# Patient Record
Sex: MALE | Race: WHITE | NOT HISPANIC OR LATINO
[De-identification: names, ages, dates, MRNs, and addresses within clinical notes are randomized per-mention and may not be internally consistent; named-entity substitution may affect disease eponyms.]

---

## 2017-03-09 PROBLEM — Z00.00 ENCOUNTER FOR PREVENTIVE HEALTH EXAMINATION: Status: ACTIVE | Noted: 2017-03-09

## 2017-03-15 ENCOUNTER — APPOINTMENT (OUTPATIENT)
Dept: UROLOGY | Facility: CLINIC | Age: 62
End: 2017-03-15

## 2017-04-03 ENCOUNTER — RECORD ABSTRACTING (OUTPATIENT)
Age: 62
End: 2017-04-03

## 2017-04-03 DIAGNOSIS — Z87.828 PERSONAL HISTORY OF OTHER (HEALED) PHYSICAL INJURY AND TRAUMA: ICD-10-CM

## 2017-04-03 DIAGNOSIS — Z78.9 OTHER SPECIFIED HEALTH STATUS: ICD-10-CM

## 2017-04-03 DIAGNOSIS — Z86.79 PERSONAL HISTORY OF OTHER DISEASES OF THE CIRCULATORY SYSTEM: ICD-10-CM

## 2017-04-03 DIAGNOSIS — Z21 ASYMPTOMATIC HUMAN IMMUNODEFICIENCY VIRUS [HIV] INFECTION STATUS: ICD-10-CM

## 2017-04-03 DIAGNOSIS — J86.9 PYOTHORAX W/OUT FISTULA: ICD-10-CM

## 2017-04-03 DIAGNOSIS — Z86.39 PERSONAL HISTORY OF OTHER ENDOCRINE, NUTRITIONAL AND METABOLIC DISEASE: ICD-10-CM

## 2017-04-26 ENCOUNTER — APPOINTMENT (OUTPATIENT)
Dept: UROLOGY | Facility: CLINIC | Age: 62
End: 2017-04-26

## 2017-04-26 VITALS — SYSTOLIC BLOOD PRESSURE: 194 MMHG | HEART RATE: 89 BPM | DIASTOLIC BLOOD PRESSURE: 110 MMHG

## 2017-04-26 DIAGNOSIS — Z87.438 PERSONAL HISTORY OF OTHER DISEASES OF MALE GENITAL ORGANS: ICD-10-CM

## 2017-04-26 RX ORDER — ASPIRIN 325 MG/1
325 TABLET, FILM COATED ORAL
Refills: 0 | Status: ACTIVE | COMMUNITY

## 2017-04-26 RX ORDER — METOPROLOL TARTRATE 100 MG/1
100 TABLET, FILM COATED ORAL
Refills: 0 | Status: ACTIVE | COMMUNITY

## 2017-04-26 RX ORDER — AMLODIPINE BESYLATE AND BENAZEPRIL HYDROCHLORIDE 10; 20 MG/1; MG/1
10-20 CAPSULE ORAL
Refills: 0 | Status: ACTIVE | COMMUNITY

## 2017-04-26 RX ORDER — BLOOD SUGAR DIAGNOSTIC
STRIP MISCELLANEOUS
Qty: 100 | Refills: 0 | Status: ACTIVE | COMMUNITY
Start: 2016-12-05

## 2017-05-31 ENCOUNTER — APPOINTMENT (OUTPATIENT)
Dept: UROLOGY | Facility: CLINIC | Age: 62
End: 2017-05-31

## 2017-06-29 ENCOUNTER — APPOINTMENT (OUTPATIENT)
Dept: UROLOGY | Facility: CLINIC | Age: 62
End: 2017-06-29

## 2017-06-29 RX ORDER — ISOPROPYL ALCOHOL 0.75 G/1
SWAB TOPICAL
Qty: 100 | Refills: 0 | Status: ACTIVE | COMMUNITY
Start: 2017-06-22

## 2017-08-08 ENCOUNTER — APPOINTMENT (OUTPATIENT)
Dept: UROLOGY | Facility: CLINIC | Age: 62
End: 2017-08-08
Payer: MEDICARE

## 2017-08-08 PROCEDURE — 51705 CHANGE OF BLADDER TUBE: CPT

## 2017-08-08 RX ORDER — NYSTATIN 100000 [USP'U]/G
100000 CREAM TOPICAL
Qty: 30 | Refills: 0 | Status: ACTIVE | COMMUNITY
Start: 2017-06-30

## 2017-08-08 RX ORDER — TRIAMCINOLONE ACETONIDE 0.25 MG/G
0.03 OINTMENT TOPICAL
Qty: 30 | Refills: 0 | Status: ACTIVE | COMMUNITY
Start: 2017-06-30

## 2017-08-17 ENCOUNTER — APPOINTMENT (OUTPATIENT)
Dept: UROLOGY | Facility: CLINIC | Age: 62
End: 2017-08-17

## 2017-09-07 ENCOUNTER — APPOINTMENT (OUTPATIENT)
Dept: UROLOGY | Facility: CLINIC | Age: 62
End: 2017-09-07
Payer: MEDICARE

## 2017-09-07 PROCEDURE — 51710 CHANGE OF BLADDER TUBE: CPT

## 2017-10-05 ENCOUNTER — APPOINTMENT (OUTPATIENT)
Dept: UROLOGY | Facility: CLINIC | Age: 62
End: 2017-10-05
Payer: MEDICARE

## 2017-10-05 VITALS — DIASTOLIC BLOOD PRESSURE: 89 MMHG | SYSTOLIC BLOOD PRESSURE: 154 MMHG | HEART RATE: 88 BPM

## 2017-10-05 PROCEDURE — 51705 CHANGE OF BLADDER TUBE: CPT

## 2017-10-10 ENCOUNTER — RX RENEWAL (OUTPATIENT)
Age: 62
End: 2017-10-10

## 2017-11-02 ENCOUNTER — APPOINTMENT (OUTPATIENT)
Dept: UROLOGY | Facility: CLINIC | Age: 62
End: 2017-11-02
Payer: MEDICARE

## 2017-11-02 VITALS
HEIGHT: 74 IN | WEIGHT: 180 LBS | SYSTOLIC BLOOD PRESSURE: 129 MMHG | HEART RATE: 81 BPM | BODY MASS INDEX: 23.1 KG/M2 | DIASTOLIC BLOOD PRESSURE: 79 MMHG

## 2017-11-02 PROCEDURE — 51705 CHANGE OF BLADDER TUBE: CPT

## 2017-11-28 ENCOUNTER — APPOINTMENT (OUTPATIENT)
Dept: UROLOGY | Facility: CLINIC | Age: 62
End: 2017-11-28
Payer: MEDICARE

## 2017-11-28 PROCEDURE — 51710 CHANGE OF BLADDER TUBE: CPT

## 2017-12-28 ENCOUNTER — APPOINTMENT (OUTPATIENT)
Dept: UROLOGY | Facility: CLINIC | Age: 62
End: 2017-12-28
Payer: MEDICARE

## 2017-12-28 ENCOUNTER — MESSAGE (OUTPATIENT)
Age: 62
End: 2017-12-28

## 2017-12-28 ENCOUNTER — OUTPATIENT (OUTPATIENT)
Dept: OUTPATIENT SERVICES | Facility: HOSPITAL | Age: 62
LOS: 1 days | Discharge: HOME | End: 2017-12-28

## 2017-12-28 DIAGNOSIS — N31.9 NEUROMUSCULAR DYSFUNCTION OF BLADDER, UNSPECIFIED: ICD-10-CM

## 2017-12-28 PROCEDURE — 51710 CHANGE OF BLADDER TUBE: CPT

## 2017-12-29 ENCOUNTER — RESULT REVIEW (OUTPATIENT)
Age: 62
End: 2017-12-29

## 2018-01-23 ENCOUNTER — OUTPATIENT (OUTPATIENT)
Dept: OUTPATIENT SERVICES | Facility: HOSPITAL | Age: 63
LOS: 1 days | Discharge: HOME | End: 2018-01-23

## 2018-01-23 ENCOUNTER — APPOINTMENT (OUTPATIENT)
Dept: UROLOGY | Facility: CLINIC | Age: 63
End: 2018-01-23
Payer: MEDICARE

## 2018-01-23 DIAGNOSIS — N20.0 CALCULUS OF KIDNEY: ICD-10-CM

## 2018-01-23 PROCEDURE — 51710 CHANGE OF BLADDER TUBE: CPT

## 2018-01-25 ENCOUNTER — APPOINTMENT (OUTPATIENT)
Dept: UROLOGY | Facility: CLINIC | Age: 63
End: 2018-01-25

## 2018-01-29 LAB
COMPN STONE: NORMAL
COMPN STONE: NORMAL
NIDUS STONE QL: NORMAL
SPECIMEN SOURCE: NORMAL
WT STONE: 0.03 G

## 2018-02-22 ENCOUNTER — APPOINTMENT (OUTPATIENT)
Dept: UROLOGY | Facility: CLINIC | Age: 63
End: 2018-02-22
Payer: MEDICARE

## 2018-02-22 VITALS
HEIGHT: 74 IN | DIASTOLIC BLOOD PRESSURE: 75 MMHG | WEIGHT: 180 LBS | SYSTOLIC BLOOD PRESSURE: 131 MMHG | HEART RATE: 77 BPM | BODY MASS INDEX: 23.1 KG/M2

## 2018-02-22 PROCEDURE — 51710 CHANGE OF BLADDER TUBE: CPT

## 2018-02-22 RX ORDER — CEFUROXIME AXETIL 250 MG/1
250 TABLET ORAL
Qty: 30 | Refills: 5 | Status: ACTIVE | COMMUNITY
Start: 2018-02-22 | End: 1900-01-01

## 2018-02-28 LAB — COMPN STONE: NORMAL

## 2018-03-22 ENCOUNTER — APPOINTMENT (OUTPATIENT)
Dept: UROLOGY | Facility: CLINIC | Age: 63
End: 2018-03-22

## 2018-03-29 ENCOUNTER — APPOINTMENT (OUTPATIENT)
Dept: UROLOGY | Facility: CLINIC | Age: 63
End: 2018-03-29
Payer: MEDICARE

## 2018-03-29 PROCEDURE — 51705 CHANGE OF BLADDER TUBE: CPT

## 2018-04-11 ENCOUNTER — RX RENEWAL (OUTPATIENT)
Age: 63
End: 2018-04-11

## 2018-04-11 RX ORDER — SOLIFENACIN SUCCINATE 10 MG/1
10 TABLET, FILM COATED ORAL
Qty: 90 | Refills: 3 | Status: ACTIVE | COMMUNITY
Start: 2018-04-11 | End: 1900-01-01

## 2018-04-26 ENCOUNTER — APPOINTMENT (OUTPATIENT)
Dept: UROLOGY | Facility: CLINIC | Age: 63
End: 2018-04-26

## 2018-05-03 ENCOUNTER — APPOINTMENT (OUTPATIENT)
Dept: UROLOGY | Facility: CLINIC | Age: 63
End: 2018-05-03
Payer: MEDICARE

## 2018-05-03 PROCEDURE — 51710 CHANGE OF BLADDER TUBE: CPT

## 2018-05-03 RX ORDER — PEN NEEDLE, DIABETIC 31 GX3/16"
31G X 8 MM NEEDLE, DISPOSABLE MISCELLANEOUS
Qty: 100 | Refills: 0 | Status: ACTIVE | COMMUNITY
Start: 2017-10-10

## 2018-05-03 RX ORDER — CLOBETASOL PROPIONATE 0.5 MG/G
0.05 OINTMENT TOPICAL
Qty: 60 | Refills: 0 | Status: ACTIVE | COMMUNITY
Start: 2018-02-05

## 2018-05-03 RX ORDER — INSULIN DETEMIR 100 [IU]/ML
100 INJECTION, SOLUTION SUBCUTANEOUS
Qty: 12 | Refills: 0 | Status: ACTIVE | COMMUNITY
Start: 2017-06-19

## 2018-05-29 ENCOUNTER — APPOINTMENT (OUTPATIENT)
Dept: UROLOGY | Facility: CLINIC | Age: 63
End: 2018-05-29
Payer: MEDICARE

## 2018-05-29 PROCEDURE — 51710 CHANGE OF BLADDER TUBE: CPT

## 2018-06-26 ENCOUNTER — APPOINTMENT (OUTPATIENT)
Dept: UROLOGY | Facility: CLINIC | Age: 63
End: 2018-06-26
Payer: MEDICARE

## 2018-06-26 VITALS
BODY MASS INDEX: 23.1 KG/M2 | HEART RATE: 85 BPM | DIASTOLIC BLOOD PRESSURE: 86 MMHG | HEIGHT: 74 IN | WEIGHT: 180 LBS | SYSTOLIC BLOOD PRESSURE: 130 MMHG

## 2018-06-26 PROCEDURE — 51705 CHANGE OF BLADDER TUBE: CPT

## 2018-07-17 ENCOUNTER — APPOINTMENT (OUTPATIENT)
Dept: UROLOGY | Facility: CLINIC | Age: 63
End: 2018-07-17
Payer: MEDICARE

## 2018-07-17 PROCEDURE — 51710 CHANGE OF BLADDER TUBE: CPT

## 2018-08-14 ENCOUNTER — APPOINTMENT (OUTPATIENT)
Dept: UROLOGY | Facility: CLINIC | Age: 63
End: 2018-08-14
Payer: MEDICARE

## 2018-08-14 PROCEDURE — 51710 CHANGE OF BLADDER TUBE: CPT

## 2018-08-14 RX ORDER — TRIAMCINOLONE ACETONIDE 1 MG/G
0.1 OINTMENT TOPICAL
Qty: 454 | Refills: 0 | Status: ACTIVE | COMMUNITY
Start: 2018-08-03

## 2018-08-14 RX ORDER — PETROLATUM,WHITE 41 %
OINTMENT (GRAM) TOPICAL
Qty: 396 | Refills: 0 | Status: ACTIVE | COMMUNITY
Start: 2018-08-03

## 2018-08-14 RX ORDER — LANCETS 28 GAUGE
EACH MISCELLANEOUS
Qty: 100 | Refills: 0 | Status: ACTIVE | COMMUNITY
Start: 2017-11-09

## 2018-09-11 ENCOUNTER — APPOINTMENT (OUTPATIENT)
Dept: UROLOGY | Facility: CLINIC | Age: 63
End: 2018-09-11
Payer: MEDICARE

## 2018-09-11 DIAGNOSIS — Z09 ENCOUNTER FOR FOLLOW-UP EXAMINATION AFTER COMPLETED TREATMENT FOR CONDITIONS OTHER THAN MALIGNANT NEOPLASM: ICD-10-CM

## 2018-09-11 PROCEDURE — 51710 CHANGE OF BLADDER TUBE: CPT

## 2018-10-11 ENCOUNTER — APPOINTMENT (OUTPATIENT)
Dept: UROLOGY | Facility: CLINIC | Age: 63
End: 2018-10-11

## 2018-10-19 ENCOUNTER — APPOINTMENT (OUTPATIENT)
Dept: UROLOGY | Facility: CLINIC | Age: 63
End: 2018-10-19
Payer: MEDICARE

## 2018-10-19 PROCEDURE — 51710 CHANGE OF BLADDER TUBE: CPT

## 2018-11-20 ENCOUNTER — APPOINTMENT (OUTPATIENT)
Dept: UROLOGY | Facility: CLINIC | Age: 63
End: 2018-11-20
Payer: MEDICARE

## 2018-11-20 PROCEDURE — 51710 CHANGE OF BLADDER TUBE: CPT

## 2018-11-21 ENCOUNTER — RX RENEWAL (OUTPATIENT)
Age: 63
End: 2018-11-21

## 2018-11-21 RX ORDER — CEFUROXIME AXETIL 250 MG/1
250 TABLET ORAL
Qty: 30 | Refills: 5 | Status: ACTIVE | COMMUNITY
Start: 2018-11-21 | End: 1900-01-01

## 2018-12-18 ENCOUNTER — APPOINTMENT (OUTPATIENT)
Dept: UROLOGY | Facility: CLINIC | Age: 63
End: 2018-12-18
Payer: MEDICARE

## 2018-12-18 PROCEDURE — 51710 CHANGE OF BLADDER TUBE: CPT

## 2019-01-03 ENCOUNTER — APPOINTMENT (OUTPATIENT)
Dept: UROLOGY | Facility: CLINIC | Age: 64
End: 2019-01-03
Payer: MEDICARE

## 2019-01-03 PROCEDURE — 51710 CHANGE OF BLADDER TUBE: CPT

## 2019-01-18 ENCOUNTER — APPOINTMENT (OUTPATIENT)
Dept: UROLOGY | Facility: CLINIC | Age: 64
End: 2019-01-18

## 2019-01-31 ENCOUNTER — APPOINTMENT (OUTPATIENT)
Dept: UROLOGY | Facility: CLINIC | Age: 64
End: 2019-01-31

## 2019-02-07 ENCOUNTER — APPOINTMENT (OUTPATIENT)
Dept: UROLOGY | Facility: CLINIC | Age: 64
End: 2019-02-07
Payer: MEDICARE

## 2019-02-07 VITALS — HEIGHT: 74 IN | BODY MASS INDEX: 23.1 KG/M2 | WEIGHT: 180 LBS

## 2019-02-07 PROCEDURE — 51710 CHANGE OF BLADDER TUBE: CPT

## 2019-03-07 ENCOUNTER — APPOINTMENT (OUTPATIENT)
Dept: UROLOGY | Facility: CLINIC | Age: 64
End: 2019-03-07
Payer: MEDICARE

## 2019-03-07 PROCEDURE — 51710 CHANGE OF BLADDER TUBE: CPT

## 2019-03-07 RX ORDER — SOLIFENACIN SUCCINATE 10 MG/1
10 TABLET, FILM COATED ORAL
Qty: 90 | Refills: 3 | Status: ACTIVE | COMMUNITY
Start: 2019-03-07 | End: 1900-01-01

## 2019-04-04 ENCOUNTER — APPOINTMENT (OUTPATIENT)
Dept: UROLOGY | Facility: CLINIC | Age: 64
End: 2019-04-04
Payer: MEDICARE

## 2019-04-04 PROCEDURE — 51710 CHANGE OF BLADDER TUBE: CPT

## 2019-04-12 ENCOUNTER — RX RENEWAL (OUTPATIENT)
Age: 64
End: 2019-04-12

## 2019-04-12 RX ORDER — METHENAMINE HIPPURATE 1 G/1
1 TABLET ORAL
Qty: 90 | Refills: 3 | Status: ACTIVE | COMMUNITY
Start: 2019-04-12 | End: 1900-01-01

## 2019-05-07 ENCOUNTER — APPOINTMENT (OUTPATIENT)
Dept: UROLOGY | Facility: CLINIC | Age: 64
End: 2019-05-07
Payer: MEDICARE

## 2019-05-07 PROCEDURE — 51710 CHANGE OF BLADDER TUBE: CPT

## 2019-06-06 ENCOUNTER — APPOINTMENT (OUTPATIENT)
Dept: UROLOGY | Facility: CLINIC | Age: 64
End: 2019-06-06
Payer: MEDICARE

## 2019-06-06 PROCEDURE — 51710 CHANGE OF BLADDER TUBE: CPT

## 2019-07-09 ENCOUNTER — APPOINTMENT (OUTPATIENT)
Dept: UROLOGY | Facility: CLINIC | Age: 64
End: 2019-07-09
Payer: MEDICARE

## 2019-07-09 PROCEDURE — 51702 INSERT TEMP BLADDER CATH: CPT

## 2019-07-17 ENCOUNTER — RX RENEWAL (OUTPATIENT)
Age: 64
End: 2019-07-17

## 2019-08-06 ENCOUNTER — APPOINTMENT (OUTPATIENT)
Dept: UROLOGY | Facility: CLINIC | Age: 64
End: 2019-08-06
Payer: MEDICARE

## 2019-08-06 PROCEDURE — 51703 INSERT BLADDER CATH COMPLEX: CPT

## 2019-09-02 PROBLEM — Z09 FOLLOW UP: Status: ACTIVE | Noted: 2018-07-17

## 2019-09-03 ENCOUNTER — APPOINTMENT (OUTPATIENT)
Dept: UROLOGY | Facility: CLINIC | Age: 64
End: 2019-09-03
Payer: MEDICARE

## 2019-09-03 PROCEDURE — 51710 CHANGE OF BLADDER TUBE: CPT

## 2019-10-02 ENCOUNTER — APPOINTMENT (OUTPATIENT)
Dept: UROLOGY | Facility: CLINIC | Age: 64
End: 2019-10-02

## 2019-10-08 ENCOUNTER — APPOINTMENT (OUTPATIENT)
Dept: UROLOGY | Facility: CLINIC | Age: 64
End: 2019-10-08
Payer: MEDICARE

## 2019-10-08 PROCEDURE — 51710 CHANGE OF BLADDER TUBE: CPT

## 2019-11-05 ENCOUNTER — APPOINTMENT (OUTPATIENT)
Dept: UROLOGY | Facility: CLINIC | Age: 64
End: 2019-11-05
Payer: MEDICARE

## 2019-11-05 PROCEDURE — 51705 CHANGE OF BLADDER TUBE: CPT

## 2019-12-05 ENCOUNTER — APPOINTMENT (OUTPATIENT)
Dept: UROLOGY | Facility: CLINIC | Age: 64
End: 2019-12-05
Payer: MEDICARE

## 2019-12-05 PROCEDURE — 51710 CHANGE OF BLADDER TUBE: CPT

## 2020-01-06 ENCOUNTER — APPOINTMENT (OUTPATIENT)
Dept: UROLOGY | Facility: CLINIC | Age: 65
End: 2020-01-06
Payer: MEDICARE

## 2020-01-06 PROCEDURE — 51710 CHANGE OF BLADDER TUBE: CPT

## 2020-01-06 RX ORDER — ATORVASTATIN CALCIUM 20 MG/1
20 TABLET, FILM COATED ORAL
Qty: 90 | Refills: 0 | Status: ACTIVE | COMMUNITY
Start: 2019-12-19

## 2020-01-06 RX ORDER — METOPROLOL SUCCINATE 100 MG/1
100 TABLET, EXTENDED RELEASE ORAL
Qty: 90 | Refills: 0 | Status: ACTIVE | COMMUNITY
Start: 2019-12-11

## 2020-02-04 ENCOUNTER — APPOINTMENT (OUTPATIENT)
Dept: UROLOGY | Facility: CLINIC | Age: 65
End: 2020-02-04
Payer: MEDICARE

## 2020-02-04 PROCEDURE — 51710 CHANGE OF BLADDER TUBE: CPT

## 2020-03-03 ENCOUNTER — APPOINTMENT (OUTPATIENT)
Dept: UROLOGY | Facility: CLINIC | Age: 65
End: 2020-03-03
Payer: MEDICARE

## 2020-03-03 PROCEDURE — 51705 CHANGE OF BLADDER TUBE: CPT

## 2020-03-31 ENCOUNTER — APPOINTMENT (OUTPATIENT)
Dept: UROLOGY | Facility: CLINIC | Age: 65
End: 2020-03-31
Payer: MEDICARE

## 2020-03-31 VITALS
SYSTOLIC BLOOD PRESSURE: 126 MMHG | WEIGHT: 180 LBS | HEART RATE: 80 BPM | HEIGHT: 74 IN | TEMPERATURE: 97.2 F | BODY MASS INDEX: 23.1 KG/M2 | DIASTOLIC BLOOD PRESSURE: 80 MMHG

## 2020-03-31 PROCEDURE — 51705 CHANGE OF BLADDER TUBE: CPT

## 2020-05-07 ENCOUNTER — APPOINTMENT (OUTPATIENT)
Dept: UROLOGY | Facility: CLINIC | Age: 65
End: 2020-05-07
Payer: MEDICARE

## 2020-05-07 PROCEDURE — 51710 CHANGE OF BLADDER TUBE: CPT

## 2020-06-04 ENCOUNTER — APPOINTMENT (OUTPATIENT)
Dept: UROLOGY | Facility: CLINIC | Age: 65
End: 2020-06-04
Payer: MEDICARE

## 2020-06-04 PROCEDURE — 51710 CHANGE OF BLADDER TUBE: CPT

## 2020-07-02 ENCOUNTER — APPOINTMENT (OUTPATIENT)
Dept: UROLOGY | Facility: CLINIC | Age: 65
End: 2020-07-02
Payer: MEDICARE

## 2020-07-02 PROCEDURE — 51710 CHANGE OF BLADDER TUBE: CPT

## 2020-07-08 ENCOUNTER — RX RENEWAL (OUTPATIENT)
Age: 65
End: 2020-07-08

## 2020-07-08 RX ORDER — OXYBUTYNIN CHLORIDE 10 MG/1
10 TABLET, EXTENDED RELEASE ORAL
Qty: 90 | Refills: 3 | Status: ACTIVE | COMMUNITY
Start: 2019-07-17 | End: 1900-01-01

## 2020-08-06 ENCOUNTER — APPOINTMENT (OUTPATIENT)
Dept: UROLOGY | Facility: CLINIC | Age: 65
End: 2020-08-06
Payer: MEDICARE

## 2020-08-06 PROCEDURE — 51710 CHANGE OF BLADDER TUBE: CPT

## 2020-09-08 ENCOUNTER — APPOINTMENT (OUTPATIENT)
Dept: UROLOGY | Facility: CLINIC | Age: 65
End: 2020-09-08
Payer: MEDICARE

## 2020-09-08 VITALS — TEMPERATURE: 98.1 F | WEIGHT: 180 LBS | HEIGHT: 74 IN | BODY MASS INDEX: 23.1 KG/M2

## 2020-09-08 PROCEDURE — 51710 CHANGE OF BLADDER TUBE: CPT

## 2020-09-18 ENCOUNTER — APPOINTMENT (OUTPATIENT)
Dept: NEUROLOGY | Facility: CLINIC | Age: 65
End: 2020-09-18

## 2020-09-22 ENCOUNTER — APPOINTMENT (OUTPATIENT)
Dept: UROLOGY | Facility: CLINIC | Age: 65
End: 2020-09-22

## 2020-10-06 ENCOUNTER — APPOINTMENT (OUTPATIENT)
Dept: UROLOGY | Facility: CLINIC | Age: 65
End: 2020-10-06
Payer: MEDICARE

## 2020-10-06 VITALS — TEMPERATURE: 97.3 F

## 2020-10-06 PROCEDURE — 51710 CHANGE OF BLADDER TUBE: CPT

## 2020-10-06 RX ORDER — METHENAMINE HIPPURATE 1 G/1
1 TABLET ORAL
Qty: 90 | Refills: 3 | Status: ACTIVE | COMMUNITY
Start: 2019-03-07 | End: 1900-01-01

## 2020-11-10 ENCOUNTER — APPOINTMENT (OUTPATIENT)
Dept: UROLOGY | Facility: CLINIC | Age: 65
End: 2020-11-10
Payer: MEDICARE

## 2020-11-10 VITALS — WEIGHT: 180 LBS | HEIGHT: 74 IN | BODY MASS INDEX: 23.1 KG/M2 | TEMPERATURE: 98.6 F

## 2020-11-10 PROCEDURE — 51710 CHANGE OF BLADDER TUBE: CPT

## 2020-11-10 PROCEDURE — 99072 ADDL SUPL MATRL&STAF TM PHE: CPT

## 2020-12-08 ENCOUNTER — APPOINTMENT (OUTPATIENT)
Dept: UROLOGY | Facility: CLINIC | Age: 65
End: 2020-12-08
Payer: MEDICARE

## 2020-12-08 PROCEDURE — 99072 ADDL SUPL MATRL&STAF TM PHE: CPT

## 2020-12-08 PROCEDURE — 51710 CHANGE OF BLADDER TUBE: CPT

## 2021-01-05 ENCOUNTER — APPOINTMENT (OUTPATIENT)
Dept: UROLOGY | Facility: CLINIC | Age: 66
End: 2021-01-05
Payer: MEDICARE

## 2021-01-05 VITALS — HEIGHT: 74 IN | BODY MASS INDEX: 23.1 KG/M2 | WEIGHT: 180 LBS | TEMPERATURE: 98 F

## 2021-01-05 PROCEDURE — 51710 CHANGE OF BLADDER TUBE: CPT

## 2021-01-05 PROCEDURE — 99072 ADDL SUPL MATRL&STAF TM PHE: CPT

## 2021-01-05 RX ORDER — FLASH GLUCOSE SENSOR
KIT MISCELLANEOUS
Qty: 2 | Refills: 0 | Status: ACTIVE | COMMUNITY
Start: 2020-12-08

## 2021-02-09 ENCOUNTER — APPOINTMENT (OUTPATIENT)
Dept: UROLOGY | Facility: CLINIC | Age: 66
End: 2021-02-09
Payer: MEDICARE

## 2021-02-09 PROCEDURE — 99072 ADDL SUPL MATRL&STAF TM PHE: CPT

## 2021-02-09 PROCEDURE — 51710 CHANGE OF BLADDER TUBE: CPT

## 2021-03-10 ENCOUNTER — APPOINTMENT (OUTPATIENT)
Dept: UROLOGY | Facility: CLINIC | Age: 66
End: 2021-03-10
Payer: MEDICARE

## 2021-03-10 PROCEDURE — 51705 CHANGE OF BLADDER TUBE: CPT

## 2021-03-10 PROCEDURE — 99072 ADDL SUPL MATRL&STAF TM PHE: CPT

## 2021-03-24 ENCOUNTER — INPATIENT (INPATIENT)
Facility: HOSPITAL | Age: 66
LOS: 6 days | Discharge: HOME HEALTH | End: 2021-03-31
Attending: INTERNAL MEDICINE | Admitting: INTERNAL MEDICINE
Payer: MEDICARE

## 2021-03-24 VITALS
DIASTOLIC BLOOD PRESSURE: 72 MMHG | RESPIRATION RATE: 18 BRPM | WEIGHT: 149.91 LBS | HEART RATE: 80 BPM | OXYGEN SATURATION: 92 % | SYSTOLIC BLOOD PRESSURE: 117 MMHG | TEMPERATURE: 99 F

## 2021-03-24 DIAGNOSIS — M46.20 OSTEOMYELITIS OF VERTEBRA, SITE UNSPECIFIED: Chronic | ICD-10-CM

## 2021-03-24 LAB
ALBUMIN SERPL ELPH-MCNC: 3.2 G/DL — LOW (ref 3.5–5.2)
ALP SERPL-CCNC: 127 U/L — HIGH (ref 30–115)
ALT FLD-CCNC: 28 U/L — SIGNIFICANT CHANGE UP (ref 0–41)
ANION GAP SERPL CALC-SCNC: 15 MMOL/L — HIGH (ref 7–14)
APTT BLD: 36 SEC — SIGNIFICANT CHANGE UP (ref 27–39.2)
AST SERPL-CCNC: 69 U/L — HIGH (ref 0–41)
BASE EXCESS BLDV CALC-SCNC: -7.9 MMOL/L — LOW (ref -2–2)
BASOPHILS # BLD AUTO: 0.03 K/UL — SIGNIFICANT CHANGE UP (ref 0–0.2)
BASOPHILS NFR BLD AUTO: 0.6 % — SIGNIFICANT CHANGE UP (ref 0–1)
BILIRUB SERPL-MCNC: 0.3 MG/DL — SIGNIFICANT CHANGE UP (ref 0.2–1.2)
BUN SERPL-MCNC: 87 MG/DL — CRITICAL HIGH (ref 10–20)
CA-I SERPL-SCNC: 1.19 MMOL/L — SIGNIFICANT CHANGE UP (ref 1.12–1.3)
CALCIUM SERPL-MCNC: 8.9 MG/DL — SIGNIFICANT CHANGE UP (ref 8.5–10.1)
CHLORIDE SERPL-SCNC: 102 MMOL/L — SIGNIFICANT CHANGE UP (ref 98–110)
CO2 SERPL-SCNC: 16 MMOL/L — LOW (ref 17–32)
CREAT SERPL-MCNC: 3.1 MG/DL — HIGH (ref 0.7–1.5)
D DIMER BLD IA.RAPID-MCNC: 570 NG/ML DDU — HIGH (ref 0–230)
EOSINOPHIL # BLD AUTO: 0.02 K/UL — SIGNIFICANT CHANGE UP (ref 0–0.7)
EOSINOPHIL NFR BLD AUTO: 0.4 % — SIGNIFICANT CHANGE UP (ref 0–8)
GAS PNL BLDV: 140 MMOL/L — SIGNIFICANT CHANGE UP (ref 136–145)
GAS PNL BLDV: SIGNIFICANT CHANGE UP
GLUCOSE BLDC GLUCOMTR-MCNC: 111 MG/DL — HIGH (ref 70–99)
GLUCOSE SERPL-MCNC: 75 MG/DL — SIGNIFICANT CHANGE UP (ref 70–99)
HCO3 BLDV-SCNC: 18 MMOL/L — LOW (ref 22–29)
HCT VFR BLD CALC: 38.9 % — LOW (ref 42–52)
HCT VFR BLDA CALC: 35.1 % — SIGNIFICANT CHANGE UP (ref 34–44)
HGB BLD CALC-MCNC: 11.5 G/DL — LOW (ref 14–18)
HGB BLD-MCNC: 12.6 G/DL — LOW (ref 14–18)
IMM GRANULOCYTES NFR BLD AUTO: 1.1 % — HIGH (ref 0.1–0.3)
INR BLD: 1.1 RATIO — SIGNIFICANT CHANGE UP (ref 0.65–1.3)
LACTATE BLDV-MCNC: 1.5 MMOL/L — SIGNIFICANT CHANGE UP (ref 0.5–1.6)
LACTATE SERPL-SCNC: 1.6 MMOL/L — SIGNIFICANT CHANGE UP (ref 0.7–2)
LYMPHOCYTES # BLD AUTO: 1.43 K/UL — SIGNIFICANT CHANGE UP (ref 1.2–3.4)
LYMPHOCYTES # BLD AUTO: 27.4 % — SIGNIFICANT CHANGE UP (ref 20.5–51.1)
MCHC RBC-ENTMCNC: 28.1 PG — SIGNIFICANT CHANGE UP (ref 27–31)
MCHC RBC-ENTMCNC: 32.4 G/DL — SIGNIFICANT CHANGE UP (ref 32–37)
MCV RBC AUTO: 86.6 FL — SIGNIFICANT CHANGE UP (ref 80–94)
MONOCYTES # BLD AUTO: 0.14 K/UL — SIGNIFICANT CHANGE UP (ref 0.1–0.6)
MONOCYTES NFR BLD AUTO: 2.7 % — SIGNIFICANT CHANGE UP (ref 1.7–9.3)
NEUTROPHILS # BLD AUTO: 3.54 K/UL — SIGNIFICANT CHANGE UP (ref 1.4–6.5)
NEUTROPHILS NFR BLD AUTO: 67.8 % — SIGNIFICANT CHANGE UP (ref 42.2–75.2)
NRBC # BLD: 0 /100 WBCS — SIGNIFICANT CHANGE UP (ref 0–0)
PCO2 BLDV: 39 MMHG — LOW (ref 42–55)
PH BLDV: 7.28 — SIGNIFICANT CHANGE UP (ref 7.26–7.43)
PLATELET # BLD AUTO: 249 K/UL — SIGNIFICANT CHANGE UP (ref 130–400)
PO2 BLDV: 19 MMHG — LOW (ref 20–40)
POTASSIUM BLDV-SCNC: 4.5 MMOL/L — SIGNIFICANT CHANGE UP (ref 3.3–5.6)
POTASSIUM SERPL-MCNC: 4.6 MMOL/L — SIGNIFICANT CHANGE UP (ref 3.5–5)
POTASSIUM SERPL-SCNC: 4.6 MMOL/L — SIGNIFICANT CHANGE UP (ref 3.5–5)
PROT SERPL-MCNC: 8.8 G/DL — HIGH (ref 6–8)
PROTHROM AB SERPL-ACNC: 12.6 SEC — SIGNIFICANT CHANGE UP (ref 9.95–12.87)
RBC # BLD: 4.49 M/UL — LOW (ref 4.7–6.1)
RBC # FLD: 14.4 % — SIGNIFICANT CHANGE UP (ref 11.5–14.5)
SAO2 % BLDV: 28 % — SIGNIFICANT CHANGE UP
SARS-COV-2 RNA SPEC QL NAA+PROBE: DETECTED
SODIUM SERPL-SCNC: 133 MMOL/L — LOW (ref 135–146)
TROPONIN T SERPL-MCNC: 0.05 NG/ML — CRITICAL HIGH
WBC # BLD: 5.22 K/UL — SIGNIFICANT CHANGE UP (ref 4.8–10.8)
WBC # FLD AUTO: 5.22 K/UL — SIGNIFICANT CHANGE UP (ref 4.8–10.8)

## 2021-03-24 PROCEDURE — 99285 EMERGENCY DEPT VISIT HI MDM: CPT

## 2021-03-24 PROCEDURE — 99223 1ST HOSP IP/OBS HIGH 75: CPT

## 2021-03-24 PROCEDURE — 93010 ELECTROCARDIOGRAM REPORT: CPT

## 2021-03-24 PROCEDURE — 71045 X-RAY EXAM CHEST 1 VIEW: CPT | Mod: 26

## 2021-03-24 RX ORDER — SODIUM CHLORIDE 9 MG/ML
1000 INJECTION, SOLUTION INTRAVENOUS
Refills: 0 | Status: DISCONTINUED | OUTPATIENT
Start: 2021-03-24 | End: 2021-03-29

## 2021-03-24 RX ORDER — DEXAMETHASONE 0.5 MG/5ML
6 ELIXIR ORAL DAILY
Refills: 0 | Status: DISCONTINUED | OUTPATIENT
Start: 2021-03-24 | End: 2021-03-30

## 2021-03-24 RX ORDER — OXYBUTYNIN CHLORIDE 5 MG
10 TABLET ORAL DAILY
Refills: 0 | Status: ACTIVE | OUTPATIENT
Start: 2021-03-24 | End: 2022-02-20

## 2021-03-24 RX ORDER — METOPROLOL TARTRATE 50 MG
100 TABLET ORAL DAILY
Refills: 0 | Status: DISCONTINUED | OUTPATIENT
Start: 2021-03-24 | End: 2021-03-27

## 2021-03-24 RX ORDER — HEPARIN SODIUM 5000 [USP'U]/ML
5000 INJECTION INTRAVENOUS; SUBCUTANEOUS EVERY 8 HOURS
Refills: 0 | Status: ACTIVE | OUTPATIENT
Start: 2021-03-24 | End: 2022-02-20

## 2021-03-24 RX ORDER — AZITHROMYCIN 500 MG/1
500 TABLET, FILM COATED ORAL ONCE
Refills: 0 | Status: COMPLETED | OUTPATIENT
Start: 2021-03-24 | End: 2021-03-24

## 2021-03-24 RX ORDER — SODIUM CHLORIDE 9 MG/ML
1000 INJECTION, SOLUTION INTRAVENOUS ONCE
Refills: 0 | Status: COMPLETED | OUTPATIENT
Start: 2021-03-24 | End: 2021-03-24

## 2021-03-24 RX ORDER — CHLORHEXIDINE GLUCONATE 213 G/1000ML
1 SOLUTION TOPICAL
Refills: 0 | Status: ACTIVE | OUTPATIENT
Start: 2021-03-24 | End: 2022-02-20

## 2021-03-24 RX ORDER — DEXAMETHASONE 0.5 MG/5ML
6 ELIXIR ORAL ONCE
Refills: 0 | Status: COMPLETED | OUTPATIENT
Start: 2021-03-24 | End: 2021-03-24

## 2021-03-24 RX ORDER — CEFTRIAXONE 500 MG/1
1000 INJECTION, POWDER, FOR SOLUTION INTRAMUSCULAR; INTRAVENOUS ONCE
Refills: 0 | Status: COMPLETED | OUTPATIENT
Start: 2021-03-24 | End: 2021-03-24

## 2021-03-24 RX ORDER — ATORVASTATIN CALCIUM 80 MG/1
40 TABLET, FILM COATED ORAL DAILY
Refills: 0 | Status: ACTIVE | OUTPATIENT
Start: 2021-03-24 | End: 2022-02-20

## 2021-03-24 RX ORDER — AMLODIPINE BESYLATE 2.5 MG/1
10 TABLET ORAL DAILY
Refills: 0 | Status: ACTIVE | OUTPATIENT
Start: 2021-03-24 | End: 2022-02-20

## 2021-03-24 RX ADMIN — Medication 6 MILLIGRAM(S): at 16:18

## 2021-03-24 RX ADMIN — AZITHROMYCIN 255 MILLIGRAM(S): 500 TABLET, FILM COATED ORAL at 16:18

## 2021-03-24 RX ADMIN — HEPARIN SODIUM 5000 UNIT(S): 5000 INJECTION INTRAVENOUS; SUBCUTANEOUS at 21:50

## 2021-03-24 RX ADMIN — CEFTRIAXONE 100 MILLIGRAM(S): 500 INJECTION, POWDER, FOR SOLUTION INTRAMUSCULAR; INTRAVENOUS at 16:18

## 2021-03-24 RX ADMIN — SODIUM CHLORIDE 1000 MILLILITER(S): 9 INJECTION, SOLUTION INTRAVENOUS at 13:43

## 2021-03-24 RX ADMIN — SODIUM CHLORIDE 75 MILLILITER(S): 9 INJECTION, SOLUTION INTRAVENOUS at 21:57

## 2021-03-24 NOTE — ED PROVIDER NOTE - NS ED ROS FT
Review of Systems:  	•	CONSTITUTIONAL - no fever, no diaphoresis, no chills  	•	SKIN - no rash  	•	HEMATOLOGIC - no bleeding, no bruising  	•	EYES - no eye pain, no blurry vision  	•	ENT - no congestion  	•	RESPIRATORY - no shortness of breath, + cough  	•	CARDIAC - no chest pain, no palpitations  	•	GI - no abd pain, no nausea, no vomiting, no diarrhea, no constipation  	•	GENITO-URINARY - no dysuria; no hematuria, no increased urinary frequency  	•	MUSCULOSKELETAL - no joint paint, no swelling, no redness  	•	NEUROLOGIC - + weakness, no headache, no paresthesias, no LOC  	•	PSYCH - no anxiety, no depression  	All other ROS are negative except as documented in HPI.
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
## 2021-03-24 NOTE — ED PROVIDER NOTE - PHYSICAL EXAMINATION
VITAL SIGNS: I have reviewed nursing notes and confirm.  CONSTITUTIONAL: Ill-appearing male; in no acute distress.  SKIN: Skin exam is warm and dry, no acute rash.  HEAD: Normocephalic; atraumatic.  EYES: PERRL, EOM intact; conjunctiva and sclera clear.  ENT: No nasal discharge; airway clear.   NECK: Supple; non tender.  CARD: S1, S2 normal; no murmurs, gallops, or rubs. Regular rate and rhythm.  RESP: Clear to auscultation bilaterally. No wheezes, rales or rhonchi.  ABD: Normal bowel sounds; soft; non-distended; non-tender.   EXT: Contracted extremities. Moving extremities. No edema.  LYMPH: No acute cervical adenopathy.  NEURO: Alert, oriented. Grossly unremarkable. No focal deficits.  PSYCH: Cooperative, appropriate.

## 2021-03-24 NOTE — H&P ADULT - NSICDXPASTMEDICALHX_GEN_ALL_CORE_FT
PAST MEDICAL HISTORY:  DM (diabetes mellitus)     H/O paraplegia     HTN (hypertension)     Spinal abscess

## 2021-03-24 NOTE — H&P ADULT - ASSESSMENT
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 COVID-19 Positive    #) Acute hypoxic respiratory failure secondary to COVID-19  - Symptoms began 3/15  - Requiring 3L NC to saturate 95%   - Wean as tolerated  - Will begin IV dexamethasone 6mg QD  - Outside of window for Remdesivir  - Follow-up inflammatory markers   - Procal sent - ED gave antibiotics to treat bacterial pneumonia, no real evidence on CXR  - Follow-up COVID antibodies   - Active type and screen in anticipation of possible convalescent plasma   - Heparin 5,000 Q8 for DVT prophylaxis     #) JOÃO   - Likely due to dehydration     #) HTN   - BP acceptable; continue home amlodipine + metoprolol     #) DM   - Not on home insulin   - Follow-up Finger-stick glucose + hemoglobin A1C    #) Diet - DASH/consistent carbohydrate  #) DVT prophylaxis - heparin 5,000 Q8   #) Disposition- TBD  #) Activity- increase as tolerated  #) Code status - Full    64 y/o male with PMH, DM, HTN, 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 COVID-19 Positive    #) Acute hypoxic respiratory failure secondary to COVID-19  - Symptoms began 3/15  - Requiring 3L NC to saturate 95%   - Wean as tolerated  - Will begin IV dexamethasone 6mg QD  - Outside of window for Remdesivir  - Follow-up inflammatory markers   - Procal sent - ED gave antibiotics to treat bacterial pneumonia, no real evidence on CXR  - Follow-up COVID antibodies   - Active type and screen in anticipation of possible convalescent plasma   - Heparin 5,000 Q8 for DVT prophylaxis     #) JOÃO   - Likely due to dehydration   - LR 75 cc/hr  - Monitor daily CMP    #) HTN   - BP acceptable; continue home amlodipine + metoprolol     #) DM   - Not on home insulin   - Follow-up Finger-stick glucose + hemoglobin A1C    #) Diet - DASH/consistent carbohydrate  #) DVT prophylaxis - heparin 5,000 Q8   #) Disposition- TBD  #) Activity- increase as tolerated  #) Code status - Full    66 y/o male with PMH, DM, HTN, 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 COVID-19 Positive    #) Acute hypoxic respiratory failure secondary to COVID-19  - Symptoms began 3/15  - Requiring 3L NC to saturate 95%   - Wean as tolerated  - Will begin IV dexamethasone 6mg QD  - Outside of window for Remdesivir  - Follow-up inflammatory markers   - Procal sent - ED gave antibiotics to treat bacterial pneumonia, no real evidence on CXR  - Follow-up COVID antibodies   - Active type and screen in anticipation of possible convalescent plasma   - Heparin 5,000 Q8 for DVT prophylaxis     #) Elevated troponin   - No complaints of chest pain   - Follow-up EKG  - Likely due to JOÃO   - Trend with am labs     #) JOÃO   - Likely due to dehydration   - LR 75 cc/hr  - Monitor daily CMP    #) Transaminitis   - Likely due to covid  - Monitor with daily labs     #) HTN   - BP acceptable; continue home amlodipine + metoprolol     #) Hyponatremia   - Mild   - Trend with daily labs  - On IVF  - If persistently low send urine studies     #) DM   - Not on home insulin   - Follow-up Finger-stick glucose + hemoglobin A1C    #) Diet - DASH/consistent carbohydrate  #) DVT prophylaxis - heparin 5,000 Q8   #) Disposition- TBD  #) Activity- increase as tolerated  #) Code status - Full    66 y/o male with PMH, DM, HTN, 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 COVID-19 Positive    #) Acute hypoxic respiratory failure secondary to COVID-19  - Symptoms began 3/15  - Requiring 3L NC to saturate 95%   - Wean as tolerated  - Will begin IV dexamethasone 6mg QD  - Outside of window for Remdesivir  - Follow-up inflammatory markers   - Procal sent - ED gave antibiotics to treat bacterial pneumonia, no real evidence on CXR  - Follow-up COVID antibodies   - Active type and screen in anticipation of possible convalescent plasma   - Heparin 5,000 Q8 for DVT prophylaxis     #) Elevated troponin   - No complaints of chest pain   - Follow-up EKG  - Likely due to JOÃO   - Trend with am labs     #) JOÃO   - Likely due to dehydration   - LR 75 cc/hr  - Monitor daily CMP    #) Transaminitis   - Likely due to covid  - Monitor with daily labs     #) HTN   - BP acceptable; continue home amlodipine + metoprolol     #) Hyponatremia   - Mild   - Trend with daily labs  - On IVF  - If persistently low send urine studies     #) DM   - Not on home insulin   - Follow-up Finger-stick glucose + hemoglobin A1C    #) Normocytic anemia   - Follow-up iron studies     #) Diet - DASH/consistent carbohydrate  #) DVT prophylaxis - heparin 5,000 Q8   #) Disposition- TBD  #) Activity- increase as tolerated  #) Code status - Full

## 2021-03-24 NOTE — ED PROVIDER NOTE - ATTENDING CONTRIBUTION TO CARE
65 yr old m w/ a pmh significant for dm, paraplegic, htn, chronic Dye who presents with weakness. Pt states that for the past couple of days he has not been feeling well. Pt states that he has also been having a cough for the past four days. Of note, pt has been recently exposed to covid by a family member (brother and nephew are covid positive). Pt denies any chest pain, sob, nausea, vomiting, diarrhea, or any other complaints.     VITAL SIGNS: I have reviewed nursing notes and confirm.  CONSTITUTIONAL: cachectic  SKIN: no rash, no petechiae.  EYES: PERRL, EOMI, pink conjunctiva, anicteric  ENT: tongue midline, no exudates, MMM  NECK: Supple; no meningismus, no JVD  CARD: RRR, no murmurs, equal radial pulses bilaterally 2+  RESP: tacypneic, pt noted to be 90% on room air   ABD: Soft, non-tender, non-distended, no peritoneal signs, no HSM, no CVA tenderness  EXT: Normal ROM x4. No edema. No calves tenderness  NEURO: Alert, oriented. CN2-12 intact, equal strength bilaterally, nl gait.  PSYCH: Cooperative, appropriate.    a/p  65 yr old m that presents with sob, concern for covid  -labs  -ekg  -cxr  -consider admission

## 2021-03-24 NOTE — H&P ADULT - NSHPPHYSICALEXAM_GEN_ALL_CORE
VITALS:   T(F): 98.2  HR: 74  BP: 136/71  RR: 18  SpO2: 95%    PHYSICAL EXAM:  GENERAL: NAD, speaks in full sentences, no signs of respiratory distress  HEAD: Atraumatic  NECK: Supple  CHEST/LUNG: Clear to auscultation bilaterally; No wheeze or crackles  HEART: S1, S2; RRR; No murmurs, rubs, or gallops  ABDOMEN: BS+; Soft, Non-tender, Non-distended  EXTREMITIES:  2+ Peripheral Pulses, No clubbing, cyanosis, or edema  PSYCH: AAOx3  NEUROLOGY: non-focal  SKIN: No rashes or lesions VITALS:   T(F): 98.2  HR: 74  BP: 136/71  RR: 18  SpO2: 95%    PHYSICAL EXAM:  GENERAL: NAD, appears confused  HEAD: Atraumatic; mucous membranes appear dry   NECK: Supple  CHEST/LUNG: Poor inspiratory effort  HEART: S1, S2; RRR; No murmurs, rubs, or gallops  ABDOMEN: BS+; Soft, Non-tender, Non-distended  EXTREMITIES:  2+ Peripheral Pulses, No clubbing, cyanosis, or edema  PSYCH: AAOx1  NEUROLOGY: non-focal  SKIN: No rashes or lesions

## 2021-03-24 NOTE — ED PROVIDER NOTE - OBJECTIVE STATEMENT
66 yo M with pmhx of HTN, DM, paraplegic, neurogenic bladder with angelo catheter presenting for generalized weakness x 4 days. Symptoms are moderate. Pt caretaker, brother is COVID +. Symptoms are moderate. Patient also has been having cough. No cp, sob, fever, chills, abdominal pain, nausea, vomiting, diarrhea, back pain, urinary symptoms, headache, dizziness, or paresthesias.

## 2021-03-24 NOTE — H&P ADULT - NSHPLABSRESULTS_GEN_ALL_CORE
LABS:                        12.6   5.22  )-----------( 249      ( 24 Mar 2021 13:45 )             38.9     03-24    133<L>  |  102  |  87<HH>  ----------------------------<  75  4.6   |  16<L>  |  3.1<H>    Ca    8.9      24 Mar 2021 13:45    TPro  8.8<H>  /  Alb  3.2<L>  /  TBili  0.3  /  DBili  x   /  AST  69<H>  /  ALT  28  /  AlkPhos  127<H>  03-24    PT/INR - ( 24 Mar 2021 13:45 )   PT: 12.60 sec;   INR: 1.10 ratio         PTT - ( 24 Mar 2021 13:45 )  PTT:36.0 sec      Troponin T, Serum: 0.05 ng/mL <HH> (03-24-21 @ 13:45)  Lactate, Blood: 1.6 mmol/L (03-24-21 @ 13:45)      CARDIAC MARKERS ( 24 Mar 2021 13:45 )  x     / 0.05 ng/mL / x     / x     / x          RADIOLOGY: LABS:                        12.6   5.22  )-----------( 249      ( 24 Mar 2021 13:45 )             38.9     03-24    133<L>  |  102  |  87<HH>  ----------------------------<  75  4.6   |  16<L>  |  3.1<H>    Ca    8.9      24 Mar 2021 13:45    TPro  8.8<H>  /  Alb  3.2<L>  /  TBili  0.3  /  DBili  x   /  AST  69<H>  /  ALT  28  /  AlkPhos  127<H>  03-24    PT/INR - ( 24 Mar 2021 13:45 )   PT: 12.60 sec;   INR: 1.10 ratio         PTT - ( 24 Mar 2021 13:45 )  PTT:36.0 sec      Troponin T, Serum: 0.05 ng/mL <HH> (03-24-21 @ 13:45)  Lactate, Blood: 1.6 mmol/L (03-24-21 @ 13:45)      CARDIAC MARKERS ( 24 Mar 2021 13:45 )  x     / 0.05 ng/mL / x     / x     / x          RADIOLOGY:  3/24 CXR -   Impression:    Bilateral mid to lower lung predominant hazy opacities may reflect pneumonia in the appropriate clinical setting. LABS:                        12.6   5.22  )-----------( 249      ( 24 Mar 2021 13:45 )             38.9     03-24    133<L>  |  102  |  87<HH>  ----------------------------<  75  4.6   |  16<L>  |  3.1<H>    Ca    8.9      24 Mar 2021 13:45    TPro  8.8<H>  /  Alb  3.2<L>  /  TBili  0.3  /  DBili  x   /  AST  69<H>  /  ALT  28  /  AlkPhos  127<H>  03-24    PT/INR - ( 24 Mar 2021 13:45 )   PT: 12.60 sec;   INR: 1.10 ratio         PTT - ( 24 Mar 2021 13:45 )  PTT:36.0 sec      Troponin T, Serum: 0.05 ng/mL <HH> (03-24-21 @ 13:45)  Lactate, Blood: 1.6 mmol/L (03-24-21 @ 13:45)      CARDIAC MARKERS ( 24 Mar 2021 13:45 )  x     / 0.05 ng/mL / x     / x     / x          RADIOLOGY:  3/24 CXR -   Impression:    Bilateral mid to lower lung predominant hazy opacities may reflect pneumonia in the appropriate clinical setting.    EKG: NSR

## 2021-03-25 LAB
A1C WITH ESTIMATED AVERAGE GLUCOSE RESULT: 6.7 % — HIGH (ref 4–5.6)
ALBUMIN SERPL ELPH-MCNC: 3 G/DL — LOW (ref 3.5–5.2)
ALP SERPL-CCNC: 106 U/L — SIGNIFICANT CHANGE UP (ref 30–115)
ALT FLD-CCNC: 22 U/L — SIGNIFICANT CHANGE UP (ref 0–41)
ANION GAP SERPL CALC-SCNC: 12 MMOL/L — SIGNIFICANT CHANGE UP (ref 7–14)
ANION GAP SERPL CALC-SCNC: 16 MMOL/L — HIGH (ref 7–14)
APPEARANCE UR: ABNORMAL
AST SERPL-CCNC: 44 U/L — HIGH (ref 0–41)
BACTERIA # UR AUTO: ABNORMAL
BASOPHILS # BLD AUTO: 0 K/UL — SIGNIFICANT CHANGE UP (ref 0–0.2)
BASOPHILS NFR BLD AUTO: 0 % — SIGNIFICANT CHANGE UP (ref 0–1)
BILIRUB SERPL-MCNC: 0.2 MG/DL — SIGNIFICANT CHANGE UP (ref 0.2–1.2)
BILIRUB UR-MCNC: NEGATIVE — SIGNIFICANT CHANGE UP
BLD GP AB SCN SERPL QL: SIGNIFICANT CHANGE UP
BUN SERPL-MCNC: 73 MG/DL — CRITICAL HIGH (ref 10–20)
BUN SERPL-MCNC: 84 MG/DL — CRITICAL HIGH (ref 10–20)
BURR CELLS BLD QL SMEAR: PRESENT — SIGNIFICANT CHANGE UP
CALCIUM SERPL-MCNC: 9.1 MG/DL — SIGNIFICANT CHANGE UP (ref 8.5–10.1)
CALCIUM SERPL-MCNC: 9.5 MG/DL — SIGNIFICANT CHANGE UP (ref 8.5–10.1)
CHLORIDE SERPL-SCNC: 109 MMOL/L — SIGNIFICANT CHANGE UP (ref 98–110)
CHLORIDE SERPL-SCNC: 110 MMOL/L — SIGNIFICANT CHANGE UP (ref 98–110)
CO2 SERPL-SCNC: 16 MMOL/L — LOW (ref 17–32)
CO2 SERPL-SCNC: 19 MMOL/L — SIGNIFICANT CHANGE UP (ref 17–32)
COLOR SPEC: YELLOW — SIGNIFICANT CHANGE UP
CREAT SERPL-MCNC: 2.1 MG/DL — HIGH (ref 0.7–1.5)
CREAT SERPL-MCNC: 2.5 MG/DL — HIGH (ref 0.7–1.5)
CRP SERPL-MCNC: 107 MG/L — HIGH
D DIMER BLD IA.RAPID-MCNC: 490 NG/ML DDU — HIGH (ref 0–230)
DIFF PNL FLD: ABNORMAL
EOSINOPHIL # BLD AUTO: 0 K/UL — SIGNIFICANT CHANGE UP (ref 0–0.7)
EOSINOPHIL NFR BLD AUTO: 0 % — SIGNIFICANT CHANGE UP (ref 0–8)
EPI CELLS # UR: 7 /HPF — HIGH (ref 0–5)
ERYTHROCYTE [SEDIMENTATION RATE] IN BLOOD: 63 MM/HR — HIGH (ref 0–10)
ESTIMATED AVERAGE GLUCOSE: 146 MG/DL — HIGH (ref 68–114)
FERRITIN SERPL-MCNC: 1903 NG/ML — HIGH (ref 30–400)
GIANT PLATELETS BLD QL SMEAR: PRESENT — SIGNIFICANT CHANGE UP
GLUCOSE BLDC GLUCOMTR-MCNC: 134 MG/DL — HIGH (ref 70–99)
GLUCOSE BLDC GLUCOMTR-MCNC: 169 MG/DL — HIGH (ref 70–99)
GLUCOSE BLDC GLUCOMTR-MCNC: 193 MG/DL — HIGH (ref 70–99)
GLUCOSE BLDC GLUCOMTR-MCNC: 221 MG/DL — HIGH (ref 70–99)
GLUCOSE BLDC GLUCOMTR-MCNC: 415 MG/DL — HIGH (ref 70–99)
GLUCOSE SERPL-MCNC: 144 MG/DL — HIGH (ref 70–99)
GLUCOSE SERPL-MCNC: 238 MG/DL — HIGH (ref 70–99)
GLUCOSE UR QL: NEGATIVE — SIGNIFICANT CHANGE UP
HCT VFR BLD CALC: 38.5 % — LOW (ref 42–52)
HCV AB S/CO SERPL IA: 0.04 COI — SIGNIFICANT CHANGE UP
HCV AB SERPL-IMP: SIGNIFICANT CHANGE UP
HGB BLD-MCNC: 12.2 G/DL — LOW (ref 14–18)
HYALINE CASTS # UR AUTO: 12 /LPF — HIGH (ref 0–7)
KETONES UR-MCNC: SIGNIFICANT CHANGE UP
LEUKOCYTE ESTERASE UR-ACNC: ABNORMAL
LYMPHOCYTES # BLD AUTO: 0.35 K/UL — LOW (ref 1.2–3.4)
LYMPHOCYTES # BLD AUTO: 16.5 % — LOW (ref 20.5–51.1)
MAGNESIUM SERPL-MCNC: 2.2 MG/DL — SIGNIFICANT CHANGE UP (ref 1.8–2.4)
MANUAL SMEAR VERIFICATION: SIGNIFICANT CHANGE UP
MCHC RBC-ENTMCNC: 27.6 PG — SIGNIFICANT CHANGE UP (ref 27–31)
MCHC RBC-ENTMCNC: 31.7 G/DL — LOW (ref 32–37)
MCV RBC AUTO: 87.1 FL — SIGNIFICANT CHANGE UP (ref 80–94)
METAMYELOCYTES # FLD: 1.7 % — HIGH (ref 0–0)
MONOCYTES # BLD AUTO: 0 K/UL — LOW (ref 0.1–0.6)
MONOCYTES NFR BLD AUTO: 0 % — LOW (ref 1.7–9.3)
NEUTROPHILS # BLD AUTO: 1.72 K/UL — SIGNIFICANT CHANGE UP (ref 1.4–6.5)
NEUTROPHILS NFR BLD AUTO: 80 % — HIGH (ref 42.2–75.2)
NITRITE UR-MCNC: POSITIVE
PH UR: 8.5 — HIGH (ref 5–8)
PLAT MORPH BLD: NORMAL — SIGNIFICANT CHANGE UP
PLATELET # BLD AUTO: 228 K/UL — SIGNIFICANT CHANGE UP (ref 130–400)
POIKILOCYTOSIS BLD QL AUTO: SLIGHT — SIGNIFICANT CHANGE UP
POTASSIUM SERPL-MCNC: 4.2 MMOL/L — SIGNIFICANT CHANGE UP (ref 3.5–5)
POTASSIUM SERPL-MCNC: 6.8 MMOL/L — CRITICAL HIGH (ref 3.5–5)
POTASSIUM SERPL-SCNC: 4.2 MMOL/L — SIGNIFICANT CHANGE UP (ref 3.5–5)
POTASSIUM SERPL-SCNC: 6.8 MMOL/L — CRITICAL HIGH (ref 3.5–5)
PROCALCITONIN SERPL-MCNC: 0.46 NG/ML — HIGH (ref 0.02–0.1)
PROT SERPL-MCNC: 8.1 G/DL — HIGH (ref 6–8)
PROT UR-MCNC: ABNORMAL
RBC # BLD: 4.42 M/UL — LOW (ref 4.7–6.1)
RBC # FLD: 14.5 % — SIGNIFICANT CHANGE UP (ref 11.5–14.5)
RBC BLD AUTO: ABNORMAL
RBC CASTS # UR COMP ASSIST: 71 /HPF — HIGH (ref 0–4)
SMUDGE CELLS # BLD: PRESENT — SIGNIFICANT CHANGE UP
SODIUM SERPL-SCNC: 141 MMOL/L — SIGNIFICANT CHANGE UP (ref 135–146)
SODIUM SERPL-SCNC: 141 MMOL/L — SIGNIFICANT CHANGE UP (ref 135–146)
SP GR SPEC: 1.01 — SIGNIFICANT CHANGE UP (ref 1.01–1.03)
TRI-PHOS CRY UR QL COMP ASSIST: ABNORMAL
TROPONIN T SERPL-MCNC: 0.01 NG/ML — SIGNIFICANT CHANGE UP
TROPONIN T SERPL-MCNC: 0.03 NG/ML — CRITICAL HIGH
UROBILINOGEN FLD QL: SIGNIFICANT CHANGE UP
VARIANT LYMPHS # BLD: 1.8 % — SIGNIFICANT CHANGE UP (ref 0–5)
WBC # BLD: 2.15 K/UL — LOW (ref 4.8–10.8)
WBC # FLD AUTO: 2.15 K/UL — LOW (ref 4.8–10.8)
WBC UR QL: 121 /HPF — HIGH (ref 0–5)

## 2021-03-25 PROCEDURE — 93970 EXTREMITY STUDY: CPT | Mod: 26

## 2021-03-25 PROCEDURE — 93010 ELECTROCARDIOGRAM REPORT: CPT

## 2021-03-25 PROCEDURE — 99222 1ST HOSP IP/OBS MODERATE 55: CPT

## 2021-03-25 PROCEDURE — 99233 SBSQ HOSP IP/OBS HIGH 50: CPT

## 2021-03-25 RX ORDER — INSULIN HUMAN 100 [IU]/ML
10 INJECTION, SOLUTION SUBCUTANEOUS ONCE
Refills: 0 | Status: COMPLETED | OUTPATIENT
Start: 2021-03-25 | End: 2021-03-25

## 2021-03-25 RX ORDER — DEXTROSE 50 % IN WATER 50 %
50 SYRINGE (ML) INTRAVENOUS ONCE
Refills: 0 | Status: COMPLETED | OUTPATIENT
Start: 2021-03-25 | End: 2021-03-25

## 2021-03-25 RX ORDER — CALCIUM GLUCONATE 100 MG/ML
2 VIAL (ML) INTRAVENOUS ONCE
Refills: 0 | Status: COMPLETED | OUTPATIENT
Start: 2021-03-25 | End: 2021-03-25

## 2021-03-25 RX ORDER — SODIUM ZIRCONIUM CYCLOSILICATE 10 G/10G
10 POWDER, FOR SUSPENSION ORAL EVERY 8 HOURS
Refills: 0 | Status: DISCONTINUED | OUTPATIENT
Start: 2021-03-25 | End: 2021-03-26

## 2021-03-25 RX ORDER — SODIUM BICARBONATE 1 MEQ/ML
50 SYRINGE (ML) INTRAVENOUS ONCE
Refills: 0 | Status: COMPLETED | OUTPATIENT
Start: 2021-03-25 | End: 2021-03-25

## 2021-03-25 RX ADMIN — Medication 50 MILLILITER(S): at 17:09

## 2021-03-25 RX ADMIN — Medication 200 GRAM(S): at 17:02

## 2021-03-25 RX ADMIN — HEPARIN SODIUM 5000 UNIT(S): 5000 INJECTION INTRAVENOUS; SUBCUTANEOUS at 14:55

## 2021-03-25 RX ADMIN — HEPARIN SODIUM 5000 UNIT(S): 5000 INJECTION INTRAVENOUS; SUBCUTANEOUS at 21:56

## 2021-03-25 RX ADMIN — INSULIN HUMAN 10 UNIT(S): 100 INJECTION, SOLUTION SUBCUTANEOUS at 09:57

## 2021-03-25 RX ADMIN — Medication 10 MILLIGRAM(S): at 11:42

## 2021-03-25 RX ADMIN — SODIUM ZIRCONIUM CYCLOSILICATE 10 GRAM(S): 10 POWDER, FOR SUSPENSION ORAL at 21:56

## 2021-03-25 RX ADMIN — ATORVASTATIN CALCIUM 40 MILLIGRAM(S): 80 TABLET, FILM COATED ORAL at 11:42

## 2021-03-25 RX ADMIN — HEPARIN SODIUM 5000 UNIT(S): 5000 INJECTION INTRAVENOUS; SUBCUTANEOUS at 06:44

## 2021-03-25 RX ADMIN — Medication 200 GRAM(S): at 09:57

## 2021-03-25 RX ADMIN — AMLODIPINE BESYLATE 10 MILLIGRAM(S): 2.5 TABLET ORAL at 06:43

## 2021-03-25 RX ADMIN — Medication 6 MILLIGRAM(S): at 06:44

## 2021-03-25 RX ADMIN — Medication 100 MILLIGRAM(S): at 06:52

## 2021-03-25 RX ADMIN — Medication 50 MILLIEQUIVALENT(S): at 17:34

## 2021-03-25 RX ADMIN — SODIUM ZIRCONIUM CYCLOSILICATE 10 GRAM(S): 10 POWDER, FOR SUSPENSION ORAL at 14:55

## 2021-03-25 RX ADMIN — INSULIN HUMAN 10 UNIT(S): 100 INJECTION, SOLUTION SUBCUTANEOUS at 17:08

## 2021-03-25 RX ADMIN — Medication 50 MILLILITER(S): at 09:56

## 2021-03-25 NOTE — PROGRESS NOTE ADULT - ASSESSMENT
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 with 10 day history of weakness, lethargy, and diarrhea. COVID-19 Positive    #) COVID PNA  - Symptoms began 3/15  - on 3L NC  - IV dexamethasone 6mg QD  - Outside of window for Remdesivir  - procal 0.46, D-Dimer 490, ferritin 1903   - will give him one dose of toci today  - No need for abx  - Follow-up COVID antibodies   - Heparin 5,000 Q8 for DVT prophylaxis   - US duplex venous LE    #) Elevated troponin   - No complaints of chest pain   - Follow-up EKG  - Likely due to JOÃO   - troponin stable    #) JOÃO   - Likely due to dehydration   - creatinine downtrending with hydration  - LR 75 cc/hr  - Monitor daily CMP    # Pyuria  - no urinary symptoms  - patient has chronic angelo secondary to urinary incontinence  - folley changed 2 weeks prior to presentation, as per his brother it gets changed every 4 weeks  - no need for abx    #) Transaminitis   - Likely due to covid  - Monitor with daily labs     #) HTN   - BP acceptable; continue home amlodipine + metoprolol     #) DM   - Not on home insulin   - Follow-up Finger-stick glucose + hemoglobin A1C    #) Normocytic anemia   - Follow-up iron studies     #) Diet - DASH/consistent carbohydrate  #) DVT prophylaxis - heparin 5,000 Q8   #) Activity- bedbound  #) Code status - Full  66 y/o male with PMH, DM, HTN, 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 COVID-19 Positive    #) COVID PNA  - Symptoms began 3/15  - on 3L NC  - IV dexamethasone 6mg QD  - Outside of window for Remdesivir  - procal 0.46, D-Dimer 490, ferritin 1903   - No need for abx  - Follow-up COVID antibodies   - Heparin 5,000 Q8 for DVT prophylaxis   - US duplex venous LE    #) Elevated troponin   - No complaints of chest pain   - Follow-up EKG  - Likely due to JOÃO   - troponin stable    #) JOÃO   - Likely due to dehydration   - creatinine downtrending with hydration  - LR 75 cc/hr  - Monitor daily CMP    # Pyuria  - no urinary symptoms  - patient has chronic angelo secondary to urinary incontinence  - folley changed 2 weeks prior to presentation, as per his brother it gets changed every 4 weeks  - no need for abx    #) Transaminitis   - Likely due to covid  - Monitor with daily labs     #) HTN   - BP acceptable; continue home amlodipine + metoprolol     #) DM   - Not on home insulin   - Follow-up Finger-stick glucose + hemoglobin A1C    #) Normocytic anemia   - Follow-up iron studies     #) Diet - DASH/consistent carbohydrate  #) DVT prophylaxis - heparin 5,000 Q8   #) Activity- bedbound  #) Code status - Full

## 2021-03-25 NOTE — PROGRESS NOTE ADULT - ASSESSMENT
a/p:  #Acute Hypoxic respiratory failure 2/2 COVID-19 pneumonia  -continue isolation precautions  -cont iv dexamethasone 6 mg daily x 10 days or until dc  -out of time frame window for RDV  -monitor o2 sat---remains on 3L and ow sat 98%    #Spinal abscess-paraplegia  -freuquent position change  -podiatry eval of Right foot (wound care)    #chronic angelo 2/2 urinary retention  -ua +--->will f/u urine cx   -no need for abx as likely colonized    #JOÃO with hyperkalemia- suspect prerenal 2/2 dehydration (x 1 week decreased po intkae with covid)  -s/p insulin, d50, calcium gluconate  -EKG if K remains >6  -cont ivf  -repeat bmp at 4 pm  -monitor bun/cr--check ulytes     DVT/GI ppx  FULL CODE     Statement Selected

## 2021-03-25 NOTE — PROGRESS NOTE ADULT - SUBJECTIVE AND OBJECTIVE BOX
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 Patient is A/O x1 at bedside; history obtained from brother (whom he lives with) via telephone. Brother tested positive for COVID on 3/11. He tried to keep his distance from patient as soon as he found out he had it, but regardless patient started to develop symptoms on 3/15. He never had fevers or shortness of breath, but he did develop extreme lethargy, a cough, and diarrhea (watery). For the last three days prior to admission, he became significantly more lethargic and less responsive prompting the brother to take him to the ED today. (24 Mar 2021 18:27)    PAST MEDICAL & SURGICAL HISTORY:  Spinal abscess  H/O paraplegia  HTN (hypertension)  DM (diabetes mellitus)  Spinal abscess  S/P Surgery    patient seen and examined independently on morning rounds for the first time today in F9 SDU covid unit, chart reviewed and discussed with the medicine resident and on interdisciplinary rounds    no overnight events---remains on IVF and oxygen- on iv dexamethasone     Vital Signs Last 24 Hrs  T(C): 36.3 (25 Mar 2021 12:58), Max: 36.8 (24 Mar 2021 16:47)  T(F): 97.3 (25 Mar 2021 12:58), Max: 98.2 (24 Mar 2021 16:47)  HR: 59 (25 Mar 2021 12:58) (59 - 79)  BP: 139/61 (25 Mar 2021 12:58) (108/56 - 139/61)  BP(mean): --  RR: 18 (25 Mar 2021 12:58) (18 - 18)  SpO2: 95% (24 Mar 2021 20:22) (95% - 95%)             PE:  GEN-NAD, AAOx2  PULM- fair air entry, bilateral basilar crackles   CVS- +s1/s2 RRR no murmurs  GI- soft NT ND +bs, no rebound, no guarding  EXT-trace edema paraplegia             12.2   2.15  )-----------( 228      ( 25 Mar 2021 06:21 )             38.5         141  |  109  |  84<HH>  ----------------------------<  144<H>  6.8<HH>   |  16<L>  |  2.5<H>    Ca    9.1      25 Mar 2021 06:21  Mg     2.2         TPro  8.1<H>  /  Alb  3.0<L>  /  TBili  0.2  /  DBili  x   /  AST  44<H>  /  ALT  22  /  AlkPhos  106      CARDIAC MARKERS ( 25 Mar 2021 06:21 )  x     / 0.03 ng/mL / x     / x     / x      CARDIAC MARKERS ( 24 Mar 2021 13:45 )  x     / 0.05 ng/mL / x     / x     / x          Urinalysis Basic - ( 25 Mar 2021 00:30 )    Color: Yellow / Appearance: Turbid / S.012 / pH: x  Gluc: x / Ketone: Trace  / Bili: Negative / Urobili: <2 mg/dL   Blood: x / Protein: 100 mg/dL / Nitrite: Positive   Leuk Esterase: Large / RBC: 71 /HPF /  /HPF   Sq Epi: x / Non Sq Epi: 7 /HPF / Bacteria: Moderate          MEDICATIONS  (STANDING):  amLODIPine   Tablet 10 milliGRAM(s) Oral daily  atorvastatin Oral Tab/Cap - Peds 40 milliGRAM(s) Oral daily  chlorhexidine 4% Liquid 1 Application(s) Topical <User Schedule>  dexAMETHasone  Injectable 6 milliGRAM(s) IV Push daily  heparin   Injectable 5000 Unit(s) SubCutaneous every 8 hours  lactated ringers. 1000 milliLiter(s) (75 mL/Hr) IV Continuous <Continuous>  metoprolol succinate  milliGRAM(s) Oral daily  oxybutynin 10 milliGRAM(s) Oral daily  sodium zirconium cyclosilicate 10 Gram(s) Oral every 8 hours

## 2021-03-25 NOTE — PROGRESS NOTE ADULT - SUBJECTIVE AND OBJECTIVE BOX
SUBJECTIVE:    Patient is a 65y old Male who presents with a chief complaint of lethargy, weakness (24 Mar 2021 18:27)    Overnight Events: Patient is stable, no acute events overnight.  VS are stable, saturating well on 3 L NC.  More awake and alert today.    PAST MEDICAL & SURGICAL HISTORY  Spinal abscess    H/O paraplegia    HTN (hypertension)    DM (diabetes mellitus)    Spinal abscess  S/P Surgery      SOCIAL HISTORY:  Negative for smoking/alcohol/drug use.     ALLERGIES:  No Known Allergies    MEDICATIONS:  STANDING MEDICATIONS  amLODIPine   Tablet 10 milliGRAM(s) Oral daily  atorvastatin Oral Tab/Cap - Peds 40 milliGRAM(s) Oral daily  chlorhexidine 4% Liquid 1 Application(s) Topical <User Schedule>  dexAMETHasone  Injectable 6 milliGRAM(s) IV Push daily  heparin   Injectable 5000 Unit(s) SubCutaneous every 8 hours  lactated ringers. 1000 milliLiter(s) IV Continuous <Continuous>  metoprolol succinate  milliGRAM(s) Oral daily  oxybutynin 10 milliGRAM(s) Oral daily  sodium zirconium cyclosilicate 10 Gram(s) Oral every 8 hours    PRN MEDICATIONS    VITALS:   T(F): 97.1, Max: 98.2 (21 @ 16:47)  HR: 60 (60 - 79)  BP: 108/56 (108/56 - 136/71)  RR: 18 (18 - 18)  SpO2: 95% (95% - 95%)    LABS:                        12.2   2.15  )-----------( 228      ( 25 Mar 2021 06:21 )             38.5         141  |  109  |  84<HH>  ----------------------------<  144<H>  6.8<HH>   |  16<L>  |  2.5<H>    Ca    9.1      25 Mar 2021 06:21  Mg     2.2         TPro  8.1<H>  /  Alb  3.0<L>  /  TBili  0.2  /  DBili  x   /  AST  44<H>  /  ALT  22  /  AlkPhos  106      PT/INR - ( 24 Mar 2021 13:45 )   PT: 12.60 sec;   INR: 1.10 ratio         PTT - ( 24 Mar 2021 13:45 )  PTT:36.0 sec  Urinalysis Basic - ( 25 Mar 2021 00:30 )    Color: Yellow / Appearance: Turbid / S.012 / pH: x  Gluc: x / Ketone: Trace  / Bili: Negative / Urobili: <2 mg/dL   Blood: x / Protein: 100 mg/dL / Nitrite: Positive   Leuk Esterase: Large / RBC: 71 /HPF /  /HPF   Sq Epi: x / Non Sq Epi: 7 /HPF / Bacteria: Moderate        Sedimentation Rate, Erythrocyte: 63 mm/Hr <H> (21 @ 06:21)  Troponin T, Serum: 0.03 ng/mL <HH> (21 @ 06:21)  Troponin T, Serum: 0.05 ng/mL <HH> (21 @ 13:45)  Lactate, Blood: 1.6 mmol/L (21 @ 13:45)      CARDIAC MARKERS ( 25 Mar 2021 06:21 )  x     / 0.03 ng/mL / x     / x     / x      CARDIAC MARKERS ( 24 Mar 2021 13:45 )  x     / 0.05 ng/mL / x     / x     / x              21 @ 07:01  -  21 @ 07:00  --------------------------------------------------------  IN: 0 mL / OUT: 1060 mL / NET: -1060 mL        PHYSICAL EXAM:  GEN: NAD, comfortable  LUNGS: CTAB, b/l crackles   HEART: RRR, s1 and s2 appreciated, no m/r/g  ABD: soft, NT/ND, +BS  EXT: no edema, PP b/l  NEURO: AAOX3, paraplegia
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
## 2021-03-26 LAB
ALBUMIN SERPL ELPH-MCNC: 2.8 G/DL — LOW (ref 3.5–5.2)
ALP SERPL-CCNC: 89 U/L — SIGNIFICANT CHANGE UP (ref 30–115)
ALT FLD-CCNC: 16 U/L — SIGNIFICANT CHANGE UP (ref 0–41)
ANION GAP SERPL CALC-SCNC: 10 MMOL/L — SIGNIFICANT CHANGE UP (ref 7–14)
ANION GAP SERPL CALC-SCNC: 12 MMOL/L — SIGNIFICANT CHANGE UP (ref 7–14)
AST SERPL-CCNC: 29 U/L — SIGNIFICANT CHANGE UP (ref 0–41)
BASOPHILS # BLD AUTO: 0.01 K/UL — SIGNIFICANT CHANGE UP (ref 0–0.2)
BASOPHILS NFR BLD AUTO: 0.1 % — SIGNIFICANT CHANGE UP (ref 0–1)
BILIRUB SERPL-MCNC: <0.2 MG/DL — SIGNIFICANT CHANGE UP (ref 0.2–1.2)
BUN SERPL-MCNC: 66 MG/DL — CRITICAL HIGH (ref 10–20)
BUN SERPL-MCNC: 70 MG/DL — CRITICAL HIGH (ref 10–20)
CALCIUM SERPL-MCNC: 8.9 MG/DL — SIGNIFICANT CHANGE UP (ref 8.5–10.1)
CALCIUM SERPL-MCNC: 9 MG/DL — SIGNIFICANT CHANGE UP (ref 8.5–10.1)
CHLORIDE SERPL-SCNC: 108 MMOL/L — SIGNIFICANT CHANGE UP (ref 98–110)
CHLORIDE SERPL-SCNC: 110 MMOL/L — SIGNIFICANT CHANGE UP (ref 98–110)
CO2 SERPL-SCNC: 19 MMOL/L — SIGNIFICANT CHANGE UP (ref 17–32)
CO2 SERPL-SCNC: 20 MMOL/L — SIGNIFICANT CHANGE UP (ref 17–32)
COVID-19 NUCLEOCAPSID GAM AB INTERP: POSITIVE
COVID-19 NUCLEOCAPSID TOTAL GAM ANTIBODY RESULT: 10.5 INDEX — HIGH
COVID-19 SPIKE DOMAIN AB INTERP: POSITIVE
COVID-19 SPIKE DOMAIN ANTIBODY RESULT: 5.41 U/ML — HIGH
CREAT SERPL-MCNC: 1.8 MG/DL — HIGH (ref 0.7–1.5)
CREAT SERPL-MCNC: 1.8 MG/DL — HIGH (ref 0.7–1.5)
CRP SERPL-MCNC: 108 MG/L — HIGH
CULTURE RESULTS: SIGNIFICANT CHANGE UP
EOSINOPHIL # BLD AUTO: 0 K/UL — SIGNIFICANT CHANGE UP (ref 0–0.7)
EOSINOPHIL NFR BLD AUTO: 0 % — SIGNIFICANT CHANGE UP (ref 0–8)
GLUCOSE BLDC GLUCOMTR-MCNC: 211 MG/DL — HIGH (ref 70–99)
GLUCOSE BLDC GLUCOMTR-MCNC: 215 MG/DL — HIGH (ref 70–99)
GLUCOSE BLDC GLUCOMTR-MCNC: 220 MG/DL — HIGH (ref 70–99)
GLUCOSE BLDC GLUCOMTR-MCNC: 252 MG/DL — HIGH (ref 70–99)
GLUCOSE SERPL-MCNC: 215 MG/DL — HIGH (ref 70–99)
GLUCOSE SERPL-MCNC: 242 MG/DL — HIGH (ref 70–99)
HCT VFR BLD CALC: 32.5 % — LOW (ref 42–52)
HGB BLD-MCNC: 10.6 G/DL — LOW (ref 14–18)
IMM GRANULOCYTES NFR BLD AUTO: 1.6 % — HIGH (ref 0.1–0.3)
LYMPHOCYTES # BLD AUTO: 0.9 K/UL — LOW (ref 1.2–3.4)
LYMPHOCYTES # BLD AUTO: 13.1 % — LOW (ref 20.5–51.1)
MCHC RBC-ENTMCNC: 28 PG — SIGNIFICANT CHANGE UP (ref 27–31)
MCHC RBC-ENTMCNC: 32.6 G/DL — SIGNIFICANT CHANGE UP (ref 32–37)
MCV RBC AUTO: 85.8 FL — SIGNIFICANT CHANGE UP (ref 80–94)
MONOCYTES # BLD AUTO: 0.37 K/UL — SIGNIFICANT CHANGE UP (ref 0.1–0.6)
MONOCYTES NFR BLD AUTO: 5.4 % — SIGNIFICANT CHANGE UP (ref 1.7–9.3)
NEUTROPHILS # BLD AUTO: 5.47 K/UL — SIGNIFICANT CHANGE UP (ref 1.4–6.5)
NEUTROPHILS NFR BLD AUTO: 79.8 % — HIGH (ref 42.2–75.2)
NRBC # BLD: 0 /100 WBCS — SIGNIFICANT CHANGE UP (ref 0–0)
PLATELET # BLD AUTO: 211 K/UL — SIGNIFICANT CHANGE UP (ref 130–400)
POTASSIUM SERPL-MCNC: 4.3 MMOL/L — SIGNIFICANT CHANGE UP (ref 3.5–5)
POTASSIUM SERPL-MCNC: 4.5 MMOL/L — SIGNIFICANT CHANGE UP (ref 3.5–5)
POTASSIUM SERPL-SCNC: 4.3 MMOL/L — SIGNIFICANT CHANGE UP (ref 3.5–5)
POTASSIUM SERPL-SCNC: 4.5 MMOL/L — SIGNIFICANT CHANGE UP (ref 3.5–5)
PROCALCITONIN SERPL-MCNC: 0.33 NG/ML — HIGH (ref 0.02–0.1)
PROT SERPL-MCNC: 7 G/DL — SIGNIFICANT CHANGE UP (ref 6–8)
RBC # BLD: 3.79 M/UL — LOW (ref 4.7–6.1)
RBC # FLD: 14.4 % — SIGNIFICANT CHANGE UP (ref 11.5–14.5)
SARS-COV-2 IGG+IGM SERPL QL IA: 10.5 INDEX — HIGH
SARS-COV-2 IGG+IGM SERPL QL IA: 5.41 U/ML — HIGH
SARS-COV-2 IGG+IGM SERPL QL IA: POSITIVE
SARS-COV-2 IGG+IGM SERPL QL IA: POSITIVE
SODIUM SERPL-SCNC: 139 MMOL/L — SIGNIFICANT CHANGE UP (ref 135–146)
SODIUM SERPL-SCNC: 140 MMOL/L — SIGNIFICANT CHANGE UP (ref 135–146)
SPECIMEN SOURCE: SIGNIFICANT CHANGE UP
TROPONIN T SERPL-MCNC: 0.01 NG/ML — SIGNIFICANT CHANGE UP
WBC # BLD: 6.86 K/UL — SIGNIFICANT CHANGE UP (ref 4.8–10.8)
WBC # FLD AUTO: 6.86 K/UL — SIGNIFICANT CHANGE UP (ref 4.8–10.8)

## 2021-03-26 PROCEDURE — 99233 SBSQ HOSP IP/OBS HIGH 50: CPT

## 2021-03-26 PROCEDURE — 99221 1ST HOSP IP/OBS SF/LOW 40: CPT

## 2021-03-26 PROCEDURE — 93010 ELECTROCARDIOGRAM REPORT: CPT

## 2021-03-26 RX ORDER — REMDESIVIR 5 MG/ML
INJECTION INTRAVENOUS
Refills: 0 | Status: COMPLETED | OUTPATIENT
Start: 2021-03-26 | End: 2021-03-30

## 2021-03-26 RX ORDER — CEFTRIAXONE 500 MG/1
1000 INJECTION, POWDER, FOR SOLUTION INTRAMUSCULAR; INTRAVENOUS EVERY 24 HOURS
Refills: 0 | Status: ACTIVE | OUTPATIENT
Start: 2021-03-26 | End: 2021-04-02

## 2021-03-26 RX ORDER — REMDESIVIR 5 MG/ML
200 INJECTION INTRAVENOUS EVERY 24 HOURS
Refills: 0 | Status: COMPLETED | OUTPATIENT
Start: 2021-03-26 | End: 2021-03-26

## 2021-03-26 RX ORDER — REMDESIVIR 5 MG/ML
100 INJECTION INTRAVENOUS EVERY 24 HOURS
Refills: 0 | Status: COMPLETED | OUTPATIENT
Start: 2021-03-27 | End: 2021-03-30

## 2021-03-26 RX ADMIN — HEPARIN SODIUM 5000 UNIT(S): 5000 INJECTION INTRAVENOUS; SUBCUTANEOUS at 05:23

## 2021-03-26 RX ADMIN — Medication 6 MILLIGRAM(S): at 05:23

## 2021-03-26 RX ADMIN — HEPARIN SODIUM 5000 UNIT(S): 5000 INJECTION INTRAVENOUS; SUBCUTANEOUS at 23:23

## 2021-03-26 RX ADMIN — HEPARIN SODIUM 5000 UNIT(S): 5000 INJECTION INTRAVENOUS; SUBCUTANEOUS at 13:12

## 2021-03-26 RX ADMIN — SODIUM ZIRCONIUM CYCLOSILICATE 10 GRAM(S): 10 POWDER, FOR SUSPENSION ORAL at 05:23

## 2021-03-26 RX ADMIN — ATORVASTATIN CALCIUM 40 MILLIGRAM(S): 80 TABLET, FILM COATED ORAL at 12:03

## 2021-03-26 RX ADMIN — CEFTRIAXONE 100 MILLIGRAM(S): 500 INJECTION, POWDER, FOR SOLUTION INTRAMUSCULAR; INTRAVENOUS at 13:12

## 2021-03-26 RX ADMIN — REMDESIVIR 500 MILLIGRAM(S): 5 INJECTION INTRAVENOUS at 12:03

## 2021-03-26 RX ADMIN — Medication 10 MILLIGRAM(S): at 12:03

## 2021-03-26 RX ADMIN — CHLORHEXIDINE GLUCONATE 1 APPLICATION(S): 213 SOLUTION TOPICAL at 05:22

## 2021-03-26 NOTE — CONSULT NOTE ADULT - SUBJECTIVE AND OBJECTIVE BOX
Patient is a 65y old  Male who presents with a chief complaint of lethargy, weakness (26 Mar 2021 15:32)      HPI:  66 y/o male with PMH, DM, HTN, multiple spinal abscesses leading to paraplegia with neurogenic bladder and chronic SPT admitted to medicine with COVID 19 PNA, Urology called to evaluate Pt. for gross hematuria. Pt. appears A&O x 3 but confused  denies medical and  surgical history all medical information obtained from medical records. SP catheter drains bloody tinged urine.   Pt. was seen in 2017 with gross hematuria by Dr. Merchant. Last Urine (cytology 2018) negative for malignant cells .   Pt. reports SPT change 3 weeks ago, refusing SPT change at this time, prefers his RN to do that.    Pt. denies fever, chills, N/V.           PAST MEDICAL & SURGICAL HISTORY:  Spinal abscess    H/O paraplegia    HTN (hypertension)    DM (diabetes mellitus)    Spinal abscess  S/P Surgery        REVIEW OF SYSTEMS:    CONSTITUTIONAL:  no fevers or chills  HEENT: No visual changes  ENDO: No sweating  NECK: No pain or stiffness  MUSCULOSKELETAL: No back pain, no joint pain  RESPIRATORY: No shortness of breath  CARDIOVASCULAR: No chest pain  GASTROINTESTINAL: No abdominal or epigastric pain. No nausea, vomiting,  No diarrhea or constipation.   NEUROLOGICAL: No mental status changes  PSYCH: No depression, no mood changes  SKIN: No itching   as per HPI    MEDICATIONS  (STANDING):  amLODIPine   Tablet 10 milliGRAM(s) Oral daily  atorvastatin Oral Tab/Cap - Peds 40 milliGRAM(s) Oral daily  cefTRIAXone   IVPB 1000 milliGRAM(s) IV Intermittent every 24 hours  chlorhexidine 4% Liquid 1 Application(s) Topical <User Schedule>  dexAMETHasone  Injectable 6 milliGRAM(s) IV Push daily  heparin   Injectable 5000 Unit(s) SubCutaneous every 8 hours  lactated ringers. 1000 milliLiter(s) (75 mL/Hr) IV Continuous <Continuous>  metoprolol succinate  milliGRAM(s) Oral daily  oxybutynin 10 milliGRAM(s) Oral daily  remdesivir  IVPB   IV Intermittent       Allergies:  No Known Allergies      SOCIAL HISTORY: No illicit drug use, smoking, ETOH     FAMILY HISTORY:  FH: heart disease    FH: melanoma    FH: breast cancer    FH: HTN (hypertension)        Vital Signs Last 24 Hrs  T(C): 36.3 (26 Mar 2021 13:47), Max: 36.3 (26 Mar 2021 13:47)  T(F): 97.4 (26 Mar 2021 13:47), Max: 97.4 (26 Mar 2021 13:47)  HR: 43 (26 Mar 2021 13:47) (43 - 55)  BP: 139/75 (26 Mar 2021 13:47) (121/65 - 139/75)  RR: 18 (26 Mar 2021 13:47) (18 - 18)  SpO2: 95% (26 Mar 2021 09:00) (95% - 97%)         PHYSICAL EXAM:  Constitutional: NAD, appears older than his age, following verbal commands answering questions,   HEENT: NC/AT, EOMI  Back: No CVA tenderness B/L   Respiratory: No accessory respiratory muscle use  Abd: Soft, NT/ND  SPT tube 26 Fr in place drains bloody tinged urine   : circumcised male genitalia, meatus with catheter associated hypospadias to the base of glans, testis descended B/L. no scrotal edema  Extremities: no edema, very contracted   Neurological: A/O x 3 , but confused   Psychiatric: Normal mood, normal affect  Skin: No apparent skin rashes    I&O's Summary    25 Mar 2021 07:01  -  26 Mar 2021 07:00  --------------------------------------------------------  IN: 1780 mL / OUT: 1550 mL / NET: 230 mL    26 Mar 2021 07:01  -  26 Mar 2021 18:58  --------------------------------------------------------  IN: 535 mL / OUT: 800 mL / NET: -265 mL        LABS:                        10.6   6.86  )-----------( 211      ( 26 Mar 2021 04:30 )             32.5     03-26    140  |  110  |  66<HH>  ----------------------------<  215<H>  4.3   |  20  |  1.8<H>    Ca    8.9      26 Mar 2021 04:30  Mg     2.2     03-25    TPro  7.0  /  Alb  2.8<L>  /  TBili  <0.2  /  DBili  x   /  AST  29  /  ALT  16  /  AlkPhos  89  03-26       Urinalysis (03.25.21 @ 00:30)    Glucose Qualitative, Urine: Negative    Blood, Urine: Moderate    pH Urine: 8.5    Color: Yellow    Urine Appearance: Turbid    Bilirubin: Negative    Ketone - Urine: Trace    Specific Gravity: 1.012    Protein, Urine: 100 mg/dL    Urobilinogen: <2 mg/dL    Nitrite: Positive    Leukocyte Esterase Concentration: Large        RADIOLOGY & ADDITIONAL STUDIES:     < from: Xray Chest 1 View-PORTABLE IMMEDIATE (Xray Chest 1 View-PORTABLE IMMEDIATE .) (03.24.21 @ 13:52) >    EXAM:  XR CHEST PORTABLE IMMED 1V            PROCEDURE DATE:  03/24/2021            INTERPRETATION:  STUDY INDICATION: weakness    Comparison:   4/23/2012.    Technique/Positioning: Frontal view of the chest were obtained.    Findings:    Support devices: None.    Cardiac/mediastinum/hilum: Cardiac silhouette size is within normal limits. Tortuous aorta.    Lung parenchyma/Pleura: Bilateral mid to lower lung predominant hazy opacities. No pneumothorax or significant effusion on frontal view.    Skeleton/soft tissues:  No radiographically evident acute displaced fracture within the limitations of this exam.    Impression:    Bilateral mid to lower lung predominant hazy opacities may reflect pneumonia in the appropriate clinical setting.        KEN JETT MD; Attending Radiologist  This document has been electronically signed. Mar 24 2021  2:45PM    < end of copied text >

## 2021-03-26 NOTE — PROGRESS NOTE ADULT - SUBJECTIVE AND OBJECTIVE BOX
SUBJECTIVE:    Patient is a 65y old Male who presents with a chief complaint of lethargy, weakness (26 Mar 2021 06:14)    Overnight Events: Patient is stable, no acute events overnight.  He is saturating well on 2 L NC.  he started having hematuria today, no other complaints.  Hemodynamically stable.    PAST MEDICAL & SURGICAL HISTORY  Spinal abscess    H/O paraplegia    HTN (hypertension)    DM (diabetes mellitus)    Spinal abscess  S/P Surgery      SOCIAL HISTORY:  Negative for smoking/alcohol/drug use.     ALLERGIES:  No Known Allergies    MEDICATIONS:  STANDING MEDICATIONS  amLODIPine   Tablet 10 milliGRAM(s) Oral daily  atorvastatin Oral Tab/Cap - Peds 40 milliGRAM(s) Oral daily  cefTRIAXone   IVPB 1000 milliGRAM(s) IV Intermittent every 24 hours  chlorhexidine 4% Liquid 1 Application(s) Topical <User Schedule>  dexAMETHasone  Injectable 6 milliGRAM(s) IV Push daily  heparin   Injectable 5000 Unit(s) SubCutaneous every 8 hours  lactated ringers. 1000 milliLiter(s) IV Continuous <Continuous>  metoprolol succinate  milliGRAM(s) Oral daily  oxybutynin 10 milliGRAM(s) Oral daily  remdesivir  IVPB   IV Intermittent   remdesivir  IVPB 200 milliGRAM(s) IV Intermittent every 24 hours    PRN MEDICATIONS    VITALS:   T(F): 97, Max: 97.3 (-21 @ 12:58)  HR: 45 (45 - 59)  BP: 123/76 (121/65 - 139/61)  RR: 18 (18 - 18)  SpO2: 95% (95% - 97%)    LABS:                        10.6   6.86  )-----------( 211      ( 26 Mar 2021 04:30 )             32.5         140  |  110  |  66<HH>  ----------------------------<  215<H>  4.3   |  20  |  1.8<H>    Ca    8.9      26 Mar 2021 04:30  Mg     2.2         TPro  7.0  /  Alb  2.8<L>  /  TBili  <0.2  /  DBili  x   /  AST  29  /  ALT  16  /  AlkPhos  89      PT/INR - ( 24 Mar 2021 13:45 )   PT: 12.60 sec;   INR: 1.10 ratio         PTT - ( 24 Mar 2021 13:45 )  PTT:36.0 sec  Urinalysis Basic - ( 25 Mar 2021 00:30 )    Color: Yellow / Appearance: Turbid / S.012 / pH: x  Gluc: x / Ketone: Trace  / Bili: Negative / Urobili: <2 mg/dL   Blood: x / Protein: 100 mg/dL / Nitrite: Positive   Leuk Esterase: Large / RBC: 71 /HPF /  /HPF   Sq Epi: x / Non Sq Epi: 7 /HPF / Bacteria: Moderate        Troponin T, Serum: 0.01 ng/mL (21 @ 16:00)      CARDIAC MARKERS ( 25 Mar 2021 16:00 )  x     / 0.01 ng/mL / x     / x     / x      CARDIAC MARKERS ( 25 Mar 2021 06:21 )  x     / 0.03 ng/mL / x     / x     / x      CARDIAC MARKERS ( 24 Mar 2021 13:45 )  x     / 0.05 ng/mL / x     / x     / x              21 @ 07:01  -  21 @ 07:00  --------------------------------------------------------  IN: 1780 mL / OUT: 1550 mL / NET: 230 mL    21 @ 07:01  -  21 @ 11:37  --------------------------------------------------------  IN: 275 mL / OUT: 0 mL / NET: 275 mL      PHYSICAL EXAM:  GEN: NAD, comfortable  LUNGS: CTAB, no w/r/r  HEART: RRR, s1 and s2 appreciated, no m/r/g  ABD: soft, NT/ND, +BS  EXT: no IZABELLA  NEURO: AAOX2,. paraplegic
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
## 2021-03-26 NOTE — PROGRESS NOTE ADULT - SUBJECTIVE AND OBJECTIVE BOX
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 Patient is A/O x1 at bedside; history obtained from brother (whom he lives with) via telephone. Brother tested positive for COVID on 3/11. He tried to keep his distance from patient as soon as he found out he had it, but regardless patient started to develop symptoms on 3/15. He never had fevers or shortness of breath, but he did develop extreme lethargy, a cough, and diarrhea (watery). For the last three days prior to admission, he became significantly more lethargic and less responsive prompting the brother to take him to the ED today. (24 Mar 2021 18:27)    PAST MEDICAL & SURGICAL HISTORY:  Spinal abscess  H/O paraplegia  HTN (hypertension)  DM (diabetes mellitus)  Spinal abscess  S/P Surgery    patient seen and examined independently on morning rounds in F9 SDU covid unit, chart reviewed and discussed with the medicine resident and on interdisciplinary rounds    no overnight events---remains on IVF and oxygen- on iv dexamethasone     Vital Signs Last 24 Hrs  T(C): 36.3 (25 Mar 2021 12:58), Max: 36.8 (24 Mar 2021 16:47)  T(F): 97.3 (25 Mar 2021 12:58), Max: 98.2 (24 Mar 2021 16:47)  HR: 59 (25 Mar 2021 12:58) (59 - 79)  BP: 139/61 (25 Mar 2021 12:58) (108/56 - 139/61)  BP(mean): --  RR: 18 (25 Mar 2021 12:58) (18 - 18)  SpO2: 95% (24 Mar 2021 20:22) (95% - 95%)             PE:  GEN-NAD, AAOx2  PULM- fair air entry, bilateral basilar crackles   CVS- +s1/s2 RRR no murmurs  GI- soft NT ND +bs, no rebound, no guarding  EXT-trace edema paraplegia             12.2   2.15  )-----------( 228      ( 25 Mar 2021 06:21 )             38.5     03-    141  |  109  |  84<HH>  ----------------------------<  144<H>  6.8<HH>   |  16<L>  |  2.5<H>    Ca    9.1      25 Mar 2021 06:21  Mg     2.2         TPro  8.1<H>  /  Alb  3.0<L>  /  TBili  0.2  /  DBili  x   /  AST  44<H>  /  ALT  22  /  AlkPhos  106  03-25    CARDIAC MARKERS ( 25 Mar 2021 06:21 )  x     / 0.03 ng/mL / x     / x     / x      CARDIAC MARKERS ( 24 Mar 2021 13:45 )  x     / 0.05 ng/mL / x     / x     / x          Urinalysis Basic - ( 25 Mar 2021 00:30 )    Color: Yellow / Appearance: Turbid / S.012 / pH: x  Gluc: x / Ketone: Trace  / Bili: Negative / Urobili: <2 mg/dL   Blood: x / Protein: 100 mg/dL / Nitrite: Positive   Leuk Esterase: Large / RBC: 71 /HPF /  /HPF   Sq Epi: x / Non Sq Epi: 7 /HPF / Bacteria: Moderate          MEDICATIONS  (STANDING):  amLODIPine   Tablet 10 milliGRAM(s) Oral daily  atorvastatin Oral Tab/Cap - Peds 40 milliGRAM(s) Oral daily  chlorhexidine 4% Liquid 1 Application(s) Topical <User Schedule>  dexAMETHasone  Injectable 6 milliGRAM(s) IV Push daily  heparin   Injectable 5000 Unit(s) SubCutaneous every 8 hours  lactated ringers. 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 Patient is A/O x1 at bedside; history obtained from brother (whom he lives with) via telephone. Brother tested positive for COVID on 3/11. He tried to keep his distance from patient as soon as he found out he had it, but regardless patient started to develop symptoms on 3/15. He never had fevers or shortness of breath, but he did develop extreme lethargy, a cough, and diarrhea (watery). For the last three days prior to admission, he became significantly more lethargic and less responsive prompting the brother to take him to the ED today. (24 Mar 2021 18:27)    PAST MEDICAL & SURGICAL HISTORY:  Spinal abscess  H/O paraplegia  HTN (hypertension)  DM (diabetes mellitus)  Spinal abscess  S/P Surgery    patient seen and examined independently on morning rounds in F9 SDU covid unit, chart reviewed and discussed with the medicine resident and on interdisciplinary rounds    new onset gross hematuria (red tinged urine in cath bag)---Suprapubic cath site c/d/i (patient says was last changed 2 weeks ago and gets changed every 4 weeks)             PE:  GEN-NAD, AAOx3  PULM- fair air entry, bilateral basilar crackles   CVS- +s1/s2 RRR no murmurs  GI- soft NT ND +bs, no rebound, no guarding, +SPC (site c/d/i_)  EXT-trace edema paraplegia        Labs:                        10.6   6.86  )-----------( 211      ( 26 Mar 2021 04:30 )             32.5     CBC Full  -  ( 26 Mar 2021 04:30 )  WBC Count : 6.86 K/uL  RBC Count : 3.79 M/uL  Hemoglobin : 10.6 g/dL  Hematocrit : 32.5 %  Platelet Count - Automated : 211 K/uL  Mean Cell Volume : 85.8 fL  Mean Cell Hemoglobin : 28.0 pg  Mean Cell Hemoglobin Concentration : 32.6 g/dL  Auto Neutrophil # : 5.47 K/uL  Auto Lymphocyte # : 0.90 K/uL  Auto Monocyte # : 0.37 K/uL  Auto Eosinophil # : 0.00 K/uL  Auto Basophil # : 0.01 K/uL  Auto Neutrophil % : 79.8 %  Auto Lymphocyte % : 13.1 %  Auto Monocyte % : 5.4 %  Auto Eosinophil % : 0.0 %  Auto Basophil % : 0.1 %      03-26    140  |  110  |  66<HH>  ----------------------------<  215<H>  4.3   |  20  |  1.8<H>    Ca    8.9      26 Mar 2021 04:30  Mg     2.2     03-25    TPro  7.0  /  Alb  2.8<L>  /  TBili  <0.2  /  DBili  x   /  AST  29  /  ALT  16  /  AlkPhos  89  03-26      Microbiology:    Culture - Urine (collected 25 Mar 2021 00:30)  Source: .Urine Clean Catch (Midstream)  Final Report (26 Mar 2021 11:38):    >=3 organisms. Probable collection contamination.        Vital Signs Last 24 Hrs  T(C): 36.3 (26 Mar 2021 13:47), Max: 36.3 (26 Mar 2021 13:47)  T(F): 97.4 (26 Mar 2021 13:47), Max: 97.4 (26 Mar 2021 13:47)  HR: 43 (26 Mar 2021 13:47) (43 - 55)  BP: 139/75 (26 Mar 2021 13:47) (121/65 - 139/75)  BP(mean): --  RR: 18 (26 Mar 2021 13:47) (18 - 18)  SpO2: 95% (26 Mar 2021 09:00) (95% - 97%)    I&O's Summary    25 Mar 2021 07:01  -  26 Mar 2021 07:00  --------------------------------------------------------  IN: 1780 mL / OUT: 1550 mL / NET: 230 mL    26 Mar 2021 07:01  -  26 Mar 2021 16:15  --------------------------------------------------------  IN: 535 mL / OUT: 800 mL / NET: -265 mL

## 2021-03-26 NOTE — PROGRESS NOTE ADULT - ASSESSMENT
a/p:  #Acute Hypoxic respiratory failure 2/2 COVID-19 pneumonia  -continue isolation precautions  -cont iv dexamethasone 6 mg daily x 10 days or until dc  -out of time frame window for RDV  -monitor o2 sat---remains on 3L and ow sat 98%    #Spinal abscess-paraplegia  -freuquent position change  -podiatry eval of Right foot (wound care)    #chronic angelo 2/2 urinary retention  -ua +--->will f/u urine cx   -no need for abx as likely colonized    #JOÃO with hyperkalemia- suspect prerenal 2/2 dehydration (x 1 week decreased po intkae with covid)  -s/p insulin, d50, calcium gluconate  -EKG if K remains >6  -cont ivf  -repeat bmp at 4 pm  -monitor bun/cr--check ulytes     DVT/GI ppx  FULL CODE     a/p:  #Acute Hypoxic respiratory failure 2/2 COVID-19 pneumonia  -continue isolation precautions  -cont iv dexamethasone 6 mg daily x 10 days or until dc and started on RDV (today day #1)  -monitor o2 sat---remains on 3L and o2 sat 98%    #Spinal abscess-paraplegia  -freuquent position change  -podiatry eval of Right foot (wound care)    #chronic SPC 2/2 urinary retention  -ua + with new onset hematuria---flush catheter and monitor for clearing of hematuria  -cbc q12 hr---transfuse if hb <8  -f/u repeat UA and Ucx (intial ucx >3 organisms---likely contaminate or colonization) however with new onset hematuria will start iv ceftriaxone and reepat ua/ucx  -monitor wbc and fever curve  - consult (may need suprapubic cath changed)    #JOÃO with hyperkalemia- improving (bun/cr 66/1.8 today)  - prerenal 2/2 dehydration (x 1 week decreased po intkae with covid)  -hyperkalemia resolved (k 4.3 today)  -repeat ekg if recurrent hyperkalemia  -cont ivf  -monitor bmp     DVT/GI ppx  FULL CODE    #Progress Note Handoff  Pending (specify):  clinical improvement in symptoms   Family discussion: we d/w patient himself bedside and med team spoke with brother as well  Disposition: Home_x when medically stable- he has hha and case managment following for dispo planning

## 2021-03-26 NOTE — PROGRESS NOTE ADULT - ASSESSMENT
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 COVID-19 Positive.    #) COVID PNA  - Symptoms began 3/15  - on 2L NC  - IV dexamethasone 6mg QD  - started on RDV on 3/26  - procal 0.46, D-Dimer 490, ferritin 1903   - No need for abx  - Follow-up COVID antibodies   - Heparin 5,000 Q8 for DVT prophylaxis   - US duplex venous LE    #) Elevated troponin   - No complaints of chest pain   - ECG no ischemic changed  - Likely due to JOÃO   - troponin stable    #) JOÃO   - Likely due to dehydration   - creatinine downtrending with hydration  - LR 75 cc/hr  - Monitor daily CMP    # Pyuria with hematuria  - patient has chronic angelo secondary to urinary incontinence  - folley changed 2 weeks prior to presentation, as per his brother it gets changed every 4 weeks  - start rocephin  - c/s uro  - f/u urine culture    #) Transaminitis   - Likely due to covid  - Monitor with daily labs     #) HTN   - BP acceptable; continue home amlodipine + metoprolol     #) DM   - Not on home insulin   - Follow-up Finger-stick glucose + hemoglobin A1C    #) Normocytic anemia   - Follow-up iron studies     #) Diet - DASH/consistent carbohydrate  #) DVT prophylaxis - heparin 5,000 Q8   #) Activity- bedbound  #) Code status - Full

## 2021-03-27 LAB
ALBUMIN SERPL ELPH-MCNC: 2.6 G/DL — LOW (ref 3.5–5.2)
ALP SERPL-CCNC: 81 U/L — SIGNIFICANT CHANGE UP (ref 30–115)
ALT FLD-CCNC: 16 U/L — SIGNIFICANT CHANGE UP (ref 0–41)
ANION GAP SERPL CALC-SCNC: 11 MMOL/L — SIGNIFICANT CHANGE UP (ref 7–14)
AST SERPL-CCNC: 24 U/L — SIGNIFICANT CHANGE UP (ref 0–41)
BASOPHILS # BLD AUTO: 0.02 K/UL — SIGNIFICANT CHANGE UP (ref 0–0.2)
BASOPHILS NFR BLD AUTO: 0.2 % — SIGNIFICANT CHANGE UP (ref 0–1)
BILIRUB SERPL-MCNC: <0.2 MG/DL — SIGNIFICANT CHANGE UP (ref 0.2–1.2)
BUN SERPL-MCNC: 48 MG/DL — HIGH (ref 10–20)
CALCIUM SERPL-MCNC: 8.1 MG/DL — LOW (ref 8.5–10.1)
CHLORIDE SERPL-SCNC: 112 MMOL/L — HIGH (ref 98–110)
CO2 SERPL-SCNC: 21 MMOL/L — SIGNIFICANT CHANGE UP (ref 17–32)
CREAT SERPL-MCNC: 1.3 MG/DL — SIGNIFICANT CHANGE UP (ref 0.7–1.5)
EOSINOPHIL # BLD AUTO: 0 K/UL — SIGNIFICANT CHANGE UP (ref 0–0.7)
EOSINOPHIL NFR BLD AUTO: 0 % — SIGNIFICANT CHANGE UP (ref 0–8)
GLUCOSE BLDC GLUCOMTR-MCNC: 204 MG/DL — HIGH (ref 70–99)
GLUCOSE BLDC GLUCOMTR-MCNC: 208 MG/DL — HIGH (ref 70–99)
GLUCOSE BLDC GLUCOMTR-MCNC: 220 MG/DL — HIGH (ref 70–99)
GLUCOSE SERPL-MCNC: 233 MG/DL — HIGH (ref 70–99)
HCT VFR BLD CALC: 32.8 % — LOW (ref 42–52)
HGB BLD-MCNC: 10.5 G/DL — LOW (ref 14–18)
IMM GRANULOCYTES NFR BLD AUTO: 1.6 % — HIGH (ref 0.1–0.3)
LYMPHOCYTES # BLD AUTO: 0.85 K/UL — LOW (ref 1.2–3.4)
LYMPHOCYTES # BLD AUTO: 10.5 % — LOW (ref 20.5–51.1)
MCHC RBC-ENTMCNC: 27.6 PG — SIGNIFICANT CHANGE UP (ref 27–31)
MCHC RBC-ENTMCNC: 32 G/DL — SIGNIFICANT CHANGE UP (ref 32–37)
MCV RBC AUTO: 86.3 FL — SIGNIFICANT CHANGE UP (ref 80–94)
MONOCYTES # BLD AUTO: 0.53 K/UL — SIGNIFICANT CHANGE UP (ref 0.1–0.6)
MONOCYTES NFR BLD AUTO: 6.6 % — SIGNIFICANT CHANGE UP (ref 1.7–9.3)
NEUTROPHILS # BLD AUTO: 6.53 K/UL — HIGH (ref 1.4–6.5)
NEUTROPHILS NFR BLD AUTO: 81.1 % — HIGH (ref 42.2–75.2)
NRBC # BLD: 0 /100 WBCS — SIGNIFICANT CHANGE UP (ref 0–0)
PLATELET # BLD AUTO: 211 K/UL — SIGNIFICANT CHANGE UP (ref 130–400)
POTASSIUM SERPL-MCNC: 3.7 MMOL/L — SIGNIFICANT CHANGE UP (ref 3.5–5)
POTASSIUM SERPL-SCNC: 3.7 MMOL/L — SIGNIFICANT CHANGE UP (ref 3.5–5)
PROT SERPL-MCNC: 6.7 G/DL — SIGNIFICANT CHANGE UP (ref 6–8)
RBC # BLD: 3.8 M/UL — LOW (ref 4.7–6.1)
RBC # FLD: 14.5 % — SIGNIFICANT CHANGE UP (ref 11.5–14.5)
SODIUM SERPL-SCNC: 144 MMOL/L — SIGNIFICANT CHANGE UP (ref 135–146)
TSH SERPL-MCNC: 0.36 UIU/ML — SIGNIFICANT CHANGE UP (ref 0.27–4.2)
WBC # BLD: 8.06 K/UL — SIGNIFICANT CHANGE UP (ref 4.8–10.8)
WBC # FLD AUTO: 8.06 K/UL — SIGNIFICANT CHANGE UP (ref 4.8–10.8)

## 2021-03-27 PROCEDURE — 99222 1ST HOSP IP/OBS MODERATE 55: CPT

## 2021-03-27 PROCEDURE — 99233 SBSQ HOSP IP/OBS HIGH 50: CPT

## 2021-03-27 RX ADMIN — HEPARIN SODIUM 5000 UNIT(S): 5000 INJECTION INTRAVENOUS; SUBCUTANEOUS at 21:28

## 2021-03-27 RX ADMIN — SODIUM CHLORIDE 75 MILLILITER(S): 9 INJECTION, SOLUTION INTRAVENOUS at 00:22

## 2021-03-27 RX ADMIN — HEPARIN SODIUM 5000 UNIT(S): 5000 INJECTION INTRAVENOUS; SUBCUTANEOUS at 13:39

## 2021-03-27 RX ADMIN — ATORVASTATIN CALCIUM 40 MILLIGRAM(S): 80 TABLET, FILM COATED ORAL at 12:37

## 2021-03-27 RX ADMIN — CHLORHEXIDINE GLUCONATE 1 APPLICATION(S): 213 SOLUTION TOPICAL at 06:30

## 2021-03-27 RX ADMIN — HEPARIN SODIUM 5000 UNIT(S): 5000 INJECTION INTRAVENOUS; SUBCUTANEOUS at 06:35

## 2021-03-27 RX ADMIN — CEFTRIAXONE 100 MILLIGRAM(S): 500 INJECTION, POWDER, FOR SOLUTION INTRAMUSCULAR; INTRAVENOUS at 12:37

## 2021-03-27 RX ADMIN — Medication 6 MILLIGRAM(S): at 06:32

## 2021-03-27 RX ADMIN — AMLODIPINE BESYLATE 10 MILLIGRAM(S): 2.5 TABLET ORAL at 06:30

## 2021-03-27 RX ADMIN — REMDESIVIR 500 MILLIGRAM(S): 5 INJECTION INTRAVENOUS at 13:40

## 2021-03-27 RX ADMIN — Medication 10 MILLIGRAM(S): at 12:37

## 2021-03-27 NOTE — PROGRESS NOTE ADULT - ASSESSMENT
a/p:  #Acute Hypoxic respiratory failure 2/2 COVID-19 pneumonia  -continue isolation precautions  -cont iv dexamethasone 6 mg daily x 10 days or until dc and RDV (today day #2)  -monitor o2 sat---remains on 3L and o2 sat 98%  -antibody +--->not candidate for conv plasma  -ID following    #Spinal abscess-paraplegia  -freuquent position change  -podiatry eval of Right foot (wound care)    #chronic SPC 2/2 urinary retention and hematuria  - consult appreciated  -needs to have suprapubic cath changed in 2 weeks--will f/u as outpaitent  -cont iv ceftriaxone---f/u ucx (initial ucx >3 organisms--c/w contaminate)  -monitor cbc and transfuse if hb <8    #JOÃO with hyperkalemia- improving (bun/cr 66/1.8)  - prerenal 2/2 dehydration (x 1 week decreased po intkae with covid)  -hyperkalemia resolved  -repeat ekg if recurrent hyperkalemia  -cont ivf  -monitor bmp     DVT/GI ppx  FULL CODE    #Progress Note Handoff  Pending (specify):  clinical improvement in symptoms   Disposition: Home_x when medically stable- he has hha and case managment following for dispo planning)--may need STR as he has HHA at home (and patient is covid +)

## 2021-03-27 NOTE — CONSULT NOTE ADULT - CONSULT REASON
COVID
Right Plantar Medial Midfoot Scab;
Sinus bradycardia
Patient admitted for covid. has hx of urinary incontinence on SPC chronic. started having hematuria while in house

## 2021-03-27 NOTE — PROGRESS NOTE ADULT - ASSESSMENT
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 COVID-19 Positive.    #) COVID PNA  - Symptoms began 3/15  - on 2L NC  - IV dexamethasone 6mg QD  - started on RDV on 3/26  - procal 0.46, D-Dimer 490, ferritin 1903   - No need for abx  - COVID antibodies positive, no indications for plasma   - Heparin 5,000 Q8 for DVT prophylaxis   - US duplex venous LE negative for dvt     #) Elevated troponin   - No complaints of chest pain   - ECG no ischemic changed  - Likely due to JOÃO   - troponin stable    #) JOÃO   - Likely due to dehydration   - creatinine downtrending with hydration  - LR 75 cc/hr  - Monitor daily CMP    # Pyuria with hematuria  - patient has chronic angelo secondary to urinary incontinence  - folley changed 2 weeks prior to presentation, as per his brother it gets changed every 4 weeks  - start rocephin, course for 7 days  - c/s uro, consider CT a/p  - hematuria resolving  - urine cx possible contamination, blood cx negative    #) Transaminitis   - Likely due to covid  - Monitor with daily labs     #) HTN   - BP acceptable; continue home amlodipine + metoprolol     #) DM   - Not on home insulin   - Follow-up Finger-stick glucose + hemoglobin A1C    #) Normocytic anemia   - Follow-up iron studies     #) Diet - DASH/consistent carbohydrate  #) DVT prophylaxis - heparin 5,000 Q8   #) Activity- bedbound  #) Code status - Full  66 y/o male with PMH, DM, HTN, 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 COVID-19 Positive.    #) COVID PNA  - Symptoms began 3/15  - on 2L NC  - IV dexamethasone 6mg QD  - started on RDV on 3/26  - procal 0.46, D-Dimer 490, ferritin 1903   - No need for abx  - COVID antibodies positive, no indications for plasma   - Heparin 5,000 Q8 for DVT prophylaxis   - US duplex venous LE negative for dvt     #) Elevated troponin   # SInus bradycardia  - No complaints of chest pain , asymptomatic  - ECG no ischemic changed  - Likely due to JOÃO   - troponin stable    #) JOÃO   - Likely due to dehydration   - creatinine downtrending with hydration  - LR 75 cc/hr  - Monitor daily CMP    # Pyuria with hematuria  - patient has chronic angelo secondary to urinary incontinence  - folley changed 2 weeks prior to presentation, as per his brother it gets changed every 4 weeks  - start rocephin, course for 7 days  - c/s uro, consider CT a/p  - hematuria resolving  - urine cx possible contamination, blood cx negative    #) Transaminitis   - Likely due to covid  - Monitor with daily labs     #) HTN   - BP acceptable; continue home amlodipine + metoprolol     #) DM   - Not on home insulin   - Follow-up Finger-stick glucose + hemoglobin A1C    #) Normocytic anemia   - Follow-up iron studies     #) Diet - DASH/consistent carbohydrate  #) DVT prophylaxis - heparin 5,000 Q8   #) Activity- bedbound  #) Code status - Full  64 y/o male with PMH, DM, HTN, 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 COVID-19 Positive.    #) COVID PNA  - Symptoms began 3/15  - on 2L NC  - IV dexamethasone 6mg QD  - started on RDV on 3/26  - procal 0.46, D-Dimer 490, ferritin 1903   - No need for abx  - COVID antibodies positive, no indications for plasma   - Heparin 5,000 Q8 for DVT prophylaxis   - US duplex venous LE negative for dvt     #) Elevated troponin   # SInus bradycardia  - No complaints of chest pain , asymptomatic  - ECG no ischemic changed  - Likely due to JOÃO   - troponin stable    #) JOÃO   - Likely due to dehydration   - creatinine downtrending with hydration  - LR 75 cc/hr  - Monitor daily CMP    # Pyuria with hematuria  - patient has chronic angelo secondary to urinary incontinence  - folley changed 2 weeks prior to presentation, as per his brother it gets changed every 4 weeks  - start rocephin, course for 7 days  - as per the brother he has recurrent episodes of hematuria at home which his urologist is aware of  - c/s uro, consider CT a/p if worsening or hemodynamic instability  - hematuria resolving  - urine cx possible contamination, blood cx negative    #) Transaminitis   - Likely due to covid  - Monitor with daily labs     #) HTN   - BP acceptable; continue home amlodipine + metoprolol     #) DM   - Not on home insulin   - Follow-up Finger-stick glucose + hemoglobin A1C    #) Normocytic anemia   - Follow-up iron studies     #) Diet - DASH/consistent carbohydrate  #) DVT prophylaxis - heparin 5,000 Q8   #) Activity- bedbound  #) Code status - Full  64 y/o male with PMH, DM, HTN, 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 COVID-19 Positive.    #) COVID PNA  - Symptoms began 3/15  - on 2L NC  - IV dexamethasone 6mg QD  - started on RDV on 3/26  - procal 0.46, D-Dimer 490, ferritin 1903   - No need for abx  - COVID antibodies positive, no indications for plasma   - Heparin 5,000 Q8 for DVT prophylaxis   - US duplex venous LE negative for dvt     #) Elevated troponin   # SInus bradycardia  - No complaints of chest pain , asymptomatic  - ECG no ischemic changed  - Likely due to JOÃO   - troponin stable    #) JOÃO   - Likely due to dehydration   - creatinine downtrending with hydration  - LR 75 cc/hr  - Monitor daily CMP    # Pyuria with hematuria  - patient has chronic angelo secondary to urinary incontinence  - folley changed on 3/10 as per his brother, next change on 4/14  - start rocephin, course for 7 days  - as per the brother he has recurrent episodes of hematuria at home which his urologist is aware of  - c/s uro, consider CT a/p if worsening or hemodynamic instability  - hematuria resolving  - urine cx possible contamination, blood cx negative    #) Transaminitis   - Likely due to covid  - Monitor with daily labs     #) HTN   - BP acceptable; continue home amlodipine + metoprolol     #) DM   - Not on home insulin   - Follow-up Finger-stick glucose + hemoglobin A1C    #) Normocytic anemia   - Follow-up iron studies     #) Diet - DASH/consistent carbohydrate  #) DVT prophylaxis - heparin 5,000 Q8   #) Activity- bedbound  #) Code status - Full

## 2021-03-27 NOTE — PROGRESS NOTE ADULT - SUBJECTIVE AND OBJECTIVE BOX
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 Patient is A/O x1 at bedside; history obtained from brother (whom he lives with) via telephone. Brother tested positive for COVID on 3/11. He tried to keep his distance from patient as soon as he found out he had it, but regardless patient started to develop symptoms on 3/15. He never had fevers or shortness of breath, but he did develop extreme lethargy, a cough, and diarrhea (watery). For the last three days prior to admission, he became significantly more lethargic and less responsive prompting the brother to take him to the ED today. (24 Mar 2021 18:27)    PAST MEDICAL & SURGICAL HISTORY:  Spinal abscess  H/O paraplegia  HTN (hypertension)  DM (diabetes mellitus)  Spinal abscess  S/P Surgery    patient seen and examined independently on morning rounds in F9 SDU covid unit, chart reviewed and discussed with the medicine resident and on interdisciplinary rounds    no overnight events---hematuria clearing (urine in catheter bag pink tinged)             PE:  GEN-NAD, AAOx3  PULM- fair air entry, bilateral basilar crackles   CVS- +s1/s2 RRR no murmurs  GI- soft NT ND +bs, no rebound, no guarding, +SPC (site c/d/i_)  EXT-trace edema paraplegia          Labs:                        10.6   6.86  )-----------( 211      ( 26 Mar 2021 04:30 )             32.5     CBC Full  -  ( 26 Mar 2021 04:30 )  WBC Count : 6.86 K/uL  RBC Count : 3.79 M/uL  Hemoglobin : 10.6 g/dL  Hematocrit : 32.5 %  Platelet Count - Automated : 211 K/uL  Mean Cell Volume : 85.8 fL  Mean Cell Hemoglobin : 28.0 pg  Mean Cell Hemoglobin Concentration : 32.6 g/dL  Auto Neutrophil # : 5.47 K/uL  Auto Lymphocyte # : 0.90 K/uL  Auto Monocyte # : 0.37 K/uL  Auto Eosinophil # : 0.00 K/uL  Auto Basophil # : 0.01 K/uL  Auto Neutrophil % : 79.8 %  Auto Lymphocyte % : 13.1 %  Auto Monocyte % : 5.4 %  Auto Eosinophil % : 0.0 %  Auto Basophil % : 0.1 %      03-26    140  |  110  |  66<HH>  ----------------------------<  215<H>  4.3   |  20  |  1.8<H>    Ca    8.9      26 Mar 2021 04:30    TPro  7.0  /  Alb  2.8<L>  /  TBili  <0.2  /  DBili  x   /  AST  29  /  ALT  16  /  AlkPhos  89  03-26      Microbiology:    Culture - Blood (collected 25 Mar 2021 16:00)  Source: .Blood Blood-Peripheral  Preliminary Report (27 Mar 2021 01:03):    No growth to date.    Culture - Blood (collected 25 Mar 2021 16:00)  Source: .Blood Blood-Peripheral  Preliminary Report (27 Mar 2021 01:03):    No growth to date.    Culture - Urine (collected 25 Mar 2021 00:30)  Source: .Urine Clean Catch (Midstream)  Final Report (26 Mar 2021 11:38):    >=3 organisms. Probable collection contamination.        Vital Signs Last 24 Hrs  T(C): 35.7 (27 Mar 2021 15:01), Max: 35.7 (27 Mar 2021 15:01)  T(F): 96.2 (27 Mar 2021 15:01), Max: 96.2 (27 Mar 2021 15:01)  HR: 50 (27 Mar 2021 15:01) (43 - 50)  BP: 129/78 (27 Mar 2021 15:01) (129/78 - 143/80)  BP(mean): --  RR: 18 (27 Mar 2021 15:01) (18 - 18)  SpO2: 96% (27 Mar 2021 08:15) (95% - 96%)    I&O's Summary    26 Mar 2021 07:01  -  27 Mar 2021 07:00  --------------------------------------------------------  IN: 610 mL / OUT: 1350 mL / NET: -740 mL    27 Mar 2021 07:01  -  27 Mar 2021 15:16  --------------------------------------------------------  IN: 300 mL / OUT: 1325 mL / NET: -1025 mL

## 2021-03-27 NOTE — CONSULT NOTE ADULT - ASSESSMENT
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 Found to have sinus bradycardia to 40s, down to 30s while asleep, asymptomatic.     Impression:  COVID PNA  Sinus bradycardia 30-40s, asymptomatic  Hx HTN    Plan:  - Agree with holding metoprolol, last dose 3/25 AM  - Cont tele monitoring  - No indication for PPM at present time  - Check TSH  - Consider ACE/ARB for HTN if needed, no indication for BB at current  - Please re-call if needed    EPS [phone]  
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 Patient is A/O x1 at bedside; history obtained from brother (whom he lives with) via telephone. Brother tested positive for COVID on 3/11. He tried to keep his distance from patient as soon as he found out he had it, but regardless patient started to develop symptoms on 3/15. He never had fevers or shortness of breath, but he did develop extreme lethargy, a cough, and diarrhea (watery). For the last three days prior to admission, he became significantly more lethargic and less responsive prompting the brother to take him to the ED today.     IMPRESSION  #Severe Sepsis on admission T<96.8F, wbc<4, Bands>10%, metabolic encephalopathy, JOÃO due to COVID19 PNA, Severe (O2 < or = 94% on RA and requiring supplemental O2)  Cxray with bilateral infiltrates    D-Dimer Assay, Quantitative: 490 ng/mL DDU (03-25-21 @ 06:21)  C-Reactive Protein, Serum: 107 mg/L (03-24-21 @ 16:20)  D-Dimer Assay, Quantitative: 570 ng/mL DDU (03-24-21 @ 16:20)  Ferritin, Serum: 1903 ng/mL (03-24-21 @ 16:20)  Procalcitonin, Serum: 0.46 ng/mL (03-24-21 @ 16:20)    #Resolved Hyponatremia   #Metabolic encephalopathy  #Suprapubic catheter associated bactiuria  #DM   #JOÃO  Creatinine, Serum: 1.8 (03-26-21 @ 00:45)      RECOMMENDATIONS  - Start Remdesivir D1: 200mg x1, Day 2 - Day 5 100mg IV daily. Monitor Cr/LFTs  - Await COVID Ab and if NEGATIVE, can offer and consent for convalescent Plasma: 1 unit over 2h   - Continue Dexamethasone 6mg x 10 days or until Discharge (whichever is shorter), any taper per Pulm  - Await Blood Cultures & baseline Urine C&S  - A/C per primary team  - Prone positioning if possible  - Monitor off Abx   - Repeat procalcitonin  - Repeat Cr    
65 y.o M with neurogenic bladder 2/2 spinal abscess, chronic SPT admitted to medicine with COVID 19 PNA Urology called to evaluate PT with gross hematuria.   Pt. refused inpatient SPT change     Plan:  Cont SPT drain to gravity, avoid pulling on the tube   Urine cx   Adjust Abx according C&S   Might repeat urine cytology   Consider CT A/P w/o IVC    Cont Oxybutynin   SPT change when Pt. is agreeable.  Will d/w  attending

## 2021-03-27 NOTE — CONSULT NOTE ADULT - TIME BILLING
I agree with the assessment and plan  no clear indication for intervention   the patient should have his UTI managed  SPT management
COVID pneumonia
bradycardia
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
## 2021-03-27 NOTE — PROGRESS NOTE ADULT - SUBJECTIVE AND OBJECTIVE BOX
SUBJECTIVE:    Patient is a 65y old Male who presents with a chief complaint of lethargy, weakness (26 Mar 2021 18:55)    Overnight Events: Patient is stable, he had sinus bradycardia with no sx overnight.  Hemodynamically stable, hematuria resolving.  No major complaints.    PAST MEDICAL & SURGICAL HISTORY  Spinal abscess    H/O paraplegia    HTN (hypertension)    DM (diabetes mellitus)    Spinal abscess  S/P Surgery      SOCIAL HISTORY:  Negative for smoking/alcohol/drug use.     ALLERGIES:  No Known Allergies    MEDICATIONS:  STANDING MEDICATIONS  amLODIPine   Tablet 10 milliGRAM(s) Oral daily  atorvastatin Oral Tab/Cap - Peds 40 milliGRAM(s) Oral daily  cefTRIAXone   IVPB 1000 milliGRAM(s) IV Intermittent every 24 hours  chlorhexidine 4% Liquid 1 Application(s) Topical <User Schedule>  dexAMETHasone  Injectable 6 milliGRAM(s) IV Push daily  heparin   Injectable 5000 Unit(s) SubCutaneous every 8 hours  lactated ringers. 1000 milliLiter(s) IV Continuous <Continuous>  oxybutynin 10 milliGRAM(s) Oral daily  remdesivir  IVPB   IV Intermittent   remdesivir  IVPB 100 milliGRAM(s) IV Intermittent every 24 hours    PRN MEDICATIONS    VITALS:   T(F): 95.1, Max: 97.4 (03-26-21 @ 13:47)  HR: 43 (43 - 45)  BP: 143/80 (139/75 - 143/80)  RR: 18 (18 - 18)  SpO2: 95% (95% - 95%)    LABS:                        10.6   6.86  )-----------( 211      ( 26 Mar 2021 04:30 )             32.5     03-26    140  |  110  |  66<HH>  ----------------------------<  215<H>  4.3   |  20  |  1.8<H>    Ca    8.9      26 Mar 2021 04:30    TPro  7.0  /  Alb  2.8<L>  /  TBili  <0.2  /  DBili  x   /  AST  29  /  ALT  16  /  AlkPhos  89  03-26          Troponin T, Serum: 0.01 ng/mL (03-26-21 @ 16:00)      Culture - Blood (collected 25 Mar 2021 16:00)  Source: .Blood Blood-Peripheral  Preliminary Report (27 Mar 2021 01:03):    No growth to date.    Culture - Blood (collected 25 Mar 2021 16:00)  Source: .Blood Blood-Peripheral  Preliminary Report (27 Mar 2021 01:03):    No growth to date.    Culture - Urine (collected 25 Mar 2021 00:30)  Source: .Urine Clean Catch (Midstream)  Final Report (26 Mar 2021 11:38):    >=3 organisms. Probable collection contamination.      CARDIAC MARKERS ( 26 Mar 2021 16:00 )  x     / 0.01 ng/mL / x     / x     / x      CARDIAC MARKERS ( 25 Mar 2021 16:00 )  x     / 0.01 ng/mL / x     / x     / x              03-26-21 @ 07:01  -  03-27-21 @ 07:00  --------------------------------------------------------  IN: 535 mL / OUT: 1350 mL / NET: -815 mL    03-27-21 @ 07:01  -  03-27-21 @ 11:43  --------------------------------------------------------  IN: 0 mL / OUT: 1325 mL / NET: -1325 mL          PHYSICAL EXAM:  GEN: NAD, comfortable  LUNGS: CTAB, no w/r/r  HEART: RRR, s1 and s2 appreciated, no m/r/g  ABD: soft, NT/ND, +BS  EXT: no edema, PP b/l  NEURO: AAOX3, paraplegic

## 2021-03-27 NOTE — CHART NOTE - NSCHARTNOTEFT_GEN_A_CORE
Patient has HR in 40s during day and drops to high 30s while asleep. Patient has running dru for past 2 days.  Patient is asymptomatic. Metoprolol and amlodipine was held yesterday.  EKG performed today noted for sinus bradycardia. last trops were negative.  will hold metoprolol for now.    EP consult placed.

## 2021-03-27 NOTE — CONSULT NOTE ADULT - ATTENDING COMMENTS
Agree with above note
asymptomatic bradycardia  - cont to monitor off of BB  - Check TSH and free T4  - Check 2D echo

## 2021-03-28 LAB
ALBUMIN SERPL ELPH-MCNC: 2.7 G/DL — LOW (ref 3.5–5.2)
ALP SERPL-CCNC: 79 U/L — SIGNIFICANT CHANGE UP (ref 30–115)
ALT FLD-CCNC: 17 U/L — SIGNIFICANT CHANGE UP (ref 0–41)
ANION GAP SERPL CALC-SCNC: 12 MMOL/L — SIGNIFICANT CHANGE UP (ref 7–14)
AST SERPL-CCNC: 28 U/L — SIGNIFICANT CHANGE UP (ref 0–41)
BASOPHILS # BLD AUTO: 0.03 K/UL — SIGNIFICANT CHANGE UP (ref 0–0.2)
BASOPHILS NFR BLD AUTO: 0.4 % — SIGNIFICANT CHANGE UP (ref 0–1)
BILIRUB SERPL-MCNC: 0.2 MG/DL — SIGNIFICANT CHANGE UP (ref 0.2–1.2)
BUN SERPL-MCNC: 42 MG/DL — HIGH (ref 10–20)
CALCIUM SERPL-MCNC: 8.2 MG/DL — LOW (ref 8.5–10.1)
CHLORIDE SERPL-SCNC: 109 MMOL/L — SIGNIFICANT CHANGE UP (ref 98–110)
CO2 SERPL-SCNC: 21 MMOL/L — SIGNIFICANT CHANGE UP (ref 17–32)
CREAT SERPL-MCNC: 1.2 MG/DL — SIGNIFICANT CHANGE UP (ref 0.7–1.5)
EOSINOPHIL # BLD AUTO: 0.03 K/UL — SIGNIFICANT CHANGE UP (ref 0–0.7)
EOSINOPHIL NFR BLD AUTO: 0.4 % — SIGNIFICANT CHANGE UP (ref 0–8)
GLUCOSE BLDC GLUCOMTR-MCNC: 128 MG/DL — HIGH (ref 70–99)
GLUCOSE BLDC GLUCOMTR-MCNC: 201 MG/DL — HIGH (ref 70–99)
GLUCOSE BLDC GLUCOMTR-MCNC: 249 MG/DL — HIGH (ref 70–99)
GLUCOSE BLDC GLUCOMTR-MCNC: 273 MG/DL — HIGH (ref 70–99)
GLUCOSE SERPL-MCNC: 131 MG/DL — HIGH (ref 70–99)
HCT VFR BLD CALC: 34.9 % — LOW (ref 42–52)
HGB BLD-MCNC: 11.1 G/DL — LOW (ref 14–18)
IMM GRANULOCYTES NFR BLD AUTO: 4.7 % — HIGH (ref 0.1–0.3)
LYMPHOCYTES # BLD AUTO: 0.86 K/UL — LOW (ref 1.2–3.4)
LYMPHOCYTES # BLD AUTO: 10.9 % — LOW (ref 20.5–51.1)
MCHC RBC-ENTMCNC: 27.4 PG — SIGNIFICANT CHANGE UP (ref 27–31)
MCHC RBC-ENTMCNC: 31.8 G/DL — LOW (ref 32–37)
MCV RBC AUTO: 86.2 FL — SIGNIFICANT CHANGE UP (ref 80–94)
MONOCYTES # BLD AUTO: 0.6 K/UL — SIGNIFICANT CHANGE UP (ref 0.1–0.6)
MONOCYTES NFR BLD AUTO: 7.6 % — SIGNIFICANT CHANGE UP (ref 1.7–9.3)
NEUTROPHILS # BLD AUTO: 6.01 K/UL — SIGNIFICANT CHANGE UP (ref 1.4–6.5)
NEUTROPHILS NFR BLD AUTO: 76 % — HIGH (ref 42.2–75.2)
NRBC # BLD: 0 /100 WBCS — SIGNIFICANT CHANGE UP (ref 0–0)
PLATELET # BLD AUTO: 192 K/UL — SIGNIFICANT CHANGE UP (ref 130–400)
POTASSIUM SERPL-MCNC: 4.4 MMOL/L — SIGNIFICANT CHANGE UP (ref 3.5–5)
POTASSIUM SERPL-SCNC: 4.4 MMOL/L — SIGNIFICANT CHANGE UP (ref 3.5–5)
PROT SERPL-MCNC: 6.9 G/DL — SIGNIFICANT CHANGE UP (ref 6–8)
RBC # BLD: 4.05 M/UL — LOW (ref 4.7–6.1)
RBC # FLD: 14.4 % — SIGNIFICANT CHANGE UP (ref 11.5–14.5)
SODIUM SERPL-SCNC: 142 MMOL/L — SIGNIFICANT CHANGE UP (ref 135–146)
WBC # BLD: 7.9 K/UL — SIGNIFICANT CHANGE UP (ref 4.8–10.8)
WBC # FLD AUTO: 7.9 K/UL — SIGNIFICANT CHANGE UP (ref 4.8–10.8)

## 2021-03-28 PROCEDURE — 99233 SBSQ HOSP IP/OBS HIGH 50: CPT

## 2021-03-28 RX ADMIN — ATORVASTATIN CALCIUM 40 MILLIGRAM(S): 80 TABLET, FILM COATED ORAL at 11:28

## 2021-03-28 RX ADMIN — CHLORHEXIDINE GLUCONATE 1 APPLICATION(S): 213 SOLUTION TOPICAL at 05:17

## 2021-03-28 RX ADMIN — AMLODIPINE BESYLATE 10 MILLIGRAM(S): 2.5 TABLET ORAL at 05:16

## 2021-03-28 RX ADMIN — Medication 10 MILLIGRAM(S): at 11:28

## 2021-03-28 RX ADMIN — HEPARIN SODIUM 5000 UNIT(S): 5000 INJECTION INTRAVENOUS; SUBCUTANEOUS at 13:28

## 2021-03-28 RX ADMIN — HEPARIN SODIUM 5000 UNIT(S): 5000 INJECTION INTRAVENOUS; SUBCUTANEOUS at 05:17

## 2021-03-28 RX ADMIN — REMDESIVIR 500 MILLIGRAM(S): 5 INJECTION INTRAVENOUS at 11:52

## 2021-03-28 RX ADMIN — HEPARIN SODIUM 5000 UNIT(S): 5000 INJECTION INTRAVENOUS; SUBCUTANEOUS at 21:17

## 2021-03-28 RX ADMIN — Medication 6 MILLIGRAM(S): at 05:15

## 2021-03-28 RX ADMIN — SODIUM CHLORIDE 75 MILLILITER(S): 9 INJECTION, SOLUTION INTRAVENOUS at 13:32

## 2021-03-28 RX ADMIN — CEFTRIAXONE 100 MILLIGRAM(S): 500 INJECTION, POWDER, FOR SOLUTION INTRAMUSCULAR; INTRAVENOUS at 12:28

## 2021-03-28 NOTE — PROGRESS NOTE ADULT - ASSESSMENT
a/p:  #Acute Hypoxic respiratory failure 2/2 COVID-19 pneumonia  -continue isolation precautions  -cont iv dexamethasone 6 mg daily x 10 days or until dc and RDV (today day #3)  -monitor o2 sat---remains on 3L and o2 sat 98%  -antibody +--->not candidate for conv plasma  -ID following    #asymptomatic bradycardia- at rest  -cont to monitor on tele  -hold bblocker  -EPS following  -check echo and tsh/t4    #Spinal abscess-paraplegia  -freuquent position change  -podiatry eval of Right foot (wound care)    #chronic SPC 2/2 urinary retention and hematuria (resolved)  - following  -needs to have suprapubic cath changed in 2 weeks--will f/u as outpaitent  -cont iv ceftriaxone---f/u ucx (initial ucx >3 organisms--c/w contaminate)  -monitor cbc and transfuse if hb <8    #JOÃO with hyperkalemia- improving (bun/cr 42/1.2)  - prerenal 2/2 dehydration (x 1 week decreased po intkae with covid)  -hyperkalemia resolved  -repeat ekg if recurrent hyperkalemia  -cont ivf  -monitor bmp     DVT/GI ppx  FULL CODE    #Progress Note Handoff  Pending (specify):  clinical improvement in symptoms   Disposition: Home_x when medically stable- he has hha and case managment following for dispo planning)--may need STR as he has HHA at home (and patient is covid +) vs need for STR (lives with sibling)

## 2021-03-28 NOTE — PROGRESS NOTE ADULT - SUBJECTIVE AND OBJECTIVE BOX
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 Patient is A/O x1 at bedside; history obtained from brother (whom he lives with) via telephone. Brother tested positive for COVID on 3/11. He tried to keep his distance from patient as soon as he found out he had it, but regardless patient started to develop symptoms on 3/15. He never had fevers or shortness of breath, but he did develop extreme lethargy, a cough, and diarrhea (watery). For the last three days prior to admission, he became significantly more lethargic and less responsive prompting the brother to take him to the ED today. (24 Mar 2021 18:27)    PAST MEDICAL & SURGICAL HISTORY:  Spinal abscess  H/O paraplegia  HTN (hypertension)  DM (diabetes mellitus)  Spinal abscess  S/P Surgery    patient seen and examined independently on morning rounds in F9 SDU covid unit, chart reviewed and discussed with the medicine resident and on interdisciplinary rounds    no overnight events---urine cleared- no further hematiura- intermittent asymptomatic bradycardia (especially at rest- hr in the 40's)             PE:  GEN-NAD, AAOx3  PULM- fair air entry, bilateral basilar crackles   CVS- +s1/s2 RRR no murmurs  GI- soft NT ND +bs, no rebound, no guarding, +SPC (site c/d/i_)  EXT-trace edema paraplegia    >=3 organisms. Probable collection contamination.        Labs:                        11.1   7.90  )-----------( 192      ( 28 Mar 2021 07:28 )             34.9     CBC Full  -  ( 28 Mar 2021 07:28 )  WBC Count : 7.90 K/uL  RBC Count : 4.05 M/uL  Hemoglobin : 11.1 g/dL  Hematocrit : 34.9 %  Platelet Count - Automated : 192 K/uL  Mean Cell Volume : 86.2 fL  Mean Cell Hemoglobin : 27.4 pg  Mean Cell Hemoglobin Concentration : 31.8 g/dL  Auto Neutrophil # : 6.01 K/uL  Auto Lymphocyte # : 0.86 K/uL  Auto Monocyte # : 0.60 K/uL  Auto Eosinophil # : 0.03 K/uL  Auto Basophil # : 0.03 K/uL  Auto Neutrophil % : 76.0 %  Auto Lymphocyte % : 10.9 %  Auto Monocyte % : 7.6 %  Auto Eosinophil % : 0.4 %  Auto Basophil % : 0.4 %      03-28    142  |  109  |  42<H>  ----------------------------<  131<H>  4.4   |  21  |  1.2    Ca    8.2<L>      28 Mar 2021 07:28    TPro  6.9  /  Alb  2.7<L>  /  TBili  0.2  /  DBili  x   /  AST  28  /  ALT  17  /  AlkPhos  79  03-28      Microbiology:    Culture - Blood (collected 25 Mar 2021 16:00)  Source: .Blood Blood-Peripheral  Preliminary Report (27 Mar 2021 01:03):    No growth to date.    Culture - Blood (collected 25 Mar 2021 16:00)  Source: .Blood Blood-Peripheral  Preliminary Report (27 Mar 2021 01:03):    No growth to date.        Vital Signs Last 24 Hrs  T(C): 35.7 (28 Mar 2021 12:56), Max: 36.3 (27 Mar 2021 20:47)  T(F): 96.2 (28 Mar 2021 12:56), Max: 97.4 (27 Mar 2021 20:47)  HR: 58 (28 Mar 2021 12:56) (52 - 58)  BP: 144/74 (28 Mar 2021 12:56) (137/74 - 144/74)  BP(mean): --  RR: 18 (28 Mar 2021 12:56) (16 - 18)  SpO2: 96% (28 Mar 2021 07:58) (96% - 97%)    I&O's Summary    27 Mar 2021 07:01  -  28 Mar 2021 07:00  --------------------------------------------------------  IN: 1320 mL / OUT: 3975 mL / NET: -2655 mL    28 Mar 2021 07:01  -  28 Mar 2021 15:06  --------------------------------------------------------  IN: 1275 mL / OUT: 900 mL / NET: 375 mL     Statement Selected

## 2021-03-28 NOTE — PROGRESS NOTE ADULT - ASSESSMENT
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 with 10 day history of weakness, lethargy, and diarrhea. COVID-19 Positive.    #) COVID PNA  - Symptoms began 3/15  - on 2L NC  - IV dexamethasone 6mg QD  - started on RDV on 3/26  - procal 0.46, D-Dimer 490, ferritin 1903   - No need for abx  - COVID antibodies positive, no indications for plasma   - Heparin 5,000 Q8 for DVT prophylaxis   - US duplex venous LE negative for dvt     #) Elevated troponin   # SInus bradycardia  - No complaints of chest pain , asymptomatic  - ECG no ischemic changed  - Likely due to JOÃO   - troponin stable    #) JOÃO   - Likely due to dehydration   - creatinine downtrending with hydration  - LR 75 cc/hr  - Monitor daily CMP    # Pyuria with hematuria  - patient has chronic angelo secondary to urinary incontinence  - folley changed on 3/10 as per his brother, next change on 4/14  - start rocephin, course for 7 days  - as per the brother he has recurrent episodes of hematuria at home which his urologist is aware of  - c/s uro, consider CT a/p if worsening or hemodynamic instability  - hematuria resolving  - urine cx possible contamination, blood cx negative    #) Transaminitis   - Likely due to covid  - Resolved    #) HTN   - BP acceptable, Continue to hold Metoprolol. Continue with amlodipine     #) DM   - Not on home insulin   - Follow-up Finger-stick glucose + hemoglobin A1C    #) Normocytic anemia   - Follow-up iron studies     #) Diet - DASH/consistent carbohydrate  #) DVT prophylaxis - heparin 5,000 Q8   #) Activity- bedbound  #) Code status - Full

## 2021-03-28 NOTE — PROGRESS NOTE ADULT - SUBJECTIVE AND OBJECTIVE BOX
24H events:    Patient is a 65y old Male who presents with a chief complaint of lethargy, weakness (27 Mar 2021 15:10)    Primary diagnosis of COVID-19       Today is hospital day 4d.     PAST MEDICAL & SURGICAL HISTORY  Spinal abscess    H/O paraplegia    HTN (hypertension)    DM (diabetes mellitus)    Spinal abscess  S/P Surgery      SOCIAL HISTORY:  Negative for smoking/alcohol/drug use.     ALLERGIES:  No Known Allergies    MEDICATIONS:  STANDING MEDICATIONS  amLODIPine   Tablet 10 milliGRAM(s) Oral daily  atorvastatin Oral Tab/Cap - Peds 40 milliGRAM(s) Oral daily  cefTRIAXone   IVPB 1000 milliGRAM(s) IV Intermittent every 24 hours  chlorhexidine 4% Liquid 1 Application(s) Topical <User Schedule>  dexAMETHasone  Injectable 6 milliGRAM(s) IV Push daily  heparin   Injectable 5000 Unit(s) SubCutaneous every 8 hours  lactated ringers. 1000 milliLiter(s) IV Continuous <Continuous>  oxybutynin 10 milliGRAM(s) Oral daily  remdesivir  IVPB   IV Intermittent   remdesivir  IVPB 100 milliGRAM(s) IV Intermittent every 24 hours    PRN MEDICATIONS    VITALS:   T(F): 96.2  HR: 58  BP: 144/74  RR: 18  SpO2: 96%    LABS:                        11.1   7.90  )-----------( 192      ( 28 Mar 2021 07:28 )             34.9     03-28    142  |  109  |  42<H>  ----------------------------<  131<H>  4.4   |  21  |  1.2    Ca    8.2<L>      28 Mar 2021 07:28    TPro  6.9  /  Alb  2.7<L>  /  TBili  0.2  /  DBili  x   /  AST  28  /  ALT  17  /  AlkPhos  79  03-28              Culture - Blood (collected 25 Mar 2021 16:00)  Source: .Blood Blood-Peripheral  Preliminary Report (27 Mar 2021 01:03):    No growth to date.    Culture - Blood (collected 25 Mar 2021 16:00)  Source: .Blood Blood-Peripheral  Preliminary Report (27 Mar 2021 01:03):    No growth to date.      CARDIAC MARKERS ( 26 Mar 2021 16:00 )  x     / 0.01 ng/mL / x     / x     / x          RADIOLOGY:    PHYSICAL EXAM:  GEN: NAD, comfortable  LUNGS: CTAB, no w/r/r  HEART: RRR, s1 and s2 appreciated, no m/r/g  ABD: soft, NT/ND, +BS  EXT: no edema, PP b/l  NEURO: AAOX3, paraplegic

## 2021-03-29 LAB
ALBUMIN SERPL ELPH-MCNC: 2.6 G/DL — LOW (ref 3.5–5.2)
ALP SERPL-CCNC: 82 U/L — SIGNIFICANT CHANGE UP (ref 30–115)
ALT FLD-CCNC: 24 U/L — SIGNIFICANT CHANGE UP (ref 0–41)
ANION GAP SERPL CALC-SCNC: 11 MMOL/L — SIGNIFICANT CHANGE UP (ref 7–14)
AST SERPL-CCNC: 37 U/L — SIGNIFICANT CHANGE UP (ref 0–41)
BASOPHILS # BLD AUTO: 0.06 K/UL — SIGNIFICANT CHANGE UP (ref 0–0.2)
BASOPHILS NFR BLD AUTO: 0.6 % — SIGNIFICANT CHANGE UP (ref 0–1)
BILIRUB SERPL-MCNC: 0.3 MG/DL — SIGNIFICANT CHANGE UP (ref 0.2–1.2)
BUN SERPL-MCNC: 35 MG/DL — HIGH (ref 10–20)
CALCIUM SERPL-MCNC: 7.9 MG/DL — LOW (ref 8.5–10.1)
CHLORIDE SERPL-SCNC: 104 MMOL/L — SIGNIFICANT CHANGE UP (ref 98–110)
CO2 SERPL-SCNC: 25 MMOL/L — SIGNIFICANT CHANGE UP (ref 17–32)
CREAT SERPL-MCNC: 1.1 MG/DL — SIGNIFICANT CHANGE UP (ref 0.7–1.5)
D DIMER BLD IA.RAPID-MCNC: 577 NG/ML DDU — HIGH (ref 0–230)
EOSINOPHIL # BLD AUTO: 0.03 K/UL — SIGNIFICANT CHANGE UP (ref 0–0.7)
EOSINOPHIL NFR BLD AUTO: 0.3 % — SIGNIFICANT CHANGE UP (ref 0–8)
GLUCOSE BLDC GLUCOMTR-MCNC: 115 MG/DL — HIGH (ref 70–99)
GLUCOSE BLDC GLUCOMTR-MCNC: 134 MG/DL — HIGH (ref 70–99)
GLUCOSE BLDC GLUCOMTR-MCNC: 197 MG/DL — HIGH (ref 70–99)
GLUCOSE BLDC GLUCOMTR-MCNC: 245 MG/DL — HIGH (ref 70–99)
GLUCOSE BLDC GLUCOMTR-MCNC: 268 MG/DL — HIGH (ref 70–99)
GLUCOSE SERPL-MCNC: 219 MG/DL — HIGH (ref 70–99)
HCT VFR BLD CALC: 36.3 % — LOW (ref 42–52)
HGB BLD-MCNC: 11.5 G/DL — LOW (ref 14–18)
IMM GRANULOCYTES NFR BLD AUTO: 6.3 % — HIGH (ref 0.1–0.3)
LYMPHOCYTES # BLD AUTO: 0.97 K/UL — LOW (ref 1.2–3.4)
LYMPHOCYTES # BLD AUTO: 9.3 % — LOW (ref 20.5–51.1)
MCHC RBC-ENTMCNC: 27.4 PG — SIGNIFICANT CHANGE UP (ref 27–31)
MCHC RBC-ENTMCNC: 31.7 G/DL — LOW (ref 32–37)
MCV RBC AUTO: 86.6 FL — SIGNIFICANT CHANGE UP (ref 80–94)
MONOCYTES # BLD AUTO: 0.44 K/UL — SIGNIFICANT CHANGE UP (ref 0.1–0.6)
MONOCYTES NFR BLD AUTO: 4.2 % — SIGNIFICANT CHANGE UP (ref 1.7–9.3)
NEUTROPHILS # BLD AUTO: 8.29 K/UL — HIGH (ref 1.4–6.5)
NEUTROPHILS NFR BLD AUTO: 79.3 % — HIGH (ref 42.2–75.2)
NRBC # BLD: 0 /100 WBCS — SIGNIFICANT CHANGE UP (ref 0–0)
PLATELET # BLD AUTO: 212 K/UL — SIGNIFICANT CHANGE UP (ref 130–400)
POTASSIUM SERPL-MCNC: 4 MMOL/L — SIGNIFICANT CHANGE UP (ref 3.5–5)
POTASSIUM SERPL-SCNC: 4 MMOL/L — SIGNIFICANT CHANGE UP (ref 3.5–5)
PROT SERPL-MCNC: 6.8 G/DL — SIGNIFICANT CHANGE UP (ref 6–8)
RBC # BLD: 4.19 M/UL — LOW (ref 4.7–6.1)
RBC # FLD: 14.5 % — SIGNIFICANT CHANGE UP (ref 11.5–14.5)
SODIUM SERPL-SCNC: 140 MMOL/L — SIGNIFICANT CHANGE UP (ref 135–146)
WBC # BLD: 10.45 K/UL — SIGNIFICANT CHANGE UP (ref 4.8–10.8)
WBC # FLD AUTO: 10.45 K/UL — SIGNIFICANT CHANGE UP (ref 4.8–10.8)

## 2021-03-29 PROCEDURE — 99233 SBSQ HOSP IP/OBS HIGH 50: CPT

## 2021-03-29 RX ORDER — SODIUM CHLORIDE 9 MG/ML
1000 INJECTION, SOLUTION INTRAVENOUS
Refills: 0 | Status: ACTIVE | OUTPATIENT
Start: 2021-03-29 | End: 2022-02-25

## 2021-03-29 RX ORDER — INSULIN LISPRO 100/ML
VIAL (ML) SUBCUTANEOUS
Refills: 0 | Status: ACTIVE | OUTPATIENT
Start: 2021-03-29 | End: 2022-02-25

## 2021-03-29 RX ORDER — PANTOPRAZOLE SODIUM 20 MG/1
40 TABLET, DELAYED RELEASE ORAL
Refills: 0 | Status: ACTIVE | OUTPATIENT
Start: 2021-03-29 | End: 2022-02-25

## 2021-03-29 RX ORDER — GLUCAGON INJECTION, SOLUTION 0.5 MG/.1ML
1 INJECTION, SOLUTION SUBCUTANEOUS ONCE
Refills: 0 | Status: ACTIVE | OUTPATIENT
Start: 2021-03-29 | End: 2022-02-25

## 2021-03-29 RX ADMIN — AMLODIPINE BESYLATE 10 MILLIGRAM(S): 2.5 TABLET ORAL at 05:39

## 2021-03-29 RX ADMIN — Medication 2: at 17:00

## 2021-03-29 RX ADMIN — Medication 10 MILLIGRAM(S): at 11:11

## 2021-03-29 RX ADMIN — HEPARIN SODIUM 5000 UNIT(S): 5000 INJECTION INTRAVENOUS; SUBCUTANEOUS at 13:01

## 2021-03-29 RX ADMIN — REMDESIVIR 500 MILLIGRAM(S): 5 INJECTION INTRAVENOUS at 12:37

## 2021-03-29 RX ADMIN — PANTOPRAZOLE SODIUM 40 MILLIGRAM(S): 20 TABLET, DELAYED RELEASE ORAL at 21:56

## 2021-03-29 RX ADMIN — CEFTRIAXONE 100 MILLIGRAM(S): 500 INJECTION, POWDER, FOR SOLUTION INTRAMUSCULAR; INTRAVENOUS at 11:10

## 2021-03-29 RX ADMIN — CHLORHEXIDINE GLUCONATE 1 APPLICATION(S): 213 SOLUTION TOPICAL at 05:38

## 2021-03-29 RX ADMIN — HEPARIN SODIUM 5000 UNIT(S): 5000 INJECTION INTRAVENOUS; SUBCUTANEOUS at 05:38

## 2021-03-29 RX ADMIN — SODIUM CHLORIDE 75 MILLILITER(S): 9 INJECTION, SOLUTION INTRAVENOUS at 07:47

## 2021-03-29 RX ADMIN — Medication 6 MILLIGRAM(S): at 05:39

## 2021-03-29 RX ADMIN — HEPARIN SODIUM 5000 UNIT(S): 5000 INJECTION INTRAVENOUS; SUBCUTANEOUS at 21:56

## 2021-03-29 RX ADMIN — ATORVASTATIN CALCIUM 40 MILLIGRAM(S): 80 TABLET, FILM COATED ORAL at 21:56

## 2021-03-29 NOTE — PROGRESS NOTE ADULT - ASSESSMENT
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 COVID-19 Positive.    # Acute hypoxemic Respiratory Failure 2/2 COVID PNA  - was on supplemental O2, now on RA  - COVID antibodies positive, no indications for plasma   - cw RDV and dexamethasone, incentive spirometry  - US duplex venous LE negative for dvt   - repeat inflammatory markers    # Sinus bradycardia  - No complaints of chest pain, asymptomatic  - ECG no ischemic changed   - EP: Hold BB, no PPM    # JOÃO  - resolved  - Likely pre-renal due to dehydration     # Pyuria with hematuria  - Patient has chronic angelo secondary to urinary incontinence  - Angelo changed on 3/10 as per his brother, next change on 4/14  - c/w rocephin, course for 7 days  - as per the brother he has recurrent episodes of hematuria at home which his urologist is aware of  - c/s uro, consider CT a/p if worsening or hemodynamic instability  - urine cx possible contamination, blood cx negative    # HTN - BP acceptable, Continue to hold Metoprolol. Continue with amlodipine   # DM - A1C 6.7, start Sliding scale for now, if persistently >180 then start basal - bolus coverage     # DVT ppx - Heparin SQ  # GI ppx - PPI   # Diet - DASH / CC  # COVID test - Positive   Full code.  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 COVID-19 Positive.    # Acute hypoxemic Respiratory Failure 2/2 COVID PNA  - was on supplemental O2, now on RA  - COVID antibodies positive, no indications for plasma   - c/w RDV and dexamethasone, incentive spirometry  - US duplex venous LE negative for dvt   - repeat inflammatory markers    # Sinus bradycardia  - No complaints of chest pain, asymptomatic  - ECG no ischemic changed   - EP: Hold BB, no PPM  - TSH - WNL  - check ECHO - pending  - continue tele today    # JOÃO  - resolving  - Likely pre-renal due to dehydration     # Pyuria with hematuria - now resolved  - Patient has chronic angelo secondary to urinary incontinence  - Angelo changed on 3/10 as per his brother, next change on 4/14  - c/w rocephin, course for 7 days - can change to PO on discharge  - as per the brother he has recurrent episodes of hematuria at home which his urologist is aware of  - seen by urology - pt refused SPC change this admission - can be done as outpt  - urine cx possible contamination, blood cx negative    # HTN - BP acceptable, Continue to hold Metoprolol. Continue with amlodipine   # DM - A1C 6.7, start Sliding scale for now, if persistently >180 then start basal - bolus coverage     # DVT ppx - Heparin SQ  # GI ppx - PPI   # Diet - DASH / CC  # COVID test - Positive   Full code.

## 2021-03-29 NOTE — PROGRESS NOTE ADULT - SUBJECTIVE AND OBJECTIVE BOX
Hospital Day:  5d    Subjective: Patient is a 65y old  Male who presents with a chief complaint of lethargy, weakness (28 Mar 2021 15:05)    Pt seen and evaluated at bedside.   Complaints: None  Over the night Events: None    Past Medical Hx:   Spinal abscess    H/O paraplegia    HTN (hypertension)    HTN, age 0-18    DM (diabetes mellitus)      Past Sx:  Spinal abscess      Allergies:  No Known Allergies    Current Meds:   Standng Meds:  amLODIPine   Tablet 10 milliGRAM(s) Oral daily  atorvastatin Oral Tab/Cap - Peds 40 milliGRAM(s) Oral daily  cefTRIAXone   IVPB 1000 milliGRAM(s) IV Intermittent every 24 hours  chlorhexidine 4% Liquid 1 Application(s) Topical <User Schedule>  dexAMETHasone  Injectable 6 milliGRAM(s) IV Push daily  heparin   Injectable 5000 Unit(s) SubCutaneous every 8 hours  lactated ringers. 1000 milliLiter(s) (75 mL/Hr) IV Continuous <Continuous>  oxybutynin 10 milliGRAM(s) Oral daily  remdesivir  IVPB   IV Intermittent   remdesivir  IVPB 100 milliGRAM(s) IV Intermittent every 24 hours    PRN Meds:      Vital Signs:   T(F): 96.3 (03-29-21 @ 05:30), Max: 96.3 (03-28-21 @ 20:28)  HR: 49 (03-29-21 @ 05:30) (49 - 58)  BP: 154/79 (03-29-21 @ 05:30) (143/75 - 154/79)  RR: 18 (03-29-21 @ 05:30) (18 - 18)  SpO2: 95% (03-29-21 @ 07:47) (95% - 98%)    Physical Exam:   GENERAL: NAD, Resting in bed  HEENT: NCAT  CHEST/LUNG: Clear to auscultation bilaterally; No wheezing or rubs.   HEART: Regular rate and rhythm; No murmurs, rubs, or gallops  ABDOMEN: Bowel sounds present; Soft, Nontender, Nondistended.   EXTREMITIES:  Contracted  NERVOUS SYSTEM:  Alert & Oriented X3    FLUID BALANCE    03-27-21 @ 07:01  -  03-28-21 @ 07:00  --------------------------------------------------------  IN: 1320 mL / OUT: 3975 mL / NET: -2655 mL    03-28-21 @ 07:01  -  03-29-21 @ 07:00  --------------------------------------------------------  IN: 1810 mL / OUT: 2400 mL / NET: -590 mL    03-29-21 @ 07:01  -  03-29-21 @ 12:22  --------------------------------------------------------  IN: 680 mL / OUT: 0 mL / NET: 680 mL        Labs:                         11.5   10.45 )-----------( 212      ( 29 Mar 2021 09:21 )             36.3     Neutophil% 79.3, Lymphocyte% 9.3, Monocyte% 4.2, Bands% 6.3 03-29-21 @ 09:21    29 Mar 2021 09:21    140    |  104    |  35     ----------------------------<  219    4.0     |  25     |  1.1      Ca    7.9        29 Mar 2021 09:21    TPro  6.8    /  Alb  2.6    /  TBili  0.3    /  DBili  x      /  AST  37     /  ALT  24     /  AlkPhos  82     29 Mar 2021 09:21    Troponin 0.01, CKMB --, CK -- 03-26-21 @ 16:00  Troponin 0.01, CKMB --, CK -- 03-25-21 @ 16:00    Culture - Blood (collected 03-25-21 @ 16:00)  Source: .Blood Blood-Peripheral  Preliminary Report (03-27-21 @ 01:03):    No growth to date.    Culture - Blood (collected 03-25-21 @ 16:00)  Source: .Blood Blood-Peripheral  Preliminary Report (03-27-21 @ 01:03):    No growth to date.    D-Dimer Assay, Quantitative: 570 ng/mL DDU (03-24-21 @ 16:20)  D-Dimer Assay, Quantitative: 490 ng/mL DDU (03-25-21 @ 06:21)    Procalcitonin, Serum: 0.46 ng/mL (03-24-21 @ 16:20)  Procalcitonin, Serum: 0.33 ng/mL (03-25-21 @ 06:21)    Ferritin, Serum: 1903 ng/mL (03-24-21 @ 16:20)    C-Reactive Protein, Serum: 107 mg/L (03-24-21 @ 16:20)  C-Reactive Protein, Serum: 108 mg/L (03-25-21 @ 06:21)    Radiology:   < from: [facility] Duplex Lower Ext Vein Scan, Carlo (03.25.21 @ 21:51) >  Impression: No evidenceof deep venous thrombosis or superficial thrombophlebitis in the bilateral lower extremities.  < end of copied text >   Hospital Day:  5d    Subjective: Patient is a 65y old  Male who presents with a chief complaint of lethargy, weakness (28 Mar 2021 15:05)    Pt seen and evaluated at bedside.   Complaints: None  Over the night Events: None    Past Medical Hx:   Spinal abscess    H/O paraplegia    HTN (hypertension)    HTN, age 0-18    DM (diabetes mellitus)      Past Sx:  Spinal abscess      Allergies:  No Known Allergies    Current Meds:   Standng Meds:  amLODIPine   Tablet 10 milliGRAM(s) Oral daily  atorvastatin Oral Tab/Cap - Peds 40 milliGRAM(s) Oral daily  cefTRIAXone   IVPB 1000 milliGRAM(s) IV Intermittent every 24 hours  chlorhexidine 4% Liquid 1 Application(s) Topical <User Schedule>  dexAMETHasone  Injectable 6 milliGRAM(s) IV Push daily  heparin   Injectable 5000 Unit(s) SubCutaneous every 8 hours  lactated ringers. 1000 milliLiter(s) (75 mL/Hr) IV Continuous <Continuous>  oxybutynin 10 milliGRAM(s) Oral daily  remdesivir  IVPB   IV Intermittent   remdesivir  IVPB 100 milliGRAM(s) IV Intermittent every 24 hours    PRN Meds:      Vital Signs:   T(F): 96.3 (03-29-21 @ 05:30), Max: 96.3 (03-28-21 @ 20:28)  HR: 49 (03-29-21 @ 05:30) (49 - 58)  BP: 154/79 (03-29-21 @ 05:30) (143/75 - 154/79)  RR: 18 (03-29-21 @ 05:30) (18 - 18)  SpO2: 95% (03-29-21 @ 07:47) (95% - 98%) on RA    Physical Exam:   GENERAL: NAD, Resting in bed  HEENT: NCAT  CHEST/LUNG: Clear to auscultation bilaterally; No wheezing or rubs.   HEART: Regular rate and rhythm; No murmurs, rubs, or gallops  ABDOMEN: Bowel sounds present; Soft, Nontender, Nondistended.   EXTREMITIES:  Contracted  NERVOUS SYSTEM:  Alert & Oriented X3    FLUID BALANCE    03-27-21 @ 07:01  -  03-28-21 @ 07:00  --------------------------------------------------------  IN: 1320 mL / OUT: 3975 mL / NET: -2655 mL    03-28-21 @ 07:01  -  03-29-21 @ 07:00  --------------------------------------------------------  IN: 1810 mL / OUT: 2400 mL / NET: -590 mL    03-29-21 @ 07:01  -  03-29-21 @ 12:22  --------------------------------------------------------  IN: 680 mL / OUT: 0 mL / NET: 680 mL        Labs:                         11.5   10.45 )-----------( 212      ( 29 Mar 2021 09:21 )             36.3     Neutophil% 79.3, Lymphocyte% 9.3, Monocyte% 4.2, Bands% 6.3 03-29-21 @ 09:21    29 Mar 2021 09:21    140    |  104    |  35     ----------------------------<  219    4.0     |  25     |  1.1      Ca    7.9        29 Mar 2021 09:21    TPro  6.8    /  Alb  2.6    /  TBili  0.3    /  DBili  x      /  AST  37     /  ALT  24     /  AlkPhos  82     29 Mar 2021 09:21    Troponin 0.01, CKMB --, CK -- 03-26-21 @ 16:00  Troponin 0.01, CKMB --, CK -- 03-25-21 @ 16:00    Culture - Blood (collected 03-25-21 @ 16:00)  Source: .Blood Blood-Peripheral  Preliminary Report (03-27-21 @ 01:03):    No growth to date.    Culture - Blood (collected 03-25-21 @ 16:00)  Source: .Blood Blood-Peripheral  Preliminary Report (03-27-21 @ 01:03):    No growth to date.    D-Dimer Assay, Quantitative: 570 ng/mL DDU (03-24-21 @ 16:20)  D-Dimer Assay, Quantitative: 490 ng/mL DDU (03-25-21 @ 06:21)    Procalcitonin, Serum: 0.46 ng/mL (03-24-21 @ 16:20)  Procalcitonin, Serum: 0.33 ng/mL (03-25-21 @ 06:21)    Ferritin, Serum: 1903 ng/mL (03-24-21 @ 16:20)    C-Reactive Protein, Serum: 107 mg/L (03-24-21 @ 16:20)  C-Reactive Protein, Serum: 108 mg/L (03-25-21 @ 06:21)    Radiology:   < from: [facility] Duplex Lower Ext Vein Scan, Carlo (03.25.21 @ 21:51) >  Impression: No evidence of deep venous thrombosis or superficial thrombophlebitis in the bilateral lower extremities.  < end of copied text >

## 2021-03-29 NOTE — PROGRESS NOTE ADULT - ATTENDING COMMENTS
Pt seen this morning and case discussed with staff on rounds today.   He is in bed and denies any pain  on room air now and stable  NO fever  No further hematuria  SPC site clean    Bradycardia  still with bradycardia on telemetry - 40 to 50's range  off metoprolol since 3/27 - continue telemetry and monitor  ECHO per EP consult    Hematuria/UTI  was placed on IV abx for cystitis/hematuria  can complete course with PO abx on discharge    COVID 19 pneumonia  will complete remdesivir on 3/30  on decadron  now tolerating room air    JOÃO - likely prerenal - improving  pt had multiple episodes of diarrhea prior to admission    pt lives with brother who needs 24 hr notice prior to discharge  discussed with case management - anticipate discharge on 3/31    case discussed with his brother Erich today who arranged for [employer]  on 3/31 at 2PM      PROGRESS NOTE HANDOFF    Pending: ECHO, labs in AM    Family discussion: with Erich today    Disposition: home with services

## 2021-03-30 ENCOUNTER — TRANSCRIPTION ENCOUNTER (OUTPATIENT)
Age: 66
End: 2021-03-30

## 2021-03-30 LAB
ALBUMIN SERPL ELPH-MCNC: 2.7 G/DL — LOW (ref 3.5–5.2)
ALP SERPL-CCNC: 76 U/L — SIGNIFICANT CHANGE UP (ref 30–115)
ALT FLD-CCNC: 25 U/L — SIGNIFICANT CHANGE UP (ref 0–41)
ANION GAP SERPL CALC-SCNC: 8 MMOL/L — SIGNIFICANT CHANGE UP (ref 7–14)
AST SERPL-CCNC: 32 U/L — SIGNIFICANT CHANGE UP (ref 0–41)
BASOPHILS # BLD AUTO: 0.09 K/UL — SIGNIFICANT CHANGE UP (ref 0–0.2)
BASOPHILS NFR BLD AUTO: 0.7 % — SIGNIFICANT CHANGE UP (ref 0–1)
BILIRUB SERPL-MCNC: 0.3 MG/DL — SIGNIFICANT CHANGE UP (ref 0.2–1.2)
BUN SERPL-MCNC: 38 MG/DL — HIGH (ref 10–20)
CALCIUM SERPL-MCNC: 7.9 MG/DL — LOW (ref 8.5–10.1)
CHLORIDE SERPL-SCNC: 103 MMOL/L — SIGNIFICANT CHANGE UP (ref 98–110)
CO2 SERPL-SCNC: 27 MMOL/L — SIGNIFICANT CHANGE UP (ref 17–32)
CREAT SERPL-MCNC: 1.2 MG/DL — SIGNIFICANT CHANGE UP (ref 0.7–1.5)
CRP SERPL-MCNC: 11 MG/L — HIGH
EOSINOPHIL # BLD AUTO: 0.05 K/UL — SIGNIFICANT CHANGE UP (ref 0–0.7)
EOSINOPHIL NFR BLD AUTO: 0.4 % — SIGNIFICANT CHANGE UP (ref 0–8)
FERRITIN SERPL-MCNC: 921 NG/ML — HIGH (ref 30–400)
GLUCOSE BLDC GLUCOMTR-MCNC: 135 MG/DL — HIGH (ref 70–99)
GLUCOSE BLDC GLUCOMTR-MCNC: 247 MG/DL — HIGH (ref 70–99)
GLUCOSE BLDC GLUCOMTR-MCNC: 279 MG/DL — HIGH (ref 70–99)
GLUCOSE BLDC GLUCOMTR-MCNC: 307 MG/DL — HIGH (ref 70–99)
GLUCOSE SERPL-MCNC: 146 MG/DL — HIGH (ref 70–99)
HCT VFR BLD CALC: 35 % — LOW (ref 42–52)
HGB BLD-MCNC: 11.1 G/DL — LOW (ref 14–18)
IMM GRANULOCYTES NFR BLD AUTO: 8 % — HIGH (ref 0.1–0.3)
LYMPHOCYTES # BLD AUTO: 1.41 K/UL — SIGNIFICANT CHANGE UP (ref 1.2–3.4)
LYMPHOCYTES # BLD AUTO: 11.5 % — LOW (ref 20.5–51.1)
MAGNESIUM SERPL-MCNC: 1.2 MG/DL — LOW (ref 1.8–2.4)
MCHC RBC-ENTMCNC: 27.6 PG — SIGNIFICANT CHANGE UP (ref 27–31)
MCHC RBC-ENTMCNC: 31.7 G/DL — LOW (ref 32–37)
MCV RBC AUTO: 87.1 FL — SIGNIFICANT CHANGE UP (ref 80–94)
MONOCYTES # BLD AUTO: 0.51 K/UL — SIGNIFICANT CHANGE UP (ref 0.1–0.6)
MONOCYTES NFR BLD AUTO: 4.2 % — SIGNIFICANT CHANGE UP (ref 1.7–9.3)
NEUTROPHILS # BLD AUTO: 9.23 K/UL — HIGH (ref 1.4–6.5)
NEUTROPHILS NFR BLD AUTO: 75.2 % — SIGNIFICANT CHANGE UP (ref 42.2–75.2)
NRBC # BLD: 0 /100 WBCS — SIGNIFICANT CHANGE UP (ref 0–0)
PLATELET # BLD AUTO: 213 K/UL — SIGNIFICANT CHANGE UP (ref 130–400)
POTASSIUM SERPL-MCNC: 3.8 MMOL/L — SIGNIFICANT CHANGE UP (ref 3.5–5)
POTASSIUM SERPL-SCNC: 3.8 MMOL/L — SIGNIFICANT CHANGE UP (ref 3.5–5)
PROCALCITONIN SERPL-MCNC: 0.09 NG/ML — SIGNIFICANT CHANGE UP (ref 0.02–0.1)
PROT SERPL-MCNC: 6.5 G/DL — SIGNIFICANT CHANGE UP (ref 6–8)
RBC # BLD: 4.02 M/UL — LOW (ref 4.7–6.1)
RBC # FLD: 14.3 % — SIGNIFICANT CHANGE UP (ref 11.5–14.5)
SODIUM SERPL-SCNC: 138 MMOL/L — SIGNIFICANT CHANGE UP (ref 135–146)
WBC # BLD: 12.27 K/UL — HIGH (ref 4.8–10.8)
WBC # FLD AUTO: 12.27 K/UL — HIGH (ref 4.8–10.8)

## 2021-03-30 PROCEDURE — 99232 SBSQ HOSP IP/OBS MODERATE 35: CPT

## 2021-03-30 PROCEDURE — 93306 TTE W/DOPPLER COMPLETE: CPT | Mod: 26

## 2021-03-30 RX ORDER — MAGNESIUM SULFATE 500 MG/ML
2 VIAL (ML) INJECTION EVERY 4 HOURS
Refills: 0 | Status: COMPLETED | OUTPATIENT
Start: 2021-03-30 | End: 2021-03-30

## 2021-03-30 RX ADMIN — REMDESIVIR 500 MILLIGRAM(S): 5 INJECTION INTRAVENOUS at 14:15

## 2021-03-30 RX ADMIN — Medication 6 MILLIGRAM(S): at 05:46

## 2021-03-30 RX ADMIN — Medication 3: at 16:58

## 2021-03-30 RX ADMIN — Medication 4: at 11:31

## 2021-03-30 RX ADMIN — AMLODIPINE BESYLATE 10 MILLIGRAM(S): 2.5 TABLET ORAL at 05:45

## 2021-03-30 RX ADMIN — Medication 10 MILLIGRAM(S): at 11:27

## 2021-03-30 RX ADMIN — ATORVASTATIN CALCIUM 40 MILLIGRAM(S): 80 TABLET, FILM COATED ORAL at 21:25

## 2021-03-30 RX ADMIN — HEPARIN SODIUM 5000 UNIT(S): 5000 INJECTION INTRAVENOUS; SUBCUTANEOUS at 14:15

## 2021-03-30 RX ADMIN — Medication 50 GRAM(S): at 09:49

## 2021-03-30 RX ADMIN — HEPARIN SODIUM 5000 UNIT(S): 5000 INJECTION INTRAVENOUS; SUBCUTANEOUS at 21:25

## 2021-03-30 RX ADMIN — CHLORHEXIDINE GLUCONATE 1 APPLICATION(S): 213 SOLUTION TOPICAL at 05:45

## 2021-03-30 RX ADMIN — CEFTRIAXONE 100 MILLIGRAM(S): 500 INJECTION, POWDER, FOR SOLUTION INTRAMUSCULAR; INTRAVENOUS at 11:23

## 2021-03-30 RX ADMIN — Medication 50 GRAM(S): at 14:58

## 2021-03-30 RX ADMIN — HEPARIN SODIUM 5000 UNIT(S): 5000 INJECTION INTRAVENOUS; SUBCUTANEOUS at 05:45

## 2021-03-30 RX ADMIN — PANTOPRAZOLE SODIUM 40 MILLIGRAM(S): 20 TABLET, DELAYED RELEASE ORAL at 06:16

## 2021-03-30 NOTE — DISCHARGE NOTE PROVIDER - NSDCMRMEDTOKEN_GEN_ALL_CORE_FT
amLODIPine 10 mg oral tablet: 1 tab(s) orally once a day  atorvastatin 40 mg oral tablet: 1 tab(s) orally once a day  metoprolol succinate 100 mg oral tablet, extended release: 1 tab(s) orally once a day  oxybutynin 10 mg/24 hr oral tablet, extended release: 1 tab(s) orally once a day   amLODIPine 10 mg oral tablet: 1 tab(s) orally once a day  atorvastatin 40 mg oral tablet: 1 tab(s) orally once a day  oxybutynin 10 mg/24 hr oral tablet, extended release: 1 tab(s) orally once a day

## 2021-03-30 NOTE — PROGRESS NOTE ADULT - SUBJECTIVE AND OBJECTIVE BOX
JOSEKRISTY  65y Male    INTERVAL HPI/OVERNIGHT EVENTS:    Pt feels well and has no complaints.   Looking forward to going home tomorrow.    T(F): 97.2 (03-30-21 @ 05:04), Max: 97.5 (03-29-21 @ 20:37)  HR: 52 (03-30-21 @ 05:04) (52 - 57)  BP: 148/65 (03-30-21 @ 05:04) (125/71 - 148/65)  RR: 18 (03-30-21 @ 05:04) (18 - 18)  SpO2: 95% (03-30-21 @ 05:04) (95% - 95%) on RA    I&O's Summary    29 Mar 2021 07:01  -  30 Mar 2021 07:00  --------------------------------------------------------  IN: 1650 mL / OUT: 2525 mL / NET: -875 mL      CAPILLARY BLOOD GLUCOSE      POCT Blood Glucose.: 307 mg/dL (30 Mar 2021 11:27)  POCT Blood Glucose.: 135 mg/dL (30 Mar 2021 07:56)  POCT Blood Glucose.: 197 mg/dL (29 Mar 2021 21:15)  POCT Blood Glucose.: 245 mg/dL (29 Mar 2021 16:36)        PHYSICAL EXAM:  GENERAL: NAD  HEAD:  Normocephalic  EYES:  conjunctiva and sclera clear  ENMT: Moist mucous membranes  NECK: Supple  NERVOUS SYSTEM:  Alert, awake, Good concentration  CHEST/LUNG: Clear to percussion bilaterally  HEART: Regular rate and rhythm  ABDOMEN: Soft, Nontender, Nondistended  suprapubic catheter  EXTREMITIES:   No edema  : clear urine    Consultant(s) Notes Reviewed:  [x ] YES  [ ] NO  Care Discussed with Consultants/Other Providers [ x] YES  [ ] NO    MEDICATIONS  (STANDING):  amLODIPine   Tablet 10 milliGRAM(s) Oral daily  atorvastatin Oral Tab/Cap - Peds 40 milliGRAM(s) Oral daily  cefTRIAXone   IVPB 1000 milliGRAM(s) IV Intermittent every 24 hours  chlorhexidine 4% Liquid 1 Application(s) Topical <User Schedule>  dextrose 5%. 1000 milliLiter(s) (100 mL/Hr) IV Continuous <Continuous>  glucagon  Injectable 1 milliGRAM(s) IntraMuscular once  heparin   Injectable 5000 Unit(s) SubCutaneous every 8 hours  insulin lispro (ADMELOG) corrective regimen sliding scale   SubCutaneous three times a day before meals  magnesium sulfate  IVPB 2 Gram(s) IV Intermittent every 4 hours  oxybutynin 10 milliGRAM(s) Oral daily  pantoprazole    Tablet 40 milliGRAM(s) Oral before breakfast  remdesivir  IVPB 100 milliGRAM(s) IV Intermittent every 24 hours  remdesivir  IVPB   IV Intermittent     MEDICATIONS  (PRN):    Telemetry reviewed    LABS:                        11.1   12.27 )-----------( 213      ( 30 Mar 2021 07:26 )             35.0     03-30    138  |  103  |  38<H>  ----------------------------<  146<H>  3.8   |  27  |  1.2    Ca    7.9<L>      30 Mar 2021 07:26  Mg     1.2     03-30    TPro  6.5  /  Alb  2.7<L>  /  TBili  0.3  /  DBili  x   /  AST  32  /  ALT  25  /  AlkPhos  76  03-30              RADIOLOGY & ADDITIONAL TESTS:    Imaging or report Personally Reviewed:  [ ] YES  [ ] NO    < from: TTE Echo Complete w/o Contrast w/ Doppler (03.30.21 @ 09:24) >  Summary:   1. Technically limited study.   2. Normal global left ventricular systolic function.   3. LV Ejection Fraction by Todd's Method with a biplane EF of 61 %.   4. Spectral Doppler shows impaired relaxation pattern of left ventricular myocardial filling (Grade I diastolic dysfunction).   5. Mildly enlarged right ventricle.   6. Normal right atrial size.   7. There is no evidence of pericardial effusion.   8. Mild thickening of the anterior and posterior mitral valve leaflets.   9. No evidence of mitral valve regurgitation.  10. Mild tricuspid regurgitation.  11. Sclerotic aortic valve with normal opening.    < end of copied text >      Case discussed with resident and RN on rounds today    Care discussed with pt

## 2021-03-30 NOTE — PROGRESS NOTE ADULT - ASSESSMENT
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    1. Acute hypoxemic Respiratory Failure due to COVID 19  - was on supplemental O2, now on RA and stable  - COVID antibodies positive, no indication for plasma   - c/w Remdesivir (last dose today) and dexamethasone, incentive spirometry  - venous duplex negative for DVT   - repeat inflammatory markers reviewed    2. Asymptomatic Sinus bradycardia  - metoprolol stopped on 3/27  - seen by EP: Hold BB, no PPM at this time  - TSH - WNL  - ECHO report reviewed  - d/c telemetry    3. JOÃO  - resolving  - Likely pre-renal due to dehydration from diarrhea  - encourage PO intake    4. Hypomagnesemia - repleted IV    5. ?UTI - pt had hematuria and abx were started for possible cystitis  he will receive 7 days of rocephin on 3/31 and then stop  pt wants SPC changed as outpt per  note  urine cx possible contamination, blood cx negative    6. Leukocytosis - possibly due to decadron - monitor temps and WBC    7. HTN on amlodipine    8. DM type 2 - on diet control - hyperglycemia likely due to decadron    9. DVT ppx - Heparin SQ      full code status      PROGRESS NOTE HANDOFF    Pending: labs in AM    Family discussion: left message for brother Erich today    Disposition: home with services

## 2021-03-30 NOTE — DISCHARGE NOTE NURSING/CASE MANAGEMENT/SOCIAL WORK - PATIENT PORTAL LINK FT
You can access the FollowMyHealth Patient Portal offered by [facility] by registering at the following website: [email or web address]. By joining [employer]’s FollowMyHealth portal, you will also be able to view your health information using other applications (apps) compatible with our system.

## 2021-03-30 NOTE — DISCHARGE NOTE PROVIDER - CARE PROVIDER_API CALL
[facility]  Internal Medicine  [address] [place] [address]  Phone: [phone]  Fax: [phone]  Follow Up Time: 1-3 days

## 2021-03-30 NOTE — PROGRESS NOTE ADULT - SUBJECTIVE AND OBJECTIVE BOX
SUBJECTIVE:    Patient is a 65y old  Male who presents with a chief complaint of lethargy, weakness (30 Mar 2021 13:13)    Currently admitted to medicine with the primary diagnosis of COVID-19    Today is hospital day 6d. Patient was seen and examined at bedside. This morning he is resting comfortably in bed and reports no new issues or overnight events.     PAST MEDICAL & SURGICAL HISTORY  PAST MEDICAL & SURGICAL HISTORY:  Spinal abscess    H/O paraplegia    HTN (hypertension)    DM (diabetes mellitus)    Spinal abscess  S/P Surgery    ALLERGIES:  No Known Allergies    MEDICATIONS:  STANDING MEDICATIONS  amLODIPine   Tablet 10 milliGRAM(s) Oral daily  atorvastatin Oral Tab/Cap - Peds 40 milliGRAM(s) Oral daily  cefTRIAXone   IVPB 1000 milliGRAM(s) IV Intermittent every 24 hours  chlorhexidine 4% Liquid 1 Application(s) Topical <User Schedule>  dextrose 5%. 1000 milliLiter(s) IV Continuous <Continuous>  glucagon  Injectable 1 milliGRAM(s) IntraMuscular once  heparin   Injectable 5000 Unit(s) SubCutaneous every 8 hours  insulin lispro (ADMELOG) corrective regimen sliding scale   SubCutaneous three times a day before meals  magnesium sulfate  IVPB 2 Gram(s) IV Intermittent every 4 hours  oxybutynin 10 milliGRAM(s) Oral daily  pantoprazole    Tablet 40 milliGRAM(s) Oral before breakfast  remdesivir  IVPB 100 milliGRAM(s) IV Intermittent every 24 hours  remdesivir  IVPB   IV Intermittent     PRN MEDICATIONS    VITALS:   T(F): 97.2  HR: 52  BP: 148/65  RR: 18  SpO2: 95%    LABS:                        11.1   12.27 )-----------( 213      ( 30 Mar 2021 07:26 )             35.0     03-30    138  |  103  |  38<H>  ----------------------------<  146<H>  3.8   |  27  |  1.2    Ca    7.9<L>      30 Mar 2021 07:26  Mg     1.2     03-30    TPro  6.5  /  Alb  2.7<L>  /  TBili  0.3  /  DBili  x   /  AST  32  /  ALT  25  /  AlkPhos  76  03-30    PHYSICAL EXAM:  GENERAL: NAD, pleasant and conversational   HEAD: Atraumatic;  NECK: Supple  CHEST/LUNG: Poor inspiratory effort  HEART: S1, S2; RRR; No murmurs, rubs, or gallops  ABDOMEN: BS+; Soft, Non-tender, Non-distended  EXTREMITIES:  2+ Peripheral Pulses, No clubbing, cyanosis, or edema  PSYCH: AAOx3  NEUROLOGY: non-focal  SKIN: No rashes or lesions

## 2021-03-30 NOTE — PROGRESS NOTE ADULT - ASSESSMENT
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 COVID-19 Positive.    # Acute hypoxemic Respiratory Failure 2/2 COVID PNA  - was on supplemental O2, now on RA  - COVID antibodies positive, no indications for plasma   - c/w RDV and dexamethasone, incentive spirometry  - US duplex venous LE negative for DVT   - repeat inflammatory markers noted  - Preparing for D/C tomorrow     # Sinus bradycardia  - No complaints of chest pain, asymptomatic  - ECG no ischemic changed   - EP: Hold BB, no PPM  - TSH - WNL  - check ECHO - pending  - continue tele today    # JOÃO  - resolving  - Likely pre-renal due to dehydration     # Pyuria with hematuria - now resolved  - Patient has chronic angelo secondary to urinary incontinence  - Angelo changed on 3/10 as per his brother, next change on 4/14  - c/w rocephin, course for 7 days - can change to PO on discharge  - as per the brother he has recurrent episodes of hematuria at home which his urologist is aware of  - seen by urology - pt refused SPC change this admission - can be done as outpt  - urine cx possible contamination, blood cx negative    # HTN - BP acceptable, Continue to hold Metoprolol. Continue with amlodipine   # DM - A1C 6.7, start Sliding scale for now, if persistently >180 then start basal - bolus coverage     # DVT ppx - Heparin SQ  # GI ppx - PPI   # Diet - DASH / CC  # COVID test - Positive   Full code.
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
## 2021-03-31 VITALS — OXYGEN SATURATION: 95 %

## 2021-03-31 LAB
CULTURE RESULTS: SIGNIFICANT CHANGE UP
CULTURE RESULTS: SIGNIFICANT CHANGE UP
GLUCOSE BLDC GLUCOMTR-MCNC: 156 MG/DL — HIGH (ref 70–99)
GLUCOSE BLDC GLUCOMTR-MCNC: 196 MG/DL — HIGH (ref 70–99)
GLUCOSE BLDC GLUCOMTR-MCNC: 238 MG/DL — HIGH (ref 70–99)
SPECIMEN SOURCE: SIGNIFICANT CHANGE UP
SPECIMEN SOURCE: SIGNIFICANT CHANGE UP

## 2021-03-31 PROCEDURE — 99238 HOSP IP/OBS DSCHRG MGMT 30/<: CPT

## 2021-03-31 RX ORDER — METOPROLOL TARTRATE 50 MG
1 TABLET ORAL
Qty: 0 | Refills: 0 | DISCHARGE

## 2021-03-31 RX ADMIN — PANTOPRAZOLE SODIUM 40 MILLIGRAM(S): 20 TABLET, DELAYED RELEASE ORAL at 05:52

## 2021-03-31 RX ADMIN — Medication 2: at 12:02

## 2021-03-31 RX ADMIN — HEPARIN SODIUM 5000 UNIT(S): 5000 INJECTION INTRAVENOUS; SUBCUTANEOUS at 05:52

## 2021-03-31 RX ADMIN — Medication 10 MILLIGRAM(S): at 12:02

## 2021-03-31 RX ADMIN — AMLODIPINE BESYLATE 10 MILLIGRAM(S): 2.5 TABLET ORAL at 05:51

## 2021-03-31 RX ADMIN — CEFTRIAXONE 100 MILLIGRAM(S): 500 INJECTION, POWDER, FOR SOLUTION INTRAMUSCULAR; INTRAVENOUS at 13:52

## 2021-03-31 RX ADMIN — HEPARIN SODIUM 5000 UNIT(S): 5000 INJECTION INTRAVENOUS; SUBCUTANEOUS at 13:52

## 2021-03-31 RX ADMIN — CHLORHEXIDINE GLUCONATE 1 APPLICATION(S): 213 SOLUTION TOPICAL at 05:52
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
## 2021-03-31 NOTE — PROGRESS NOTE ADULT - ASSESSMENT
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    1. Acute hypoxemic Respiratory Failure due to COVID 19  - was on supplemental O2, now on RA and stable  - COVID antibodies positive, no indication for plasma   - s/p Remdesivir and dexamethasone, incentive spirometry  - venous duplex negative for DVT   - repeat inflammatory markers reviewed    2. Asymptomatic Sinus bradycardia  - metoprolol stopped on 3/27  - seen by EP: Hold BB, no PPM at this time  - TSH - WNL  - ECHO report reviewed    3. JOÃO  - resolving  - Likely pre-renal due to dehydration from diarrhea  - encourage PO intake    4. Hypomagnesemia - repleted IV on 3/30    5. ?UTI - pt had hematuria and abx were started for possible cystitis  he will receive 7 days of rocephin on 3/31 and then stop  pt wants SPC changed as outpt per  note  urine cx possible contamination, blood cx negative    6. Leukocytosis - possibly due to decadron - pt is afebrile    7. HTN on amlodipine    8. DM type 2 - on diet control - hyperglycemia likely due to decadron    9. DVT ppx - Heparin SQ        Home with services today    Medication reconciliation and discharge papers reviewed

## 2021-03-31 NOTE — PROGRESS NOTE ADULT - SUBJECTIVE AND OBJECTIVE BOX
Discharge note    KRISTY GARCIA  65y Male    INTERVAL HPI/OVERNIGHT EVENTS:    No complaints. Looking forward to going home today    T(F): 97.5 (03-31-21 @ 05:00), Max: 97.8 (03-30-21 @ 13:22)  HR: 46 (03-31-21 @ 05:00) (46 - 67)  BP: 130/83 (03-31-21 @ 05:00) (106/69 - 138/75)  RR: 18 (03-31-21 @ 05:00) (16 - 18)  SpO2: 95% (03-31-21 @ 11:00) (95% - 96%) on RA    I&O's Summary    CAPILLARY BLOOD GLUCOSE      POCT Blood Glucose.: 238 mg/dL (31 Mar 2021 11:44)  POCT Blood Glucose.: 156 mg/dL (31 Mar 2021 08:14)  POCT Blood Glucose.: 247 mg/dL (30 Mar 2021 20:50)  POCT Blood Glucose.: 279 mg/dL (30 Mar 2021 16:39)        PHYSICAL EXAM:  GENERAL: NAD  HEAD:  Normocephalic  EYES:  conjunctiva and sclera clear  ENMT: Moist mucous membranes  NECK: Supple  NERVOUS SYSTEM:  Alert, awake, Good concentration, paraplegia  CHEST/LUNG: Clear to percussion bilaterally  HEART: Regular rate and rhythm  ABDOMEN: Soft, Nontender, Nondistended; + SPC  EXTREMITIES:   No edema    Consultant(s) Notes Reviewed:  [x ] YES  [ ] NO  Care Discussed with Consultants/Other Providers [ x] YES  [ ] NO    MEDICATIONS  (STANDING):  amLODIPine   Tablet 10 milliGRAM(s) Oral daily  atorvastatin Oral Tab/Cap - Peds 40 milliGRAM(s) Oral daily  cefTRIAXone   IVPB 1000 milliGRAM(s) IV Intermittent every 24 hours  chlorhexidine 4% Liquid 1 Application(s) Topical <User Schedule>  dextrose 5%. 1000 milliLiter(s) (100 mL/Hr) IV Continuous <Continuous>  glucagon  Injectable 1 milliGRAM(s) IntraMuscular once  heparin   Injectable 5000 Unit(s) SubCutaneous every 8 hours  insulin lispro (ADMELOG) corrective regimen sliding scale   SubCutaneous three times a day before meals  oxybutynin 10 milliGRAM(s) Oral daily  pantoprazole    Tablet 40 milliGRAM(s) Oral before breakfast    MEDICATIONS  (PRN):      LABS:                        11.1   12.27 )-----------( 213      ( 30 Mar 2021 07:26 )             35.0     03-30    138  |  103  |  38<H>  ----------------------------<  146<H>  3.8   |  27  |  1.2    Ca    7.9<L>      30 Mar 2021 07:26  Mg     1.2     03-30    TPro  6.5  /  Alb  2.7<L>  /  TBili  0.3  /  DBili  x   /  AST  32  /  ALT  25  /  AlkPhos  76  03-30            Case discussed with resident and RN on rounds toda    Care discussed with pt

## 2021-03-31 NOTE — PROGRESS NOTE ADULT - PROVIDER SPECIALTY LIST ADULT
Hospitalist
Internal Medicine
Hospitalist
Internal Medicine
Internal Medicine
Hospitalist
Internal Medicine

## 2021-03-31 NOTE — PROGRESS NOTE ADULT - REASON FOR ADMISSION
lethargy, weakness

## 2021-04-08 DIAGNOSIS — U07.1 COVID-19: ICD-10-CM

## 2021-04-08 DIAGNOSIS — N17.9 ACUTE KIDNEY FAILURE, UNSPECIFIED: ICD-10-CM

## 2021-04-08 DIAGNOSIS — E87.2 ACIDOSIS: ICD-10-CM

## 2021-04-08 DIAGNOSIS — E87.1 HYPO-OSMOLALITY AND HYPONATREMIA: ICD-10-CM

## 2021-04-08 DIAGNOSIS — R00.1 BRADYCARDIA, UNSPECIFIED: ICD-10-CM

## 2021-04-08 DIAGNOSIS — E09.65 DRUG OR CHEMICAL INDUCED DIABETES MELLITUS WITH HYPERGLYCEMIA: ICD-10-CM

## 2021-04-08 DIAGNOSIS — G93.41 METABOLIC ENCEPHALOPATHY: ICD-10-CM

## 2021-04-08 DIAGNOSIS — N31.9 NEUROMUSCULAR DYSFUNCTION OF BLADDER, UNSPECIFIED: ICD-10-CM

## 2021-04-08 DIAGNOSIS — J12.82 PNEUMONIA DUE TO CORONAVIRUS DISEASE 2019: ICD-10-CM

## 2021-04-08 DIAGNOSIS — R65.20 SEVERE SEPSIS WITHOUT SEPTIC SHOCK: ICD-10-CM

## 2021-04-08 DIAGNOSIS — G82.20 PARAPLEGIA, UNSPECIFIED: ICD-10-CM

## 2021-04-08 DIAGNOSIS — A41.89 OTHER SPECIFIED SEPSIS: ICD-10-CM
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
## 2021-04-08 DIAGNOSIS — I10 ESSENTIAL (PRIMARY) HYPERTENSION: ICD-10-CM

## 2021-04-08 DIAGNOSIS — R32 UNSPECIFIED URINARY INCONTINENCE: ICD-10-CM

## 2021-04-08 DIAGNOSIS — D64.9 ANEMIA, UNSPECIFIED: ICD-10-CM

## 2021-04-08 DIAGNOSIS — E86.0 DEHYDRATION: ICD-10-CM

## 2021-04-08 DIAGNOSIS — N30.91 CYSTITIS, UNSPECIFIED WITH HEMATURIA: ICD-10-CM

## 2021-04-08 DIAGNOSIS — R82.71 BACTERIURIA: ICD-10-CM

## 2021-04-08 DIAGNOSIS — T83.89XA OTHER SPECIFIED COMPLICATION OF GENITOURINARY PROSTHETIC DEVICES, IMPLANTS AND GRAFTS, INITIAL ENCOUNTER: ICD-10-CM

## 2021-04-08 DIAGNOSIS — Z74.01 BED CONFINEMENT STATUS: ICD-10-CM

## 2021-04-08 DIAGNOSIS — T38.0X5A ADVERSE EFFECT OF GLUCOCORTICOIDS AND SYNTHETIC ANALOGUES, INITIAL ENCOUNTER: ICD-10-CM

## 2021-04-08 DIAGNOSIS — E87.5 HYPERKALEMIA: ICD-10-CM

## 2021-04-08 DIAGNOSIS — E83.42 HYPOMAGNESEMIA: ICD-10-CM

## 2021-04-08 DIAGNOSIS — J96.01 ACUTE RESPIRATORY FAILURE WITH HYPOXIA: ICD-10-CM

## 2021-04-14 ENCOUNTER — APPOINTMENT (OUTPATIENT)
Dept: UROLOGY | Facility: CLINIC | Age: 66
End: 2021-04-14
Payer: MEDICARE

## 2021-04-14 PROBLEM — M46.20 OSTEOMYELITIS OF VERTEBRA, SITE UNSPECIFIED: Chronic | Status: ACTIVE | Noted: 2021-03-24

## 2021-04-14 PROBLEM — I10 ESSENTIAL (PRIMARY) HYPERTENSION: Chronic | Status: ACTIVE | Noted: 2021-03-24

## 2021-04-14 PROBLEM — E11.9 TYPE 2 DIABETES MELLITUS WITHOUT COMPLICATIONS: Chronic | Status: ACTIVE | Noted: 2021-03-24

## 2021-04-14 PROBLEM — Z86.69 PERSONAL HISTORY OF OTHER DISEASES OF THE NERVOUS SYSTEM AND SENSE ORGANS: Chronic | Status: ACTIVE | Noted: 2021-03-24

## 2021-04-14 PROCEDURE — 51710 CHANGE OF BLADDER TUBE: CPT

## 2021-04-14 PROCEDURE — 99072 ADDL SUPL MATRL&STAF TM PHE: CPT

## 2021-05-19 ENCOUNTER — APPOINTMENT (OUTPATIENT)
Dept: UROLOGY | Facility: CLINIC | Age: 66
End: 2021-05-19
Payer: MEDICARE

## 2021-05-19 VITALS
SYSTOLIC BLOOD PRESSURE: 165 MMHG | TEMPERATURE: 98 F | BODY MASS INDEX: 20.02 KG/M2 | HEIGHT: 74 IN | HEART RATE: 85 BPM | WEIGHT: 156 LBS | DIASTOLIC BLOOD PRESSURE: 93 MMHG

## 2021-05-19 PROCEDURE — 51710 CHANGE OF BLADDER TUBE: CPT

## 2021-06-23 ENCOUNTER — APPOINTMENT (OUTPATIENT)
Dept: UROLOGY | Facility: CLINIC | Age: 66
End: 2021-06-23
Payer: MEDICARE

## 2021-06-23 PROCEDURE — 51710 CHANGE OF BLADDER TUBE: CPT

## 2021-07-21 ENCOUNTER — APPOINTMENT (OUTPATIENT)
Dept: UROLOGY | Facility: CLINIC | Age: 66
End: 2021-07-21
Payer: MEDICARE

## 2021-07-21 PROCEDURE — 51710 CHANGE OF BLADDER TUBE: CPT

## 2021-08-18 ENCOUNTER — APPOINTMENT (OUTPATIENT)
Dept: UROLOGY | Facility: CLINIC | Age: 66
End: 2021-08-18
Payer: MEDICARE

## 2021-08-18 PROCEDURE — 51710 CHANGE OF BLADDER TUBE: CPT

## 2021-08-25 ENCOUNTER — APPOINTMENT (OUTPATIENT)
Dept: UROLOGY | Facility: CLINIC | Age: 66
End: 2021-08-25

## 2021-09-08 ENCOUNTER — APPOINTMENT (OUTPATIENT)
Dept: UROLOGY | Facility: CLINIC | Age: 66
End: 2021-09-08
Payer: MEDICARE

## 2021-09-08 PROCEDURE — 51705 CHANGE OF BLADDER TUBE: CPT

## 2021-09-15 ENCOUNTER — NON-APPOINTMENT (OUTPATIENT)
Age: 66
End: 2021-09-15

## 2021-09-16 ENCOUNTER — OUTPATIENT (OUTPATIENT)
Dept: OUTPATIENT SERVICES | Facility: HOSPITAL | Age: 66
LOS: 1 days | Discharge: HOME | End: 2021-09-16
Payer: MEDICARE

## 2021-09-16 VITALS
OXYGEN SATURATION: 100 % | HEIGHT: 73 IN | RESPIRATION RATE: 18 BRPM | HEART RATE: 68 BPM | WEIGHT: 190.04 LBS | DIASTOLIC BLOOD PRESSURE: 86 MMHG | TEMPERATURE: 97 F | SYSTOLIC BLOOD PRESSURE: 142 MMHG

## 2021-09-16 DIAGNOSIS — Z01.818 ENCOUNTER FOR OTHER PREPROCEDURAL EXAMINATION: ICD-10-CM

## 2021-09-16 DIAGNOSIS — R33.9 RETENTION OF URINE, UNSPECIFIED: ICD-10-CM

## 2021-09-16 DIAGNOSIS — M46.20 OSTEOMYELITIS OF VERTEBRA, SITE UNSPECIFIED: Chronic | ICD-10-CM

## 2021-09-16 LAB
ALBUMIN SERPL ELPH-MCNC: 4 G/DL — SIGNIFICANT CHANGE UP (ref 3.5–5.2)
ALP SERPL-CCNC: 142 U/L — HIGH (ref 30–115)
ALT FLD-CCNC: 22 U/L — SIGNIFICANT CHANGE UP (ref 0–41)
ANION GAP SERPL CALC-SCNC: 12 MMOL/L — SIGNIFICANT CHANGE UP (ref 7–14)
APTT BLD: 39.7 SEC — HIGH (ref 27–39.2)
AST SERPL-CCNC: 27 U/L — SIGNIFICANT CHANGE UP (ref 0–41)
BASOPHILS # BLD AUTO: 0.1 K/UL — SIGNIFICANT CHANGE UP (ref 0–0.2)
BASOPHILS NFR BLD AUTO: 1.2 % — HIGH (ref 0–1)
BILIRUB SERPL-MCNC: 0.3 MG/DL — SIGNIFICANT CHANGE UP (ref 0.2–1.2)
BUN SERPL-MCNC: 34 MG/DL — HIGH (ref 10–20)
CALCIUM SERPL-MCNC: 9.3 MG/DL — SIGNIFICANT CHANGE UP (ref 8.5–10.1)
CHLORIDE SERPL-SCNC: 106 MMOL/L — SIGNIFICANT CHANGE UP (ref 98–110)
CO2 SERPL-SCNC: 24 MMOL/L — SIGNIFICANT CHANGE UP (ref 17–32)
CREAT SERPL-MCNC: 1.5 MG/DL — SIGNIFICANT CHANGE UP (ref 0.7–1.5)
EOSINOPHIL # BLD AUTO: 0.27 K/UL — SIGNIFICANT CHANGE UP (ref 0–0.7)
EOSINOPHIL NFR BLD AUTO: 3.3 % — SIGNIFICANT CHANGE UP (ref 0–8)
GLUCOSE SERPL-MCNC: 144 MG/DL — HIGH (ref 70–99)
HCT VFR BLD CALC: 40.1 % — LOW (ref 42–52)
HGB BLD-MCNC: 12.7 G/DL — LOW (ref 14–18)
IMM GRANULOCYTES NFR BLD AUTO: 0.4 % — HIGH (ref 0.1–0.3)
INR BLD: 0.95 RATIO — SIGNIFICANT CHANGE UP (ref 0.65–1.3)
LYMPHOCYTES # BLD AUTO: 2.65 K/UL — SIGNIFICANT CHANGE UP (ref 1.2–3.4)
LYMPHOCYTES # BLD AUTO: 32.5 % — SIGNIFICANT CHANGE UP (ref 20.5–51.1)
MCHC RBC-ENTMCNC: 28.9 PG — SIGNIFICANT CHANGE UP (ref 27–31)
MCHC RBC-ENTMCNC: 31.7 G/DL — LOW (ref 32–37)
MCV RBC AUTO: 91.1 FL — SIGNIFICANT CHANGE UP (ref 80–94)
MONOCYTES # BLD AUTO: 0.56 K/UL — SIGNIFICANT CHANGE UP (ref 0.1–0.6)
MONOCYTES NFR BLD AUTO: 6.9 % — SIGNIFICANT CHANGE UP (ref 1.7–9.3)
NEUTROPHILS # BLD AUTO: 4.55 K/UL — SIGNIFICANT CHANGE UP (ref 1.4–6.5)
NEUTROPHILS NFR BLD AUTO: 55.7 % — SIGNIFICANT CHANGE UP (ref 42.2–75.2)
NRBC # BLD: 0 /100 WBCS — SIGNIFICANT CHANGE UP (ref 0–0)
PLATELET # BLD AUTO: 246 K/UL — SIGNIFICANT CHANGE UP (ref 130–400)
POTASSIUM SERPL-MCNC: 4.7 MMOL/L — SIGNIFICANT CHANGE UP (ref 3.5–5)
POTASSIUM SERPL-SCNC: 4.7 MMOL/L — SIGNIFICANT CHANGE UP (ref 3.5–5)
PROT SERPL-MCNC: 8.7 G/DL — HIGH (ref 6–8)
PROTHROM AB SERPL-ACNC: 10.9 SEC — SIGNIFICANT CHANGE UP (ref 9.95–12.87)
RBC # BLD: 4.4 M/UL — LOW (ref 4.7–6.1)
RBC # FLD: 15.3 % — HIGH (ref 11.5–14.5)
SODIUM SERPL-SCNC: 142 MMOL/L — SIGNIFICANT CHANGE UP (ref 135–146)
WBC # BLD: 8.16 K/UL — SIGNIFICANT CHANGE UP (ref 4.8–10.8)
WBC # FLD AUTO: 8.16 K/UL — SIGNIFICANT CHANGE UP (ref 4.8–10.8)

## 2021-09-16 PROCEDURE — 93010 ELECTROCARDIOGRAM REPORT: CPT

## 2021-09-16 RX ORDER — OXYBUTYNIN CHLORIDE 5 MG
1 TABLET ORAL
Qty: 0 | Refills: 0 | DISCHARGE

## 2021-09-16 NOTE — H&P PST ADULT - REASON FOR ADMISSION
Patient is a  year old  66 male presenting to [facility] in preparation for Suprapubic Catheter Placement   on  9/22/2021  under sedation anesthesia by Dr. zepeda

## 2021-09-16 NOTE — H&P PST ADULT - NSICDXPASTMEDICALHX_GEN_ALL_CORE_FT
PAST MEDICAL HISTORY:  DM (diabetes mellitus)     H/O paraplegia     HTN (hypertension)     Renal stones     Spinal abscess

## 2021-09-16 NOTE — H&P PST ADULT - HISTORY OF PRESENT ILLNESS
pt hx quadriplegia secondary to spinal abscesses , has had suprapubic catheters, fell out last week   now for planned procedure        PATIENT CURRENTLY DENIES CHEST PAIN  SHORTNESS OF BREATH  PALPITATIONS,  DYSURIA, OR UPPER RESPIRATORY INFECTION IN PAST 2 WEEKS  EXERCISE  TOLERANCE pt quadriplegic   denies travel outside the [place] in the past 30 days  2 doses moderna  Patient denies any signs or symptoms of COVID 19 and denies contact with known positive individuals.  They have an appointment for repeat COVID testing pre-procedure and acknowledge its time and place.  They were instructed to quarantine pre-procedure, practice exposure control measures, continue to self-monitor and report any concerns to their proceduralist.  pt advised self quarantine till day of procedure  Anesthesia Alert  NO--Difficult Airway  NO--History of neck surgery or radiation  NO--Limited ROM of neck  NO--History of Malignant hyperthermia  NO--No personal or family history of Pseudocholinesterase deficiency.  NO--Prior Anesthesia Complication  NO--Latex Allergy  NO--Loose teeth  NO--History of Rheumatoid Arthritis  NO--Bleeding risk  NO--MARGE  NO--Other_____    PT with abrasions to lower extremities     AS PER THE PT, THIS IS HIS/HER COMPLETE MEDICAL AND SURGICAL HX, INCLUDING MEDICATIONS PRESCRIBED AND OVER THE COUNTER    Patient verbalized understanding of instructions and was given the opportunity to ask questions and have them answered.    pt denies any suicidal ideation or thoughts, pt states not a threat to self or others       R33.9 / 71051    Retention of urine    Encounter for other preprocedural examination    ^R33.9 / 37501    FH: HTN (hypertension)    FH: breast cancer    FH: melanoma    FH: heart disease    Retention of urine    Encounter for other preprocedural examination    DM (diabetes mellitus)    HTN, age 0-18    HTN (hypertension)    H/O paraplegia    Spinal abscess    Spinal abscess

## 2021-09-19 ENCOUNTER — OUTPATIENT (OUTPATIENT)
Dept: OUTPATIENT SERVICES | Facility: HOSPITAL | Age: 66
LOS: 1 days | Discharge: HOME | End: 2021-09-19

## 2021-09-19 ENCOUNTER — LABORATORY RESULT (OUTPATIENT)
Age: 66
End: 2021-09-19

## 2021-09-19 DIAGNOSIS — Z11.59 ENCOUNTER FOR SCREENING FOR OTHER VIRAL DISEASES: ICD-10-CM

## 2021-09-19 DIAGNOSIS — M46.20 OSTEOMYELITIS OF VERTEBRA, SITE UNSPECIFIED: Chronic | ICD-10-CM

## 2021-09-19 PROBLEM — N20.0 CALCULUS OF KIDNEY: Chronic | Status: ACTIVE | Noted: 2021-09-16

## 2021-09-21 ENCOUNTER — APPOINTMENT (OUTPATIENT)
Dept: UROLOGY | Facility: CLINIC | Age: 66
End: 2021-09-21
Payer: MEDICARE

## 2021-09-21 PROCEDURE — 51703 INSERT BLADDER CATH COMPLEX: CPT

## 2021-09-22 ENCOUNTER — RESULT REVIEW (OUTPATIENT)
Age: 66
End: 2021-09-22

## 2021-09-22 ENCOUNTER — OUTPATIENT (OUTPATIENT)
Dept: OUTPATIENT SERVICES | Facility: HOSPITAL | Age: 66
LOS: 1 days | Discharge: HOME | End: 2021-09-22
Payer: MEDICARE

## 2021-09-22 DIAGNOSIS — M46.20 OSTEOMYELITIS OF VERTEBRA, SITE UNSPECIFIED: Chronic | ICD-10-CM

## 2021-09-22 DIAGNOSIS — N31.9 NEUROMUSCULAR DYSFUNCTION OF BLADDER, UNSPECIFIED: ICD-10-CM

## 2021-09-22 DIAGNOSIS — R33.9 RETENTION OF URINE, UNSPECIFIED: ICD-10-CM

## 2021-09-22 DIAGNOSIS — T83.020A DISPLACEMENT OF CYSTOSTOMY CATHETER, INITIAL ENCOUNTER: ICD-10-CM

## 2021-09-22 PROCEDURE — 75984 XRAY CONTROL CATHETER CHANGE: CPT | Mod: 26

## 2021-09-22 PROCEDURE — 99152 MOD SED SAME PHYS/QHP 5/>YRS: CPT

## 2021-09-22 PROCEDURE — 51705 CHANGE OF BLADDER TUBE: CPT

## 2021-09-22 NOTE — PROGRESS NOTE ADULT - SUBJECTIVE AND OBJECTIVE BOX
Interventional Radiology Outpatient Documentation    PRE-OPERATIVE DAY OF PROCEDURE EVALUATION:     I have personally seen and examined this patient.  I agree with the history and physical which I have reviewed and noted any changes below:     Plan is for image-directed placement of suprapubic catheter  (Signed electronically Jamarcus Arcos MD) 09-22-21 @ 08:13     Procedure/ risks/ benefits/ goals/ alternatives were explained.  All questions answered. Informed content obtained from patient.  Consent placed in chart.     POST-OPERATIVE PROCEDURAL EVALUATION:     Procedure:  Image-guided replacement of suprapubic cystostomy catheter    Pre-Op Diagnosis:  Neurogenic bladder; urinary incontinence; paraplegia; H/O UTI and dislodged SPT 2 weeks ago    Post-Op Diagnosis:  Same    Attending:  Isrrael  Resident:  None    Antibiotic Prophylaxis:  Ancef, 2 g ivpb on-call    Anesthesia (type):  [ ] General Anesthesia  [x] Sedation-- Versed, 2 mg and Fentanyl, 50 mcg, iv (in divided doses)  [ ] Spinal Anesthesia  [x] Local/Regional-- 1% Lidocaine, SQ, 18 cc    Total Face-to-Face Sedation Time:  38 minutes    Contrast:  Visipaque, 35 cc to bladder, drained    Blood Loss:  5 cc    Condition:   [ ] Critical  [ ] Serious  [ ] Fair   [X] Good    Findings/Follow up Plan of Care:  Attempted but failed retrograde gravity filling of bladder to place new catheter.  Although the original dermatotomy had closed, a preserved subcutaneous tract going to the urinary bladder was identified with ultrasound, which was accessed.  An 18-French Councill catheter was placed uneventfully.  Patient tolerated fairly well, without incident.     Specimens Removed:  None    Implants:  None    Complications:  None immediate    Disposition:  Recovery, then discharge home and f/u Dr. Merchant.

## 2021-11-03 ENCOUNTER — APPOINTMENT (OUTPATIENT)
Dept: UROLOGY | Facility: CLINIC | Age: 66
End: 2021-11-03
Payer: MEDICARE

## 2021-11-03 PROCEDURE — 51710 CHANGE OF BLADDER TUBE: CPT

## 2021-12-15 ENCOUNTER — APPOINTMENT (OUTPATIENT)
Dept: UROLOGY | Facility: CLINIC | Age: 66
End: 2021-12-15
Payer: MEDICARE

## 2021-12-15 PROCEDURE — 51710 CHANGE OF BLADDER TUBE: CPT

## 2022-01-26 ENCOUNTER — APPOINTMENT (OUTPATIENT)
Dept: UROLOGY | Facility: CLINIC | Age: 67
End: 2022-01-26
Payer: MEDICARE

## 2022-01-26 PROCEDURE — 51710 CHANGE OF BLADDER TUBE: CPT

## 2022-03-01 ENCOUNTER — APPOINTMENT (OUTPATIENT)
Dept: UROLOGY | Facility: CLINIC | Age: 67
End: 2022-03-01
Payer: MEDICARE

## 2022-03-01 VITALS — HEIGHT: 74 IN | WEIGHT: 156 LBS | BODY MASS INDEX: 20.02 KG/M2

## 2022-03-01 PROCEDURE — 51710 CHANGE OF BLADDER TUBE: CPT

## 2022-04-05 ENCOUNTER — APPOINTMENT (OUTPATIENT)
Dept: UROLOGY | Facility: CLINIC | Age: 67
End: 2022-04-05
Payer: MEDICARE

## 2022-04-05 PROCEDURE — 51710 CHANGE OF BLADDER TUBE: CPT

## 2022-05-11 ENCOUNTER — APPOINTMENT (OUTPATIENT)
Dept: UROLOGY | Facility: CLINIC | Age: 67
End: 2022-05-11
Payer: MEDICARE

## 2022-05-11 PROCEDURE — 51710 CHANGE OF BLADDER TUBE: CPT

## 2022-06-08 ENCOUNTER — APPOINTMENT (OUTPATIENT)
Dept: UROLOGY | Facility: CLINIC | Age: 67
End: 2022-06-08
Payer: MEDICARE

## 2022-06-08 PROCEDURE — 51710 CHANGE OF BLADDER TUBE: CPT

## 2022-07-13 ENCOUNTER — APPOINTMENT (OUTPATIENT)
Dept: UROLOGY | Facility: CLINIC | Age: 67
End: 2022-07-13

## 2022-07-13 VITALS — BODY MASS INDEX: 20.02 KG/M2 | WEIGHT: 156 LBS | HEIGHT: 74 IN

## 2022-07-13 PROCEDURE — 51710 CHANGE OF BLADDER TUBE: CPT

## 2022-08-10 ENCOUNTER — APPOINTMENT (OUTPATIENT)
Dept: UROLOGY | Facility: CLINIC | Age: 67
End: 2022-08-10

## 2022-08-10 PROCEDURE — 51710 CHANGE OF BLADDER TUBE: CPT

## 2022-09-13 ENCOUNTER — APPOINTMENT (OUTPATIENT)
Dept: UROLOGY | Facility: CLINIC | Age: 67
End: 2022-09-13

## 2022-09-13 VITALS — HEIGHT: 74 IN | BODY MASS INDEX: 20.02 KG/M2 | WEIGHT: 156 LBS

## 2022-09-13 PROCEDURE — 51710 CHANGE OF BLADDER TUBE: CPT

## 2022-10-11 ENCOUNTER — APPOINTMENT (OUTPATIENT)
Dept: UROLOGY | Facility: CLINIC | Age: 67
End: 2022-10-11

## 2022-10-11 PROCEDURE — 51710 CHANGE OF BLADDER TUBE: CPT

## 2022-11-09 ENCOUNTER — APPOINTMENT (OUTPATIENT)
Dept: UROLOGY | Facility: CLINIC | Age: 67
End: 2022-11-09

## 2022-11-09 PROCEDURE — 51710 CHANGE OF BLADDER TUBE: CPT

## 2022-12-13 ENCOUNTER — APPOINTMENT (OUTPATIENT)
Dept: UROLOGY | Facility: CLINIC | Age: 67
End: 2022-12-13

## 2022-12-13 PROCEDURE — 51710 CHANGE OF BLADDER TUBE: CPT

## 2023-01-11 ENCOUNTER — APPOINTMENT (OUTPATIENT)
Dept: UROLOGY | Facility: CLINIC | Age: 68
End: 2023-01-11

## 2023-02-07 ENCOUNTER — APPOINTMENT (OUTPATIENT)
Dept: UROLOGY | Facility: CLINIC | Age: 68
End: 2023-02-07
Payer: MEDICARE

## 2023-02-07 PROCEDURE — 51705 CHANGE OF BLADDER TUBE: CPT

## 2023-03-07 ENCOUNTER — APPOINTMENT (OUTPATIENT)
Dept: UROLOGY | Facility: CLINIC | Age: 68
End: 2023-03-07
Payer: MEDICARE

## 2023-03-07 VITALS
BODY MASS INDEX: 20.02 KG/M2 | HEART RATE: 82 BPM | RESPIRATION RATE: 16 BRPM | SYSTOLIC BLOOD PRESSURE: 161 MMHG | WEIGHT: 156 LBS | OXYGEN SATURATION: 98 % | TEMPERATURE: 97.3 F | HEIGHT: 74 IN | DIASTOLIC BLOOD PRESSURE: 88 MMHG

## 2023-03-07 PROCEDURE — 51705 CHANGE OF BLADDER TUBE: CPT

## 2023-04-06 ENCOUNTER — APPOINTMENT (OUTPATIENT)
Dept: UROLOGY | Facility: CLINIC | Age: 68
End: 2023-04-06
Payer: MEDICARE

## 2023-04-06 PROCEDURE — 51705 CHANGE OF BLADDER TUBE: CPT

## 2023-05-09 ENCOUNTER — APPOINTMENT (OUTPATIENT)
Dept: UROLOGY | Facility: CLINIC | Age: 68
End: 2023-05-09
Payer: MEDICARE

## 2023-05-09 PROCEDURE — 51705 CHANGE OF BLADDER TUBE: CPT

## 2023-06-06 ENCOUNTER — APPOINTMENT (OUTPATIENT)
Dept: UROLOGY | Facility: CLINIC | Age: 68
End: 2023-06-06
Payer: MEDICARE

## 2023-06-06 VITALS
DIASTOLIC BLOOD PRESSURE: 96 MMHG | SYSTOLIC BLOOD PRESSURE: 193 MMHG | HEIGHT: 74 IN | HEART RATE: 72 BPM | TEMPERATURE: 98 F

## 2023-06-06 PROCEDURE — 51705 CHANGE OF BLADDER TUBE: CPT

## 2023-07-03 NOTE — DISCHARGE NOTE NURSING/CASE MANAGEMENT/SOCIAL WORK - MODE OF TRANSPORTATION
From: Randa Sifuentes  To: Carla Dickson  Sent: 6/30/2023 8:42 AM CDT  Subject: Blood Pressure    As of today 6/30/23 it is down to 127/72. No salt added to anything and diet also adjusted accordingly!!  
Message sent back to patient with information below.  
Patient was seen in the office on 6/22/23 for MWV.  At visit, her blood pressure was elevated 190/100.  She had mentioned that she had taken her Metoprolol that day, but feels like she has been overdoing it with the salt.    Per notes: \" Metoprolol increased to 50 mg daily, consider add amlodipine if needed, she will monitor BP at home and report in a 3-4 weeks her readings.\"    She writes on 6/30/23, that she has not added salt to anything and also adjusted her diet accordingly.  She is taking Metoprolol 50 mg.  Blood pressure down to 127/72. (In Epic)    Please advise.     
Sounds good, continue metoprolol and minimal salt!  
Ambulette

## 2023-07-05 ENCOUNTER — APPOINTMENT (OUTPATIENT)
Dept: UROLOGY | Facility: CLINIC | Age: 68
End: 2023-07-05
Payer: MEDICARE

## 2023-07-05 PROCEDURE — 51705 CHANGE OF BLADDER TUBE: CPT

## 2023-08-02 ENCOUNTER — APPOINTMENT (OUTPATIENT)
Dept: UROLOGY | Facility: CLINIC | Age: 68
End: 2023-08-02
Payer: MEDICARE

## 2023-08-02 DIAGNOSIS — R97.20 ELEVATED PROSTATE, SPECIFIC ANTIGEN [PSA]: ICD-10-CM

## 2023-08-02 DIAGNOSIS — N20.0 CALCULUS OF KIDNEY: ICD-10-CM

## 2023-08-02 DIAGNOSIS — N31.9 NEUROMUSCULAR DYSFUNCTION OF BLADDER, UNSPECIFIED: ICD-10-CM

## 2023-08-02 PROCEDURE — 51705 CHANGE OF BLADDER TUBE: CPT

## 2023-08-30 ENCOUNTER — APPOINTMENT (OUTPATIENT)
Dept: UROLOGY | Facility: CLINIC | Age: 68
End: 2023-08-30
Payer: MEDICARE

## 2023-08-30 PROCEDURE — 51705 CHANGE OF BLADDER TUBE: CPT

## 2023-09-06 ENCOUNTER — INPATIENT (INPATIENT)
Facility: HOSPITAL | Age: 68
LOS: 11 days | Discharge: HOME HEALTH | DRG: 683 | End: 2023-09-18
Attending: INTERNAL MEDICINE | Admitting: INTERNAL MEDICINE
Payer: MEDICARE

## 2023-09-06 VITALS
OXYGEN SATURATION: 100 % | TEMPERATURE: 98 F | DIASTOLIC BLOOD PRESSURE: 97 MMHG | RESPIRATION RATE: 16 BRPM | HEART RATE: 97 BPM | SYSTOLIC BLOOD PRESSURE: 174 MMHG | HEIGHT: 73 IN | WEIGHT: 175.05 LBS

## 2023-09-06 DIAGNOSIS — M46.20 OSTEOMYELITIS OF VERTEBRA, SITE UNSPECIFIED: Chronic | ICD-10-CM

## 2023-09-06 LAB
ALBUMIN SERPL ELPH-MCNC: 2.9 G/DL — LOW (ref 3.5–5.2)
ALP SERPL-CCNC: 108 U/L — SIGNIFICANT CHANGE UP (ref 30–115)
ALT FLD-CCNC: 11 U/L — SIGNIFICANT CHANGE UP (ref 0–41)
ANION GAP SERPL CALC-SCNC: 13 MMOL/L — SIGNIFICANT CHANGE UP (ref 7–14)
AST SERPL-CCNC: 14 U/L — SIGNIFICANT CHANGE UP (ref 0–41)
BASE EXCESS BLDV CALC-SCNC: -5.3 MMOL/L — LOW (ref -2–3)
BASOPHILS # BLD AUTO: 0.08 K/UL — SIGNIFICANT CHANGE UP (ref 0–0.2)
BASOPHILS NFR BLD AUTO: 0.9 % — SIGNIFICANT CHANGE UP (ref 0–1)
BILIRUB DIRECT SERPL-MCNC: <0.2 MG/DL — SIGNIFICANT CHANGE UP (ref 0–0.3)
BILIRUB INDIRECT FLD-MCNC: >0.3 MG/DL — SIGNIFICANT CHANGE UP (ref 0.2–1.2)
BILIRUB SERPL-MCNC: 0.5 MG/DL — SIGNIFICANT CHANGE UP (ref 0.2–1.2)
BUN SERPL-MCNC: 80 MG/DL — CRITICAL HIGH (ref 10–20)
CA-I SERPL-SCNC: 1.15 MMOL/L — SIGNIFICANT CHANGE UP (ref 1.15–1.33)
CALCIUM SERPL-MCNC: 8.4 MG/DL — SIGNIFICANT CHANGE UP (ref 8.4–10.5)
CHLORIDE SERPL-SCNC: 102 MMOL/L — SIGNIFICANT CHANGE UP (ref 98–110)
CO2 SERPL-SCNC: 19 MMOL/L — SIGNIFICANT CHANGE UP (ref 17–32)
CREAT SERPL-MCNC: 5.5 MG/DL — CRITICAL HIGH (ref 0.7–1.5)
EGFR: 11 ML/MIN/1.73M2 — LOW
EOSINOPHIL # BLD AUTO: 0.18 K/UL — SIGNIFICANT CHANGE UP (ref 0–0.7)
EOSINOPHIL NFR BLD AUTO: 2.1 % — SIGNIFICANT CHANGE UP (ref 0–8)
GAS PNL BLDV: 131 MMOL/L — LOW (ref 136–145)
GAS PNL BLDV: SIGNIFICANT CHANGE UP
GLUCOSE SERPL-MCNC: 191 MG/DL — HIGH (ref 70–99)
HCO3 BLDV-SCNC: 22 MMOL/L — SIGNIFICANT CHANGE UP (ref 22–29)
HCT VFR BLD CALC: 32.9 % — LOW (ref 42–52)
HCT VFR BLDA CALC: 33 % — LOW (ref 39–51)
HGB BLD CALC-MCNC: 10.9 G/DL — LOW (ref 12.6–17.4)
HGB BLD-MCNC: 10.6 G/DL — LOW (ref 14–18)
IMM GRANULOCYTES NFR BLD AUTO: 0.2 % — SIGNIFICANT CHANGE UP (ref 0.1–0.3)
LACTATE BLDV-MCNC: 1.3 MMOL/L — SIGNIFICANT CHANGE UP (ref 0.5–2)
LIDOCAIN IGE QN: 57 U/L — SIGNIFICANT CHANGE UP (ref 7–60)
LYMPHOCYTES # BLD AUTO: 1.76 K/UL — SIGNIFICANT CHANGE UP (ref 1.2–3.4)
LYMPHOCYTES # BLD AUTO: 20.5 % — SIGNIFICANT CHANGE UP (ref 20.5–51.1)
MAGNESIUM SERPL-MCNC: 2.2 MG/DL — SIGNIFICANT CHANGE UP (ref 1.8–2.4)
MCHC RBC-ENTMCNC: 29.4 PG — SIGNIFICANT CHANGE UP (ref 27–31)
MCHC RBC-ENTMCNC: 32.2 G/DL — SIGNIFICANT CHANGE UP (ref 32–37)
MCV RBC AUTO: 91.1 FL — SIGNIFICANT CHANGE UP (ref 80–94)
MONOCYTES # BLD AUTO: 0.57 K/UL — SIGNIFICANT CHANGE UP (ref 0.1–0.6)
MONOCYTES NFR BLD AUTO: 6.6 % — SIGNIFICANT CHANGE UP (ref 1.7–9.3)
NEUTROPHILS # BLD AUTO: 5.97 K/UL — SIGNIFICANT CHANGE UP (ref 1.4–6.5)
NEUTROPHILS NFR BLD AUTO: 69.7 % — SIGNIFICANT CHANGE UP (ref 42.2–75.2)
NRBC # BLD: 0 /100 WBCS — SIGNIFICANT CHANGE UP (ref 0–0)
PCO2 BLDV: 47 MMHG — SIGNIFICANT CHANGE UP (ref 42–55)
PH BLDV: 7.27 — LOW (ref 7.32–7.43)
PHOSPHATE SERPL-MCNC: 4.7 MG/DL — SIGNIFICANT CHANGE UP (ref 2.1–4.9)
PLATELET # BLD AUTO: 250 K/UL — SIGNIFICANT CHANGE UP (ref 130–400)
PMV BLD: 10.8 FL — HIGH (ref 7.4–10.4)
PO2 BLDV: 29 MMHG — SIGNIFICANT CHANGE UP
POTASSIUM BLDV-SCNC: 5.5 MMOL/L — HIGH (ref 3.5–5.1)
POTASSIUM SERPL-MCNC: 4.8 MMOL/L — SIGNIFICANT CHANGE UP (ref 3.5–5)
POTASSIUM SERPL-SCNC: 4.8 MMOL/L — SIGNIFICANT CHANGE UP (ref 3.5–5)
PROT SERPL-MCNC: 8.4 G/DL — HIGH (ref 6–8)
RBC # BLD: 3.61 M/UL — LOW (ref 4.7–6.1)
RBC # FLD: 13.9 % — SIGNIFICANT CHANGE UP (ref 11.5–14.5)
SAO2 % BLDV: 48.9 % — SIGNIFICANT CHANGE UP
SODIUM SERPL-SCNC: 134 MMOL/L — LOW (ref 135–146)
WBC # BLD: 8.58 K/UL — SIGNIFICANT CHANGE UP (ref 4.8–10.8)
WBC # FLD AUTO: 8.58 K/UL — SIGNIFICANT CHANGE UP (ref 4.8–10.8)

## 2023-09-06 PROCEDURE — 83550 IRON BINDING TEST: CPT

## 2023-09-06 PROCEDURE — 99222 1ST HOSP IP/OBS MODERATE 55: CPT

## 2023-09-06 PROCEDURE — 84156 ASSAY OF PROTEIN URINE: CPT

## 2023-09-06 PROCEDURE — 71045 X-RAY EXAM CHEST 1 VIEW: CPT

## 2023-09-06 PROCEDURE — 86901 BLOOD TYPING SEROLOGIC RH(D): CPT

## 2023-09-06 PROCEDURE — 82668 ASSAY OF ERYTHROPOIETIN: CPT

## 2023-09-06 PROCEDURE — 82570 ASSAY OF URINE CREATININE: CPT

## 2023-09-06 PROCEDURE — 80048 BASIC METABOLIC PNL TOTAL CA: CPT

## 2023-09-06 PROCEDURE — 84155 ASSAY OF PROTEIN SERUM: CPT

## 2023-09-06 PROCEDURE — 84300 ASSAY OF URINE SODIUM: CPT

## 2023-09-06 PROCEDURE — 86255 FLUORESCENT ANTIBODY SCREEN: CPT

## 2023-09-06 PROCEDURE — 85027 COMPLETE CBC AUTOMATED: CPT

## 2023-09-06 PROCEDURE — 76775 US EXAM ABDO BACK WALL LIM: CPT

## 2023-09-06 PROCEDURE — 86140 C-REACTIVE PROTEIN: CPT

## 2023-09-06 PROCEDURE — 84133 ASSAY OF URINE POTASSIUM: CPT

## 2023-09-06 PROCEDURE — 93010 ELECTROCARDIOGRAM REPORT: CPT

## 2023-09-06 PROCEDURE — 83516 IMMUNOASSAY NONANTIBODY: CPT

## 2023-09-06 PROCEDURE — 86850 RBC ANTIBODY SCREEN: CPT

## 2023-09-06 PROCEDURE — 84165 PROTEIN E-PHORESIS SERUM: CPT

## 2023-09-06 PROCEDURE — 83735 ASSAY OF MAGNESIUM: CPT

## 2023-09-06 PROCEDURE — 83036 HEMOGLOBIN GLYCOSYLATED A1C: CPT

## 2023-09-06 PROCEDURE — 83521 IG LIGHT CHAINS FREE EACH: CPT

## 2023-09-06 PROCEDURE — 80074 ACUTE HEPATITIS PANEL: CPT

## 2023-09-06 PROCEDURE — 86225 DNA ANTIBODY NATIVE: CPT

## 2023-09-06 PROCEDURE — 86334 IMMUNOFIX E-PHORESIS SERUM: CPT

## 2023-09-06 PROCEDURE — 99285 EMERGENCY DEPT VISIT HI MDM: CPT

## 2023-09-06 PROCEDURE — 82962 GLUCOSE BLOOD TEST: CPT

## 2023-09-06 PROCEDURE — 80053 COMPREHEN METABOLIC PANEL: CPT

## 2023-09-06 PROCEDURE — 82728 ASSAY OF FERRITIN: CPT

## 2023-09-06 PROCEDURE — 82784 ASSAY IGA/IGD/IGG/IGM EACH: CPT

## 2023-09-06 PROCEDURE — 36415 COLL VENOUS BLD VENIPUNCTURE: CPT

## 2023-09-06 PROCEDURE — 84100 ASSAY OF PHOSPHORUS: CPT

## 2023-09-06 PROCEDURE — 86038 ANTINUCLEAR ANTIBODIES: CPT

## 2023-09-06 PROCEDURE — 71045 X-RAY EXAM CHEST 1 VIEW: CPT | Mod: 26

## 2023-09-06 PROCEDURE — 86160 COMPLEMENT ANTIGEN: CPT

## 2023-09-06 PROCEDURE — 82746 ASSAY OF FOLIC ACID SERUM: CPT

## 2023-09-06 PROCEDURE — 84443 ASSAY THYROID STIM HORMONE: CPT

## 2023-09-06 PROCEDURE — 82306 VITAMIN D 25 HYDROXY: CPT

## 2023-09-06 PROCEDURE — 85025 COMPLETE CBC W/AUTO DIFF WBC: CPT

## 2023-09-06 PROCEDURE — 83540 ASSAY OF IRON: CPT

## 2023-09-06 PROCEDURE — 81001 URINALYSIS AUTO W/SCOPE: CPT

## 2023-09-06 PROCEDURE — 84540 ASSAY OF URINE/UREA-N: CPT

## 2023-09-06 PROCEDURE — 86900 BLOOD TYPING SEROLOGIC ABO: CPT

## 2023-09-06 PROCEDURE — 80061 LIPID PANEL: CPT

## 2023-09-06 PROCEDURE — 83935 ASSAY OF URINE OSMOLALITY: CPT

## 2023-09-06 PROCEDURE — 82607 VITAMIN B-12: CPT

## 2023-09-06 RX ORDER — INSULIN LISPRO 100/ML
VIAL (ML) SUBCUTANEOUS
Refills: 0 | Status: DISCONTINUED | OUTPATIENT
Start: 2023-09-06 | End: 2023-09-18

## 2023-09-06 RX ORDER — DEXTROSE 50 % IN WATER 50 %
12.5 SYRINGE (ML) INTRAVENOUS ONCE
Refills: 0 | Status: DISCONTINUED | OUTPATIENT
Start: 2023-09-06 | End: 2023-09-18

## 2023-09-06 RX ORDER — ATORVASTATIN CALCIUM 80 MG/1
1 TABLET, FILM COATED ORAL
Qty: 0 | Refills: 0 | DISCHARGE

## 2023-09-06 RX ORDER — AMLODIPINE BESYLATE 2.5 MG/1
1 TABLET ORAL
Qty: 0 | Refills: 0 | DISCHARGE

## 2023-09-06 RX ORDER — SODIUM CHLORIDE 9 MG/ML
1000 INJECTION, SOLUTION INTRAVENOUS
Refills: 0 | Status: DISCONTINUED | OUTPATIENT
Start: 2023-09-06 | End: 2023-09-18

## 2023-09-06 RX ORDER — DEXTROSE 50 % IN WATER 50 %
25 SYRINGE (ML) INTRAVENOUS ONCE
Refills: 0 | Status: DISCONTINUED | OUTPATIENT
Start: 2023-09-06 | End: 2023-09-18

## 2023-09-06 RX ORDER — NIFEDIPINE 30 MG
30 TABLET, EXTENDED RELEASE 24 HR ORAL ONCE
Refills: 0 | Status: DISCONTINUED | OUTPATIENT
Start: 2023-09-06 | End: 2023-09-06

## 2023-09-06 RX ORDER — HEPARIN SODIUM 5000 [USP'U]/ML
5000 INJECTION INTRAVENOUS; SUBCUTANEOUS EVERY 8 HOURS
Refills: 0 | Status: DISCONTINUED | OUTPATIENT
Start: 2023-09-06 | End: 2023-09-18

## 2023-09-06 RX ORDER — ATORVASTATIN CALCIUM 80 MG/1
20 TABLET, FILM COATED ORAL AT BEDTIME
Refills: 0 | Status: DISCONTINUED | OUTPATIENT
Start: 2023-09-06 | End: 2023-09-18

## 2023-09-06 RX ORDER — GLUCAGON INJECTION, SOLUTION 0.5 MG/.1ML
1 INJECTION, SOLUTION SUBCUTANEOUS ONCE
Refills: 0 | Status: DISCONTINUED | OUTPATIENT
Start: 2023-09-06 | End: 2023-09-18

## 2023-09-06 RX ORDER — DEXTROSE 50 % IN WATER 50 %
15 SYRINGE (ML) INTRAVENOUS ONCE
Refills: 0 | Status: DISCONTINUED | OUTPATIENT
Start: 2023-09-06 | End: 2023-09-18

## 2023-09-06 RX ORDER — SODIUM CHLORIDE 9 MG/ML
1000 INJECTION, SOLUTION INTRAVENOUS
Refills: 0 | Status: DISCONTINUED | OUTPATIENT
Start: 2023-09-06 | End: 2023-09-08

## 2023-09-06 RX ORDER — SODIUM CHLORIDE 9 MG/ML
1000 INJECTION INTRAMUSCULAR; INTRAVENOUS; SUBCUTANEOUS ONCE
Refills: 0 | Status: COMPLETED | OUTPATIENT
Start: 2023-09-06 | End: 2023-09-06

## 2023-09-06 RX ADMIN — HEPARIN SODIUM 5000 UNIT(S): 5000 INJECTION INTRAVENOUS; SUBCUTANEOUS at 21:47

## 2023-09-06 RX ADMIN — SODIUM CHLORIDE 60 MILLILITER(S): 9 INJECTION, SOLUTION INTRAVENOUS at 21:47

## 2023-09-06 RX ADMIN — ATORVASTATIN CALCIUM 20 MILLIGRAM(S): 80 TABLET, FILM COATED ORAL at 21:48

## 2023-09-06 RX ADMIN — SODIUM CHLORIDE 1000 MILLILITER(S): 9 INJECTION INTRAMUSCULAR; INTRAVENOUS; SUBCUTANEOUS at 13:16

## 2023-09-06 NOTE — ED PROVIDER NOTE - EKG #1 DATE/TIME
06-Sep-2023 15:20 Graft Cartilage Fenestration Text: The cartilage was fenestrated with a 2mm punch biopsy to help facilitate graft survival and healing.

## 2023-09-06 NOTE — H&P ADULT - HISTORY OF PRESENT ILLNESS
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  Patient had blood work done Friday, September 1 which demonstrated a creatinine of 5.7.  Patient and his brother received a phone call from the PCP today and told him to come to the emergency department.  Of note patient states he has had some loose stool about once a day for the past month and according to brother has been eating and drinking less.  Brother also notices for the past 2 weeks has been emptying his Dye less often.  Patient denies any fever, chills, abdominal pain, chest pain, nausea and vomiting   In the ED, HD stable, afebrile, Labs significant for Hgb 10.6 , Cr 5.5  Patient will be admitted for further workup of renal failure

## 2023-09-06 NOTE — ED PROVIDER NOTE - OBJECTIVE STATEMENT
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  Patient had blood work done Friday, September 1 which demonstrated a creatinine of 5.73.  Patient and his brother received a phone call from the PCP today and told him to come to the emergency department.  Of note patient states he has had some loose stool about once a day for the past month and according to brother has been eating and drinking less.  Brother also notices for the past 2 weeks has been emptying his Dye less often.  Patient denies any symptoms no fever chills abdominal pain.

## 2023-09-06 NOTE — ED ADULT NURSE NOTE - OBJECTIVE STATEMENT
Pt. presents to the ED c/o abnormal lab results. As per pt. brother, pt. had blood work on Friday and creatine and GFR were both abnormal. Pt. states PCP told them to come to the ED. Pt. has a suprapubic angelo catheter placed prior to arrival; changed 1 week ago.

## 2023-09-06 NOTE — H&P ADULT - CLICK TO LAUNCH ORM
.
pt comes to ED with abdominal pain and no bm x2 days. hx of constipation   smiling and interacting. states she want to throw up eating snacks in [place]   up to date on vaccinations, auscultated hr consistent with v/s machine

## 2023-09-06 NOTE — H&P ADULT - NSHPLABSRESULTS_GEN_ALL_CORE
.  LABS:                         10.6   8.58  )-----------( 250      ( 06 Sep 2023 13:56 )             32.9     09-06    134<L>  |  102  |  80<HH>  ----------------------------<  191<H>  4.8   |  19  |  5.5<HH>    Ca    8.4      06 Sep 2023 13:56  Phos  4.7     09-06  Mg     2.2     09-06    TPro  8.4<H>  /  Alb  2.9<L>  /  TBili  0.5  /  DBili  <0.2  /  AST  14  /  ALT  11  /  AlkPhos  108  09-06      Urinalysis Basic - ( 06 Sep 2023 13:56 )    Color: x / Appearance: x / SG: x / pH: x  Gluc: 191 mg/dL / Ketone: x  / Bili: x / Urobili: x   Blood: x / Protein: x / Nitrite: x   Leuk Esterase: x / RBC: x / WBC x   Sq Epi: x / Non Sq Epi: x / Bacteria: x      RADIOLOGY, EKG & ADDITIONAL TESTS: Reviewed.

## 2023-09-06 NOTE — H&P ADULT - NSHPPHYSICALEXAM_GEN_ALL_CORE
Constitutional: NAD  Eyes: PERRL, no conjunctival injection  HENT:  Neck supple without meningismus, poor dentition    CV: RRR, Warm, well-perfused extremities  RESP: CTA B/L, no tachypnea   GI: soft, non-tender, non-distended, angelo in place with yellow urine   MSK/skin: contracted LE with chronic wounds   Neuro: Alert,   Psych: Appropriate mood and affect.

## 2023-09-06 NOTE — ED PROVIDER NOTE - PHYSICAL EXAMINATION
Const: NAD  Eyes: PERRL, no conjunctival injection  HENT:  Neck supple without meningismus, poor dentition    CV: RRR, Warm, well-perfused extremities  RESP: CTA B/L, no tachypnea   GI: soft, non-tender, non-distended, angelo in place with yellow urine   MSK/skin: contracted LE with chronic wounds   Neuro: Alert,   Psych: Appropriate mood and affect.

## 2023-09-06 NOTE — H&P ADULT - ASSESSMENT
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 2021 1.5  - Admits to reduced PO/fluid intake and recent loose stools   - No recent nephrotoxic drugs  - s/p 1L fluids in ED, c/w IV fluid hydration   - phosphorous, bicarb wnl   - follow up UA, urine lytes  - Nephrology consult    #Normocytic anemia  - Possibly secondary to CKD  - F/u iron studies, B12, B9, EPO    #Hx of multiple chronic lower extremity wounds   - Healing well, has visiting nurse for dressing 3x/week  - f/u wound care recs    #Hx of DM  - On metformin at home  - Monitor finger sticks, + ISS   - F/u a1c    #Hx of HTN   #Hx of HLD  - Was on amlodipine 10 2 years ago but not currently   - c/w statin   - Monitor BP     #Diet: Renal  #DVT ppx: Heparin  #Code: DNR/DNI,  68-year-old male past medical history of DM, HTN, 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 2021 1.5  - Admits to reduced PO/fluid intake and recent loose stools   - No recent nephrotoxic drugs  - s/p 1L fluids in ED, c/w IV fluid hydration   - phosphorous, bicarb wnl   - follow up UA, urine lytes  - Strict in/out, renal diet   - Nephrology consult    #Normocytic anemia  - Possibly secondary to CKD  - F/u iron studies, B12, B9, EPO    #Hx of multiple chronic lower extremity wounds   - Healing well, has visiting nurse for dressing 3x/week  - f/u wound care recs    #Hx of DM  - On metformin at home  - Monitor finger sticks, + ISS   - F/u a1c    #Hx of HTN   #Hx of HLD  - Was on amlodipine 10 2 years ago but not currently   - c/w statin   - Monitor BP     #Diet: Renal  #DVT ppx: Heparin  #Code: DNR/DNI,

## 2023-09-06 NOTE — ED PROVIDER NOTE - CLINICAL SUMMARY MEDICAL DECISION MAKING FREE TEXT BOX
69 yo M presented to the ED for concern for elevated creatinine. Pt was found to have creatinine of 5.5 without any electrolyte abnormalities. urine in angeol bag. pt admitted for further evaluation and management.

## 2023-09-06 NOTE — ED ADULT NURSE NOTE - NSFALLRISKINTERV_ED_ALL_ED

## 2023-09-06 NOTE — H&P ADULT - ATTENDING COMMENTS
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  Patient had blood work done Friday, September 1 which demonstrated a creatinine of 5.7.  Patient and his brother received a phone call from the PCP today and told him to come to the emergency department.  Of note patient states he has had some loose stool about once a day for the past month and according to brother has been eating and drinking less.  Brother also notices for the past 2 weeks has been emptying his Dye less often.  Patient denies any fever, chills, abdominal pain, chest pain, nausea and vomiting   In the ED, HD stable, afebrile, Labs significant for Hgb 10.6 , Cr 5.5  Patient will be admitted for further workup of renal failure  (06 Sep 2023 16:18)    REVIEW OF SYSTEMS: see cc/HPI   CONSTITUTIONAL: No weakness, fevers or chills  EYES/ENT: No visual changes;  No vertigo or throat pain   NECK: No pain or stiffness  RESPIRATORY: No cough, wheezing, hemoptysis; No shortness of breath  CARDIOVASCULAR: No chest pain or palpitations  GASTROINTESTINAL: No abdominal or epigastric pain. No nausea, vomiting, or hematemesis; No diarrhea or constipation. No melena or hematochezia.  GENITOURINARY: No dysuria, frequency or hematuria  NEUROLOGICAL: No numbness or weakness  SKIN: No itching, rashes  ENDO: ( ) hyperglycemia ( ) thyroid disorder ( ) dyslipidemia   HEM: ( ) bleeding ( ) bruising ( ) anemia   PSYCHE: ( ) psychosis  ( ) mood disorder ( ) hallucinations ( ) delusion   MSK: ( ) deformity ( ) fracture    Physical Exam:   General: WN/WD NAD  Neurology: A&Ox3, nonfocal, follows commands  Eyes: PERRLA/ EOMI  ENT/Neck: Neck supple, trachea midline, No JVD  Respiratory: CTA B/L, No wheezing, rales, rhonchi  CV: Normal rate regular rhythm, S1S2, no murmurs, rubs or gallops  Abdominal: Soft, NT, ND +BS,   Extremities: No edema, + peripheral pulses  Skin: No Rashes, Hematoma, Ecchymosis  Incisions:   Tubes: HPI:  68-year-old male past medical history of DM, HTN, 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  Patient had blood work done Friday, September 1 which demonstrated a creatinine of 5.7.  Patient and his brother received a phone call from the PCP today and told him to come to the emergency department.  Of note patient states he has had some loose stool about once a day for the past month and according to brother has been eating and drinking less.  Brother also notices for the past 2 weeks has been emptying his Dye less often.  Patient denies any fever, chills, abdominal pain, chest pain, nausea and vomiting   In the ED, HD stable, afebrile, Labs significant for Hgb 10.6 , Cr 5.5  Patient will be admitted for further workup of renal failure  (06 Sep 2023 16:18)    REVIEW OF SYSTEMS: see cc/HPI   CONSTITUTIONAL: No weakness, fevers or chills  EYES/ENT: No visual changes;  No vertigo or throat pain   NECK: No pain or stiffness  RESPIRATORY: No cough, wheezing, hemoptysis; No shortness of breath  CARDIOVASCULAR: No chest pain or palpitations  GASTROINTESTINAL: No abdominal or epigastric pain. No nausea, vomiting, or hematemesis; No diarrhea or constipation. No melena or hematochezia.  GENITOURINARY: No dysuria, frequency or hematuria  NEUROLOGICAL: No numbness (+) weakness-LE weakness / bedbound   SKIN: No itching, rashes, see cc/HPI   ENDO: ( ) hyperglycemia ( ) thyroid disorder ( ) dyslipidemia   HEM: ( ) bleeding ( ) bruising ( ) anemia   PSYCHE: ( ) psychosis  ( ) mood disorder ( ) hallucinations ( ) delusion   MSK: ( ) deformity ( ) fracture    T(C): 36.5 (09-07-23 @ 01:33), Max: 36.5 (09-07-23 @ 01:33)  HR: 74 (09-07-23 @ 01:33) (73 - 97)  BP: 144/67 (09-07-23 @ 01:33) (144/67 - 174/97)  RR: 18 (09-07-23 @ 01:33) (16 - 18)  SpO2: 97% (09-07-23 @ 01:33) (97% - 100%)    Physical Exam:   General: WN/WD NAD  Neurology: A&Ox3, nonfocal, follows commands  Eyes: PERRLA/ EOMI  ENT/Neck: Neck supple, trachea midline, No JVD  Respiratory: CTA B/L, No wheezing, rales, rhonchi  CV: Normal rate regular rhythm, S1S2, no murmurs, rubs or gallops  Abdominal: Soft, NT, ND +BS,   Extremities: No edema, + peripheral pulses  Skin: No Rashes, Hematoma, Ecchymosis  Incisions:   Tubes:    A/p  Acute on Chronic kidney disease possible 2/2 dehydration ( decreased intake and loose stools )  H/o suprapubic cath   -IV fluids and serial CMP   -Is and OS   -Nephrology eval     Functional quadriplegia   Pressure ulcers   -frequent rotation   -c/w Dye     DM type II - elevated blood glucose   -Hold oral agents  -Lantus / Lispo AC and F/s monitoring w/ ISS    HTN  -elevated BP   -restart CCB    DVT prophylaxis

## 2023-09-07 LAB
24R-OH-CALCIDIOL SERPL-MCNC: 13 NG/ML — LOW (ref 30–80)
A1C WITH ESTIMATED AVERAGE GLUCOSE RESULT: 11.6 % — HIGH (ref 4–5.6)
ALBUMIN SERPL ELPH-MCNC: 2.7 G/DL — LOW (ref 3.5–5.2)
ALP SERPL-CCNC: 102 U/L — SIGNIFICANT CHANGE UP (ref 30–115)
ALT FLD-CCNC: 10 U/L — SIGNIFICANT CHANGE UP (ref 0–41)
ANION GAP SERPL CALC-SCNC: 13 MMOL/L — SIGNIFICANT CHANGE UP (ref 7–14)
APPEARANCE UR: ABNORMAL
AST SERPL-CCNC: 14 U/L — SIGNIFICANT CHANGE UP (ref 0–41)
BACTERIA # UR AUTO: ABNORMAL /HPF
BASOPHILS # BLD AUTO: 0.06 K/UL — SIGNIFICANT CHANGE UP (ref 0–0.2)
BASOPHILS NFR BLD AUTO: 1 % — SIGNIFICANT CHANGE UP (ref 0–1)
BILIRUB SERPL-MCNC: 0.6 MG/DL — SIGNIFICANT CHANGE UP (ref 0.2–1.2)
BILIRUB UR-MCNC: NEGATIVE — SIGNIFICANT CHANGE UP
BUN SERPL-MCNC: 71 MG/DL — CRITICAL HIGH (ref 10–20)
CALCIUM SERPL-MCNC: 8.3 MG/DL — LOW (ref 8.4–10.5)
CAST: 2 /LPF — SIGNIFICANT CHANGE UP (ref 0–4)
CHLORIDE SERPL-SCNC: 104 MMOL/L — SIGNIFICANT CHANGE UP (ref 98–110)
CHOLEST SERPL-MCNC: 134 MG/DL — SIGNIFICANT CHANGE UP
CO2 SERPL-SCNC: 16 MMOL/L — LOW (ref 17–32)
COLOR SPEC: YELLOW — SIGNIFICANT CHANGE UP
CREAT SERPL-MCNC: 5 MG/DL — CRITICAL HIGH (ref 0.7–1.5)
DIFF PNL FLD: ABNORMAL
EGFR: 12 ML/MIN/1.73M2 — LOW
EOSINOPHIL # BLD AUTO: 0.17 K/UL — SIGNIFICANT CHANGE UP (ref 0–0.7)
EOSINOPHIL NFR BLD AUTO: 2.8 % — SIGNIFICANT CHANGE UP (ref 0–8)
ESTIMATED AVERAGE GLUCOSE: 286 MG/DL — HIGH (ref 68–114)
GLUCOSE BLDC GLUCOMTR-MCNC: 116 MG/DL — HIGH (ref 70–99)
GLUCOSE BLDC GLUCOMTR-MCNC: 116 MG/DL — HIGH (ref 70–99)
GLUCOSE BLDC GLUCOMTR-MCNC: 121 MG/DL — HIGH (ref 70–99)
GLUCOSE BLDC GLUCOMTR-MCNC: 217 MG/DL — HIGH (ref 70–99)
GLUCOSE SERPL-MCNC: 133 MG/DL — HIGH (ref 70–99)
GLUCOSE UR QL: NEGATIVE MG/DL — SIGNIFICANT CHANGE UP
HCT VFR BLD CALC: 31.3 % — LOW (ref 42–52)
HDLC SERPL-MCNC: 30 MG/DL — LOW
HGB BLD-MCNC: 9.9 G/DL — LOW (ref 14–18)
IMM GRANULOCYTES NFR BLD AUTO: 0.3 % — SIGNIFICANT CHANGE UP (ref 0.1–0.3)
IRON SATN MFR SERPL: 21 % — SIGNIFICANT CHANGE UP (ref 15–50)
IRON SATN MFR SERPL: 30 UG/DL — LOW (ref 35–150)
KETONES UR-MCNC: NEGATIVE MG/DL — SIGNIFICANT CHANGE UP
LEUKOCYTE ESTERASE UR-ACNC: ABNORMAL
LIPID PNL WITH DIRECT LDL SERPL: 78 MG/DL — SIGNIFICANT CHANGE UP
LYMPHOCYTES # BLD AUTO: 1.2 K/UL — SIGNIFICANT CHANGE UP (ref 1.2–3.4)
LYMPHOCYTES # BLD AUTO: 19.4 % — LOW (ref 20.5–51.1)
MAGNESIUM SERPL-MCNC: 2 MG/DL — SIGNIFICANT CHANGE UP (ref 1.8–2.4)
MCHC RBC-ENTMCNC: 28.6 PG — SIGNIFICANT CHANGE UP (ref 27–31)
MCHC RBC-ENTMCNC: 31.6 G/DL — LOW (ref 32–37)
MCV RBC AUTO: 90.5 FL — SIGNIFICANT CHANGE UP (ref 80–94)
MONOCYTES # BLD AUTO: 0.43 K/UL — SIGNIFICANT CHANGE UP (ref 0.1–0.6)
MONOCYTES NFR BLD AUTO: 7 % — SIGNIFICANT CHANGE UP (ref 1.7–9.3)
NEUTROPHILS # BLD AUTO: 4.3 K/UL — SIGNIFICANT CHANGE UP (ref 1.4–6.5)
NEUTROPHILS NFR BLD AUTO: 69.5 % — SIGNIFICANT CHANGE UP (ref 42.2–75.2)
NITRITE UR-MCNC: POSITIVE
NON HDL CHOLESTEROL: 104 MG/DL — SIGNIFICANT CHANGE UP
NRBC # BLD: 0 /100 WBCS — SIGNIFICANT CHANGE UP (ref 0–0)
OSMOLALITY UR: 361 MOS/KG — SIGNIFICANT CHANGE UP (ref 50–1200)
PH UR: 8 — SIGNIFICANT CHANGE UP (ref 5–8)
PHOSPHATE SERPL-MCNC: 4.7 MG/DL — SIGNIFICANT CHANGE UP (ref 2.1–4.9)
PLATELET # BLD AUTO: 251 K/UL — SIGNIFICANT CHANGE UP (ref 130–400)
PMV BLD: 10.3 FL — SIGNIFICANT CHANGE UP (ref 7.4–10.4)
POTASSIUM SERPL-MCNC: 4.5 MMOL/L — SIGNIFICANT CHANGE UP (ref 3.5–5)
POTASSIUM SERPL-SCNC: 4.5 MMOL/L — SIGNIFICANT CHANGE UP (ref 3.5–5)
POTASSIUM UR-SCNC: 14 MMOL/L — SIGNIFICANT CHANGE UP
PROT SERPL-MCNC: 8.4 G/DL — HIGH (ref 6–8)
PROT UR-MCNC: 100 MG/DL
RBC # BLD: 3.46 M/UL — LOW (ref 4.7–6.1)
RBC # FLD: 13.8 % — SIGNIFICANT CHANGE UP (ref 11.5–14.5)
RBC CASTS # UR COMP ASSIST: 31 /HPF — HIGH (ref 0–4)
SODIUM SERPL-SCNC: 133 MMOL/L — LOW (ref 135–146)
SODIUM UR-SCNC: 45 MMOL/L — SIGNIFICANT CHANGE UP
SP GR SPEC: 1.01 — SIGNIFICANT CHANGE UP (ref 1–1.03)
SQUAMOUS # UR AUTO: 0 /HPF — SIGNIFICANT CHANGE UP (ref 0–5)
TIBC SERPL-MCNC: 143 UG/DL — LOW (ref 220–430)
TRIGL SERPL-MCNC: 132 MG/DL — SIGNIFICANT CHANGE UP
UIBC SERPL-MCNC: 113 UG/DL — SIGNIFICANT CHANGE UP (ref 110–370)
UROBILINOGEN FLD QL: 0.2 MG/DL — SIGNIFICANT CHANGE UP (ref 0.2–1)
WBC # BLD: 6.18 K/UL — SIGNIFICANT CHANGE UP (ref 4.8–10.8)
WBC # FLD AUTO: 6.18 K/UL — SIGNIFICANT CHANGE UP (ref 4.8–10.8)
WBC UR QL: 469 /HPF — HIGH (ref 0–5)

## 2023-09-07 PROCEDURE — 99232 SBSQ HOSP IP/OBS MODERATE 35: CPT

## 2023-09-07 RX ORDER — SODIUM BICARBONATE 1 MEQ/ML
650 SYRINGE (ML) INTRAVENOUS THREE TIMES A DAY
Refills: 0 | Status: DISCONTINUED | OUTPATIENT
Start: 2023-09-07 | End: 2023-09-18

## 2023-09-07 RX ORDER — NIFEDIPINE 30 MG
30 TABLET, EXTENDED RELEASE 24 HR ORAL DAILY
Refills: 0 | Status: DISCONTINUED | OUTPATIENT
Start: 2023-09-07 | End: 2023-09-18

## 2023-09-07 RX ADMIN — Medication 650 MILLIGRAM(S): at 21:14

## 2023-09-07 RX ADMIN — Medication 4: at 17:49

## 2023-09-07 RX ADMIN — Medication 30 MILLIGRAM(S): at 21:14

## 2023-09-07 RX ADMIN — ATORVASTATIN CALCIUM 20 MILLIGRAM(S): 80 TABLET, FILM COATED ORAL at 21:14

## 2023-09-07 RX ADMIN — HEPARIN SODIUM 5000 UNIT(S): 5000 INJECTION INTRAVENOUS; SUBCUTANEOUS at 21:14

## 2023-09-07 RX ADMIN — HEPARIN SODIUM 5000 UNIT(S): 5000 INJECTION INTRAVENOUS; SUBCUTANEOUS at 05:28

## 2023-09-07 RX ADMIN — HEPARIN SODIUM 5000 UNIT(S): 5000 INJECTION INTRAVENOUS; SUBCUTANEOUS at 14:20

## 2023-09-07 NOTE — ADVANCED PRACTICE NURSE CONSULT - ASSESSMENT
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  Patient had blood work done Friday, September 1 which demonstrated a creatinine of 5.7.  Patient and his brother received a phone call from the PCP today and told him to come to the emergency department.  Of note patient states he has had some loose stool about once a day for the past month and according to brother has been eating and drinking less.  Brother also notices for the past 2 weeks has been emptying his Dye less often.  Patient denies any fever, chills, abdominal pain, chest pain, nausea and vomiting. Patient will be admitted for further workup of renal failure     Allergies and Intolerances:        Allergies:  	No Known Allergies:     Home Medications:   * Patient Currently Takes Medications as of 06-Sep-2023 16:25 documented in Structured Notes  · 	metFORMIN 500 mg oral tablet: Last Dose Taken:  , 1 orally 2 times a day  · 	atorvastatin 20 mg oral tablet: Last Dose Taken:  , 1 orally once a day    Patient History:    Past Medical, Past Surgical, and Family History:  PAST MEDICAL HISTORY:  DM (diabetes mellitus)   H/O paraplegia   HTN (hypertension)   Renal stones   Spinal abscess.   PAST SURGICAL HISTORY:  Spinal abscess S/P Surgery.   FAMILY HISTORY:  FH: breast cancer  FH: heart disease  FH: HTN (hypertension)  FH: melanoma.    Patient received lying in bed. Alert and oriented. Limited mobility. Incontinent of stool. Suprapubic catheter in place. High risk for pressure injury. Patient states has visiting nurse treat wounds at home.     Type of Wound: Vascular ulcers  Location: Bilateral lower extremities  Tunneling /Undermining: No  Wound bed: Multiple areas of pink partial thickness skin loss  Wound edges: Irregular  Periwound: Dry, flaky, hyperpigmented  Wound exudate: None  Wound odor: No  Induration, erythema, warmth: No  Wound pain: No    No pressure injuries at time of assessment.

## 2023-09-07 NOTE — ADVANCED PRACTICE NURSE CONSULT - REASON FOR CONSULT
Pressure Injury Assessment and Treatment Recommendation
- IV tylenol 1 g  - 1 L NS
Medications for intubation per anesthesia  Given 70mcg propanol on way to ICU for tachycardia

## 2023-09-07 NOTE — CONSULT NOTE ADULT - ASSESSMENT
68 year old male with PMH HTN, DM, spinal abscesses leading to quadriplegia, incontinence w suprapubic catheter, presented to ED after lab call back for creatinine of 5.5 in setting of diarrhea x7days, poor PO intake. Admitted for management of prerenal JOÃO.     #JOÃO, prerenal   #eGFR 11 - CKD?  -pt reporting diarrhea x7 days, poor PO intake  -cr 5.5 > 5.0 (baseline 1.5)  -cw IV fluids - LR  -encourage PO intake   -bicarb 19 - give PO bicarb 650mg TID  -hold metformin (eGFR <30)  -f/u US to rule out obstructive process     #HTN  -hx of htn, pt self-discontinued about a year ago   -elevated pressures inpt >180 systolic  -start procardia 30mg q daily   -avoid ACEi/ARB  -BP goal <140/90     #bilateral LE lesions, chronic  -pt w bilat LE lesions w erythema, scaling, areas of ulceration for years  -daily wound care

## 2023-09-07 NOTE — CONSULT NOTE ADULT - SUBJECTIVE AND OBJECTIVE BOX
NEPHROLOGY CONSULTATION NOTE    Patient is a 68y Male whom presented to the hospital with JOÃO.     PAST MEDICAL & SURGICAL HISTORY:  DM (diabetes mellitus)  HTN (hypertension)  H/O paraplegia  Spinal abscess  Renal stones  Spinal abscess  S/P Surgery      Allergies:  No Known Allergies    Home Medications Reviewed  Hospital Medications:   MEDICATIONS  (STANDING):  atorvastatin 20 milliGRAM(s) Oral at bedtime  dextrose 5%. 1000 milliLiter(s) (50 mL/Hr) IV Continuous <Continuous>  dextrose 5%. 1000 milliLiter(s) (100 mL/Hr) IV Continuous <Continuous>  dextrose 50% Injectable 25 Gram(s) IV Push once  dextrose 50% Injectable 12.5 Gram(s) IV Push once  dextrose 50% Injectable 25 Gram(s) IV Push once  glucagon  Injectable 1 milliGRAM(s) IntraMuscular once  heparin   Injectable 5000 Unit(s) SubCutaneous every 8 hours  insulin lispro (ADMELOG) corrective regimen sliding scale   SubCutaneous three times a day before meals  lactated ringers. 1000 milliLiter(s) (60 mL/Hr) IV Continuous <Continuous>    SOCIAL HISTORY:  Denies ETOH,Smoking,   FAMILY HISTORY:  FH: HTN (hypertension)    FH: breast cancer    FH: melanoma    FH: heart disease        REVIEW OF SYSTEMS:  CONSTITUTIONAL: No weakness, fevers or chills  RESPIRATORY: No cough, wheezing, hemoptysis; No shortness of breath  CARDIOVASCULAR: No chest pain or palpitations.  GASTROINTESTINAL: No abdominal or epigastric pain. + diarrhea x7 days, resolving   GENITOURINARY: No dysuria, frequency, foamy urine, urinary urgency, incontinence or hematuria  NEUROLOGICAL: paraplegia, chronic  SKIN: bilateral chronic LE rash up to knees with erythema and scaling  VASCULAR: No bilateral lower extremity edema.   All other review of systems is negative unless indicated above.    VITALS:  Vital Signs Last 24 Hrs  T(C): 36.4 (07 Sep 2023 08:29), Max: 36.5 (07 Sep 2023 01:33)  T(F): 97.5 (07 Sep 2023 08:29), Max: 97.7 (07 Sep 2023 01:33)  HR: 73 (07 Sep 2023 08:29) (69 - 74)  BP: 171/87 (07 Sep 2023 10:45) (117/78 - 191/82)  BP(mean): --  RR: 18 (07 Sep 2023 08:29) (18 - 18)  SpO2: 99% (07 Sep 2023 07:23) (97% - 99%)    Parameters below as of 07 Sep 2023 07:23  Patient On (Oxygen Delivery Method): room air      PHYSICAL EXAM:  Constitutional: NAD  HEENT: anicteric sclera, oropharynx clear, MMM  Neck: No JVD  Respiratory: CTAB, no wheezes, rales or rhonchi  Cardiovascular: S1, S2, RRR  Gastrointestinal: BS+, soft, NT/ND  Extremities: No cyanosis or clubbing. No peripheral edema  Neurological: A/O x 3, no focal deficits  Psychiatric: Normal mood, normal affect  : No CVA tenderness. No angelo.   Skin: bilat LE rash with erythema, scaling, chronic  Vascular Access:    LABS:  09-07    133<L>  |  104  |  71<HH>  ----------------------------<  133<H>  4.5   |  16<L>  |  5.0<HH>    Ca    8.3<L>      07 Sep 2023 09:17  Phos  4.7     09-07  Mg     2.0     09-07    TPro  8.4<H>  /  Alb  2.7<L>  /  TBili  0.6  /  DBili      /  AST  14  /  ALT  10  /  AlkPhos  102  09-07    Creatinine Trend: 5.0 <--, 5.5 <--                        9.9    6.18  )-----------( 251      ( 07 Sep 2023 09:17 )             31.3     Urine Studies:  Urinalysis Basic - ( 07 Sep 2023 09:17 )    Color:  / Appearance:  / SG:  / pH:   Gluc: 133 mg/dL / Ketone:   / Bili:  / Urobili:    Blood:  / Protein:  / Nitrite:    Leuk Esterase:  / RBC:  / WBC    Sq Epi:  / Non Sq Epi:  / Bacteria:       RADIOLOGY & ADDITIONAL STUDIES:    US Kid/Blad pending

## 2023-09-07 NOTE — PATIENT PROFILE ADULT - FALL HARM RISK - HARM RISK INTERVENTIONS
Assistance with ambulation/Assistance OOB with selected safe patient handling equipment/Communicate Risk of Fall with Harm to all staff/Discuss with provider need for PT consult/Monitor gait and stability/Reinforce activity limits and safety measures with patient and family/Tailored Fall Risk Interventions/Visual Cue: Yellow wristband and red socks/Bed in lowest position, wheels locked, appropriate side rails in place/Call bell, personal items and telephone in reach/Instruct patient to call for assistance before getting out of bed or chair/Non-slip footwear when patient is out of bed/[place] to call system/Physically safe environment - no spills, clutter or unnecessary equipment/Purposeful Proactive Rounding/Room/bathroom lighting operational, light cord in reach

## 2023-09-07 NOTE — CONSULT NOTE ADULT - ATTENDING COMMENTS
#JOÃO/ acidosis/ SPC / anemia chronic   most likely prerenal  suggest IVF bicarb drip at 100 cc per hour   follow UOP   follow BMP tonight and in AM   target bicarb 21-27  follow Hb no need for transfusion    will follow

## 2023-09-07 NOTE — ADVANCED PRACTICE NURSE CONSULT - RECOMMEDATIONS
Cleanse wounds with soap and water.   Pat dry apply Xeroform, wrap with Kerlix, twice a day and prn for soiling.   Apply moisturizer to periwound    Maintain pressure injury prevention.   Keep skin clean.   Maintain incontinence care.   Monitor wound for changes and notify provider  Cleanse wounds with soap and water.   Pat dry apply Xeroform, wrap with Kerlix, twice a day and prn for soiling.   Apply moisturizer to periwound  Recommend follow up with Vascular    Maintain pressure injury prevention.   Keep skin clean.   Maintain incontinence care.   Monitor wound for changes and notify provider

## 2023-09-08 LAB
ANION GAP SERPL CALC-SCNC: 11 MMOL/L — SIGNIFICANT CHANGE UP (ref 7–14)
ANION GAP SERPL CALC-SCNC: 9 MMOL/L — SIGNIFICANT CHANGE UP (ref 7–14)
BUN SERPL-MCNC: 67 MG/DL — CRITICAL HIGH (ref 10–20)
BUN SERPL-MCNC: 70 MG/DL — CRITICAL HIGH (ref 10–20)
CALCIUM SERPL-MCNC: 8.2 MG/DL — LOW (ref 8.4–10.4)
CALCIUM SERPL-MCNC: 8.2 MG/DL — LOW (ref 8.4–10.4)
CHLORIDE SERPL-SCNC: 106 MMOL/L — SIGNIFICANT CHANGE UP (ref 98–110)
CHLORIDE SERPL-SCNC: 109 MMOL/L — SIGNIFICANT CHANGE UP (ref 98–110)
CO2 SERPL-SCNC: 17 MMOL/L — SIGNIFICANT CHANGE UP (ref 17–32)
CO2 SERPL-SCNC: 22 MMOL/L — SIGNIFICANT CHANGE UP (ref 17–32)
CREAT ?TM UR-MCNC: 98 MG/DL — SIGNIFICANT CHANGE UP
CREAT SERPL-MCNC: 5.2 MG/DL — CRITICAL HIGH (ref 0.7–1.5)
CREAT SERPL-MCNC: 5.3 MG/DL — CRITICAL HIGH (ref 0.7–1.5)
CRP SERPL-MCNC: 62.3 MG/L — HIGH
EGFR: 11 ML/MIN/1.73M2 — LOW
EGFR: 11 ML/MIN/1.73M2 — LOW
FERRITIN SERPL-MCNC: 533 NG/ML — HIGH (ref 30–400)
FOLATE SERPL-MCNC: 8.4 NG/ML — SIGNIFICANT CHANGE UP
GLUCOSE BLDC GLUCOMTR-MCNC: 140 MG/DL — HIGH (ref 70–99)
GLUCOSE BLDC GLUCOMTR-MCNC: 167 MG/DL — HIGH (ref 70–99)
GLUCOSE BLDC GLUCOMTR-MCNC: 196 MG/DL — HIGH (ref 70–99)
GLUCOSE BLDC GLUCOMTR-MCNC: 246 MG/DL — HIGH (ref 70–99)
GLUCOSE SERPL-MCNC: 144 MG/DL — HIGH (ref 70–99)
GLUCOSE SERPL-MCNC: 163 MG/DL — HIGH (ref 70–99)
HCT VFR BLD CALC: 31.3 % — LOW (ref 42–52)
HGB BLD-MCNC: 9.9 G/DL — LOW (ref 14–18)
MAGNESIUM SERPL-MCNC: 1.9 MG/DL — SIGNIFICANT CHANGE UP (ref 1.8–2.4)
MCHC RBC-ENTMCNC: 28.6 PG — SIGNIFICANT CHANGE UP (ref 27–31)
MCHC RBC-ENTMCNC: 31.6 G/DL — LOW (ref 32–37)
MCV RBC AUTO: 90.5 FL — SIGNIFICANT CHANGE UP (ref 80–94)
NRBC # BLD: 0 /100 WBCS — SIGNIFICANT CHANGE UP (ref 0–0)
PLATELET # BLD AUTO: 243 K/UL — SIGNIFICANT CHANGE UP (ref 130–400)
PMV BLD: 11 FL — HIGH (ref 7.4–10.4)
POTASSIUM SERPL-MCNC: 4.4 MMOL/L — SIGNIFICANT CHANGE UP (ref 3.5–5)
POTASSIUM SERPL-MCNC: 4.9 MMOL/L — SIGNIFICANT CHANGE UP (ref 3.5–5)
POTASSIUM SERPL-SCNC: 4.4 MMOL/L — SIGNIFICANT CHANGE UP (ref 3.5–5)
POTASSIUM SERPL-SCNC: 4.9 MMOL/L — SIGNIFICANT CHANGE UP (ref 3.5–5)
PROT ?TM UR-MCNC: 91 MG/DLG/24H — SIGNIFICANT CHANGE UP
PROT/CREAT UR-RTO: 0.9 RATIO — HIGH (ref 0–0.2)
RBC # BLD: 3.46 M/UL — LOW (ref 4.7–6.1)
RBC # FLD: 13.9 % — SIGNIFICANT CHANGE UP (ref 11.5–14.5)
SODIUM SERPL-SCNC: 137 MMOL/L — SIGNIFICANT CHANGE UP (ref 135–146)
SODIUM SERPL-SCNC: 137 MMOL/L — SIGNIFICANT CHANGE UP (ref 135–146)
UUN UR-MCNC: 514 MG/DL — SIGNIFICANT CHANGE UP
VIT B12 SERPL-MCNC: 1267 PG/ML — HIGH (ref 232–1245)
WBC # BLD: 8.05 K/UL — SIGNIFICANT CHANGE UP (ref 4.8–10.8)
WBC # FLD AUTO: 8.05 K/UL — SIGNIFICANT CHANGE UP (ref 4.8–10.8)

## 2023-09-08 PROCEDURE — 99233 SBSQ HOSP IP/OBS HIGH 50: CPT

## 2023-09-08 PROCEDURE — 76775 US EXAM ABDO BACK WALL LIM: CPT | Mod: 26

## 2023-09-08 RX ORDER — ERGOCALCIFEROL 1.25 MG/1
50000 CAPSULE ORAL
Refills: 0 | Status: DISCONTINUED | OUTPATIENT
Start: 2023-09-08 | End: 2023-09-18

## 2023-09-08 RX ORDER — SODIUM CHLORIDE 9 MG/ML
1000 INJECTION, SOLUTION INTRAVENOUS
Refills: 0 | Status: DISCONTINUED | OUTPATIENT
Start: 2023-09-08 | End: 2023-09-08

## 2023-09-08 RX ORDER — SODIUM BICARBONATE 1 MEQ/ML
0.09 SYRINGE (ML) INTRAVENOUS
Qty: 75 | Refills: 0 | Status: DISCONTINUED | OUTPATIENT
Start: 2023-09-08 | End: 2023-09-09

## 2023-09-08 RX ADMIN — ATORVASTATIN CALCIUM 20 MILLIGRAM(S): 80 TABLET, FILM COATED ORAL at 21:21

## 2023-09-08 RX ADMIN — Medication 650 MILLIGRAM(S): at 21:20

## 2023-09-08 RX ADMIN — Medication 30 MILLIGRAM(S): at 05:12

## 2023-09-08 RX ADMIN — Medication 4: at 12:02

## 2023-09-08 RX ADMIN — Medication 650 MILLIGRAM(S): at 13:42

## 2023-09-08 RX ADMIN — SODIUM CHLORIDE 60 MILLILITER(S): 9 INJECTION, SOLUTION INTRAVENOUS at 12:01

## 2023-09-08 RX ADMIN — Medication 100 MEQ/KG/HR: at 17:33

## 2023-09-08 RX ADMIN — HEPARIN SODIUM 5000 UNIT(S): 5000 INJECTION INTRAVENOUS; SUBCUTANEOUS at 05:12

## 2023-09-08 RX ADMIN — Medication 2: at 17:26

## 2023-09-08 RX ADMIN — HEPARIN SODIUM 5000 UNIT(S): 5000 INJECTION INTRAVENOUS; SUBCUTANEOUS at 21:21

## 2023-09-08 RX ADMIN — Medication 650 MILLIGRAM(S): at 05:12

## 2023-09-08 RX ADMIN — HEPARIN SODIUM 5000 UNIT(S): 5000 INJECTION INTRAVENOUS; SUBCUTANEOUS at 13:42

## 2023-09-08 NOTE — PROGRESS NOTE ADULT - ASSESSMENT
68 year old male with PMH HTN, DM, spinal abscesses leading to quadriplegia, incontinence w suprapubic catheter, presented to ED after lab call back for creatinine of 5.5 in setting of diarrhea x7days, poor PO intake. Admitted for management of prerenal JOÃO.     #JOÃO, ?prerenal     -pt reporting diarrhea x7 days, poor PO intake  -cr initially improved , now up   -cw IV fluids - LR  -encourage PO intake   -bicarb ok  -hold metformin (eGFR <30)  -f/u US   no indication for HD    #HTN  -hx of htn, pt self-discontinued about a year ago   -elevated pressures inpt >180 systolic now dropped  significantly hold nifedipine   -avoid ACEi/ARB for now     #bilateral LE lesions, chronic  -pt w bilat LE lesions w erythema, scaling, areas of ulceration for years  -daily wound care

## 2023-09-08 NOTE — PROGRESS NOTE ADULT - ASSESSMENT
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 Acute kidney injury on CKD associated with decrease po intake  . Hx of chronic suprapubic catheter . Hx of CKD stage 5  *  HD stable, Afebrile, SPC changed 2 days ago  * Cr 5.5, BUN 80, Patient and family unsure of baseline, last Cr in Sept. 2021 1.5   Urine analysis:positive           - CXR:WNL   - No recent nephrotoxic drugs  - s/p 1L fluids in ED, c/w IV fluid hydration   - IVFs w/ HCO3 as per renal  - FENA 1.7 suggestive of ATN, AIN, etc   - US renal:pending   - Nephrology on the case  -daily BMP  -strict I/O's    2. Normocytic anemia  - Possibly secondary to CKD    3. Hx of multiple chronic lower extremity wounds   - Healing well, has visiting nurse for dressing 3x/week  - f/u wound care recs    4. Uncontrolled DM w/ hyperglycemia  - On metformin at home?   - Monitor finger sticks, + ISS   - a1c 11.6    5. Hx of HTN  Hx of HLD  - Was on amlodipine 10 2 years ago but not currently   - c/w statin   - Monitor BP     6. Hx of paraplegia due to multiple spinal abscesses in the past  - Supportive care     7. A-Sx Pyuria   - ua:positive  - Observe     8. Vit D defic  supplement    #Diet: Renal  #DVT ppx: Heparin  #Code: DNR/DNI,     #Progress Note Handoff  Pending: Clinical improvement and stability__x__Improved Cr_  Pt/Family discussion: Pt informed and agrees with the current plan  Disposition: Home____    My note supersedes the residents note should a discrepancy arise.    Chart and notes personally reviewed.  Care Discussed with Consultants/Other Providers/ Housestaff [ x] YES [ ] NO   Radiology, labs, old records personally reviewed.    discussed w/ housestaff, nursing, case management, neuro team    Attestation Statements:    Attestation Statements:  Risk Statement (NON-critical care).     On this date of service, level of risk to patient is considered: High.     Due to: severe JOÃO, uncontrolled DM    Time-based billing (NON-critical care).     50 minutes spent on total encounter. The necessity of the time spent during the encounter on this date of service was due to:     time spent on review of labs, imaging studies, old records, obtaining history, personally examining patient, counselling and communicating with patient/ family, entering orders for medications/tests/etc, discussions with other health care providers, documentation in electronic health records, independent interpretation of labs, imaging/procedure results and care coordination.     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 Acute kidney injury on CKD associated with decrease po intake  . Hx of chronic suprapubic catheter . Hx of CKD stage 5  *  HD stable, Afebrile, SPC changed 2 days ago  * Cr 5.5, BUN 80, Patient and family unsure of baseline, last Cr in Sept. 2021 1.5   Urine analysis:positive           - CXR:WNL   - No recent nephrotoxic drugs  - s/p 1L fluids in ED, c/w IV fluid hydration   - IVFs w/ HCO3 as per renal  - FENA 1.7 suggestive of ATN, AIN, etc   - US renal:pending   - Nephrology on the case  -daily BMP  -strict I/O's    2. Normocytic anemia  - Possibly secondary to CKD    3. Hx of multiple chronic lower extremity wounds   - Healing well, has visiting nurse for dressing 3x/week  - f/u wound care recs  Cleanse wounds with soap and water.   Pat dry apply Xeroform, wrap with Kerlix, twice a day and prn for soiling.     4. Uncontrolled DM w/ hyperglycemia  - On metformin at home?   - Monitor finger sticks, + ISS   - a1c 11.6    5. Hx of HTN  Hx of HLD  - Was on amlodipine 10 2 years ago but not currently   - c/w statin   - Monitor BP     6. Hx of paraplegia due to multiple spinal abscesses in the past  - Supportive care     7. A-Sx Pyuria   - ua:positive  - Observe     8. Vit D defic  supplement    #Diet: Renal  #DVT ppx: Heparin  #Code: DNR/DNI,     #Progress Note Handoff  Pending: Clinical improvement and stability__x__Improved Cr_  Pt/Family discussion: Pt informed and agrees with the current plan  Disposition: Home____    My note supersedes the residents note should a discrepancy arise.    Chart and notes personally reviewed.  Care Discussed with Consultants/Other Providers/ Housestaff [ x] YES [ ] NO   Radiology, labs, old records personally reviewed.    discussed w/ housestaff, nursing, case management, neuro team    Attestation Statements:    Attestation Statements:  Risk Statement (NON-critical care).     On this date of service, level of risk to patient is considered: High.     Due to: severe JOÃO, uncontrolled DM    Time-based billing (NON-critical care).     50 minutes spent on total encounter. The necessity of the time spent during the encounter on this date of service was due to:     time spent on review of labs, imaging studies, old records, obtaining history, personally examining patient, counselling and communicating with patient/ family, entering orders for medications/tests/etc, discussions with other health care providers, documentation in electronic health records, independent interpretation of labs, imaging/procedure results and care coordination.

## 2023-09-08 NOTE — PROGRESS NOTE ADULT - SUBJECTIVE AND OBJECTIVE BOX
Patient is a 68y old  Male who presents with a chief complaint of Renal failure (07 Sep 2023 11:41)    INTERVAL HPI/OVERNIGHT EVENTS: Patient was examined and seen at bedside. This morning pt is resting comfortably in bed and reports no new issues or overnight events. No complaints, feels well. Was refusing IVFs earlier but agreed to after explanation. Reports that bowel movements are more formed now. Appetite is good.  ROS: Denies CP, SOB, AP, new weakness  All other systems reviewed and are within normal limits.  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  Patient had blood work done Friday, September 1 which demonstrated a creatinine of 5.7.  Patient and his brother received a phone call from the PCP today and told him to come to the emergency department.  Of note patient states he has had some loose stool about once a day for the past month and according to brother has been eating and drinking less.  Brother also notices for the past 2 weeks has been emptying his Dye less often.  Patient denies any fever, chills, abdominal pain, chest pain, nausea and vomiting   In the ED, HD stable, afebrile, Labs significant for Hgb 10.6 , Cr 5.5  Patient will be admitted for further workup of renal failure  (06 Sep 2023 16:18)    PAST MEDICAL & SURGICAL HISTORY:  DM (diabetes mellitus)      HTN (hypertension)      H/O paraplegia      Spinal abscess      Renal stones      Spinal abscess  S/P Surgery          General: NAD, AAO3  HEENT:  EOMI, no LAD  CV: S1 S2  Resp: decreased breath sounds at bases  GI: NT/ND/S +BS. +SPC  MS: chronic venous stasis w/ b/l skin ulcerations  Neuro: paraplegia b/l LE    MEDICATIONS  (STANDING):  atorvastatin 20 milliGRAM(s) Oral at bedtime  dextrose 5%. 1000 milliLiter(s) (50 mL/Hr) IV Continuous <Continuous>  dextrose 5%. 1000 milliLiter(s) (100 mL/Hr) IV Continuous <Continuous>  dextrose 50% Injectable 25 Gram(s) IV Push once  dextrose 50% Injectable 25 Gram(s) IV Push once  dextrose 50% Injectable 12.5 Gram(s) IV Push once  glucagon  Injectable 1 milliGRAM(s) IntraMuscular once  heparin   Injectable 5000 Unit(s) SubCutaneous every 8 hours  insulin lispro (ADMELOG) corrective regimen sliding scale   SubCutaneous three times a day before meals  lactated ringers. 1000 milliLiter(s) (60 mL/Hr) IV Continuous <Continuous>  NIFEdipine XL 30 milliGRAM(s) Oral daily  sodium bicarbonate 650 milliGRAM(s) Oral three times a day    MEDICATIONS  (PRN):  dextrose Oral Gel 15 Gram(s) Oral once PRN Blood Glucose LESS THAN 70 milliGRAM(s)/deciliter    Vital Signs Last 24 Hrs  T(C): 35.7 (08 Sep 2023 05:06), Max: 37.2 (07 Sep 2023 20:43)  T(F): 96.2 (08 Sep 2023 05:06), Max: 98.9 (07 Sep 2023 20:43)  HR: 82 (08 Sep 2023 05:06) (82 - 91)  BP: 108/57 (08 Sep 2023 05:06) (108/57 - 182/94)  BP(mean): 131 (07 Sep 2023 20:43) (131 - 131)  RR: 19 (08 Sep 2023 05:06) (19 - 20)  SpO2: --      CAPILLARY BLOOD GLUCOSE      POCT Blood Glucose.: 246 mg/dL (08 Sep 2023 11:58)  POCT Blood Glucose.: 140 mg/dL (08 Sep 2023 07:41)  POCT Blood Glucose.: 116 mg/dL (07 Sep 2023 21:04)  POCT Blood Glucose.: 217 mg/dL (07 Sep 2023 17:02)                          9.9    8.05  )-----------( 243      ( 08 Sep 2023 05:41 )             31.3     09-08    137  |  109  |  70<HH>  ----------------------------<  144<H>  4.9   |  17  |  5.3<HH>    Ca    8.2<L>      08 Sep 2023 05:41  Phos  4.7     09-07  Mg     1.9     09-08    TPro  8.4<H>  /  Alb  2.7<L>  /  TBili  0.6  /  DBili  x   /  AST  14  /  ALT  10  /  AlkPhos  102  09-07    LIVER FUNCTIONS - ( 07 Sep 2023 09:17 )  Alb: 2.7 g/dL / Pro: 8.4 g/dL / ALK PHOS: 102 U/L / ALT: 10 U/L / AST: 14 U/L / GGT: x                 Urinalysis Basic - ( 08 Sep 2023 05:41 )    Color: x / Appearance: x / SG: x / pH: x  Gluc: 144 mg/dL / Ketone: x  / Bili: x / Urobili: x   Blood: x / Protein: x / Nitrite: x   Leuk Esterase: x / RBC: x / WBC x   Sq Epi: x / Non Sq Epi: x / Bacteria: x              Chart, Consultant(s) Notes Reviewed:  [x ] YES  [ ] NO  Care Discussed with Consultants/Other Providers/ Housestaff [ x] YES  [ ] NO  Radiology, labs, old available records personally reviewed.

## 2023-09-09 LAB
ALBUMIN SERPL ELPH-MCNC: 2.5 G/DL — LOW (ref 3.5–5.2)
ALP SERPL-CCNC: 91 U/L — SIGNIFICANT CHANGE UP (ref 30–115)
ALT FLD-CCNC: 9 U/L — SIGNIFICANT CHANGE UP (ref 0–41)
ANION GAP SERPL CALC-SCNC: 11 MMOL/L — SIGNIFICANT CHANGE UP (ref 7–14)
AST SERPL-CCNC: 13 U/L — SIGNIFICANT CHANGE UP (ref 0–41)
BILIRUB SERPL-MCNC: 0.3 MG/DL — SIGNIFICANT CHANGE UP (ref 0.2–1.2)
BUN SERPL-MCNC: 59 MG/DL — HIGH (ref 10–20)
CALCIUM SERPL-MCNC: 8.1 MG/DL — LOW (ref 8.4–10.5)
CHLORIDE SERPL-SCNC: 104 MMOL/L — SIGNIFICANT CHANGE UP (ref 98–110)
CO2 SERPL-SCNC: 22 MMOL/L — SIGNIFICANT CHANGE UP (ref 17–32)
CREAT SERPL-MCNC: 4.7 MG/DL — CRITICAL HIGH (ref 0.7–1.5)
EGFR: 13 ML/MIN/1.73M2 — LOW
EPO SERPL-MCNC: 9.7 MIU/ML — SIGNIFICANT CHANGE UP (ref 2.6–18.5)
GLUCOSE BLDC GLUCOMTR-MCNC: 133 MG/DL — HIGH (ref 70–99)
GLUCOSE BLDC GLUCOMTR-MCNC: 152 MG/DL — HIGH (ref 70–99)
GLUCOSE BLDC GLUCOMTR-MCNC: 162 MG/DL — HIGH (ref 70–99)
GLUCOSE BLDC GLUCOMTR-MCNC: 164 MG/DL — HIGH (ref 70–99)
GLUCOSE BLDC GLUCOMTR-MCNC: 200 MG/DL — HIGH (ref 70–99)
GLUCOSE SERPL-MCNC: 129 MG/DL — HIGH (ref 70–99)
HCT VFR BLD CALC: 31.2 % — LOW (ref 42–52)
HGB BLD-MCNC: 9.9 G/DL — LOW (ref 14–18)
MAGNESIUM SERPL-MCNC: 1.7 MG/DL — LOW (ref 1.8–2.4)
MCHC RBC-ENTMCNC: 28.9 PG — SIGNIFICANT CHANGE UP (ref 27–31)
MCHC RBC-ENTMCNC: 31.7 G/DL — LOW (ref 32–37)
MCV RBC AUTO: 91.2 FL — SIGNIFICANT CHANGE UP (ref 80–94)
NRBC # BLD: 0 /100 WBCS — SIGNIFICANT CHANGE UP (ref 0–0)
PLATELET # BLD AUTO: 206 K/UL — SIGNIFICANT CHANGE UP (ref 130–400)
PMV BLD: 10.7 FL — HIGH (ref 7.4–10.4)
POTASSIUM SERPL-MCNC: 4.6 MMOL/L — SIGNIFICANT CHANGE UP (ref 3.5–5)
POTASSIUM SERPL-SCNC: 4.6 MMOL/L — SIGNIFICANT CHANGE UP (ref 3.5–5)
PROT SERPL-MCNC: 7.7 G/DL — SIGNIFICANT CHANGE UP (ref 6–8)
RBC # BLD: 3.42 M/UL — LOW (ref 4.7–6.1)
RBC # FLD: 13.8 % — SIGNIFICANT CHANGE UP (ref 11.5–14.5)
SODIUM SERPL-SCNC: 137 MMOL/L — SIGNIFICANT CHANGE UP (ref 135–146)
WBC # BLD: 7.68 K/UL — SIGNIFICANT CHANGE UP (ref 4.8–10.8)
WBC # FLD AUTO: 7.68 K/UL — SIGNIFICANT CHANGE UP (ref 4.8–10.8)

## 2023-09-09 PROCEDURE — 99232 SBSQ HOSP IP/OBS MODERATE 35: CPT

## 2023-09-09 RX ORDER — GUAIFENESIN/DEXTROMETHORPHAN 600MG-30MG
10 TABLET, EXTENDED RELEASE 12 HR ORAL EVERY 4 HOURS
Refills: 0 | Status: DISCONTINUED | OUTPATIENT
Start: 2023-09-09 | End: 2023-09-18

## 2023-09-09 RX ADMIN — Medication 30 MILLIGRAM(S): at 05:48

## 2023-09-09 RX ADMIN — Medication 2: at 15:42

## 2023-09-09 RX ADMIN — Medication 2: at 12:29

## 2023-09-09 RX ADMIN — ATORVASTATIN CALCIUM 20 MILLIGRAM(S): 80 TABLET, FILM COATED ORAL at 21:23

## 2023-09-09 RX ADMIN — HEPARIN SODIUM 5000 UNIT(S): 5000 INJECTION INTRAVENOUS; SUBCUTANEOUS at 05:48

## 2023-09-09 RX ADMIN — ERGOCALCIFEROL 50000 UNIT(S): 1.25 CAPSULE ORAL at 12:25

## 2023-09-09 RX ADMIN — HEPARIN SODIUM 5000 UNIT(S): 5000 INJECTION INTRAVENOUS; SUBCUTANEOUS at 13:08

## 2023-09-09 RX ADMIN — Medication 650 MILLIGRAM(S): at 05:48

## 2023-09-09 RX ADMIN — Medication 650 MILLIGRAM(S): at 21:23

## 2023-09-09 RX ADMIN — HEPARIN SODIUM 5000 UNIT(S): 5000 INJECTION INTRAVENOUS; SUBCUTANEOUS at 21:23

## 2023-09-09 RX ADMIN — Medication 10 MILLILITER(S): at 22:15

## 2023-09-09 RX ADMIN — Medication 650 MILLIGRAM(S): at 13:11

## 2023-09-09 NOTE — PROGRESS NOTE ADULT - ASSESSMENT
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 Acute kidney injury on CKD associated with decrease po intake  . Hx of chronic suprapubic catheter . Hx of CKD stage 5  - HD stable, Afebrile, SPC changed 2 days ago  - Cr 5.5, BUN 80, Patient and family unsure of baseline, last Cr in Sept. 2021 1.5  - Urine noted   - No recent nephrotoxic drugs  - FENA 1.7 suggestive of ATN, AIN, etc   - US renal: no hydronephrosis, left renal stone  - Nephrology on the case, recs today noted: d/c bicarb drip , c/w sodium bicarb po  - daily BMP  - strict I/O's    2. Normocytic anemia  - Possibly secondary to CKD    3. Hx of multiple chronic lower extremity wounds   - Healing well, has visiting nurse for dressing 3x/week  - f/u wound care recs  Cleanse wounds with soap and water.   Pat dry apply Xeroform, wrap with Kerlix, twice a day and prn for soiling.     4. Uncontrolled DM w/ hyperglycemia  - On metformin at home?   - d/c metformin with advanced CKD  - Monitor finger sticks, + ISS   - a1c 11.6    5. Hx of HTN   - Was on amlodipine 10 2 years ago but not currently   - Monitor BP   - now on nifedipine 30 mg XL     6. Hx of HLD  - c/w statin     7.  Hx of paraplegia due to multiple spinal abscesses in the past  - Supportive care     8. Vit D defic  supplement    #Diet: Renal  #DVT ppx: Heparin  #Code: DNR/DNI,   #Progress Note Handoff  Pending: Clinical improvement and stability__x__Improved Cr_  Pt/Family discussion: Pt informed and agrees with the current plan  Disposition: Home

## 2023-09-09 NOTE — PROGRESS NOTE ADULT - SUBJECTIVE AND OBJECTIVE BOX
SUBJECTIVE:    Patient is a 68y old Male who presents with a chief complaint of Renal failure (09 Sep 2023 09:16)    Currently admitted to medicine with the primary diagnosis of Acute renal failure    Today is hospital day 3d. This morning he is resting comfortably in bed and reports no new issues or overnight events.     no complains this morning.     PAST MEDICAL & SURGICAL HISTORY  DM (diabetes mellitus)    HTN (hypertension)    H/O paraplegia    Spinal abscess    Renal stones    Spinal abscess  S/P Surgery      SOCIAL HISTORY:  Negative for smoking/alcohol/drug use.     ALLERGIES:  No Known Allergies    MEDICATIONS:  STANDING MEDICATIONS  atorvastatin 20 milliGRAM(s) Oral at bedtime  dextrose 5%. 1000 milliLiter(s) IV Continuous <Continuous>  dextrose 5%. 1000 milliLiter(s) IV Continuous <Continuous>  dextrose 50% Injectable 25 Gram(s) IV Push once  dextrose 50% Injectable 25 Gram(s) IV Push once  dextrose 50% Injectable 12.5 Gram(s) IV Push once  ergocalciferol 19375 Unit(s) Oral every week  glucagon  Injectable 1 milliGRAM(s) IntraMuscular once  heparin   Injectable 5000 Unit(s) SubCutaneous every 8 hours  insulin lispro (ADMELOG) corrective regimen sliding scale   SubCutaneous three times a day before meals  NIFEdipine XL 30 milliGRAM(s) Oral daily  sodium bicarbonate 650 milliGRAM(s) Oral three times a day  sodium bicarbonate  Infusion 0.094 mEq/kG/Hr IV Continuous <Continuous>    PRN MEDICATIONS  dextrose Oral Gel 15 Gram(s) Oral once PRN    VITALS:   T(F): 97  HR: 71  BP: 134/63  RR: 18  SpO2: 98%    LABS:                        9.9    7.68  )-----------( 206      ( 09 Sep 2023 08:32 )             31.2     09-09    137  |  104  |  59<H>  ----------------------------<  129<H>  4.6   |  22  |  4.7<HH>    Ca    8.1<L>      09 Sep 2023 08:32  Mg     1.7     09-09    TPro  7.7  /  Alb  2.5<L>  /  TBili  0.3  /  DBili  x   /  AST  13  /  ALT  9   /  AlkPhos  91  09-09      Urinalysis Basic - ( 09 Sep 2023 08:32 )    Color: x / Appearance: x / SG: x / pH: x  Gluc: 129 mg/dL / Ketone: x  / Bili: x / Urobili: x   Blood: x / Protein: x / Nitrite: x   Leuk Esterase: x / RBC: x / WBC x   Sq Epi: x / Non Sq Epi: x / Bacteria: x                RADIOLOGY:    < from: US Renal (09.08.23 @ 21:25) >    IMPRESSION:  1.  Left kidney is slightly decreased in size compared with examination   performed March, 2008.  2.  New 1.9 x 1.6 cm lower pole, left renal calculus without   hydronephrosis.  3.Unremarkable examination of the right kidney.  4.  Urinary bladder not evaluated due to the presence of a Dye catheter.      < end of copied text >      PHYSICAL EXAM:  GEN: No acute distress  LUNGS: Clear to auscultation bilaterally   HEART: S1/S2 present. RRR.   ABD: Soft, non-tender, non-distended.    EXT: bilateral lower ext rash and erythema, RLE wrapped   NEURO: AAOX3     Germaine Gongora  (RN)  2018 09:36:40

## 2023-09-09 NOTE — PROGRESS NOTE ADULT - SUBJECTIVE AND OBJECTIVE BOX
seen and examined   24 h events noted   no distress        PAST HISTORY  --------------------------------------------------------------------------------  No significant changes to PMH, PSH, FHx, SHx, unless otherwise noted    ALLERGIES & MEDICATIONS  --------------------------------------------------------------------------------  Allergies    No Known Allergies    Intolerances      Standing Inpatient Medications  atorvastatin 20 milliGRAM(s) Oral at bedtime  dextrose 5%. 1000 milliLiter(s) IV Continuous <Continuous>  dextrose 5%. 1000 milliLiter(s) IV Continuous <Continuous>  dextrose 50% Injectable 25 Gram(s) IV Push once  dextrose 50% Injectable 25 Gram(s) IV Push once  dextrose 50% Injectable 12.5 Gram(s) IV Push once  ergocalciferol 22940 Unit(s) Oral every week  glucagon  Injectable 1 milliGRAM(s) IntraMuscular once  heparin   Injectable 5000 Unit(s) SubCutaneous every 8 hours  insulin lispro (ADMELOG) corrective regimen sliding scale   SubCutaneous three times a day before meals  NIFEdipine XL 30 milliGRAM(s) Oral daily  sodium bicarbonate 650 milliGRAM(s) Oral three times a day  sodium bicarbonate  Infusion 0.094 mEq/kG/Hr IV Continuous <Continuous>    PRN Inpatient Medications  dextrose Oral Gel 15 Gram(s) Oral once PRN            VITALS/PHYSICAL EXAM  --------------------------------------------------------------------------------  T(C): 36.1 (09-09-23 @ 04:25), Max: 36.6 (09-08-23 @ 20:33)  HR: 71 (09-09-23 @ 04:25) (71 - 92)  BP: 134/63 (09-09-23 @ 04:25) (118/64 - 139/73)  RR: 18 (09-09-23 @ 04:25) (18 - 18)  SpO2: 98% (09-09-23 @ 04:25) (98% - 98%)  Wt(kg): --  Height (cm): 185.4 (09-08-23 @ 13:18)  Weight (kg): 79.4 (09-08-23 @ 13:18)  BMI (kg/m2): 23.1 (09-08-23 @ 13:18)  BSA (m2): 2.03 (09-08-23 @ 13:18)      09-08-23 @ 07:01  -  09-09-23 @ 07:00  --------------------------------------------------------  IN: 2160 mL / OUT: 2305 mL / NET: -145 mL      Physical Exam:  	Gen: NAD,  	Pulm: decrease BS  B/L  	CV:  S1S2; no rub  	Abd: +distended  	LE:  dressing    LABS/STUDIES  --------------------------------------------------------------------------------              9.9    7.68  >-----------<  206      [09-09-23 @ 08:32]              31.2     137  |  106  |  67  ----------------------------<  163      [09-08-23 @ 19:45]  4.4   |  22  |  5.2        Ca     8.2     [09-08-23 @ 19:45]      Mg     1.9     [09-08-23 @ 05:41]      Phos  4.7     [09-07-23 @ 09:17]    TPro  8.4  /  Alb  2.7  /  TBili  0.6  /  DBili  x   /  AST  14  /  ALT  10  /  AlkPhos  102  [09-07-23 @ 09:17]      Creatinine Trend:  SCr 5.2 [09-08 @ 19:45]  SCr 5.3 [09-08 @ 05:41]  SCr 5.0 [09-07 @ 09:17]  SCr 5.5 [09-06 @ 13:56]    Urinalysis - [09-08-23 @ 19:45]      Color  / Appearance  / SG  / pH       Gluc 163 / Ketone   / Bili  / Urobili        Blood  / Protein  / Leuk Est  / Nitrite       RBC  / WBC  / Hyaline  / Gran  / Sq Epi  / Non Sq Epi  / Bacteria     Urine Creatinine 98      [09-07-23 @ 12:20]  Urine Protein 91      [09-07-23 @ 12:20]  Urine Sodium 45.0      [09-07-23 @ 12:20]  Urine Urea Nitrogen 514      [09-07-23 @ 12:20]  Urine Potassium 14      [09-07-23 @ 12:20]  Urine Osmolality 361      [09-07-23 @ 12:20]    Iron 30, TIBC 143, %sat 21      [09-07-23 @ 09:17]  Ferritin 533      [09-07-23 @ 09:17]  Vitamin D (25OH) 13      [09-07-23 @ 09:17]  Lipid: chol 134, , HDL 30, LDL --      [09-07-23 @ 09:17]

## 2023-09-09 NOTE — PROGRESS NOTE ADULT - ASSESSMENT
68 year old male with PMH HTN, DM, spinal abscesses leading to quadriplegia, incontinence w suprapubic catheter, presented to ED after lab call back for creatinine of 5.5 in setting of diarrhea x7days, poor PO intake. Admitted for management of prerenal JOÃO.   #JOÃO, ?prerenal / most probably ATN since cr not improving with IV fluids  check repeat cr today  non oliguric   continue sodium bicarbonate po   d/c bicarbonate drip   ph at goal   LR at 75 cc/h   sono : no hydro/ stone  BP controlled  no acute indication for RRT   will follow

## 2023-09-10 LAB
ALBUMIN SERPL ELPH-MCNC: 2.4 G/DL — LOW (ref 3.5–5.2)
ALP SERPL-CCNC: 95 U/L — SIGNIFICANT CHANGE UP (ref 30–115)
ALT FLD-CCNC: 10 U/L — SIGNIFICANT CHANGE UP (ref 0–41)
ANION GAP SERPL CALC-SCNC: 9 MMOL/L — SIGNIFICANT CHANGE UP (ref 7–14)
AST SERPL-CCNC: 17 U/L — SIGNIFICANT CHANGE UP (ref 0–41)
BILIRUB SERPL-MCNC: 0.3 MG/DL — SIGNIFICANT CHANGE UP (ref 0.2–1.2)
BUN SERPL-MCNC: 58 MG/DL — HIGH (ref 10–20)
CALCIUM SERPL-MCNC: 7.9 MG/DL — LOW (ref 8.4–10.4)
CHLORIDE SERPL-SCNC: 105 MMOL/L — SIGNIFICANT CHANGE UP (ref 98–110)
CO2 SERPL-SCNC: 22 MMOL/L — SIGNIFICANT CHANGE UP (ref 17–32)
CREAT SERPL-MCNC: 4.4 MG/DL — CRITICAL HIGH (ref 0.7–1.5)
EGFR: 14 ML/MIN/1.73M2 — LOW
GLUCOSE BLDC GLUCOMTR-MCNC: 111 MG/DL — HIGH (ref 70–99)
GLUCOSE SERPL-MCNC: 134 MG/DL — HIGH (ref 70–99)
HCT VFR BLD CALC: 30.3 % — LOW (ref 42–52)
HGB BLD-MCNC: 9.5 G/DL — LOW (ref 14–18)
MAGNESIUM SERPL-MCNC: 1.6 MG/DL — LOW (ref 1.8–2.4)
MCHC RBC-ENTMCNC: 28.5 PG — SIGNIFICANT CHANGE UP (ref 27–31)
MCHC RBC-ENTMCNC: 31.4 G/DL — LOW (ref 32–37)
MCV RBC AUTO: 91 FL — SIGNIFICANT CHANGE UP (ref 80–94)
NRBC # BLD: 0 /100 WBCS — SIGNIFICANT CHANGE UP (ref 0–0)
PLATELET # BLD AUTO: 216 K/UL — SIGNIFICANT CHANGE UP (ref 130–400)
PMV BLD: 10.7 FL — HIGH (ref 7.4–10.4)
POTASSIUM SERPL-MCNC: 4.2 MMOL/L — SIGNIFICANT CHANGE UP (ref 3.5–5)
POTASSIUM SERPL-SCNC: 4.2 MMOL/L — SIGNIFICANT CHANGE UP (ref 3.5–5)
PROT SERPL-MCNC: 7.5 G/DL — SIGNIFICANT CHANGE UP (ref 6–8)
RBC # BLD: 3.33 M/UL — LOW (ref 4.7–6.1)
RBC # FLD: 13.8 % — SIGNIFICANT CHANGE UP (ref 11.5–14.5)
SODIUM SERPL-SCNC: 136 MMOL/L — SIGNIFICANT CHANGE UP (ref 135–146)
WBC # BLD: 7.81 K/UL — SIGNIFICANT CHANGE UP (ref 4.8–10.8)
WBC # FLD AUTO: 7.81 K/UL — SIGNIFICANT CHANGE UP (ref 4.8–10.8)

## 2023-09-10 PROCEDURE — 71045 X-RAY EXAM CHEST 1 VIEW: CPT | Mod: 26

## 2023-09-10 PROCEDURE — 99232 SBSQ HOSP IP/OBS MODERATE 35: CPT

## 2023-09-10 RX ADMIN — HEPARIN SODIUM 5000 UNIT(S): 5000 INJECTION INTRAVENOUS; SUBCUTANEOUS at 13:34

## 2023-09-10 RX ADMIN — HEPARIN SODIUM 5000 UNIT(S): 5000 INJECTION INTRAVENOUS; SUBCUTANEOUS at 05:10

## 2023-09-10 RX ADMIN — HEPARIN SODIUM 5000 UNIT(S): 5000 INJECTION INTRAVENOUS; SUBCUTANEOUS at 21:42

## 2023-09-10 RX ADMIN — Medication 650 MILLIGRAM(S): at 21:41

## 2023-09-10 RX ADMIN — Medication 6: at 11:36

## 2023-09-10 RX ADMIN — Medication 650 MILLIGRAM(S): at 05:11

## 2023-09-10 RX ADMIN — ATORVASTATIN CALCIUM 20 MILLIGRAM(S): 80 TABLET, FILM COATED ORAL at 21:42

## 2023-09-10 RX ADMIN — Medication 30 MILLIGRAM(S): at 05:10

## 2023-09-10 RX ADMIN — Medication 650 MILLIGRAM(S): at 13:34

## 2023-09-10 NOTE — PROGRESS NOTE ADULT - ASSESSMENT
68 year old male with PMH HTN, DM, spinal abscesses leading to quadriplegia, incontinence w suprapubic catheter, presented to ED after lab call back for creatinine of 5.5 in setting of diarrhea x7days, poor PO intake. Admitted for management of prerenal JOÃO.   #JOÃO, ?prerenal vs. ATN  non oliguric  creat level down-trending  continue sodium bicarbonate po   ph at goal   sono : no hydro/ stone  increase nifedipine to 60 if BP remains elevated  no acute indication for RRT   replete Mg to 2  will follow

## 2023-09-10 NOTE — PROGRESS NOTE ADULT - SUBJECTIVE AND OBJECTIVE BOX
KRISTY GARCIA 68y Male  MRN#: [id number]     SUBJECTIVE  Patient is a 68y old Male who presents with a chief complaint of Renal failure (09 Sep 2023 11:29)  Today is hospital day 4d, and this morning he is lying in bed without distress.   No acute overnight events.     OBJECTIVE  PAST MEDICAL & SURGICAL HISTORY  DM (diabetes mellitus)  HTN (hypertension)  H/O paraplegia  Spinal abscess  Renal stones  Spinal abscess  S/P Surgery    ALLERGIES:  No Known Allergies    MEDICATIONS:  STANDING MEDICATIONS  atorvastatin 20 milliGRAM(s) Oral at bedtime  dextrose 5%. 1000 milliLiter(s) IV Continuous <Continuous>  dextrose 5%. 1000 milliLiter(s) IV Continuous <Continuous>  dextrose 50% Injectable 25 Gram(s) IV Push once  dextrose 50% Injectable 12.5 Gram(s) IV Push once  dextrose 50% Injectable 25 Gram(s) IV Push once  ergocalciferol 01049 Unit(s) Oral every week  glucagon  Injectable 1 milliGRAM(s) IntraMuscular once  heparin   Injectable 5000 Unit(s) SubCutaneous every 8 hours  insulin lispro (ADMELOG) corrective regimen sliding scale   SubCutaneous three times a day before meals  NIFEdipine XL 30 milliGRAM(s) Oral daily  sodium bicarbonate 650 milliGRAM(s) Oral three times a day    PRN MEDICATIONS  dextrose Oral Gel 15 Gram(s) Oral once PRN  guaifenesin/dextromethorphan Oral Liquid 10 milliLiter(s) Oral every 4 hours PRN    HOME MEDICATIONS  Home Medications:  atorvastatin 20 mg oral tablet: 1 orally once a day (06 Sep 2023 16:25)  metFORMIN 500 mg oral tablet: 1 orally 2 times a day (06 Sep 2023 16:25)      VITAL SIGNS: Last 24 Hours  T(C): 36 (10 Sep 2023 04:30), Max: 36.2 (09 Sep 2023 12:31)  T(F): 96.8 (10 Sep 2023 04:30), Max: 97.1 (09 Sep 2023 12:31)  HR: 77 (10 Sep 2023 04:30) (66 - 80)  BP: 152/78 (10 Sep 2023 04:30) (138/67 - 152/78)  BP(mean): 106 (10 Sep 2023 04:30) (106 - 106)  RR: 18 (10 Sep 2023 04:30) (18 - 19)  SpO2: --    09-09-23 @ 07:01  -  09-10-23 @ 07:00  --------------------------------------------------------  IN: 400 mL / OUT: 1870 mL / NET: -1470 mL        LABS:                        9.5    7.81  )-----------( 216      ( 10 Sep 2023 07:20 )             30.3     09-10    136  |  105  |  58<H>  ----------------------------<  134<H>  4.2   |  22  |  4.4<HH>    Ca    7.9<L>      10 Sep 2023 07:20  Mg     1.6     09-10    TPro  7.5  /  Alb  2.4<L>  /  TBili  0.3  /  DBili  x   /  AST  17  /  ALT  10  /  AlkPhos  95  09-10    LIVER FUNCTIONS - ( 10 Sep 2023 07:20 )  Alb: 2.4 g/dL / Pro: 7.5 g/dL / ALK PHOS: 95 U/L / ALT: 10 U/L / AST: 17 U/L / GGT: x             Urinalysis Basic - ( 10 Sep 2023 07:20 )    Color: x / Appearance: x / SG: x / pH: x  Gluc: 134 mg/dL / Ketone: x  / Bili: x / Urobili: x   Blood: x / Protein: x / Nitrite: x   Leuk Esterase: x / RBC: x / WBC x   Sq Epi: x / Non Sq Epi: x / Bacteria: x      CAPILLARY BLOOD GLUCOSE  POCT Blood Glucose.: 111 mg/dL (10 Sep 2023 07:50)      RADIOLOGY:    PHYSICAL EXAM:  GENERAL: NAD, 68y M  HEAD:  Atraumatic, Normocephalic  EYES: EOMI, conjunctivae clear and sclerae white  NECK: Supple, No JVD  CHEST/LUNG: Clear to auscultation bilaterally; No wheeze; No crackles; No accessory muscles used  HEART: Regular rate and rhythm; No murmurs;   ABDOMEN: Soft, Nontender, Nondistended; Bowel sounds present; No guarding  EXTREMITIES: bilateral LE rash and erythema, ACE wrapped  NEUROLOGY: non-focal    ADMISSION SUMMARY  Patient is a 68y old Male who presents with a chief complaint of Renal failure (09 Sep 2023 11:29)

## 2023-09-10 NOTE — PROGRESS NOTE ADULT - SUBJECTIVE AND OBJECTIVE BOX
Nephrology Progress Note    KRISTY GARCIA  MRN-[id number]  68y  Male    S:  Patient is seen and examined, events over the last 24h noted.    O:  Allergies:  No Known Allergies    Hospital Medications:   MEDICATIONS  (STANDING):  atorvastatin 20 milliGRAM(s) Oral at bedtime  dextrose 5%. 1000 milliLiter(s) (50 mL/Hr) IV Continuous <Continuous>  dextrose 5%. 1000 milliLiter(s) (100 mL/Hr) IV Continuous <Continuous>  dextrose 50% Injectable 25 Gram(s) IV Push once  dextrose 50% Injectable 25 Gram(s) IV Push once  dextrose 50% Injectable 12.5 Gram(s) IV Push once  ergocalciferol 01400 Unit(s) Oral every week  glucagon  Injectable 1 milliGRAM(s) IntraMuscular once  heparin   Injectable 5000 Unit(s) SubCutaneous every 8 hours  insulin lispro (ADMELOG) corrective regimen sliding scale   SubCutaneous three times a day before meals  NIFEdipine XL 30 milliGRAM(s) Oral daily  sodium bicarbonate 650 milliGRAM(s) Oral three times a day    MEDICATIONS  (PRN):  dextrose Oral Gel 15 Gram(s) Oral once PRN Blood Glucose LESS THAN 70 milliGRAM(s)/deciliter  guaifenesin/dextromethorphan Oral Liquid 10 milliLiter(s) Oral every 4 hours PRN Cough    Home Medications:  atorvastatin 20 mg oral tablet: 1 orally once a day (06 Sep 2023 16:25)  metFORMIN 500 mg oral tablet: 1 orally 2 times a day (06 Sep 2023 16:25)      VITALS:  Daily     Daily   T(F): 96.8 (09-10-23 @ 04:30), Max: 97.1 (09-09-23 @ 12:31)  HR: 77 (09-10-23 @ 04:30)  BP: 152/78 (09-10-23 @ 04:30)  RR: 18 (09-10-23 @ 04:30)  SpO2: --  Wt(kg): --  I&O's Detail    09 Sep 2023 07:01  -  10 Sep 2023 07:00  --------------------------------------------------------  IN:    Sodium Bicarbonate: 400 mL  Total IN: 400 mL    OUT:    Indwelling Catheter - Suprapubic (mL): 1870 mL  Total OUT: 1870 mL    Total NET: -1470 mL        I&O's Summary    09 Sep 2023 07:01  -  10 Sep 2023 07:00  --------------------------------------------------------  IN: 400 mL / OUT: 1870 mL / NET: -1470 mL          PHYSICAL EXAM:  Gen: NAD  Chest: b/l breath sounds  Abd: soft      LABS:    09-10    136  |  105  |  58<H>  ----------------------------<  134<H>  4.2   |  22  |  4.4<HH>    Ca    7.9<L>      10 Sep 2023 07:20  Mg     1.6     09-10    TPro  7.5  /  Alb  2.4<L>  /  TBili  0.3  /  DBili      /  AST  17  /  ALT  10  /  AlkPhos  95  09-10    eGFR: 14 mL/min/1.73m2 (09-10-23 @ 07:20)  eGFR: 13 mL/min/1.73m2 (09-09-23 @ 08:32)    Phosphorus: 4.7 mg/dL (09-07-23 @ 09:17)  Phosphorus: 4.7 mg/dL (09-06-23 @ 13:56)    Vitamin D, 25-Hydroxy: 13 ng/mL (09-07-23 @ 09:17)                          9.5    7.81  )-----------( 216      ( 10 Sep 2023 07:20 )             30.3     Mean Cell Volume: 91.0 fL (09-10-23 @ 07:20)    Iron Total: 30 ug/dL (09-07-23 @ 09:17)  % Saturation, Iron: 21 % (09-07-23 @ 09:17)      % Saturation, Iron: 21 % (09-07-23 @ 09:17)    Urine Studies:  Protein, Urine: 100 mg/dL (09-07-23 @ 12:20)  White Blood Cell - Urine: 469 /HPF (09-07-23 @ 12:20)  Red Blood Cell - Urine: 31 /HPF (09-07-23 @ 12:20)  Urea Nitrogen,  Random Urine: 514 mg/dL (09-07-23 @ 12:20)  Sodium, Random Urine: 45.0 mmoL/L (09-07 @ 12:20)  Creatinine, Random Urine: 98 mg/dL (09-07 @ 12:20)    Creatinine trend:  Creatinine: 4.4 mg/dL (09-10-23 @ 07:20)  Creatinine: 4.7 mg/dL (09-09-23 @ 08:32)  Creatinine: 5.2 mg/dL (09-08-23 @ 19:45)  Creatinine: 5.3 mg/dL (09-08-23 @ 05:41)  Creatinine: 5.0 mg/dL (09-07-23 @ 09:17)  Creatinine: 5.5 mg/dL (09-06-23 @ 13:56)  Creatinine, Serum: 1.5 mg/dL (09-16-21 @ 09:44)      US Renal:   ACC: [id number] EXAM:  US KIDNEY(S)   ORDERED BY: YOSEF MEYERS     PROCEDURE DATE:  09/08/2023          INTERPRETATION:  CLINICAL INFORMATION: 66-year-old male with kidney   failure.    COMPARISON: Renal ultrasound examination performed 3/27/2008    TECHNIQUE: Sonography of the kidneys and bladder    FINDINGS:    Right kidney: 12.2 cm in length, previously 11.9 cm.  Normal in   echogenicity.  No hydronephrosis or calculi.  Vascular flow is   demonstrated within the right kidney.    Left kidney:9.9 cm in length, previously 13.8 cm.  Normal in   echogenicity.  There is a new 1.9 x 1.6 cm shadowing, echogenic focus in   the lower pole of the left kidney.  Vascular flow is demonstrated within   the left kidney.    Bladder: The bladder is not evaluated due to the presence of a Dye   catheter.      IMPRESSION:  1.  Left kidney is slightly decreased in size compared with examination   performed March, 2008.  2.  New 1.9 x 1.6 cm lower pole, left renal calculus without   hydronephrosis.  3.Unremarkable examination of the right kidney.  4.  Urinary bladder not evaluated due to the presence of a Dye catheter.        --- End of Report ---            HARIKA GARCIA MD; Attending Radiologist  This document has been electronically signed. Sep  9 2023  8:37AM (09-08-23 @ 21:25)       Nephrology Progress Note    KRISTY GARCIA  MRN-[id number]  68y  Male    S:  Patient is seen and examined, events over the last 24h noted.    O:  Allergies:  No Known Allergies    Hospital Medications:   MEDICATIONS  (STANDING):  atorvastatin 20 milliGRAM(s) Oral at bedtime  ergocalciferol 31841 Unit(s) Oral every week  heparin   Injectable 5000 Unit(s) SubCutaneous every 8 hours  insulin lispro (ADMELOG) corrective regimen sliding scale   SubCutaneous three times a day before meals  NIFEdipine XL 30 milliGRAM(s) Oral daily  sodium bicarbonate 650 milliGRAM(s) Oral three times a day    MEDICATIONS  (PRN):  dextrose Oral Gel 15 Gram(s) Oral once PRN Blood Glucose LESS THAN 70 milliGRAM(s)/deciliter  guaifenesin/dextromethorphan Oral Liquid 10 milliLiter(s) Oral every 4 hours PRN Cough    Home Medications:  atorvastatin 20 mg oral tablet: 1 orally once a day (06 Sep 2023 16:25)  metFORMIN 500 mg oral tablet: 1 orally 2 times a day (06 Sep 2023 16:25)      VITALS:  T(F): 96.8 (09-10-23 @ 04:30), Max: 97.1 (09-09-23 @ 12:31)  HR: 77 (09-10-23 @ 04:30)  BP: 152/78 (09-10-23 @ 04:30)  RR: 18 (09-10-23 @ 04:30)  SpO2: --  Wt(kg): --  I&O's Detail    09 Sep 2023 07:01  -  10 Sep 2023 07:00  --------------------------------------------------------  IN:    Sodium Bicarbonate: 400 mL  Total IN: 400 mL    OUT:    Indwelling Catheter - Suprapubic (mL): 1870 mL  Total OUT: 1870 mL    Total NET: -1470 mL        I&O's Summary    09 Sep 2023 07:01  -  10 Sep 2023 07:00  --------------------------------------------------------  IN: 400 mL / OUT: 1870 mL / NET: -1470 mL          PHYSICAL EXAM:  Gen: NAD  Chest: b/l breath sounds  Abd: soft      LABS:    09-10    136  |  105  |  58<H>  ----------------------------<  134<H>  4.2   |  22  |  4.4<HH>    Ca    7.9<L>      10 Sep 2023 07:20  Mg     1.6     09-10    TPro  7.5  /  Alb  2.4<L>  /  TBili  0.3  /  DBili      /  AST  17  /  ALT  10  /  AlkPhos  95  09-10    Phosphorus: 4.7 mg/dL (09-07-23 @ 09:17)  Phosphorus: 4.7 mg/dL (09-06-23 @ 13:56)    Vitamin D, 25-Hydroxy: 13 ng/mL (09-07-23 @ 09:17)                          9.5    7.81  )-----------( 216      ( 10 Sep 2023 07:20 )             30.3     Mean Cell Volume: 91.0 fL (09-10-23 @ 07:20)    Iron Total: 30 ug/dL (09-07-23 @ 09:17)  % Saturation, Iron: 21 % (09-07-23 @ 09:17)    Urine Studies:  Protein, Urine: 100 mg/dL (09-07-23 @ 12:20)  White Blood Cell - Urine: 469 /HPF (09-07-23 @ 12:20)  Red Blood Cell - Urine: 31 /HPF (09-07-23 @ 12:20)  Urea Nitrogen,  Random Urine: 514 mg/dL (09-07-23 @ 12:20)  Sodium, Random Urine: 45.0 mmoL/L (09-07 @ 12:20)  Creatinine, Random Urine: 98 mg/dL (09-07 @ 12:20)    Creatinine trend:  Creatinine: 4.4 mg/dL (09-10-23 @ 07:20)  Creatinine: 4.7 mg/dL (09-09-23 @ 08:32)  Creatinine: 5.2 mg/dL (09-08-23 @ 19:45)  Creatinine: 5.3 mg/dL (09-08-23 @ 05:41)  Creatinine: 5.0 mg/dL (09-07-23 @ 09:17)  Creatinine: 5.5 mg/dL (09-06-23 @ 13:56)  Creatinine, Serum: 1.5 mg/dL (09-16-21 @ 09:44)      US Renal:   ACC: [id number] EXAM:  US KIDNEY(S)   ORDERED BY: YOSEF MEYERS     PROCEDURE DATE:  09/08/2023          INTERPRETATION:  CLINICAL INFORMATION: 66-year-old male with kidney   failure.    COMPARISON: Renal ultrasound examination performed 3/27/2008    TECHNIQUE: Sonography of the kidneys and bladder    FINDINGS:    Right kidney: 12.2 cm in length, previously 11.9 cm.  Normal in   echogenicity.  No hydronephrosis or calculi.  Vascular flow is   demonstrated within the right kidney.    Left kidney:9.9 cm in length, previously 13.8 cm.  Normal in   echogenicity.  There is a new 1.9 x 1.6 cm shadowing, echogenic focus in   the lower pole of the left kidney.  Vascular flow is demonstrated within   the left kidney.    Bladder: The bladder is not evaluated due to the presence of a Dye   catheter.      IMPRESSION:  1.  Left kidney is slightly decreased in size compared with examination   performed March, 2008.  2.  New 1.9 x 1.6 cm lower pole, left renal calculus without   hydronephrosis.  3.Unremarkable examination of the right kidney.  4.  Urinary bladder not evaluated due to the presence of a Dye catheter.        --- End of Report ---            HARIKA GARCIA MD; Attending Radiologist  This document has been electronically signed. Sep  9 2023  8:37AM (09-08-23 @ 21:25)

## 2023-09-10 NOTE — PROGRESS NOTE ADULT - ASSESSMENT
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    1. Acute kidney injury on CKD associated with decrease po intake  . Hx of chronic suprapubic catheter . Hx of CKD stage 5  - HD stable, Afebrile, SPC changed 2 days ago  - Cr 5.5, BUN 80, Patient and family unsure of baseline, last Cr in Sept. 2021 1.5  - Urine noted   - No recent nephrotoxic drugs  - FENA 1.7 suggestive of ATN, AIN, etc   - US renal: no hydronephrosis, left renal stone  - Nephrology on the case, d/c bicarb drip , c/w sodium bicarb po  - daily BMP  - strict I/O's    2. Normocytic anemia  - Possibly secondary to CKD    3. Hx of multiple chronic lower extremity wounds   - Healing well, has visiting nurse for dressing 3x/week  - f/u wound care recs  Cleanse wounds with soap and water.   Pat dry apply Xeroform, wrap with Kerlix, twice a day and prn for soiling.     4. Uncontrolled DM w/ hyperglycemia  - On metformin at home?   - d/c metformin with advanced CKD  - Monitor finger sticks, + ISS   - a1c 11.6    5. Hx of HTN   - Was on amlodipine 10 2 years ago but not currently   - Monitor BP   - now on nifedipine 30 mg XL     6. Hx of HLD  - c/w statin     7.  Hx of paraplegia due to multiple spinal abscesses in the past  - Supportive care     8. Vit D defic  supplement    #Diet: Renal  #DVT ppx: Heparin  #Code: DNR/DNI,   #Progress Note Handoff  Pending: Clinical improvement and stability__x__Improved Cr_  Pt/Family discussion: Pt informed and agrees with the current plan  Disposition: Home

## 2023-09-11 LAB
ALBUMIN SERPL ELPH-MCNC: 2.7 G/DL — LOW (ref 3.5–5.2)
ALP SERPL-CCNC: 101 U/L — SIGNIFICANT CHANGE UP (ref 30–115)
ALT FLD-CCNC: 12 U/L — SIGNIFICANT CHANGE UP (ref 0–41)
ANION GAP SERPL CALC-SCNC: 13 MMOL/L — SIGNIFICANT CHANGE UP (ref 7–14)
AST SERPL-CCNC: 18 U/L — SIGNIFICANT CHANGE UP (ref 0–41)
BILIRUB SERPL-MCNC: 0.3 MG/DL — SIGNIFICANT CHANGE UP (ref 0.2–1.2)
BUN SERPL-MCNC: 53 MG/DL — HIGH (ref 10–20)
CALCIUM SERPL-MCNC: 8.4 MG/DL — SIGNIFICANT CHANGE UP (ref 8.4–10.5)
CHLORIDE SERPL-SCNC: 106 MMOL/L — SIGNIFICANT CHANGE UP (ref 98–110)
CO2 SERPL-SCNC: 21 MMOL/L — SIGNIFICANT CHANGE UP (ref 17–32)
CREAT SERPL-MCNC: 4.2 MG/DL — CRITICAL HIGH (ref 0.7–1.5)
EGFR: 15 ML/MIN/1.73M2 — LOW
GLUCOSE BLDC GLUCOMTR-MCNC: 145 MG/DL — HIGH (ref 70–99)
GLUCOSE BLDC GLUCOMTR-MCNC: 218 MG/DL — HIGH (ref 70–99)
GLUCOSE BLDC GLUCOMTR-MCNC: 244 MG/DL — HIGH (ref 70–99)
GLUCOSE SERPL-MCNC: 105 MG/DL — HIGH (ref 70–99)
HCT VFR BLD CALC: 32.9 % — LOW (ref 42–52)
HGB BLD-MCNC: 10.2 G/DL — LOW (ref 14–18)
MAGNESIUM SERPL-MCNC: 1.6 MG/DL — LOW (ref 1.8–2.4)
MCHC RBC-ENTMCNC: 28.9 PG — SIGNIFICANT CHANGE UP (ref 27–31)
MCHC RBC-ENTMCNC: 31 G/DL — LOW (ref 32–37)
MCV RBC AUTO: 93.2 FL — SIGNIFICANT CHANGE UP (ref 80–94)
NRBC # BLD: 0 /100 WBCS — SIGNIFICANT CHANGE UP (ref 0–0)
PLATELET # BLD AUTO: 208 K/UL — SIGNIFICANT CHANGE UP (ref 130–400)
PMV BLD: 11 FL — HIGH (ref 7.4–10.4)
POTASSIUM SERPL-MCNC: 4.7 MMOL/L — SIGNIFICANT CHANGE UP (ref 3.5–5)
POTASSIUM SERPL-SCNC: 4.7 MMOL/L — SIGNIFICANT CHANGE UP (ref 3.5–5)
PROT SERPL-MCNC: 7.9 G/DL — SIGNIFICANT CHANGE UP (ref 6–8)
RBC # BLD: 3.53 M/UL — LOW (ref 4.7–6.1)
RBC # FLD: 13.9 % — SIGNIFICANT CHANGE UP (ref 11.5–14.5)
SODIUM SERPL-SCNC: 140 MMOL/L — SIGNIFICANT CHANGE UP (ref 135–146)
WBC # BLD: 8.13 K/UL — SIGNIFICANT CHANGE UP (ref 4.8–10.8)
WBC # FLD AUTO: 8.13 K/UL — SIGNIFICANT CHANGE UP (ref 4.8–10.8)

## 2023-09-11 PROCEDURE — 99233 SBSQ HOSP IP/OBS HIGH 50: CPT

## 2023-09-11 RX ORDER — CHLORHEXIDINE GLUCONATE 213 G/1000ML
1 SOLUTION TOPICAL
Refills: 0 | Status: DISCONTINUED | OUTPATIENT
Start: 2023-09-11 | End: 2023-09-18

## 2023-09-11 RX ORDER — BACITRACIN ZINC 500 UNIT/G
1 OINTMENT IN PACKET (EA) TOPICAL
Refills: 0 | Status: DISCONTINUED | OUTPATIENT
Start: 2023-09-11 | End: 2023-09-18

## 2023-09-11 RX ORDER — MAGNESIUM SULFATE 500 MG/ML
2 VIAL (ML) INJECTION ONCE
Refills: 0 | Status: COMPLETED | OUTPATIENT
Start: 2023-09-11 | End: 2023-09-11

## 2023-09-11 RX ORDER — BACITRACIN ZINC NEOMYCIN SULFATE POLYMYXIN B SULFATE 400; 3.5; 5 [IU]/G; MG/G; [IU]/G
1 OINTMENT TOPICAL
Refills: 0 | Status: DISCONTINUED | OUTPATIENT
Start: 2023-09-11 | End: 2023-09-11

## 2023-09-11 RX ADMIN — ATORVASTATIN CALCIUM 20 MILLIGRAM(S): 80 TABLET, FILM COATED ORAL at 21:27

## 2023-09-11 RX ADMIN — Medication 4: at 12:08

## 2023-09-11 RX ADMIN — HEPARIN SODIUM 5000 UNIT(S): 5000 INJECTION INTRAVENOUS; SUBCUTANEOUS at 21:27

## 2023-09-11 RX ADMIN — HEPARIN SODIUM 5000 UNIT(S): 5000 INJECTION INTRAVENOUS; SUBCUTANEOUS at 13:26

## 2023-09-11 RX ADMIN — Medication 4: at 17:45

## 2023-09-11 RX ADMIN — Medication 650 MILLIGRAM(S): at 21:26

## 2023-09-11 RX ADMIN — Medication 30 MILLIGRAM(S): at 05:21

## 2023-09-11 RX ADMIN — Medication 650 MILLIGRAM(S): at 13:26

## 2023-09-11 RX ADMIN — HEPARIN SODIUM 5000 UNIT(S): 5000 INJECTION INTRAVENOUS; SUBCUTANEOUS at 05:21

## 2023-09-11 RX ADMIN — Medication 650 MILLIGRAM(S): at 05:21

## 2023-09-11 RX ADMIN — Medication 25 GRAM(S): at 11:03

## 2023-09-11 NOTE — PROGRESS NOTE ADULT - ASSESSMENT
68 year old male with PMH HTN, DM, spinal abscesses leading to quadriplegia, incontinence w suprapubic catheter, presented to ED after lab call back for creatinine of 5.5 in setting of diarrhea x7days, poor PO intake. Admitted for management of prerenal JOÃO.   #JOÃO, ?prerenal vs. ATN  non oliguric  creat level trending down / check repeat   continue sodium bicarbonate po   ph at goal   sono : no hydro/ stone  bp controlled   no acute indication for RRT   will follow

## 2023-09-11 NOTE — PROGRESS NOTE ADULT - SUBJECTIVE AND OBJECTIVE BOX
Patient is a 68y old  Male who presents with a chief complaint of Renal failure (07 Sep 2023 11:41)    INTERVAL HPI/OVERNIGHT EVENTS: Patient was examined and seen at bedside. This morning pt is resting comfortably in bed and reports no new issues or overnight events. No complaints, feels well. Cr trending down. Making good urine.   ROS: Denies CP, SOB, AP, new weakness  All other systems reviewed and are within normal limits.  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  Patient had blood work done Friday, September 1 which demonstrated a creatinine of 5.7.  Patient and his brother received a phone call from the PCP today and told him to come to the emergency department.  Of note patient states he has had some loose stool about once a day for the past month and according to brother has been eating and drinking less.  Brother also notices for the past 2 weeks has been emptying his Dye less often.  Patient denies any fever, chills, abdominal pain, chest pain, nausea and vomiting   In the ED, HD stable, afebrile, Labs significant for Hgb 10.6 , Cr 5.5  Patient will be admitted for further workup of renal failure  (06 Sep 2023 16:18)    PAST MEDICAL & SURGICAL HISTORY:  DM (diabetes mellitus)      HTN (hypertension)      H/O paraplegia      Spinal abscess      Renal stones      Spinal abscess  S/P Surgery          General: NAD, AAO3  HEENT:  EOMI, no LAD  CV: S1 S2  Resp: decreased breath sounds at bases  GI: NT/ND/S +BS. +SPC  MS: chronic venous stasis w/ b/l skin ulcerations  Neuro: paraplegia b/l LE          Home Medications:  atorvastatin 20 mg oral tablet: 1 orally once a day (06 Sep 2023 16:25)  metFORMIN 500 mg oral tablet: 1 orally 2 times a day (06 Sep 2023 16:25)    MEDICATIONS  (STANDING):  atorvastatin 20 milliGRAM(s) Oral at bedtime  chlorhexidine 2% Cloths 1 Application(s) Topical <User Schedule>  dextrose 5%. 1000 milliLiter(s) (50 mL/Hr) IV Continuous <Continuous>  dextrose 5%. 1000 milliLiter(s) (100 mL/Hr) IV Continuous <Continuous>  dextrose 50% Injectable 25 Gram(s) IV Push once  dextrose 50% Injectable 12.5 Gram(s) IV Push once  dextrose 50% Injectable 25 Gram(s) IV Push once  ergocalciferol 60185 Unit(s) Oral every week  glucagon  Injectable 1 milliGRAM(s) IntraMuscular once  heparin   Injectable 5000 Unit(s) SubCutaneous every 8 hours  insulin lispro (ADMELOG) corrective regimen sliding scale   SubCutaneous three times a day before meals  NIFEdipine XL 30 milliGRAM(s) Oral daily  sodium bicarbonate 650 milliGRAM(s) Oral three times a day    MEDICATIONS  (PRN):  bacitracin   Ointment 1 Application(s) Topical two times a day PRN erythema surrounding open wound  dextrose Oral Gel 15 Gram(s) Oral once PRN Blood Glucose LESS THAN 70 milliGRAM(s)/deciliter  guaifenesin/dextromethorphan Oral Liquid 10 milliLiter(s) Oral every 4 hours PRN Cough    Vital Signs Last 24 Hrs  T(C): 36.2 (11 Sep 2023 12:41), Max: 36.2 (11 Sep 2023 04:40)  T(F): 97.1 (11 Sep 2023 12:41), Max: 97.1 (11 Sep 2023 04:40)  HR: 75 (11 Sep 2023 12:41) (75 - 84)  BP: 158/82 (11 Sep 2023 12:41) (124/61 - 158/82)  BP(mean): 85 (11 Sep 2023 04:40) (85 - 85)  RR: 18 (11 Sep 2023 12:41) (18 - 18)  SpO2: 97% (11 Sep 2023 04:40) (97% - 97%)    Parameters below as of 11 Sep 2023 04:40  Patient On (Oxygen Delivery Method): room air      CAPILLARY BLOOD GLUCOSE      POCT Blood Glucose.: 244 mg/dL (11 Sep 2023 11:36)  POCT Blood Glucose.: 127 mg/dL (11 Sep 2023 07:25)  POCT Blood Glucose.: 240 mg/dL (10 Sep 2023 21:47)  POCT Blood Glucose.: 116 mg/dL (10 Sep 2023 16:26)    LABS:                        10.2   8.13  )-----------( 208      ( 11 Sep 2023 06:23 )             32.9     09-11    140  |  106  |  53<H>  ----------------------------<  105<H>  4.7   |  21  |  4.2<HH>    Ca    8.4      11 Sep 2023 06:23  Mg     1.6     09-11    TPro  7.9  /  Alb  2.7<L>  /  TBili  0.3  /  DBili  x   /  AST  18  /  ALT  12  /  AlkPhos  101  09-11    LIVER FUNCTIONS - ( 11 Sep 2023 06:23 )  Alb: 2.7 g/dL / Pro: 7.9 g/dL / ALK PHOS: 101 U/L / ALT: 12 U/L / AST: 18 U/L / GGT: x                 Urinalysis Basic - ( 11 Sep 2023 06:23 )    Color: x / Appearance: x / SG: x / pH: x  Gluc: 105 mg/dL / Ketone: x  / Bili: x / Urobili: x   Blood: x / Protein: x / Nitrite: x   Leuk Esterase: x / RBC: x / WBC x   Sq Epi: x / Non Sq Epi: x / Bacteria: x              Consultant Notes Reviewed:  [x ] YES  [ ] NO  Care Discussed with Consultants/Other Providers/ Housestaff [ x] YES  [ ] NO  Radiology, labs, new studies personally reviewed.

## 2023-09-11 NOTE — PROGRESS NOTE ADULT - ASSESSMENT
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 Acute kidney injury on CKD associated with decrease po intake  . Hx of chronic suprapubic catheter . Hx of CKD stage 5  *  HD stable, Afebrile, SPC changed 2 days ago  * Cr 5.5, BUN 80, Patient and family unsure of baseline, last Cr in Sept. 2021 1.5   Urine analysis:positive           - CXR:WNL   - No recent nephrotoxic drugs  - s/p 1L fluids in ED  - FENA 1.7 suggestive of ATN, AIN, etc   - US renal w/out hydro/obstruction  - Nephrology on the case  -daily BMP  -strict I/O's    2. Normocytic anemia  - Possibly secondary to CKD    3. Hx of multiple chronic lower extremity wounds   - Healing well, has visiting nurse for dressing 3x/week  - f/u wound care recs  Cleanse wounds with soap and water.   Pat dry apply Xeroform, wrap with Kerlix, twice a day and prn for soiling.     4. Uncontrolled DM w/ hyperglycemia  - On metformin at home?   - Monitor finger sticks, + ISS   - a1c 11.6    5. Hx of HTN  Hx of HLD  - Was on amlodipine 10 2 years ago but not currently at home   - c/w statin   - Monitor BP     6. Hx of paraplegia due to multiple spinal abscesses in the past  - Supportive care     7. A-Sx Pyuria   - ua:positive  - Observe     8. Vit D defic  supplement    #Diet: Renal  #DVT ppx: Heparin  #Code: DNR/DNI,     #Progress Note Handoff  Pending: Clinical improvement and stability__x__Improved Cr_  Pt/Family discussion: Pt informed and agrees with the current plan  Disposition: Home____    My note supersedes the residents note should a discrepancy arise.    Chart and notes personally reviewed.  Care Discussed with Consultants/Other Providers/ Housestaff [ x] YES [ ] NO   Radiology, labs, old records personally reviewed.    discussed w/ housestaff, nursing, case management, neuro team    Attestation Statements:    Attestation Statements:  Risk Statement (NON-critical care).     On this date of service, level of risk to patient is considered: High.     Due to: severe JOÃO, uncontrolled DM    Time-based billing (NON-critical care).     50 minutes spent on total encounter. The necessity of the time spent during the encounter on this date of service was due to:     time spent on review of labs, imaging studies, old records, obtaining history, personally examining patient, counselling and communicating with patient/ family, entering orders for medications/tests/etc, discussions with other health care providers, documentation in electronic health records, independent interpretation of labs, imaging/procedure results and care coordination.

## 2023-09-11 NOTE — PROGRESS NOTE ADULT - SUBJECTIVE AND OBJECTIVE BOX
seen and examined   24 h events noted   no distress         PAST HISTORY  --------------------------------------------------------------------------------  No significant changes to PMH, PSH, FHx, SHx, unless otherwise noted    ALLERGIES & MEDICATIONS  --------------------------------------------------------------------------------  Allergies    No Known Allergies    Intolerances      Standing Inpatient Medications  atorvastatin 20 milliGRAM(s) Oral at bedtime  dextrose 5%. 1000 milliLiter(s) IV Continuous <Continuous>  dextrose 5%. 1000 milliLiter(s) IV Continuous <Continuous>  dextrose 50% Injectable 25 Gram(s) IV Push once  dextrose 50% Injectable 25 Gram(s) IV Push once  dextrose 50% Injectable 12.5 Gram(s) IV Push once  ergocalciferol 88391 Unit(s) Oral every week  glucagon  Injectable 1 milliGRAM(s) IntraMuscular once  heparin   Injectable 5000 Unit(s) SubCutaneous every 8 hours  insulin lispro (ADMELOG) corrective regimen sliding scale   SubCutaneous three times a day before meals  NIFEdipine XL 30 milliGRAM(s) Oral daily  sodium bicarbonate 650 milliGRAM(s) Oral three times a day    PRN Inpatient Medications  dextrose Oral Gel 15 Gram(s) Oral once PRN  guaifenesin/dextromethorphan Oral Liquid 10 milliLiter(s) Oral every 4 hours PRN          VITALS/PHYSICAL EXAM  --------------------------------------------------------------------------------  T(C): 36.2 (09-11-23 @ 04:40), Max: 36.4 (09-10-23 @ 13:48)  HR: 76 (09-11-23 @ 04:40) (76 - 84)  BP: 124/61 (09-11-23 @ 04:40) (124/61 - 155/85)  RR: 18 (09-11-23 @ 04:40) (18 - 18)  SpO2: 97% (09-11-23 @ 04:40) (97% - 97%)  Wt(kg): --        09-10-23 @ 07:01  -  09-11-23 @ 07:00  --------------------------------------------------------  IN: 0 mL / OUT: 1630 mL / NET: -1630 mL      Physical Exam:  	Gen: NAD,  	Pulm: CTA B/L  	CV:  S1S2; no rub  	Abd: soft, nontender/nondistended  	LE:dressings       LABS/STUDIES  --------------------------------------------------------------------------------              9.5    7.81  >-----------<  216      [09-10-23 @ 07:20]              30.3     136  |  105  |  58  ----------------------------<  134      [09-10-23 @ 07:20]  4.2   |  22  |  4.4        Ca     7.9     [09-10-23 @ 07:20]      Mg     1.6     [09-10-23 @ 07:20]    TPro  7.5  /  Alb  2.4  /  TBili  0.3  /  DBili  x   /  AST  17  /  ALT  10  /  AlkPhos  95  [09-10-23 @ 07:20]        Creatinine Trend:  SCr 4.4 [09-10 @ 07:20]  SCr 4.7 [09-09 @ 08:32]  SCr 5.2 [09-08 @ 19:45]  SCr 5.3 [09-08 @ 05:41]  SCr 5.0 [09-07 @ 09:17]    Urinalysis - [09-10-23 @ 07:20]      Color  / Appearance  / SG  / pH       Gluc 134 / Ketone   / Bili  / Urobili        Blood  / Protein  / Leuk Est  / Nitrite       RBC  / WBC  / Hyaline  / Gran  / Sq Epi  / Non Sq Epi  / Bacteria     Urine Creatinine 98      [09-07-23 @ 12:20]  Urine Protein 91      [09-07-23 @ 12:20]  Urine Sodium 45.0      [09-07-23 @ 12:20]  Urine Urea Nitrogen 514      [09-07-23 @ 12:20]  Urine Potassium 14      [09-07-23 @ 12:20]  Urine Osmolality 361      [09-07-23 @ 12:20]    Iron 30, TIBC 143, %sat 21      [09-07-23 @ 09:17]  Ferritin 533      [09-07-23 @ 09:17]  Vitamin D (25OH) 13      [09-07-23 @ 09:17]  Lipid: chol 134, , HDL 30, LDL --      [09-07-23 @ 09:17]

## 2023-09-12 LAB
ANION GAP SERPL CALC-SCNC: 11 MMOL/L — SIGNIFICANT CHANGE UP (ref 7–14)
BUN SERPL-MCNC: 49 MG/DL — HIGH (ref 10–20)
CALCIUM SERPL-MCNC: 8.4 MG/DL — SIGNIFICANT CHANGE UP (ref 8.4–10.5)
CHLORIDE SERPL-SCNC: 104 MMOL/L — SIGNIFICANT CHANGE UP (ref 98–110)
CO2 SERPL-SCNC: 22 MMOL/L — SIGNIFICANT CHANGE UP (ref 17–32)
CREAT SERPL-MCNC: 4.2 MG/DL — CRITICAL HIGH (ref 0.7–1.5)
EGFR: 15 ML/MIN/1.73M2 — LOW
GLUCOSE BLDC GLUCOMTR-MCNC: 132 MG/DL — HIGH (ref 70–99)
GLUCOSE BLDC GLUCOMTR-MCNC: 187 MG/DL — HIGH (ref 70–99)
GLUCOSE BLDC GLUCOMTR-MCNC: 192 MG/DL — HIGH (ref 70–99)
GLUCOSE BLDC GLUCOMTR-MCNC: 229 MG/DL — HIGH (ref 70–99)
GLUCOSE SERPL-MCNC: 126 MG/DL — HIGH (ref 70–99)
MAGNESIUM SERPL-MCNC: 2 MG/DL — SIGNIFICANT CHANGE UP (ref 1.8–2.4)
PHOSPHATE SERPL-MCNC: 3.8 MG/DL — SIGNIFICANT CHANGE UP (ref 2.1–4.9)
POTASSIUM SERPL-MCNC: 4.8 MMOL/L — SIGNIFICANT CHANGE UP (ref 3.5–5)
POTASSIUM SERPL-SCNC: 4.8 MMOL/L — SIGNIFICANT CHANGE UP (ref 3.5–5)
SODIUM SERPL-SCNC: 137 MMOL/L — SIGNIFICANT CHANGE UP (ref 135–146)

## 2023-09-12 PROCEDURE — 99233 SBSQ HOSP IP/OBS HIGH 50: CPT

## 2023-09-12 RX ORDER — ONDANSETRON 8 MG/1
4 TABLET, FILM COATED ORAL EVERY 6 HOURS
Refills: 0 | Status: DISCONTINUED | OUTPATIENT
Start: 2023-09-12 | End: 2023-09-18

## 2023-09-12 RX ORDER — CHOLECALCIFEROL (VITAMIN D3) 125 MCG
1000 CAPSULE ORAL DAILY
Refills: 0 | Status: DISCONTINUED | OUTPATIENT
Start: 2023-09-12 | End: 2023-09-12

## 2023-09-12 RX ORDER — ACETAMINOPHEN 500 MG
650 TABLET ORAL EVERY 6 HOURS
Refills: 0 | Status: DISCONTINUED | OUTPATIENT
Start: 2023-09-12 | End: 2023-09-18

## 2023-09-12 RX ORDER — PETROLATUM,WHITE
1 JELLY (GRAM) TOPICAL
Refills: 0 | Status: DISCONTINUED | OUTPATIENT
Start: 2023-09-12 | End: 2023-09-18

## 2023-09-12 RX ADMIN — ATORVASTATIN CALCIUM 20 MILLIGRAM(S): 80 TABLET, FILM COATED ORAL at 21:57

## 2023-09-12 RX ADMIN — CHLORHEXIDINE GLUCONATE 1 APPLICATION(S): 213 SOLUTION TOPICAL at 05:36

## 2023-09-12 RX ADMIN — HEPARIN SODIUM 5000 UNIT(S): 5000 INJECTION INTRAVENOUS; SUBCUTANEOUS at 13:05

## 2023-09-12 RX ADMIN — HEPARIN SODIUM 5000 UNIT(S): 5000 INJECTION INTRAVENOUS; SUBCUTANEOUS at 21:57

## 2023-09-12 RX ADMIN — HEPARIN SODIUM 5000 UNIT(S): 5000 INJECTION INTRAVENOUS; SUBCUTANEOUS at 05:36

## 2023-09-12 RX ADMIN — ONDANSETRON 4 MILLIGRAM(S): 8 TABLET, FILM COATED ORAL at 20:55

## 2023-09-12 RX ADMIN — ONDANSETRON 4 MILLIGRAM(S): 8 TABLET, FILM COATED ORAL at 13:34

## 2023-09-12 RX ADMIN — Medication 2: at 17:00

## 2023-09-12 RX ADMIN — Medication 1000 UNIT(S): at 11:18

## 2023-09-12 RX ADMIN — Medication 4: at 11:52

## 2023-09-12 RX ADMIN — Medication 650 MILLIGRAM(S): at 13:04

## 2023-09-12 RX ADMIN — Medication 650 MILLIGRAM(S): at 21:57

## 2023-09-12 RX ADMIN — Medication 1 TABLET(S): at 11:17

## 2023-09-12 RX ADMIN — Medication 650 MILLIGRAM(S): at 05:36

## 2023-09-12 RX ADMIN — Medication 1 APPLICATION(S): at 16:45

## 2023-09-12 RX ADMIN — Medication 30 MILLIGRAM(S): at 05:36

## 2023-09-12 NOTE — PROGRESS NOTE ADULT - ASSESSMENT
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 Acute kidney injury on CKD associated with decrease po intake  . Hx of chronic suprapubic catheter . Hx of CKD stage 5  *  HD stable, Afebrile, SPC changed 2 days ago  * Cr 5.5, BUN 80, Patient and family unsure of baseline, last Cr in Sept. 2021 1.5   Urine analysis:positive           - CXR:WNL   - No recent nephrotoxic drugs  - s/p 1L fluids in ED  - FENA 1.7 suggestive of ATN, AIN, etc   - US renal w/out hydro/obstruction  - Nephrology on the case  -daily BMP  -strict I/O's  -was on IVFs but held over the weekend ?resume as Cr plateaued  -awaiting renal f/u    2. Normocytic anemia  - Possibly secondary to CKD    3. Hx of multiple chronic lower extremity wounds   - Healing well, has visiting nurse for dressing 3x/week  - f/u wound care recs  Cleanse wounds with soap and water.   Pat dry apply Xeroform, wrap with Kerlix, twice a day and prn for soiling.   Cont aggressive decub prevention and treatment protocols.    4. Uncontrolled DM w/ hyperglycemia  - On metformin at home?   - Monitor finger sticks, + ISS   - a1c 11.6  -will benefit from more aggressive regimen on d/c    5. Hx of HTN  Hx of HLD  - Was on amlodipine 10 2 years ago but not currently at home   - c/w statin   - Monitor BP     6. Hx of paraplegia due to multiple spinal abscesses in the past  - Supportive care     7. A-Sx Pyuria   - ua:positive  - Observe     8. Vit D defic  supplement    #Diet: Renal  #DVT ppx: Heparin  #Code: DNR/DNI,     #Progress Note Handoff  Pending: Clinical improvement and stability__x__Improved Cr_  Pt/Family discussion: Pt informed and agrees with the current plan  Disposition: Home____    My note supersedes the residents note should a discrepancy arise.    Chart and notes personally reviewed.  Care Discussed with Consultants/Other Providers/ Housestaff [ x] YES [ ] NO   Radiology, labs, old records personally reviewed.    discussed w/ housestaff, nursing, case management, neuro team    Attestation Statements:    Attestation Statements:  Risk Statement (NON-critical care).     On this date of service, level of risk to patient is considered: High.     Due to: severe JOÃO, uncontrolled DM    Time-based billing (NON-critical care).     50 minutes spent on total encounter. The necessity of the time spent during the encounter on this date of service was due to:     time spent on review of labs, imaging studies, old records, obtaining history, personally examining patient, counselling and communicating with patient/ family, entering orders for medications/tests/etc, discussions with other health care providers, documentation in electronic health records, independent interpretation of labs, imaging/procedure results and care coordination.
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
## 2023-09-12 NOTE — PROGRESS NOTE ADULT - SUBJECTIVE AND OBJECTIVE BOX
Patient is a 68y old  Male who presents with a chief complaint of Renal failure (07 Sep 2023 11:41)    INTERVAL HPI/OVERNIGHT EVENTS: Patient was examined and seen at bedside. This morning pt is resting comfortably in bed and reports no new issues or overnight events. No complaints, feels well. Cr was trending down but now plateued.  ROS: Denies CP, SOB, AP, new weakness  All other systems reviewed and are within normal limits.  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  Patient had blood work done Friday, September 1 which demonstrated a creatinine of 5.7.  Patient and his brother received a phone call from the PCP today and told him to come to the emergency department.  Of note patient states he has had some loose stool about once a day for the past month and according to brother has been eating and drinking less.  Brother also notices for the past 2 weeks has been emptying his Dye less often.  Patient denies any fever, chills, abdominal pain, chest pain, nausea and vomiting   In the ED, HD stable, afebrile, Labs significant for Hgb 10.6 , Cr 5.5  Patient will be admitted for further workup of renal failure  (06 Sep 2023 16:18)    PAST MEDICAL & SURGICAL HISTORY:  DM (diabetes mellitus)      HTN (hypertension)      H/O paraplegia      Spinal abscess      Renal stones      Spinal abscess  S/P Surgery          General: NAD, AAO3  HEENT:  EOMI, no LAD  CV: S1 S2  Resp: decreased breath sounds at bases  GI: NT/ND/S +BS. +SPC  MS: chronic venous stasis w/ b/l skin ulcerations  Neuro: paraplegia b/l LE            Home Medications:  atorvastatin 20 mg oral tablet: 1 orally once a day (06 Sep 2023 16:25)  metFORMIN 500 mg oral tablet: 1 orally 2 times a day (06 Sep 2023 16:25)    MEDICATIONS  (STANDING):  AQUAPHOR (petrolatum Ointment) 1 Application(s) Topical two times a day  atorvastatin 20 milliGRAM(s) Oral at bedtime  chlorhexidine 2% Cloths 1 Application(s) Topical <User Schedule>  cholecalciferol 1000 Unit(s) Oral daily  dextrose 5%. 1000 milliLiter(s) (50 mL/Hr) IV Continuous <Continuous>  dextrose 5%. 1000 milliLiter(s) (100 mL/Hr) IV Continuous <Continuous>  dextrose 50% Injectable 25 Gram(s) IV Push once  dextrose 50% Injectable 12.5 Gram(s) IV Push once  dextrose 50% Injectable 25 Gram(s) IV Push once  ergocalciferol 20974 Unit(s) Oral every week  glucagon  Injectable 1 milliGRAM(s) IntraMuscular once  heparin   Injectable 5000 Unit(s) SubCutaneous every 8 hours  insulin lispro (ADMELOG) corrective regimen sliding scale   SubCutaneous three times a day before meals  multivitamin  Chewable 1 Tablet(s) Oral daily  NIFEdipine XL 30 milliGRAM(s) Oral daily  sodium bicarbonate 650 milliGRAM(s) Oral three times a day    MEDICATIONS  (PRN):  bacitracin   Ointment 1 Application(s) Topical two times a day PRN erythema surrounding open wound  dextrose Oral Gel 15 Gram(s) Oral once PRN Blood Glucose LESS THAN 70 milliGRAM(s)/deciliter  guaifenesin/dextromethorphan Oral Liquid 10 milliLiter(s) Oral every 4 hours PRN Cough    Vital Signs Last 24 Hrs  T(C): 36 (12 Sep 2023 05:31), Max: 36.3 (11 Sep 2023 19:50)  T(F): 96.8 (12 Sep 2023 05:31), Max: 97.3 (11 Sep 2023 19:50)  HR: 74 (12 Sep 2023 05:31) (74 - 84)  BP: 126/67 (12 Sep 2023 05:31) (126/67 - 158/82)  BP(mean): --  RR: 18 (12 Sep 2023 05:31) (18 - 18)  SpO2: 97% (12 Sep 2023 05:31) (97% - 97%)    Parameters below as of 12 Sep 2023 05:31  Patient On (Oxygen Delivery Method): room air      CAPILLARY BLOOD GLUCOSE      POCT Blood Glucose.: 229 mg/dL (12 Sep 2023 11:23)  POCT Blood Glucose.: 132 mg/dL (12 Sep 2023 07:33)  POCT Blood Glucose.: 145 mg/dL (11 Sep 2023 21:07)  POCT Blood Glucose.: 218 mg/dL (11 Sep 2023 17:41)    LABS:                        10.2   8.13  )-----------( 208      ( 11 Sep 2023 06:23 )             32.9     09-12    137  |  104  |  49<H>  ----------------------------<  126<H>  4.8   |  22  |  4.2<HH>    Ca    8.4      12 Sep 2023 06:23  Phos  3.8     09-12  Mg     2.0     09-12    TPro  7.9  /  Alb  2.7<L>  /  TBili  0.3  /  DBili  x   /  AST  18  /  ALT  12  /  AlkPhos  101  09-11    LIVER FUNCTIONS - ( 11 Sep 2023 06:23 )  Alb: 2.7 g/dL / Pro: 7.9 g/dL / ALK PHOS: 101 U/L / ALT: 12 U/L / AST: 18 U/L / GGT: x                 Urinalysis Basic - ( 12 Sep 2023 06:23 )    Color: x / Appearance: x / SG: x / pH: x  Gluc: 126 mg/dL / Ketone: x  / Bili: x / Urobili: x   Blood: x / Protein: x / Nitrite: x   Leuk Esterase: x / RBC: x / WBC x   Sq Epi: x / Non Sq Epi: x / Bacteria: x              Consultant Notes Reviewed:  [x ] YES  [ ] NO  Care Discussed with Consultants/Other Providers/ Housestaff [ x] YES  [ ] NO  Radiology, labs, new studies personally reviewed.

## 2023-09-12 NOTE — PROGRESS NOTE ADULT - ASSESSMENT
68 year old male with PMH HTN, DM, spinal abscesses leading to quadriplegia, incontinence w suprapubic catheter, presented to ED after lab call back for creatinine of 5.5 in setting of diarrhea x7days, poor PO intake. Admitted for management of prerenal JOÃO.   #JOÃO, ?prerenal vs. ATN  non oliguric  creat stable today, improved from peak  continue sodium bicarbonate po   ph 3.8  sono : no hydro/ stone  bp controlled   no acute indication for RRT   will follow

## 2023-09-12 NOTE — PROGRESS NOTE ADULT - SUBJECTIVE AND OBJECTIVE BOX
Nephrology progress note    Patient was seen and examined, events over the last 24 h noted .  Cr stable    Allergies:  No Known Allergies    Hospital Medications:   MEDICATIONS  (STANDING):  AQUAPHOR (petrolatum Ointment) 1 Application(s) Topical two times a day  atorvastatin 20 milliGRAM(s) Oral at bedtime  chlorhexidine 2% Cloths 1 Application(s) Topical <User Schedule>  cholecalciferol 1000 Unit(s) Oral daily  dextrose 5%. 1000 milliLiter(s) (50 mL/Hr) IV Continuous <Continuous>  dextrose 5%. 1000 milliLiter(s) (100 mL/Hr) IV Continuous <Continuous>  dextrose 50% Injectable 25 Gram(s) IV Push once  dextrose 50% Injectable 25 Gram(s) IV Push once  dextrose 50% Injectable 12.5 Gram(s) IV Push once  ergocalciferol 70725 Unit(s) Oral every week  glucagon  Injectable 1 milliGRAM(s) IntraMuscular once  heparin   Injectable 5000 Unit(s) SubCutaneous every 8 hours  insulin lispro (ADMELOG) corrective regimen sliding scale   SubCutaneous three times a day before meals  multivitamin  Chewable 1 Tablet(s) Oral daily  NIFEdipine XL 30 milliGRAM(s) Oral daily  sodium bicarbonate 650 milliGRAM(s) Oral three times a day        VITALS:  T(F): 96.8 (09-12-23 @ 05:31), Max: 97.3 (09-11-23 @ 19:50)  HR: 74 (09-12-23 @ 05:31)  BP: 126/67 (09-12-23 @ 05:31)  RR: 18 (09-12-23 @ 05:31)  SpO2: 97% (09-12-23 @ 05:31)  Wt(kg): --    09-10 @ 07:01  -  09-11 @ 07:00  --------------------------------------------------------  IN: 0 mL / OUT: 1630 mL / NET: -1630 mL    09-11 @ 07:01  -  09-12 @ 07:00  --------------------------------------------------------  IN: 0 mL / OUT: 1500 mL / NET: -1500 mL      Height (cm): 185.4 (09-12 @ 12:04)  Weight (kg): 79.4 (09-12 @ 12:04)  BMI (kg/m2): 23.1 (09-12 @ 12:04)  BSA (m2): 2.03 (09-12 @ 12:04)    PHYSICAL EXAM:    	Gen: NAD,  	Pulm: CTA B/L  	CV:  S1S2; no rub  	Abd: soft, nontender/nondistended  	LE:dressings   LABS:  09-12    137  |  104  |  49<H>  ----------------------------<  126<H>  4.8   |  22  |  4.2<HH>    Ca    8.4      12 Sep 2023 06:23  Phos  3.8     09-12  Mg     2.0     09-12    TPro  7.9  /  Alb  2.7<L>  /  TBili  0.3  /  DBili      /  AST  18  /  ALT  12  /  AlkPhos  101  09-11                          10.2   8.13  )-----------( 208      ( 11 Sep 2023 06:23 )             32.9       Urine Studies:  Urinalysis Basic - ( 12 Sep 2023 06:23 )    Color:  / Appearance:  / SG:  / pH:   Gluc: 126 mg/dL / Ketone:   / Bili:  / Urobili:    Blood:  / Protein:  / Nitrite:    Leuk Esterase:  / RBC:  / WBC    Sq Epi:  / Non Sq Epi:  / Bacteria:       Sodium, Random Urine: 45.0 mmoL/L (09-07 @ 12:20)  Creatinine, Random Urine: 98 mg/dL (09-07 @ 12:20)  Protein/Creatinine Ratio Calculation: 0.9 Ratio (09-07 @ 12:20)  Osmolality, Random Urine: 361 mos/kg (09-07 @ 12:20)  Potassium, Random Urine: 14 mmol/L (09-07 @ 12:20)    RADIOLOGY & ADDITIONAL STUDIES:

## 2023-09-13 LAB
ANION GAP SERPL CALC-SCNC: 10 MMOL/L — SIGNIFICANT CHANGE UP (ref 7–14)
BUN SERPL-MCNC: 48 MG/DL — HIGH (ref 10–20)
CALCIUM SERPL-MCNC: 7.9 MG/DL — LOW (ref 8.4–10.5)
CHLORIDE SERPL-SCNC: 104 MMOL/L — SIGNIFICANT CHANGE UP (ref 98–110)
CO2 SERPL-SCNC: 23 MMOL/L — SIGNIFICANT CHANGE UP (ref 17–32)
CREAT SERPL-MCNC: 4.4 MG/DL — CRITICAL HIGH (ref 0.7–1.5)
EGFR: 14 ML/MIN/1.73M2 — LOW
GLUCOSE BLDC GLUCOMTR-MCNC: 111 MG/DL — HIGH (ref 70–99)
GLUCOSE BLDC GLUCOMTR-MCNC: 145 MG/DL — HIGH (ref 70–99)
GLUCOSE BLDC GLUCOMTR-MCNC: 153 MG/DL — HIGH (ref 70–99)
GLUCOSE BLDC GLUCOMTR-MCNC: 225 MG/DL — HIGH (ref 70–99)
GLUCOSE SERPL-MCNC: 112 MG/DL — HIGH (ref 70–99)
HCT VFR BLD CALC: 28.8 % — LOW (ref 42–52)
HGB BLD-MCNC: 8.9 G/DL — LOW (ref 14–18)
MAGNESIUM SERPL-MCNC: 1.8 MG/DL — SIGNIFICANT CHANGE UP (ref 1.8–2.4)
MCHC RBC-ENTMCNC: 28.2 PG — SIGNIFICANT CHANGE UP (ref 27–31)
MCHC RBC-ENTMCNC: 30.9 G/DL — LOW (ref 32–37)
MCV RBC AUTO: 91.1 FL — SIGNIFICANT CHANGE UP (ref 80–94)
NRBC # BLD: 0 /100 WBCS — SIGNIFICANT CHANGE UP (ref 0–0)
PLATELET # BLD AUTO: 173 K/UL — SIGNIFICANT CHANGE UP (ref 130–400)
PMV BLD: 10.7 FL — HIGH (ref 7.4–10.4)
POTASSIUM SERPL-MCNC: 4.6 MMOL/L — SIGNIFICANT CHANGE UP (ref 3.5–5)
POTASSIUM SERPL-SCNC: 4.6 MMOL/L — SIGNIFICANT CHANGE UP (ref 3.5–5)
RBC # BLD: 3.16 M/UL — LOW (ref 4.7–6.1)
RBC # FLD: 13.5 % — SIGNIFICANT CHANGE UP (ref 11.5–14.5)
SODIUM SERPL-SCNC: 137 MMOL/L — SIGNIFICANT CHANGE UP (ref 135–146)
WBC # BLD: 7.13 K/UL — SIGNIFICANT CHANGE UP (ref 4.8–10.8)
WBC # FLD AUTO: 7.13 K/UL — SIGNIFICANT CHANGE UP (ref 4.8–10.8)

## 2023-09-13 PROCEDURE — 99233 SBSQ HOSP IP/OBS HIGH 50: CPT

## 2023-09-13 RX ADMIN — HEPARIN SODIUM 5000 UNIT(S): 5000 INJECTION INTRAVENOUS; SUBCUTANEOUS at 17:10

## 2023-09-13 RX ADMIN — Medication 4: at 12:09

## 2023-09-13 RX ADMIN — CHLORHEXIDINE GLUCONATE 1 APPLICATION(S): 213 SOLUTION TOPICAL at 05:57

## 2023-09-13 RX ADMIN — Medication 1 APPLICATION(S): at 17:16

## 2023-09-13 RX ADMIN — Medication 1 TABLET(S): at 12:09

## 2023-09-13 RX ADMIN — Medication 1 APPLICATION(S): at 17:10

## 2023-09-13 RX ADMIN — ATORVASTATIN CALCIUM 20 MILLIGRAM(S): 80 TABLET, FILM COATED ORAL at 21:09

## 2023-09-13 RX ADMIN — Medication 650 MILLIGRAM(S): at 17:12

## 2023-09-13 RX ADMIN — Medication 650 MILLIGRAM(S): at 05:56

## 2023-09-13 RX ADMIN — HEPARIN SODIUM 5000 UNIT(S): 5000 INJECTION INTRAVENOUS; SUBCUTANEOUS at 21:09

## 2023-09-13 RX ADMIN — HEPARIN SODIUM 5000 UNIT(S): 5000 INJECTION INTRAVENOUS; SUBCUTANEOUS at 05:56

## 2023-09-13 RX ADMIN — Medication 650 MILLIGRAM(S): at 21:10

## 2023-09-13 RX ADMIN — Medication 1 APPLICATION(S): at 05:56

## 2023-09-13 RX ADMIN — Medication 30 MILLIGRAM(S): at 05:56

## 2023-09-13 NOTE — PROGRESS NOTE ADULT - SUBJECTIVE AND OBJECTIVE BOX
seen and examined  24 h events noted   no new complaints         PAST HISTORY  --------------------------------------------------------------------------------  No significant changes to PMH, PSH, FHx, SHx, unless otherwise noted    ALLERGIES & MEDICATIONS  --------------------------------------------------------------------------------  Allergies    No Known Allergies    Intolerances      Standing Inpatient Medications  AQUAPHOR (petrolatum Ointment) 1 Application(s) Topical two times a day  atorvastatin 20 milliGRAM(s) Oral at bedtime  chlorhexidine 2% Cloths 1 Application(s) Topical <User Schedule>  dextrose 5%. 1000 milliLiter(s) IV Continuous <Continuous>  dextrose 5%. 1000 milliLiter(s) IV Continuous <Continuous>  dextrose 50% Injectable 25 Gram(s) IV Push once  dextrose 50% Injectable 25 Gram(s) IV Push once  dextrose 50% Injectable 12.5 Gram(s) IV Push once  ergocalciferol 18518 Unit(s) Oral every week  glucagon  Injectable 1 milliGRAM(s) IntraMuscular once  heparin   Injectable 5000 Unit(s) SubCutaneous every 8 hours  insulin lispro (ADMELOG) corrective regimen sliding scale   SubCutaneous three times a day before meals  multivitamin  Chewable 1 Tablet(s) Oral daily  NIFEdipine XL 30 milliGRAM(s) Oral daily  sodium bicarbonate 650 milliGRAM(s) Oral three times a day    PRN Inpatient Medications  acetaminophen     Tablet .. 650 milliGRAM(s) Oral every 6 hours PRN  bacitracin   Ointment 1 Application(s) Topical two times a day PRN  dextrose Oral Gel 15 Gram(s) Oral once PRN  guaifenesin/dextromethorphan Oral Liquid 10 milliLiter(s) Oral every 4 hours PRN  ondansetron Injectable 4 milliGRAM(s) IV Push every 6 hours PRN            VITALS/PHYSICAL EXAM  --------------------------------------------------------------------------------  T(C): 36.5 (09-13-23 @ 04:30), Max: 37 (09-12-23 @ 20:14)  HR: 64 (09-13-23 @ 04:30) (64 - 103)  BP: 131/72 (09-13-23 @ 04:30) (115/68 - 170/97)  RR: 18 (09-13-23 @ 04:30) (18 - 18)  SpO2: --  Wt(kg): --  Height (cm): 185.4 (09-12-23 @ 12:04)  Weight (kg): 79.4 (09-12-23 @ 12:04)  BMI (kg/m2): 23.1 (09-12-23 @ 12:04)  BSA (m2): 2.03 (09-12-23 @ 12:04)      09-12-23 @ 07:01  -  09-13-23 @ 07:00  --------------------------------------------------------  IN: 0 mL / OUT: 820 mL / NET: -820 mL      Physical Exam:  	Gen: NAD,  	Pulm: CTA B/L  	CV:  S1S2; no rub  	Abd:  soft, nontender/nondistended  	LE: dressing       LABS/STUDIES  --------------------------------------------------------------------------------              8.9    7.13  >-----------<  173      [09-13-23 @ 06:52]              28.8     137  |  104  |  49  ----------------------------<  126      [09-12-23 @ 06:23]  4.8   |  22  |  4.2        Ca     8.4     [09-12-23 @ 06:23]      Mg     2.0     [09-12-23 @ 06:23]      Phos  3.8     [09-12-23 @ 06:23]        Creatinine Trend:  SCr 4.2 [09-12 @ 06:23]  SCr 4.2 [09-11 @ 06:23]  SCr 4.4 [09-10 @ 07:20]  SCr 4.7 [09-09 @ 08:32]  SCr 5.2 [09-08 @ 19:45]    Urinalysis - [09-12-23 @ 06:23]      Color  / Appearance  / SG  / pH       Gluc 126 / Ketone   / Bili  / Urobili        Blood  / Protein  / Leuk Est  / Nitrite       RBC  / WBC  / Hyaline  / Gran  / Sq Epi  / Non Sq Epi  / Bacteria     Urine Creatinine 98      [09-07-23 @ 12:20]  Urine Protein 91      [09-07-23 @ 12:20]  Urine Sodium 45.0      [09-07-23 @ 12:20]  Urine Urea Nitrogen 514      [09-07-23 @ 12:20]  Urine Potassium 14      [09-07-23 @ 12:20]  Urine Osmolality 361      [09-07-23 @ 12:20]    Iron 30, TIBC 143, %sat 21      [09-07-23 @ 09:17]  Ferritin 533      [09-07-23 @ 09:17]  Vitamin D (25OH) 13      [09-07-23 @ 09:17]  Lipid: chol 134, , HDL 30, LDL --      [09-07-23 @ 09:17]

## 2023-09-13 NOTE — PROGRESS NOTE ADULT - ASSESSMENT
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 2021 1.5   Urine analysis:positive           - CXR:WNL   - No recent nephrotoxic drugs  - s/p 1L fluids in ED  - FENA 1.7 suggestive of ATN, AIN, etc   - US renal w/out hydro/obstruction  - Nephrology on the case  -daily BMP  -strict I/O's  -was on IVFs but held over the weekend ?resume as Cr plateaued  -awaiting renal f/u    2. Normocytic anemia  - Possibly secondary to CKD    3. Hx of multiple chronic lower extremity wounds   - Healing well, has visiting nurse for dressing 3x/week  - f/u wound care recs  Cleanse wounds with soap and water.   Pat dry apply Xeroform, wrap with Kerlix, twice a day and prn for soiling.   Cont aggressive decub prevention and treatment protocols.    4. Uncontrolled DM w/ hyperglycemia  - On metformin at home?   - Monitor finger sticks, + ISS   - a1c 11.6  -will benefit from more aggressive regimen on d/c    5. Hx of HTN  Hx of HLD  - Was on amlodipine 10 2 years ago but not currently at home   - c/w statin   - Monitor BP     6. Hx of paraplegia due to multiple spinal abscesses in the past  - Supportive care     7. A-Sx Pyuria   - ua:positive  - Observe     8. Vit D defic  supplement    #Diet: Renal  #DVT ppx: Heparin  #Code: DNR/DNI,     #Progress Note Handoff  Pending: Clinical improvement and stability__x__Improved Cr_  Pt/Family discussion: Pt informed and agrees with the current plan  Disposition: Home____    My note supersedes the residents note should a discrepancy arise.    Chart and notes personally reviewed.  Care Discussed with Consultants/Other Providers/ Housestaff [ x] YES [ ] NO   Radiology, labs, old records personally reviewed.    discussed w/ housestaff, nursing, case management, renal    Attestation Statements:    Attestation Statements:  Risk Statement (NON-critical care).     On this date of service, level of risk to patient is considered: High.     Due to: severe JOÃO, uncontrolled DM    Time-based billing (NON-critical care).     50 minutes spent on total encounter. The necessity of the time spent during the encounter on this date of service was due to:     time spent on review of labs, imaging studies, old records, obtaining history, personally examining patient, counselling and communicating with patient/ family, entering orders for medications/tests/etc, discussions with other health care providers, documentation in electronic health records, independent interpretation of labs, imaging/procedure results and care coordination.

## 2023-09-13 NOTE — PROGRESS NOTE ADULT - ASSESSMENT
68 year old male with PMH HTN, DM, spinal abscesses leading to quadriplegia, incontinence w suprapubic catheter, presented to ED after lab call back for creatinine of 5.5 in setting of diarrhea x7days, poor PO intake. Admitted for management of prerenal JOÃO.   #JOÃO, ?prerenal vs. ATN  non oliguric  creat stable   continue sodium bicarbonate po   ph 3.8/ no binders   sono : no hydro/ stone  bp controlled   no acute indication for RRT   will follow

## 2023-09-13 NOTE — PROGRESS NOTE ADULT - SUBJECTIVE AND OBJECTIVE BOX
Patient is a 68y old  Male who presents with a chief complaint of Renal failure (07 Sep 2023 11:41)    INTERVAL HPI/OVERNIGHT EVENTS: Patient was examined and seen at bedside. This morning pt is resting comfortably in bed and reports no new issues or overnight events. No complaints, feels well. Cr was trending down but now plateued. Had 1 episode of vomiting yesterday that pt attributes to overeating.  ROS: Denies CP, SOB, AP, new weakness  All other systems reviewed and are within normal limits.  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  Patient had blood work done Friday, September 1 which demonstrated a creatinine of 5.7.  Patient and his brother received a phone call from the PCP today and told him to come to the emergency department.  Of note patient states he has had some loose stool about once a day for the past month and according to brother has been eating and drinking less.  Brother also notices for the past 2 weeks has been emptying his Dye less often.  Patient denies any fever, chills, abdominal pain, chest pain, nausea and vomiting   In the ED, HD stable, afebrile, Labs significant for Hgb 10.6 , Cr 5.5  Patient will be admitted for further workup of renal failure  (06 Sep 2023 16:18)    PAST MEDICAL & SURGICAL HISTORY:  DM (diabetes mellitus)      HTN (hypertension)      H/O paraplegia      Spinal abscess      Renal stones      Spinal abscess  S/P Surgery          General: NAD, AAO3  HEENT:  EOMI, no LAD  CV: S1 S2  Resp: decreased breath sounds at bases  GI: NT/ND/S +BS. +SPC  MS: chronic venous stasis w/ b/l skin ulcerations  Neuro: paraplegia b/l LE          Home Medications:  atorvastatin 20 mg oral tablet: 1 orally once a day (06 Sep 2023 16:25)  metFORMIN 500 mg oral tablet: 1 orally 2 times a day (06 Sep 2023 16:25)    MEDICATIONS  (STANDING):  AQUAPHOR (petrolatum Ointment) 1 Application(s) Topical two times a day  atorvastatin 20 milliGRAM(s) Oral at bedtime  chlorhexidine 2% Cloths 1 Application(s) Topical <User Schedule>  dextrose 5%. 1000 milliLiter(s) (50 mL/Hr) IV Continuous <Continuous>  dextrose 5%. 1000 milliLiter(s) (100 mL/Hr) IV Continuous <Continuous>  dextrose 50% Injectable 25 Gram(s) IV Push once  dextrose 50% Injectable 12.5 Gram(s) IV Push once  dextrose 50% Injectable 25 Gram(s) IV Push once  ergocalciferol 58517 Unit(s) Oral every week  glucagon  Injectable 1 milliGRAM(s) IntraMuscular once  heparin   Injectable 5000 Unit(s) SubCutaneous every 8 hours  insulin lispro (ADMELOG) corrective regimen sliding scale   SubCutaneous three times a day before meals  multivitamin  Chewable 1 Tablet(s) Oral daily  NIFEdipine XL 30 milliGRAM(s) Oral daily  sodium bicarbonate 650 milliGRAM(s) Oral three times a day    MEDICATIONS  (PRN):  acetaminophen     Tablet .. 650 milliGRAM(s) Oral every 6 hours PRN Mild Pain (1 - 3), Moderate Pain (4 - 6)  bacitracin   Ointment 1 Application(s) Topical two times a day PRN erythema surrounding open wound  dextrose Oral Gel 15 Gram(s) Oral once PRN Blood Glucose LESS THAN 70 milliGRAM(s)/deciliter  guaifenesin/dextromethorphan Oral Liquid 10 milliLiter(s) Oral every 4 hours PRN Cough  ondansetron Injectable 4 milliGRAM(s) IV Push every 6 hours PRN Nausea and/or Vomiting    Vital Signs Last 24 Hrs  T(C): 36.4 (13 Sep 2023 13:42), Max: 37 (12 Sep 2023 20:14)  T(F): 97.5 (13 Sep 2023 13:42), Max: 98.6 (12 Sep 2023 20:14)  HR: 71 (13 Sep 2023 13:42) (64 - 103)  BP: 131/62 (13 Sep 2023 13:42) (115/68 - 131/72)  BP(mean): 107 (13 Sep 2023 13:42) (97 - 107)  RR: 18 (13 Sep 2023 13:42) (18 - 18)  SpO2: --      CAPILLARY BLOOD GLUCOSE      POCT Blood Glucose.: 225 mg/dL (13 Sep 2023 11:21)  POCT Blood Glucose.: 111 mg/dL (13 Sep 2023 07:29)  POCT Blood Glucose.: 187 mg/dL (12 Sep 2023 21:32)  POCT Blood Glucose.: 192 mg/dL (12 Sep 2023 16:27)    LABS:                        8.9    7.13  )-----------( 173      ( 13 Sep 2023 06:52 )             28.8     09-13    137  |  104  |  48<H>  ----------------------------<  112<H>  4.6   |  23  |  4.4<HH>    Ca    7.9<L>      13 Sep 2023 06:52  Phos  3.8     09-12  Mg     1.8     09-13              Urinalysis Basic - ( 13 Sep 2023 06:52 )    Color: x / Appearance: x / SG: x / pH: x  Gluc: 112 mg/dL / Ketone: x  / Bili: x / Urobili: x   Blood: x / Protein: x / Nitrite: x   Leuk Esterase: x / RBC: x / WBC x   Sq Epi: x / Non Sq Epi: x / Bacteria: x              Consultant Notes Reviewed:  [x ] YES  [ ] NO  Care Discussed with Consultants/Other Providers/ Housestaff [ x] YES  [ ] NO  Radiology, labs, new studies personally reviewed.

## 2023-09-14 LAB
ANION GAP SERPL CALC-SCNC: 8 MMOL/L — SIGNIFICANT CHANGE UP (ref 7–14)
BLD GP AB SCN SERPL QL: SIGNIFICANT CHANGE UP
BUN SERPL-MCNC: 50 MG/DL — HIGH (ref 10–20)
CALCIUM SERPL-MCNC: 8.2 MG/DL — LOW (ref 8.4–10.5)
CHLORIDE SERPL-SCNC: 102 MMOL/L — SIGNIFICANT CHANGE UP (ref 98–110)
CO2 SERPL-SCNC: 24 MMOL/L — SIGNIFICANT CHANGE UP (ref 17–32)
CREAT SERPL-MCNC: 4.9 MG/DL — CRITICAL HIGH (ref 0.7–1.5)
EGFR: 12 ML/MIN/1.73M2 — LOW
GLUCOSE BLDC GLUCOMTR-MCNC: 126 MG/DL — HIGH (ref 70–99)
GLUCOSE BLDC GLUCOMTR-MCNC: 167 MG/DL — HIGH (ref 70–99)
GLUCOSE BLDC GLUCOMTR-MCNC: 263 MG/DL — HIGH (ref 70–99)
GLUCOSE SERPL-MCNC: 125 MG/DL — HIGH (ref 70–99)
POTASSIUM SERPL-MCNC: 4.7 MMOL/L — SIGNIFICANT CHANGE UP (ref 3.5–5)
POTASSIUM SERPL-SCNC: 4.7 MMOL/L — SIGNIFICANT CHANGE UP (ref 3.5–5)
SODIUM SERPL-SCNC: 134 MMOL/L — LOW (ref 135–146)
T4 FREE+ TSH PNL SERPL: 2.8 UIU/ML — SIGNIFICANT CHANGE UP (ref 0.27–4.2)

## 2023-09-14 PROCEDURE — 99232 SBSQ HOSP IP/OBS MODERATE 35: CPT

## 2023-09-14 RX ORDER — SODIUM CHLORIDE 9 MG/ML
1000 INJECTION INTRAMUSCULAR; INTRAVENOUS; SUBCUTANEOUS
Refills: 0 | Status: DISCONTINUED | OUTPATIENT
Start: 2023-09-14 | End: 2023-09-15

## 2023-09-14 RX ADMIN — Medication 1 APPLICATION(S): at 17:18

## 2023-09-14 RX ADMIN — Medication 1 APPLICATION(S): at 06:13

## 2023-09-14 RX ADMIN — Medication 650 MILLIGRAM(S): at 23:14

## 2023-09-14 RX ADMIN — Medication 650 MILLIGRAM(S): at 06:07

## 2023-09-14 RX ADMIN — Medication 30 MILLIGRAM(S): at 06:07

## 2023-09-14 RX ADMIN — Medication 1 TABLET(S): at 15:03

## 2023-09-14 RX ADMIN — ATORVASTATIN CALCIUM 20 MILLIGRAM(S): 80 TABLET, FILM COATED ORAL at 23:14

## 2023-09-14 RX ADMIN — CHLORHEXIDINE GLUCONATE 1 APPLICATION(S): 213 SOLUTION TOPICAL at 06:08

## 2023-09-14 RX ADMIN — Medication 2: at 17:17

## 2023-09-14 RX ADMIN — HEPARIN SODIUM 5000 UNIT(S): 5000 INJECTION INTRAVENOUS; SUBCUTANEOUS at 06:07

## 2023-09-14 RX ADMIN — Medication 10 MILLILITER(S): at 23:23

## 2023-09-14 RX ADMIN — Medication 6: at 11:33

## 2023-09-14 RX ADMIN — Medication 650 MILLIGRAM(S): at 15:06

## 2023-09-14 RX ADMIN — HEPARIN SODIUM 5000 UNIT(S): 5000 INJECTION INTRAVENOUS; SUBCUTANEOUS at 23:14

## 2023-09-14 RX ADMIN — SODIUM CHLORIDE 100 MILLILITER(S): 9 INJECTION INTRAMUSCULAR; INTRAVENOUS; SUBCUTANEOUS at 17:17

## 2023-09-14 RX ADMIN — HEPARIN SODIUM 5000 UNIT(S): 5000 INJECTION INTRAVENOUS; SUBCUTANEOUS at 15:07

## 2023-09-14 NOTE — PROGRESS NOTE ADULT - ASSESSMENT
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 2021 1.5   Urine analysis:positive           - CXR:WNL   - No recent nephrotoxic drugs  - s/p 1L fluids in ED  - FENA 1.7 suggestive of ATN, AIN, etc   - US renal w/out hydro/obstruction  - Nephrology on the case  -daily BMP  -strict I/O's  -was on IVFs but held over the weekend ?resume as Cr plateaued  -awaiting renal recs/plan    2. Normocytic anemia  - Possibly secondary to CKD    3. Hx of multiple chronic lower extremity wounds   - Healing well, has visiting nurse for dressing 3x/week  - f/u wound care recs  Cleanse wounds with soap and water.   Pat dry apply Xeroform, wrap with Kerlix, twice a day and prn for soiling.   Cont aggressive decub prevention and treatment protocols.    4. Uncontrolled DM w/ hyperglycemia  - On metformin at home?   - Monitor finger sticks, + ISS   - a1c 11.6  -will benefit from more aggressive regimen on d/c    5. Hx of HTN  Hx of HLD  - Was on amlodipine 10 2 years ago but not currently at home   - c/w statin   - Monitor BP     6. Hx of paraplegia due to multiple spinal abscesses in the past  - Supportive care     7. A-Sx Pyuria   - ua:positive  - Observe     8. Vit D defic  supplement    #Diet: Renal  #DVT ppx: Heparin  #Code: DNR/DNI,     #Progress Note Handoff  Pending: Clinical improvement and stability__x__Improved Cr_  Pt/Family discussion: Pt informed and agrees with the current plan  Disposition: Home____    My note supersedes the residents note should a discrepancy arise.    Chart and notes personally reviewed.  Care Discussed with Consultants/Other Providers/ Housestaff [ x] YES [ ] NO   Radiology, labs, old records personally reviewed.    discussed w/ housestaff, nursing, case management    Time-based billing (NON-critical care).     35 minutes spent on total encounter. The necessity of the time spent during the encounter on this date of service was due to:     time spent on review of labs, imaging studies, old records, obtaining history, personally examining patient, counselling and communicating with patient/ family, entering orders for medications/tests/etc, discussions with other health care providers, documentation in electronic health records, independent interpretation of labs, imaging/procedure results and care coordination.

## 2023-09-14 NOTE — PROGRESS NOTE ADULT - ASSESSMENT
68 year old male with PMH HTN, DM, spinal abscesses leading to quadriplegia, incontinence w suprapubic catheter, presented to ED after lab call back for creatinine of 5.5 in setting of diarrhea x7days, poor PO intake. Admitted for management of prerenal JOÃO.   #JOÃO, ?prerenal vs. ATN  non oliguric  creat up today to 4.9   if poor po intak consider LR at 100 cc per hour   continue sodium bicarbonate po   proteinuria could be related to DM   ph 3.8/ no binders   sono : no hydro/ stone  bp controlled   no acute indication for RRT yet  will follow

## 2023-09-14 NOTE — PROGRESS NOTE ADULT - SUBJECTIVE AND OBJECTIVE BOX
Nephrology progress note    Patient is seen and examined, events over the last 24 h noted .  Lying in bed comfortable   non oliguric     Allergies:  No Known Allergies    Hospital Medications:   MEDICATIONS  (STANDING):  AQUAPHOR (petrolatum Ointment) 1 Application(s) Topical two times a day  atorvastatin 20 milliGRAM(s) Oral at bedtime  ergocalciferol 90064 Unit(s) Oral every week  glucagon  Injectable 1 milliGRAM(s) IntraMuscular once  heparin   Injectable 5000 Unit(s) SubCutaneous every 8 hours  insulin lispro (ADMELOG) corrective regimen sliding scale   SubCutaneous three times a day before meals  multivitamin  Chewable 1 Tablet(s) Oral daily  NIFEdipine XL 30 milliGRAM(s) Oral daily  sodium bicarbonate 650 milliGRAM(s) Oral three times a day        VITALS:  T(F): 97.2 (09-14-23 @ 04:56), Max: 98.7 (09-13-23 @ 21:12)  HR: 68 (09-14-23 @ 04:56)  BP: 136/85 (09-14-23 @ 04:56)  RR: 19 (09-14-23 @ 04:56)      09-12 @ 07:01  -  09-13 @ 07:00  --------------------------------------------------------  IN: 0 mL / OUT: 820 mL / NET: -820 mL    09-13 @ 07:01  -  09-14 @ 07:00  --------------------------------------------------------  IN: 0 mL / OUT: 1100 mL / NET: -1100 mL      Height (cm): 185.4 (09-13 @ 13:49)  Weight (kg): 79.4 (09-13 @ 13:49)  BMI (kg/m2): 23.1 (09-13 @ 13:49)  BSA (m2): 2.03 (09-13 @ 13:49)    PHYSICAL EXAM:    Constitutional: NAD  Respiratory: CTAB,  Cardiovascular: S1, S2, RRR  Gastrointestinal: BS+, soft, NT/ND  Extremities: No cyanosis or clubbing. No peripheral edema  :  No angelo.   Skin: No rashes    LABS:  09-14    134<L>  |  102  |  50<H>  ----------------------------<  125<H>  4.7   |  24  |  4.9<HH>  Creatinine Trend: 4.9<--, 4.4<--, 4.2<--, 4.2<--, 4.4<--, 4.7<--  Ca    8.2<L>      14 Sep 2023 07:01  Mg     1.8     09-13                            8.9    7.13  )-----------( 173      ( 13 Sep 2023 06:52 )             28.8       Urine Studies:  Urinalysis Basic - ( 14 Sep 2023 07:01 )    Color:  / Appearance:  / SG:  / pH:   Gluc: 125 mg/dL / Ketone:   / Bili:  / Urobili:    Blood:  / Protein:  / Nitrite:    Leuk Esterase:  / RBC:  / WBC    Sq Epi:  / Non Sq Epi:  / Bacteria:       Sodium, Random Urine: 45.0 mmoL/L (09-07 @ 12:20)  Creatinine, Random Urine: 98 mg/dL (09-07 @ 12:20)  Protein/Creatinine Ratio Calculation: 0.9 Ratio (09-07 @ 12:20)  Osmolality, Random Urine: 361 mos/kg (09-07 @ 12:20)  Potassium, Random Urine: 14 mmol/L (09-07 @ 12:20)      Iron 30, TIBC 143, %sat 21      [09-07-23 @ 09:17]  Ferritin 533      [09-07-23 @ 09:17]  Vitamin D (25OH) 13      [09-07-23 @ 09:17]  Lipid: chol 134, , HDL 30, LDL --      [09-07-23 @ 09:17]          RADIOLOGY & ADDITIONAL STUDIES:

## 2023-09-14 NOTE — PROGRESS NOTE ADULT - SUBJECTIVE AND OBJECTIVE BOX
Patient is a 68y old  Male who presents with a chief complaint of Renal failure (07 Sep 2023 11:41)    INTERVAL HPI/OVERNIGHT EVENTS: Patient was examined and seen at bedside. This morning pt is resting comfortably in bed and reports no new issues or overnight events. No complaints, feels well. Cr up today and decreased urine outpt.  ROS: Denies CP, SOB, AP, new weakness  All other systems reviewed and are within normal limits.  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  Patient had blood work done Friday, September 1 which demonstrated a creatinine of 5.7.  Patient and his brother received a phone call from the PCP today and told him to come to the emergency department.  Of note patient states he has had some loose stool about once a day for the past month and according to brother has been eating and drinking less.  Brother also notices for the past 2 weeks has been emptying his Dye less often.  Patient denies any fever, chills, abdominal pain, chest pain, nausea and vomiting   In the ED, HD stable, afebrile, Labs significant for Hgb 10.6 , Cr 5.5  Patient will be admitted for further workup of renal failure  (06 Sep 2023 16:18)    PAST MEDICAL & SURGICAL HISTORY:  DM (diabetes mellitus)      HTN (hypertension)      H/O paraplegia      Spinal abscess      Renal stones      Spinal abscess  S/P Surgery          General: NAD, AAO3  HEENT:  EOMI, no LAD  CV: S1 S2  Resp: decreased breath sounds at bases  GI: NT/ND/S +BS. +SPC  MS: chronic venous stasis w/ b/l skin ulcerations  Neuro: paraplegia b/l LE            Home Medications:  atorvastatin 20 mg oral tablet: 1 orally once a day (06 Sep 2023 16:25)  metFORMIN 500 mg oral tablet: 1 orally 2 times a day (06 Sep 2023 16:25)    MEDICATIONS  (STANDING):  AQUAPHOR (petrolatum Ointment) 1 Application(s) Topical two times a day  atorvastatin 20 milliGRAM(s) Oral at bedtime  chlorhexidine 2% Cloths 1 Application(s) Topical <User Schedule>  dextrose 5%. 1000 milliLiter(s) (50 mL/Hr) IV Continuous <Continuous>  dextrose 5%. 1000 milliLiter(s) (100 mL/Hr) IV Continuous <Continuous>  dextrose 50% Injectable 25 Gram(s) IV Push once  dextrose 50% Injectable 12.5 Gram(s) IV Push once  dextrose 50% Injectable 25 Gram(s) IV Push once  ergocalciferol 40993 Unit(s) Oral every week  glucagon  Injectable 1 milliGRAM(s) IntraMuscular once  heparin   Injectable 5000 Unit(s) SubCutaneous every 8 hours  insulin lispro (ADMELOG) corrective regimen sliding scale   SubCutaneous three times a day before meals  multivitamin  Chewable 1 Tablet(s) Oral daily  NIFEdipine XL 30 milliGRAM(s) Oral daily  sodium bicarbonate 650 milliGRAM(s) Oral three times a day  sodium chloride 0.9%. 1000 milliLiter(s) (100 mL/Hr) IV Continuous <Continuous>    MEDICATIONS  (PRN):  acetaminophen     Tablet .. 650 milliGRAM(s) Oral every 6 hours PRN Mild Pain (1 - 3), Moderate Pain (4 - 6)  bacitracin   Ointment 1 Application(s) Topical two times a day PRN erythema surrounding open wound  dextrose Oral Gel 15 Gram(s) Oral once PRN Blood Glucose LESS THAN 70 milliGRAM(s)/deciliter  guaifenesin/dextromethorphan Oral Liquid 10 milliLiter(s) Oral every 4 hours PRN Cough  ondansetron Injectable 4 milliGRAM(s) IV Push every 6 hours PRN Nausea and/or Vomiting    Vital Signs Last 24 Hrs  T(C): 35.7 (14 Sep 2023 13:00), Max: 37.1 (13 Sep 2023 21:12)  T(F): 96.2 (14 Sep 2023 13:00), Max: 98.7 (13 Sep 2023 21:12)  HR: 73 (14 Sep 2023 13:00) (68 - 79)  BP: 133/73 (14 Sep 2023 13:00) (133/73 - 136/85)  BP(mean): 85 (13 Sep 2023 21:12) (85 - 85)  RR: 18 (14 Sep 2023 13:00) (18 - 19)  SpO2: --      CAPILLARY BLOOD GLUCOSE      POCT Blood Glucose.: 167 mg/dL (14 Sep 2023 16:32)  POCT Blood Glucose.: 263 mg/dL (14 Sep 2023 11:06)  POCT Blood Glucose.: 126 mg/dL (14 Sep 2023 07:27)  POCT Blood Glucose.: 153 mg/dL (13 Sep 2023 21:36)    LABS:                        8.9    7.13  )-----------( 173      ( 13 Sep 2023 06:52 )             28.8     09-14    134<L>  |  102  |  50<H>  ----------------------------<  125<H>  4.7   |  24  |  4.9<HH>    Ca    8.2<L>      14 Sep 2023 07:01  Mg     1.8     09-13              Urinalysis Basic - ( 14 Sep 2023 07:01 )    Color: x / Appearance: x / SG: x / pH: x  Gluc: 125 mg/dL / Ketone: x  / Bili: x / Urobili: x   Blood: x / Protein: x / Nitrite: x   Leuk Esterase: x / RBC: x / WBC x   Sq Epi: x / Non Sq Epi: x / Bacteria: x              Consultant Notes Reviewed:  [x ] YES  [ ] NO  Care Discussed with Consultants/Other Providers/ Housestaff [ x] YES  [ ] NO  Radiology, labs, new studies personally reviewed.

## 2023-09-15 LAB
ANION GAP SERPL CALC-SCNC: 11 MMOL/L — SIGNIFICANT CHANGE UP (ref 7–14)
BUN SERPL-MCNC: 43 MG/DL — HIGH (ref 10–20)
CALCIUM SERPL-MCNC: 7.6 MG/DL — LOW (ref 8.4–10.4)
CHLORIDE SERPL-SCNC: 108 MMOL/L — SIGNIFICANT CHANGE UP (ref 98–110)
CO2 SERPL-SCNC: 19 MMOL/L — SIGNIFICANT CHANGE UP (ref 17–32)
CREAT SERPL-MCNC: 3.9 MG/DL — HIGH (ref 0.7–1.5)
EGFR: 16 ML/MIN/1.73M2 — LOW
GLUCOSE BLDC GLUCOMTR-MCNC: 117 MG/DL — HIGH (ref 70–99)
GLUCOSE BLDC GLUCOMTR-MCNC: 148 MG/DL — HIGH (ref 70–99)
GLUCOSE BLDC GLUCOMTR-MCNC: 207 MG/DL — HIGH (ref 70–99)
GLUCOSE SERPL-MCNC: 117 MG/DL — HIGH (ref 70–99)
HCT VFR BLD CALC: 27.2 % — LOW (ref 42–52)
HGB BLD-MCNC: 8.3 G/DL — LOW (ref 14–18)
MAGNESIUM SERPL-MCNC: 1.5 MG/DL — LOW (ref 1.8–2.4)
MCHC RBC-ENTMCNC: 28.7 PG — SIGNIFICANT CHANGE UP (ref 27–31)
MCHC RBC-ENTMCNC: 30.5 G/DL — LOW (ref 32–37)
MCV RBC AUTO: 94.1 FL — HIGH (ref 80–94)
NRBC # BLD: 0 /100 WBCS — SIGNIFICANT CHANGE UP (ref 0–0)
PLATELET # BLD AUTO: 159 K/UL — SIGNIFICANT CHANGE UP (ref 130–400)
PMV BLD: 12 FL — HIGH (ref 7.4–10.4)
POTASSIUM SERPL-MCNC: 4.8 MMOL/L — SIGNIFICANT CHANGE UP (ref 3.5–5)
POTASSIUM SERPL-SCNC: 4.8 MMOL/L — SIGNIFICANT CHANGE UP (ref 3.5–5)
RBC # BLD: 2.89 M/UL — LOW (ref 4.7–6.1)
RBC # FLD: 13.6 % — SIGNIFICANT CHANGE UP (ref 11.5–14.5)
SODIUM SERPL-SCNC: 138 MMOL/L — SIGNIFICANT CHANGE UP (ref 135–146)
WBC # BLD: 6.52 K/UL — SIGNIFICANT CHANGE UP (ref 4.8–10.8)
WBC # FLD AUTO: 6.52 K/UL — SIGNIFICANT CHANGE UP (ref 4.8–10.8)

## 2023-09-15 PROCEDURE — 99232 SBSQ HOSP IP/OBS MODERATE 35: CPT

## 2023-09-15 RX ORDER — MAGNESIUM SULFATE 500 MG/ML
2 VIAL (ML) INJECTION
Refills: 0 | Status: COMPLETED | OUTPATIENT
Start: 2023-09-15 | End: 2023-09-15

## 2023-09-15 RX ORDER — SODIUM CHLORIDE 9 MG/ML
1000 INJECTION INTRAMUSCULAR; INTRAVENOUS; SUBCUTANEOUS
Refills: 0 | Status: DISCONTINUED | OUTPATIENT
Start: 2023-09-15 | End: 2023-09-18

## 2023-09-15 RX ADMIN — CHLORHEXIDINE GLUCONATE 1 APPLICATION(S): 213 SOLUTION TOPICAL at 06:42

## 2023-09-15 RX ADMIN — Medication 650 MILLIGRAM(S): at 21:04

## 2023-09-15 RX ADMIN — Medication 1 APPLICATION(S): at 17:49

## 2023-09-15 RX ADMIN — SODIUM CHLORIDE 100 MILLILITER(S): 9 INJECTION INTRAMUSCULAR; INTRAVENOUS; SUBCUTANEOUS at 06:43

## 2023-09-15 RX ADMIN — Medication 650 MILLIGRAM(S): at 14:18

## 2023-09-15 RX ADMIN — Medication 1 APPLICATION(S): at 06:40

## 2023-09-15 RX ADMIN — Medication 650 MILLIGRAM(S): at 06:41

## 2023-09-15 RX ADMIN — HEPARIN SODIUM 5000 UNIT(S): 5000 INJECTION INTRAVENOUS; SUBCUTANEOUS at 06:41

## 2023-09-15 RX ADMIN — Medication 25 GRAM(S): at 11:13

## 2023-09-15 RX ADMIN — ATORVASTATIN CALCIUM 20 MILLIGRAM(S): 80 TABLET, FILM COATED ORAL at 21:04

## 2023-09-15 RX ADMIN — HEPARIN SODIUM 5000 UNIT(S): 5000 INJECTION INTRAVENOUS; SUBCUTANEOUS at 14:18

## 2023-09-15 RX ADMIN — Medication 30 MILLIGRAM(S): at 06:41

## 2023-09-15 RX ADMIN — Medication 1 TABLET(S): at 11:01

## 2023-09-15 RX ADMIN — Medication 25 GRAM(S): at 14:17

## 2023-09-15 RX ADMIN — Medication 4: at 11:13

## 2023-09-15 RX ADMIN — HEPARIN SODIUM 5000 UNIT(S): 5000 INJECTION INTRAVENOUS; SUBCUTANEOUS at 21:04

## 2023-09-15 NOTE — PROGRESS NOTE ADULT - ASSESSMENT
68 year old male with PMH HTN, DM, spinal abscesses leading to quadriplegia, incontinence w suprapubic catheter, presented to ED after lab call back for creatinine of 5.5 in setting of diarrhea x7days, poor PO intake. Admitted for management of prerenal JOÃO.   #JOÃO, ?prerenal vs. ATN  non oliguric  creat improved  today to 3.9   if poor po intak consider LR at 100 cc per hour   continue sodium bicarbonate po   proteinuria could be related to DM   ph 3.8/ no binders   sono : no hydro/ stone  bp controlled   no acute indication for RRT   will follow

## 2023-09-15 NOTE — PROGRESS NOTE ADULT - ASSESSMENT
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 2021 1.5   Urine analysis:positive           - CXR:WNL   - No recent nephrotoxic drugs  - s/p 1L fluids in ED  - FENA 1.7 suggestive of ATN, AIN, etc   - US renal w/out hydro/obstruction  - Nephrology on the case  -daily BMP  -strict I/O's  -was on IVFs but held over the weekend   -9/14 started on IVFs  -awaiting renal recs/plan    2. Normocytic anemia  - Possibly secondary to CKD  -monitor    3. Hx of multiple chronic lower extremity wounds   - Healing well, has visiting nurse for dressing 3x/week  - f/u wound care recs  Cleanse wounds with soap and water.   Pat dry apply Xeroform, wrap with Kerlix, twice a day and prn for soiling.   Cont aggressive decub prevention and treatment protocols.    4. Uncontrolled DM w/ hyperglycemia  - On metformin at home?   - Monitor finger sticks, + ISS   - a1c 11.6  -will benefit from more aggressive regimen on d/c    5. Hx of HTN  Hx of HLD  - Was on amlodipine 10 2 years ago but not currently at home   - c/w statin   - Monitor BP     6. Hx of paraplegia due to multiple spinal abscesses in the past  - Supportive care     7. A-Sx Pyuria   - ua:positive  - Observe     8. Vit D defic  supplement    #Diet: Renal  #DVT ppx: Heparin  #Code: DNR/DNI,     #Progress Note Handoff  Pending: Clinical improvement and stability__x__Improved Cr_  Pt/Family discussion: Pt informed and agrees with the current plan  Disposition: Home____    My note supersedes the residents note should a discrepancy arise.    Chart and notes personally reviewed.  Care Discussed with Consultants/Other Providers/ Housestaff [ x] YES [ ] NO   Radiology, labs, old records personally reviewed.    discussed w/ housestaff, nursing, case management    Time-based billing (NON-critical care).     35 minutes spent on total encounter. The necessity of the time spent during the encounter on this date of service was due to:     time spent on review of labs, imaging studies, old records, obtaining history, personally examining patient, counselling and communicating with patient/ family, entering orders for medications/tests/etc, discussions with other health care providers, documentation in electronic health records, independent interpretation of labs, imaging/procedure results and care coordination.

## 2023-09-15 NOTE — PROGRESS NOTE ADULT - SUBJECTIVE AND OBJECTIVE BOX
Patient is a 68y old  Male who presents with a chief complaint of Renal failure (07 Sep 2023 11:41)    INTERVAL HPI/OVERNIGHT EVENTS: Patient was examined and seen at bedside. This morning pt is resting comfortably in bed and reports no new issues or overnight events. No complaints, feels well. Cr improved to 3.9 from 4.9 on IVFs.  ROS: Denies CP, SOB, AP, new weakness  All other systems reviewed and are within normal limits.  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  Patient had blood work done Friday, September 1 which demonstrated a creatinine of 5.7.  Patient and his brother received a phone call from the PCP today and told him to come to the emergency department.  Of note patient states he has had some loose stool about once a day for the past month and according to brother has been eating and drinking less.  Brother also notices for the past 2 weeks has been emptying his Dye less often.  Patient denies any fever, chills, abdominal pain, chest pain, nausea and vomiting   In the ED, HD stable, afebrile, Labs significant for Hgb 10.6 , Cr 5.5  Patient will be admitted for further workup of renal failure  (06 Sep 2023 16:18)    PAST MEDICAL & SURGICAL HISTORY:  DM (diabetes mellitus)      HTN (hypertension)      H/O paraplegia      Spinal abscess      Renal stones      Spinal abscess  S/P Surgery          General: NAD, AAO3  HEENT:  EOMI, no LAD  CV: S1 S2  Resp: decreased breath sounds at bases  GI: NT/ND/S +BS. +SPC  MS: chronic venous stasis w/ b/l skin ulcerations  Neuro: paraplegia b/l LE          Home Medications:  atorvastatin 20 mg oral tablet: 1 orally once a day (06 Sep 2023 16:25)  metFORMIN 500 mg oral tablet: 1 orally 2 times a day (06 Sep 2023 16:25)    MEDICATIONS  (STANDING):  AQUAPHOR (petrolatum Ointment) 1 Application(s) Topical two times a day  atorvastatin 20 milliGRAM(s) Oral at bedtime  chlorhexidine 2% Cloths 1 Application(s) Topical <User Schedule>  dextrose 5%. 1000 milliLiter(s) (50 mL/Hr) IV Continuous <Continuous>  dextrose 5%. 1000 milliLiter(s) (100 mL/Hr) IV Continuous <Continuous>  dextrose 50% Injectable 25 Gram(s) IV Push once  dextrose 50% Injectable 25 Gram(s) IV Push once  dextrose 50% Injectable 12.5 Gram(s) IV Push once  ergocalciferol 44389 Unit(s) Oral every week  glucagon  Injectable 1 milliGRAM(s) IntraMuscular once  heparin   Injectable 5000 Unit(s) SubCutaneous every 8 hours  insulin lispro (ADMELOG) corrective regimen sliding scale   SubCutaneous three times a day before meals  multivitamin  Chewable 1 Tablet(s) Oral daily  NIFEdipine XL 30 milliGRAM(s) Oral daily  sodium bicarbonate 650 milliGRAM(s) Oral three times a day  sodium chloride 0.9%. 1000 milliLiter(s) (100 mL/Hr) IV Continuous <Continuous>    MEDICATIONS  (PRN):  acetaminophen     Tablet .. 650 milliGRAM(s) Oral every 6 hours PRN Mild Pain (1 - 3), Moderate Pain (4 - 6)  bacitracin   Ointment 1 Application(s) Topical two times a day PRN erythema surrounding open wound  dextrose Oral Gel 15 Gram(s) Oral once PRN Blood Glucose LESS THAN 70 milliGRAM(s)/deciliter  guaifenesin/dextromethorphan Oral Liquid 10 milliLiter(s) Oral every 4 hours PRN Cough  ondansetron Injectable 4 milliGRAM(s) IV Push every 6 hours PRN Nausea and/or Vomiting    Vital Signs Last 24 Hrs  T(C): 36.3 (15 Sep 2023 12:30), Max: 37.2 (14 Sep 2023 19:50)  T(F): 97.3 (15 Sep 2023 12:30), Max: 99 (14 Sep 2023 19:50)  HR: 79 (15 Sep 2023 12:30) (70 - 81)  BP: 135/82 (15 Sep 2023 12:30) (135/82 - 185/92)  BP(mean): --  RR: 18 (15 Sep 2023 12:30) (18 - 18)  SpO2: --      CAPILLARY BLOOD GLUCOSE      POCT Blood Glucose.: 148 mg/dL (15 Sep 2023 17:07)  POCT Blood Glucose.: 207 mg/dL (15 Sep 2023 11:07)  POCT Blood Glucose.: 117 mg/dL (15 Sep 2023 07:50)    LABS:                        8.3    6.52  )-----------( 159      ( 15 Sep 2023 08:37 )             27.2     09-15    138  |  108  |  43<H>  ----------------------------<  117<H>  4.8   |  19  |  3.9<H>    Ca    7.6<L>      15 Sep 2023 08:37  Mg     1.5     09-15              Urinalysis Basic - ( 15 Sep 2023 08:37 )    Color: x / Appearance: x / SG: x / pH: x  Gluc: 117 mg/dL / Ketone: x  / Bili: x / Urobili: x   Blood: x / Protein: x / Nitrite: x   Leuk Esterase: x / RBC: x / WBC x   Sq Epi: x / Non Sq Epi: x / Bacteria: x              Consultant Notes Reviewed:  [x ] YES  [ ] NO  Care Discussed with Consultants/Other Providers/ Housestaff [ x] YES  [ ] NO  Radiology, labs, new studies personally reviewed.

## 2023-09-15 NOTE — PROGRESS NOTE ADULT - SUBJECTIVE AND OBJECTIVE BOX
Nephrology progress note    Patient was seen and examined, events over the last 24 h noted .    Allergies:  No Known Allergies    Hospital Medications:   MEDICATIONS  (STANDING):  AQUAPHOR (petrolatum Ointment) 1 Application(s) Topical two times a day  atorvastatin 20 milliGRAM(s) Oral at bedtime  chlorhexidine 2% Cloths 1 Application(s) Topical <User Schedule>  dextrose 5%. 1000 milliLiter(s) (50 mL/Hr) IV Continuous <Continuous>  dextrose 5%. 1000 milliLiter(s) (100 mL/Hr) IV Continuous <Continuous>  dextrose 50% Injectable 25 Gram(s) IV Push once  dextrose 50% Injectable 25 Gram(s) IV Push once  dextrose 50% Injectable 12.5 Gram(s) IV Push once  ergocalciferol 31950 Unit(s) Oral every week  glucagon  Injectable 1 milliGRAM(s) IntraMuscular once  heparin   Injectable 5000 Unit(s) SubCutaneous every 8 hours  insulin lispro (ADMELOG) corrective regimen sliding scale   SubCutaneous three times a day before meals  multivitamin  Chewable 1 Tablet(s) Oral daily  NIFEdipine XL 30 milliGRAM(s) Oral daily  sodium bicarbonate 650 milliGRAM(s) Oral three times a day  sodium chloride 0.9%. 1000 milliLiter(s) (100 mL/Hr) IV Continuous <Continuous>        VITALS:  T(F): 97.4 (09-15-23 @ 05:19), Max: 99 (09-14-23 @ 19:50)  HR: 70 (09-15-23 @ 05:19)  BP: 138/88 (09-15-23 @ 05:19)  RR: 18 (09-15-23 @ 05:19)  SpO2: --  Wt(kg): --    09-13 @ 07:01  -  09-14 @ 07:00  --------------------------------------------------------  IN: 0 mL / OUT: 1100 mL / NET: -1100 mL    09-14 @ 07:01  -  09-15 @ 07:00  --------------------------------------------------------  IN: 1320 mL / OUT: 500 mL / NET: 820 mL      Height (cm): 185.4 (09-14 @ 18:28)  Weight (kg): 79.4 (09-14 @ 18:28)  BMI (kg/m2): 23.1 (09-14 @ 18:28)  BSA (m2): 2.03 (09-14 @ 18:28)    PHYSICAL EXAM:  Constitutional: NAD  HEENT: anicteric sclera, oropharynx clear, MMM  Neck: No JVD  Respiratory: CTAB, no wheezes, rales or rhonchi  Cardiovascular: S1, S2, RRR  Gastrointestinal: BS+, soft, NT/ND  Extremities: No cyanosis or clubbing. No peripheral edema  :  No angelo.   Skin: No rashes    LABS:  09-15    138  |  108  |  43<H>  ----------------------------<  117<H>  4.8   |  19  |  3.9<H>    Ca    7.6<L>      15 Sep 2023 08:37  Mg     1.5     09-15          Urine Studies:  Urinalysis Basic - ( 15 Sep 2023 08:37 )    Color:  / Appearance:  / SG:  / pH:   Gluc: 117 mg/dL / Ketone:   / Bili:  / Urobili:    Blood:  / Protein:  / Nitrite:    Leuk Esterase:  / RBC:  / WBC    Sq Epi:  / Non Sq Epi:  / Bacteria:         RADIOLOGY & ADDITIONAL STUDIES:

## 2023-09-16 LAB
ANION GAP SERPL CALC-SCNC: 9 MMOL/L — SIGNIFICANT CHANGE UP (ref 7–14)
BUN SERPL-MCNC: 44 MG/DL — HIGH (ref 10–20)
CALCIUM SERPL-MCNC: 7.8 MG/DL — LOW (ref 8.4–10.5)
CHLORIDE SERPL-SCNC: 103 MMOL/L — SIGNIFICANT CHANGE UP (ref 98–110)
CO2 SERPL-SCNC: 21 MMOL/L — SIGNIFICANT CHANGE UP (ref 17–32)
CREAT SERPL-MCNC: 4.6 MG/DL — CRITICAL HIGH (ref 0.7–1.5)
EGFR: 13 ML/MIN/1.73M2 — LOW
GLUCOSE BLDC GLUCOMTR-MCNC: 129 MG/DL — HIGH (ref 70–99)
GLUCOSE BLDC GLUCOMTR-MCNC: 161 MG/DL — HIGH (ref 70–99)
GLUCOSE BLDC GLUCOMTR-MCNC: 213 MG/DL — HIGH (ref 70–99)
GLUCOSE BLDC GLUCOMTR-MCNC: 244 MG/DL — HIGH (ref 70–99)
GLUCOSE SERPL-MCNC: 156 MG/DL — HIGH (ref 70–99)
POTASSIUM SERPL-MCNC: 4.8 MMOL/L — SIGNIFICANT CHANGE UP (ref 3.5–5)
POTASSIUM SERPL-SCNC: 4.8 MMOL/L — SIGNIFICANT CHANGE UP (ref 3.5–5)
SODIUM SERPL-SCNC: 133 MMOL/L — LOW (ref 135–146)

## 2023-09-16 PROCEDURE — 99233 SBSQ HOSP IP/OBS HIGH 50: CPT

## 2023-09-16 RX ORDER — MAGNESIUM OXIDE 400 MG ORAL TABLET 241.3 MG
400 TABLET ORAL
Refills: 0 | Status: DISCONTINUED | OUTPATIENT
Start: 2023-09-16 | End: 2023-09-17

## 2023-09-16 RX ADMIN — HEPARIN SODIUM 5000 UNIT(S): 5000 INJECTION INTRAVENOUS; SUBCUTANEOUS at 05:03

## 2023-09-16 RX ADMIN — Medication 1 TABLET(S): at 13:09

## 2023-09-16 RX ADMIN — Medication 650 MILLIGRAM(S): at 13:09

## 2023-09-16 RX ADMIN — CHLORHEXIDINE GLUCONATE 1 APPLICATION(S): 213 SOLUTION TOPICAL at 05:03

## 2023-09-16 RX ADMIN — Medication 650 MILLIGRAM(S): at 05:03

## 2023-09-16 RX ADMIN — Medication 4: at 12:15

## 2023-09-16 RX ADMIN — MAGNESIUM OXIDE 400 MG ORAL TABLET 400 MILLIGRAM(S): 241.3 TABLET ORAL at 13:08

## 2023-09-16 RX ADMIN — ATORVASTATIN CALCIUM 20 MILLIGRAM(S): 80 TABLET, FILM COATED ORAL at 21:38

## 2023-09-16 RX ADMIN — Medication 650 MILLIGRAM(S): at 21:38

## 2023-09-16 RX ADMIN — Medication 2: at 09:24

## 2023-09-16 RX ADMIN — HEPARIN SODIUM 5000 UNIT(S): 5000 INJECTION INTRAVENOUS; SUBCUTANEOUS at 13:09

## 2023-09-16 RX ADMIN — ERGOCALCIFEROL 50000 UNIT(S): 1.25 CAPSULE ORAL at 12:16

## 2023-09-16 RX ADMIN — HEPARIN SODIUM 5000 UNIT(S): 5000 INJECTION INTRAVENOUS; SUBCUTANEOUS at 21:37

## 2023-09-16 RX ADMIN — MAGNESIUM OXIDE 400 MG ORAL TABLET 400 MILLIGRAM(S): 241.3 TABLET ORAL at 16:50

## 2023-09-16 RX ADMIN — Medication 30 MILLIGRAM(S): at 05:03

## 2023-09-16 NOTE — PROGRESS NOTE ADULT - ASSESSMENT
68 year old male with PMH HTN, DM, spinal abscesses leading to quadriplegia, incontinence w suprapubic catheter, presented to ED after lab call back for creatinine of 5.5 in setting of diarrhea x7days, poor PO intake. Admitted for management of prerenal JOÃO.   #JOÃO, ?prerenal vs. ATN  non oliguric  crea noted / the 3.9 ? error  continue sodium bicarbonate po   proteinuria could be related to DM / will send GN w/up: check c3/ c4/ TAY/ dna/ IF, k/l, pla2r, hepatitis profile, ANCA   ph 3.8/ no binders   sono : no hydro/ stone  bp controlled   will start TACHO after venofer course  no acute indication for RRT   will follow

## 2023-09-16 NOTE — PROGRESS NOTE ADULT - ASSESSMENT
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 2021 1.5   Urine analysis:positive           - CXR:WNL   - No recent nephrotoxic drugs  - s/p 1L fluids in ED  - FENA 1.7 suggestive of ATN, AIN, etc   - US renal w/out hydro/obstruction  - Nephrology on the case  -daily BMP  -strict I/O's  -was on IVFs but held over the weekend   -9/14 started on IVFs  -9/16 d/w renal: will order GN panel. Orders placed    2. Normocytic anemia  - Possibly secondary to CKD  -monitor    3. Hx of multiple chronic lower extremity wounds   - Healing well, has visiting nurse for dressing 3x/week  - f/u wound care recs  Cleanse wounds with soap and water.   Pat dry apply Xeroform, wrap with Kerlix, twice a day and prn for soiling.   Cont aggressive decub prevention and treatment protocols.    4. Uncontrolled DM w/ hyperglycemia  - On metformin at home?   - Monitor finger sticks, + ISS   - a1c 11.6  -will benefit from more aggressive regimen on d/c    5. Hx of HTN  Hx of HLD  - Was on amlodipine 10 2 years ago but not currently at home   - c/w statin   - Monitor BP     6. Hx of paraplegia due to multiple spinal abscesses in the past  - Supportive care     7. A-Sx Pyuria   - ua:positive  - Observe     8. Vit D defic  supplement    #Diet: Renal  #DVT ppx: Heparin  #Code: DNR/DNI,     #Progress Note Handoff  Pending: Clinical improvement and stability__x__Improved Cr_  Pt/Family discussion: Pt informed and agrees with the current plan  Disposition: Home____    My note supersedes the residents note should a discrepancy arise.    Chart and notes personally reviewed.  Care Discussed with Consultants/Other Providers/ Housestaff [ x] YES [ ] NO   Radiology, labs, old records personally reviewed.    discussed w/ housestaff, nursing, case management    Time-based billing (NON-critical care).     35 minutes spent on total encounter. The necessity of the time spent during the encounter on this date of service was due to:     time spent on review of labs, imaging studies, old records, obtaining history, personally examining patient, counselling and communicating with patient/ family, entering orders for medications/tests/etc, discussions with other health care providers, documentation in electronic health records, independent interpretation of labs, imaging/procedure results and care coordination.

## 2023-09-16 NOTE — PROGRESS NOTE ADULT - SUBJECTIVE AND OBJECTIVE BOX
seen and examined  24 h events noted  no distress        PAST HISTORY  --------------------------------------------------------------------------------  No significant changes to PMH, PSH, FHx, SHx, unless otherwise noted    ALLERGIES & MEDICATIONS  --------------------------------------------------------------------------------  Allergies    No Known Allergies    Intolerances      Standing Inpatient Medications  AQUAPHOR (petrolatum Ointment) 1 Application(s) Topical two times a day  atorvastatin 20 milliGRAM(s) Oral at bedtime  chlorhexidine 2% Cloths 1 Application(s) Topical <User Schedule>  dextrose 5%. 1000 milliLiter(s) IV Continuous <Continuous>  dextrose 5%. 1000 milliLiter(s) IV Continuous <Continuous>  dextrose 50% Injectable 25 Gram(s) IV Push once  dextrose 50% Injectable 12.5 Gram(s) IV Push once  dextrose 50% Injectable 25 Gram(s) IV Push once  ergocalciferol 70379 Unit(s) Oral every week  glucagon  Injectable 1 milliGRAM(s) IntraMuscular once  heparin   Injectable 5000 Unit(s) SubCutaneous every 8 hours  insulin lispro (ADMELOG) corrective regimen sliding scale   SubCutaneous three times a day before meals  multivitamin  Chewable 1 Tablet(s) Oral daily  NIFEdipine XL 30 milliGRAM(s) Oral daily  sodium bicarbonate 650 milliGRAM(s) Oral three times a day  sodium chloride 0.9%. 1000 milliLiter(s) IV Continuous <Continuous>    PRN Inpatient Medications  acetaminophen     Tablet .. 650 milliGRAM(s) Oral every 6 hours PRN  bacitracin   Ointment 1 Application(s) Topical two times a day PRN  dextrose Oral Gel 15 Gram(s) Oral once PRN  guaifenesin/dextromethorphan Oral Liquid 10 milliLiter(s) Oral every 4 hours PRN  ondansetron Injectable 4 milliGRAM(s) IV Push every 6 hours PRN          VITALS/PHYSICAL EXAM  --------------------------------------------------------------------------------  T(C): 36.2 (09-16-23 @ 05:21), Max: 36.3 (09-15-23 @ 12:30)  HR: 104 (09-16-23 @ 05:21) (79 - 104)  BP: 158/77 (09-16-23 @ 05:21) (135/82 - 158/77)  RR: 18 (09-16-23 @ 05:21) (18 - 18)  SpO2: --  Wt(kg): --  Height (cm): 185.4 (09-15-23 @ 17:31)  Weight (kg): 79.4 (09-15-23 @ 17:31)  BMI (kg/m2): 23.1 (09-15-23 @ 17:31)  BSA (m2): 2.03 (09-15-23 @ 17:31)      09-15-23 @ 07:01  -  09-16-23 @ 07:00  --------------------------------------------------------  IN: 0 mL / OUT: 2300 mL / NET: -2300 mL      Physical Exam:  	Gen: NAD  	Pulm: decrease BS  B/L  	CV:  S1S2; no rub  	Abd:  soft, nontender/nondistended  	LE: dressing    LABS/STUDIES  --------------------------------------------------------------------------------              8.3    6.52  >-----------<  159      [09-15-23 @ 08:37]              27.2     133  |  103  |  44  ----------------------------<  156      [09-16-23 @ 06:45]  4.8   |  21  |  4.6        Ca     7.8     [09-16-23 @ 06:45]      Mg     1.5     [09-15-23 @ 08:37]            Creatinine Trend:  SCr 4.6 [09-16 @ 06:45]  SCr 3.9 [09-15 @ 08:37]  SCr 4.9 [09-14 @ 07:01]  SCr 4.4 [09-13 @ 06:52]  SCr 4.2 [09-12 @ 06:23]    Urinalysis - [09-16-23 @ 06:45]      Color  / Appearance  / SG  / pH       Gluc 156 / Ketone   / Bili  / Urobili        Blood  / Protein  / Leuk Est  / Nitrite       RBC  / WBC  / Hyaline  / Gran  / Sq Epi  / Non Sq Epi  / Bacteria       Iron 30, TIBC 143, %sat 21      [09-07-23 @ 09:17]  Ferritin 533      [09-07-23 @ 09:17]  Vitamin D (25OH) 13      [09-07-23 @ 09:17]  Lipid: chol 134, , HDL 30, LDL --      [09-07-23 @ 09:17]

## 2023-09-16 NOTE — PROGRESS NOTE ADULT - SUBJECTIVE AND OBJECTIVE BOX
Patient is a 68y old  Male who presents with a chief complaint of Renal failure (07 Sep 2023 11:41)    INTERVAL HPI/OVERNIGHT EVENTS: Patient was examined and seen at bedside. This morning pt is resting comfortably in bed and reports no new issues or overnight events. No complaints, feels well. Refused IVFs since yesterday. Cr improved to 3.9(???lab error) from 4.9 on IVFs but now back to 4.6.  ROS: Denies CP, SOB, AP, new weakness  All other systems reviewed and are within normal limits.  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  Patient had blood work done Friday, September 1 which demonstrated a creatinine of 5.7.  Patient and his brother received a phone call from the PCP today and told him to come to the emergency department.  Of note patient states he has had some loose stool about once a day for the past month and according to brother has been eating and drinking less.  Brother also notices for the past 2 weeks has been emptying his Dye less often.  Patient denies any fever, chills, abdominal pain, chest pain, nausea and vomiting   In the ED, HD stable, afebrile, Labs significant for Hgb 10.6 , Cr 5.5  Patient will be admitted for further workup of renal failure  (06 Sep 2023 16:18)    PAST MEDICAL & SURGICAL HISTORY:  DM (diabetes mellitus)      HTN (hypertension)      H/O paraplegia      Spinal abscess      Renal stones      Spinal abscess  S/P Surgery          General: NAD, AAO3  HEENT:  EOMI, no LAD  CV: S1 S2  Resp: decreased breath sounds at bases  GI: NT/ND/S +BS. +SPC  MS: chronic venous stasis w/ b/l skin ulcerations  Neuro: paraplegia b/l LE          Home Medications:  atorvastatin 20 mg oral tablet: 1 orally once a day (06 Sep 2023 16:25)  metFORMIN 500 mg oral tablet: 1 orally 2 times a day (06 Sep 2023 16:25)    MEDICATIONS  (STANDING):  AQUAPHOR (petrolatum Ointment) 1 Application(s) Topical two times a day  atorvastatin 20 milliGRAM(s) Oral at bedtime  chlorhexidine 2% Cloths 1 Application(s) Topical <User Schedule>  dextrose 5%. 1000 milliLiter(s) (50 mL/Hr) IV Continuous <Continuous>  dextrose 5%. 1000 milliLiter(s) (100 mL/Hr) IV Continuous <Continuous>  dextrose 50% Injectable 25 Gram(s) IV Push once  dextrose 50% Injectable 25 Gram(s) IV Push once  dextrose 50% Injectable 12.5 Gram(s) IV Push once  ergocalciferol 60580 Unit(s) Oral every week  glucagon  Injectable 1 milliGRAM(s) IntraMuscular once  heparin   Injectable 5000 Unit(s) SubCutaneous every 8 hours  insulin lispro (ADMELOG) corrective regimen sliding scale   SubCutaneous three times a day before meals  magnesium oxide 400 milliGRAM(s) Oral three times a day with meals  multivitamin  Chewable 1 Tablet(s) Oral daily  NIFEdipine XL 30 milliGRAM(s) Oral daily  sodium bicarbonate 650 milliGRAM(s) Oral three times a day  sodium chloride 0.9%. 1000 milliLiter(s) (100 mL/Hr) IV Continuous <Continuous>    MEDICATIONS  (PRN):  acetaminophen     Tablet .. 650 milliGRAM(s) Oral every 6 hours PRN Mild Pain (1 - 3), Moderate Pain (4 - 6)  bacitracin   Ointment 1 Application(s) Topical two times a day PRN erythema surrounding open wound  dextrose Oral Gel 15 Gram(s) Oral once PRN Blood Glucose LESS THAN 70 milliGRAM(s)/deciliter  guaifenesin/dextromethorphan Oral Liquid 10 milliLiter(s) Oral every 4 hours PRN Cough  ondansetron Injectable 4 milliGRAM(s) IV Push every 6 hours PRN Nausea and/or Vomiting    Vital Signs Last 24 Hrs  T(C): 36.2 (16 Sep 2023 05:21), Max: 36.3 (15 Sep 2023 12:30)  T(F): 97.1 (16 Sep 2023 05:21), Max: 97.3 (15 Sep 2023 12:30)  HR: 104 (16 Sep 2023 05:21) (79 - 104)  BP: 158/77 (16 Sep 2023 05:21) (135/82 - 158/77)  BP(mean): --  RR: 18 (16 Sep 2023 05:21) (18 - 18)  SpO2: --      CAPILLARY BLOOD GLUCOSE      POCT Blood Glucose.: 244 mg/dL (16 Sep 2023 11:51)  POCT Blood Glucose.: 161 mg/dL (16 Sep 2023 07:52)  POCT Blood Glucose.: 148 mg/dL (15 Sep 2023 17:07)    LABS:                        8.3    6.52  )-----------( 159      ( 15 Sep 2023 08:37 )             27.2     09-16    133<L>  |  103  |  44<H>  ----------------------------<  156<H>  4.8   |  21  |  4.6<HH>    Ca    7.8<L>      16 Sep 2023 06:45  Mg     1.5     09-15              Urinalysis Basic - ( 16 Sep 2023 06:45 )    Color: x / Appearance: x / SG: x / pH: x  Gluc: 156 mg/dL / Ketone: x  / Bili: x / Urobili: x   Blood: x / Protein: x / Nitrite: x   Leuk Esterase: x / RBC: x / WBC x   Sq Epi: x / Non Sq Epi: x / Bacteria: x              Consultant Notes Reviewed:  [x ] YES  [ ] NO  Care Discussed with Consultants/Other Providers/ Housestaff [ x] YES  [ ] NO  Radiology, labs, new studies personally reviewed.

## 2023-09-17 LAB
ANION GAP SERPL CALC-SCNC: 12 MMOL/L — SIGNIFICANT CHANGE UP (ref 7–14)
BUN SERPL-MCNC: 48 MG/DL — HIGH (ref 10–20)
CALCIUM SERPL-MCNC: 8.1 MG/DL — LOW (ref 8.4–10.5)
CHLORIDE SERPL-SCNC: 102 MMOL/L — SIGNIFICANT CHANGE UP (ref 98–110)
CO2 SERPL-SCNC: 22 MMOL/L — SIGNIFICANT CHANGE UP (ref 17–32)
CREAT SERPL-MCNC: 4.3 MG/DL — CRITICAL HIGH (ref 0.7–1.5)
EGFR: 14 ML/MIN/1.73M2 — LOW
GLUCOSE BLDC GLUCOMTR-MCNC: 110 MG/DL — HIGH (ref 70–99)
GLUCOSE BLDC GLUCOMTR-MCNC: 114 MG/DL — HIGH (ref 70–99)
GLUCOSE BLDC GLUCOMTR-MCNC: 143 MG/DL — HIGH (ref 70–99)
GLUCOSE BLDC GLUCOMTR-MCNC: 220 MG/DL — HIGH (ref 70–99)
GLUCOSE SERPL-MCNC: 103 MG/DL — HIGH (ref 70–99)
HCT VFR BLD CALC: 29.9 % — LOW (ref 42–52)
HGB BLD-MCNC: 9.3 G/DL — LOW (ref 14–18)
MAGNESIUM SERPL-MCNC: 2.3 MG/DL — SIGNIFICANT CHANGE UP (ref 1.8–2.4)
MCHC RBC-ENTMCNC: 28.7 PG — SIGNIFICANT CHANGE UP (ref 27–31)
MCHC RBC-ENTMCNC: 31.1 G/DL — LOW (ref 32–37)
MCV RBC AUTO: 92.3 FL — SIGNIFICANT CHANGE UP (ref 80–94)
NRBC # BLD: 0 /100 WBCS — SIGNIFICANT CHANGE UP (ref 0–0)
PLATELET # BLD AUTO: 222 K/UL — SIGNIFICANT CHANGE UP (ref 130–400)
PMV BLD: 10.7 FL — HIGH (ref 7.4–10.4)
POTASSIUM SERPL-MCNC: 4.8 MMOL/L — SIGNIFICANT CHANGE UP (ref 3.5–5)
POTASSIUM SERPL-SCNC: 4.8 MMOL/L — SIGNIFICANT CHANGE UP (ref 3.5–5)
RBC # BLD: 3.24 M/UL — LOW (ref 4.7–6.1)
RBC # FLD: 13.7 % — SIGNIFICANT CHANGE UP (ref 11.5–14.5)
SODIUM SERPL-SCNC: 136 MMOL/L — SIGNIFICANT CHANGE UP (ref 135–146)
WBC # BLD: 8.18 K/UL — SIGNIFICANT CHANGE UP (ref 4.8–10.8)
WBC # FLD AUTO: 8.18 K/UL — SIGNIFICANT CHANGE UP (ref 4.8–10.8)

## 2023-09-17 PROCEDURE — 99232 SBSQ HOSP IP/OBS MODERATE 35: CPT

## 2023-09-17 RX ADMIN — ATORVASTATIN CALCIUM 20 MILLIGRAM(S): 80 TABLET, FILM COATED ORAL at 21:03

## 2023-09-17 RX ADMIN — Medication 1 APPLICATION(S): at 17:28

## 2023-09-17 RX ADMIN — MAGNESIUM OXIDE 400 MG ORAL TABLET 400 MILLIGRAM(S): 241.3 TABLET ORAL at 08:06

## 2023-09-17 RX ADMIN — CHLORHEXIDINE GLUCONATE 1 APPLICATION(S): 213 SOLUTION TOPICAL at 06:26

## 2023-09-17 RX ADMIN — MAGNESIUM OXIDE 400 MG ORAL TABLET 400 MILLIGRAM(S): 241.3 TABLET ORAL at 12:08

## 2023-09-17 RX ADMIN — Medication 1 APPLICATION(S): at 06:25

## 2023-09-17 RX ADMIN — Medication 650 MILLIGRAM(S): at 06:26

## 2023-09-17 RX ADMIN — Medication 4: at 17:27

## 2023-09-17 RX ADMIN — Medication 1 TABLET(S): at 12:09

## 2023-09-17 RX ADMIN — Medication 650 MILLIGRAM(S): at 13:30

## 2023-09-17 RX ADMIN — HEPARIN SODIUM 5000 UNIT(S): 5000 INJECTION INTRAVENOUS; SUBCUTANEOUS at 21:03

## 2023-09-17 RX ADMIN — Medication 30 MILLIGRAM(S): at 06:25

## 2023-09-17 RX ADMIN — Medication 650 MILLIGRAM(S): at 21:03

## 2023-09-17 RX ADMIN — HEPARIN SODIUM 5000 UNIT(S): 5000 INJECTION INTRAVENOUS; SUBCUTANEOUS at 13:30

## 2023-09-17 RX ADMIN — SODIUM CHLORIDE 100 MILLILITER(S): 9 INJECTION INTRAMUSCULAR; INTRAVENOUS; SUBCUTANEOUS at 22:00

## 2023-09-17 RX ADMIN — HEPARIN SODIUM 5000 UNIT(S): 5000 INJECTION INTRAVENOUS; SUBCUTANEOUS at 06:26

## 2023-09-17 NOTE — PROGRESS NOTE ADULT - ASSESSMENT
68 year old male with PMH HTN, DM, spinal abscesses leading to quadriplegia, incontinence w suprapubic catheter, presented to ED after lab call back for creatinine of 5.5 in setting of diarrhea x7days, poor PO intake. Admitted for management of prerenal JOÃO.   #JOÃO, ?prerenal vs. ATN  non oliguric  crea noted trend around mid 4   continue sodium bicarbonate po   proteinuria could be related to DM / will send GN w/up: check c3/ c4/ TAY/ dna/ IF, k/l, pla2r, hepatitis profile, ANCA   ph 3.8/ no binders   sono : no hydro/ stone  bp controlled   will start TACHO after venofer course  no acute indication for RRT   will follow

## 2023-09-17 NOTE — PROGRESS NOTE ADULT - SUBJECTIVE AND OBJECTIVE BOX
Nephrology progress note    THIS IS AN INCOMPLETE NOTE . FULL NOTE TO FOLLOW SHORTLY    Patient is seen and examined, events over the last 24 h noted .    Allergies:  No Known Allergies    Hospital Medications:   MEDICATIONS  (STANDING):  AQUAPHOR (petrolatum Ointment) 1 Application(s) Topical two times a day  atorvastatin 20 milliGRAM(s) Oral at bedtime  chlorhexidine 2% Cloths 1 Application(s) Topical <User Schedule>  dextrose 5%. 1000 milliLiter(s) (50 mL/Hr) IV Continuous <Continuous>  dextrose 5%. 1000 milliLiter(s) (100 mL/Hr) IV Continuous <Continuous>  dextrose 50% Injectable 25 Gram(s) IV Push once  dextrose 50% Injectable 12.5 Gram(s) IV Push once  dextrose 50% Injectable 25 Gram(s) IV Push once  ergocalciferol 60165 Unit(s) Oral every week  glucagon  Injectable 1 milliGRAM(s) IntraMuscular once  heparin   Injectable 5000 Unit(s) SubCutaneous every 8 hours  insulin lispro (ADMELOG) corrective regimen sliding scale   SubCutaneous three times a day before meals  magnesium oxide 400 milliGRAM(s) Oral three times a day with meals  multivitamin  Chewable 1 Tablet(s) Oral daily  NIFEdipine XL 30 milliGRAM(s) Oral daily  sodium bicarbonate 650 milliGRAM(s) Oral three times a day  sodium chloride 0.9%. 1000 milliLiter(s) (100 mL/Hr) IV Continuous <Continuous>        VITALS:  T(F): 97.1 (09-17-23 @ 05:26), Max: 97.1 (09-17-23 @ 05:26)  HR: 71 (09-17-23 @ 05:26)  BP: 116/59 (09-17-23 @ 05:26)  RR: 18 (09-17-23 @ 05:26)  SpO2: --  Wt(kg): --    09-15 @ 07:01  -  09-16 @ 07:00  --------------------------------------------------------  IN: 0 mL / OUT: 2300 mL / NET: -2300 mL    09-16 @ 07:01  -  09-17 @ 07:00  --------------------------------------------------------  IN: 0 mL / OUT: 325 mL / NET: -325 mL      Height (cm): 185.4 (09-16 @ 12:17)  Weight (kg): 79.4 (09-16 @ 12:17)  BMI (kg/m2): 23.1 (09-16 @ 12:17)  BSA (m2): 2.03 (09-16 @ 12:17)    PHYSICAL EXAM:  Constitutional: NAD  HEENT: anicteric sclera, oropharynx clear, MMM  Neck: No JVD  Respiratory: CTAB, no wheezes, rales or rhonchi  Cardiovascular: S1, S2, RRR  Gastrointestinal: BS+, soft, NT/ND  Extremities: No cyanosis or clubbing. No peripheral edema  :  No angelo.   Skin: No rashes    LABS:  09-17    136  |  102  |  48<H>  ----------------------------<  103<H>  4.8   |  22  |  4.3<HH>    Ca    8.1<L>      17 Sep 2023 07:23  Mg     2.3     09-17                            9.3    8.18  )-----------( 222      ( 17 Sep 2023 07:23 )             29.9       Urine Studies:  Urinalysis Basic - ( 17 Sep 2023 07:23 )    Color:  / Appearance:  / SG:  / pH:   Gluc: 103 mg/dL / Ketone:   / Bili:  / Urobili:    Blood:  / Protein:  / Nitrite:    Leuk Esterase:  / RBC:  / WBC    Sq Epi:  / Non Sq Epi:  / Bacteria:           Iron 30, TIBC 143, %sat 21      [09-07-23 @ 09:17]  Ferritin 533      [09-07-23 @ 09:17]  Vitamin D (25OH) 13      [09-07-23 @ 09:17]  Lipid: chol 134, , HDL 30, LDL --      [09-07-23 @ 09:17]          RADIOLOGY & ADDITIONAL STUDIES:   Nephrology progress note    Patient is seen and examined, events over the last 24 h noted .  Lying in bed comfortable     Allergies:  No Known Allergies    Hospital Medications:   MEDICATIONS  (STANDING):  AQUAPHOR (petrolatum Ointment) 1 Application(s) Topical two times a day  atorvastatin 20 milliGRAM(s) Oral at bedtime  ergocalciferol 83249 Unit(s) Oral every week  glucagon  Injectable 1 milliGRAM(s) IntraMuscular once  heparin   Injectable 5000 Unit(s) SubCutaneous every 8 hours  insulin lispro (ADMELOG) corrective regimen sliding scale   SubCutaneous three times a day before meals  magnesium oxide 400 milliGRAM(s) Oral three times a day with meals  multivitamin  Chewable 1 Tablet(s) Oral daily  NIFEdipine XL 30 milliGRAM(s) Oral daily  sodium bicarbonate 650 milliGRAM(s) Oral three times a day  sodium chloride 0.9%. 1000 milliLiter(s) (100 mL/Hr) IV Continuous <Continuous>        VITALS:  T(F): 97.1 (09-17-23 @ 05:26), Max: 97.1 (09-17-23 @ 05:26)  HR: 71 (09-17-23 @ 05:26)  BP: 116/59 (09-17-23 @ 05:26)  RR: 18 (09-17-23 @ 05:26)      09-15 @ 07:01  -  09-16 @ 07:00  --------------------------------------------------------  IN: 0 mL / OUT: 2300 mL / NET: -2300 mL    09-16 @ 07:01  -  09-17 @ 07:00  --------------------------------------------------------  IN: 0 mL / OUT: 325 mL / NET: -325 mL      Height (cm): 185.4 (09-16 @ 12:17)  Weight (kg): 79.4 (09-16 @ 12:17)  BMI (kg/m2): 23.1 (09-16 @ 12:17)  BSA (m2): 2.03 (09-16 @ 12:17)    PHYSICAL EXAM:  Constitutional: NAD  Respiratory: CTAB,  Cardiovascular: S1, S2, RRR  Gastrointestinal: BS+, soft, NT/ND  Extremities: No cyanosis or clubbing. No peripheral edema  :  No angelo.   Skin: No rashes    LABS:  09-17    136  |  102  |  48<H>  ----------------------------<  103<H>  4.8   |  22  |  4.3<HH>  Creatinine Trend: 4.3<--, 4.6<--, 3.9<--, 4.9<--, 4.4<--, 4.2<--  Ca    8.1<L>      17 Sep 2023 07:23  Mg     2.3     09-17                            9.3    8.18  )-----------( 222      ( 17 Sep 2023 07:23 )             29.9       Urine Studies:  Urinalysis Basic - ( 17 Sep 2023 07:23 )    Color:  / Appearance:  / SG:  / pH:   Gluc: 103 mg/dL / Ketone:   / Bili:  / Urobili:    Blood:  / Protein:  / Nitrite:    Leuk Esterase:  / RBC:  / WBC    Sq Epi:  / Non Sq Epi:  / Bacteria:           Iron 30, TIBC 143, %sat 21      [09-07-23 @ 09:17]  Ferritin 533      [09-07-23 @ 09:17]  Vitamin D (25OH) 13      [09-07-23 @ 09:17]  Lipid: chol 134, , HDL 30, LDL --      [09-07-23 @ 09:17]          RADIOLOGY & ADDITIONAL STUDIES:

## 2023-09-17 NOTE — PROGRESS NOTE ADULT - SUBJECTIVE AND OBJECTIVE BOX
Patient is a 68y old  Male who presents with a chief complaint of Renal failure (07 Sep 2023 11:41)    INTERVAL HPI/OVERNIGHT EVENTS: Patient was examined and seen at bedside. This morning pt is resting comfortably in bed and reports no new issues or overnight events. No complaints, feels well. Occasionally refuses IVFs.  ROS: Denies CP, SOB, AP, new weakness  All other systems reviewed and are within normal limits.  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  Patient had blood work done Friday, September 1 which demonstrated a creatinine of 5.7.  Patient and his brother received a phone call from the PCP today and told him to come to the emergency department.  Of note patient states he has had some loose stool about once a day for the past month and according to brother has been eating and drinking less.  Brother also notices for the past 2 weeks has been emptying his Dye less often.  Patient denies any fever, chills, abdominal pain, chest pain, nausea and vomiting   In the ED, HD stable, afebrile, Labs significant for Hgb 10.6 , Cr 5.5  Patient will be admitted for further workup of renal failure  (06 Sep 2023 16:18)    PAST MEDICAL & SURGICAL HISTORY:  DM (diabetes mellitus)      HTN (hypertension)      H/O paraplegia      Spinal abscess      Renal stones      Spinal abscess  S/P Surgery          General: NAD, AAO3  HEENT:  EOMI, no LAD  CV: S1 S2  Resp: decreased breath sounds at bases  GI: NT/ND/S +BS. +SPC  MS: chronic venous stasis w/ b/l skin ulcerations  Neuro: paraplegia b/l LE          Home Medications:  atorvastatin 20 mg oral tablet: 1 orally once a day (06 Sep 2023 16:25)  metFORMIN 500 mg oral tablet: 1 orally 2 times a day (06 Sep 2023 16:25)    MEDICATIONS  (STANDING):  AQUAPHOR (petrolatum Ointment) 1 Application(s) Topical two times a day  atorvastatin 20 milliGRAM(s) Oral at bedtime  chlorhexidine 2% Cloths 1 Application(s) Topical <User Schedule>  dextrose 5%. 1000 milliLiter(s) (50 mL/Hr) IV Continuous <Continuous>  dextrose 5%. 1000 milliLiter(s) (100 mL/Hr) IV Continuous <Continuous>  dextrose 50% Injectable 25 Gram(s) IV Push once  dextrose 50% Injectable 25 Gram(s) IV Push once  dextrose 50% Injectable 12.5 Gram(s) IV Push once  ergocalciferol 39425 Unit(s) Oral every week  glucagon  Injectable 1 milliGRAM(s) IntraMuscular once  heparin   Injectable 5000 Unit(s) SubCutaneous every 8 hours  insulin lispro (ADMELOG) corrective regimen sliding scale   SubCutaneous three times a day before meals  multivitamin  Chewable 1 Tablet(s) Oral daily  NIFEdipine XL 30 milliGRAM(s) Oral daily  sodium bicarbonate 650 milliGRAM(s) Oral three times a day  sodium chloride 0.9%. 1000 milliLiter(s) (100 mL/Hr) IV Continuous <Continuous>    MEDICATIONS  (PRN):  acetaminophen     Tablet .. 650 milliGRAM(s) Oral every 6 hours PRN Mild Pain (1 - 3), Moderate Pain (4 - 6)  bacitracin   Ointment 1 Application(s) Topical two times a day PRN erythema surrounding open wound  dextrose Oral Gel 15 Gram(s) Oral once PRN Blood Glucose LESS THAN 70 milliGRAM(s)/deciliter  guaifenesin/dextromethorphan Oral Liquid 10 milliLiter(s) Oral every 4 hours PRN Cough  ondansetron Injectable 4 milliGRAM(s) IV Push every 6 hours PRN Nausea and/or Vomiting    Vital Signs Last 24 Hrs  T(C): 36.4 (17 Sep 2023 12:51), Max: 36.4 (17 Sep 2023 12:51)  T(F): 97.5 (17 Sep 2023 12:51), Max: 97.5 (17 Sep 2023 12:51)  HR: 77 (17 Sep 2023 12:51) (71 - 77)  BP: 108/62 (17 Sep 2023 12:51) (108/62 - 116/66)  BP(mean): --  RR: 18 (17 Sep 2023 12:51) (18 - 18)  SpO2: --      CAPILLARY BLOOD GLUCOSE      POCT Blood Glucose.: 143 mg/dL (17 Sep 2023 11:22)  POCT Blood Glucose.: 110 mg/dL (17 Sep 2023 07:38)  POCT Blood Glucose.: 213 mg/dL (16 Sep 2023 21:27)  POCT Blood Glucose.: 129 mg/dL (16 Sep 2023 16:19)    LABS:                        9.3    8.18  )-----------( 222      ( 17 Sep 2023 07:23 )             29.9     09-17    136  |  102  |  48<H>  ----------------------------<  103<H>  4.8   |  22  |  4.3<HH>    Ca    8.1<L>      17 Sep 2023 07:23  Mg     2.3     09-17              Urinalysis Basic - ( 17 Sep 2023 07:23 )    Color: x / Appearance: x / SG: x / pH: x  Gluc: 103 mg/dL / Ketone: x  / Bili: x / Urobili: x   Blood: x / Protein: x / Nitrite: x   Leuk Esterase: x / RBC: x / WBC x   Sq Epi: x / Non Sq Epi: x / Bacteria: x              Consultant Notes Reviewed:  [x ] YES  [ ] NO  Care Discussed with Consultants/Other Providers/ Housestaff [ x] YES  [ ] NO  Radiology, labs, new studies personally reviewed.

## 2023-09-17 NOTE — PROGRESS NOTE ADULT - ASSESSMENT
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 2021 1.5   Urine analysis:positive           - CXR:WNL   - No recent nephrotoxic drugs  - s/p 1L fluids in ED  - FENA 1.7 suggestive of ATN, AIN, etc   - US renal w/out hydro/obstruction  - Nephrology on the case  -daily BMP  -strict I/O's  -was on IVFs but held over the weekend   -9/14 started on IVFs  -9/16 d/w renal: ordered GN panel. Awaiting results    2. Normocytic anemia  - Possibly secondary to CKD  -monitor  -no evidence of iron defic    3. Hx of multiple chronic lower extremity wounds   - Healing well, has visiting nurse for dressing 3x/week  - f/u wound care recs  Cleanse wounds with soap and water.   Pat dry apply Xeroform, wrap with Kerlix, twice a day and prn for soiling.   Cont aggressive decub prevention and treatment protocols.    4. Uncontrolled DM w/ hyperglycemia  - On metformin at home?   - Monitor finger sticks, + ISS   - a1c 11.6  -will benefit from more aggressive regimen on d/c    5. Hx of HTN  Hx of HLD  - Was on amlodipine 10 2 years ago but not currently at home   - c/w statin   - Monitor BP     6. Hx of paraplegia due to multiple spinal abscesses in the past  - Supportive care     7. A-Sx Pyuria   - ua:positive  - Observe     8. Vit D defic  supplement    #Diet: Renal  #DVT ppx: Heparin  #Code: DNR/DNI,     #Progress Note Handoff  Pending: Clinical improvement and stability__x__Improved Cr, GN workup, Renal clearance_  Pt/Family discussion: Pt informed and agrees with the current plan  Disposition: Home____    My note supersedes the residents note should a discrepancy arise.    Chart and notes personally reviewed.  Care Discussed with Consultants/Other Providers/ Housestaff [ x] YES [ ] NO   Radiology, labs, old records personally reviewed.    discussed w/ housestaff, nursing, case management    Time-based billing (NON-critical care).     35 minutes spent on total encounter. The necessity of the time spent during the encounter on this date of service was due to:     time spent on review of labs, imaging studies, old records, obtaining history, personally examining patient, counselling and communicating with patient/ family, entering orders for medications/tests/etc, discussions with other health care providers, documentation in electronic health records, independent interpretation of labs, imaging/procedure results and care coordination.

## 2023-09-18 ENCOUNTER — TRANSCRIPTION ENCOUNTER (OUTPATIENT)
Age: 68
End: 2023-09-18

## 2023-09-18 LAB
ANION GAP SERPL CALC-SCNC: 12 MMOL/L — SIGNIFICANT CHANGE UP (ref 7–14)
ANION GAP SERPL CALC-SCNC: 8 MMOL/L — SIGNIFICANT CHANGE UP (ref 7–14)
BUN SERPL-MCNC: 47 MG/DL — HIGH (ref 10–20)
BUN SERPL-MCNC: 50 MG/DL — HIGH (ref 10–20)
C3 SERPL-MCNC: 132 MG/DL — SIGNIFICANT CHANGE UP (ref 81–157)
C4 SERPL-MCNC: 35 MG/DL — SIGNIFICANT CHANGE UP (ref 13–39)
CALCIUM SERPL-MCNC: 7.7 MG/DL — LOW (ref 8.4–10.4)
CALCIUM SERPL-MCNC: 7.7 MG/DL — LOW (ref 8.4–10.5)
CHLORIDE SERPL-SCNC: 104 MMOL/L — SIGNIFICANT CHANGE UP (ref 98–110)
CHLORIDE SERPL-SCNC: 104 MMOL/L — SIGNIFICANT CHANGE UP (ref 98–110)
CO2 SERPL-SCNC: 18 MMOL/L — SIGNIFICANT CHANGE UP (ref 17–32)
CO2 SERPL-SCNC: 23 MMOL/L — SIGNIFICANT CHANGE UP (ref 17–32)
CREAT SERPL-MCNC: 4 MG/DL — HIGH (ref 0.7–1.5)
CREAT SERPL-MCNC: 4.4 MG/DL — CRITICAL HIGH (ref 0.7–1.5)
EGFR: 14 ML/MIN/1.73M2 — LOW
EGFR: 16 ML/MIN/1.73M2 — LOW
GLUCOSE BLDC GLUCOMTR-MCNC: 105 MG/DL — HIGH (ref 70–99)
GLUCOSE BLDC GLUCOMTR-MCNC: 155 MG/DL — HIGH (ref 70–99)
GLUCOSE BLDC GLUCOMTR-MCNC: 246 MG/DL — HIGH (ref 70–99)
GLUCOSE SERPL-MCNC: 111 MG/DL — HIGH (ref 70–99)
GLUCOSE SERPL-MCNC: 148 MG/DL — HIGH (ref 70–99)
HAV IGM SER-ACNC: SIGNIFICANT CHANGE UP
HBV CORE IGM SER-ACNC: SIGNIFICANT CHANGE UP
HBV SURFACE AG SER-ACNC: SIGNIFICANT CHANGE UP
HCV AB S/CO SERPL IA: 0.16 S/CO — SIGNIFICANT CHANGE UP (ref 0–0.99)
HCV AB SERPL-IMP: SIGNIFICANT CHANGE UP
IGA FLD-MCNC: 311 MG/DL — SIGNIFICANT CHANGE UP (ref 84–499)
IGG FLD-MCNC: 2932 MG/DL — HIGH (ref 610–1660)
IGM SERPL-MCNC: 194 MG/DL — SIGNIFICANT CHANGE UP (ref 35–242)
KAPPA LC SER QL IFE: 35.4 MG/DL — HIGH (ref 0.33–1.94)
KAPPA LC SER QL IFE: 35.4 MG/DL — HIGH (ref 0.33–1.94)
KAPPA/LAMBDA FREE LIGHT CHAIN RATIO, SERUM: 0.98 RATIO — SIGNIFICANT CHANGE UP (ref 0.26–1.65)
KAPPA/LAMBDA FREE LIGHT CHAIN RATIO, SERUM: 0.98 RATIO — SIGNIFICANT CHANGE UP (ref 0.26–1.65)
LAMBDA LC SER QL IFE: 35.96 MG/DL — HIGH (ref 0.57–2.63)
LAMBDA LC SER QL IFE: 35.96 MG/DL — HIGH (ref 0.57–2.63)
POTASSIUM SERPL-MCNC: 4.4 MMOL/L — SIGNIFICANT CHANGE UP (ref 3.5–5)
POTASSIUM SERPL-MCNC: 5.4 MMOL/L — HIGH (ref 3.5–5)
POTASSIUM SERPL-SCNC: 4.4 MMOL/L — SIGNIFICANT CHANGE UP (ref 3.5–5)
POTASSIUM SERPL-SCNC: 5.4 MMOL/L — HIGH (ref 3.5–5)
SODIUM SERPL-SCNC: 134 MMOL/L — LOW (ref 135–146)
SODIUM SERPL-SCNC: 135 MMOL/L — SIGNIFICANT CHANGE UP (ref 135–146)

## 2023-09-18 PROCEDURE — 99239 HOSP IP/OBS DSCHRG MGMT >30: CPT

## 2023-09-18 RX ORDER — ERGOCALCIFEROL 1.25 MG/1
1 CAPSULE ORAL
Qty: 13 | Refills: 0
Start: 2023-09-18 | End: 2023-12-16

## 2023-09-18 RX ORDER — METFORMIN HYDROCHLORIDE 850 MG/1
1 TABLET ORAL
Refills: 0 | DISCHARGE

## 2023-09-18 RX ORDER — PETROLATUM,WHITE
1 JELLY (GRAM) TOPICAL
Qty: 0 | Refills: 0 | DISCHARGE
Start: 2023-09-18

## 2023-09-18 RX ORDER — ATORVASTATIN CALCIUM 80 MG/1
1 TABLET, FILM COATED ORAL
Qty: 0 | Refills: 0 | DISCHARGE
Start: 2023-09-18

## 2023-09-18 RX ORDER — BACITRACIN ZINC 500 UNIT/G
1 OINTMENT IN PACKET (EA) TOPICAL
Qty: 0 | Refills: 0 | DISCHARGE
Start: 2023-09-18

## 2023-09-18 RX ORDER — SODIUM BICARBONATE 1 MEQ/ML
1 SYRINGE (ML) INTRAVENOUS
Qty: 270 | Refills: 3
Start: 2023-09-18 | End: 2024-09-11

## 2023-09-18 RX ORDER — ATORVASTATIN CALCIUM 80 MG/1
1 TABLET, FILM COATED ORAL
Refills: 0 | DISCHARGE

## 2023-09-18 RX ORDER — NIFEDIPINE 30 MG
1 TABLET, EXTENDED RELEASE 24 HR ORAL
Qty: 90 | Refills: 3
Start: 2023-09-18 | End: 2024-09-11

## 2023-09-18 RX ADMIN — HEPARIN SODIUM 5000 UNIT(S): 5000 INJECTION INTRAVENOUS; SUBCUTANEOUS at 05:04

## 2023-09-18 RX ADMIN — Medication 1 APPLICATION(S): at 05:08

## 2023-09-18 RX ADMIN — Medication 2: at 17:00

## 2023-09-18 RX ADMIN — Medication 650 MILLIGRAM(S): at 14:05

## 2023-09-18 RX ADMIN — Medication 30 MILLIGRAM(S): at 05:04

## 2023-09-18 RX ADMIN — Medication 1 APPLICATION(S): at 17:13

## 2023-09-18 RX ADMIN — Medication 4: at 11:39

## 2023-09-18 RX ADMIN — CHLORHEXIDINE GLUCONATE 1 APPLICATION(S): 213 SOLUTION TOPICAL at 05:07

## 2023-09-18 RX ADMIN — Medication 1 TABLET(S): at 11:40

## 2023-09-18 RX ADMIN — Medication 650 MILLIGRAM(S): at 05:04

## 2023-09-18 RX ADMIN — HEPARIN SODIUM 5000 UNIT(S): 5000 INJECTION INTRAVENOUS; SUBCUTANEOUS at 14:05

## 2023-09-18 NOTE — DISCHARGE NOTE PROVIDER - NSDCMRMEDTOKEN_GEN_ALL_CORE_FT
atorvastatin 20 mg oral tablet: 1 orally once a day  metFORMIN 500 mg oral tablet: 1 orally 2 times a day   atorvastatin 20 mg oral tablet: 1 tab(s) orally once a day (at bedtime)  bacitracin 500 units/g topical ointment: 1 Apply topically to affected area 2 times a day As needed erythema surrounding open wound  NIFEdipine 30 mg oral tablet, extended release: 1 tab(s) orally once a day  petrolatum topical ointment: 1 Apply topically to affected area 2 times a day  sodium bicarbonate 650 mg oral tablet: 1 tab(s) orally 3 times a day  Vitamin D2 1.25 mg (50,000 intl units) oral capsule: 1 cap(s) orally once a week

## 2023-09-18 NOTE — PROGRESS NOTE ADULT - REASON FOR ADMISSION
Renal failure

## 2023-09-18 NOTE — PROGRESS NOTE ADULT - ASSESSMENT
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 2021 1.5   Urine analysis:positive           - CXR:WNL   - No recent nephrotoxic drugs  - s/p 1L fluids in ED  - FENA 1.7 suggestive of ATN, AIN, etc   - US renal w/out hydro/obstruction  - Nephrology on the case  -daily BMP  -strict I/O's  -was on IVFs but held over the weekend   -9/14 started on IVFs  -9/16 d/w renal: ordered GN panel. Awaiting results  -9/18 d/w renal. Cr plateaued. Nephrology cleared for outpt f/u    2. Normocytic anemia  - Possibly secondary to CKD  -monitor  -no evidence of iron defic    3. Hx of multiple chronic lower extremity wounds   - Healing well, has visiting nurse for dressing 3x/week  - f/u wound care recs  Cleanse wounds with soap and water.   Pat dry apply Xeroform, wrap with Kerlix, twice a day and prn for soiling.   Cont aggressive decub prevention and treatment protocols.    4. Uncontrolled DM w/ hyperglycemia  - On metformin at home?   - Monitor finger sticks, + ISS   - a1c 11.6    5. Hx of HTN  Hx of HLD  - Was on amlodipine 10 2 years ago but not currently at home   - c/w statin   - Monitor BP     6. Hx of paraplegia due to multiple spinal abscesses in the past  - Supportive care     7. A-Sx Pyuria   - ua:positive  - Observe     8. Vit D defic  supplement    #Diet: Renal  #DVT ppx: Heparin  #Code: DNR/DNI,     Discharge instructions discussed and patient knows when to seek immediate medical attention.  Patient has proper follow up.  All results discussed and patient aware they require further follow up/work up.  Stressed importance of proper follow up.  Medications prescribed and changes discussed.  All questions and concerns from patient addressed. Understanding of instructions verbalized. PGY2 gave the same detailed instructions to the brother    My note supersedes the residents note should a discrepancy arise.    Chart and notes personally reviewed.  Care Discussed with Consultants/Other Providers/ Housestaff [ x] YES [ ] NO   Radiology, labs, old records personally reviewed.    discussed w/ housestaff, nursing, case management, nephrology    Time-based billing (NON-critical care).     35 minutes spent on total encounter. The necessity of the time spent during the encounter on this date of service was due to:     time spent on review of labs, imaging studies, old records, obtaining history, personally examining patient, counselling and communicating with patient/ family, entering orders for medications/tests/etc, discussions with other health care providers, documentation in electronic health records, independent interpretation of labs, imaging/procedure results and care coordination.

## 2023-09-18 NOTE — PROGRESS NOTE ADULT - SUBJECTIVE AND OBJECTIVE BOX
Nephrology progress note    THIS IS AN INCOMPLETE NOTE . FULL NOTE TO FOLLOW SHORTLY    Patient is seen and examined, events over the last 24 h noted .    Allergies:  No Known Allergies    Hospital Medications:   MEDICATIONS  (STANDING):  AQUAPHOR (petrolatum Ointment) 1 Application(s) Topical two times a day  atorvastatin 20 milliGRAM(s) Oral at bedtime  chlorhexidine 2% Cloths 1 Application(s) Topical <User Schedule>  dextrose 5%. 1000 milliLiter(s) (50 mL/Hr) IV Continuous <Continuous>  dextrose 5%. 1000 milliLiter(s) (100 mL/Hr) IV Continuous <Continuous>  dextrose 50% Injectable 25 Gram(s) IV Push once  dextrose 50% Injectable 12.5 Gram(s) IV Push once  dextrose 50% Injectable 25 Gram(s) IV Push once  ergocalciferol 80208 Unit(s) Oral every week  glucagon  Injectable 1 milliGRAM(s) IntraMuscular once  heparin   Injectable 5000 Unit(s) SubCutaneous every 8 hours  insulin lispro (ADMELOG) corrective regimen sliding scale   SubCutaneous three times a day before meals  multivitamin  Chewable 1 Tablet(s) Oral daily  NIFEdipine XL 30 milliGRAM(s) Oral daily  sodium bicarbonate 650 milliGRAM(s) Oral three times a day  sodium chloride 0.9%. 1000 milliLiter(s) (100 mL/Hr) IV Continuous <Continuous>        VITALS:  T(F): 97.3 (09-18-23 @ 05:14), Max: 100.3 (09-17-23 @ 20:37)  HR: 73 (09-18-23 @ 05:14)  BP: 132/63 (09-18-23 @ 05:14)  RR: 18 (09-18-23 @ 05:14)  SpO2: --  Wt(kg): --    09-16 @ 07:01  -  09-17 @ 07:00  --------------------------------------------------------  IN: 0 mL / OUT: 325 mL / NET: -325 mL    09-17 @ 07:01  -  09-18 @ 07:00  --------------------------------------------------------  IN: 0 mL / OUT: 1150 mL / NET: -1150 mL      Height (cm): 185.4 (09-17 @ 13:46)  Weight (kg): 79.4 (09-17 @ 13:46)  BMI (kg/m2): 23.1 (09-17 @ 13:46)  BSA (m2): 2.03 (09-17 @ 13:46)    PHYSICAL EXAM:  Constitutional: NAD  HEENT: anicteric sclera, oropharynx clear, MMM  Neck: No JVD  Respiratory: CTAB, no wheezes, rales or rhonchi  Cardiovascular: S1, S2, RRR  Gastrointestinal: BS+, soft, NT/ND  Extremities: No cyanosis or clubbing. No peripheral edema  :  No angelo.   Skin: No rashes    LABS:  09-18    134<L>  |  104  |  50<H>  ----------------------------<  111<H>  5.4<H>   |  18  |  4.4<HH>    Ca    7.7<L>      18 Sep 2023 06:47  Mg     2.3     09-17                            9.3    8.18  )-----------( 222      ( 17 Sep 2023 07:23 )             29.9       Urine Studies:  Urinalysis Basic - ( 18 Sep 2023 06:47 )    Color:  / Appearance:  / SG:  / pH:   Gluc: 111 mg/dL / Ketone:   / Bili:  / Urobili:    Blood:  / Protein:  / Nitrite:    Leuk Esterase:  / RBC:  / WBC    Sq Epi:  / Non Sq Epi:  / Bacteria:           Iron 30, TIBC 143, %sat 21      [09-07-23 @ 09:17]  Ferritin 533      [09-07-23 @ 09:17]  Vitamin D (25OH) 13      [09-07-23 @ 09:17]  Lipid: chol 134, , HDL 30, LDL --      [09-07-23 @ 09:17]          RADIOLOGY & ADDITIONAL STUDIES:   Nephrology progress note    Patient is seen and examined, events over the last 24 h noted .  Lying in bed comfortable     Allergies:  No Known Allergies    Hospital Medications:   MEDICATIONS  (STANDING):  AQUAPHOR (petrolatum Ointment) 1 Application(s) Topical two times a day  atorvastatin 20 milliGRAM(s) Oral at bedtime  ergocalciferol 97361 Unit(s) Oral every week  glucagon  Injectable 1 milliGRAM(s) IntraMuscular once  heparin   Injectable 5000 Unit(s) SubCutaneous every 8 hours  insulin lispro (ADMELOG) corrective regimen sliding scale   SubCutaneous three times a day before meals  multivitamin  Chewable 1 Tablet(s) Oral daily  NIFEdipine XL 30 milliGRAM(s) Oral daily  sodium bicarbonate 650 milliGRAM(s) Oral three times a day  sodium chloride 0.9%. 1000 milliLiter(s) (100 mL/Hr) IV Continuous <Continuous>        VITALS:  T(F): 97.3 (09-18-23 @ 05:14), Max: 100.3 (09-17-23 @ 20:37)  HR: 73 (09-18-23 @ 05:14)  BP: 132/63 (09-18-23 @ 05:14)  RR: 18 (09-18-23 @ 05:14)      09-16 @ 07:01  -  09-17 @ 07:00  --------------------------------------------------------  IN: 0 mL / OUT: 325 mL / NET: -325 mL    09-17 @ 07:01  -  09-18 @ 07:00  --------------------------------------------------------  IN: 0 mL / OUT: 1150 mL / NET: -1150 mL      Height (cm): 185.4 (09-17 @ 13:46)  Weight (kg): 79.4 (09-17 @ 13:46)  BMI (kg/m2): 23.1 (09-17 @ 13:46)  BSA (m2): 2.03 (09-17 @ 13:46)    PHYSICAL EXAM:  Constitutional: NAD  Respiratory: CTAB,  Cardiovascular: S1, S2, RRR  Gastrointestinal: BS+, soft, NT/ND  Extremities: No cyanosis or clubbing. No peripheral edema  :  No angelo.   Skin: No rashes    LABS:  09-18    134<L>  |  104  |  50<H>  ----------------------------<  111<H>  5.4<H>   |  18  |  4.4<HH>    Ca    7.7<L>      18 Sep 2023 06:47  Mg     2.3     09-17                            9.3    8.18  )-----------( 222      ( 17 Sep 2023 07:23 )             29.9       Urine Studies:  Urinalysis Basic - ( 18 Sep 2023 06:47 )    Color:  / Appearance:  / SG:  / pH:   Gluc: 111 mg/dL / Ketone:   / Bili:  / Urobili:    Blood:  / Protein:  / Nitrite:    Leuk Esterase:  / RBC:  / WBC    Sq Epi:  / Non Sq Epi:  / Bacteria:           Iron 30, TIBC 143, %sat 21      [09-07-23 @ 09:17]  Ferritin 533      [09-07-23 @ 09:17]  Vitamin D (25OH) 13      [09-07-23 @ 09:17]  Lipid: chol 134, , HDL 30, LDL --      [09-07-23 @ 09:17]          RADIOLOGY & ADDITIONAL STUDIES:

## 2023-09-18 NOTE — PROGRESS NOTE ADULT - PROVIDER SPECIALTY LIST ADULT
Hospitalist
Nephrology
Nephrology
Hospitalist
Hospitalist
Internal Medicine
Nephrology
Hospitalist
Nephrology
Nephrology
Hospitalist
Nephrology
Hospitalist

## 2023-09-18 NOTE — DISCHARGE NOTE PROVIDER - CARE PROVIDER_API CALL
David Thakur  Nephrology  [address] [place] [id number]  Phone: [phone]  Fax: [phone]  Follow Up Time:    David Thakur  Nephrology  [address] [place] [id number]  Phone: [phone]  Fax: [phone]  Follow Up Time: 1 week    Kwaku Hernandez  Phone: (   )    -  Fax: (   )    -  Follow Up Time: 1 week

## 2023-09-18 NOTE — DISCHARGE NOTE NURSING/CASE MANAGEMENT/SOCIAL WORK - PATIENT PORTAL LINK FT
You can access the FollowMyHealth Patient Portal offered by [facility] by registering at the following website: [email or web address]. By joining [employer]’s FollowMyHealth portal, you will also be able to view your health information using other applications (apps) compatible with our system.

## 2023-09-18 NOTE — PROGRESS NOTE ADULT - SUBJECTIVE AND OBJECTIVE BOX
Patient is a 68y old  Male who presents with a chief complaint of Renal failure (07 Sep 2023 11:41)    INTERVAL HPI/OVERNIGHT EVENTS: Patient was examined and seen at bedside. This morning pt is resting comfortably in bed and reports no new issues or overnight events. No complaints, feels well.   ROS: Denies CP, SOB, AP, new weakness  All other systems reviewed and are within normal limits.  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  Patient had blood work done Friday, September 1 which demonstrated a creatinine of 5.7.  Patient and his brother received a phone call from the PCP today and told him to come to the emergency department.  Of note patient states he has had some loose stool about once a day for the past month and according to brother has been eating and drinking less.  Brother also notices for the past 2 weeks has been emptying his Yde less often.  Patient denies any fever, chills, abdominal pain, chest pain, nausea and vomiting   In the ED, HD stable, afebrile, Labs significant for Hgb 10.6 , Cr 5.5  Patient will be admitted for further workup of renal failure  (06 Sep 2023 16:18)    PAST MEDICAL & SURGICAL HISTORY:  DM (diabetes mellitus)      HTN (hypertension)      H/O paraplegia      Spinal abscess      Renal stones      Spinal abscess  S/P Surgery          General: NAD, AAO3  HEENT:  EOMI, no LAD  CV: S1 S2  Resp: decreased breath sounds at bases  GI: NT/ND/S +BS. +SPC  MS: chronic venous stasis w/ b/l skin ulcerations  Neuro: paraplegia b/l LE          Home Medications:  atorvastatin 20 mg oral tablet: 1 tab(s) orally once a day (at bedtime) (18 Sep 2023 15:24)  bacitracin 500 units/g topical ointment: 1 Apply topically to affected area 2 times a day As needed erythema surrounding open wound (18 Sep 2023 16:15)  petrolatum topical ointment: 1 Apply topically to affected area 2 times a day (18 Sep 2023 16:15)    MEDICATIONS  (STANDING):  AQUAPHOR (petrolatum Ointment) 1 Application(s) Topical two times a day  atorvastatin 20 milliGRAM(s) Oral at bedtime  chlorhexidine 2% Cloths 1 Application(s) Topical <User Schedule>  dextrose 5%. 1000 milliLiter(s) (50 mL/Hr) IV Continuous <Continuous>  dextrose 5%. 1000 milliLiter(s) (100 mL/Hr) IV Continuous <Continuous>  dextrose 50% Injectable 25 Gram(s) IV Push once  dextrose 50% Injectable 12.5 Gram(s) IV Push once  dextrose 50% Injectable 25 Gram(s) IV Push once  ergocalciferol 56068 Unit(s) Oral every week  glucagon  Injectable 1 milliGRAM(s) IntraMuscular once  heparin   Injectable 5000 Unit(s) SubCutaneous every 8 hours  insulin lispro (ADMELOG) corrective regimen sliding scale   SubCutaneous three times a day before meals  multivitamin  Chewable 1 Tablet(s) Oral daily  NIFEdipine XL 30 milliGRAM(s) Oral daily  sodium bicarbonate 650 milliGRAM(s) Oral three times a day  sodium chloride 0.9%. 1000 milliLiter(s) (100 mL/Hr) IV Continuous <Continuous>    MEDICATIONS  (PRN):  acetaminophen     Tablet .. 650 milliGRAM(s) Oral every 6 hours PRN Mild Pain (1 - 3), Moderate Pain (4 - 6)  bacitracin   Ointment 1 Application(s) Topical two times a day PRN erythema surrounding open wound  dextrose Oral Gel 15 Gram(s) Oral once PRN Blood Glucose LESS THAN 70 milliGRAM(s)/deciliter  guaifenesin/dextromethorphan Oral Liquid 10 milliLiter(s) Oral every 4 hours PRN Cough  ondansetron Injectable 4 milliGRAM(s) IV Push every 6 hours PRN Nausea and/or Vomiting    Vital Signs Last 24 Hrs  T(C): 36.6 (18 Sep 2023 13:18), Max: 36.6 (18 Sep 2023 13:18)  T(F): 97.9 (18 Sep 2023 13:18), Max: 97.9 (18 Sep 2023 13:18)  HR: 77 (18 Sep 2023 13:18) (73 - 77)  BP: 148/72 (18 Sep 2023 13:18) (132/63 - 148/72)  BP(mean): 103 (18 Sep 2023 13:18) (103 - 103)  RR: 18 (18 Sep 2023 13:18) (18 - 18)  SpO2: --      CAPILLARY BLOOD GLUCOSE      POCT Blood Glucose.: 155 mg/dL (18 Sep 2023 16:52)  POCT Blood Glucose.: 246 mg/dL (18 Sep 2023 11:05)  POCT Blood Glucose.: 105 mg/dL (18 Sep 2023 07:32)  POCT Blood Glucose.: 114 mg/dL (17 Sep 2023 21:46)    LABS:                        9.3    8.18  )-----------( 222      ( 17 Sep 2023 07:23 )             29.9     09-18    135  |  104  |  47<H>  ----------------------------<  148<H>  4.4   |  23  |  4.0<H>    Ca    7.7<L>      18 Sep 2023 16:22  Mg     2.3     09-17              Urinalysis Basic - ( 18 Sep 2023 16:22 )    Color: x / Appearance: x / SG: x / pH: x  Gluc: 148 mg/dL / Ketone: x  / Bili: x / Urobili: x   Blood: x / Protein: x / Nitrite: x   Leuk Esterase: x / RBC: x / WBC x   Sq Epi: x / Non Sq Epi: x / Bacteria: x              Consultant Notes Reviewed:  [x ] YES  [ ] NO  Care Discussed with Consultants/Other Providers/ Housestaff [ x] YES  [ ] NO  Radiology, labs, new studies personally reviewed.

## 2023-09-18 NOTE — DISCHARGE NOTE PROVIDER - NSDCCPCAREPLAN_GEN_ALL_CORE_FT
PRINCIPAL DISCHARGE DIAGNOSIS  Diagnosis: Acute renal failure  Assessment and Plan of Treatment: You were evaluated in the hospital for your high creatinine which indicates poor kidney function. It was initially suspected that the kidney damage was because of dehydration over the past month, but your kidney function did not improve to your previous baseline. Your creatinine stayed between 4-5, as compared to your previous 1.5 in 1/2023 and compared to 5.5 on admission.   A panel of blood work was sent and will need to be followed up as an outpatient with Nephrology.   After discharge, you will need to:   - Follow up with your primary care doctor within 1-2 weeks and have blood work within 1 week to check your kidney function   - Follow up with Nephrology within 2 weeks  - Take all medications as prescribed unless otherwise instructed   ** Please follow up with your providers by calling them to make an appointment so that you can see them in 1-2weeks; bring your paperwork from this hospital stay to that visit. You can access your visit information by signing up for an account for the patient portal at [email or web address]   ** Seek immediate medical attention if you develop fevers, chills, chest pain, shortness of breath, nausea and vomiting, abdominal pain, passing out, weakness or numbness or tingling on one side of your body, or any other concerning signs or symptoms.     PRINCIPAL DISCHARGE DIAGNOSIS  Diagnosis: Acute renal failure  Assessment and Plan of Treatment: You were evaluated in the hospital for your high creatinine which indicates poor kidney function. It was initially suspected that the kidney damage was because of dehydration over the past month, but your kidney function did not improve to your previous baseline. Your creatinine stayed between 4-5, as compared to your previous 1.5 in 1/2023 and compared to 5.5 on admission.   A panel of blood work was sent and will need to be followed up as an outpatient with Nephrology.   After discharge, you will need to:   - Follow up with your primary care doctor within 1-2 weeks and have blood work within 1 week to check your kidney function   - Follow up with Nephrology within 1-2 weeks  - Take all medications as prescribed unless otherwise instructed  - avoid eating bananas, tomatoes, potatoes as they have high amount of potassium.   ** Please follow up with your providers by calling them to make an appointment so that you can see them in 1-2weeks; bring your paperwork from this hospital stay to that visit. You can access your visit information by signing up for an account for the patient portal at [email or web address]   ** Seek immediate medical attention if you develop fevers, chills, chest pain, shortness of breath, nausea and vomiting, abdominal pain, passing out, weakness or numbness or tingling on one side of your body, or any other concerning signs or symptoms.      SECONDARY DISCHARGE DIAGNOSES  Diagnosis: Diabetes  Assessment and Plan of Treatment: please stop taking Metformin as it is contraindicated in advanced kidney dz. Please monitor your sugars at home and bring results to PMD.    Diagnosis: Hypertension  Assessment and Plan of Treatment: we started you on Procardia. Please monitor BP at home    Diagnosis: Multiple open wounds of lower leg  Assessment and Plan of Treatment: Cleanse wounds with soap and water.   Pat dry apply Xeroform, wrap with Kerlix, twice a day and as needed for soiling.   Apply moisturizer to periwound  Recommend follow up with Vascular  Maintain pressure injury prevention.   Keep skin clean.   Maintain incontinence care.   Monitor wounds for changes and notify your doctors
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
## 2023-09-18 NOTE — DISCHARGE NOTE NURSING/CASE MANAGEMENT/SOCIAL WORK - NSDCPEFALRISK_GEN_ALL_CORE
For information on Fall & Injury Prevention, visit: [email or web address] OR  [email or web address] OR  https://www.cdc.gov/steadi/patient.html

## 2023-09-18 NOTE — DISCHARGE NOTE PROVIDER - PROVIDER TOKENS
PROVIDER:[TOKEN:[[id number]]] PROVIDER:[TOKEN:[[id number]],FOLLOWUP:[1 week]],FREE:[LAST:[[place]],FIRST:[[place]],PHONE:[(   )    -],FAX:[(   )    -],FOLLOWUP:[1 week]]

## 2023-09-18 NOTE — DISCHARGE NOTE PROVIDER - CARE PROVIDERS DIRECT ADDRESSES
[email or web address]

## 2023-09-18 NOTE — PROGRESS NOTE ADULT - ASSESSMENT
68 year old male with PMH HTN, DM, spinal abscesses leading to quadriplegia, incontinence w suprapubic catheter, presented to ED after lab call back for creatinine of 5.5 in setting of diarrhea x7days, poor PO intake. Admitted for management of prerenal JOÃO.   #JOÃO, ?prerenal vs. ATN  non oliguric  crea noted trend around mid 4   continue sodium bicarbonate po   proteinuria could be related to DM / send GN w/up: check c3/ c4/ TAY/ dna/ IF, k/l, pla2r, hepatitis profile, ANCA   ph 3.8/ no binders   sono : no hydro/ stone  bp controlled   start TACHO after venofer course  no acute indication for RRT   if discharged work up above needs to be done as OP   will follow

## 2023-09-18 NOTE — DISCHARGE NOTE PROVIDER - NSDCFUSCHEDAPPT_GEN_ALL_CORE_FT
[facility]ician [facility]  UROLOGY [address] [place] [address]  Scheduled Appointment: 09/26/2023

## 2023-09-19 VITALS
HEART RATE: 83 BPM | TEMPERATURE: 96 F | DIASTOLIC BLOOD PRESSURE: 95 MMHG | SYSTOLIC BLOOD PRESSURE: 167 MMHG | RESPIRATION RATE: 18 BRPM

## 2023-09-19 LAB
ANA TITR SER: NEGATIVE — SIGNIFICANT CHANGE UP
DSDNA AB SER-ACNC: <12 IU/ML — SIGNIFICANT CHANGE UP
MPO AB + PR3 PNL SER: SIGNIFICANT CHANGE UP
PROT SERPL-MCNC: 7.4 G/DL — SIGNIFICANT CHANGE UP (ref 6–8.3)
PROT SERPL-MCNC: 7.4 G/DL — SIGNIFICANT CHANGE UP (ref 6–8.3)

## 2023-09-20 LAB
AUTO DIFF PNL BLD: NEGATIVE — SIGNIFICANT CHANGE UP
C-ANCA SER-ACNC: NEGATIVE — SIGNIFICANT CHANGE UP
P-ANCA SER-ACNC: NEGATIVE — SIGNIFICANT CHANGE UP
PHOSPHOLIPASE A2 RECEPTOR ELISA: <1.8 RU/ML — SIGNIFICANT CHANGE UP (ref 0–19.9)

## 2023-09-26 ENCOUNTER — APPOINTMENT (OUTPATIENT)
Dept: UROLOGY | Facility: CLINIC | Age: 68
End: 2023-09-26
Payer: MEDICARE

## 2023-09-26 LAB
% ALBUMIN: 30.2 % — SIGNIFICANT CHANGE UP
% ALPHA 1: 6.7 % — SIGNIFICANT CHANGE UP
% ALPHA 2: 12.1 % — SIGNIFICANT CHANGE UP
% BETA: 12.6 % — SIGNIFICANT CHANGE UP
% GAMMA: 38.4 % — SIGNIFICANT CHANGE UP
ALBUMIN SERPL ELPH-MCNC: 2.2 G/DL — LOW (ref 3.6–5.5)
ALBUMIN/GLOB SERPL ELPH: 0.4 RATIO — SIGNIFICANT CHANGE UP
ALPHA1 GLOB SERPL ELPH-MCNC: 0.5 G/DL — HIGH (ref 0.1–0.4)
ALPHA2 GLOB SERPL ELPH-MCNC: 0.9 G/DL — SIGNIFICANT CHANGE UP (ref 0.5–1)
B-GLOBULIN SERPL ELPH-MCNC: 0.9 G/DL — SIGNIFICANT CHANGE UP (ref 0.5–1)
GAMMA GLOBULIN: 2.8 G/DL — HIGH (ref 0.6–1.6)
INTERPRETATION SERPL IFE-IMP: SIGNIFICANT CHANGE UP
PROT PATTERN SERPL ELPH-IMP: SIGNIFICANT CHANGE UP

## 2023-09-26 PROCEDURE — 99214 OFFICE O/P EST MOD 30 MIN: CPT

## 2023-09-27 NOTE — CDI QUERY NOTE - NSCDIOTHERTXTBX_GEN_ALL_CORE_HH
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 Hx of paraplegia due to multiple spinal abscesses in the past  - Supportive care      9/7/2023:  Adult Assessment and Intervention:   Registered Nurse:  Putting on and taking off regular lower body clothing?: 2 = A lot of assistance  Bathing (including washing, rinsing, drying)?: 2 = A lot of assistance  Toileting, which includes using toilet, bedpan or urinal?: 2 = A lot of assistance  Putting on and taking off regular upper body clothing?: 2 = A lot of assistance  Take care of personal grooming such as brushing teeth?: 4 = No assist / stand by assistance  Eating meals?: 4 = No assist / stand by assistance      Score: 16  Diet/Tolerance  Feeding Assistance: tray set-up      Thank you.

## 2023-10-03 DIAGNOSIS — Z79.84 LONG TERM (CURRENT) USE OF ORAL HYPOGLYCEMIC DRUGS: ICD-10-CM

## 2023-10-03 DIAGNOSIS — D63.1 ANEMIA IN CHRONIC KIDNEY DISEASE: ICD-10-CM

## 2023-10-03 DIAGNOSIS — I87.8 OTHER SPECIFIED DISORDERS OF VEINS: ICD-10-CM

## 2023-10-03 DIAGNOSIS — E11.65 TYPE 2 DIABETES MELLITUS WITH HYPERGLYCEMIA: ICD-10-CM

## 2023-10-03 DIAGNOSIS — G82.20 PARAPLEGIA, UNSPECIFIED: ICD-10-CM

## 2023-10-03 DIAGNOSIS — N17.0 ACUTE KIDNEY FAILURE WITH TUBULAR NECROSIS: ICD-10-CM

## 2023-10-03 DIAGNOSIS — E55.9 VITAMIN D DEFICIENCY, UNSPECIFIED: ICD-10-CM

## 2023-10-03 DIAGNOSIS — E11.22 TYPE 2 DIABETES MELLITUS WITH DIABETIC CHRONIC KIDNEY DISEASE: ICD-10-CM

## 2023-10-03 DIAGNOSIS — E86.0 DEHYDRATION: ICD-10-CM

## 2023-10-03 DIAGNOSIS — N18.31 CHRONIC KIDNEY DISEASE, STAGE 3A: ICD-10-CM

## 2023-10-03 DIAGNOSIS — I12.9 HYPERTENSIVE CHRONIC KIDNEY DISEASE WITH STAGE 1 THROUGH STAGE 4 CHRONIC KIDNEY DISEASE, OR UNSPECIFIED CHRONIC KIDNEY DISEASE: ICD-10-CM

## 2023-10-03 DIAGNOSIS — E78.5 HYPERLIPIDEMIA, UNSPECIFIED: ICD-10-CM

## 2023-10-03 DIAGNOSIS — E87.20 ACIDOSIS, UNSPECIFIED: ICD-10-CM

## 2023-10-25 ENCOUNTER — APPOINTMENT (OUTPATIENT)
Dept: UROLOGY | Facility: CLINIC | Age: 68
End: 2023-10-25
Payer: MEDICARE

## 2023-10-25 PROCEDURE — 51705 CHANGE OF BLADDER TUBE: CPT

## 2023-11-07 ENCOUNTER — APPOINTMENT (OUTPATIENT)
Dept: UROLOGY | Facility: CLINIC | Age: 68
End: 2023-11-07

## 2023-11-14 ENCOUNTER — APPOINTMENT (OUTPATIENT)
Dept: UROLOGY | Facility: CLINIC | Age: 68
End: 2023-11-14
Payer: MEDICARE

## 2023-11-14 PROCEDURE — 51705 CHANGE OF BLADDER TUBE: CPT

## 2023-12-14 ENCOUNTER — APPOINTMENT (OUTPATIENT)
Dept: UROLOGY | Facility: CLINIC | Age: 68
End: 2023-12-14
Payer: MEDICARE

## 2023-12-14 PROCEDURE — 51705 CHANGE OF BLADDER TUBE: CPT

## 2024-01-17 ENCOUNTER — APPOINTMENT (OUTPATIENT)
Dept: UROLOGY | Facility: CLINIC | Age: 69
End: 2024-01-17
Payer: MEDICARE

## 2024-01-17 PROCEDURE — 51705 CHANGE OF BLADDER TUBE: CPT

## 2024-02-15 ENCOUNTER — APPOINTMENT (OUTPATIENT)
Dept: UROLOGY | Facility: CLINIC | Age: 69
End: 2024-02-15
Payer: MEDICARE

## 2024-02-15 DIAGNOSIS — R33.9 RETENTION OF URINE, UNSPECIFIED: ICD-10-CM

## 2024-02-15 PROCEDURE — 51705 CHANGE OF BLADDER TUBE: CPT

## 2024-03-14 ENCOUNTER — APPOINTMENT (OUTPATIENT)
Dept: UROLOGY | Facility: CLINIC | Age: 69
End: 2024-03-14
Payer: MEDICARE

## 2024-03-14 PROCEDURE — 51705 CHANGE OF BLADDER TUBE: CPT

## 2024-03-15 LAB
PSA FREE FLD-MCNC: 16 %
PSA FREE SERPL-MCNC: 0.81 NG/ML
PSA SERPL-MCNC: 4.99 NG/ML

## 2024-04-11 ENCOUNTER — APPOINTMENT (OUTPATIENT)
Dept: UROLOGY | Facility: CLINIC | Age: 69
End: 2024-04-11
Payer: MEDICARE

## 2024-04-11 PROCEDURE — 99213 OFFICE O/P EST LOW 20 MIN: CPT | Mod: 24

## 2024-04-11 PROCEDURE — G2211 COMPLEX E/M VISIT ADD ON: CPT

## 2024-04-11 PROCEDURE — 51705 CHANGE OF BLADDER TUBE: CPT

## 2024-05-15 ENCOUNTER — APPOINTMENT (OUTPATIENT)
Dept: UROLOGY | Facility: CLINIC | Age: 69
End: 2024-05-15
Payer: MEDICARE

## 2024-05-15 PROCEDURE — 51705 CHANGE OF BLADDER TUBE: CPT

## 2024-06-08 NOTE — H&P ADULT - NSHPLANGLIMITEDENGLISH_GEN_A_CORE
"Goal Outcome Evaluation:      Plan of Care Reviewed With: patient    Overall Patient Progress: no change Overall Patient Progress: no change    Outcome Evaluation: Pt confused but calm and cooperative. PSC at bedside.    Drowsy, confused. Requires reorientation. Slept well most of night. Voiding spontaneously. Discharge TBD.    Problem: Adult Inpatient Plan of Care  Goal: Plan of Care Review  Description: The Plan of Care Review/Shift note should be completed every shift.  The Outcome Evaluation is a brief statement about your assessment that the patient is improving, declining, or no change.  This information will be displayed automatically on your shift  note.  Outcome: Progressing  Flowsheets (Taken 6/8/2024 0348)  Outcome Evaluation: Pt confused but calm and cooperative. PSC at bedside.  Plan of Care Reviewed With: patient  Overall Patient Progress: no change  Goal: Patient-Specific Goal (Individualized)  Description: You can add care plan individualizations to a care plan. Examples of Individualization might be:  \"Parent requests to be called daily at 9am for status\", \"I have a hard time hearing out of my right ear\", or \"Do not touch me to wake me up as it startles  me\".  Outcome: Progressing  Flowsheets (Taken 6/8/2024 0348)  Individualized Care Needs: Reorient as needed.  Goal: Absence of Hospital-Acquired Illness or Injury  Outcome: Progressing  Intervention: Identify and Manage Fall Risk  Recent Flowsheet Documentation  Taken 6/8/2024 0147 by Paolo Baldwin RN  Safety Promotion/Fall Prevention:   activity supervised   assistive device/personal items within reach   bedside attendant   increase visualization of patient   nonskid shoes/slippers when out of bed   patient and family education   safety round/check completed  Intervention: Prevent Skin Injury  Recent Flowsheet Documentation  Taken 6/8/2024 0147 by Paolo Baldwin RN  Body Position: weight shifting  Goal: Optimal Comfort and " Wellbeing  Outcome: Progressing  Goal: Readiness for Transition of Care  Outcome: Progressing  Flowsheets (Taken 6/8/2024 0348)  Anticipated Changes Related to Illness: inability to care for self  Transportation Anticipated: (unknown at this time) other (see comments)  Concerns to be Addressed:   basic needs   care coordination/care conferences   decision making   discharge planning   financial/insurance  Barriers to Discharge: Family unable to care for patient at home. Placement pending MA application.  Intervention: Mutually Develop Transition Plan  Recent Flowsheet Documentation  Taken 6/8/2024 0348 by Paolo Baldwin RN  Anticipated Changes Related to Illness: inability to care for self  Transportation Anticipated: (unknown at this time) other (see comments)  Concerns to be Addressed:   basic needs   care coordination/care conferences   decision making   discharge planning   financial/insurance     Problem: Delirium  Goal: Optimal Coping  Outcome: Progressing  Goal: Improved Behavioral Control  Outcome: Progressing  Intervention: Minimize Safety Risk  Recent Flowsheet Documentation  Taken 6/8/2024 0147 by Paolo Baldwin RN  Enhanced Safety Measures:  at bedside  Goal: Improved Attention and Thought Clarity  Outcome: Progressing  Goal: Improved Sleep  Outcome: Progressing     Problem: Dementia Signs/Symptoms  Goal: Improved Behavioral Control (Dementia Signs/Symptoms)  Outcome: Progressing  Flowsheets (Taken 6/8/2024 0348)  Mutually Determined Action Steps (Improved Behavioral Control):   eats at meal time   maintains baseline weight   maintains regular elimination   sleeps 4-6 hours at night  Goal: Improved Mood Symptoms (Dementia Signs/Symptoms)  Outcome: Progressing  Flowsheets (Taken 6/8/2024 0348)  Mutually Determined Action Steps (Improved Mood Symptoms): engages in recreational/leisure activities  Goal: Optimized Cognitive Function (Dementia Signs/Symptoms)  Outcome:  Progressing  Flowsheets (Taken 6/8/2024 0348)  Mutually Determined Action Steps (Optimized Cognitive Function):   engages in cognitive-oriented therapies   uses memory and communication aides  Goal: Optimized Oral Intake (Dementia Signs/Symptoms)  Outcome: Progressing  Flowsheets (Taken 6/8/2024 0348)  Mutually Determined Action Steps (Optimized Nutrition Intake):   maintains baseline weight   maintains regular elimination   eats at meal time  Goal: Improved Sleep (Dementia Signs/Symptoms)  Outcome: Progressing  Flowsheets (Taken 6/8/2024 0348)  Mutually Determined Action Steps (Improved Sleep):   sleeps 4-6 hours at night   uses relaxation techniques  Goal: Enhanced Social or Functional Skills and Ability (Dementia Signs/Symptoms)  Outcome: Progressing  Flowsheets (Taken 6/8/2024 0348)  Mutually Determined Action Steps (Enhanced Social or Functional Skill and Ability):   develop quality of life goals   identifies quality of life goals      No

## 2024-06-12 ENCOUNTER — APPOINTMENT (OUTPATIENT)
Dept: UROLOGY | Facility: CLINIC | Age: 69
End: 2024-06-12
Payer: MEDICARE

## 2024-06-12 PROCEDURE — 51705 CHANGE OF BLADDER TUBE: CPT

## 2024-07-09 ENCOUNTER — INPATIENT (INPATIENT)
Facility: HOSPITAL | Age: 69
LOS: 18 days | Discharge: HOME | DRG: 694 | End: 2024-07-28
Attending: INTERNAL MEDICINE | Admitting: STUDENT IN AN ORGANIZED HEALTH CARE EDUCATION/TRAINING PROGRAM
Payer: MEDICARE

## 2024-07-09 VITALS
RESPIRATION RATE: 18 BRPM | TEMPERATURE: 98 F | SYSTOLIC BLOOD PRESSURE: 136 MMHG | HEART RATE: 80 BPM | DIASTOLIC BLOOD PRESSURE: 80 MMHG | OXYGEN SATURATION: 98 %

## 2024-07-09 DIAGNOSIS — M46.20 OSTEOMYELITIS OF VERTEBRA, SITE UNSPECIFIED: Chronic | ICD-10-CM

## 2024-07-09 DIAGNOSIS — Z43.5 ENCOUNTER FOR ATTENTION TO CYSTOSTOMY: Chronic | ICD-10-CM

## 2024-07-09 DIAGNOSIS — N13.2 HYDRONEPHROSIS WITH RENAL AND URETERAL CALCULOUS OBSTRUCTION: ICD-10-CM

## 2024-07-09 LAB
ALBUMIN SERPL ELPH-MCNC: 2.7 G/DL — LOW (ref 3.5–5.2)
ALP SERPL-CCNC: 186 U/L — HIGH (ref 30–115)
ALT FLD-CCNC: 28 U/L — SIGNIFICANT CHANGE UP (ref 0–41)
ANION GAP SERPL CALC-SCNC: 21 MMOL/L — HIGH (ref 7–14)
APPEARANCE UR: ABNORMAL
AST SERPL-CCNC: 34 U/L — SIGNIFICANT CHANGE UP (ref 0–41)
BASOPHILS # BLD AUTO: 0.03 K/UL — SIGNIFICANT CHANGE UP (ref 0–0.2)
BASOPHILS NFR BLD AUTO: 0.1 % — SIGNIFICANT CHANGE UP (ref 0–1)
BILIRUB SERPL-MCNC: 0.4 MG/DL — SIGNIFICANT CHANGE UP (ref 0.2–1.2)
BILIRUB UR-MCNC: NEGATIVE — SIGNIFICANT CHANGE UP
BUN SERPL-MCNC: 116 MG/DL — CRITICAL HIGH (ref 10–20)
CALCIUM SERPL-MCNC: 8.6 MG/DL — SIGNIFICANT CHANGE UP (ref 8.4–10.5)
CHLORIDE SERPL-SCNC: 98 MMOL/L — SIGNIFICANT CHANGE UP (ref 98–110)
CO2 SERPL-SCNC: 13 MMOL/L — LOW (ref 17–32)
COLOR SPEC: YELLOW — SIGNIFICANT CHANGE UP
CREAT SERPL-MCNC: 6.1 MG/DL — CRITICAL HIGH (ref 0.7–1.5)
DIFF PNL FLD: ABNORMAL
EGFR: 9 ML/MIN/1.73M2 — LOW
EOSINOPHIL # BLD AUTO: 0 K/UL — SIGNIFICANT CHANGE UP (ref 0–0.7)
EOSINOPHIL NFR BLD AUTO: 0 % — SIGNIFICANT CHANGE UP (ref 0–8)
FLUAV AG NPH QL: SIGNIFICANT CHANGE UP
FLUBV AG NPH QL: SIGNIFICANT CHANGE UP
GLUCOSE BLDC GLUCOMTR-MCNC: 110 MG/DL — HIGH (ref 70–99)
GLUCOSE BLDC GLUCOMTR-MCNC: 152 MG/DL — HIGH (ref 70–99)
GLUCOSE SERPL-MCNC: 169 MG/DL — HIGH (ref 70–99)
GLUCOSE UR QL: NEGATIVE MG/DL — SIGNIFICANT CHANGE UP
HCT VFR BLD CALC: 33.6 % — LOW (ref 42–52)
HGB BLD-MCNC: 10.8 G/DL — LOW (ref 14–18)
IMM GRANULOCYTES NFR BLD AUTO: 0.7 % — HIGH (ref 0.1–0.3)
KETONES UR-MCNC: ABNORMAL MG/DL
LACTATE SERPL-SCNC: 1 MMOL/L — SIGNIFICANT CHANGE UP (ref 0.7–2)
LEUKOCYTE ESTERASE UR-ACNC: ABNORMAL
LIDOCAIN IGE QN: 20 U/L — SIGNIFICANT CHANGE UP (ref 7–60)
LYMPHOCYTES # BLD AUTO: 1.01 K/UL — LOW (ref 1.2–3.4)
LYMPHOCYTES # BLD AUTO: 5 % — LOW (ref 20.5–51.1)
MAGNESIUM SERPL-MCNC: 2 MG/DL — SIGNIFICANT CHANGE UP (ref 1.8–2.4)
MCHC RBC-ENTMCNC: 29.7 PG — SIGNIFICANT CHANGE UP (ref 27–31)
MCHC RBC-ENTMCNC: 32.1 G/DL — SIGNIFICANT CHANGE UP (ref 32–37)
MCV RBC AUTO: 92.3 FL — SIGNIFICANT CHANGE UP (ref 80–94)
MONOCYTES # BLD AUTO: 0.95 K/UL — HIGH (ref 0.1–0.6)
MONOCYTES NFR BLD AUTO: 4.7 % — SIGNIFICANT CHANGE UP (ref 1.7–9.3)
NEUTROPHILS # BLD AUTO: 18.17 K/UL — HIGH (ref 1.4–6.5)
NEUTROPHILS NFR BLD AUTO: 89.5 % — HIGH (ref 42.2–75.2)
NITRITE UR-MCNC: NEGATIVE — SIGNIFICANT CHANGE UP
NRBC # BLD: 0 /100 WBCS — SIGNIFICANT CHANGE UP (ref 0–0)
PH UR: 7 — SIGNIFICANT CHANGE UP (ref 5–8)
PLATELET # BLD AUTO: 359 K/UL — SIGNIFICANT CHANGE UP (ref 130–400)
PMV BLD: 10.1 FL — SIGNIFICANT CHANGE UP (ref 7.4–10.4)
POTASSIUM SERPL-MCNC: 4.5 MMOL/L — SIGNIFICANT CHANGE UP (ref 3.5–5)
POTASSIUM SERPL-SCNC: 4.5 MMOL/L — SIGNIFICANT CHANGE UP (ref 3.5–5)
PROT SERPL-MCNC: 8.5 G/DL — HIGH (ref 6–8)
PROT UR-MCNC: 100 MG/DL
RBC # BLD: 3.64 M/UL — LOW (ref 4.7–6.1)
RBC # FLD: 14 % — SIGNIFICANT CHANGE UP (ref 11.5–14.5)
RSV RNA NPH QL NAA+NON-PROBE: SIGNIFICANT CHANGE UP
SARS-COV-2 RNA SPEC QL NAA+PROBE: SIGNIFICANT CHANGE UP
SODIUM SERPL-SCNC: 132 MMOL/L — LOW (ref 135–146)
SP GR SPEC: 1.01 — SIGNIFICANT CHANGE UP (ref 1–1.03)
UROBILINOGEN FLD QL: 0.2 MG/DL — SIGNIFICANT CHANGE UP (ref 0.2–1)
WBC # BLD: 20.31 K/UL — HIGH (ref 4.8–10.8)
WBC # FLD AUTO: 20.31 K/UL — HIGH (ref 4.8–10.8)

## 2024-07-09 PROCEDURE — 97110 THERAPEUTIC EXERCISES: CPT | Mod: GP

## 2024-07-09 PROCEDURE — 87075 CULTR BACTERIA EXCEPT BLOOD: CPT

## 2024-07-09 PROCEDURE — 84100 ASSAY OF PHOSPHORUS: CPT

## 2024-07-09 PROCEDURE — C2617: CPT

## 2024-07-09 PROCEDURE — 82962 GLUCOSE BLOOD TEST: CPT

## 2024-07-09 PROCEDURE — 83036 HEMOGLOBIN GLYCOSYLATED A1C: CPT

## 2024-07-09 PROCEDURE — C1887: CPT

## 2024-07-09 PROCEDURE — 85027 COMPLETE CBC AUTOMATED: CPT

## 2024-07-09 PROCEDURE — 86850 RBC ANTIBODY SCREEN: CPT

## 2024-07-09 PROCEDURE — C1769: CPT

## 2024-07-09 PROCEDURE — 80048 BASIC METABOLIC PNL TOTAL CA: CPT

## 2024-07-09 PROCEDURE — 83735 ASSAY OF MAGNESIUM: CPT

## 2024-07-09 PROCEDURE — 87070 CULTURE OTHR SPECIMN AEROBIC: CPT

## 2024-07-09 PROCEDURE — 87040 BLOOD CULTURE FOR BACTERIA: CPT

## 2024-07-09 PROCEDURE — 87102 FUNGUS ISOLATION CULTURE: CPT

## 2024-07-09 PROCEDURE — 86900 BLOOD TYPING SEROLOGIC ABO: CPT

## 2024-07-09 PROCEDURE — 99223 1ST HOSP IP/OBS HIGH 75: CPT

## 2024-07-09 PROCEDURE — 82365 CALCULUS SPECTROSCOPY: CPT

## 2024-07-09 PROCEDURE — 82310 ASSAY OF CALCIUM: CPT

## 2024-07-09 PROCEDURE — 85610 PROTHROMBIN TIME: CPT

## 2024-07-09 PROCEDURE — 80053 COMPREHEN METABOLIC PANEL: CPT

## 2024-07-09 PROCEDURE — 86901 BLOOD TYPING SEROLOGIC RH(D): CPT

## 2024-07-09 PROCEDURE — 87086 URINE CULTURE/COLONY COUNT: CPT

## 2024-07-09 PROCEDURE — 85025 COMPLETE CBC W/AUTO DIFF WBC: CPT

## 2024-07-09 PROCEDURE — 87015 SPECIMEN INFECT AGNT CONCNTJ: CPT

## 2024-07-09 PROCEDURE — 74176 CT ABD & PELVIS W/O CONTRAST: CPT | Mod: 26,MC

## 2024-07-09 PROCEDURE — 85730 THROMBOPLASTIN TIME PARTIAL: CPT

## 2024-07-09 PROCEDURE — 50434 CONVERT NEPHROSTOMY CATHETER: CPT | Mod: RT

## 2024-07-09 PROCEDURE — 87186 SC STD MICRODIL/AGAR DIL: CPT

## 2024-07-09 PROCEDURE — 74018 RADEX ABDOMEN 1 VIEW: CPT

## 2024-07-09 PROCEDURE — 97166 OT EVAL MOD COMPLEX 45 MIN: CPT | Mod: GO

## 2024-07-09 PROCEDURE — 83605 ASSAY OF LACTIC ACID: CPT

## 2024-07-09 PROCEDURE — 83970 ASSAY OF PARATHORMONE: CPT

## 2024-07-09 PROCEDURE — C1894: CPT

## 2024-07-09 PROCEDURE — 87116 MYCOBACTERIA CULTURE: CPT

## 2024-07-09 PROCEDURE — 87077 CULTURE AEROBIC IDENTIFY: CPT

## 2024-07-09 PROCEDURE — 74176 CT ABD & PELVIS W/O CONTRAST: CPT | Mod: MC

## 2024-07-09 PROCEDURE — 87206 SMEAR FLUORESCENT/ACID STAI: CPT

## 2024-07-09 PROCEDURE — 50433 PLMT NEPHROURETERAL CATHETER: CPT | Mod: RT

## 2024-07-09 PROCEDURE — C1889: CPT

## 2024-07-09 PROCEDURE — 51705 CHANGE OF BLADDER TUBE: CPT

## 2024-07-09 PROCEDURE — C1758: CPT

## 2024-07-09 PROCEDURE — 36415 COLL VENOUS BLD VENIPUNCTURE: CPT

## 2024-07-09 PROCEDURE — 99285 EMERGENCY DEPT VISIT HI MDM: CPT | Mod: 25

## 2024-07-09 PROCEDURE — C9399: CPT

## 2024-07-09 RX ORDER — DEXTROSE 4 G
25 TABLET,CHEWABLE ORAL ONCE
Refills: 0 | Status: DISCONTINUED | OUTPATIENT
Start: 2024-07-09 | End: 2024-07-28

## 2024-07-09 RX ORDER — BACTERIOSTATIC SODIUM CHLORIDE 0.9 %
1000 VIAL (ML) INJECTION ONCE
Refills: 0 | Status: COMPLETED | OUTPATIENT
Start: 2024-07-09 | End: 2024-07-09

## 2024-07-09 RX ORDER — INSULIN LISPRO 100/ML
VIAL (ML) SUBCUTANEOUS
Refills: 0 | Status: DISCONTINUED | OUTPATIENT
Start: 2024-07-09 | End: 2024-07-28

## 2024-07-09 RX ORDER — METRONIDAZOLE 500 MG/1
500 TABLET ORAL ONCE
Refills: 0 | Status: COMPLETED | OUTPATIENT
Start: 2024-07-09 | End: 2024-07-09

## 2024-07-09 RX ORDER — ATORVASTATIN CALCIUM 40 MG/1
20 TABLET, FILM COATED ORAL AT BEDTIME
Refills: 0 | Status: DISCONTINUED | OUTPATIENT
Start: 2024-07-09 | End: 2024-07-28

## 2024-07-09 RX ORDER — ONDANSETRON HCL/PF 4 MG/2 ML
4 VIAL (ML) INJECTION EVERY 8 HOURS
Refills: 0 | Status: DISCONTINUED | OUTPATIENT
Start: 2024-07-09 | End: 2024-07-28

## 2024-07-09 RX ORDER — DEXTROSE 4 G
12.5 TABLET,CHEWABLE ORAL ONCE
Refills: 0 | Status: DISCONTINUED | OUTPATIENT
Start: 2024-07-09 | End: 2024-07-28

## 2024-07-09 RX ORDER — DEXTROSE MONOHYDRATE, SODIUM CHLORIDE, SODIUM LACTATE, CALCIUM CHLORIDE, MAGNESIUM CHLORIDE 1.5; 538; 448; 18.4; 5.08 G/100ML; MG/100ML; MG/100ML; MG/100ML; MG/100ML
1000 SOLUTION INTRAPERITONEAL ONCE
Refills: 0 | Status: COMPLETED | OUTPATIENT
Start: 2024-07-09 | End: 2024-07-09

## 2024-07-09 RX ORDER — CEFEPIME HYDROCHLORIDE 1 G/1
2000 INJECTION, POWDER, FOR SOLUTION INTRAMUSCULAR; INTRAVENOUS EVERY 12 HOURS
Refills: 0 | Status: DISCONTINUED | OUTPATIENT
Start: 2024-07-09 | End: 2024-07-09

## 2024-07-09 RX ORDER — DEXTROSE 4 G
15 TABLET,CHEWABLE ORAL ONCE
Refills: 0 | Status: DISCONTINUED | OUTPATIENT
Start: 2024-07-09 | End: 2024-07-28

## 2024-07-09 RX ORDER — ENOXAPARIN SODIUM 120 MG/.8ML
40 INJECTION SUBCUTANEOUS EVERY 24 HOURS
Refills: 0 | Status: DISCONTINUED | OUTPATIENT
Start: 2024-07-09 | End: 2024-07-09

## 2024-07-09 RX ORDER — CEFEPIME HYDROCHLORIDE 1 G/1
1000 INJECTION, POWDER, FOR SOLUTION INTRAMUSCULAR; INTRAVENOUS ONCE
Refills: 0 | Status: COMPLETED | OUTPATIENT
Start: 2024-07-09 | End: 2024-07-09

## 2024-07-09 RX ORDER — VANCOMYCIN HYDROCHLORIDE 5 G/100ML
1000 INJECTION, POWDER, LYOPHILIZED, FOR SOLUTION INTRAVENOUS ONCE
Refills: 0 | Status: COMPLETED | OUTPATIENT
Start: 2024-07-09 | End: 2024-07-09

## 2024-07-09 RX ORDER — HEPARIN SODIUM 1000 [USP'U]/ML
5000 INJECTION, SOLUTION INTRAVENOUS; SUBCUTANEOUS EVERY 8 HOURS
Refills: 0 | Status: DISCONTINUED | OUTPATIENT
Start: 2024-07-09 | End: 2024-07-10

## 2024-07-09 RX ORDER — DEXTROSE MONOHYDRATE, SODIUM CHLORIDE, SODIUM LACTATE, CALCIUM CHLORIDE, MAGNESIUM CHLORIDE 1.5; 538; 448; 18.4; 5.08 G/100ML; MG/100ML; MG/100ML; MG/100ML; MG/100ML
1000 SOLUTION INTRAPERITONEAL
Refills: 0 | Status: DISCONTINUED | OUTPATIENT
Start: 2024-07-09 | End: 2024-07-28

## 2024-07-09 RX ORDER — GLUCAGON INJECTION, SOLUTION 0.5 MG/.1ML
1 INJECTION, SOLUTION SUBCUTANEOUS ONCE
Refills: 0 | Status: DISCONTINUED | OUTPATIENT
Start: 2024-07-09 | End: 2024-07-28

## 2024-07-09 RX ORDER — PANTOPRAZOLE SODIUM 20 MG/1
40 TABLET, DELAYED RELEASE ORAL
Refills: 0 | Status: DISCONTINUED | OUTPATIENT
Start: 2024-07-09 | End: 2024-07-28

## 2024-07-09 RX ORDER — MELATONIN 3 MG
3 TABLET ORAL AT BEDTIME
Refills: 0 | Status: DISCONTINUED | OUTPATIENT
Start: 2024-07-09 | End: 2024-07-28

## 2024-07-09 RX ORDER — SODIUM BICARBONATE 0.9MEQ/ML
650 SYRINGE (ML) INTRAVENOUS THREE TIMES A DAY
Refills: 0 | Status: DISCONTINUED | OUTPATIENT
Start: 2024-07-09 | End: 2024-07-19

## 2024-07-09 RX ORDER — NIFEDIPINE 20 MG/1
30 CAPSULE, LIQUID FILLED ORAL DAILY
Refills: 0 | Status: DISCONTINUED | OUTPATIENT
Start: 2024-07-09 | End: 2024-07-11

## 2024-07-09 RX ORDER — MAGNESIUM, ALUMINUM HYDROXIDE 200-225/5
30 SUSPENSION, ORAL (FINAL DOSE FORM) ORAL EVERY 4 HOURS
Refills: 0 | Status: DISCONTINUED | OUTPATIENT
Start: 2024-07-09 | End: 2024-07-28

## 2024-07-09 RX ORDER — INSULIN LISPRO 100/ML
VIAL (ML) SUBCUTANEOUS AT BEDTIME
Refills: 0 | Status: DISCONTINUED | OUTPATIENT
Start: 2024-07-09 | End: 2024-07-28

## 2024-07-09 RX ORDER — CEFEPIME HYDROCHLORIDE 1 G/1
1000 INJECTION, POWDER, FOR SOLUTION INTRAMUSCULAR; INTRAVENOUS EVERY 24 HOURS
Refills: 0 | Status: DISCONTINUED | OUTPATIENT
Start: 2024-07-10 | End: 2024-07-11

## 2024-07-09 RX ADMIN — VANCOMYCIN HYDROCHLORIDE 250 MILLIGRAM(S): 5 INJECTION, POWDER, LYOPHILIZED, FOR SOLUTION INTRAVENOUS at 15:17

## 2024-07-09 RX ADMIN — Medication 2000 MILLILITER(S): at 13:14

## 2024-07-09 RX ADMIN — Medication 650 MILLIGRAM(S): at 22:57

## 2024-07-09 RX ADMIN — HEPARIN SODIUM 5000 UNIT(S): 1000 INJECTION, SOLUTION INTRAVENOUS; SUBCUTANEOUS at 22:57

## 2024-07-09 RX ADMIN — CEFEPIME HYDROCHLORIDE 100 MILLIGRAM(S): 1 INJECTION, POWDER, FOR SOLUTION INTRAMUSCULAR; INTRAVENOUS at 14:54

## 2024-07-09 RX ADMIN — NIFEDIPINE 30 MILLIGRAM(S): 20 CAPSULE, LIQUID FILLED ORAL at 22:58

## 2024-07-09 RX ADMIN — METRONIDAZOLE 100 MILLIGRAM(S): 500 TABLET ORAL at 14:20

## 2024-07-09 RX ADMIN — DEXTROSE MONOHYDRATE, SODIUM CHLORIDE, SODIUM LACTATE, CALCIUM CHLORIDE, MAGNESIUM CHLORIDE 1000 MILLILITER(S): 1.5; 538; 448; 18.4; 5.08 SOLUTION INTRAPERITONEAL at 15:17

## 2024-07-09 RX ADMIN — ATORVASTATIN CALCIUM 20 MILLIGRAM(S): 40 TABLET, FILM COATED ORAL at 22:57

## 2024-07-09 NOTE — ED ADULT NURSE NOTE - NSICDXPASTSURGICALHX_GEN_ALL_CORE_FT
PAST SURGICAL HISTORY:  Encounter for care or replacement of suprapubic tube     Spinal abscess S/P Surgery

## 2024-07-09 NOTE — ED ADULT NURSE NOTE - CAS ED TRANSFER FORM COMPLETE YN
“You can access the [employer] Patient Portal, offered by [facility], by registering with the following website: [email or web address]”
Yes

## 2024-07-09 NOTE — ED PROVIDER NOTE - PHYSICAL EXAMINATION
CONSTITUTIONAL: Well-appearing; well-nourished; in no apparent distress.   ENT: dry MM  NECK: Supple; non-tender; no cervical lymphadenopathy.   CARDIOVASCULAR: Normal S1, S2; no murmurs, rubs, or gallops.   RESPIRATORY: Normal chest excursion with respiration; breath sounds clear and equal bilaterally; no wheezes, rhonchi, or rales.  GI/: Normal bowel sounds; non-distended; non-tender; no palpable organomegaly.   MS: No CVA tenderness. non-tender to palpation; distal pulses are normal.   SKIN: warm; dry; dehydrated  NEURO/PSYCH: A & O x 4; grossly unremarkable. mood and manner are appropriate

## 2024-07-09 NOTE — ED ADULT NURSE REASSESSMENT NOTE - NS ED NURSE REASSESS COMMENT FT1
patient admitted to medicine and moved to Ed4 bed 2. Report given to ANGEL Enriquez.
pt assessed A&Ox3, pt reamins comfortable on bed at this time suprapubic cath remainsin place
Transfer from south, A+Ox4.

## 2024-07-09 NOTE — H&P ADULT - HISTORY OF PRESENT ILLNESS
70yo male w/ pmhx of HTN, HLD, DMII, CKD satge 5 baseline Cr ~ 4.4, urinary incontinence with chronic supra pubic catheter, Nephrolithiasis, spinal abscess leading to quadriplegia presenting w. NBNB vomiting and nausea every time after he eats. Reports generalized crampy abdominal pain. Reports is non compliant w. medications and has not take is prescribed medications for the past few months.  Denies fever chills, flank pain, chest pain, sob, urinary sxs, oliguria.     in the ED,   Vitals: 136/80. HR 80. RR 18. T 98.2, satting 98% on RA   Labs:  68yo male w/ pmhx of HTN, HLD, DMII, CKD stage 5 baseline Cr ~ 4.4, urinary incontinence with chronic supra pubic catheter, Nephrolithiasis, spinal abscess leading to quadriplegia presenting w. NBNB vomiting and nausea every time after he eats. Reports generalized crampy abdominal pain. Reports is non compliant w. medications and has not take is prescribed medications for the past few months.  Denies fever chills, flank pain, chest pain, sob, urinary sxs, oliguria.     in the ED,   Vitals: 136/80. HR 80. RR 18. T 98.2, satting 98% on RA   Labs: significant for wbc 20; UA grossly positive  CTAP shows severe R hydro; L non-obs calculi  SPC replaced   Urology consulted recommending emergency b/l nephrostomy tubes be placed    Patient admitted for acute on chronic JOÃO in s/o obstructing stones w/ UTI

## 2024-07-09 NOTE — CONSULT NOTE ADULT - NS ATTEND AMEND GEN_ALL_CORE FT
Patient seen and examined in the ED.   Patient is afebrile, VSS. He reports having no significant flank or abdominal pain. Dye catheter in place draining clear yellow urine.  Labs notable for JOÃO and leukocytosis.   CTAP imaging reviewed and interpreted. Significant for severe right sided hydroneprosis with evidence of some renal atrophy with extensive renal stone burden >3cm. Pt also noted to left significant left renal stones measuring over ~1.5cm with an additional left distal ureteral stone, no upstream hydronephrosis on this side.     Given patient's extensive stone burden in setting of JOÃO, leukocytosis with impending sepsis, recommend emergent bilateral nephrostomy tube placement by IR. Pt will also require  PCNL procedures for definitive stone treatment at a future date. Will defer to Dr. Akins for further management and recommendations regarding the procedure.

## 2024-07-09 NOTE — H&P ADULT - ASSESSMENT
68yo male w/ pmhx of HTN, HLD, DMII, CKD stage 5 baseline Cr ~ 4.4, urinary incontinence with chronic supra pubic catheter, Nephrolithiasis, spinal abscess leading to quadriplegia presenting w. NBNB vomiting and nausea every time after he eats. Reports generalized crampy abdominal pain. Reports is non compliant w. medications and has not take is prescribed medications for the past few months.  Denies fever chills, flank pain, chest pain, sob, urinary sxs, oliguria.     in the ED,   Vitals: 136/80. HR 80. RR 18. T 98.2, satting 98% on RA   Labs: significant for wbc 20; UA grossly positive  CTAP shows severe R hydro; L non-obs calculi  SPC replaced   Urology consulted recommending emergency b/l nephrostomy tubes be placed    Patient admitted for acute on chronic JOÃO in s/o obstructing stones w/ UTI    #Obstructive hydronephrosis with UTI  #CKD stage 5 baseline Cr ~ 4.4, urinary incontinence with chronic supra pubic catheter, #Nephrolithiasis  - CTAP shows severe hydro on R and non obs calculi on Left  - Uro on board; recs emergent nephrostomy tube  - f/u IR consult  - f/u ID recs  - f/u nephro  - s/p cefepime, vanco, flagyl  - c/w cefepime  - c/w IV fluids  - f/u x2 blood cultures (spec rec'd)     #HTN,   #HLD  #DMII   - c/w home meds      #spinal abscess leading to quadriplegia  - outpt f/u    -------------------------------------------------------------------------------------------------------------------------------------  #DVT PPX: lovenox  #GI PPX: panto  #Diet: carb consis; npo after midnight for possible nephrostomy placement  #Code Status: dnr/dni  #Activity Order: increase as tolerated   #Dispo/Needs: inpt    #Handoff  - f/u cultures, ID recs, IR 68yo male w/ pmhx of HTN, HLD, DMII, CKD stage 5 baseline Cr ~ 4.4, urinary incontinence with chronic supra pubic catheter, Nephrolithiasis, spinal abscess leading to quadriplegia presenting w. NBNB vomiting and nausea every time after he eats. Reports generalized crampy abdominal pain. Reports is non compliant w. medications and has not take is prescribed medications for the past few months.  Denies fever chills, flank pain, chest pain, sob, urinary sxs, oliguria.     in the ED,   Vitals: 136/80. HR 80. RR 18. T 98.2, satting 98% on RA   Labs: significant for wbc 20; UA grossly positive  CTAP shows severe R hydro; L non-obs calculi  SPC replaced   Urology consulted recommending emergency b/l nephrostomy tubes be placed    Patient admitted for acute on chronic JOÃO in s/o obstructing stones w/ UTI    #Obstructive hydronephrosis with UTI  #CKD stage 5 baseline Cr ~ 4.4, urinary incontinence with chronic supra pubic catheter, #Nephrolithiasis  - CTAP shows severe hydro on R and non obs calculi on Left  - Uro on board; recs emergent nephrostomy tube  - f/u IR consult; f/u ID recs; f/u nephro  - s/p cefepime, vanco, flagyl  - c/w cefepime  - f/u x2 blood cultures (spec rec'd)  - f/u 11:30pm pre-op labs     #HTN,   #HLD  #DMII   - c/w home meds      #spinal abscess leading to quadriplegia  - outpt f/u    -------------------------------------------------------------------------------------------------------------------------------------  #DVT PPX: lovenox  #GI PPX: panto  #Diet: carb consis; npo after midnight for possible nephrostomy placement  #Code Status: dnr/dni  #Activity Order: increase as tolerated   #Dispo/Needs: inpt    #Handoff  - f/u pre-op labs; f/u cultures, ID recs, IR 70yo male w/ pmhx of HTN, HLD, DMII, CKD stage 5 baseline Cr ~ 4.4, urinary incontinence with chronic supra pubic catheter, Nephrolithiasis, spinal abscess leading to quadriplegia presenting w. NBNB vomiting and nausea every time after he eats. Reports generalized crampy abdominal pain. Reports is non compliant w. medications and has not take is prescribed medications for the past few months.  Denies fever chills, flank pain, chest pain, sob, urinary sxs, oliguria.     in the ED,   Vitals: 136/80. HR 80. RR 18. T 98.2, satting 98% on RA   Labs: significant for wbc 20; UA grossly positive  CTAP shows severe R hydro; L non-obs calculi  SPC replaced   Urology consulted recommending emergency b/l nephrostomy tubes be placed    Patient admitted for acute on chronic JOÃO in s/o obstructing stones w/ UTI    #Obstructive hydronephrosis with UTI  #CKD stage 5 baseline Cr ~ 4.4, urinary incontinence with chronic supra pubic catheter, #Nephrolithiasis  - CTAP shows severe hydro on R and non obs calculi on Left  - Uro on board; recs emergent nephrostomy tube  - f/u IR consult; f/u ID recs; f/u nephro  - s/p cefepime, vanco, flagyl  - c/w cefepime  - f/u x2 blood cultures (spec rec'd)  - f/u 11:30pm pre-op labs     #HTN,   #HLD  #DMII   - c/w home meds      #spinal abscess leading to quadriplegia  - outpt f/u    -------------------------------------------------------------------------------------------------------------------------------------  #DVT PPX: heparin  #GI PPX: panto  #Diet: carb consis; npo after midnight for possible nephrostomy placement  #Code Status: dnr/dni  #Activity Order: increase as tolerated   #Dispo/Needs: inpt    #Handoff  - f/u pre-op labs; f/u cultures, ID recs, IR

## 2024-07-09 NOTE — ED PROVIDER NOTE - OBJECTIVE STATEMENT
pt with pmhx DM, spinal abscess, paraplegia, HTN, renal stones sent from PMD for weakness/UTI. has indwelling SPT, last changed 6/2024 with sched f/u tomorrow went to PMD for eval of decreased po intake and weakness for the last few days with mild n/v. urine taken from bag at PMD office +, sent for tx. Denies fever/chill/HA/dizziness/chest pain/palpitation/sob/abd pain/d/ black stool/bloody stool

## 2024-07-09 NOTE — CONSULT NOTE ADULT - SUBJECTIVE AND OBJECTIVE BOX
Patient is a 68yo male w/ pmhx of HTN, HLD, DMII, CKD satge 5 baseline Cr ~ 4.4, urinary incontinence with chronic supra pubic catheter, Nephrolithiasis, spinal abscess leading to quadriplegia presenting w. NBNB vomiting and nausea every time after he eats. Reports generalized crampy abdominal pain. Reports is non compliant w. medications and has not take is prescribed medications for the past few months.  Denies fever chills, flank pain, chest pain, sob, urinary sxs, oliguria.     PAST MEDICAL & SURGICAL HISTORY:  DM (diabetes mellitus)  HTN (hypertension)  H/O paraplegia  Spinal abscess  Renal stones  Spinal abscess  S/P Surgery      MEDICATIONS  (STANDING):  lactated ringers Bolus 1000 milliLiter(s) IV Bolus once  vancomycin  IVPB. 1000 milliGRAM(s) IV Intermittent once    Allergies  No Known Allergies  Intolerances    Physical Exam:  General: NAD, resting comfortably  Pulmonary: CTA B/L  Cardiovascular: S1, S2 w/RRR  Abdominal: soft, ND/NT, +SPT w/ light yellow urine   Extremities:  no clubbing/cyanosis/edema  Neuro: A/O x 3    Vital Signs Last 24 Hrs  T(C): 36.8 (2024 11:57), Max: 36.8 (2024 11:57)  T(F): 98.2 (2024 11:57), Max: 98.2 (2024 11:57)  HR: 72 (2024 14:56) (72 - 80)  BP: 145/76 (2024 14:56) (136/80 - 145/76)  RR: 20 (2024 14:56) (18 - 20)  SpO2: 100% (2024 14:56) (98% - 100%)    Parameters below as of 2024 14:56  Patient On (Oxygen Delivery Method): room air        I&O's Summary          LABS:                        10.8   20.31 )-----------( 359      ( 2024 13:30 )             33.6     07-    132<L>  |  98  |  116<HH>  ----------------------------<  169<H>  4.5   |  13<L>  |  6.1<HH>    Ca    8.6      2024 13:30  Mg     2.0         TPro  8.5<H>  /  Alb  2.7<L>  /  TBili  0.4  /  DBili  x   /  AST  34  /  ALT  28  /  AlkPhos  186<H>        Urinalysis Basic - ( 2024 13:50 )    Color: Yellow / Appearance: Turbid / S.015 / pH: x  Gluc: x / Ketone: Trace mg/dL  / Bili: Negative / Urobili: 0.2 mg/dL   Blood: x / Protein: 100 mg/dL / Nitrite: Negative   Leuk Esterase: Large / RBC: 10 /HPF / WBC 50 /HPF   Sq Epi: x / Non Sq Epi: 3 /HPF / Bacteria: Many /HPF      CAPILLARY BLOOD GLUCOSE      POCT Blood Glucose.: 152 mg/dL (2024 12:11)    LIVER FUNCTIONS - ( 2024 13:30 )  Alb: 2.7 g/dL / Pro: 8.5 g/dL / ALK PHOS: 186 U/L / ALT: 28 U/L / AST: 34 U/L / GGT: x             Cultures:      RADIOLOGY & ADDITIONAL STUDIES:  < from: CT Abdomen and Pelvis No Cont (24 @ 13:00) >  IMPRESSION:  1.  Severe right hydroureteronephrosis with cortical thinning and   numerous large intrarenal and proximal to mid ureteral calculi;   obstructing calculus in the right mid ureter measures 1.1 x 1 x 1.8 cm (   ) with normal caliber distal ureter.  2.  Multiple nonobstructing left intrarenal calculi and a 0.8 cm left   distal ureteral calculus; no left hydronephrosis.  3.  Few prominent retroperitoneal lymph nodes, likely reactive.  4.  Cholelithiasis.  --- End of Report ---

## 2024-07-09 NOTE — ED ADULT NURSE NOTE - NS ED NURSE LEVEL OF CONSCIOUSNESS SPEECH
Pt. Given STAT order for duplex US and instructed to come back today for testing.  He is aware of the process to r/o dvt, he is also educated on use of Bactrim and given Rx and instructed to call and make follow up appointment with his PCP. He verbalized understanding.   
Speaking Coherently

## 2024-07-09 NOTE — H&P ADULT - ATTENDING COMMENTS
Patient seen and examined at bedside independently of the residents. I read the resident's note and agree with the plan with the additions and corrections as noted below. My note supersedes the resident's note.     REVIEW OF SYSTEMS:  Negative except in HPI.     PMH:  HTN, HLD, DM II, CKD stage 5 (baseline Cr 4.4)) Urinary incontinence with chronic suprapubic catheter, Nephrolithiasis, Spinal abscess with quadriplegia    FHx: Reviewed. No fhx of asthma/copd, No fhx of liver and pulmonary disease. No fhx of hematological disorder.     Physical Exam:  GEN: No acute distress. Awake, Alert and oriented x 3.   Head: Atraumatic, Normocephalic.   Eye: PEERLA. No sclera icterus. EOMI.   ENT: Normal oropharynx, no thyromegaly, no mass, no lymphadenopathy.   LUNGS: Clear to auscultation bilaterally. No wheeze/rales/crackles.   HEART: Normal. S1/S2 present. RRR. No murmur/gallops.   ABD: Soft, non-tender, non-distended. Bowel sounds present. + suprapubic catheter.   EXT: No pitting edema. No erythema. No tenderness.  Integumentary: No rash, No sore, No petechia.   NEURO: CN III-XII intact. B/L LE paraplegia.     Vital Signs Last 24 Hrs  T(C): 36.7 (10 Jul 2024 00:07), Max: 36.8 (2024 11:57)  T(F): 98.1 (10 Jul 2024 00:07), Max: 98.2 (2024 11:57)  HR: 87 (10 Jul 2024 00:07) (62 - 87)  BP: 148/82 (10 Jul 2024 00:07) (136/80 - 158/85)  BP(mean): --  RR: 18 (10 Jul 2024 00:07) (16 - 20)  SpO2: 100% (10 Jul 2024 00:07) (98% - 100%)    Parameters below as of 10 July 2024 00:07  Patient On (Oxygen Delivery Method): room air      Please see the above notes for Labs and radiology.     Assessment and Plan:     68 yo M with hx of HTN, HLD, DM II, CKD stage 5 (baseline Cr 4.4)) Urinary incontinence with chronic suprapubic catheter, Nephrolithiasis, Spinal abscess with quadriplegia presents to ED for crampy abdominal pain a/w nausea and vomiting.     # JOÃO on CKD likely 2/2 obstructive uropathy   # Complicated UTI   - CT abdomen shows Severe right hydroureteronephrosis with cortical thinning and   numerous large intrarenal and proximal to mid ureteral calculi, obstructing calculus in the right mid ureter measures 1.1 x 1 x 1.8 cm with normal caliber distal ureter.  Multiple nonobstructive left intrarenal calculi and a 0.8 cm left distal ureteral calculus; no left hydronephrosis.  Few prominent retroperitoneal lymph nodes, likely reactive. Cholelithiasis.  - s/p eval by Urology in ED --> recommend b/l nephrostomy placement by IR.   - UA grossly positive.   - s/p Vanc and Cefepime given in ED. --> c/w cefepime  - f/u UCx and BCx.   - NPO with IVF gentle hydration.   - IR consult.     # HTN/HLD - c/w home med.   # DM II - monitor FS AC HS. insulin regimen.     DVT ppx: Heparin SC (Hold AM dose)  GI ppx: PPI  Diet: NPO with IVF gentle hydration  Activity: as tolerated.     Date seen by the attendin2024  Total time spent: 75 minutes.

## 2024-07-09 NOTE — ED PROVIDER NOTE - ATTENDING APP SHARED VISIT CONTRIBUTION OF CARE
69-year-old male with past medical history noted presents for evaluation of increased weakness.  Patient with suprapubic catheter.  Brother states that patient has history of UTI.  Has had increased weakness as well and decreased p.o. intake.  Patient also had vomiting.  No fevers, on exam patient is in NAD, alert and awake, dry lips and mucous membranes, lungs CTA B/L, abdomen soft nontender nondistended    We obtained labs and changed suprapubic catheter.  Patient has elevated white blood cell count to 20.  Patient also with elevated BUN and creatinine.  Patient does have a history of CKD.  Per chart review previous labs from October 2023 BUN was 50 and creatinine was 4.4.  Patient with normal potassium.  Urinalysis is consistent with UTI.  CT scan reveals severe hydro ureteral nephrosis and large stone.  Urology was consulted here and recommends patient be managed at [facility] for PCN.  ED team Dr Deisy Anderson aware of patient's pending arrival urology team here is to make urology team they are aware

## 2024-07-09 NOTE — CONSULT NOTE ADULT - ASSESSMENT
Patient is a 70yo male w/ pmhx of HTN, HLD, DMII, CKD satge 5 baseline Cr ~ 4.4, urinary incontinence with chronic supra pubic catheter, Nephrolithiasis, spinal abscess leading to quadriplegia presenting w. NBNB vomiting and nausea every time after he eats. Reports generalized crampy abdominal pain. Reports is non compliant w. medications and has not taken his prescribed medications for the past few months.  Denies fever chills, flank pain, chest pain, sob, urinary sxs, oliguria.     #Obstructing R mid ureter calculus   #B/L nephrolithiasis   #JOÃO  - VSS, WBC 20, CR 6.1 (baseline Cr ~ 4.4), UA w/ large leuks, WBC, moderate bacteria  - CTAP: IMPRESSION:  1.  Severe right hydroureteronephrosis with cortical thinning and   numerous large intrarenal and proximal to mid ureteral calculi;   obstructing calculus in the right mid ureter measures 1.1 x 1 x 1.8 cm (   ) with normal caliber distal ureter.  2.  Multiple nonobstructing left intrarenal calculi and a 0.8 cm left   distal ureteral calculus; no left hydronephrosis.  3.  Few prominent retroperitoneal lymph nodes, likely reactive.  4.  Cholelithiasis.  - NPO/IVF   - IV abx  - Pain control prn   - Monitor for fevers  - IR consult for PCNLO   - Discussed case w/ Dr. Christine and Dr. Akins, recommended transfer [facility] for immediate IR follow up   - Urology team at [facility] aware

## 2024-07-09 NOTE — H&P ADULT - NSHPPHYSICALEXAM_GEN_ALL_CORE
VITALS:   T(C): 36.7 (07-09-24 @ 17:16), Max: 36.8 (07-09-24 @ 11:57)  HR: 62 (07-09-24 @ 17:16) (62 - 80)  BP: 158/85 (07-09-24 @ 17:16) (136/80 - 158/85)  RR: 16 (07-09-24 @ 17:16) (16 - 20)  SpO2: 99% (07-09-24 @ 17:16) (98% - 100%)    GENERAL: NAD, lying in bed comfortably  HEAD:  Atraumatic, normocephalic  EYES: EOMI, PERRLA, conjunctiva and sclera clear  ENT: poor dentition  NECK: Supple, no JVD  HEART: Regular rate and rhythm, no murmurs, rubs, or gallops  LUNGS: Unlabored respirations.  Clear to auscultation bilaterally, no crackles, wheezing, or rhonchi  ABDOMEN: Soft, nontender, nondistended, +BS; SPC catheter in place  EXTREMITIES: 2+ peripheral pulses bilaterally. No clubbing, cyanosis, or edema

## 2024-07-09 NOTE — ED ADULT NURSE NOTE - NSFALLHARMRISKINTERV_ED_ALL_ED

## 2024-07-10 ENCOUNTER — APPOINTMENT (OUTPATIENT)
Dept: UROLOGY | Facility: CLINIC | Age: 69
End: 2024-07-10

## 2024-07-10 LAB
A1C WITH ESTIMATED AVERAGE GLUCOSE RESULT: 6.9 % — HIGH (ref 4–5.6)
ALBUMIN SERPL ELPH-MCNC: 2.3 G/DL — LOW (ref 3.5–5.2)
ALBUMIN SERPL ELPH-MCNC: 2.5 G/DL — LOW (ref 3.5–5.2)
ALP SERPL-CCNC: 153 U/L — HIGH (ref 30–115)
ALP SERPL-CCNC: 154 U/L — HIGH (ref 30–115)
ALT FLD-CCNC: 22 U/L — SIGNIFICANT CHANGE UP (ref 0–41)
ALT FLD-CCNC: 26 U/L — SIGNIFICANT CHANGE UP (ref 0–41)
ANION GAP SERPL CALC-SCNC: 15 MMOL/L — HIGH (ref 7–14)
ANION GAP SERPL CALC-SCNC: 20 MMOL/L — HIGH (ref 7–14)
ANION GAP SERPL CALC-SCNC: 20 MMOL/L — HIGH (ref 7–14)
APTT BLD: 34.9 SEC — SIGNIFICANT CHANGE UP (ref 27–39.2)
AST SERPL-CCNC: 22 U/L — SIGNIFICANT CHANGE UP (ref 0–41)
AST SERPL-CCNC: 29 U/L — SIGNIFICANT CHANGE UP (ref 0–41)
BASOPHILS # BLD AUTO: 0.03 K/UL — SIGNIFICANT CHANGE UP (ref 0–0.2)
BASOPHILS NFR BLD AUTO: 0.1 % — SIGNIFICANT CHANGE UP (ref 0–1)
BILIRUB SERPL-MCNC: 0.3 MG/DL — SIGNIFICANT CHANGE UP (ref 0.2–1.2)
BILIRUB SERPL-MCNC: 0.3 MG/DL — SIGNIFICANT CHANGE UP (ref 0.2–1.2)
BLD GP AB SCN SERPL QL: SIGNIFICANT CHANGE UP
BUN SERPL-MCNC: 116 MG/DL — CRITICAL HIGH (ref 10–20)
BUN SERPL-MCNC: 118 MG/DL — CRITICAL HIGH (ref 10–20)
BUN SERPL-MCNC: 120 MG/DL — CRITICAL HIGH (ref 10–20)
CALCIUM SERPL-MCNC: 8.2 MG/DL — LOW (ref 8.4–10.5)
CALCIUM SERPL-MCNC: 8.4 MG/DL — SIGNIFICANT CHANGE UP (ref 8.4–10.5)
CALCIUM SERPL-MCNC: 8.6 MG/DL — SIGNIFICANT CHANGE UP (ref 8.4–10.5)
CHLORIDE SERPL-SCNC: 104 MMOL/L — SIGNIFICANT CHANGE UP (ref 98–110)
CHLORIDE SERPL-SCNC: 105 MMOL/L — SIGNIFICANT CHANGE UP (ref 98–110)
CHLORIDE SERPL-SCNC: 108 MMOL/L — SIGNIFICANT CHANGE UP (ref 98–110)
CO2 SERPL-SCNC: 12 MMOL/L — LOW (ref 17–32)
CO2 SERPL-SCNC: 13 MMOL/L — LOW (ref 17–32)
CO2 SERPL-SCNC: 16 MMOL/L — LOW (ref 17–32)
CREAT SERPL-MCNC: 5.3 MG/DL — CRITICAL HIGH (ref 0.7–1.5)
CREAT SERPL-MCNC: 5.6 MG/DL — CRITICAL HIGH (ref 0.7–1.5)
CREAT SERPL-MCNC: 5.7 MG/DL — CRITICAL HIGH (ref 0.7–1.5)
CULTURE RESULTS: SIGNIFICANT CHANGE UP
EGFR: 10 ML/MIN/1.73M2 — LOW
EGFR: 10 ML/MIN/1.73M2 — LOW
EGFR: 11 ML/MIN/1.73M2 — LOW
EOSINOPHIL # BLD AUTO: 0 K/UL — SIGNIFICANT CHANGE UP (ref 0–0.7)
EOSINOPHIL NFR BLD AUTO: 0 % — SIGNIFICANT CHANGE UP (ref 0–8)
ESTIMATED AVERAGE GLUCOSE: 151 MG/DL — HIGH (ref 68–114)
GLUCOSE BLDC GLUCOMTR-MCNC: 122 MG/DL — HIGH (ref 70–99)
GLUCOSE BLDC GLUCOMTR-MCNC: 122 MG/DL — HIGH (ref 70–99)
GLUCOSE BLDC GLUCOMTR-MCNC: 134 MG/DL — HIGH (ref 70–99)
GLUCOSE BLDC GLUCOMTR-MCNC: 162 MG/DL — HIGH (ref 70–99)
GLUCOSE SERPL-MCNC: 100 MG/DL — HIGH (ref 70–99)
GLUCOSE SERPL-MCNC: 107 MG/DL — HIGH (ref 70–99)
GLUCOSE SERPL-MCNC: 151 MG/DL — HIGH (ref 70–99)
HCT VFR BLD CALC: 29.7 % — LOW (ref 42–52)
HCT VFR BLD CALC: 31 % — LOW (ref 42–52)
HGB BLD-MCNC: 10 G/DL — LOW (ref 14–18)
HGB BLD-MCNC: 10.2 G/DL — LOW (ref 14–18)
IMM GRANULOCYTES NFR BLD AUTO: 0.6 % — HIGH (ref 0.1–0.3)
INR BLD: 1.28 RATIO — SIGNIFICANT CHANGE UP (ref 0.65–1.3)
INR BLD: 1.31 RATIO — HIGH (ref 0.65–1.3)
LACTATE SERPL-SCNC: 0.8 MMOL/L — SIGNIFICANT CHANGE UP (ref 0.7–2)
LYMPHOCYTES # BLD AUTO: 0.85 K/UL — LOW (ref 1.2–3.4)
LYMPHOCYTES # BLD AUTO: 4.1 % — LOW (ref 20.5–51.1)
MAGNESIUM SERPL-MCNC: 1.9 MG/DL — SIGNIFICANT CHANGE UP (ref 1.8–2.4)
MAGNESIUM SERPL-MCNC: 1.9 MG/DL — SIGNIFICANT CHANGE UP (ref 1.8–2.4)
MCHC RBC-ENTMCNC: 30.1 PG — SIGNIFICANT CHANGE UP (ref 27–31)
MCHC RBC-ENTMCNC: 30.7 PG — SIGNIFICANT CHANGE UP (ref 27–31)
MCHC RBC-ENTMCNC: 32.9 G/DL — SIGNIFICANT CHANGE UP (ref 32–37)
MCHC RBC-ENTMCNC: 33.7 G/DL — SIGNIFICANT CHANGE UP (ref 32–37)
MCV RBC AUTO: 91.1 FL — SIGNIFICANT CHANGE UP (ref 80–94)
MCV RBC AUTO: 91.4 FL — SIGNIFICANT CHANGE UP (ref 80–94)
MONOCYTES # BLD AUTO: 1.06 K/UL — HIGH (ref 0.1–0.6)
MONOCYTES NFR BLD AUTO: 5.1 % — SIGNIFICANT CHANGE UP (ref 1.7–9.3)
NEUTROPHILS # BLD AUTO: 18.56 K/UL — HIGH (ref 1.4–6.5)
NEUTROPHILS NFR BLD AUTO: 90.1 % — HIGH (ref 42.2–75.2)
NRBC # BLD: 0 /100 WBCS — SIGNIFICANT CHANGE UP (ref 0–0)
NRBC # BLD: 0 /100 WBCS — SIGNIFICANT CHANGE UP (ref 0–0)
PLATELET # BLD AUTO: 327 K/UL — SIGNIFICANT CHANGE UP (ref 130–400)
PLATELET # BLD AUTO: 342 K/UL — SIGNIFICANT CHANGE UP (ref 130–400)
PMV BLD: 10.4 FL — SIGNIFICANT CHANGE UP (ref 7.4–10.4)
PMV BLD: 10.4 FL — SIGNIFICANT CHANGE UP (ref 7.4–10.4)
POTASSIUM SERPL-MCNC: 4 MMOL/L — SIGNIFICANT CHANGE UP (ref 3.5–5)
POTASSIUM SERPL-MCNC: 4.2 MMOL/L — SIGNIFICANT CHANGE UP (ref 3.5–5)
POTASSIUM SERPL-MCNC: 4.3 MMOL/L — SIGNIFICANT CHANGE UP (ref 3.5–5)
POTASSIUM SERPL-SCNC: 4 MMOL/L — SIGNIFICANT CHANGE UP (ref 3.5–5)
POTASSIUM SERPL-SCNC: 4.2 MMOL/L — SIGNIFICANT CHANGE UP (ref 3.5–5)
POTASSIUM SERPL-SCNC: 4.3 MMOL/L — SIGNIFICANT CHANGE UP (ref 3.5–5)
PROT SERPL-MCNC: 7.6 G/DL — SIGNIFICANT CHANGE UP (ref 6–8)
PROT SERPL-MCNC: 7.6 G/DL — SIGNIFICANT CHANGE UP (ref 6–8)
PROTHROM AB SERPL-ACNC: 14.6 SEC — HIGH (ref 9.95–12.87)
PROTHROM AB SERPL-ACNC: 15 SEC — HIGH (ref 9.95–12.87)
RBC # BLD: 3.26 M/UL — LOW (ref 4.7–6.1)
RBC # BLD: 3.39 M/UL — LOW (ref 4.7–6.1)
RBC # FLD: 13.8 % — SIGNIFICANT CHANGE UP (ref 11.5–14.5)
RBC # FLD: 13.9 % — SIGNIFICANT CHANGE UP (ref 11.5–14.5)
SODIUM SERPL-SCNC: 135 MMOL/L — SIGNIFICANT CHANGE UP (ref 135–146)
SODIUM SERPL-SCNC: 138 MMOL/L — SIGNIFICANT CHANGE UP (ref 135–146)
SODIUM SERPL-SCNC: 140 MMOL/L — SIGNIFICANT CHANGE UP (ref 135–146)
SPECIMEN SOURCE: SIGNIFICANT CHANGE UP
WBC # BLD: 20.23 K/UL — HIGH (ref 4.8–10.8)
WBC # BLD: 20.63 K/UL — HIGH (ref 4.8–10.8)
WBC # FLD AUTO: 20.23 K/UL — HIGH (ref 4.8–10.8)
WBC # FLD AUTO: 20.63 K/UL — HIGH (ref 4.8–10.8)

## 2024-07-10 PROCEDURE — 50433 PLMT NEPHROURETERAL CATHETER: CPT | Mod: RT

## 2024-07-10 PROCEDURE — 99233 SBSQ HOSP IP/OBS HIGH 50: CPT

## 2024-07-10 RX ORDER — HYDROMORPHONE HCL IN 0.9% NACL 0.2 MG/ML
0.5 PLASTIC BAG, INJECTION (ML) INTRAVENOUS
Refills: 0 | Status: DISCONTINUED | OUTPATIENT
Start: 2024-07-23 | End: 2024-07-28

## 2024-07-10 RX ORDER — ONDANSETRON HCL/PF 4 MG/2 ML
4 VIAL (ML) INJECTION ONCE
Refills: 0 | Status: DISCONTINUED | OUTPATIENT
Start: 2024-07-23 | End: 2024-07-28

## 2024-07-10 RX ORDER — DEXTROSE MONOHYDRATE, SODIUM CHLORIDE, SODIUM LACTATE, CALCIUM CHLORIDE, MAGNESIUM CHLORIDE 1.5; 538; 448; 18.4; 5.08 G/100ML; MG/100ML; MG/100ML; MG/100ML; MG/100ML
1000 SOLUTION INTRAPERITONEAL
Refills: 0 | Status: DISCONTINUED | OUTPATIENT
Start: 2024-07-10 | End: 2024-07-10

## 2024-07-10 RX ORDER — SODIUM BICARBONATE 0.9MEQ/ML
0.12 SYRINGE (ML) INTRAVENOUS
Qty: 150 | Refills: 0 | Status: DISCONTINUED | OUTPATIENT
Start: 2024-07-10 | End: 2024-07-12

## 2024-07-10 RX ORDER — HEPARIN SODIUM 1000 [USP'U]/ML
5000 INJECTION, SOLUTION INTRAVENOUS; SUBCUTANEOUS EVERY 8 HOURS
Refills: 0 | Status: DISCONTINUED | OUTPATIENT
Start: 2024-07-10 | End: 2024-07-10

## 2024-07-10 RX ORDER — HYDROMORPHONE HCL IN 0.9% NACL 0.2 MG/ML
1 PLASTIC BAG, INJECTION (ML) INTRAVENOUS
Refills: 0 | Status: DISCONTINUED | OUTPATIENT
Start: 2024-07-23 | End: 2024-07-28

## 2024-07-10 RX ADMIN — NIFEDIPINE 30 MILLIGRAM(S): 20 CAPSULE, LIQUID FILLED ORAL at 05:49

## 2024-07-10 RX ADMIN — Medication 650 MILLIGRAM(S): at 21:31

## 2024-07-10 RX ADMIN — DEXTROSE MONOHYDRATE, SODIUM CHLORIDE, SODIUM LACTATE, CALCIUM CHLORIDE, MAGNESIUM CHLORIDE 100 MILLILITER(S): 1.5; 538; 448; 18.4; 5.08 SOLUTION INTRAPERITONEAL at 10:20

## 2024-07-10 RX ADMIN — Medication 650 MILLIGRAM(S): at 05:49

## 2024-07-10 RX ADMIN — CEFEPIME HYDROCHLORIDE 100 MILLIGRAM(S): 1 INJECTION, POWDER, FOR SOLUTION INTRAMUSCULAR; INTRAVENOUS at 14:38

## 2024-07-10 RX ADMIN — Medication 650 MILLIGRAM(S): at 21:29

## 2024-07-10 RX ADMIN — PANTOPRAZOLE SODIUM 40 MILLIGRAM(S): 20 TABLET, DELAYED RELEASE ORAL at 05:51

## 2024-07-10 RX ADMIN — ATORVASTATIN CALCIUM 20 MILLIGRAM(S): 40 TABLET, FILM COATED ORAL at 21:29

## 2024-07-10 NOTE — PROGRESS NOTE ADULT - SUBJECTIVE AND OBJECTIVE BOX
JOSEKRISTY  69y Male    INTERVAL HPI/OVERNIGHT EVENTS:    no complaints today  no fever, N/V, pain, SOB  for right PCN by IR today    T(F): 98.7 (07-10-24 @ 14:09), Max: 98.9 (07-10-24 @ 05:50)  HR: 81 (07-10-24 @ 14:34) (62 - 87)  BP: 107/68 (07-10-24 @ 14:34) (100/54 - 158/85)  RR: 20 (07-10-24 @ 14:34) (16 - 20)  SpO2: 100% (07-10-24 @ 14:34) (98% - 100%) on RA  I&O's Summary    CAPILLARY BLOOD GLUCOSE      POCT Blood Glucose.: 134 mg/dL (10 Jul 2024 14:40)  POCT Blood Glucose.: 122 mg/dL (10 Jul 2024 11:21)  POCT Blood Glucose.: 122 mg/dL (10 Jul 2024 08:17)  POCT Blood Glucose.: 110 mg/dL (09 Jul 2024 23:47)        PHYSICAL EXAM:  GENERAL: NAD  HEAD:  Normocephalic  EYES:  conjunctiva and sclera clear  ENMT: Moist mucous membranes  NERVOUS SYSTEM:  Alert, awake, Good concentration  CHEST/LUNG: CTA b/l  HEART: Regular rate and rhythm  ABDOMEN: Soft, Nontender, Nondistended; Bowel sounds present  suprapubic catheter site - no erythema or discharge  EXTREMITIES:   No edema LE  SKIN: chronic crusted lesions, + dressings b/l    Consultant(s) Notes Reviewed:  [x ] YES  [ ] NO  Care Discussed with Consultants/Other Providers [ x] YES  [ ] NO    MEDICATIONS  (STANDING):  atorvastatin 20 milliGRAM(s) Oral at bedtime  cefepime   IVPB 1000 milliGRAM(s) IV Intermittent every 24 hours  dextrose 5%. 1000 milliLiter(s) (100 mL/Hr) IV Continuous <Continuous>  dextrose 5%. 1000 milliLiter(s) (50 mL/Hr) IV Continuous <Continuous>  dextrose 50% Injectable 25 Gram(s) IV Push once  dextrose 50% Injectable 12.5 Gram(s) IV Push once  dextrose 50% Injectable 25 Gram(s) IV Push once  glucagon  Injectable 1 milliGRAM(s) IntraMuscular once  insulin lispro (ADMELOG) corrective regimen sliding scale   SubCutaneous three times a day before meals  insulin lispro (ADMELOG) corrective regimen sliding scale   SubCutaneous at bedtime  NIFEdipine XL 30 milliGRAM(s) Oral daily  pantoprazole    Tablet 40 milliGRAM(s) Oral before breakfast  sodium bicarbonate 650 milliGRAM(s) Oral three times a day  sodium bicarbonate  Infusion 0.125 mEq/kG/Hr (75 mL/Hr) IV Continuous <Continuous>    MEDICATIONS  (PRN):  aluminum hydroxide/magnesium hydroxide/simethicone Suspension 30 milliLiter(s) Oral every 4 hours PRN Dyspepsia  dextrose Oral Gel 15 Gram(s) Oral once PRN Blood Glucose LESS THAN 70 milliGRAM(s)/deciliter  melatonin 3 milliGRAM(s) Oral at bedtime PRN Insomnia  ondansetron Injectable 4 milliGRAM(s) IV Push every 8 hours PRN Nausea and/or Vomiting      LABS:                        10.0   20.63 )-----------( 327      ( 10 Jul 2024 06:36 )             29.7     07-10    140  |  108  |  118<HH>  ----------------------------<  107<H>  4.0   |  12<L>  |  5.3<HH>    Ca    8.2<L>      10 Jul 2024 06:36  Mg     1.9     07-10    TPro  7.6  /  Alb  2.3<L>  /  TBili  0.3  /  DBili  x   /  AST  22  /  ALT  22  /  AlkPhos  153<H>  07-10    PT/INR - ( 10 Jul 2024 06:36 )   PT: 15.00 sec;   INR: 1.31 ratio         PTT - ( 10 Jul 2024 06:36 )  PTT:34.9 sec    Lactate, Blood: 0.8 mmol/L (07-10 @ 06:36)  Lactate, Blood: 1.0 mmol/L (07-09 @ 13:30)          RADIOLOGY & ADDITIONAL TESTS:    Imaging or report Personally Reviewed:  [ x] YES  [ ] NO    < from: CT Abdomen and Pelvis No Cont (07.09.24 @ 13:00) >  IMPRESSION:  1.  Severe right hydroureteronephrosis with cortical thinning and   numerous large intrarenal and proximal to mid ureteral calculi;   obstructing calculus in the right mid ureter measures 1.1 x 1 x 1.8 cm (   ) with normal caliber distal ureter.  2.  Multiple nonobstructing left intrarenal calculi and a 0.8 cm left   distal ureteral calculus; no left hydronephrosis.  3.  Few prominent retroperitoneal lymph nodes, likely reactive.  4.  Cholelithiasis.    < end of copied text >      < from: TTE Echo Complete w/o Contrast w/ Doppler (03.30.21 @ 09:24) >  Summary:   1. Technically limited study.   2. Normal global left ventricular systolic function.   3. LV Ejection Fraction by Todd's Method with a biplane EF of 61 %.   4. Spectral Doppler shows impaired relaxation pattern of left ventricular myocardial filling (Grade I diastolic dysfunction).   5. Mildly enlarged right ventricle.   6. Normal right atrial size.   7. There is no evidence of pericardial effusion.   8. Mild thickening of the anterior and posterior mitral valve leaflets.   9. No evidence of mitral valve regurgitation.  10. Mild tricuspid regurgitation.  11. Sclerotic aortic valve with normal opening.    < end of copied text >              Case discussed with residents on rounds today    Care discussed with pt

## 2024-07-10 NOTE — CONSULT NOTE ADULT - NS ATTEST RISK PROBLEM GEN_ALL_CORE FT
- Patient has an illness which poses a threat to life or bodily function without treatment  I have personally seen and examined this patient.  I have reviewed all pertinent clinical information and reviewed all relevant imaging and diagnostic studies personally.   If possible, I counseled the patient about diagnostic testing and treatment plan.   I discussed my recommendations with the primary team and any family members at bedside.

## 2024-07-10 NOTE — CONSULT NOTE ADULT - SUBJECTIVE AND OBJECTIVE BOX
NEPHROLOGY CONSULTATION NOTE    68yo male w/ pmhx of HTN, HLD, DMII, CKD stage 5 baseline Cr ~ 4.4, urinary incontinence with chronic supra pubic catheter, Nephrolithiasis, spinal abscess leading to quadriplegia presenting w. NBNB vomiting and nausea every time after he eats. Reports generalized crampy abdominal pain. Reports is non compliant w. medications and has not take is prescribed medications for the past few months.  Denies fever chills, flank pain, chest pain, sob, urinary sxs, oliguria.     in the ED,   Vitals: 136/80. HR 80. RR 18. T 98.2, satting 98% on RA   Labs: significant for wbc 20; UA grossly positive  CTAP shows severe R hydro; L non-obs calculi  SPC replaced   Urology consulted recommending emergency b/l nephrostomy tubes be placed    Patient admitted for acute on chronic JOÃO in s/o obstructing stones w/ UTI      PAST MEDICAL & SURGICAL HISTORY:  DM (diabetes mellitus)      HTN (hypertension)      H/O paraplegia      Spinal abscess      Renal stones      Spinal abscess  S/P Surgery      Encounter for care or replacement of suprapubic tube        Allergies:  No Known Allergies    Home Medications Reviewed  Hospital Medications:   MEDICATIONS  (STANDING):  atorvastatin 20 milliGRAM(s) Oral at bedtime  cefepime   IVPB 1000 milliGRAM(s) IV Intermittent every 24 hours  dextrose 5%. 1000 milliLiter(s) (100 mL/Hr) IV Continuous <Continuous>  dextrose 5%. 1000 milliLiter(s) (50 mL/Hr) IV Continuous <Continuous>  dextrose 50% Injectable 12.5 Gram(s) IV Push once  dextrose 50% Injectable 25 Gram(s) IV Push once  dextrose 50% Injectable 25 Gram(s) IV Push once  glucagon  Injectable 1 milliGRAM(s) IntraMuscular once  insulin lispro (ADMELOG) corrective regimen sliding scale   SubCutaneous three times a day before meals  insulin lispro (ADMELOG) corrective regimen sliding scale   SubCutaneous at bedtime  NIFEdipine XL 30 milliGRAM(s) Oral daily  pantoprazole    Tablet 40 milliGRAM(s) Oral before breakfast  sodium bicarbonate 650 milliGRAM(s) Oral three times a day    SOCIAL HISTORY:  Denies ETOH,Smoking,   FAMILY HISTORY:  FH: HTN (hypertension)    FH: breast cancer    FH: melanoma    FH: heart disease      DNI      REVIEW OF SYSTEMS:  CONSTITUTIONAL: No weakness, fevers or chills  EYES/ENT: No visual changes;  No vertigo or throat pain   NECK: No pain or stiffness  RESPIRATORY: No cough, wheezing, hemoptysis; No shortness of breath  CARDIOVASCULAR: No chest pain or palpitations.  GASTROINTESTINAL: No abdominal or epigastric pain. No nausea, vomiting, or hematemesis; No diarrhea or constipation. No melena or hematochezia.  GENITOURINARY: No dysuria, frequency, foamy urine, urinary urgency, incontinence or hematuria  NEUROLOGICAL: No numbness or weakness  SKIN: No itching, burning, rashes, or lesions   VASCULAR: No bilateral lower extremity edema.   All other review of systems is negative unless indicated above.    VITALS:  Vital Signs Last 24 Hrs  T(C): 36.8 (10 Jul 2024 07:52), Max: 37.2 (10 Jul 2024 05:50)  T(F): 98.2 (10 Jul 2024 07:52), Max: 98.9 (10 Jul 2024 05:50)  HR: 82 (10 Jul 2024 07:52) (62 - 87)  BP: 100/54 (10 Jul 2024 07:52) (100/54 - 158/85)  BP(mean): --  RR: 18 (10 Jul 2024 07:52) (16 - 20)  SpO2: 98% (10 Jul 2024 07:52) (98% - 100%)    Parameters below as of 10 Jul 2024 07:52  Patient On (Oxygen Delivery Method): room air          PHYSICAL EXAM:  Constitutional: NAD  HEENT: anicteric sclera, oropharynx clear, MMM  Neck: No JVD  Respiratory: CTAB, no wheezes, rales or rhonchi  Cardiovascular: S1, S2, RRR  Gastrointestinal: BS+, soft, NT/ND  Extremities: No cyanosis or clubbing. No peripheral edema  Neurological: A/O x 3, no focal deficits  Psychiatric: Normal mood, normal affect  : No CVA tenderness. No angelo.   Skin: No rashes  Vascular Access:    LABS:  07-10    140  |  108  |  118<HH>  ----------------------------<  107<H>  4.0   |  12<L>  |  5.3<HH>    Ca    8.2<L>      10 Jul 2024 06:36  Mg     1.9     07-10    TPro  7.6  /  Alb  2.3<L>  /  TBili  0.3  /  DBili      /  AST  22  /  ALT  22  /  AlkPhos  153<H>  07-10    Creatinine Trend: 5.3 <--, 5.6 <--, 6.1 <--                        10.0   20.63 )-----------( 327      ( 10 Jul 2024 06:36 )             29.7     Urine Studies:  Urinalysis Basic - ( 10 Jul 2024 06:36 )    Color:  / Appearance:  / SG:  / pH:   Gluc: 107 mg/dL / Ketone:   / Bili:  / Urobili:    Blood:  / Protein:  / Nitrite:    Leuk Esterase:  / RBC:  / WBC    Sq Epi:  / Non Sq Epi:  / Bacteria:       RADIOLOGY & ADDITIONAL STUDIES:  ACC: [id number] EXAM:  CT ABDOMEN AND PELVIS   ORDERED BY: HELEN AU     PROCEDURE DATE:  07/09/2024          INTERPRETATION:  CLINICAL STATEMENT: Nausea and vomiting    TECHNIQUE: Contiguous axial CT images were obtained from the lower chest   to the pubic symphysis without intravenous contrast. Oral contrast was   not administered.  Reformatted images in the coronal and sagittal planes   were acquired.    COMPARISON CT: February 12, 2016    OTHER STUDIES USED FOR CORRELATION: None.      FINDINGS:    LOWER CHEST: Coronary artery calcifications.    HEPATOBILIARY: Punctate calcified granulomas. Cholelithiasis.    SPLEEN: Unremarkable.    PANCREAS: Unremarkable.    ADRENAL GLANDS: Unremarkable.    KIDNEYS: Severe right hydroureteronephrosis with associated cortical   thinning and multiple calculi in the renal calyces, collecting systems   and proximal to mid ureter. The most distal obstructing calculus in the   right mid ureter measures 1.1 x 1 x 1.8 cm (); normal caliber   distal ureter. A right upper pole renal calculus measures up to 3.1 cm   and a renal pelvic calculus measures up to 3 cm. Right renal pelvic and   periureteral fat stranding noted.    Multiple nonobstructing left intrarenal calculi measuring up to 1.6 cm.   Left distal ureteral 0.5 x 0.6 x 0.8 cm calculus without associated   hydronephrosis (, 302/245).      ABDOMINOPELVIC NODES: Few prominent retroperitoneal lymph nodes, may be   reactive. For example, an interaortocaval lymph node measures 1.4 x 1 cm.    PELVIC ORGANS: Enlarged prostate gland. Collapsed urinary bladder with a   suprapubic catheter in place. A 1.6 cm bladder calculus noted.    PERITONEUM/MESENTERY/BOWEL: No bowel obstruction, ascites or   pneumoperitoneum appendix not visualized; no focal pericecal   inflammation..    BONES/SOFT TISSUES: Osteopenia. Degenerative changes of the spine and   hips noted. Bilateral fat-containing inguinal hernias..    OTHER: Diffuse atherosclerotic vascular calcifications.    IMPRESSION:  1.  Severe right hydroureteronephrosis with cortical thinning and   numerous large intrarenal and proximal to mid ureteral calculi;   obstructing calculus in the right mid ureter measures 1.1 x 1 x 1.8 cm (   ) with normal caliber distal ureter.  2.  Multiple nonobstructing left intrarenal calculi and a 0.8 cm left   distal ureteral calculus; no left hydronephrosis.  3.  Few prominent retroperitoneal lymph nodes, likely reactive.  4.  Cholelithiasis.    --- End of Report ---    Assessment/Plan:   68yo male w/ pmhx of HTN, HLD, DMII, CKD stage 5 baseline Cr ~ 4.4, urinary incontinence with chronic supra pubic catheter, Nephrolithiasis, spinal abscess leading to quadriplegia presenting w. NBNB vomiting and nausea every time after he eats. Reports generalized crampy abdominal pain. Reports is non compliant w. medications and has not take is prescribed medications for the past few months. Patient was found to have Obstructing R mid ureter calculus and B/L nephrolithiasis. 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   - BID BMP   - strict I/Os   - renal diet   - avoid nephrotoxic meds   - check Ph and PTH        NEPHROLOGY CONSULTATION NOTE    70yo male w/ pmhx of HTN, HLD, DMII, CKD stage 5 baseline Cr ~ 4.4, urinary incontinence with chronic supra pubic catheter, Nephrolithiasis, spinal abscess leading to quadriplegia presenting w. NBNB vomiting and nausea every time after he eats. Reports generalized crampy abdominal pain. Reports is non compliant w. medications and has not take is prescribed medications for the past few months.  Denies fever chills, flank pain, chest pain, sob, urinary sxs, oliguria.     in the ED,   Vitals: 136/80. HR 80. RR 18. T 98.2, satting 98% on RA   Labs: significant for wbc 20; UA grossly positive  CTAP shows severe R hydro; L non-obs calculi  SPC replaced   Urology consulted recommending emergency b/l nephrostomy tubes be placed    Patient admitted for acute on chronic JOÃO in s/o obstructing stones w/ UTI      PAST MEDICAL & SURGICAL HISTORY:  DM (diabetes mellitus)      HTN (hypertension)      H/O paraplegia      Spinal abscess      Renal stones      Spinal abscess  S/P Surgery      Encounter for care or replacement of suprapubic tube        Allergies:  No Known Allergies    Home Medications Reviewed  Hospital Medications:   MEDICATIONS  (STANDING):  atorvastatin 20 milliGRAM(s) Oral at bedtime  cefepime   IVPB 1000 milliGRAM(s) IV Intermittent every 24 hours  dextrose 5%. 1000 milliLiter(s) (100 mL/Hr) IV Continuous <Continuous>  dextrose 5%. 1000 milliLiter(s) (50 mL/Hr) IV Continuous <Continuous>  dextrose 50% Injectable 12.5 Gram(s) IV Push once  dextrose 50% Injectable 25 Gram(s) IV Push once  dextrose 50% Injectable 25 Gram(s) IV Push once  glucagon  Injectable 1 milliGRAM(s) IntraMuscular once  insulin lispro (ADMELOG) corrective regimen sliding scale   SubCutaneous three times a day before meals  insulin lispro (ADMELOG) corrective regimen sliding scale   SubCutaneous at bedtime  NIFEdipine XL 30 milliGRAM(s) Oral daily  pantoprazole    Tablet 40 milliGRAM(s) Oral before breakfast  sodium bicarbonate 650 milliGRAM(s) Oral three times a day    SOCIAL HISTORY:  Denies ETOH,Smoking,   FAMILY HISTORY:  FH: HTN (hypertension)    FH: breast cancer    FH: melanoma    FH: heart disease      DNI      REVIEW OF SYSTEMS:  CONSTITUTIONAL: No weakness, fevers or chills  EYES/ENT: No visual changes;  No vertigo or throat pain   NECK: No pain or stiffness  RESPIRATORY: No cough, wheezing, hemoptysis; No shortness of breath  CARDIOVASCULAR: No chest pain or palpitations.  GASTROINTESTINAL: No abdominal or epigastric pain. No nausea, vomiting, or hematemesis; No diarrhea or constipation. No melena or hematochezia.  GENITOURINARY: No dysuria, frequency, foamy urine, urinary urgency, incontinence or hematuria  NEUROLOGICAL: No numbness or weakness  SKIN: No itching, burning, rashes, or lesions   VASCULAR: No bilateral lower extremity edema.   All other review of systems is negative unless indicated above.    VITALS:  Vital Signs Last 24 Hrs  T(C): 36.8 (10 Jul 2024 07:52), Max: 37.2 (10 Jul 2024 05:50)  T(F): 98.2 (10 Jul 2024 07:52), Max: 98.9 (10 Jul 2024 05:50)  HR: 82 (10 Jul 2024 07:52) (62 - 87)  BP: 100/54 (10 Jul 2024 07:52) (100/54 - 158/85)  BP(mean): --  RR: 18 (10 Jul 2024 07:52) (16 - 20)  SpO2: 98% (10 Jul 2024 07:52) (98% - 100%)    Parameters below as of 10 Jul 2024 07:52  Patient On (Oxygen Delivery Method): room air          PHYSICAL EXAM:  Constitutional: NAD  HEENT: anicteric sclera, oropharynx clear, MMM  Neck: No JVD  Respiratory: CTAB, no wheezes, rales or rhonchi  Cardiovascular: S1, S2, RRR  Gastrointestinal: BS+, soft, NT/ND  Extremities: No cyanosis or clubbing. No peripheral edema  Neurological: A/O x 3, no focal deficits  Psychiatric: Normal mood, normal affect  : No CVA tenderness. No angelo.   Skin: No rashes  Vascular Access:    LABS:  07-10    140  |  108  |  118<HH>  ----------------------------<  107<H>  4.0   |  12<L>  |  5.3<HH>    Ca    8.2<L>      10 Jul 2024 06:36  Mg     1.9     07-10    TPro  7.6  /  Alb  2.3<L>  /  TBili  0.3  /  DBili      /  AST  22  /  ALT  22  /  AlkPhos  153<H>  07-10    Creatinine Trend: 5.3 <--, 5.6 <--, 6.1 <--                        10.0   20.63 )-----------( 327      ( 10 Jul 2024 06:36 )             29.7     Urine Studies:  Urinalysis Basic - ( 10 Jul 2024 06:36 )    Color:  / Appearance:  / SG:  / pH:   Gluc: 107 mg/dL / Ketone:   / Bili:  / Urobili:    Blood:  / Protein:  / Nitrite:    Leuk Esterase:  / RBC:  / WBC    Sq Epi:  / Non Sq Epi:  / Bacteria:       RADIOLOGY & ADDITIONAL STUDIES:  ACC: [id number] EXAM:  CT ABDOMEN AND PELVIS   ORDERED BY: HELEN AU     PROCEDURE DATE:  07/09/2024          INTERPRETATION:  CLINICAL STATEMENT: Nausea and vomiting    TECHNIQUE: Contiguous axial CT images were obtained from the lower chest   to the pubic symphysis without intravenous contrast. Oral contrast was   not administered.  Reformatted images in the coronal and sagittal planes   were acquired.    COMPARISON CT: February 12, 2016    OTHER STUDIES USED FOR CORRELATION: None.      FINDINGS:    LOWER CHEST: Coronary artery calcifications.    HEPATOBILIARY: Punctate calcified granulomas. Cholelithiasis.    SPLEEN: Unremarkable.    PANCREAS: Unremarkable.    ADRENAL GLANDS: Unremarkable.    KIDNEYS: Severe right hydroureteronephrosis with associated cortical   thinning and multiple calculi in the renal calyces, collecting systems   and proximal to mid ureter. The most distal obstructing calculus in the   right mid ureter measures 1.1 x 1 x 1.8 cm (); normal caliber   distal ureter. A right upper pole renal calculus measures up to 3.1 cm   and a renal pelvic calculus measures up to 3 cm. Right renal pelvic and   periureteral fat stranding noted.    Multiple nonobstructing left intrarenal calculi measuring up to 1.6 cm.   Left distal ureteral 0.5 x 0.6 x 0.8 cm calculus without associated   hydronephrosis (, 302/245).      ABDOMINOPELVIC NODES: Few prominent retroperitoneal lymph nodes, may be   reactive. For example, an interaortocaval lymph node measures 1.4 x 1 cm.    PELVIC ORGANS: Enlarged prostate gland. Collapsed urinary bladder with a   suprapubic catheter in place. A 1.6 cm bladder calculus noted.    PERITONEUM/MESENTERY/BOWEL: No bowel obstruction, ascites or   pneumoperitoneum appendix not visualized; no focal pericecal   inflammation..    BONES/SOFT TISSUES: Osteopenia. Degenerative changes of the spine and   hips noted. Bilateral fat-containing inguinal hernias..    OTHER: Diffuse atherosclerotic vascular calcifications.    IMPRESSION:  1.  Severe right hydroureteronephrosis with cortical thinning and   numerous large intrarenal and proximal to mid ureteral calculi;   obstructing calculus in the right mid ureter measures 1.1 x 1 x 1.8 cm (   ) with normal caliber distal ureter.  2.  Multiple nonobstructing left intrarenal calculi and a 0.8 cm left   distal ureteral calculus; no left hydronephrosis.  3.  Few prominent retroperitoneal lymph nodes, likely reactive.  4.  Cholelithiasis.    --- End of Report ---    Assessment/Plan:   70yo male w/ pmhx of HTN, HLD, DMII, CKD stage 5 baseline Cr ~ 4.4, urinary incontinence with chronic supra pubic catheter, Nephrolithiasis, spinal abscess leading to quadriplegia presenting w. NBNB vomiting and nausea every time after he eats. Reports generalized crampy abdominal pain. Reports is non compliant w. medications and has not take is prescribed medications for the past few months. Patient was found to have Obstructing R mid ureter calculus and B/L nephrolithiasis. Nephrology are consulted for JOÃO     #Obstructing R mid ureter calculus with severe hydroureteronephrosis   #B/L nephrolithiasis   #Post-renal JOÃO on CKD 5   #HAGMA   #Chronic suprapubic catheter   - normal baseline creatinine ~4.4, presented with Cr 6.1 --> 5.3    - likely etiology is post-renal JOÃO secondary to obstruction   - urology and IR on board   - patient scheduled for bilateral PCN today  - CO2 12     Recommendations   - f/u IR for emergent PCNs today   - Bicarb drip?   - BID BMP   - strict I/Os   - renal diet   - avoid nephrotoxic meds   - check Ph and PTH        NEPHROLOGY CONSULTATION NOTE    68yo male w/ pmhx of HTN, HLD, DMII, CKD stage 5 baseline Cr ~ 4.4, urinary incontinence with chronic supra pubic catheter, Nephrolithiasis, spinal abscess leading to quadriplegia presenting w. NBNB vomiting and nausea every time after he eats. Reports generalized crampy abdominal pain. Reports is non compliant w. medications and has not take is prescribed medications for the past few months.  Denies fever chills, flank pain, chest pain, sob, urinary sxs, oliguria.     in the ED,   Vitals: 136/80. HR 80. RR 18. T 98.2, satting 98% on RA   Labs: significant for wbc 20; UA grossly positive  CTAP shows severe R hydro; L non-obs calculi  SPC replaced   Urology consulted recommending emergency b/l nephrostomy tubes be placed    Patient admitted for acute on chronic JOÃO in s/o obstructing stones w/ UTI      PAST MEDICAL & SURGICAL HISTORY:  DM (diabetes mellitus)      HTN (hypertension)      H/O paraplegia      Spinal abscess      Renal stones      Spinal abscess  S/P Surgery      Encounter for care or replacement of suprapubic tube        Allergies:  No Known Allergies    Home Medications Reviewed  Hospital Medications:   MEDICATIONS  (STANDING):  atorvastatin 20 milliGRAM(s) Oral at bedtime  cefepime   IVPB 1000 milliGRAM(s) IV Intermittent every 24 hours  dextrose 5%. 1000 milliLiter(s) (100 mL/Hr) IV Continuous <Continuous>  dextrose 5%. 1000 milliLiter(s) (50 mL/Hr) IV Continuous <Continuous>  dextrose 50% Injectable 12.5 Gram(s) IV Push once  dextrose 50% Injectable 25 Gram(s) IV Push once  dextrose 50% Injectable 25 Gram(s) IV Push once  glucagon  Injectable 1 milliGRAM(s) IntraMuscular once  insulin lispro (ADMELOG) corrective regimen sliding scale   SubCutaneous three times a day before meals  insulin lispro (ADMELOG) corrective regimen sliding scale   SubCutaneous at bedtime  NIFEdipine XL 30 milliGRAM(s) Oral daily  pantoprazole    Tablet 40 milliGRAM(s) Oral before breakfast  sodium bicarbonate 650 milliGRAM(s) Oral three times a day    SOCIAL HISTORY:  Denies ETOH,Smoking,   FAMILY HISTORY:  FH: HTN (hypertension)    FH: breast cancer    FH: melanoma    FH: heart disease      DNI      REVIEW OF SYSTEMS:  CONSTITUTIONAL: No weakness, fevers or chills  EYES/ENT: No visual changes;  No vertigo or throat pain   NECK: No pain or stiffness  RESPIRATORY: No cough, wheezing, hemoptysis; No shortness of breath  CARDIOVASCULAR: No chest pain or palpitations.  GASTROINTESTINAL: No abdominal or epigastric pain. No nausea, vomiting, or hematemesis; No diarrhea or constipation. No melena or hematochezia.  GENITOURINARY: No dysuria, frequency, foamy urine, urinary urgency, incontinence or hematuria  NEUROLOGICAL: No numbness or weakness  SKIN: No itching, burning, rashes, or lesions   VASCULAR: No bilateral lower extremity edema.   All other review of systems is negative unless indicated above.    VITALS:  Vital Signs Last 24 Hrs  T(C): 36.8 (10 Jul 2024 07:52), Max: 37.2 (10 Jul 2024 05:50)  T(F): 98.2 (10 Jul 2024 07:52), Max: 98.9 (10 Jul 2024 05:50)  HR: 82 (10 Jul 2024 07:52) (62 - 87)  BP: 100/54 (10 Jul 2024 07:52) (100/54 - 158/85)  BP(mean): --  RR: 18 (10 Jul 2024 07:52) (16 - 20)  SpO2: 98% (10 Jul 2024 07:52) (98% - 100%)    Parameters below as of 10 Jul 2024 07:52  Patient On (Oxygen Delivery Method): room air          PHYSICAL EXAM:  Constitutional: NAD  HEENT: anicteric sclera, oropharynx clear, MMM  Neck: No JVD  Respiratory: CTAB, no wheezes, rales or rhonchi  Cardiovascular: S1, S2, RRR  Gastrointestinal: BS+, soft, NT/ND  Extremities: No cyanosis or clubbing. No peripheral edema  Neurological: A/O x 3, no focal deficits  Psychiatric: Normal mood, normal affect  : No CVA tenderness. No angelo.   Skin: No rashes  Vascular Access:    LABS:  07-10    140  |  108  |  118<HH>  ----------------------------<  107<H>  4.0   |  12<L>  |  5.3<HH>    Ca    8.2<L>      10 Jul 2024 06:36  Mg     1.9     07-10    TPro  7.6  /  Alb  2.3<L>  /  TBili  0.3  /  DBili      /  AST  22  /  ALT  22  /  AlkPhos  153<H>  07-10    Creatinine Trend: 5.3 <--, 5.6 <--, 6.1 <--                        10.0   20.63 )-----------( 327      ( 10 Jul 2024 06:36 )             29.7     Urine Studies:  Urinalysis Basic - ( 10 Jul 2024 06:36 )    Color:  / Appearance:  / SG:  / pH:   Gluc: 107 mg/dL / Ketone:   / Bili:  / Urobili:    Blood:  / Protein:  / Nitrite:    Leuk Esterase:  / RBC:  / WBC    Sq Epi:  / Non Sq Epi:  / Bacteria:       RADIOLOGY & ADDITIONAL STUDIES:  ACC: [id number] EXAM:  CT ABDOMEN AND PELVIS   ORDERED BY: HELEN AU     PROCEDURE DATE:  07/09/2024          INTERPRETATION:  CLINICAL STATEMENT: Nausea and vomiting    TECHNIQUE: Contiguous axial CT images were obtained from the lower chest   to the pubic symphysis without intravenous contrast. Oral contrast was   not administered.  Reformatted images in the coronal and sagittal planes   were acquired.    COMPARISON CT: February 12, 2016    OTHER STUDIES USED FOR CORRELATION: None.      FINDINGS:    LOWER CHEST: Coronary artery calcifications.    HEPATOBILIARY: Punctate calcified granulomas. Cholelithiasis.    SPLEEN: Unremarkable.    PANCREAS: Unremarkable.    ADRENAL GLANDS: Unremarkable.    KIDNEYS: Severe right hydroureteronephrosis with associated cortical   thinning and multiple calculi in the renal calyces, collecting systems   and proximal to mid ureter. The most distal obstructing calculus in the   right mid ureter measures 1.1 x 1 x 1.8 cm (); normal caliber   distal ureter. A right upper pole renal calculus measures up to 3.1 cm   and a renal pelvic calculus measures up to 3 cm. Right renal pelvic and   periureteral fat stranding noted.    Multiple nonobstructing left intrarenal calculi measuring up to 1.6 cm.   Left distal ureteral 0.5 x 0.6 x 0.8 cm calculus without associated   hydronephrosis (, 302/245).      ABDOMINOPELVIC NODES: Few prominent retroperitoneal lymph nodes, may be   reactive. For example, an interaortocaval lymph node measures 1.4 x 1 cm.    PELVIC ORGANS: Enlarged prostate gland. Collapsed urinary bladder with a   suprapubic catheter in place. A 1.6 cm bladder calculus noted.    PERITONEUM/MESENTERY/BOWEL: No bowel obstruction, ascites or   pneumoperitoneum appendix not visualized; no focal pericecal   inflammation..    BONES/SOFT TISSUES: Osteopenia. Degenerative changes of the spine and   hips noted. Bilateral fat-containing inguinal hernias..    OTHER: Diffuse atherosclerotic vascular calcifications.    IMPRESSION:  1.  Severe right hydroureteronephrosis with cortical thinning and   numerous large intrarenal and proximal to mid ureteral calculi;   obstructing calculus in the right mid ureter measures 1.1 x 1 x 1.8 cm (   ) with normal caliber distal ureter.  2.  Multiple nonobstructing left intrarenal calculi and a 0.8 cm left   distal ureteral calculus; no left hydronephrosis.  3.  Few prominent retroperitoneal lymph nodes, likely reactive.  4.  Cholelithiasis.    --- End of Report ---    Assessment/Plan:   68yo male w/ pmhx of HTN, HLD, DMII, CKD stage 5 baseline Cr ~ 4.4, urinary incontinence with chronic supra pubic catheter, Nephrolithiasis, spinal abscess leading to quadriplegia presenting w. NBNB vomiting and nausea every time after he eats. Reports generalized crampy abdominal pain. Reports is non compliant w. medications and has not take is prescribed medications for the past few months. Patient was found to have Obstructing R mid ureter calculus and B/L nephrolithiasis. Nephrology are consulted for JOÃO     #Obstructing R mid ureter calculus with severe hydroureteronephrosis   #B/L nephrolithiasis   #Post-renal JOÃO on CKD 5   #HAGMA   #Chronic suprapubic catheter   - normal baseline creatinine ~4.4, presented with Cr 6.1 --> 5.3    - likely etiology is post-renal JOÃO secondary to obstruction   - urology and IR on board   - urology recommending bilateral PCNs due to high stone burden although no obstruction on left side   - CO2 12   - unsure how much the R obstruction is contributing to JOÃO as R kidney is already atrophic with cortical thinning     Recommendations   - f/u IR for emergent PCN today   - start D5W + sodium bicarb 150 meq at a rate of 75 cc/hr   (target CO2 of 18, switch to oral after )   - BID BMP   - strict I/Os   - renal diet   - avoid nephrotoxic meds   - check Ph and PTH   - pt is low threshold to start dialysis although no indication for urgent RRT   - will continue to monitor        NEPHROLOGY CONSULTATION NOTE    68yo male w/ pmhx of HTN, HLD, DMII, CKD stage 5 baseline Cr ~ 4.4, urinary incontinence with chronic supra pubic catheter, Nephrolithiasis, spinal abscess leading to quadriplegia presenting w. NBNB vomiting and nausea every time after he eats. Reports generalized crampy abdominal pain. Reports is non compliant w. medications and has not take is prescribed medications for the past few months.  Denies fever chills, flank pain, chest pain, sob, urinary sxs, oliguria.     in the ED,   Vitals: 136/80. HR 80. RR 18. T 98.2, satting 98% on RA   Labs: significant for wbc 20; UA grossly positive  CTAP shows severe R hydro; L non-obs calculi  SPC replaced   Urology consulted recommending emergency b/l nephrostomy tubes be placed    Patient admitted for acute on chronic JOÃO in s/o obstructing stones w/ UTI      PAST MEDICAL & SURGICAL HISTORY:  DM (diabetes mellitus)      HTN (hypertension)      H/O paraplegia      Spinal abscess      Renal stones      Spinal abscess  S/P Surgery      Encounter for care or replacement of suprapubic tube        Allergies:  No Known Allergies    Home Medications Reviewed  Hospital Medications:   MEDICATIONS  (STANDING):  atorvastatin 20 milliGRAM(s) Oral at bedtime  cefepime   IVPB 1000 milliGRAM(s) IV Intermittent every 24 hours  dextrose 5%. 1000 milliLiter(s) (100 mL/Hr) IV Continuous <Continuous>  dextrose 5%. 1000 milliLiter(s) (50 mL/Hr) IV Continuous <Continuous>  dextrose 50% Injectable 12.5 Gram(s) IV Push once  dextrose 50% Injectable 25 Gram(s) IV Push once  dextrose 50% Injectable 25 Gram(s) IV Push once  glucagon  Injectable 1 milliGRAM(s) IntraMuscular once  insulin lispro (ADMELOG) corrective regimen sliding scale   SubCutaneous three times a day before meals  insulin lispro (ADMELOG) corrective regimen sliding scale   SubCutaneous at bedtime  NIFEdipine XL 30 milliGRAM(s) Oral daily  pantoprazole    Tablet 40 milliGRAM(s) Oral before breakfast  sodium bicarbonate 650 milliGRAM(s) Oral three times a day    SOCIAL HISTORY:  Denies ETOH,Smoking,   FAMILY HISTORY:  FH: HTN (hypertension)    FH: breast cancer    FH: melanoma    FH: heart disease      DNI      REVIEW OF SYSTEMS:  All other review of systems is negative unless indicated above.    VITALS:  Vital Signs Last 24 Hrs  T(C): 36.8 (10 Jul 2024 07:52), Max: 37.2 (10 Jul 2024 05:50)  T(F): 98.2 (10 Jul 2024 07:52), Max: 98.9 (10 Jul 2024 05:50)  HR: 82 (10 Jul 2024 07:52) (62 - 87)  BP: 100/54 (10 Jul 2024 07:52) (100/54 - 158/85)  BP(mean): --  RR: 18 (10 Jul 2024 07:52) (16 - 20)  SpO2: 98% (10 Jul 2024 07:52) (98% - 100%)    Parameters below as of 10 Jul 2024 07:52  Patient On (Oxygen Delivery Method): room air          PHYSICAL EXAM:  Constitutional: NAD  HEENT: anicteric sclera, oropharynx clear, MMM  Neck: No JVD  Respiratory: CTAB, no wheezes, rales or rhonchi  Cardiovascular: S1, S2, RRR  Gastrointestinal: BS+, soft, NT/ND  Extremities: No cyanosis or clubbing. No peripheral edema  Neurological: A/O x 3, no focal deficits  Psychiatric: Normal mood, normal affect  : No CVA tenderness. SPC      LABS:  07-10    140  |  108  |  118<HH>  ----------------------------<  107<H>  4.0   |  12<L>  |  5.3<HH>    Ca    8.2<L>      10 Jul 2024 06:36  Mg     1.9     07-10    TPro  7.6  /  Alb  2.3<L>  /  TBili  0.3  /  DBili      /  AST  22  /  ALT  22  /  AlkPhos  153<H>  07-10    Creatinine Trend: 5.3 <--, 5.6 <--, 6.1 <--                        10.0   20.63 )-----------( 327      ( 10 Jul 2024 06:36 )             29.7     Urine Studies:  Urinalysis Basic - ( 10 Jul 2024 06:36 )    Color:  / Appearance:  / SG:  / pH:   Gluc: 107 mg/dL / Ketone:   / Bili:  / Urobili:    Blood:  / Protein:  / Nitrite:    Leuk Esterase:  / RBC:  / WBC    Sq Epi:  / Non Sq Epi:  / Bacteria:       RADIOLOGY & ADDITIONAL STUDIES:  ACC: [id number] EXAM:  CT ABDOMEN AND PELVIS   ORDERED BY: RICKYJAIME AU     PROCEDURE DATE:  07/09/2024          INTERPRETATION:  CLINICAL STATEMENT: Nausea and vomiting    TECHNIQUE: Contiguous axial CT images were obtained from the lower chest   to the pubic symphysis without intravenous contrast. Oral contrast was   not administered.  Reformatted images in the coronal and sagittal planes   were acquired.    COMPARISON CT: February 12, 2016    OTHER STUDIES USED FOR CORRELATION: None.      FINDINGS:    LOWER CHEST: Coronary artery calcifications.    HEPATOBILIARY: Punctate calcified granulomas. Cholelithiasis.    SPLEEN: Unremarkable.    PANCREAS: Unremarkable.    ADRENAL GLANDS: Unremarkable.    KIDNEYS: Severe right hydroureteronephrosis with associated cortical   thinning and multiple calculi in the renal calyces, collecting systems   and proximal to mid ureter. The most distal obstructing calculus in the   right mid ureter measures 1.1 x 1 x 1.8 cm (); normal caliber   distal ureter. A right upper pole renal calculus measures up to 3.1 cm   and a renal pelvic calculus measures up to 3 cm. Right renal pelvic and   periureteral fat stranding noted.    Multiple nonobstructing left intrarenal calculi measuring up to 1.6 cm.   Left distal ureteral 0.5 x 0.6 x 0.8 cm calculus without associated   hydronephrosis (, 302/245).      ABDOMINOPELVIC NODES: Few prominent retroperitoneal lymph nodes, may be   reactive. For example, an interaortocaval lymph node measures 1.4 x 1 cm.    PELVIC ORGANS: Enlarged prostate gland. Collapsed urinary bladder with a   suprapubic catheter in place. A 1.6 cm bladder calculus noted.    PERITONEUM/MESENTERY/BOWEL: No bowel obstruction, ascites or   pneumoperitoneum appendix not visualized; no focal pericecal   inflammation..    BONES/SOFT TISSUES: Osteopenia. Degenerative changes of the spine and   hips noted. Bilateral fat-containing inguinal hernias..    OTHER: Diffuse atherosclerotic vascular calcifications.    IMPRESSION:  1.  Severe right hydroureteronephrosis with cortical thinning and   numerous large intrarenal and proximal to mid ureteral calculi;   obstructing calculus in the right mid ureter measures 1.1 x 1 x 1.8 cm (   ) with normal caliber distal ureter.  2.  Multiple nonobstructing left intrarenal calculi and a 0.8 cm left   distal ureteral calculus; no left hydronephrosis.  3.  Few prominent retroperitoneal lymph nodes, likely reactive.  4.  Cholelithiasis.    --- End of Report ---    Assessment/Plan:   68yo male w/ pmhx of HTN, HLD, DMII, CKD stage 5 baseline Cr ~ 4.4, urinary incontinence with chronic supra pubic catheter, Nephrolithiasis, spinal abscess leading to quadriplegia presenting w. NBNB vomiting and nausea every time after he eats. Reports generalized crampy abdominal pain. Reports is non compliant w. medications and has not take is prescribed medications for the past few months. Patient was found to have Obstructing R mid ureter calculus and B/L nephrolithiasis. Nephrology are consulted for JOÃO     #Obstructing R mid ureter calculus with severe hydroureteronephrosis   #B/L nephrolithiasis   #Post-renal JOÃO on CKD 5   #HAGMA   #Chronic suprapubic catheter   - normal baseline creatinine ~4.4, presented with Cr 6.1 --> 5.3  s/p hydration  - urology and IR on board   - urology recommending bilateral PCNs due to high stone burden although no obstruction on left side   - CO2 12   - unsure how much the R obstruction is contributing to JOÃO as R kidney is already atrophic with cortical thinning     Recommendations   - f/u IR for emergent PCN today   - start D5W + sodium bicarb 150 meq at a rate of 75 cc/hr   (target CO2 of 18, switch to oral after )   - BID BMP   - strict I/Os   - renal diet   - avoid nephrotoxic meds   - check Ph and PTH   - pt is low threshold to start dialysis although no indication for urgent RRT   - will continue to monitor

## 2024-07-10 NOTE — PROGRESS NOTE ADULT - SUBJECTIVE AND OBJECTIVE BOX
INTERVENTIONAL RADIOLOGY BRIEF-OPERATIVE NOTE    Procedure:  Percutaneous right nephrostomy followed by placement of right nephroureteral catheter    Pre-Op Diagnosis:  Obstructive right uropathy    Post-Op Diagnosis:  Same; right nephroureterolithiasis; right pyonephrosis    Attending:  Isrrael (IR) / Aftab (Anaesthesia)  Resident:  None    Anesthesia (type):  [X] General Anesthesia  [ ] Sedation  [ ] Spinal Anesthesia  [X] Local/Regional    Contrast:  Visipaque, 20 cc to right kidney/bladder, drained    Estimated Blood Loss:  3 cc    Condition:   [ ] Critical  [ ] Serious  [ ] Fair   [X] Good    Findings/Follow up Plan of Care:  Dilated right renal collecting system containing calculi.  Access to dilated upper pole, right renal calyx containing calculus was obtained using ultrasound and fluoroscopic guidance.  Thick viscous green-yellow malodorous material aspirated.  Guidewire traversal of the right ureter to the bladder was quickly and simply achieved, followed by uneventful placement of an 8-Fr, 22 cm nephroureteral catheter.  ~10 cc of right kidney urine was sent for cultures.  patient tolerated very well, without incident. Case d/w Dr. Akins via Teams telephone immediately, post-procedure.    Specimens Removed:  10 cc of thick, viscous green-yellow, malodorous right kidney urine sent for cultures    Implants:  None    Complications:  None immediate    Disposition:  Back to floor;   Please maintain new catheter to external, gravity drainage and monitor output q shift.  Please f/u specimen cultures.      Please call Interventional Radiology [phone] with any questions, concerns, or issues.

## 2024-07-10 NOTE — PATIENT PROFILE ADULT - FALL HARM RISK - HARM RISK INTERVENTIONS
Assistance with ambulation/Assistance OOB with selected safe patient handling equipment/Communicate Risk of Fall with Harm to all staff/Discuss with provider need for PT consult/Monitor gait and stability/Reinforce activity limits and safety measures with patient and family/Tailored Fall Risk Interventions/Visual Cue: Yellow wristband and red socks/Bed in lowest position, wheels locked, appropriate side rails in place/Call bell, personal items and telephone in reach/Instruct patient to call for assistance before getting out of bed or chair/Non-slip footwear when patient is out of bed/[place] to call system/Physically safe environment - no spills, clutter or unnecessary equipment/Purposeful Proactive Rounding/Room/bathroom lighting operational, light cord in reach

## 2024-07-10 NOTE — PROGRESS NOTE ADULT - SUBJECTIVE AND OBJECTIVE BOX
SUBJECTIVE/OVERNIGHT EVENTS  Today is hospital day 1d. This morning patient was seen and examined at bedside, resting comfortably in bed. No acute or major events overnight.    Patient was comfortable in bed this morning.   Denies any chest pains, SOB, N/V, palpitations, headaches        MEDICATIONS  STANDING MEDICATIONS  atorvastatin 20 milliGRAM(s) Oral at bedtime  cefepime   IVPB 1000 milliGRAM(s) IV Intermittent every 24 hours  dextrose 5%. 1000 milliLiter(s) IV Continuous <Continuous>  dextrose 5%. 1000 milliLiter(s) IV Continuous <Continuous>  dextrose 50% Injectable 12.5 Gram(s) IV Push once  dextrose 50% Injectable 25 Gram(s) IV Push once  dextrose 50% Injectable 25 Gram(s) IV Push once  glucagon  Injectable 1 milliGRAM(s) IntraMuscular once  insulin lispro (ADMELOG) corrective regimen sliding scale   SubCutaneous three times a day before meals  insulin lispro (ADMELOG) corrective regimen sliding scale   SubCutaneous at bedtime  lactated ringers. 1000 milliLiter(s) IV Continuous <Continuous>  NIFEdipine XL 30 milliGRAM(s) Oral daily  pantoprazole    Tablet 40 milliGRAM(s) Oral before breakfast  sodium bicarbonate 650 milliGRAM(s) Oral three times a day    PRN MEDICATIONS  aluminum hydroxide/magnesium hydroxide/simethicone Suspension 30 milliLiter(s) Oral every 4 hours PRN  dextrose Oral Gel 15 Gram(s) Oral once PRN  melatonin 3 milliGRAM(s) Oral at bedtime PRN  ondansetron Injectable 4 milliGRAM(s) IV Push every 8 hours PRN    VITALS  T(F): 98.2 (07-10-24 @ 07:52), Max: 98.9 (07-10-24 @ 05:50)  HR: 82 (07-10-24 @ 07:52) (62 - 87)  BP: 100/54 (07-10-24 @ 07:52) (100/54 - 158/85)  RR: 18 (07-10-24 @ 07:52) (16 - 20)  SpO2: 98% (07-10-24 @ 07:52) (98% - 100%)  POCT Blood Glucose.: 122 mg/dL (07-10-24 @ 08:17)  POCT Blood Glucose.: 110 mg/dL (07-09-24 @ 23:47)  POCT Blood Glucose.: 152 mg/dL (07-09-24 @ 12:11)    PHYSICAL EXAM  GENERAL  (  ) NAD, lying in bed comfortably     (  ) obtunded     (  ) lethargic     (  ) somnolent    HEAD  (  ) Atraumatic     (  ) hematoma     (  ) laceration (specify location:       )     NECK  (  ) Supple     (  ) neck stiffness     (  ) nuchal rigidity     (  )  no JVD     (  ) JVD present ( -- cm)    HEART  Rate -->  (  ) normal rate    (  ) bradycardic    (  ) tachycardic  Rhythm -->  (  ) regular    (  ) regularly irregular    (  ) irregularly irregular  Murmurs -->  (  ) normal s1/s2    (  ) systolic murmur    (  ) diastolic murmur    (  ) continuous murmur     (  ) S3 present    (  ) S4 present    LUNGS  (  )Unlabored respirations     (  ) tachypnea  (  ) B/L air entry     (  ) decreased breath sounds in:  (location     )    (  ) no adventitious sound     (  ) crackles     (  ) wheezing      (  ) rhonchi      (specify location:       )  (  ) chest wall tenderness (specify location:       )    ABDOMEN  (  ) Soft     (  ) tense   |   (  ) nondistended     (  ) distended   |   (  ) +BS     (  ) hypoactive bowel sounds     (  ) hyperactive bowel sounds  (  ) nontender     (  ) RUQ tenderness     (  ) RLQ tenderness     (  ) LLQ tenderness     (  ) epigastric tenderness     (  ) diffuse tenderness  (  ) Splenomegaly      (  ) Hepatomegaly      (  ) Jaundice     (  ) ecchymosis     EXTREMITIES  (  ) Normal     (  ) Rash     (  ) ecchymosis     (  ) varicose veins      (  ) pitting edema     (  ) non-pitting edema   (  ) ulceration     (  ) gangrene:     (location:     )    NERVOUS SYSTEM  (  ) A&Ox3     (  ) confused     (  ) lethargic  CN II-XII:     (  ) Intact     (  ) focal deficits  (Specify:     )   Upper extremities:     (  ) strength X/5     (  ) focal deficit (specify:    )  Lower extremities:     (  ) strength  X/5    (  ) focal deficit (specify:    )    SKIN  (  ) No rashes or lesions     (  ) maculopapular rash     (  ) pustules     (  ) vesicles     (  ) ulcer     (  ) ecchymosis     (specify location:     )    (  ) Indwelling Dye Catheter   Date insterted:    Reason (  ) Critical illness     (  ) urinary retention    (  ) Accurate Ins/Outs Monitoring     (  ) CMO patient      LABS             10.0   20.63 )-----------( 327      ( 07-10-24 @ 06:36 )             29.7     140  |  108  |  118  -------------------------<  107   07-10-24 @ 06:36  4.0  |  12  |  5.3    Ca      8.2     07-10-24 @ 06:36  Mg     1.9     07-10-24 @ 06:36    TPro  7.6  /  Alb  2.3  /  TBili  0.3  /  DBili  x   /  AST  22  /  ALT  22  /  AlkPhos  153  /  GGT  x     07-10-24 @ 06:36    PT/INR - ( 07-10-24 @ 06:36 )   PT: 15.00 sec<H>;   INR: 1.31 ratio<H>  PTT - ( 07-10-24 @ 06:36 )  PTT:34.9 sec      Urinalysis Basic - ( 10 Jul 2024 06:36 )    Color: x / Appearance: x / SG: x / pH: x  Gluc: 107 mg/dL / Ketone: x  / Bili: x / Urobili: x   Blood: x / Protein: x / Nitrite: x   Leuk Esterase: x / RBC: x / WBC x   Sq Epi: x / Non Sq Epi: x / Bacteria: x          IMAGING    CT abdomen 07/09  IMPRESSION:  1.  Severe right hydroureteronephrosis with cortical thinning and   numerous large intrarenal and proximal to mid ureteral calculi;   obstructing calculus in the right mid ureter measures 1.1 x 1 x 1.8 cm (   ) with normal caliber distal ureter.  2.  Multiple nonobstructing left intrarenal calculi and a 0.8 cm left   distal ureteral calculus; no left hydronephrosis.  3.  Few prominent retroperitoneal lymph nodes, likely reactive.  4.  Cholelithiasis.    EKG 07/09  Normal sinus rhythm  Normal ECG

## 2024-07-10 NOTE — CONSULT NOTE ADULT - ATTENDING COMMENTS
69 yom with PMHx of advanced CKD p/w JOÃO, b/l nephrolithiasis obstructing on the right, unclear how much the right obstruction is contributing to overall JOÃO given right kidney atrophy, planned for R PCN today by IR  love down-trending with iv fluids on presentation, maintain on isotonic bicarb drip for now  will see where renal function levels off to decide if pt will need to start HD / for now, no acute indication   f/u cultures and order abx accordingly   / IR f/u needed

## 2024-07-10 NOTE — CONSULT NOTE ADULT - ASSESSMENT
ASSESSMENT  70yo male w/ pmhx of HTN, HLD, DMII, CKD stage 5 baseline Cr ~ 4.4, urinary incontinence with chronic supra pubic catheter, Nephrolithiasis, spinal abscess leading to quadriplegia presenting w. NBNB vomiting and nausea every time after he eats.       IMPRESSION    #    WBC 20 on admission ; Afebrile     UA pyuria WBC 50     SPT (replaced in ER)    CTAP 1.  Severe right hydroureteronephrosis with cortical thinning and   numerous large intrarenal and proximal to mid ureteral calculi;   obstructing calculus in the right mid ureter measures 1.1 x 1 x 1.8 cm (   ) with normal caliber distal ureter.  2.  Multiple nonobstructing left intrarenal calculi and a 0.8 cm left   distal ureteral calculus; no left hydronephrosis.  3.  Few prominent retroperitoneal lymph nodes, likely reactive.  4.  Cholelithiasis.  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 All final recommendations to follow after interview and examination of the patient. Please follow recommendations noted below.  - Appreciate Urology consult- Given patient's extensive stone burden in setting of JOÃO, leukocytosis with impending sepsis, recommend emergent bilateral nephrostomy tube placement by IR  - Appreciate IR consult    If any questions, please send a message or call on [employer] Teams  Please continue to update ID with any pertinent new laboratory, radiographic findings, or change in clinical status   ASSESSMENT  68yo male w/ pmhx of HTN, HLD, DMII, CKD stage 5 baseline Cr ~ 4.4, urinary incontinence with chronic supra pubic catheter, Nephrolithiasis, spinal abscess leading to quadriplegia presenting w. NBNB vomiting and nausea every time after he eats.       IMPRESSION    #    WBC 20 on admission ; Afebrile     UA pyuria WBC 50     SPT (replaced in ER)    CTAP 1.  Severe right hydroureteronephrosis with cortical thinning and   numerous large intrarenal and proximal to mid ureteral calculi;   obstructing calculus in the right mid ureter measures 1.1 x 1 x 1.8 cm (   ) with normal caliber distal ureter.  2.  Multiple nonobstructing left intrarenal calculi and a 0.8 cm left   distal ureteral calculus; no left hydronephrosis.  3.  Few prominent retroperitoneal lymph nodes, likely reactive.  4.  Cholelithiasis.  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 which could results in poor clinical outcomes  #Abx allergy: No Known Allergies  #CKD  Creatinine: 5.3 (07-10-24 @ 06:36)    Height (cm): 185.4 (09-18-23 @ 13:48)  Weight (kg): 79.379 (09-18-23 @ 13:48)    RECOMMENDATIONS  - Cefepime 1g q24h IV   - f/u UCX   - Appreciate Urology consult- Given patient's extensive stone burden in setting of JOÃO, leukocytosis with impending sepsis, recommend emergent bilateral nephrostomy tube placement by IR  - Appreciate IR consult    If any questions, please send a message or call on [employer] Teams  Please continue to update ID with any pertinent new laboratory, radiographic findings, or change in clinical status   ASSESSMENT  70yo male w/ pmhx of HTN, HLD, DMII, CKD stage 5 baseline Cr ~ 4.4, urinary incontinence with chronic supra pubic catheter, Nephrolithiasis, spinal abscess leading to quadriplegia presenting w. NBNB vomiting and nausea every time after he eats.       IMPRESSION    #Pyelonephritis due to obstructing stone    WBC 20 on admission ; Afebrile     UA pyuria WBC 50     SPT (replaced in ER)    CTAP 1.  Severe right hydroureteronephrosis with cortical thinning and   numerous large intrarenal and proximal to mid ureteral calculi;   obstructing calculus in the right mid ureter measures 1.1 x 1 x 1.8 cm (   ) with normal caliber distal ureter.  2.  Multiple nonobstructing left intrarenal calculi and a 0.8 cm left   distal ureteral calculus; no left hydronephrosis.  3.  Few prominent retroperitoneal lymph nodes, likely reactive.  4.  Cholelithiasis.  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 which could results in poor clinical outcomes  #Abx allergy: No Known Allergies  #CKD  Creatinine: 5.3 (07-10-24 @ 06:36)    Height (cm): 185.4 (09-18-23 @ 13:48)  Weight (kg): 79.379 (09-18-23 @ 13:48)    RECOMMENDATIONS  - Cefepime 1g q24h IV   - f/u UCX   - Appreciate Urology consult- Given patient's extensive stone burden in setting of JOÃO, leukocytosis with impending sepsis, recommend emergent bilateral nephrostomy tube placement by IR  - Appreciate IR consult    If any questions, please send a message or call on [employer] Teams  Please continue to update ID with any pertinent new laboratory, radiographic findings, or change in clinical status

## 2024-07-10 NOTE — PROGRESS NOTE ADULT - ASSESSMENT
68 y/o man with PMH of HTN, hyperlipidemia, DM II, CKD 5 (baseline Cr 4.4) Urinary incontinence with chronic suprapubic catheter, Nephrolithiasis, Spinal abscess with quadriplegia and chronic LE wounds receiving dressing changes by visiting nurse now presented to the ED for crampy abdominal pain along with nausea and vomiting.     1. JOÃO over CKD 5 likely due to obstructive uropathy   metabolic acidosis  could have prerenal component - Cr downtrending with IV hydration  Complicated UTI   - CT abd/pelvis: Severe right hydroureteronephrosis with cortical thinning and numerous large intrarenal and proximal to mid ureteral calculi, obstructing calculus in the right mid ureter measures 1.1 x 1 x 1.8 cm with normal caliber distal ureter.  Multiple nonobstructive left intrarenal calculi and a 0.8 cm left distal ureteral calculus; no left hydronephrosis.  Few prominent retroperitoneal lymph nodes, likely reactive. Cholelithiasis.  - s/p eval by Urology in ED --> recommend b/l nephrostomy placement by IR.   - for right PCN by IR today (NPO for procedure and heparin SQ held)  - s/p SPC exchange in the ED  - continue cefepime per ID  - f/u blood and urine cultures   - D5 with bicarbonate per renal  - check phos level  - avoid nephrotoxic meds  - being monitored for need for RRT (no acute indication today)    2. HTN on nifedipine XL  per brother, pt refuses meds at home and Dr. Garibay is aware    3. Hyperlipidemia on statin    4. DM - FS acceptable  A1C 6.9  insulin for FS >180    5. DVT ppx: Heparin SQ (on hold for procedure today)      DNR/DNI  very guarded prognosis  high risk pt      PROGRESS NOTE HANDOFF    Pending: right PCN today, labs in AM, f/u culture (blood/urine), IR f/u for left PCN    Family discussion: resident updated brother today    Disposition: home with care

## 2024-07-10 NOTE — PROGRESS NOTE ADULT - ASSESSMENT
70 yo M with hx of HTN, HLD, DM II, CKD stage 5 (baseline Cr 4.4)) Urinary incontinence with chronic suprapubic catheter, Nephrolithiasis, Spinal abscess with quadriplegia presents to ED for crampy abdominal pain a/w nausea and vomiting.     #Acute on chronic CKD 2/2 Obstructive hydronephrosis   #Complicated UTI  - CKD stage 5 baseline Cr ~ 4.4, urinary incontinence with chronic supra pubic catheter,   - CTAP shows severe hydro on R and non obs calculi on Left  - Urology on board - right nephrostomy tube today with IR.  - F/u ID recs  - F/u nephro recs  - c/w cefepime  - f/u x2 blood cultures (spec rec'd)  - Gentle IV LR hydration    #HTN  #HLD  - c/w home medication    #DMII   - monitor FS  - Insulin regimen    DVT ppx: Heparin SC (Hold AM dose)  GI ppx: PPI  Diet: NPO with IVF gentle hydration  Activity: as tolerated.    70 yo M with hx of HTN, HLD, DM II, CKD stage 5 (baseline Cr 4.4)) Urinary incontinence with chronic suprapubic catheter, Nephrolithiasis, Spinal abscess with quadriplegia presents to ED for crampy abdominal pain a/w nausea and vomiting.     #Acute on chronic CKD 2/2 Obstructive hydronephrosis   #Complicated UTI  - CKD stage 5 baseline Cr ~ 4.4, urinary incontinence with chronic supra pubic catheter,   - CTAP shows severe hydro on R and non obs calculi on Left  - Urology on board - right nephrostomy tube today with IR.  - Per nephro recs: start D5W + sodium bicarb 150 meq at a rate of 75 cc/hr (target CO2 of 18, switch to oral after )   - BID BMP  - Strict I/Os  - Per ID recs: c/w cefepime  - f/u x2 blood cultures (spec rec'd)  - Gentle IV LR hydration    #HTN  #HLD  - c/w home medication    #DMII   - monitor FS  - Insulin regimen    DVT ppx: Heparin SC (Hold AM dose)  GI ppx: PPI  Diet: NPO with IVF gentle hydration  Activity: as tolerated.

## 2024-07-10 NOTE — CHART NOTE - NSCHARTNOTEFT_GEN_A_CORE
Discussed case with IR attending, Dr. Handy. Patient will be scheduled for right-sided PCN placement during the day time 7/10/24. Please make patient NPO after midnight and hold AM DVT ppx.     Full consult note to follow in AM.     Discussed plan with MAR Dr. Clau Swain.

## 2024-07-10 NOTE — CHART NOTE - NSCHARTNOTEFT_GEN_A_CORE
PACU ANESTHESIA ADMISSION NOTE      Procedure:   Post op diagnosis:      ____  Intubated  TV:______       Rate: ______      FiO2: ______    __x__  Patent Airway    __x__  Full return of protective reflexes    __x__  Full recovery from anesthesia / back to baseline     Vitals:   T:   36        R:  11                BP:    101/62              Sat: 95                  P: 59      Mental Status:  x____ Awake   _____ Alert   _____ Drowsy   _____ Sedated    Nausea/Vomiting:  __x__ NO  ______Yes,   See Post - Op Orders          Pain Scale (0-10):  ___x__    Treatment: ____ None    ____ See Post - Op/PCA Orders    Post - Operative Fluids:   ____ Oral   ____ See Post - Op Orders    Plan: Discharge:   ____Home       _x____Floor     _____Critical Care    _____  Other:_________________    Comments:

## 2024-07-10 NOTE — CONSULT NOTE ADULT - SUBJECTIVE AND OBJECTIVE BOX
KRISTY GARCIA  69y, Male  Allergy: No Known Allergies      CHIEF COMPLAINT:   UTI w/ obstructing stone (09 Jul 2024 19:07)      LOS  1d    HPI  HPI:  68yo male w/ pmhx of HTN, HLD, DMII, CKD stage 5 baseline Cr ~ 4.4, urinary incontinence with chronic supra pubic catheter, Nephrolithiasis, spinal abscess leading to quadriplegia presenting w. NBNB vomiting and nausea every time after he eats. Reports generalized crampy abdominal pain. Reports is non compliant w. medications and has not take is prescribed medications for the past few months.  Denies fever chills, flank pain, chest pain, sob, urinary sxs, oliguria.     in the ED,   Vitals: 136/80. HR 80. RR 18. T 98.2, satting 98% on RA   Labs: significant for wbc 20; UA grossly positive  CTAP shows severe R hydro; L non-obs calculi  SPC replaced   Urology consulted recommending emergency b/l nephrostomy tubes be placed    Patient admitted for acute on chronic JÃOO in s/o obstructing stones w/ UTI (09 Jul 2024 19:07)      INFECTIOUS DISEASE HISTORY:  ID consulted for cystitis CT with severe R hydronephrosis, obstructing calculus in the right mid ureter measures 1.1 x 1 x 1.8 cm,  and non-obstructive stones on the L     Currently ordered for:  cefepime   IVPB 1000 milliGRAM(s) IV Intermittent every 24 hours      PMH  PAST MEDICAL & SURGICAL HISTORY:  DM (diabetes mellitus)      HTN (hypertension)      H/O paraplegia      Spinal abscess      Renal stones      Spinal abscess  S/P Surgery      Encounter for care or replacement of suprapubic tube          FAMILY HISTORY  FH: HTN (hypertension)    FH: breast cancer    FH: melanoma    FH: heart disease        SOCIAL HISTORY  Social History:        ROS  ***    VITALS:  T(F): 98.2, Max: 98.9 (07-10-24 @ 05:50)  HR: 82  BP: 100/54  RR: 18Vital Signs Last 24 Hrs  T(C): 36.8 (10 Jul 2024 07:52), Max: 37.2 (10 Jul 2024 05:50)  T(F): 98.2 (10 Jul 2024 07:52), Max: 98.9 (10 Jul 2024 05:50)  HR: 82 (10 Jul 2024 07:52) (62 - 87)  BP: 100/54 (10 Jul 2024 07:52) (100/54 - 158/85)  BP(mean): --  RR: 18 (10 Jul 2024 07:52) (16 - 20)  SpO2: 98% (10 Jul 2024 07:52) (98% - 100%)    Parameters below as of 10 Jul 2024 07:52  Patient On (Oxygen Delivery Method): room air        PHYSICAL EXAM:  ***    TESTS & MEASUREMENTS:                        10.0   20.63 )-----------( 327      ( 10 Jul 2024 06:36 )             29.7     07-10    140  |  108  |  118<HH>  ----------------------------<  107<H>  4.0   |  12<L>  |  5.3<HH>    Ca    8.2<L>      10 Jul 2024 06:36  Mg     1.9     07-10    TPro  7.6  /  Alb  2.3<L>  /  TBili  0.3  /  DBili  x   /  AST  22  /  ALT  22  /  AlkPhos  153<H>  07-10      LIVER FUNCTIONS - ( 10 Jul 2024 06:36 )  Alb: 2.3 g/dL / Pro: 7.6 g/dL / ALK PHOS: 153 U/L / ALT: 22 U/L / AST: 22 U/L / GGT: x           Urinalysis Basic - ( 10 Jul 2024 06:36 )    Color: x / Appearance: x / SG: x / pH: x  Gluc: 107 mg/dL / Ketone: x  / Bili: x / Urobili: x   Blood: x / Protein: x / Nitrite: x   Leuk Esterase: x / RBC: x / WBC x   Sq Epi: x / Non Sq Epi: x / Bacteria: x          Lactate, Blood: 0.8 mmol/L (07-10-24 @ 06:36)  Lactate, Blood: 1.0 mmol/L (07-09-24 @ 13:30)      INFECTIOUS DISEASES TESTING  Hepatitis B Surface Antigen: Nonreact (09-17-23 @ 07:23)  Hepatitis C Virus Interpretation: Nonreact (09-17-23 @ 07:23)      INFLAMMATORY MARKERS      RADIOLOGY & ADDITIONAL TESTS:  I have personally reviewed the last Chest xray  CXR      CT  CT Abdomen and Pelvis No Cont:   ACC: [id number] EXAM:  CT ABDOMEN AND PELVIS   ORDERED BY: HELEN AU     PROCEDURE DATE:  07/09/2024          INTERPRETATION:  CLINICAL STATEMENT: Nausea and vomiting    TECHNIQUE: Contiguous axial CT images were obtained from the lower chest   to the pubic symphysis without intravenous contrast. Oral contrast was   not administered.  Reformatted images in the coronal and sagittal planes   were acquired.    COMPARISON CT: February 12, 2016    OTHER STUDIES USED FOR CORRELATION: None.      FINDINGS:    LOWER CHEST: Coronary artery calcifications.    HEPATOBILIARY: Punctate calcified granulomas. Cholelithiasis.    SPLEEN: Unremarkable.    PANCREAS: Unremarkable.    ADRENAL GLANDS: Unremarkable.    KIDNEYS: Severe right hydroureteronephrosis with associated cortical   thinning and multiple calculi in the renal calyces, collecting systems   and proximal to mid ureter. The most distal obstructing calculus in the   right mid ureter measures 1.1 x 1 x 1.8 cm (); normal caliber   distal ureter. A right upper pole renal calculus measures up to 3.1 cm   and a renal pelvic calculus measures up to 3 cm. Right renal pelvic and   periureteral fat stranding noted.    Multiple nonobstructing left intrarenal calculi measuring up to 1.6 cm.   Left distal ureteral 0.5 x 0.6 x 0.8 cm calculus without associated   hydronephrosis (, 302/245).      ABDOMINOPELVIC NODES: Few prominent retroperitoneal lymph nodes, may be   reactive. For example, an interaortocaval lymph node measures 1.4 x 1cm.    PELVIC ORGANS: Enlarged prostate gland. Collapsed urinary bladder with a   suprapubic catheter in place. A 1.6 cm bladder calculus noted.    PERITONEUM/MESENTERY/BOWEL: No bowel obstruction, ascites or   pneumoperitoneum appendix not visualized; no focal pericecal   inflammation..    BONES/SOFT TISSUES: Osteopenia. Degenerative changes of the spine and   hips noted. Bilateral fat-containing inguinal hernias..    OTHER: Diffuse atherosclerotic vascular calcifications.    IMPRESSION:  1.  Severe right hydroureteronephrosis with cortical thinning and   numerous large intrarenal and proximal to mid ureteral calculi;   obstructing calculus in the right mid ureter measures 1.1 x 1 x 1.8 cm (   ) with normal caliber distal ureter.  2.  Multiple nonobstructing left intrarenal calculi and a 0.8 cm left   distal ureteral calculus; no left hydronephrosis.  3.  Few prominent retroperitoneal lymph nodes, likely reactive.  4.  Cholelithiasis.    --- End of Report ---            NGUYEN URIARTE MD; Attending Radiologist  This document has been electronically signed. Jul 9 2024  2:02PM (07-09-24 @ 13:00)      CARDIOLOGY TESTING  12 Lead ECG:   Ventricular Rate 71 BPM    Atrial Rate 71 BPM    P-R Interval 186 ms <TRUNCATED> (07-09-24 @ 12:14)       MEDICATIONS  atorvastatin 20 Oral at bedtime  cefepime   IVPB 1000 IV Intermittent every 24 hours  dextrose 5%. 1000 IV Continuous <Continuous>  dextrose 5%. 1000 IV Continuous <Continuous>  dextrose 50% Injectable 12.5 IV Push once  dextrose 50% Injectable 25 IV Push once  dextrose 50% Injectable 25 IV Push once  glucagon  Injectable 1 IntraMuscular once  insulin lispro (ADMELOG) corrective regimen sliding scale  SubCutaneous three times a day before meals  insulin lispro (ADMELOG) corrective regimen sliding scale  SubCutaneous at bedtime  NIFEdipine XL 30 Oral daily  pantoprazole    Tablet 40 Oral before breakfast  sodium bicarbonate 650 Oral three times a day      ANTIBIOTICS:  cefepime   IVPB 1000 milliGRAM(s) IV Intermittent every 24 hours      ALLERGIES:  No Known Allergies         KRISTY GARCIA  69y, Male  Allergy: No Known Allergies      CHIEF COMPLAINT:   UTI w/ obstructing stone (09 Jul 2024 19:07)      LOS  1d    HPI  HPI:  70yo male w/ pmhx of HTN, HLD, DMII, CKD stage 5 baseline Cr ~ 4.4, urinary incontinence with chronic supra pubic catheter, Nephrolithiasis, spinal abscess leading to quadriplegia presenting w. NBNB vomiting and nausea every time after he eats. Reports generalized crampy abdominal pain. Reports is non compliant w. medications and has not take is prescribed medications for the past few months.  Denies fever chills, flank pain, chest pain, sob, urinary sxs, oliguria.     in the ED,   Vitals: 136/80. HR 80. RR 18. T 98.2, satting 98% on RA   Labs: significant for wbc 20; UA grossly positive  CTAP shows severe R hydro; L non-obs calculi  SPC replaced   Urology consulted recommending emergency b/l nephrostomy tubes be placed    Patient admitted for acute on chronic JOÃO in s/o obstructing stones w/ UTI (09 Jul 2024 19:07)      INFECTIOUS DISEASE HISTORY:  ID consulted for cystitis CT with severe R hydronephrosis, obstructing calculus in the right mid ureter measures 1.1 x 1 x 1.8 cm,  and non-obstructive stones on the L    Reports some discomfort at the SPC site, crampy abd pain  SPT changed in ER      Currently ordered for:  cefepime   IVPB 1000 milliGRAM(s) IV Intermittent every 24 hours      PMH  PAST MEDICAL & SURGICAL HISTORY:  DM (diabetes mellitus)      HTN (hypertension)      H/O paraplegia      Spinal abscess      Renal stones      Spinal abscess  S/P Surgery      Encounter for care or replacement of suprapubic tube          FAMILY HISTORY  FH: HTN (hypertension)    FH: breast cancer    FH: melanoma    FH: heart disease        SOCIAL HISTORY  Social History:  no IVDU      ROS  General: Denies rigors, nightsweats  HEENT: Denies headache, rhinorrhea, sore throat, eye pain  CV: Denies CP, palpitations  PULM: Denies wheezing, hemoptysis  GI: Denies hematemesis, hematochezia, melena  : as noted above   MSK: Denies arthralgias, myalgias  SKIN: Denies rash, lesions  NEURO: Denies paresthesias, weakness  PSYCH: Denies depression, anxiety     VITALS:  T(F): 98.2, Max: 98.9 (07-10-24 @ 05:50)  HR: 82  BP: 100/54  RR: 18Vital Signs Last 24 Hrs  T(C): 36.8 (10 Jul 2024 07:52), Max: 37.2 (10 Jul 2024 05:50)  T(F): 98.2 (10 Jul 2024 07:52), Max: 98.9 (10 Jul 2024 05:50)  HR: 82 (10 Jul 2024 07:52) (62 - 87)  BP: 100/54 (10 Jul 2024 07:52) (100/54 - 158/85)  BP(mean): --  RR: 18 (10 Jul 2024 07:52) (16 - 20)  SpO2: 98% (10 Jul 2024 07:52) (98% - 100%)    Parameters below as of 10 Jul 2024 07:52  Patient On (Oxygen Delivery Method): room air        PHYSICAL EXAM:  Gen: chronically ill appearing   HEENT: Normocephalic, atraumatic poor dentiton  Neck: supple, no lymphadenopathy  CV: Regular rate & regular rhythm  Lungs: decreased BS at bases, no fremitus  Abdomen: Soft, BS present SPT mild discharge around entry site, no suprapubic tenderness, no CVAT   Ext: Warm, well perfused  Neuro: non focal,   Skin: no rash, no erythema  Lines: no phlebitis     TESTS & MEASUREMENTS:                        10.0   20.63 )-----------( 327      ( 10 Jul 2024 06:36 )             29.7     07-10    140  |  108  |  118<HH>  ----------------------------<  107<H>  4.0   |  12<L>  |  5.3<HH>    Ca    8.2<L>      10 Jul 2024 06:36  Mg     1.9     07-10    TPro  7.6  /  Alb  2.3<L>  /  TBili  0.3  /  DBili  x   /  AST  22  /  ALT  22  /  AlkPhos  153<H>  07-10      LIVER FUNCTIONS - ( 10 Jul 2024 06:36 )  Alb: 2.3 g/dL / Pro: 7.6 g/dL / ALK PHOS: 153 U/L / ALT: 22 U/L / AST: 22 U/L / GGT: x           Urinalysis Basic - ( 10 Jul 2024 06:36 )    Color: x / Appearance: x / SG: x / pH: x  Gluc: 107 mg/dL / Ketone: x  / Bili: x / Urobili: x   Blood: x / Protein: x / Nitrite: x   Leuk Esterase: x / RBC: x / WBC x   Sq Epi: x / Non Sq Epi: x / Bacteria: x          Lactate, Blood: 0.8 mmol/L (07-10-24 @ 06:36)  Lactate, Blood: 1.0 mmol/L (07-09-24 @ 13:30)      INFECTIOUS DISEASES TESTING  Hepatitis B Surface Antigen: Nonreact (09-17-23 @ 07:23)  Hepatitis C Virus Interpretation: Nonreact (09-17-23 @ 07:23)      INFLAMMATORY MARKERS      RADIOLOGY & ADDITIONAL TESTS:  I have personally reviewed the last Chest xray  CXR      CT  CT Abdomen and Pelvis No Cont:   ACC: [id number] EXAM:  CT ABDOMEN AND PELVIS   ORDERED BY: HELEN AU     PROCEDURE DATE:  07/09/2024          INTERPRETATION:  CLINICAL STATEMENT: Nausea and vomiting    TECHNIQUE: Contiguous axial CT images were obtained from the lower chest   to the pubic symphysis without intravenous contrast. Oral contrast was   not administered.  Reformatted images in the coronal and sagittal planes   were acquired.    COMPARISON CT: February 12, 2016    OTHER STUDIES USED FOR CORRELATION: None.      FINDINGS:    LOWER CHEST: Coronary artery calcifications.    HEPATOBILIARY: Punctate calcified granulomas. Cholelithiasis.    SPLEEN: Unremarkable.    PANCREAS: Unremarkable.    ADRENAL GLANDS: Unremarkable.    KIDNEYS: Severe right hydroureteronephrosis with associated cortical   thinning and multiple calculi in the renal calyces, collecting systems   and proximal to mid ureter. The most distal obstructing calculus in the   right mid ureter measures 1.1 x 1 x 1.8 cm (); normal caliber   distal ureter. A right upper pole renal calculus measures up to 3.1 cm   and a renal pelvic calculus measures up to 3 cm. Right renal pelvic and   periureteral fat stranding noted.    Multiple nonobstructing left intrarenal calculi measuring up to 1.6 cm.   Left distal ureteral 0.5 x 0.6 x 0.8 cm calculus without associated   hydronephrosis (, 302/245).      ABDOMINOPELVIC NODES: Few prominent retroperitoneal lymph nodes, may be   reactive. For example, an interaortocaval lymph node measures 1.4 x 1cm.    PELVIC ORGANS: Enlarged prostate gland. Collapsed urinary bladder with a   suprapubic catheter in place. A 1.6 cm bladder calculus noted.    PERITONEUM/MESENTERY/BOWEL: No bowel obstruction, ascites or   pneumoperitoneum appendix not visualized; no focal pericecal   inflammation..    BONES/SOFT TISSUES: Osteopenia. Degenerative changes of the spine and   hips noted. Bilateral fat-containing inguinal hernias..    OTHER: Diffuse atherosclerotic vascular calcifications.    IMPRESSION:  1.  Severe right hydroureteronephrosis with cortical thinning and   numerous large intrarenal and proximal to mid ureteral calculi;   obstructing calculus in the right mid ureter measures 1.1 x 1 x 1.8 cm (   ) with normal caliber distal ureter.  2.  Multiple nonobstructing left intrarenal calculi and a 0.8 cm left   distal ureteral calculus; no left hydronephrosis.  3.  Few prominent retroperitoneal lymph nodes, likely reactive.  4.  Cholelithiasis.    --- End of Report ---            NGUYEN URIARTE MD; Attending Radiologist  This document has been electronically signed. Jul 9 2024  2:02PM (07-09-24 @ 13:00)      CARDIOLOGY TESTING  12 Lead ECG:   Ventricular Rate 71 BPM    Atrial Rate 71 BPM    P-R Interval 186 ms <TRUNCATED> (07-09-24 @ 12:14)       MEDICATIONS  atorvastatin 20 Oral at bedtime  cefepime   IVPB 1000 IV Intermittent every 24 hours  dextrose 5%. 1000 IV Continuous <Continuous>  dextrose 5%. 1000 IV Continuous <Continuous>  dextrose 50% Injectable 12.5 IV Push once  dextrose 50% Injectable 25 IV Push once  dextrose 50% Injectable 25 IV Push once  glucagon  Injectable 1 IntraMuscular once  insulin lispro (ADMELOG) corrective regimen sliding scale  SubCutaneous three times a day before meals  insulin lispro (ADMELOG) corrective regimen sliding scale  SubCutaneous at bedtime  NIFEdipine XL 30 Oral daily  pantoprazole    Tablet 40 Oral before breakfast  sodium bicarbonate 650 Oral three times a day      ANTIBIOTICS:  cefepime   IVPB 1000 milliGRAM(s) IV Intermittent every 24 hours      ALLERGIES:  No Known Allergies

## 2024-07-11 LAB
-  BLOOD PCR PANEL: SIGNIFICANT CHANGE UP
ANION GAP SERPL CALC-SCNC: 17 MMOL/L — HIGH (ref 7–14)
ANION GAP SERPL CALC-SCNC: 21 MMOL/L — HIGH (ref 7–14)
BASOPHILS # BLD AUTO: 0.02 K/UL — SIGNIFICANT CHANGE UP (ref 0–0.2)
BASOPHILS NFR BLD AUTO: 0.1 % — SIGNIFICANT CHANGE UP (ref 0–1)
BUN SERPL-MCNC: 116 MG/DL — CRITICAL HIGH (ref 10–20)
BUN SERPL-MCNC: 120 MG/DL — CRITICAL HIGH (ref 10–20)
CALCIUM SERPL-MCNC: 7.8 MG/DL — LOW (ref 8.4–10.4)
CALCIUM SERPL-MCNC: 7.9 MG/DL — LOW (ref 8.4–10.5)
CALCIUM SERPL-MCNC: 8.3 MG/DL — LOW (ref 8.4–10.5)
CHLORIDE SERPL-SCNC: 106 MMOL/L — SIGNIFICANT CHANGE UP (ref 98–110)
CHLORIDE SERPL-SCNC: 107 MMOL/L — SIGNIFICANT CHANGE UP (ref 98–110)
CO2 SERPL-SCNC: 12 MMOL/L — LOW (ref 17–32)
CO2 SERPL-SCNC: 12 MMOL/L — LOW (ref 17–32)
CREAT SERPL-MCNC: 5.8 MG/DL — CRITICAL HIGH (ref 0.7–1.5)
CREAT SERPL-MCNC: 5.8 MG/DL — CRITICAL HIGH (ref 0.7–1.5)
EGFR: 10 ML/MIN/1.73M2 — LOW
EGFR: 10 ML/MIN/1.73M2 — LOW
EOSINOPHIL # BLD AUTO: 0 K/UL — SIGNIFICANT CHANGE UP (ref 0–0.7)
EOSINOPHIL NFR BLD AUTO: 0 % — SIGNIFICANT CHANGE UP (ref 0–8)
GLUCOSE BLDC GLUCOMTR-MCNC: 172 MG/DL — HIGH (ref 70–99)
GLUCOSE BLDC GLUCOMTR-MCNC: 198 MG/DL — HIGH (ref 70–99)
GLUCOSE BLDC GLUCOMTR-MCNC: 215 MG/DL — HIGH (ref 70–99)
GLUCOSE BLDC GLUCOMTR-MCNC: 223 MG/DL — HIGH (ref 70–99)
GLUCOSE SERPL-MCNC: 141 MG/DL — HIGH (ref 70–99)
GLUCOSE SERPL-MCNC: 204 MG/DL — HIGH (ref 70–99)
GRAM STN FLD: ABNORMAL
HCT VFR BLD CALC: 30.6 % — LOW (ref 42–52)
HGB BLD-MCNC: 10 G/DL — LOW (ref 14–18)
IMM GRANULOCYTES NFR BLD AUTO: 1 % — HIGH (ref 0.1–0.3)
LYMPHOCYTES # BLD AUTO: 0.68 K/UL — LOW (ref 1.2–3.4)
LYMPHOCYTES # BLD AUTO: 3.6 % — LOW (ref 20.5–51.1)
MAGNESIUM SERPL-MCNC: 1.8 MG/DL — SIGNIFICANT CHANGE UP (ref 1.8–2.4)
MCHC RBC-ENTMCNC: 30.3 PG — SIGNIFICANT CHANGE UP (ref 27–31)
MCHC RBC-ENTMCNC: 32.7 G/DL — SIGNIFICANT CHANGE UP (ref 32–37)
MCV RBC AUTO: 92.7 FL — SIGNIFICANT CHANGE UP (ref 80–94)
METHOD TYPE: SIGNIFICANT CHANGE UP
MONOCYTES # BLD AUTO: 0.58 K/UL — SIGNIFICANT CHANGE UP (ref 0.1–0.6)
MONOCYTES NFR BLD AUTO: 3.1 % — SIGNIFICANT CHANGE UP (ref 1.7–9.3)
NEUTROPHILS # BLD AUTO: 17.32 K/UL — HIGH (ref 1.4–6.5)
NEUTROPHILS NFR BLD AUTO: 92.2 % — HIGH (ref 42.2–75.2)
NRBC # BLD: 0 /100 WBCS — SIGNIFICANT CHANGE UP (ref 0–0)
PHOSPHATE SERPL-MCNC: 6.5 MG/DL — HIGH (ref 2.1–4.9)
PLATELET # BLD AUTO: 233 K/UL — SIGNIFICANT CHANGE UP (ref 130–400)
PMV BLD: 10.8 FL — HIGH (ref 7.4–10.4)
POTASSIUM SERPL-MCNC: 3.6 MMOL/L — SIGNIFICANT CHANGE UP (ref 3.5–5)
POTASSIUM SERPL-MCNC: 4.2 MMOL/L — SIGNIFICANT CHANGE UP (ref 3.5–5)
POTASSIUM SERPL-SCNC: 3.6 MMOL/L — SIGNIFICANT CHANGE UP (ref 3.5–5)
POTASSIUM SERPL-SCNC: 4.2 MMOL/L — SIGNIFICANT CHANGE UP (ref 3.5–5)
PTH-INTACT FLD-MCNC: 43 PG/ML — SIGNIFICANT CHANGE UP (ref 15–65)
RBC # BLD: 3.3 M/UL — LOW (ref 4.7–6.1)
RBC # FLD: 14.2 % — SIGNIFICANT CHANGE UP (ref 11.5–14.5)
SODIUM SERPL-SCNC: 136 MMOL/L — SIGNIFICANT CHANGE UP (ref 135–146)
SODIUM SERPL-SCNC: 139 MMOL/L — SIGNIFICANT CHANGE UP (ref 135–146)
WBC # BLD: 18.78 K/UL — HIGH (ref 4.8–10.8)
WBC # FLD AUTO: 18.78 K/UL — HIGH (ref 4.8–10.8)

## 2024-07-11 PROCEDURE — 99233 SBSQ HOSP IP/OBS HIGH 50: CPT

## 2024-07-11 RX ORDER — CEFEPIME HYDROCHLORIDE 1 G/1
2000 INJECTION, POWDER, FOR SOLUTION INTRAMUSCULAR; INTRAVENOUS EVERY 24 HOURS
Refills: 0 | Status: DISCONTINUED | OUTPATIENT
Start: 2024-07-11 | End: 2024-07-12

## 2024-07-11 RX ADMIN — Medication 75 MEQ/KG/HR: at 01:16

## 2024-07-11 RX ADMIN — Medication 650 MILLIGRAM(S): at 21:34

## 2024-07-11 RX ADMIN — Medication 75 MEQ/KG/HR: at 09:05

## 2024-07-11 RX ADMIN — NIFEDIPINE 30 MILLIGRAM(S): 20 CAPSULE, LIQUID FILLED ORAL at 05:49

## 2024-07-11 RX ADMIN — ATORVASTATIN CALCIUM 20 MILLIGRAM(S): 40 TABLET, FILM COATED ORAL at 21:34

## 2024-07-11 RX ADMIN — Medication 75 MEQ/KG/HR: at 21:34

## 2024-07-11 RX ADMIN — Medication 650 MILLIGRAM(S): at 05:48

## 2024-07-11 RX ADMIN — Medication 650 MILLIGRAM(S): at 11:21

## 2024-07-11 RX ADMIN — PANTOPRAZOLE SODIUM 40 MILLIGRAM(S): 20 TABLET, DELAYED RELEASE ORAL at 05:49

## 2024-07-11 RX ADMIN — Medication 2: at 17:32

## 2024-07-11 RX ADMIN — CEFEPIME HYDROCHLORIDE 100 MILLIGRAM(S): 1 INJECTION, POWDER, FOR SOLUTION INTRAMUSCULAR; INTRAVENOUS at 11:21

## 2024-07-11 NOTE — PROGRESS NOTE ADULT - SUBJECTIVE AND OBJECTIVE BOX
Patient is a 69y old Male who presents with a chief complaint of UTI w/ obstructing stone (11 Jul 2024 09:17)    Today is hospital day 2      Summary/Handoff:  - f/u cx  - f/u ID recs  - f/u urology/IR regarding possible L PCN    Overnight events/Interval History:  - No acute overnight events  - At bedside, patient endorsing some abdominal epigastric pain. Denies back pain, nausea, vomiting. Breathing comfortably on room air. Denies fevers, chills, SOB, chest pain, diarrhea, constipation    ROS:  - All ROS is negative unless indicated in HPI    Code Status: DNR/DNI (confirmed with patient at bedside)    ALLERGIES:  No Known Allergies    MEDICATIONS:  STANDING MEDICATIONS  atorvastatin 20 milliGRAM(s) Oral at bedtime  cefepime   IVPB 2000 milliGRAM(s) IV Intermittent every 24 hours  dextrose 5%. 1000 milliLiter(s) IV Continuous <Continuous>  dextrose 5%. 1000 milliLiter(s) IV Continuous <Continuous>  dextrose 50% Injectable 12.5 Gram(s) IV Push once  dextrose 50% Injectable 25 Gram(s) IV Push once  dextrose 50% Injectable 25 Gram(s) IV Push once  glucagon  Injectable 1 milliGRAM(s) IntraMuscular once  insulin lispro (ADMELOG) corrective regimen sliding scale   SubCutaneous three times a day before meals  insulin lispro (ADMELOG) corrective regimen sliding scale   SubCutaneous at bedtime  pantoprazole    Tablet 40 milliGRAM(s) Oral before breakfast  sodium bicarbonate 650 milliGRAM(s) Oral three times a day  sodium bicarbonate  Infusion 0.125 mEq/kG/Hr IV Continuous <Continuous>    PRN MEDICATIONS  aluminum hydroxide/magnesium hydroxide/simethicone Suspension 30 milliLiter(s) Oral every 4 hours PRN  dextrose Oral Gel 15 Gram(s) Oral once PRN  melatonin 3 milliGRAM(s) Oral at bedtime PRN  ondansetron Injectable 4 milliGRAM(s) IV Push every 8 hours PRN      VITALS LAST 24H:  Vital Signs Last 24 Hrs  T(C): 37 (11 Jul 2024 08:19), Max: 37.1 (10 Jul 2024 14:09)  T(F): 98.6 (11 Jul 2024 08:19), Max: 98.7 (10 Jul 2024 14:09)  HR: 80 (11 Jul 2024 08:19) (80 - 104)  BP: 103/52 (11 Jul 2024 08:19) (95/59 - 139/59)  BP(mean): 75 (10 Jul 2024 18:00) (75 - 82)  RR: 18 (11 Jul 2024 08:19) (18 - 20)  SpO2: 97% (11 Jul 2024 08:19) (97% - 100%)    Parameters below as of 11 Jul 2024 08:19  Patient On (Oxygen Delivery Method): room air        PHYSICAL EXAM:  GENERAL: NAD, lying in bed comfortably  HEENT:  EOMI, dry mucous membranes, poor dentition  CHEST/LUNG: Clear to auscultation bilaterally; normal respiratory effort, no coughing, wheezes, crackles, or rales.  HEART: Regular rate and rhythm  ABDOMEN: Soft, +epigastric TTP, nondistended, Bowel sounds present, +SPC w/ serous, cloudy, particulate drainage, +R PCN w/ bloody drainage (~20cc)  EXTREMITIES:  2+ Peripheral Pulses, brisk capillary refill. +chronic venous stasis ulcers w/ dermatitis bilaterally. No lower extremity edema bilaterally.   NERVOUS SYSTEM:  A&Ox3, no focal deficits     LABS:                        10.0   18.78 )-----------( 233      ( 11 Jul 2024 07:28 )             30.6     07-11    139  |  106  |  120<HH>  ----------------------------<  204<H>  4.2   |  12<L>  |  5.8<HH>    Ca    7.8<L>      11 Jul 2024 07:28  Phos  6.5     07-11  Mg     1.8     07-11    TPro  7.6  /  Alb  2.3<L>  /  TBili  0.3  /  DBili  x   /  AST  22  /  ALT  22  /  AlkPhos  153<H>  07-10    PT/INR - ( 10 Jul 2024 06:36 )   PT: 15.00 sec;   INR: 1.31 ratio         PTT - ( 10 Jul 2024 06:36 )  PTT:34.9 sec  Urinalysis Basic - ( 11 Jul 2024 07:28 )    Color: x / Appearance: x / SG: x / pH: x  Gluc: 204 mg/dL / Ketone: x  / Bili: x / Urobili: x   Blood: x / Protein: x / Nitrite: x   Leuk Esterase: x / RBC: x / WBC x   Sq Epi: x / Non Sq Epi: x / Bacteria: x    Culture - Blood (collected 09 Jul 2024 14:20)  Source: .Blood Blood-Peripheral  Gram Stain (11 Jul 2024 01:20):    Growth in anaerobic bottle: Gram Negative Rods  Preliminary Report (11 Jul 2024 01:20):    Growth in anaerobic bottle: Gram Negative Rods    Direct identification is available within approximately 3-5    hours either by Blood Panel Multiplexed PCR or Direct    MALDI-TOF. Details: [email or web address]  Organism: Blood Culture PCR (11 Jul 2024 03:23)  Organism: Blood Culture PCR (11 Jul 2024 03:23)    Culture - Blood (collected 09 Jul 2024 14:20)  Source: .Blood Blood-Peripheral  Preliminary Report (10 Jul 2024 23:10):    No growth at 24 hours    Culture - Urine (collected 09 Jul 2024 13:50)  Source: Clean Catch Clean Catch (Midstream)  Final Report (10 Jul 2024 23:26):    >=3 organisms. Probable collection contamination.          RADIOLOGY:  CXR      CT  CT Abdomen and Pelvis No Cont:   ACC: [id number] EXAM:  CT ABDOMEN AND PELVIS   ORDERED BY: HELEN AU     PROCEDURE DATE:  07/09/2024          INTERPRETATION:  CLINICAL STATEMENT: Nausea and vomiting    TECHNIQUE: Contiguous axial CT images were obtained from the lower chest   to the pubic symphysis without intravenous contrast. Oral contrast was   not administered.  Reformatted images in the coronal and sagittal planes   were acquired.    COMPARISON CT: February 12, 2016    OTHER STUDIES USED FOR CORRELATION: None.      FINDINGS:    LOWER CHEST: Coronary artery calcifications.    HEPATOBILIARY: Punctate calcified granulomas. Cholelithiasis.    SPLEEN: Unremarkable.    PANCREAS: Unremarkable.    ADRENAL GLANDS: Unremarkable.    KIDNEYS: Severe right hydroureteronephrosis with associated cortical   thinning and multiple calculi in the renal calyces, collecting systems   and proximal to mid ureter. The most distal obstructing calculus in the   right mid ureter measures 1.1 x 1 x 1.8 cm (); normal caliber   distal ureter. A right upper pole renal calculus measures up to 3.1 cm   and a renal pelvic calculus measures up to 3 cm. Right renal pelvic and   periureteral fat stranding noted.    Multiple nonobstructing left intrarenal calculi measuring up to 1.6 cm.   Left distal ureteral 0.5 x 0.6 x 0.8 cm calculus without associated   hydronephrosis (, 302/245).      ABDOMINOPELVIC NODES: Few prominent retroperitoneal lymph nodes, may be   reactive. For example, an interaortocaval lymph node measures 1.4 x 1cm.    PELVIC ORGANS: Enlarged prostate gland. Collapsed urinary bladder with a   suprapubic catheter in place. A 1.6 cm bladder calculus noted.    PERITONEUM/MESENTERY/BOWEL: No bowel obstruction, ascites or   pneumoperitoneum appendix not visualized; no focal pericecal   inflammation..    BONES/SOFT TISSUES: Osteopenia. Degenerative changes of the spine and   hips noted. Bilateral fat-containing inguinal hernias..    OTHER: Diffuse atherosclerotic vascular calcifications.    IMPRESSION:  1.  Severe right hydroureteronephrosis with cortical thinning and   numerous large intrarenal and proximal to mid ureteral calculi;   obstructing calculus in the right mid ureter measures 1.1 x 1 x 1.8 cm (   ) with normal caliber distal ureter.  2.  Multiple nonobstructing left intrarenal calculi and a 0.8 cm left   distal ureteral calculus; no left hydronephrosis.  3.  Few prominent retroperitoneal lymph nodes, likely reactive.  4.  Cholelithiasis.    --- End of Report ---            NGUYEN URIARTE MD; Attending Radiologist  This document has been electronically signed. Jul 9 2024  2:02PM (07-09-24 @ 13:00)

## 2024-07-11 NOTE — PROGRESS NOTE ADULT - SUBJECTIVE AND OBJECTIVE BOX
LISAKRISTY MARIE  69y, Male  Allergy: No Known Allergies      LOS  2d    CHIEF COMPLAINT: UTI w/ obstructing stone (10 Jul 2024 17:14)      INTERVAL EVENTS/HPI  - s/p percutaneous right nephrostomy followed by placement of right nephroureteral catheter  -   7/9 BCX GNR, PCR panel negative  - T(F): , Max: 98.7 (07-10-24 @ 14:09)  - Tolerating medication  - WBC Count: 18.78 (07-11-24 @ 07:28)  WBC Count: 20.63 (07-10-24 @ 06:36)     - Creatinine: 5.8 (07-11-24 @ 07:28)  Creatinine: 5.8 (07-10-24 @ 23:30)       ROS  ***    VITALS:  T(F): 98.6, Max: 98.7 (07-10-24 @ 14:09)  HR: 80  BP: 103/52  RR: 18Vital Signs Last 24 Hrs  T(C): 37 (11 Jul 2024 08:19), Max: 37.1 (10 Jul 2024 14:09)  T(F): 98.6 (11 Jul 2024 08:19), Max: 98.7 (10 Jul 2024 14:09)  HR: 80 (11 Jul 2024 08:19) (80 - 104)  BP: 103/52 (11 Jul 2024 08:19) (95/59 - 139/59)  BP(mean): 75 (10 Jul 2024 18:00) (75 - 82)  RR: 18 (11 Jul 2024 08:19) (18 - 20)  SpO2: 97% (11 Jul 2024 08:19) (97% - 100%)    Parameters below as of 11 Jul 2024 08:19  Patient On (Oxygen Delivery Method): room air        PHYSICAL EXAM:  ***    FH: Non-contributory  Social Hx: Non-contributory    TESTS & MEASUREMENTS:                        10.0   18.78 )-----------( 233      ( 11 Jul 2024 07:28 )             30.6     07-11    139  |  106  |  120<HH>  ----------------------------<  204<H>  4.2   |  12<L>  |  5.8<HH>    Ca    7.8<L>      11 Jul 2024 07:28  Phos  6.5     07-11  Mg     1.8     07-11    TPro  7.6  /  Alb  2.3<L>  /  TBili  0.3  /  DBili  x   /  AST  22  /  ALT  22  /  AlkPhos  153<H>  07-10      LIVER FUNCTIONS - ( 10 Jul 2024 06:36 )  Alb: 2.3 g/dL / Pro: 7.6 g/dL / ALK PHOS: 153 U/L / ALT: 22 U/L / AST: 22 U/L / GGT: x           Urinalysis Basic - ( 11 Jul 2024 07:28 )    Color: x / Appearance: x / SG: x / pH: x  Gluc: 204 mg/dL / Ketone: x  / Bili: x / Urobili: x   Blood: x / Protein: x / Nitrite: x   Leuk Esterase: x / RBC: x / WBC x   Sq Epi: x / Non Sq Epi: x / Bacteria: x        Culture - Blood (collected 07-09-24 @ 14:20)  Source: .Blood Blood-Peripheral  Gram Stain (07-11-24 @ 01:20):    Growth in anaerobic bottle: Gram Negative Rods  Preliminary Report (07-11-24 @ 01:20):    Growth in anaerobic bottle: Gram Negative Rods    Direct identification is available within approximately 3-5    hours either by Blood Panel Multiplexed PCR or Direct    MALDI-TOF. Details: [email or web address]  Organism: Blood Culture PCR (07-11-24 @ 03:23)  Organism: Blood Culture PCR (07-11-24 @ 03:23)      Method Type: PCR      -  Blood PCR Panel: NEG    Culture - Blood (collected 07-09-24 @ 14:20)  Source: .Blood Blood-Peripheral  Preliminary Report (07-10-24 @ 23:10):    No growth at 24 hours    Culture - Urine (collected 07-09-24 @ 13:50)  Source: Clean Catch Clean Catch (Midstream)  Final Report (07-10-24 @ 23:26):    >=3 organisms. Probable collection contamination.        Lactate, Blood: 0.8 mmol/L (07-10-24 @ 06:36)  Lactate, Blood: 1.0 mmol/L (07-09-24 @ 13:30)      INFECTIOUS DISEASES TESTING  strept    INFLAMMATORY MARKERS      RADIOLOGY & ADDITIONAL TESTS:  I have personally reviewed the last available Chest xray  CXR      CT  CT Abdomen and Pelvis No Cont:   ACC: [id number] EXAM:  CT ABDOMEN AND PELVIS   ORDERED BY: RICKYJAIME AU     PROCEDURE DATE:  07/09/2024          INTERPRETATION:  CLINICAL STATEMENT: Nausea and vomiting    TECHNIQUE: Contiguous axial CT images were obtained from the lower chest   to the pubic symphysis without intravenous contrast. Oral contrast was   not administered.  Reformatted images in the coronal and sagittal planes   were acquired.    COMPARISON CT: February 12, 2016    OTHER STUDIES USED FOR CORRELATION: None.      FINDINGS:    LOWER CHEST: Coronary artery calcifications.    HEPATOBILIARY: Punctate calcified granulomas. Cholelithiasis.    SPLEEN: Unremarkable.    PANCREAS: Unremarkable.    ADRENAL GLANDS: Unremarkable.    KIDNEYS: Severe right hydroureteronephrosis with associated cortical   thinning and multiple calculi in the renal calyces, collecting systems   and proximal to mid ureter. The most distal obstructing calculus in the   right mid ureter measures 1.1 x 1 x 1.8 cm (); normal caliber   distal ureter. A right upper pole renal calculus measures up to 3.1 cm   and a renal pelvic calculus measures up to 3 cm. Right renal pelvic and   periureteral fat stranding noted.    Multiple nonobstructing left intrarenal calculi measuring up to 1.6 cm.   Left distal ureteral 0.5 x 0.6 x 0.8 cm calculus without associated   hydronephrosis (, 302/245).      ABDOMINOPELVIC NODES: Few prominent retroperitoneal lymph nodes, may be   reactive. For example, an interaortocaval lymph node measures 1.4 x 1cm.    PELVIC ORGANS: Enlarged prostate gland. Collapsed urinary bladder with a   suprapubic catheter in place. A 1.6 cm bladder calculus noted.    PERITONEUM/MESENTERY/BOWEL: No bowel obstruction, ascites or   pneumoperitoneum appendix not visualized; no focal pericecal   inflammation..    BONES/SOFT TISSUES: Osteopenia. Degenerative changes of the spine and   hips noted. Bilateral fat-containing inguinal hernias..    OTHER: Diffuse atherosclerotic vascular calcifications.    IMPRESSION:  1.  Severe right hydroureteronephrosis with cortical thinning and   numerous large intrarenal and proximal to mid ureteral calculi;   obstructing calculus in the right mid ureter measures 1.1 x 1 x 1.8 cm (   ) with normal caliber distal ureter.  2.  Multiple nonobstructing left intrarenal calculi and a 0.8 cm left   distal ureteral calculus; no left hydronephrosis.  3.  Few prominent retroperitoneal lymph nodes, likely reactive.  4.  Cholelithiasis.    --- End of Report ---            NGUYEN URIARTE MD; Attending Radiologist  This document has been electronically signed. Jul 9 2024  2:02PM (07-09-24 @ 13:00)      CARDIOLOGY TESTING  12 Lead ECG:   Ventricular Rate 71 BPM    Atrial Rate 71 BPM    P-R Interval 186 ms    QRS Duration 96 ms    Q-T Interval 404 ms    QTC Calculation(Bazett) 439 ms    P Axis 62 degrees    R Axis 49 degrees    T Axis 9 degrees    Diagnosis Line Normal sinus rhythm  Normal ECG    Confirmed by Julius Don ([id number]) on 7/9/2024 12:41:31 PM (07-09-24 @ 12:14)      MEDICATIONS  atorvastatin 20 Oral at bedtime  cefepime   IVPB 1000 IV Intermittent every 24 hours  dextrose 5%. 1000 IV Continuous <Continuous>  dextrose 5%. 1000 IV Continuous <Continuous>  dextrose 50% Injectable 12.5 IV Push once  dextrose 50% Injectable 25 IV Push once  dextrose 50% Injectable 25 IV Push once  glucagon  Injectable 1 IntraMuscular once  insulin lispro (ADMELOG) corrective regimen sliding scale  SubCutaneous three times a day before meals  insulin lispro (ADMELOG) corrective regimen sliding scale  SubCutaneous at bedtime  NIFEdipine XL 30 Oral daily  pantoprazole    Tablet 40 Oral before breakfast  sodium bicarbonate 650 Oral three times a day  sodium bicarbonate  Infusion 0.125 IV Continuous <Continuous>      WEIGHT  Weight (kg): 81.6 (07-10-24 @ 14:34)  Creatinine: 5.8 mg/dL (07-11-24 @ 07:28)  Creatinine: 5.8 mg/dL (07-10-24 @ 23:30)  Creatinine: 5.7 mg/dL (07-10-24 @ 21:08)      ANTIBIOTICS:  cefepime   IVPB 1000 milliGRAM(s) IV Intermittent every 24 hours      All available historical records have been reviewed       LISAKRISTY MARIE  69y, Male  Allergy: No Known Allergies      LOS  2d    CHIEF COMPLAINT: UTI w/ obstructing stone (10 Jul 2024 17:14)      INTERVAL EVENTS/HPI  - s/p percutaneous right nephrostomy followed by placement of right nephroureteral catheter  -   7/9 BCX GNR, PCR panel negative  - T(F): , Max: 98.7 (07-10-24 @ 14:09)  - Tolerating medication  - WBC Count: 18.78 (07-11-24 @ 07:28)  WBC Count: 20.63 (07-10-24 @ 06:36)     - Creatinine: 5.8 (07-11-24 @ 07:28)  Creatinine: 5.8 (07-10-24 @ 23:30)       ROS  General: Denies rigors, nightsweats  HEENT: Denies headache, rhinorrhea, sore throat, eye pain  CV: Denies CP, palpitations  PULM: Denies wheezing, hemoptysis  GI: Denies hematemesis, hematochezia, melena  : Denies discharge, hematuria  MSK: Denies arthralgias, myalgias  SKIN: Denies rash, lesions  NEURO: Denies paresthesias, weakness  PSYCH: Denies depression, anxiety     VITALS:  T(F): 98.6, Max: 98.7 (07-10-24 @ 14:09)  HR: 80  BP: 103/52  RR: 18Vital Signs Last 24 Hrs  T(C): 37 (11 Jul 2024 08:19), Max: 37.1 (10 Jul 2024 14:09)  T(F): 98.6 (11 Jul 2024 08:19), Max: 98.7 (10 Jul 2024 14:09)  HR: 80 (11 Jul 2024 08:19) (80 - 104)  BP: 103/52 (11 Jul 2024 08:19) (95/59 - 139/59)  BP(mean): 75 (10 Jul 2024 18:00) (75 - 82)  RR: 18 (11 Jul 2024 08:19) (18 - 20)  SpO2: 97% (11 Jul 2024 08:19) (97% - 100%)    Parameters below as of 11 Jul 2024 08:19  Patient On (Oxygen Delivery Method): room air        PHYSICAL EXAM:  Gen: chronically ill appearing   HEENT: Normocephalic, atraumatic  Neck: supple, no lymphadenopathy  CV: Regular rate & regular rhythm  Lungs: decreased BS at bases, no fremitus  Abdomen: Soft, BS present R PCN serosanguinous urine with purulence  Ext: Warm, well perfused  Neuro: non focal,   Skin: no rash, no erythema  Lines: no phlebitis     FH: Non-contributory  Social Hx: Non-contributory    TESTS & MEASUREMENTS:                        10.0   18.78 )-----------( 233      ( 11 Jul 2024 07:28 )             30.6     07-11    139  |  106  |  120<HH>  ----------------------------<  204<H>  4.2   |  12<L>  |  5.8<HH>    Ca    7.8<L>      11 Jul 2024 07:28  Phos  6.5     07-11  Mg     1.8     07-11    TPro  7.6  /  Alb  2.3<L>  /  TBili  0.3  /  DBili  x   /  AST  22  /  ALT  22  /  AlkPhos  153<H>  07-10      LIVER FUNCTIONS - ( 10 Jul 2024 06:36 )  Alb: 2.3 g/dL / Pro: 7.6 g/dL / ALK PHOS: 153 U/L / ALT: 22 U/L / AST: 22 U/L / GGT: x           Urinalysis Basic - ( 11 Jul 2024 07:28 )    Color: x / Appearance: x / SG: x / pH: x  Gluc: 204 mg/dL / Ketone: x  / Bili: x / Urobili: x   Blood: x / Protein: x / Nitrite: x   Leuk Esterase: x / RBC: x / WBC x   Sq Epi: x / Non Sq Epi: x / Bacteria: x        Culture - Blood (collected 07-09-24 @ 14:20)  Source: .Blood Blood-Peripheral  Gram Stain (07-11-24 @ 01:20):    Growth in anaerobic bottle: Gram Negative Rods  Preliminary Report (07-11-24 @ 01:20):    Growth in anaerobic bottle: Gram Negative Rods    Direct identification is available within approximately 3-5    hours either by Blood Panel Multiplexed PCR or Direct    MALDI-TOF. Details: [email or web address]  Organism: Blood Culture PCR (07-11-24 @ 03:23)  Organism: Blood Culture PCR (07-11-24 @ 03:23)      Method Type: PCR      -  Blood PCR Panel: NEG    Culture - Blood (collected 07-09-24 @ 14:20)  Source: .Blood Blood-Peripheral  Preliminary Report (07-10-24 @ 23:10):    No growth at 24 hours    Culture - Urine (collected 07-09-24 @ 13:50)  Source: Clean Catch Clean Catch (Midstream)  Final Report (07-10-24 @ 23:26):    >=3 organisms. Probable collection contamination.        Lactate, Blood: 0.8 mmol/L (07-10-24 @ 06:36)  Lactate, Blood: 1.0 mmol/L (07-09-24 @ 13:30)      INFECTIOUS DISEASES TESTING  strept    INFLAMMATORY MARKERS      RADIOLOGY & ADDITIONAL TESTS:  I have personally reviewed the last available Chest xray  CXR      CT  CT Abdomen and Pelvis No Cont:   ACC: [id number] EXAM:  CT ABDOMEN AND PELVIS   ORDERED BY: HELEN AU     PROCEDURE DATE:  07/09/2024          INTERPRETATION:  CLINICAL STATEMENT: Nausea and vomiting    TECHNIQUE: Contiguous axial CT images were obtained from the lower chest   to the pubic symphysis without intravenous contrast. Oral contrast was   not administered.  Reformatted images in the coronal and sagittal planes   were acquired.    COMPARISON CT: February 12, 2016    OTHER STUDIES USED FOR CORRELATION: None.      FINDINGS:    LOWER CHEST: Coronary artery calcifications.    HEPATOBILIARY: Punctate calcified granulomas. Cholelithiasis.    SPLEEN: Unremarkable.    PANCREAS: Unremarkable.    ADRENAL GLANDS: Unremarkable.    KIDNEYS: Severe right hydroureteronephrosis with associated cortical   thinning and multiple calculi in the renal calyces, collecting systems   and proximal to mid ureter. The most distal obstructing calculus in the   right mid ureter measures 1.1 x 1 x 1.8 cm (); normal caliber   distal ureter. A right upper pole renal calculus measures up to 3.1 cm   and a renal pelvic calculus measures up to 3 cm. Right renal pelvic and   periureteral fat stranding noted.    Multiple nonobstructing left intrarenal calculi measuring up to 1.6 cm.   Left distal ureteral 0.5 x 0.6 x 0.8 cm calculus without associated   hydronephrosis (, 302/245).      ABDOMINOPELVIC NODES: Few prominent retroperitoneal lymph nodes, may be   reactive. For example, an interaortocaval lymph node measures 1.4 x 1cm.    PELVIC ORGANS: Enlarged prostate gland. Collapsed urinary bladder with a   suprapubic catheter in place. A 1.6 cm bladder calculus noted.    PERITONEUM/MESENTERY/BOWEL: No bowel obstruction, ascites or   pneumoperitoneum appendix not visualized; no focal pericecal   inflammation..    BONES/SOFT TISSUES: Osteopenia. Degenerative changes of the spine and   hips noted. Bilateral fat-containing inguinal hernias..    OTHER: Diffuse atherosclerotic vascular calcifications.    IMPRESSION:  1.  Severe right hydroureteronephrosis with cortical thinning and   numerous large intrarenal and proximal to mid ureteral calculi;   obstructing calculus in the right mid ureter measures 1.1 x 1 x 1.8 cm (   ) with normal caliber distal ureter.  2.  Multiple nonobstructing left intrarenal calculi and a 0.8 cm left   distal ureteral calculus; no left hydronephrosis.  3.  Few prominent retroperitoneal lymph nodes, likely reactive.  4.  Cholelithiasis.    --- End of Report ---            NGUYEN URIARTE MD; Attending Radiologist  This document has been electronically signed. Jul 9 2024  2:02PM (07-09-24 @ 13:00)      CARDIOLOGY TESTING  12 Lead ECG:   Ventricular Rate 71 BPM    Atrial Rate 71 BPM    P-R Interval 186 ms    QRS Duration 96 ms    Q-T Interval 404 ms    QTC Calculation(Bazett) 439 ms    P Axis 62 degrees    R Axis 49 degrees    T Axis 9 degrees    Diagnosis Line Normal sinus rhythm  Normal ECG    Confirmed by Julius Don ([id number]) on 7/9/2024 12:41:31 PM (07-09-24 @ 12:14)      MEDICATIONS  atorvastatin 20 Oral at bedtime  cefepime   IVPB 1000 IV Intermittent every 24 hours  dextrose 5%. 1000 IV Continuous <Continuous>  dextrose 5%. 1000 IV Continuous <Continuous>  dextrose 50% Injectable 12.5 IV Push once  dextrose 50% Injectable 25 IV Push once  dextrose 50% Injectable 25 IV Push once  glucagon  Injectable 1 IntraMuscular once  insulin lispro (ADMELOG) corrective regimen sliding scale  SubCutaneous three times a day before meals  insulin lispro (ADMELOG) corrective regimen sliding scale  SubCutaneous at bedtime  NIFEdipine XL 30 Oral daily  pantoprazole    Tablet 40 Oral before breakfast  sodium bicarbonate 650 Oral three times a day  sodium bicarbonate  Infusion 0.125 IV Continuous <Continuous>      WEIGHT  Weight (kg): 81.6 (07-10-24 @ 14:34)  Creatinine: 5.8 mg/dL (07-11-24 @ 07:28)  Creatinine: 5.8 mg/dL (07-10-24 @ 23:30)  Creatinine: 5.7 mg/dL (07-10-24 @ 21:08)      ANTIBIOTICS:  cefepime   IVPB 1000 milliGRAM(s) IV Intermittent every 24 hours      All available historical records have been reviewed

## 2024-07-11 NOTE — PROGRESS NOTE ADULT - SUBJECTIVE AND OBJECTIVE BOX
JOSEKRISTY  69y Male    INTERVAL HPI/OVERNIGHT EVENTS:    pt feels "so-so"  no fever, pain, N/V, SOB  right nephrostomy with blood in tubing  suprapubic catheter draining urine with debris and some blood    T(F): 98.6 (07-11-24 @ 08:19), Max: 98.6 (07-11-24 @ 08:19)  HR: 80 (07-11-24 @ 08:19) (80 - 104)  BP: 103/52 (07-11-24 @ 08:19) (95/59 - 139/59)  RR: 18 (07-11-24 @ 08:19) (18 - 20)  SpO2: 97% (07-11-24 @ 08:19) (97% - 100%) on RA    I&O's Summary    CAPILLARY BLOOD GLUCOSE      POCT Blood Glucose.: 215 mg/dL (11 Jul 2024 11:01)  POCT Blood Glucose.: 223 mg/dL (11 Jul 2024 07:46)  POCT Blood Glucose.: 162 mg/dL (10 Jul 2024 20:25)  POCT Blood Glucose.: 134 mg/dL (10 Jul 2024 14:40)        PHYSICAL EXAM:  GENERAL: NAD  HEAD:  Normocephalic  EYES:  conjunctiva and sclera clear  ENMT: Moist mucous membranes  NERVOUS SYSTEM:  Alert, awake, Good concentration  CHEST/LUNG: CTA b/l  HEART: Regular rate and rhythm  ABDOMEN: Soft, Nontender, Nondistended; Bowel sounds present  EXTREMITIES:   No edema LE  SKIN: multiple dried wounds, no active drainage    Consultant(s) Notes Reviewed:  [x ] YES  [ ] NO  Care Discussed with Consultants/Other Providers [ x] YES  [ ] NO    MEDICATIONS  (STANDING):  atorvastatin 20 milliGRAM(s) Oral at bedtime  cefepime   IVPB 2000 milliGRAM(s) IV Intermittent every 24 hours  dextrose 5%. 1000 milliLiter(s) (50 mL/Hr) IV Continuous <Continuous>  dextrose 5%. 1000 milliLiter(s) (100 mL/Hr) IV Continuous <Continuous>  dextrose 50% Injectable 25 Gram(s) IV Push once  dextrose 50% Injectable 12.5 Gram(s) IV Push once  dextrose 50% Injectable 25 Gram(s) IV Push once  glucagon  Injectable 1 milliGRAM(s) IntraMuscular once  insulin lispro (ADMELOG) corrective regimen sliding scale   SubCutaneous three times a day before meals  insulin lispro (ADMELOG) corrective regimen sliding scale   SubCutaneous at bedtime  pantoprazole    Tablet 40 milliGRAM(s) Oral before breakfast  sodium bicarbonate 650 milliGRAM(s) Oral three times a day  sodium bicarbonate  Infusion 0.125 mEq/kG/Hr (75 mL/Hr) IV Continuous <Continuous>    MEDICATIONS  (PRN):  aluminum hydroxide/magnesium hydroxide/simethicone Suspension 30 milliLiter(s) Oral every 4 hours PRN Dyspepsia  dextrose Oral Gel 15 Gram(s) Oral once PRN Blood Glucose LESS THAN 70 milliGRAM(s)/deciliter  melatonin 3 milliGRAM(s) Oral at bedtime PRN Insomnia  ondansetron Injectable 4 milliGRAM(s) IV Push every 8 hours PRN Nausea and/or Vomiting      LABS:                        10.0   18.78 )-----------( 233      ( 11 Jul 2024 07:28 )             30.6     07-11    139  |  106  |  120<HH>  ----------------------------<  204<H>  4.2   |  12<L>  |  5.8<HH>    Ca    7.8<L>      11 Jul 2024 07:28  Phos  6.5     07-11  Mg     1.8     07-11    TPro  7.6  /  Alb  2.3<L>  /  TBili  0.3  /  DBili  x   /  AST  22  /  ALT  22  /  AlkPhos  153<H>  07-10    PT/INR - ( 10 Jul 2024 06:36 )   PT: 15.00 sec;   INR: 1.31 ratio         PTT - ( 10 Jul 2024 06:36 )  PTT:34.9 sec    Culture - Blood (collected 09 Jul 2024 14:20)  Source: .Blood Blood-Peripheral  Preliminary Report (10 Jul 2024 23:10):    No growth at 24 hours    Culture - Blood (collected 09 Jul 2024 14:20)  Source: .Blood Blood-Peripheral  Gram Stain (11 Jul 2024 01:20):    Growth in anaerobic bottle: Gram Negative Rods  Preliminary Report (11 Jul 2024 01:20):    Growth in anaerobic bottle: Gram Negative Rods    Direct identification is available within approximately 3-5    hours either by Blood Panel Multiplexed PCR or Direct    MALDI-TOF. Details: [email or web address]  Organism: Blood Culture PCR (11 Jul 2024 03:23)  Organism: Blood Culture PCR (11 Jul 2024 03:23)    Culture - Urine (collected 09 Jul 2024 13:50)  Source: Clean Catch Clean Catch (Midstream)  Final Report (10 Jul 2024 23:26):    >=3 organisms. Probable collection contamination.      Lactate, Blood: 0.8 mmol/L (07-10 @ 06:36)  Lactate, Blood: 1.0 mmol/L (07-09 @ 13:30)                      Case discussed with resident today    Care discussed with pt

## 2024-07-11 NOTE — PROGRESS NOTE ADULT - ASSESSMENT
68 y/o man with PMH of HTN, hyperlipidemia, DM II, CKD 5 (baseline Cr 4.4) Urinary incontinence with chronic suprapubic catheter, Nephrolithiasis, Spinal abscess with quadriplegia and chronic LE wounds receiving dressing changes by visiting nurse now presented to the ED for crampy abdominal pain along with nausea and vomiting.     1. JOÃO over CKD 5 likely due to obstructive uropathy   metabolic acidosis  could have prerenal component - Cr downtrended with IV hydration initially  Complicated UTI / pyelonephritis  GNR bacteremia  - CT abd/pelvis: Severe right hydroureteronephrosis with cortical thinning and numerous large intrarenal and proximal to mid ureteral calculi, obstructing calculus in the right mid ureter measures 1.1 x 1 x 1.8 cm with normal caliber distal ureter.  Multiple nonobstructive left intrarenal calculi and a 0.8 cm left distal ureteral calculus; no left hydronephrosis.  Few prominent retroperitoneal lymph nodes, likely reactive. Cholelithiasis.  - s/p eval by Urology in ED --> recommended b/l nephrostomy placement by IR.   - s/p right PCN by IR on 7/10 - 10 cc of thick, viscous green-yellow, malodorous right kidney urine sent for cultures  - s/p SPC exchange in the ED  - continue cefepime per ID  - f/u blood and urine cultures   - D5 with bicarbonate per renal and on oral bicarbonate for acidosis  - phos level 6.5 -> can start calcium acetate and monitor  - avoid nephrotoxic meds  - being monitored for need for RRT - renal following  - further stone management as outpt per     2. HTN  - SBP running low on nifedipine - hold for now and monitor  per brother, pt refuses meds at home and Dr. Garibay is aware    3. Hyperlipidemia on statin    4. DM - FS acceptable  A1C 6.9  insulin for FS >180    5. DVT ppx: Heparin SQ if H/H remains stable and bleeding via nephrostomy improves     6. Chronic LE wounds - wound care per advanced nursing recommendation      DNR/DNI  very guarded prognosis  high risk pt      PROGRESS NOTE HANDOFF    Pending: labs in AM, f/u culture (blood/urine/OR), renal f/u, monitor urine output and right nephrostomy output    Family discussion: resident will update brother today    Disposition: home with care

## 2024-07-11 NOTE — PROGRESS NOTE ADULT - ASSESSMENT
68 y/o m with b/l nephrolithiasis s/p IR placement of a right nephroureteral stent.    A) b/l nephrolithiasis  stable s/p right nephroureteral stent  hematuria s/p procedure  afebrile    P) Continue medical optimization.  continue abx as per ID  ID consult appreciated  No acute urologic intervention at this time.  OP f/u with Dr. Akins for definitive stone management  d/w attending  recall prn

## 2024-07-11 NOTE — ADVANCED PRACTICE NURSE CONSULT - ASSESSMENT
Reason for Admission: UTI w/ obstructing stone  History of Present Illness:   70yo male w/ pmhx of HTN, HLD, DMII, CKD stage 5 baseline Cr ~ 4.4, urinary incontinence with chronic supra pubic catheter, Nephrolithiasis, spinal abscess leading to quadriplegia presenting w. NBNB vomiting and nausea every time after he eats. Reports generalized crampy abdominal pain. Reports is non compliant w. medications and has not take is prescribed medications for the past few months.  Denies fever chills, flank pain, chest pain, sob, urinary sxs, oliguria.        Allergies:  	No Known Allergies:     Home Medications:   * Patient Currently Takes Medications as of 09-Jul-2024 19:56 documented in Structured Notes  •?	NIFEdipine 30 mg oral tablet, extended release: Last Dose Taken:  , 1 tab(s) orally once a day  •?	sodium bicarbonate 650 mg oral tablet: Last Dose Taken:  , 1 tab(s) orally 3 times a day  •?	Vitamin D2 1.25 mg (50,000 intl units) oral capsule: Last Dose Taken:  , 1 cap(s) orally once a week  •?	petrolatum topical ointment: Last Dose Taken:  , 1 Apply topically to affected area 2 times a day  •?	atorvastatin 20 mg oral tablet: Last Dose Taken:  , 1 tab(s) orally once a day (at bedtime)    Patient received in bed, limited mobility, high risk for pressure injury development or progression.    Wound  Type & Location: Bilateral lower extremity chronic venous ulcers.  Tissue Description: Multiple dried scabs as well as partial thickness ulcerations that are dry  Wound Exudate: none  Wound Edge: intact, irregular  Vickie wound Condition: hyperpigmented     Patients heels intact at time of assessment – patient has very dry skin plaques yellow/brownish in color as well as blanching erythema to lateral side of feet by sole.  Sacrococcygeal area intact at time of assessment

## 2024-07-11 NOTE — PROGRESS NOTE ADULT - ASSESSMENT
ASSESSMENT  70yo male w/ pmhx of HTN, HLD, DMII, CKD stage 5 baseline Cr ~ 4.4, urinary incontinence with chronic supra pubic catheter, Nephrolithiasis, spinal abscess leading to quadriplegia presenting w. NBNB vomiting and nausea every time after he eats.       IMPRESSION  #GNR bacteremia secondary to Pyelonephritis/Pyonephrosis with obstructing stone    7/9 BCX GNR, PCR panel negative    7/9 BCX NGTD   - s/p percutaneous right nephrostomy followed by placement of right nephroureteral catheter    WBC 20 on admission ; Afebrile     UA pyuria WBC 50 ; UCX   >=3 organisms. Probable collection contamination.    SPT (replaced in ER)    CTAP 1.  Severe right hydroureteronephrosis with cortical thinning and   numerous large intrarenal and proximal to mid ureteral calculi;   obstructing calculus in the right mid ureter measures 1.1 x 1 x 1.8 cm (   ) with normal caliber distal ureter.  2.  Multiple nonobstructing left intrarenal calculi and a 0.8 cm left   distal ureteral calculus; no left hydronephrosis.  3.  Few prominent retroperitoneal lymph nodes, likely reactive.  4.  Cholelithiasis.  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

## 2024-07-11 NOTE — PROGRESS NOTE ADULT - ASSESSMENT
70 yo M with hx of urinary incontinence w/ chronic SPC, nephrolithiasis, spinal abscess w/ quadriplegia, DM2, CKD5 (baseline Cr 4.4), HTN, HLD, DM II, CKD stage 5 (baseline Cr 4.4)) presented to ED with abdominal pain, n/v, found to have severe R hydroureteronephrosis w/ numerous calculi and nonobstructive L calculi w/o hydro. Admitted for acute obstructive pyelonephritis w/ R hydronephrosis 2/2 nephrolithiasis c/b GNR bacteremia, HAGMA, and JOÃO on CKD likely postrenal. Now s/p R PCN and R nephroureteral catheter placement with IR (7/10), continuing on antibiotics and IVF hydration.     #Acute obstructive pyelonephritis with right hydronephrosis 2/2 R obstructing calculi  #GNR bacteremia   #Bilateral renal calculi  - Afebrile, hypotensive (90/50s), significant leukocytosis  - CTAP shows severe hydro on R w/ obstructive calculi and non obs calculi on Left  - BCx w/ GNR, UCx >3 spp likely contaminated   - s/p R PCN and nephroureteral catheter with IR on 7/10 - drained pus in OR and now w/ bloody drainage   - monitor PCN and SPC output   - urology consulted, recommending bilateral PCN, f/u on plan for L PCN  - IR on board, appreciate recs  - ID consulted, appreciate recs   - c/w IV cefepime 2g q24h  - Trend CBC, fever  - f/u cultures    #JOÃO on CKD5, likely postrenal iso right-sided hydro  #HAGMA  - CKD stage 5 baseline Cr ~ 4.4, urinary incontinence with chronic supra pubic catheter,   - Nephro consulted, appreciate recs  - c/w sodium bicarb infusion 150mEq @75cc/h (target CO2 18)   - c/w oral bicarb 650 mg TID   - BID BMP  - Strict I/Os    #Chronic venous stasis ulcers w/ dermatitis  - wounds do not appear infected, no edema or tenderness   - wound care consulted, appreciate recs  - cleanse wound w/ soap and water, pat dry  - apply dermaphor bid to bilateral legs and feet     #HTN  #HLD  - c/w atorvastatin  - hold home nifedipine iso hypotension    #DMII   - monitor FS  - ISS TIDAC, qhs    ---------------------  DVT ppx: HSQ (start in PM, 24h after PCN)  Diet: renal/CCC  Activity: IAT  GOC: DNR/DNI, confirmed with patient, pt requested to team to update brother Erich [phone]  Dispo: pending clinical improvement 68 yo M with hx of urinary incontinence w/ chronic SPC, nephrolithiasis, spinal abscess w/ quadriplegia, DM2, CKD5 (baseline Cr 4.4), HTN, HLD, DM II, CKD stage 5 (baseline Cr 4.4)) presented to ED with abdominal pain, n/v, found to have severe R hydroureteronephrosis w/ numerous R-sided calculi including obstructing stone in R ureter as well as nonobstructive L calculi w/o hydro. Admitted for acute obstructive pyelonephritis w/ R hydronephrosis 2/2 nephrolithiasis c/b GNR bacteremia, HAGMA, and JOÃO on CKD likely postrenal. Now s/p R PCN and R nephroureteral catheter placement with IR (7/10), continuing on antibiotics and IVF hydration.     #Acute obstructive pyelonephritis with right hydronephrosis 2/2 R obstructing calculi  #GNR bacteremia   #Bilateral renal calculi  - Afebrile, hypotensive (90/50s), significant leukocytosis  - CTAP shows severe hydro on R w/ obstructive calculi and non obs calculi on Left  - BCx w/ GNR, UCx >3 spp likely contaminated   - s/p R PCN and nephroureteral catheter with IR on 7/10 - drained pus in OR and now w/ bloody drainage   - monitor PCN and SPC output   - urology consulted, recommending bilateral PCN, f/u on plan for L PCN; PCNL for stone treatment at future date   - IR on board, appreciate recs  - ID consulted, appreciate recs   - c/w IV cefepime 2g q24h  - Trend CBC, fever  - f/u cultures    #JOÃO on CKD5, likely postrenal iso right-sided hydro  #HAGMA  - CKD stage 5 baseline Cr ~ 4.4, urinary incontinence with chronic supra pubic catheter,   - Nephro consulted, appreciate recs  - c/w sodium bicarb infusion 150mEq @75cc/h (target CO2 18)   - c/w oral bicarb 650 mg TID   - BID BMP  - Strict I/Os    #Chronic venous stasis ulcers w/ dermatitis  - wounds do not appear infected, no edema or tenderness   - wound care consulted, appreciate recs  - cleanse wound w/ soap and water, pat dry  - apply dermaphor bid to bilateral legs and feet     #HTN  #HLD  - c/w atorvastatin  - d/c nifedipine iso hypotension     #DMII   - monitor FS  - ISS TIDAC, qhs    ---------------------  DVT ppx: HSQ (start in PM, 24h after PCN)  Diet: renal/CCC  Activity: IAT  GOC: DNR/DNI, confirmed with patient, pt requested to team to update brother Erich [phone]  Dispo: pending clinical improvement

## 2024-07-11 NOTE — PROGRESS NOTE ADULT - SUBJECTIVE AND OBJECTIVE BOX
UROLOGY DAILY PROGRESS NOTE    Pt is a 68 y/o Male s/p IR placement of a right nephroureteral stent with aspiration of purulent fluid.  Pt doing well today c/o mild right sided discomfort. Nephrostomy draining well blood tinged urine.    MEDICATIONS  (STANDING):  atorvastatin 20 milliGRAM(s) Oral at bedtime  cefepime   IVPB 2000 milliGRAM(s) IV Intermittent every 24 hours  dextrose 5%. 1000 milliLiter(s) (50 mL/Hr) IV Continuous <Continuous>  dextrose 5%. 1000 milliLiter(s) (100 mL/Hr) IV Continuous <Continuous>  dextrose 50% Injectable 25 Gram(s) IV Push once  dextrose 50% Injectable 12.5 Gram(s) IV Push once  dextrose 50% Injectable 25 Gram(s) IV Push once  glucagon  Injectable 1 milliGRAM(s) IntraMuscular once  insulin lispro (ADMELOG) corrective regimen sliding scale   SubCutaneous three times a day before meals  insulin lispro (ADMELOG) corrective regimen sliding scale   SubCutaneous at bedtime  pantoprazole    Tablet 40 milliGRAM(s) Oral before breakfast  sodium bicarbonate 650 milliGRAM(s) Oral three times a day  sodium bicarbonate  Infusion 0.125 mEq/kG/Hr (75 mL/Hr) IV Continuous <Continuous>    MEDICATIONS  (PRN):  aluminum hydroxide/magnesium hydroxide/simethicone Suspension 30 milliLiter(s) Oral every 4 hours PRN Dyspepsia  dextrose Oral Gel 15 Gram(s) Oral once PRN Blood Glucose LESS THAN 70 milliGRAM(s)/deciliter  melatonin 3 milliGRAM(s) Oral at bedtime PRN Insomnia  ondansetron Injectable 4 milliGRAM(s) IV Push every 8 hours PRN Nausea and/or Vomiting      REVIEW OF SYSTEMS   [x] A ten-point review of systems was otherwise negative except as noted.    Vital Signs Last 24 Hrs  T(C): 37 (11 Jul 2024 08:19), Max: 37.1 (10 Jul 2024 14:09)  T(F): 98.6 (11 Jul 2024 08:19), Max: 98.7 (10 Jul 2024 14:09)  HR: 80 (11 Jul 2024 08:19) (80 - 104)  BP: 103/52 (11 Jul 2024 08:19) (95/59 - 139/59)  BP(mean): 75 (10 Jul 2024 18:00) (75 - 82)  RR: 18 (11 Jul 2024 08:19) (18 - 20)  SpO2: 97% (11 Jul 2024 08:19) (97% - 100%)    Parameters below as of 11 Jul 2024 08:19  Patient On (Oxygen Delivery Method): room air        PHYSICAL EXAM:    GEN: NAD, awake and alert.  SKIN: Good color, non diaphoretic.  RESP: Non-labored breathing. No use of accessory muscles.  ABDO: Soft, NT/ND, no palpable bladder, no suprapubic tenderness/ + Suprapubic Tube draining blood tinged urine.  BACK: + mild right CVAT  nephrostomy draining blood tinged urine  EXT: PANTOJA x 4      I&O's Summary      LABS:                        10.0   18.78 )-----------( 233      ( 11 Jul 2024 07:28 )             30.6     07-11    139  |  106  |  120<HH>  ----------------------------<  204<H>  4.2   |  12<L>  |  5.8<HH>    Ca    7.8<L>      11 Jul 2024 07:28  Phos  6.5     07-11  Mg     1.8     07-11    TPro  7.6  /  Alb  2.3<L>  /  TBili  0.3  /  DBili  x   /  AST  22  /  ALT  22  /  AlkPhos  153<H>  07-10    PT/INR - ( 10 Jul 2024 06:36 )   PT: 15.00 sec;   INR: 1.31 ratio         PTT - ( 10 Jul 2024 06:36 )  PTT:34.9 sec  Urinalysis Basic - ( 11 Jul 2024 07:28 )    Color: x / Appearance: x / SG: x / pH: x  Gluc: 204 mg/dL / Ketone: x  / Bili: x / Urobili: x   Blood: x / Protein: x / Nitrite: x   Leuk Esterase: x / RBC: x / WBC x   Sq Epi: x / Non Sq Epi: x / Bacteria: x            RADIOLOGY & ADDITIONAL STUDIES:    Progress Note:   · Provider Specialty	Intervent Radiology    Reason for Admission:   Reason for Admission:  · Reason for Admission	UTI w/ obstructing stone      · Subjective and Objective:   INTERVENTIONAL RADIOLOGY BRIEF-OPERATIVE NOTE    Procedure:  Percutaneous right nephrostomy followed by placement of right nephroureteral catheter    Pre-Op Diagnosis:  Obstructive right uropathy    Post-Op Diagnosis:  Same; right nephroureterolithiasis; right pyonephrosis    Attending:  Isrrael (IR) / Aftab (Anaesthesia)  Resident:  None    Anesthesia (type):  [X] General Anesthesia  [ ] Sedation  [ ] Spinal Anesthesia  [X] Local/Regional    Contrast:  Visipaque, 20 cc to right kidney/bladder, drained    Estimated Blood Loss:  3 cc    Condition:   [ ] Critical  [ ] Serious  [ ] Fair   [X] Good    Findings/Follow up Plan of Care:  Dilated right renal collecting system containing calculi.  Access to dilated upper pole, right renal calyx containing calculus was obtained using ultrasound and fluoroscopic guidance.  Thick viscous green-yellow malodorous material aspirated.  Guidewire traversal of the right ureter to the bladder was quickly and simply achieved, followed by uneventful placement of an 8-Fr, 22 cm nephroureteral catheter.  ~10 cc of right kidney urine was sent for cultures.  patient tolerated very well, without incident. Case d/w Dr. Akins via Teams telephone immediately, post-procedure.    Specimens Removed:  10 cc of thick, viscous green-yellow, malodorous right kidney urine sent for cultures    Implants:  None    Complications:  None immediate    Disposition:  Back to floor;   Please maintain new catheter to external, gravity drainage and monitor output q shift.  Please f/u specimen cultures.      Please call Interventional Radiology [phone] with any questions, concerns, or issues.                Electronic Signatures:  Jamarcus Arcos)  (Signed 10-Jul-2024 17:22)  	Authored: Progress Note, Reason for Admission, Subjective and Objective      Last Updated: 10-Jul-2024 17:22 by Jamarcus Arcos)

## 2024-07-12 LAB
ANION GAP SERPL CALC-SCNC: 15 MMOL/L — HIGH (ref 7–14)
BASOPHILS # BLD AUTO: 0.04 K/UL — SIGNIFICANT CHANGE UP (ref 0–0.2)
BASOPHILS NFR BLD AUTO: 0.2 % — SIGNIFICANT CHANGE UP (ref 0–1)
BUN SERPL-MCNC: 108 MG/DL — CRITICAL HIGH (ref 10–20)
CALCIUM SERPL-MCNC: 7.6 MG/DL — LOW (ref 8.4–10.5)
CHLORIDE SERPL-SCNC: 98 MMOL/L — SIGNIFICANT CHANGE UP (ref 98–110)
CO2 SERPL-SCNC: 21 MMOL/L — SIGNIFICANT CHANGE UP (ref 17–32)
CREAT SERPL-MCNC: 5.3 MG/DL — CRITICAL HIGH (ref 0.7–1.5)
EGFR: 11 ML/MIN/1.73M2 — LOW
EOSINOPHIL # BLD AUTO: 0.02 K/UL — SIGNIFICANT CHANGE UP (ref 0–0.7)
EOSINOPHIL NFR BLD AUTO: 0.1 % — SIGNIFICANT CHANGE UP (ref 0–8)
GLUCOSE BLDC GLUCOMTR-MCNC: 154 MG/DL — HIGH (ref 70–99)
GLUCOSE BLDC GLUCOMTR-MCNC: 170 MG/DL — HIGH (ref 70–99)
GLUCOSE BLDC GLUCOMTR-MCNC: 204 MG/DL — HIGH (ref 70–99)
GLUCOSE BLDC GLUCOMTR-MCNC: 209 MG/DL — HIGH (ref 70–99)
GLUCOSE SERPL-MCNC: 189 MG/DL — HIGH (ref 70–99)
GRAM STN FLD: ABNORMAL
HCT VFR BLD CALC: 29.1 % — LOW (ref 42–52)
HGB BLD-MCNC: 9.9 G/DL — LOW (ref 14–18)
IMM GRANULOCYTES NFR BLD AUTO: 0.9 % — HIGH (ref 0.1–0.3)
LYMPHOCYTES # BLD AUTO: 1.04 K/UL — LOW (ref 1.2–3.4)
LYMPHOCYTES # BLD AUTO: 5.5 % — LOW (ref 20.5–51.1)
MAGNESIUM SERPL-MCNC: 1.8 MG/DL — SIGNIFICANT CHANGE UP (ref 1.8–2.4)
MCHC RBC-ENTMCNC: 30.5 PG — SIGNIFICANT CHANGE UP (ref 27–31)
MCHC RBC-ENTMCNC: 34 G/DL — SIGNIFICANT CHANGE UP (ref 32–37)
MCV RBC AUTO: 89.5 FL — SIGNIFICANT CHANGE UP (ref 80–94)
MONOCYTES # BLD AUTO: 0.37 K/UL — SIGNIFICANT CHANGE UP (ref 0.1–0.6)
MONOCYTES NFR BLD AUTO: 1.9 % — SIGNIFICANT CHANGE UP (ref 1.7–9.3)
NEUTROPHILS # BLD AUTO: 17.42 K/UL — HIGH (ref 1.4–6.5)
NEUTROPHILS NFR BLD AUTO: 91.4 % — HIGH (ref 42.2–75.2)
NRBC # BLD: 0 /100 WBCS — SIGNIFICANT CHANGE UP (ref 0–0)
PHOSPHATE SERPL-MCNC: 4.2 MG/DL — SIGNIFICANT CHANGE UP (ref 2.1–4.9)
PLATELET # BLD AUTO: 248 K/UL — SIGNIFICANT CHANGE UP (ref 130–400)
PMV BLD: 10.7 FL — HIGH (ref 7.4–10.4)
POTASSIUM SERPL-MCNC: 3 MMOL/L — LOW (ref 3.5–5)
POTASSIUM SERPL-SCNC: 3 MMOL/L — LOW (ref 3.5–5)
RBC # BLD: 3.25 M/UL — LOW (ref 4.7–6.1)
RBC # FLD: 14.3 % — SIGNIFICANT CHANGE UP (ref 11.5–14.5)
SODIUM SERPL-SCNC: 134 MMOL/L — LOW (ref 135–146)
WBC # BLD: 19.07 K/UL — HIGH (ref 4.8–10.8)
WBC # FLD AUTO: 19.07 K/UL — HIGH (ref 4.8–10.8)

## 2024-07-12 PROCEDURE — 99233 SBSQ HOSP IP/OBS HIGH 50: CPT

## 2024-07-12 RX ORDER — HEPARIN SODIUM 1000 [USP'U]/ML
5000 INJECTION, SOLUTION INTRAVENOUS; SUBCUTANEOUS EVERY 8 HOURS
Refills: 0 | Status: DISCONTINUED | OUTPATIENT
Start: 2024-07-12 | End: 2024-07-25

## 2024-07-12 RX ORDER — MEROPENEM 1 G/20ML
500 INJECTION, POWDER, FOR SOLUTION INTRAVENOUS ONCE
Refills: 0 | Status: COMPLETED | OUTPATIENT
Start: 2024-07-12 | End: 2024-07-12

## 2024-07-12 RX ORDER — MEROPENEM 1 G/20ML
INJECTION, POWDER, FOR SOLUTION INTRAVENOUS
Refills: 0 | Status: DISCONTINUED | OUTPATIENT
Start: 2024-07-12 | End: 2024-07-17

## 2024-07-12 RX ORDER — POTASSIUM CHLORIDE 1500 MG/1
40 TABLET, EXTENDED RELEASE ORAL ONCE
Refills: 0 | Status: COMPLETED | OUTPATIENT
Start: 2024-07-12 | End: 2024-07-12

## 2024-07-12 RX ORDER — MEROPENEM 1 G/20ML
500 INJECTION, POWDER, FOR SOLUTION INTRAVENOUS EVERY 12 HOURS
Refills: 0 | Status: DISCONTINUED | OUTPATIENT
Start: 2024-07-13 | End: 2024-07-17

## 2024-07-12 RX ADMIN — Medication 650 MILLIGRAM(S): at 21:31

## 2024-07-12 RX ADMIN — PANTOPRAZOLE SODIUM 40 MILLIGRAM(S): 20 TABLET, DELAYED RELEASE ORAL at 05:15

## 2024-07-12 RX ADMIN — Medication 4 MILLIGRAM(S): at 11:11

## 2024-07-12 RX ADMIN — Medication 4: at 11:31

## 2024-07-12 RX ADMIN — HEPARIN SODIUM 5000 UNIT(S): 1000 INJECTION, SOLUTION INTRAVENOUS; SUBCUTANEOUS at 21:34

## 2024-07-12 RX ADMIN — POTASSIUM CHLORIDE 40 MILLIEQUIVALENT(S): 1500 TABLET, EXTENDED RELEASE ORAL at 11:15

## 2024-07-12 RX ADMIN — CEFEPIME HYDROCHLORIDE 100 MILLIGRAM(S): 1 INJECTION, POWDER, FOR SOLUTION INTRAMUSCULAR; INTRAVENOUS at 11:14

## 2024-07-12 RX ADMIN — ATORVASTATIN CALCIUM 20 MILLIGRAM(S): 40 TABLET, FILM COATED ORAL at 21:33

## 2024-07-12 RX ADMIN — Medication 650 MILLIGRAM(S): at 05:15

## 2024-07-12 RX ADMIN — MEROPENEM 100 MILLIGRAM(S): 1 INJECTION, POWDER, FOR SOLUTION INTRAVENOUS at 14:55

## 2024-07-12 RX ADMIN — Medication 4: at 08:13

## 2024-07-12 RX ADMIN — Medication 2: at 17:20

## 2024-07-12 NOTE — PHARMACOTHERAPY INTERVENTION NOTE - COMMENTS
Patient gfr 9, recommend adjusting lovenox to heparin and adjusting cefepime to 	  1 g every 24 hours
Recommended increasing cefepime dose to 2000mg IV q24h given CrCl of ~13 mL/min and indication of bacteremia. 
Patient being switched to meropenem per Dr. Jane pending identification of gram-negative organism in the blood. Recommended a meropenem dose of 500mg IV q12h given CrCl of ~14.5 mL/min.

## 2024-07-12 NOTE — PROGRESS NOTE ADULT - ASSESSMENT
68 yo M with hx of urinary incontinence w/ chronic SPC, nephrolithiasis, spinal abscess w/ quadriplegia, DM2, CKD5 (baseline Cr 4.4), HTN, HLD, DM II, CKD stage 5 (baseline Cr 4.4)) presented to ED with abdominal pain, n/v, found to have severe R hydroureteronephrosis w/ numerous R-sided calculi including obstructing stone in R ureter as well as nonobstructive L calculi w/o hydro. Admitted for acute obstructive pyelonephritis w/ R hydronephrosis 2/2 nephrolithiasis resulting in JOÃO on CKD5, course complicated by GNR bacteremia and HAGMA. Now s/p R PCN and R nephroureteral catheter placement with IR (7/10), continuing on antibiotics. Cultures pending. JOÃO slightly improving     #JOÃO on CKD5, likely multifactorial - improving slightly today.   #Acute obstructive pyelonephritis with right hydronephrosis 2/2 R obstructing calculi  #GNR bacteremia   #Bilateral renal calculi   #HAGMA - improving  - Afebrile, hypotensive but improving, leukocytosis  - CKD stage 5 baseline Cr ~ 4.4, urinary incontinence with chronic supra pubic catheter  - CTAP shows severe hydro on R w/ obstructive calculi and non obs calculi on Left  - BCx w/ GNR, UCx on admission >3 spp likely contaminated   - s/p R PCN and nephroureteral catheter with IR on 7/10 - drained pus in OR and now w/ bloody drainage   - monitor PCN and SPC output   - urology consulted: f/u PCNL outpatient, no plan for L PCN at this time per urology  - ID consulted, appreciate recs   - Nephro consulted, appreciate recs  - c/w IV meropenem 500mg q12h per ID (transitioned from IV cefepime 2g q24h)  - Trend CBC, fever  - f/u BCx spp, f/u UCx from PCN  - c/w oral bicarb 650 mg TID   - Trend BUN/Cr, Mg, phos  - replete electrolytes PRN  - Strict I/Os    #Chronic venous stasis ulcers w/ dermatitis  - wounds do not appear infected, no edema or tenderness   - wound care consulted, appreciate recs  - cleanse wound w/ soap and water, pat dry  - apply dermaphor bid to bilateral legs and feet     #HTN  #HLD  - c/w atorvastatin  - d/c nifedipine iso hypotension     #DMII   - monitor FS  - ISS TIDAC, qhs    ---------------------  DVT ppx: HSQ  Diet: renal/CCC/DASHTLC  Activity: IAT  GOC: DNR/DNI (MOLST in chart), [facility] brother Erich [phone]  Dispo: pending clinical improvement

## 2024-07-12 NOTE — PROGRESS NOTE ADULT - ASSESSMENT
68yo male w/ pmhx of HTN, HLD, DMII, CKD stage 5 baseline Cr ~ 4.4, urinary incontinence with chronic supra pubic catheter, Nephrolithiasis, spinal abscess leading to quadriplegia presenting w. NBNB vomiting and nausea every time after he eats.      #Obstructing R mid ureter calculus with severe hydroureteronephrosis s/p Stent   #B/L nephrolithiasis   #Post-renal JOÃO on CKD 5   #HAGMA   #Chronic suprapubic catheter   - normal baseline creatinine ~4.4, presented with Cr 6.1 --> 5.3  s/p hydration and stent   - urology and IR on board   - urology recommending bilateral PCNs due to high stone burden although no obstruction on left side   - CO2 12   - unsure how much the R obstruction is contributing to JOÃO as R kidney is already atrophic with cortical thinning           - BID BMP   - strict I/Os   - renal diet   - avoid nephrotoxic meds   - Cont PO bicarb  - pt is low threshold to start dialysis although no indication for urgent RRT   - will continue to monitor

## 2024-07-12 NOTE — DISCHARGE NOTE NURSING/CASE MANAGEMENT/SOCIAL WORK - NSDCPEFALRISK_GEN_ALL_CORE
For information on Fall & Injury Prevention, visit: [email or web address] OR  [email or web address] OR  https://www.cdc.gov/steadi/patient.html

## 2024-07-12 NOTE — PROGRESS NOTE ADULT - ASSESSMENT
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 NBNB vomiting and nausea every time after he eats.       IMPRESSION  #GNR bacteremia secondary to Pyelonephritis/Pyonephrosis with obstructing stone    7/9 BCX GNR anaerobic bottle x2, PCR panel negative    7/9 BCX NGTD   - s/p percutaneous right nephrostomy followed by placement of right nephroureteral catheter    WBC 20 on admission ; Afebrile     UA pyuria WBC 50 ; UCX   >=3 organisms. Probable collection contamination.    SPT (replaced in ER)    CTAP 1.  Severe right hydroureteronephrosis with cortical thinning and   numerous large intrarenal and proximal to mid ureteral calculi;   obstructing calculus in the right mid ureter measures 1.1 x 1 x 1.8 cm (   ) with normal caliber distal ureter.  2.  Multiple nonobstructing left intrarenal calculi and a 0.8 cm left   distal ureteral calculus; no left hydronephrosis.  3.  Few prominent retroperitoneal lymph nodes, likely reactive.  4.  Cholelithiasis.  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 Teams  Please continue to update ID with any pertinent new laboratory, radiographic findings, or change in clinical status

## 2024-07-12 NOTE — PROGRESS NOTE ADULT - SUBJECTIVE AND OBJECTIVE BOX
Nephrology progress note    Patient was seen and examined, events over the last 24 h noted .    Allergies:  No Known Allergies    Hospital Medications:   MEDICATIONS  (STANDING):  atorvastatin 20 milliGRAM(s) Oral at bedtime  cefepime   IVPB 2000 milliGRAM(s) IV Intermittent every 24 hours  dextrose 5%. 1000 milliLiter(s) (100 mL/Hr) IV Continuous <Continuous>  dextrose 5%. 1000 milliLiter(s) (50 mL/Hr) IV Continuous <Continuous>  dextrose 50% Injectable 25 Gram(s) IV Push once  dextrose 50% Injectable 12.5 Gram(s) IV Push once  dextrose 50% Injectable 25 Gram(s) IV Push once  glucagon  Injectable 1 milliGRAM(s) IntraMuscular once  heparin   Injectable 5000 Unit(s) SubCutaneous every 8 hours  insulin lispro (ADMELOG) corrective regimen sliding scale   SubCutaneous three times a day before meals  insulin lispro (ADMELOG) corrective regimen sliding scale   SubCutaneous at bedtime  pantoprazole    Tablet 40 milliGRAM(s) Oral before breakfast  sodium bicarbonate 650 milliGRAM(s) Oral three times a day        VITALS:  T(F): 98.4 (07-12-24 @ 08:24), Max: 98.4 (07-12-24 @ 08:24)  HR: 91 (07-12-24 @ 08:24)  BP: 113/74 (07-12-24 @ 08:24)  RR: 17 (07-12-24 @ 08:24)  SpO2: 98% (07-12-24 @ 08:24)  Wt(kg): --    07-11 @ 07:01  -  07-12 @ 07:00  --------------------------------------------------------  IN: 0 mL / OUT: 725 mL / NET: -725 mL          PHYSICAL EXAM:  Constitutional: NAD  HEENT: anicteric sclera, oropharynx clear, MMM  Neck: No JVD  Respiratory: CTAB, no wheezes, rales or rhonchi  Cardiovascular: S1, S2, RRR  Gastrointestinal: BS+, soft, NT/ND  Extremities: No cyanosis or clubbing. No peripheral edema  :  No angelo.   Skin: No rashes    LABS:  07-12    134<L>  |  98  |  108<HH>  ----------------------------<  189<H>  3.0<L>   |  21  |  5.3<HH>    Ca    7.6<L>      12 Jul 2024 06:22  Phos  4.2     07-12  Mg     1.8     07-12                            9.9    19.07 )-----------( 248      ( 12 Jul 2024 06:22 )             29.1       Urine Studies:  Urinalysis Basic - ( 12 Jul 2024 06:22 )    Color:  / Appearance:  / SG:  / pH:   Gluc: 189 mg/dL / Ketone:   / Bili:  / Urobili:    Blood:  / Protein:  / Nitrite:    Leuk Esterase:  / RBC:  / WBC    Sq Epi:  / Non Sq Epi:  / Bacteria:         RADIOLOGY & ADDITIONAL STUDIES:

## 2024-07-12 NOTE — PROGRESS NOTE ADULT - SUBJECTIVE AND OBJECTIVE BOX
LISAKRISTY MARIE  69y, Male  Allergy: No Known Allergies      LOS  3d    CHIEF COMPLAINT: UTI w/ obstructing stone (12 Jul 2024 14:06)      INTERVAL EVENTS/HPI  - T(F): , Max: 98.4 (07-12-24 @ 08:24)  - WBC Count: 19.07 (07-12-24 @ 06:22) uptrending   WBC Count: 18.78 (07-11-24 @ 07:28)     - Creatinine: 5.3 (07-12-24 @ 06:22)  Creatinine: 5.8 (07-11-24 @ 07:28)     -   -   -     ROS  cannot obtain secondary to patient's sedation and/or mental status    VITALS:  T(F): 98.4, Max: 98.4 (07-12-24 @ 08:24)  HR: 91  BP: 113/74  RR: 17Vital Signs Last 24 Hrs  T(C): 36.9 (12 Jul 2024 08:24), Max: 36.9 (12 Jul 2024 08:24)  T(F): 98.4 (12 Jul 2024 08:24), Max: 98.4 (12 Jul 2024 08:24)  HR: 91 (12 Jul 2024 08:24) (79 - 91)  BP: 113/74 (12 Jul 2024 08:24) (91/58 - 113/74)  BP(mean): 69 (11 Jul 2024 23:37) (69 - 69)  RR: 17 (12 Jul 2024 08:24) (17 - 17)  SpO2: 98% (12 Jul 2024 08:24) (91% - 98%)    Parameters below as of 12 Jul 2024 08:24  Patient On (Oxygen Delivery Method): room air        PHYSICAL EXAM:  Gen: NAD, resting in bed  HEENT: Normocephalic, atraumatic  Neck: supple, no lymphadenopathy  CV: Regular rate & regular rhythm  Lungs: decreased BS at bases, no fremitus  Abdomen: Soft, BS present R PCN  Ext: Warm, well perfused  Neuro: non focal, awake  Skin: no rash, no erythema  Lines: no phlebitis     FH: Non-contributory  Social Hx: Non-contributory    TESTS & MEASUREMENTS:                        9.9    19.07 )-----------( 248      ( 12 Jul 2024 06:22 )             29.1     07-12    134<L>  |  98  |  108<HH>  ----------------------------<  189<H>  3.0<L>   |  21  |  5.3<HH>    Ca    7.6<L>      12 Jul 2024 06:22  Phos  4.2     07-12  Mg     1.8     07-12          Urinalysis Basic - ( 12 Jul 2024 06:22 )    Color: x / Appearance: x / SG: x / pH: x  Gluc: 189 mg/dL / Ketone: x  / Bili: x / Urobili: x   Blood: x / Protein: x / Nitrite: x   Leuk Esterase: x / RBC: x / WBC x   Sq Epi: x / Non Sq Epi: x / Bacteria: x        Culture - Blood (collected 07-09-24 @ 14:20)  Source: .Blood Blood-Peripheral  Gram Stain (07-11-24 @ 01:20):    Growth in anaerobic bottle: Gram Negative Rods  Preliminary Report (07-11-24 @ 01:20):    Growth in anaerobic bottle: Gram Negative Rods    Direct identification is available within approximately 3-5    hours either by Blood Panel Multiplexed PCR or Direct    MALDI-TOF. Details: [email or web address]  Organism: Blood Culture PCR (07-11-24 @ 03:23)  Organism: Blood Culture PCR (07-11-24 @ 03:23)      Method Type: PCR      -  Blood PCR Panel: NEG    Culture - Blood (collected 07-09-24 @ 14:20)  Source: .Blood Blood-Peripheral  Gram Stain (07-12-24 @ 04:50):    Growth in anaerobic bottle: Gram Negative Rods  Preliminary Report (07-12-24 @ 04:50):    Growth in anaerobic bottle: Gram Negative Rods    Culture - Urine (collected 07-09-24 @ 13:50)  Source: Clean Catch Clean Catch (Midstream)  Final Report (07-10-24 @ 23:26):    >=3 organisms. Probable collection contamination.        Lactate, Blood: 0.8 mmol/L (07-10-24 @ 06:36)  Lactate, Blood: 1.0 mmol/L (07-09-24 @ 13:30)      INFECTIOUS DISEASES TESTING      INFLAMMATORY MARKERS      RADIOLOGY & ADDITIONAL TESTS:  I have personally reviewed the last available Chest xray  CXR      CT      CARDIOLOGY TESTING  12 Lead ECG:   Ventricular Rate 71 BPM    Atrial Rate 71 BPM    P-R Interval 186 ms    QRS Duration 96 ms    Q-T Interval 404 ms    QTC Calculation(Bazett) 439 ms    P Axis 62 degrees    R Axis 49 degrees    T Axis 9 degrees    Diagnosis Line Normal sinus rhythm  Normal ECG    Confirmed by Julius Don ([id number]) on 7/9/2024 12:41:31 PM (07-09-24 @ 12:14)      MEDICATIONS  atorvastatin 20  dextrose 5%. 1000  dextrose 5%. 1000  dextrose 50% Injectable 25  dextrose 50% Injectable 12.5  dextrose 50% Injectable 25  glucagon  Injectable 1  heparin   Injectable 5000  insulin lispro (ADMELOG) corrective regimen sliding scale   insulin lispro (ADMELOG) corrective regimen sliding scale   meropenem  IVPB   pantoprazole    Tablet 40  sodium bicarbonate 650      WEIGHT  Weight (kg): 81.6 (07-10-24 @ 14:34)  Creatinine: 5.3 mg/dL (07-12-24 @ 06:22)      ANTIBIOTICS:  meropenem  IVPB          All available historical records have been reviewed

## 2024-07-12 NOTE — PROGRESS NOTE ADULT - ASSESSMENT
70 y/o man with PMH of HTN, hyperlipidemia, DM II, CKD 5 (baseline Cr 4.4) Urinary incontinence with chronic suprapubic catheter, Nephrolithiasis, Spinal abscess with quadriplegia and chronic LE wounds receiving dressing changes by visiting nurse now presented to the ED for crampy abdominal pain along with nausea and vomiting.     1. JOÃO over CKD 5 likely due to obstructive uropathy   metabolic acidosis  could have prerenal component - Cr downtrended with IV hydration initially  Complicated UTI / pyelonephritis  GNR bacteremia  - CT abd/pelvis: Severe right hydroureteronephrosis with cortical thinning and numerous large intrarenal and proximal to mid ureteral calculi, obstructing calculus in the right mid ureter measures 1.1 x 1 x 1.8 cm with normal caliber distal ureter.  Multiple nonobstructive left intrarenal calculi and a 0.8 cm left distal ureteral calculus; no left hydronephrosis.  Few prominent retroperitoneal lymph nodes, likely reactive. Cholelithiasis.  - s/p eval by Urology in ED --> recommended b/l nephrostomy placement by IR.   - s/p right PCN by IR on 7/10 - 10 cc of thick, viscous green-yellow, malodorous right kidney urine sent for cultures  - s/p SPC exchange in the ED  - now on meropenem per ID  - f/u blood and urine cultures   - acidosis improved - bicarb in IVF stopped - continue PO bicarbonate  - phos level 6.5 -> 4.2 (improved)  - avoid nephrotoxic meds  - being monitored for need for RRT - renal following  - further stone management as outpt per     2. HTN  - SBP was running low on nifedipine and it was stopped - BP now improved and continue to monitor  per brother, pt refuses meds at home and Dr. Garibay is aware    3. Hyperlipidemia on statin    4. DM - FS acceptable  A1C 6.9  insulin for FS >180    5. DVT ppx: Heparin SQ restarted    6. Chronic LE wounds - wound care per advanced nursing recommendation    7. Nausea - zofran prn    8. Hypokalemia repleted      DNR/DNI  very guarded prognosis  high risk pt      PROGRESS NOTE HANDOFF    Pending: labs in AM, f/u culture (blood/urine/OR), renal f/u, monitor urine output and right nephrostomy output    Family discussion: resident will update brother today  medical team - please update brother daily    Disposition: home with care

## 2024-07-12 NOTE — DISCHARGE NOTE NURSING/CASE MANAGEMENT/SOCIAL WORK - PATIENT PORTAL LINK FT
You can access the FollowMyHealth Patient Portal offered by [facility] by registering at the following website: [email or web address]. By joining [employer]’s FollowMyHealth portal, you will also be able to view your health information using other applications (apps) compatible with our system.

## 2024-07-12 NOTE — PROGRESS NOTE ADULT - SUBJECTIVE AND OBJECTIVE BOX
Patient is a 69y old Male who presents with a chief complaint of UTI w/ obstructing stone (11 Jul 2024 09:17)    Today is hospital day 3      Summary/Handoff:  - f/u cx  - start IV meropenem 500mg q12h   - f/u nephro recs    Overnight events/Interval History:  - No acute overnight events  - At bedside, patient has no acute complaints. Denies abdominal pain, back pain, nausea, vomiting. Breathing comfortably on room air. Denies fevers, chills, SOB, chest pain, diarrhea, constipation    ROS:  - All ROS is negative unless indicated in HPI    Code Status: DNR/DNI, HCP is brother Erich    ALLERGIES:  No Known Allergies    MEDICATIONS:  STANDING MEDICATIONS  atorvastatin 20 milliGRAM(s) Oral at bedtime  dextrose 5%. 1000 milliLiter(s) IV Continuous <Continuous>  dextrose 5%. 1000 milliLiter(s) IV Continuous <Continuous>  dextrose 50% Injectable 12.5 Gram(s) IV Push once  dextrose 50% Injectable 25 Gram(s) IV Push once  dextrose 50% Injectable 25 Gram(s) IV Push once  glucagon  Injectable 1 milliGRAM(s) IntraMuscular once  heparin   Injectable 5000 Unit(s) SubCutaneous every 8 hours  insulin lispro (ADMELOG) corrective regimen sliding scale   SubCutaneous three times a day before meals  insulin lispro (ADMELOG) corrective regimen sliding scale   SubCutaneous at bedtime  meropenem  IVPB      pantoprazole    Tablet 40 milliGRAM(s) Oral before breakfast  sodium bicarbonate 650 milliGRAM(s) Oral three times a day    PRN MEDICATIONS  aluminum hydroxide/magnesium hydroxide/simethicone Suspension 30 milliLiter(s) Oral every 4 hours PRN  dextrose Oral Gel 15 Gram(s) Oral once PRN  melatonin 3 milliGRAM(s) Oral at bedtime PRN  ondansetron Injectable 4 milliGRAM(s) IV Push every 8 hours PRN    VITALS LAST 24H:  Vital Signs Last 24 Hrs  T(C): 36.9 (12 Jul 2024 08:24), Max: 36.9 (12 Jul 2024 08:24)  T(F): 98.4 (12 Jul 2024 08:24), Max: 98.4 (12 Jul 2024 08:24)  HR: 91 (12 Jul 2024 08:24) (79 - 91)  BP: 113/74 (12 Jul 2024 08:24) (91/58 - 113/74)  BP(mean): 69 (11 Jul 2024 23:37) (69 - 69)  RR: 17 (12 Jul 2024 08:24) (17 - 17)  SpO2: 98% (12 Jul 2024 08:24) (91% - 98%)    Parameters below as of 12 Jul 2024 08:24  Patient On (Oxygen Delivery Method): room air      PHYSICAL EXAM:  GENERAL: NAD, lying in bed comfortably  HEENT:  EOMI, moist mucous membranes, poor dentition  CHEST/LUNG: Clear to auscultation bilaterally; normal respiratory effort, no coughing, wheezes, crackles, or rales.  HEART: Regular rate and rhythm  ABDOMEN: Soft, +epigastric TTP improved from yesterday, nondistended, +SPC w/ clear yellow urine draining. +R PCN w/ bloody drainage (~100cc)  EXTREMITIES:  2+ Peripheral Pulses, brisk capillary refill. +chronic venous stasis ulcers w/ dermatitis bilaterally. No lower extremity edema bilaterally.   NERVOUS SYSTEM:  A&Ox3, no focal deficits     LABS:                        9.9    19.07 )-----------( 248      ( 12 Jul 2024 06:22 )             29.1     07-12    134<L>  |  98  |  108<HH>  ----------------------------<  189<H>  3.0<L>   |  21  |  5.3<HH>    Ca    7.6<L>      12 Jul 2024 06:22  Phos  4.2     07-12  Mg     1.8     07-12        Urinalysis Basic - ( 12 Jul 2024 06:22 )    Color: x / Appearance: x / SG: x / pH: x  Gluc: 189 mg/dL / Ketone: x  / Bili: x / Urobili: x   Blood: x / Protein: x / Nitrite: x   Leuk Esterase: x / RBC: x / WBC x   Sq Epi: x / Non Sq Epi: x / Bacteria: x            Culture - Blood (collected 09 Jul 2024 14:20)  Source: .Blood Blood-Peripheral  Gram Stain (11 Jul 2024 01:20):    Growth in anaerobic bottle: Gram Negative Rods  Preliminary Report (11 Jul 2024 01:20):    Growth in anaerobic bottle: Gram Negative Rods    Direct identification is available within approximately 3-5    hours either by Blood Panel Multiplexed PCR or Direct    MALDI-TOF. Details: [email or web address]  Organism: Blood Culture PCR (11 Jul 2024 03:23)  Organism: Blood Culture PCR (11 Jul 2024 03:23)    Culture - Blood (collected 09 Jul 2024 14:20)  Source: .Blood Blood-Peripheral  Gram Stain (12 Jul 2024 04:50):    Growth in anaerobic bottle: Gram Negative Rods  Preliminary Report (12 Jul 2024 04:50):    Growth in anaerobic bottle: Gram Negative Rods

## 2024-07-12 NOTE — PROGRESS NOTE ADULT - SUBJECTIVE AND OBJECTIVE BOX
JOSEKRISTY  69y Male    INTERVAL HPI/OVERNIGHT EVENTS:    pt had nausea this morning. Ate some breakfast  no vomiting  no fever, abdominal pain    T(F): 98.4 (07-12-24 @ 08:24), Max: 98.4 (07-12-24 @ 08:24)  HR: 91 (07-12-24 @ 08:24) (79 - 91)  BP: 113/74 (07-12-24 @ 08:24) (91/58 - 113/74)  RR: 17 (07-12-24 @ 08:24) (17 - 17)  SpO2: 98% (07-12-24 @ 08:24) (91% - 98%) on RA  I&O's Summary    11 Jul 2024 07:01  -  12 Jul 2024 07:00  --------------------------------------------------------  IN: 0 mL / OUT: 725 mL / NET: -725 mL      CAPILLARY BLOOD GLUCOSE      POCT Blood Glucose.: 204 mg/dL (12 Jul 2024 11:19)  POCT Blood Glucose.: 209 mg/dL (12 Jul 2024 08:01)  POCT Blood Glucose.: 172 mg/dL (11 Jul 2024 20:31)  POCT Blood Glucose.: 198 mg/dL (11 Jul 2024 16:43)        PHYSICAL EXAM:  GENERAL: NAD  HEAD:  Normocephalic  EYES:  conjunctiva and sclera clear  ENMT: Moist mucous membranes  NERVOUS SYSTEM:  Alert, awake, Good concentration  CHEST/LUNG: CTA b/l  HEART: Regular rate and rhythm  ABDOMEN: Soft, mildly tender in epigastric pain, Nondistended; Bowel sounds present  EXTREMITIES:   No edema LE  SKIN: dry, LE chronic wounds  : SPC draining clear yellow urine (improved from yesterday)  right nephrostomy bag with hematuria and debris    Consultant(s) Notes Reviewed:  [x ] YES  [ ] NO  Care Discussed with Consultants/Other Providers [ x] YES  [ ] NO    MEDICATIONS  (STANDING):  atorvastatin 20 milliGRAM(s) Oral at bedtime  dextrose 5%. 1000 milliLiter(s) (50 mL/Hr) IV Continuous <Continuous>  dextrose 5%. 1000 milliLiter(s) (100 mL/Hr) IV Continuous <Continuous>  dextrose 50% Injectable 12.5 Gram(s) IV Push once  dextrose 50% Injectable 25 Gram(s) IV Push once  dextrose 50% Injectable 25 Gram(s) IV Push once  glucagon  Injectable 1 milliGRAM(s) IntraMuscular once  heparin   Injectable 5000 Unit(s) SubCutaneous every 8 hours  insulin lispro (ADMELOG) corrective regimen sliding scale   SubCutaneous three times a day before meals  insulin lispro (ADMELOG) corrective regimen sliding scale   SubCutaneous at bedtime  meropenem  IVPB      pantoprazole    Tablet 40 milliGRAM(s) Oral before breakfast  sodium bicarbonate 650 milliGRAM(s) Oral three times a day    MEDICATIONS  (PRN):  aluminum hydroxide/magnesium hydroxide/simethicone Suspension 30 milliLiter(s) Oral every 4 hours PRN Dyspepsia  dextrose Oral Gel 15 Gram(s) Oral once PRN Blood Glucose LESS THAN 70 milliGRAM(s)/deciliter  melatonin 3 milliGRAM(s) Oral at bedtime PRN Insomnia  ondansetron Injectable 4 milliGRAM(s) IV Push every 8 hours PRN Nausea and/or Vomiting      LABS:                        9.9    19.07 )-----------( 248      ( 12 Jul 2024 06:22 )             29.1     07-12    134<L>  |  98  |  108<HH>  ----------------------------<  189<H>  3.0<L>   |  21  |  5.3<HH>    Ca    7.6<L>      12 Jul 2024 06:22  Phos  4.2     07-12  Mg     1.8     07-12          Culture - Blood (collected 09 Jul 2024 14:20)  Source: .Blood Blood-Peripheral  Gram Stain (11 Jul 2024 01:20):    Growth in anaerobic bottle: Gram Negative Rods  Preliminary Report (11 Jul 2024 01:20):    Growth in anaerobic bottle: Gram Negative Rods    Direct identification is available within approximately 3-5    hours either by Blood Panel Multiplexed PCR or Direct    MALDI-TOF. Details: [email or web address]  Organism: Blood Culture PCR (11 Jul 2024 03:23)  Organism: Blood Culture PCR (11 Jul 2024 03:23)    Culture - Blood (collected 09 Jul 2024 14:20)  Source: .Blood Blood-Peripheral  Gram Stain (12 Jul 2024 04:50):    Growth in anaerobic bottle: Gram Negative Rods  Preliminary Report (12 Jul 2024 04:50):    Growth in anaerobic bottle: Gram Negative Rods      Lactate, Blood: 0.8 mmol/L (07-10 @ 06:36)  Lactate, Blood: 1.0 mmol/L (07-09 @ 13:30)                    Case discussed with residents today    Care discussed with pt

## 2024-07-13 LAB
-  AMOXICILLIN/CLAVULANIC ACID: SIGNIFICANT CHANGE UP
-  AMOXICILLIN/CLAVULANIC ACID: SIGNIFICANT CHANGE UP
-  AMPICILLIN/SULBACTAM: SIGNIFICANT CHANGE UP
-  AMPICILLIN/SULBACTAM: SIGNIFICANT CHANGE UP
-  AMPICILLIN: SIGNIFICANT CHANGE UP
-  AMPICILLIN: SIGNIFICANT CHANGE UP
-  AZTREONAM: SIGNIFICANT CHANGE UP
-  AZTREONAM: SIGNIFICANT CHANGE UP
-  CEFAZOLIN: SIGNIFICANT CHANGE UP
-  CEFAZOLIN: SIGNIFICANT CHANGE UP
-  CEFEPIME: SIGNIFICANT CHANGE UP
-  CEFEPIME: SIGNIFICANT CHANGE UP
-  CEFOXITIN: SIGNIFICANT CHANGE UP
-  CEFOXITIN: SIGNIFICANT CHANGE UP
-  CEFTRIAXONE: SIGNIFICANT CHANGE UP
-  CEFTRIAXONE: SIGNIFICANT CHANGE UP
-  CEFUROXIME: SIGNIFICANT CHANGE UP
-  CIPROFLOXACIN: SIGNIFICANT CHANGE UP
-  CIPROFLOXACIN: SIGNIFICANT CHANGE UP
-  ERTAPENEM: SIGNIFICANT CHANGE UP
-  ERTAPENEM: SIGNIFICANT CHANGE UP
-  GENTAMICIN: SIGNIFICANT CHANGE UP
-  GENTAMICIN: SIGNIFICANT CHANGE UP
-  LEVOFLOXACIN: SIGNIFICANT CHANGE UP
-  LEVOFLOXACIN: SIGNIFICANT CHANGE UP
-  MEROPENEM: SIGNIFICANT CHANGE UP
-  MEROPENEM: SIGNIFICANT CHANGE UP
-  NITROFURANTOIN: SIGNIFICANT CHANGE UP
-  NITROFURANTOIN: SIGNIFICANT CHANGE UP
-  PIPERACILLIN/TAZOBACTAM: SIGNIFICANT CHANGE UP
-  PIPERACILLIN/TAZOBACTAM: SIGNIFICANT CHANGE UP
-  TOBRAMYCIN: SIGNIFICANT CHANGE UP
-  TOBRAMYCIN: SIGNIFICANT CHANGE UP
-  TRIMETHOPRIM/SULFAMETHOXAZOLE: SIGNIFICANT CHANGE UP
-  TRIMETHOPRIM/SULFAMETHOXAZOLE: SIGNIFICANT CHANGE UP
ALBUMIN SERPL ELPH-MCNC: 2.2 G/DL — LOW (ref 3.5–5.2)
ALP SERPL-CCNC: 129 U/L — HIGH (ref 30–115)
ALT FLD-CCNC: 14 U/L — SIGNIFICANT CHANGE UP (ref 0–41)
ANION GAP SERPL CALC-SCNC: 19 MMOL/L — HIGH (ref 7–14)
AST SERPL-CCNC: 21 U/L — SIGNIFICANT CHANGE UP (ref 0–41)
BASOPHILS # BLD AUTO: 0.03 K/UL — SIGNIFICANT CHANGE UP (ref 0–0.2)
BASOPHILS NFR BLD AUTO: 0.2 % — SIGNIFICANT CHANGE UP (ref 0–1)
BILIRUB SERPL-MCNC: 0.4 MG/DL — SIGNIFICANT CHANGE UP (ref 0.2–1.2)
BUN SERPL-MCNC: 108 MG/DL — CRITICAL HIGH (ref 10–20)
CALCIUM SERPL-MCNC: 7.7 MG/DL — LOW (ref 8.4–10.5)
CHLORIDE SERPL-SCNC: 99 MMOL/L — SIGNIFICANT CHANGE UP (ref 98–110)
CO2 SERPL-SCNC: 21 MMOL/L — SIGNIFICANT CHANGE UP (ref 17–32)
CREAT SERPL-MCNC: 5 MG/DL — CRITICAL HIGH (ref 0.7–1.5)
CULTURE RESULTS: ABNORMAL
EGFR: 12 ML/MIN/1.73M2 — LOW
EOSINOPHIL # BLD AUTO: 0.06 K/UL — SIGNIFICANT CHANGE UP (ref 0–0.7)
EOSINOPHIL NFR BLD AUTO: 0.3 % — SIGNIFICANT CHANGE UP (ref 0–8)
GLUCOSE BLDC GLUCOMTR-MCNC: 123 MG/DL — HIGH (ref 70–99)
GLUCOSE BLDC GLUCOMTR-MCNC: 127 MG/DL — HIGH (ref 70–99)
GLUCOSE BLDC GLUCOMTR-MCNC: 132 MG/DL — HIGH (ref 70–99)
GLUCOSE BLDC GLUCOMTR-MCNC: 141 MG/DL — HIGH (ref 70–99)
GLUCOSE SERPL-MCNC: 115 MG/DL — HIGH (ref 70–99)
HCT VFR BLD CALC: 29.3 % — LOW (ref 42–52)
HGB BLD-MCNC: 9.7 G/DL — LOW (ref 14–18)
IMM GRANULOCYTES NFR BLD AUTO: 0.8 % — HIGH (ref 0.1–0.3)
LYMPHOCYTES # BLD AUTO: 1.15 K/UL — LOW (ref 1.2–3.4)
LYMPHOCYTES # BLD AUTO: 6 % — LOW (ref 20.5–51.1)
MAGNESIUM SERPL-MCNC: 1.8 MG/DL — SIGNIFICANT CHANGE UP (ref 1.8–2.4)
MCHC RBC-ENTMCNC: 29.9 PG — SIGNIFICANT CHANGE UP (ref 27–31)
MCHC RBC-ENTMCNC: 33.1 G/DL — SIGNIFICANT CHANGE UP (ref 32–37)
MCV RBC AUTO: 90.4 FL — SIGNIFICANT CHANGE UP (ref 80–94)
METHOD TYPE: SIGNIFICANT CHANGE UP
METHOD TYPE: SIGNIFICANT CHANGE UP
MONOCYTES # BLD AUTO: 0.47 K/UL — SIGNIFICANT CHANGE UP (ref 0.1–0.6)
MONOCYTES NFR BLD AUTO: 2.5 % — SIGNIFICANT CHANGE UP (ref 1.7–9.3)
NEUTROPHILS # BLD AUTO: 17.17 K/UL — HIGH (ref 1.4–6.5)
NEUTROPHILS NFR BLD AUTO: 90.2 % — HIGH (ref 42.2–75.2)
NRBC # BLD: 0 /100 WBCS — SIGNIFICANT CHANGE UP (ref 0–0)
PHOSPHATE SERPL-MCNC: 4.6 MG/DL — SIGNIFICANT CHANGE UP (ref 2.1–4.9)
PLATELET # BLD AUTO: 220 K/UL — SIGNIFICANT CHANGE UP (ref 130–400)
PMV BLD: 11.1 FL — HIGH (ref 7.4–10.4)
POTASSIUM SERPL-MCNC: 3.3 MMOL/L — LOW (ref 3.5–5)
POTASSIUM SERPL-SCNC: 3.3 MMOL/L — LOW (ref 3.5–5)
PROT SERPL-MCNC: 6.7 G/DL — SIGNIFICANT CHANGE UP (ref 6–8)
RBC # BLD: 3.24 M/UL — LOW (ref 4.7–6.1)
RBC # FLD: 14.3 % — SIGNIFICANT CHANGE UP (ref 11.5–14.5)
SODIUM SERPL-SCNC: 139 MMOL/L — SIGNIFICANT CHANGE UP (ref 135–146)
SPECIMEN SOURCE: SIGNIFICANT CHANGE UP
WBC # BLD: 19.03 K/UL — HIGH (ref 4.8–10.8)
WBC # FLD AUTO: 19.03 K/UL — HIGH (ref 4.8–10.8)

## 2024-07-13 PROCEDURE — 99232 SBSQ HOSP IP/OBS MODERATE 35: CPT | Mod: GC

## 2024-07-13 PROCEDURE — 99232 SBSQ HOSP IP/OBS MODERATE 35: CPT

## 2024-07-13 RX ADMIN — HEPARIN SODIUM 5000 UNIT(S): 1000 INJECTION, SOLUTION INTRAVENOUS; SUBCUTANEOUS at 21:06

## 2024-07-13 RX ADMIN — Medication 650 MILLIGRAM(S): at 21:06

## 2024-07-13 RX ADMIN — Medication 650 MILLIGRAM(S): at 06:17

## 2024-07-13 RX ADMIN — Medication 650 MILLIGRAM(S): at 13:11

## 2024-07-13 RX ADMIN — MEROPENEM 100 MILLIGRAM(S): 1 INJECTION, POWDER, FOR SOLUTION INTRAVENOUS at 17:12

## 2024-07-13 RX ADMIN — ATORVASTATIN CALCIUM 20 MILLIGRAM(S): 40 TABLET, FILM COATED ORAL at 21:06

## 2024-07-13 RX ADMIN — PANTOPRAZOLE SODIUM 40 MILLIGRAM(S): 20 TABLET, DELAYED RELEASE ORAL at 06:17

## 2024-07-13 RX ADMIN — HEPARIN SODIUM 5000 UNIT(S): 1000 INJECTION, SOLUTION INTRAVENOUS; SUBCUTANEOUS at 13:11

## 2024-07-13 RX ADMIN — HEPARIN SODIUM 5000 UNIT(S): 1000 INJECTION, SOLUTION INTRAVENOUS; SUBCUTANEOUS at 06:18

## 2024-07-13 RX ADMIN — MEROPENEM 100 MILLIGRAM(S): 1 INJECTION, POWDER, FOR SOLUTION INTRAVENOUS at 06:16

## 2024-07-13 NOTE — PROGRESS NOTE ADULT - SUBJECTIVE AND OBJECTIVE BOX
seen and examined  24 h events noted   no distress      PAST HISTORY  --------------------------------------------------------------------------------  No significant changes to PMH, PSH, FHx, SHx, unless otherwise noted    ALLERGIES & MEDICATIONS  --------------------------------------------------------------------------------  Allergies    No Known Allergies    Intolerances      Standing Inpatient Medications  atorvastatin 20 milliGRAM(s) Oral at bedtime  dextrose 5%. 1000 milliLiter(s) IV Continuous <Continuous>  dextrose 5%. 1000 milliLiter(s) IV Continuous <Continuous>  dextrose 50% Injectable 12.5 Gram(s) IV Push once  dextrose 50% Injectable 25 Gram(s) IV Push once  dextrose 50% Injectable 25 Gram(s) IV Push once  glucagon  Injectable 1 milliGRAM(s) IntraMuscular once  heparin   Injectable 5000 Unit(s) SubCutaneous every 8 hours  insulin lispro (ADMELOG) corrective regimen sliding scale   SubCutaneous three times a day before meals  insulin lispro (ADMELOG) corrective regimen sliding scale   SubCutaneous at bedtime  meropenem  IVPB 500 milliGRAM(s) IV Intermittent every 12 hours  meropenem  IVPB      pantoprazole    Tablet 40 milliGRAM(s) Oral before breakfast  sodium bicarbonate 650 milliGRAM(s) Oral three times a day    PRN Inpatient Medications  aluminum hydroxide/magnesium hydroxide/simethicone Suspension 30 milliLiter(s) Oral every 4 hours PRN  dextrose Oral Gel 15 Gram(s) Oral once PRN  melatonin 3 milliGRAM(s) Oral at bedtime PRN  ondansetron Injectable 4 milliGRAM(s) IV Push every 8 hours PRN    VITALS/PHYSICAL EXAM  --------------------------------------------------------------------------------  T(C): 36.8 (07-13-24 @ 08:17), Max: 37.1 (07-12-24 @ 15:20)  HR: 75 (07-13-24 @ 08:17) (75 - 84)  BP: 136/61 (07-13-24 @ 08:17) (99/62 - 136/61)  RR: 18 (07-13-24 @ 08:17) (17 - 18)  SpO2: 95% (07-13-24 @ 08:17) (92% - 96%)  Wt(kg): --        07-12-24 @ 07:01  -  07-13-24 @ 07:00  --------------------------------------------------------  IN: 0 mL / OUT: 900 mL / NET: -900 mL      Physical Exam:  	Gen: NAD  	Pulm: decrease BS  B/L  	CV: S1S2; no rub  	Abd:  distended  	LE:  no edema      LABS/STUDIES  --------------------------------------------------------------------------------              9.7    19.03 >-----------<  220      [07-13-24 @ 06:12]              29.3     139  |  99  |  108  ----------------------------<  115      [07-13-24 @ 06:12]  3.3   |  21  |  5.0        Ca     7.7     [07-13-24 @ 06:12]      Mg     1.8     [07-13-24 @ 06:12]      Phos  4.6     [07-13-24 @ 06:12]    TPro  6.7  /  Alb  2.2  /  TBili  0.4  /  DBili  x   /  AST  21  /  ALT  14  /  AlkPhos  129  [07-13-24 @ 06:12]    Creatinine Trend:  SCr 5.0 [07-13 @ 06:12]  SCr 5.3 [07-12 @ 06:22]  SCr 5.8 [07-11 @ 07:28]  SCr 5.8 [07-10 @ 23:30]  SCr 5.7 [07-10 @ 21:08]    Urinalysis - [07-13-24 @ 06:12]      Color  / Appearance  / SG  / pH       Gluc 115 / Ketone   / Bili  / Urobili        Blood  / Protein  / Leuk Est  / Nitrite       RBC  / WBC  / Hyaline  / Gran  / Sq Epi  / Non Sq Epi  / Bacteria       Iron 30, TIBC 143, %sat 21      [09-07-23 @ 09:17]  Ferritin 533      [09-07-23 @ 09:17]  PTH -- (Ca 8.3)      [07-10-24 @ 21:08]   43  Vitamin D (25OH) 13      [09-07-23 @ 09:17]  Lipid: chol 134, , HDL 30, LDL --      [09-07-23 @ 09:17]

## 2024-07-13 NOTE — PROGRESS NOTE ADULT - ASSESSMENT
70 yo M with hx of urinary incontinence w/ chronic SPC, nephrolithiasis, spinal abscess w/ quadriplegia, DM2, CKD5 (baseline Cr 4.4), HTN, HLD, DM II, CKD stage 5 (baseline Cr 4.4)) presented to ED with abdominal pain, n/v, found to have severe R hydroureteronephrosis w/ numerous R-sided calculi including obstructing stone in R ureter as well as nonobstructive L calculi w/o hydro. Admitted for acute obstructive pyelonephritis w/ R hydronephrosis 2/2 nephrolithiasis resulting in JOÃO on CKD5, course complicated by GNR bacteremia and HAGMA. Now s/p R PCN and R nephroureteral catheter placement with IR (7/10), continuing on antibiotics. Cultures pending. JOÃO slightly improving     #JOÃO on CKD5, likely multifactorial - improving slightly today.   #Acute obstructive pyelonephritis with right hydronephrosis 2/2 R obstructing calculi  #GNR bacteremia   #Bilateral renal calculi   #HAGMA - improving  - Afebrile, hypotensive but improving, leukocytosis  - CKD stage 5 baseline Cr ~ 4.4, urinary incontinence with chronic supra pubic catheter  - CTAP shows severe hydro on R w/ obstructive calculi and non obs calculi on Left  - BCx w/ GNR, UCx on admission >3 spp likely contaminated   - s/p R PCN and nephroureteral catheter with IR on 7/10 - drained pus in OR and now w/ bloody drainage   - monitor PCN and SPC output   - urology consulted: f/u PCNL outpatient, no plan for L PCN at this time per urology  - ID consulted, appreciate recs   - Nephro consulted, appreciate recs  - c/w IV meropenem 500mg q12h per ID (transitioned from IV cefepime 2g q24h)  - Trend CBC, fever  - f/u BCx spp, f/u UCx from PCN  - c/w oral bicarb 650 mg TID   - Trend BUN/Cr, Mg, phos  - replete electrolytes PRN  - Strict I/Os    #Chronic venous stasis ulcers w/ dermatitis  - wounds do not appear infected, no edema or tenderness   - wound care consulted, appreciate recs  - cleanse wound w/ soap and water, pat dry  - apply dermaphor bid to bilateral legs and feet     #HTN  #HLD  - c/w atorvastatin  - d/c nifedipine iso hypotension     #DMII   - monitor FS  - ISS TIDAC, qhs    ---------------------  DVT ppx: HSQ  Diet: renal/CCC/DASHTLC  Activity: IAT  GOC: DNR/DNI (MOLST in chart), [facility] brother Erich [phone]  Dispo: pending clinical improvement

## 2024-07-13 NOTE — PROGRESS NOTE ADULT - SUBJECTIVE AND OBJECTIVE BOX
24H events:    Patient is a 69y old Male who presents with a chief complaint of UTI w/ obstructing stone (13 Jul 2024 13:48)    Primary diagnosis of Hydronephrosis with obstructing calculus          Today is hospital day 4d.   This morning patient was seen and examined at bedside, resting comfortably in bed.    No acute or major events overnight.    Code Status:    Family communication:  Contact date:  Name of person contacted:  Relationship to patient:  Communication details:  What matters most:    PAST MEDICAL & SURGICAL HISTORY  DM (diabetes mellitus)    HTN (hypertension)    H/O paraplegia    Spinal abscess    Renal stones    Spinal abscess  S/P Surgery    Encounter for care or replacement of suprapubic tube      SOCIAL HISTORY:  Social History:      ALLERGIES:  No Known Allergies    MEDICATIONS:  STANDING MEDICATIONS  atorvastatin 20 milliGRAM(s) Oral at bedtime  dextrose 5%. 1000 milliLiter(s) IV Continuous <Continuous>  dextrose 5%. 1000 milliLiter(s) IV Continuous <Continuous>  dextrose 50% Injectable 12.5 Gram(s) IV Push once  dextrose 50% Injectable 25 Gram(s) IV Push once  dextrose 50% Injectable 25 Gram(s) IV Push once  glucagon  Injectable 1 milliGRAM(s) IntraMuscular once  heparin   Injectable 5000 Unit(s) SubCutaneous every 8 hours  insulin lispro (ADMELOG) corrective regimen sliding scale   SubCutaneous three times a day before meals  insulin lispro (ADMELOG) corrective regimen sliding scale   SubCutaneous at bedtime  meropenem  IVPB 500 milliGRAM(s) IV Intermittent every 12 hours  meropenem  IVPB      pantoprazole    Tablet 40 milliGRAM(s) Oral before breakfast  sodium bicarbonate 650 milliGRAM(s) Oral three times a day    PRN MEDICATIONS  aluminum hydroxide/magnesium hydroxide/simethicone Suspension 30 milliLiter(s) Oral every 4 hours PRN  dextrose Oral Gel 15 Gram(s) Oral once PRN  melatonin 3 milliGRAM(s) Oral at bedtime PRN  ondansetron Injectable 4 milliGRAM(s) IV Push every 8 hours PRN    VITALS:   T(F): 98.2  HR: 75  BP: 136/61  RR: 18  SpO2: 95%    PHYSICAL EXAM:  GENERAL: NAD, lying in bed comfortably  HEENT:  EOMI, moist mucous membranes, poor dentition  CHEST/LUNG: Clear to auscultation bilaterally; normal respiratory effort, no coughing, wheezes, crackles, or rales.  HEART: Regular rate and rhythm  ABDOMEN: Soft, +epigastric TTP improved from yesterday, nondistended, +SPC w/ clear yellow urine draining. +R PCN w/ bloody drainage (~100cc)  EXTREMITIES:  2+ Peripheral Pulses, brisk capillary refill. +chronic venous stasis ulcers w/ dermatitis bilaterally. No lower extremity edema bilaterally.   NERVOUS SYSTEM:  A&Ox3, no focal deficits         LABS:                        9.7    19.03 )-----------( 220      ( 13 Jul 2024 06:12 )             29.3     07-13    139  |  99  |  108<HH>  ----------------------------<  115<H>  3.3<L>   |  21  |  5.0<HH>    Ca    7.7<L>      13 Jul 2024 06:12  Phos  4.6     07-13  Mg     1.8     07-13    TPro  6.7  /  Alb  2.2<L>  /  TBili  0.4  /  DBili  x   /  AST  21  /  ALT  14  /  AlkPhos  129<H>  07-13      Urinalysis Basic - ( 13 Jul 2024 06:12 )    Color: x / Appearance: x / SG: x / pH: x  Gluc: 115 mg/dL / Ketone: x  / Bili: x / Urobili: x   Blood: x / Protein: x / Nitrite: x   Leuk Esterase: x / RBC: x / WBC x   Sq Epi: x / Non Sq Epi: x / Bacteria: x            Culture - Urine (collected 10 Jul 2024 18:14)  Source: .Urine Nephrostomy - Right  Preliminary Report (12 Jul 2024 22:45):    >100,000 CFU/ml Proteus mirabilis    10,000 - 49,000 CFU/mL Serratia marcescens          RADIOLOGY:  CXR      CT           24H events:    Patient is a 69y old Male who presents with a chief complaint of UTI w/ obstructing stone (13 Jul 2024 13:48)    Primary diagnosis of Hydronephrosis with obstructing calculus          Today is hospital day 4d.   This morning patient was seen and examined at bedside, resting comfortably in bed.    No acute or major events overnight.    Code Status:    Family communication:  Contact date:  Name of person contacted:  Relationship to patient:  Communication details:  What matters most:    PAST MEDICAL & SURGICAL HISTORY  DM (diabetes mellitus)    HTN (hypertension)    H/O paraplegia    Spinal abscess    Renal stones    Spinal abscess  S/P Surgery    Encounter for care or replacement of suprapubic tube      SOCIAL HISTORY:  Social History:      ALLERGIES:  No Known Allergies    MEDICATIONS:  STANDING MEDICATIONS  atorvastatin 20 milliGRAM(s) Oral at bedtime  dextrose 5%. 1000 milliLiter(s) IV Continuous <Continuous>  dextrose 5%. 1000 milliLiter(s) IV Continuous <Continuous>  dextrose 50% Injectable 12.5 Gram(s) IV Push once  dextrose 50% Injectable 25 Gram(s) IV Push once  dextrose 50% Injectable 25 Gram(s) IV Push once  glucagon  Injectable 1 milliGRAM(s) IntraMuscular once  heparin   Injectable 5000 Unit(s) SubCutaneous every 8 hours  insulin lispro (ADMELOG) corrective regimen sliding scale   SubCutaneous three times a day before meals  insulin lispro (ADMELOG) corrective regimen sliding scale   SubCutaneous at bedtime  meropenem  IVPB 500 milliGRAM(s) IV Intermittent every 12 hours  meropenem  IVPB      pantoprazole    Tablet 40 milliGRAM(s) Oral before breakfast  sodium bicarbonate 650 milliGRAM(s) Oral three times a day    PRN MEDICATIONS  aluminum hydroxide/magnesium hydroxide/simethicone Suspension 30 milliLiter(s) Oral every 4 hours PRN  dextrose Oral Gel 15 Gram(s) Oral once PRN  melatonin 3 milliGRAM(s) Oral at bedtime PRN  ondansetron Injectable 4 milliGRAM(s) IV Push every 8 hours PRN    VITALS:   T(F): 98.2  HR: 75  BP: 136/61  RR: 18  SpO2: 95%    PHYSICAL EXAM:  GENERAL: NAD, lying in bed comfortably  HEENT:  EOMI, moist mucous membranes, poor dentition  CHEST/LUNG: Clear to auscultation bilaterally; normal respiratory effort, no coughing, wheezes, crackles, or rales.  HEART: Regular rate and rhythm  ABDOMEN: Soft, +epigastric TTP improved from yesterday, nondistended, +SPC w/ clear yellow urine draining. +R PCN w/ bloody drainage (~100cc)  EXTREMITIES:  2+ Peripheral Pulses, brisk capillary refill. +chronic venous stasis ulcers w/ dermatitis bilaterally. No lower extremity edema bilaterally.   NERVOUS SYSTEM:  A&Ox3,         LABS:                        9.7    19.03 )-----------( 220      ( 13 Jul 2024 06:12 )             29.3     07-13    139  |  99  |  108<HH>  ----------------------------<  115<H>  3.3<L>   |  21  |  5.0<HH>    Ca    7.7<L>      13 Jul 2024 06:12  Phos  4.6     07-13  Mg     1.8     07-13    TPro  6.7  /  Alb  2.2<L>  /  TBili  0.4  /  DBili  x   /  AST  21  /  ALT  14  /  AlkPhos  129<H>  07-13      Urinalysis Basic - ( 13 Jul 2024 06:12 )    Color: x / Appearance: x / SG: x / pH: x  Gluc: 115 mg/dL / Ketone: x  / Bili: x / Urobili: x   Blood: x / Protein: x / Nitrite: x   Leuk Esterase: x / RBC: x / WBC x   Sq Epi: x / Non Sq Epi: x / Bacteria: x            Culture - Urine (collected 10 Jul 2024 18:14)  Source: .Urine Nephrostomy - Right  Preliminary Report (12 Jul 2024 22:45):    >100,000 CFU/ml Proteus mirabilis    10,000 - 49,000 CFU/mL Serratia marcescens          RADIOLOGY:  CXR      CT

## 2024-07-13 NOTE — PROGRESS NOTE ADULT - ASSESSMENT
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 NBNB vomiting and nausea every time after he eats  #Obstructing R mid ureter calculus with severe hydroureteronephrosis s/p Stent   #B/L nephrolithiasis   #Post-renal JOÃO on CKD 5   #HAGMA   #Chronic suprapubic catheter   - normal baseline creatinine ~4.4,  - cr trending down   - urology and IR on board , s/p Percutaneous right nephrostomy followed by placement of right nephroureteral catheter  - continue sodium bicarbonate po   - ph at goal   - followed by ID on atb   - bp controlled  - no acute indication for RRT   will follow

## 2024-07-13 NOTE — PROGRESS NOTE ADULT - ASSESSMENT
68y/o M s/p IR placement of RIGHT PCNU.    - Pt seen and examined at bedside with Dr. Morocho, plan as per attending  - Case was discussed with Dr. Akins yesterday - planning on urologic intervention in two weeks, please continue medical optimization and medical clearance for general anesthesia and RIGHT PCNL and LEFT JJ stent placement  - Continue IV Abx as per ID  - Will follow .

## 2024-07-13 NOTE — PROGRESS NOTE ADULT - SUBJECTIVE AND OBJECTIVE BOX
UROLOGY: Pt is a 70y/o M s/p IR placement of RIGHT PCNU. Pt seen and examined at bedside with Dr. Morocho. No acute complaints at this time .    REVIEW OF SYSTEMS   [x] A ten-point review of systems was otherwise negative except as noted.    Vital Signs Last 24 Hrs  T(C): 36.8 (13 Jul 2024 08:17), Max: 37.1 (12 Jul 2024 15:20)  T(F): 98.2 (13 Jul 2024 08:17), Max: 98.8 (12 Jul 2024 15:20)  HR: 75 (13 Jul 2024 08:17) (75 - 84)  BP: 136/61 (13 Jul 2024 08:17) (99/62 - 136/61)  BP(mean): 74 (13 Jul 2024 00:15) (74 - 74)  RR: 18 (13 Jul 2024 08:17) (17 - 18)  SpO2: 95% (13 Jul 2024 08:17) (92% - 96%)    PHYSICAL EXAM:  GEN: NAD, awake and alert.  SKIN: Good color, non diaphoretic.  RESP: Non-labored breathing. No use of accessory muscles.  CARDIO: +S1/S2  ABDO: Soft, NT/ND  BACK: No CVAT B/L  : PCNU draining clear yellow urine.  EXT: PANTOJA x 4    LABS:             9.7    19.03 )-----------( 220      ( 13 Jul 2024 06:12 )             29.3     139  |  99  |  108<HH>  ----------------------------<  115<H>  3.3<L>   |  21  |  5.0<HH>

## 2024-07-14 LAB
-  AMPICILLIN: SIGNIFICANT CHANGE UP
-  CIPROFLOXACIN: SIGNIFICANT CHANGE UP
-  LEVOFLOXACIN: SIGNIFICANT CHANGE UP
-  NITROFURANTOIN: SIGNIFICANT CHANGE UP
-  TETRACYCLINE: SIGNIFICANT CHANGE UP
-  VANCOMYCIN: SIGNIFICANT CHANGE UP
ALBUMIN SERPL ELPH-MCNC: 2.3 G/DL — LOW (ref 3.5–5.2)
ALP SERPL-CCNC: 132 U/L — HIGH (ref 30–115)
ALT FLD-CCNC: 13 U/L — SIGNIFICANT CHANGE UP (ref 0–41)
ANION GAP SERPL CALC-SCNC: 17 MMOL/L — HIGH (ref 7–14)
AST SERPL-CCNC: 19 U/L — SIGNIFICANT CHANGE UP (ref 0–41)
BASOPHILS # BLD AUTO: 0.02 K/UL — SIGNIFICANT CHANGE UP (ref 0–0.2)
BASOPHILS NFR BLD AUTO: 0.1 % — SIGNIFICANT CHANGE UP (ref 0–1)
BILIRUB SERPL-MCNC: 0.4 MG/DL — SIGNIFICANT CHANGE UP (ref 0.2–1.2)
BUN SERPL-MCNC: 108 MG/DL — CRITICAL HIGH (ref 10–20)
CALCIUM SERPL-MCNC: 8 MG/DL — LOW (ref 8.4–10.5)
CHLORIDE SERPL-SCNC: 98 MMOL/L — SIGNIFICANT CHANGE UP (ref 98–110)
CO2 SERPL-SCNC: 22 MMOL/L — SIGNIFICANT CHANGE UP (ref 17–32)
CREAT SERPL-MCNC: 5 MG/DL — CRITICAL HIGH (ref 0.7–1.5)
CULTURE RESULTS: ABNORMAL
EGFR: 12 ML/MIN/1.73M2 — LOW
EOSINOPHIL # BLD AUTO: 0.12 K/UL — SIGNIFICANT CHANGE UP (ref 0–0.7)
EOSINOPHIL NFR BLD AUTO: 0.7 % — SIGNIFICANT CHANGE UP (ref 0–8)
GLUCOSE BLDC GLUCOMTR-MCNC: 105 MG/DL — HIGH (ref 70–99)
GLUCOSE BLDC GLUCOMTR-MCNC: 117 MG/DL — HIGH (ref 70–99)
GLUCOSE BLDC GLUCOMTR-MCNC: 139 MG/DL — HIGH (ref 70–99)
GLUCOSE BLDC GLUCOMTR-MCNC: 195 MG/DL — HIGH (ref 70–99)
GLUCOSE SERPL-MCNC: 100 MG/DL — HIGH (ref 70–99)
HCT VFR BLD CALC: 30.6 % — LOW (ref 42–52)
HGB BLD-MCNC: 10.1 G/DL — LOW (ref 14–18)
IMM GRANULOCYTES NFR BLD AUTO: 0.9 % — HIGH (ref 0.1–0.3)
LYMPHOCYTES # BLD AUTO: 1.67 K/UL — SIGNIFICANT CHANGE UP (ref 1.2–3.4)
LYMPHOCYTES # BLD AUTO: 10 % — LOW (ref 20.5–51.1)
MAGNESIUM SERPL-MCNC: 1.9 MG/DL — SIGNIFICANT CHANGE UP (ref 1.8–2.4)
MCHC RBC-ENTMCNC: 30.2 PG — SIGNIFICANT CHANGE UP (ref 27–31)
MCHC RBC-ENTMCNC: 33 G/DL — SIGNIFICANT CHANGE UP (ref 32–37)
MCV RBC AUTO: 91.6 FL — SIGNIFICANT CHANGE UP (ref 80–94)
METHOD TYPE: SIGNIFICANT CHANGE UP
MONOCYTES # BLD AUTO: 0.68 K/UL — HIGH (ref 0.1–0.6)
MONOCYTES NFR BLD AUTO: 4.1 % — SIGNIFICANT CHANGE UP (ref 1.7–9.3)
NEUTROPHILS # BLD AUTO: 14.12 K/UL — HIGH (ref 1.4–6.5)
NEUTROPHILS NFR BLD AUTO: 84.2 % — HIGH (ref 42.2–75.2)
NRBC # BLD: 0 /100 WBCS — SIGNIFICANT CHANGE UP (ref 0–0)
ORGANISM # SPEC MICROSCOPIC CNT: ABNORMAL
ORGANISM # SPEC MICROSCOPIC CNT: SIGNIFICANT CHANGE UP
PHOSPHATE SERPL-MCNC: 4.5 MG/DL — SIGNIFICANT CHANGE UP (ref 2.1–4.9)
PLATELET # BLD AUTO: 239 K/UL — SIGNIFICANT CHANGE UP (ref 130–400)
PMV BLD: 11.3 FL — HIGH (ref 7.4–10.4)
POTASSIUM SERPL-MCNC: 3.4 MMOL/L — LOW (ref 3.5–5)
POTASSIUM SERPL-SCNC: 3.4 MMOL/L — LOW (ref 3.5–5)
PROT SERPL-MCNC: 7.1 G/DL — SIGNIFICANT CHANGE UP (ref 6–8)
RBC # BLD: 3.34 M/UL — LOW (ref 4.7–6.1)
RBC # FLD: 14.4 % — SIGNIFICANT CHANGE UP (ref 11.5–14.5)
SODIUM SERPL-SCNC: 137 MMOL/L — SIGNIFICANT CHANGE UP (ref 135–146)
SPECIMEN SOURCE: SIGNIFICANT CHANGE UP
WBC # BLD: 16.76 K/UL — HIGH (ref 4.8–10.8)
WBC # FLD AUTO: 16.76 K/UL — HIGH (ref 4.8–10.8)

## 2024-07-14 PROCEDURE — 99232 SBSQ HOSP IP/OBS MODERATE 35: CPT

## 2024-07-14 RX ORDER — POTASSIUM CHLORIDE 1500 MG/1
40 TABLET, EXTENDED RELEASE ORAL ONCE
Refills: 0 | Status: COMPLETED | OUTPATIENT
Start: 2024-07-14 | End: 2024-07-15

## 2024-07-14 RX ORDER — POTASSIUM CHLORIDE 1500 MG/1
40 TABLET, EXTENDED RELEASE ORAL ONCE
Refills: 0 | Status: COMPLETED | OUTPATIENT
Start: 2024-07-14 | End: 2024-07-14

## 2024-07-14 RX ADMIN — PANTOPRAZOLE SODIUM 40 MILLIGRAM(S): 20 TABLET, DELAYED RELEASE ORAL at 05:15

## 2024-07-14 RX ADMIN — MEROPENEM 100 MILLIGRAM(S): 1 INJECTION, POWDER, FOR SOLUTION INTRAVENOUS at 17:44

## 2024-07-14 RX ADMIN — HEPARIN SODIUM 5000 UNIT(S): 1000 INJECTION, SOLUTION INTRAVENOUS; SUBCUTANEOUS at 13:36

## 2024-07-14 RX ADMIN — Medication 650 MILLIGRAM(S): at 05:15

## 2024-07-14 RX ADMIN — MEROPENEM 100 MILLIGRAM(S): 1 INJECTION, POWDER, FOR SOLUTION INTRAVENOUS at 05:15

## 2024-07-14 RX ADMIN — Medication 650 MILLIGRAM(S): at 13:36

## 2024-07-14 RX ADMIN — POTASSIUM CHLORIDE 40 MILLIEQUIVALENT(S): 1500 TABLET, EXTENDED RELEASE ORAL at 09:10

## 2024-07-14 RX ADMIN — ATORVASTATIN CALCIUM 20 MILLIGRAM(S): 40 TABLET, FILM COATED ORAL at 21:51

## 2024-07-14 RX ADMIN — Medication 3 MILLIGRAM(S): at 21:51

## 2024-07-14 RX ADMIN — HEPARIN SODIUM 5000 UNIT(S): 1000 INJECTION, SOLUTION INTRAVENOUS; SUBCUTANEOUS at 05:15

## 2024-07-14 RX ADMIN — Medication 650 MILLIGRAM(S): at 21:52

## 2024-07-14 RX ADMIN — Medication 4 MILLIGRAM(S): at 21:49

## 2024-07-14 RX ADMIN — Medication 2: at 08:13

## 2024-07-14 RX ADMIN — HEPARIN SODIUM 5000 UNIT(S): 1000 INJECTION, SOLUTION INTRAVENOUS; SUBCUTANEOUS at 21:51

## 2024-07-14 NOTE — PROGRESS NOTE ADULT - ASSESSMENT
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 NBNB vomiting and nausea every time after he eats  #Obstructing R mid ureter calculus with severe hydroureteronephrosis s/p Stent   #B/L nephrolithiasis   #Post-renal JOÃO on CKD 5   #HAGMA   #Chronic suprapubic catheter   - normal baseline creatinine ~4.4,  - cr trending down stable at 5 now   - urology and IR on board , s/p Percutaneous right nephrostomy followed by placement of right nephroureteral catheter  - continue sodium bicarbonate po   - ph at goal   - followed by ID on atb   - bp controlled  - no acute indication for RRT   will follow

## 2024-07-14 NOTE — PROGRESS NOTE ADULT - SUBJECTIVE AND OBJECTIVE BOX
HPI  Patient is a 69y old Male who presents with a chief complaint of UTI w/ obstructing stone (14 Jul 2024 09:42)    Currently admitted to medicine with the primary diagnosis of Hydronephrosis with obstructing calculus       Today is hospital day 5d.     INTERVAL HPI / OVERNIGHT EVENTS:  Patient was seen and examined at bedside    Patient Feels better  No new complaints  Denies any complains of chest pain or shortness of breath  Denies any abdominal pain/nausea/vomiting        PAST MEDICAL & SURGICAL HISTORY  DM (diabetes mellitus)    HTN (hypertension)    H/O paraplegia    Spinal abscess    Renal stones    Spinal abscess  S/P Surgery    Encounter for care or replacement of suprapubic tube      ALLERGIES  No Known Allergies    MEDICATIONS  STANDING MEDICATIONS  atorvastatin 20 milliGRAM(s) Oral at bedtime  dextrose 5%. 1000 milliLiter(s) IV Continuous <Continuous>  dextrose 5%. 1000 milliLiter(s) IV Continuous <Continuous>  dextrose 50% Injectable 25 Gram(s) IV Push once  dextrose 50% Injectable 12.5 Gram(s) IV Push once  dextrose 50% Injectable 25 Gram(s) IV Push once  glucagon  Injectable 1 milliGRAM(s) IntraMuscular once  heparin   Injectable 5000 Unit(s) SubCutaneous every 8 hours  insulin lispro (ADMELOG) corrective regimen sliding scale   SubCutaneous three times a day before meals  insulin lispro (ADMELOG) corrective regimen sliding scale   SubCutaneous at bedtime  meropenem  IVPB 500 milliGRAM(s) IV Intermittent every 12 hours  meropenem  IVPB      pantoprazole    Tablet 40 milliGRAM(s) Oral before breakfast  potassium chloride    Tablet ER 40 milliEquivalent(s) Oral once  sodium bicarbonate 650 milliGRAM(s) Oral three times a day    PRN MEDICATIONS  aluminum hydroxide/magnesium hydroxide/simethicone Suspension 30 milliLiter(s) Oral every 4 hours PRN  dextrose Oral Gel 15 Gram(s) Oral once PRN  melatonin 3 milliGRAM(s) Oral at bedtime PRN  ondansetron Injectable 4 milliGRAM(s) IV Push every 8 hours PRN    VITALS:  T(F): 97.6  HR: 79  BP: 134/82  RR: 19  SpO2: 97%    PHYSICAL EXAM  GEN: no distress, comfortable  PULM: BS heard b/l equal, No wheezing  CVS: S1S2 present, no rubs or gallops  ABD: Soft, non-distended, no guarding; non-tender  EXT: No lower extremity edema  NEURO: A&Ox3, moving all extremities    LABS                        10.1   16.76 )-----------( 239      ( 14 Jul 2024 07:12 )             30.6     07-14    137  |  98  |  108<HH>  ----------------------------<  100<H>  3.4<L>   |  22  |  5.0<HH>    Ca    8.0<L>      14 Jul 2024 07:12  Phos  4.5     07-14  Mg     1.9     07-14    TPro  7.1  /  Alb  2.3<L>  /  TBili  0.4  /  DBili  x   /  AST  19  /  ALT  13  /  AlkPhos  132<H>  07-14      Urinalysis Basic - ( 14 Jul 2024 07:12 )    Color: x / Appearance: x / SG: x / pH: x  Gluc: 100 mg/dL / Ketone: x  / Bili: x / Urobili: x   Blood: x / Protein: x / Nitrite: x   Leuk Esterase: x / RBC: x / WBC x   Sq Epi: x / Non Sq Epi: x / Bacteria: x                RADIOLOGY

## 2024-07-14 NOTE — PROGRESS NOTE ADULT - SUBJECTIVE AND OBJECTIVE BOX
Patient is a 69y old Male who presents with a chief complaint of UTI w/ obstructing stone (11 Jul 2024 09:17)    Today is hospital day 5    Overnight events/Interval History:  - No acute overnight events  - At bedside, patient has no acute complaints. Reports 3/10 abdominal pain in epigastric region, occasional nausea and vomiting. Per RN, vomit is NBNB. Breathing comfortably on room air. Denies fevers, chills, SOB, chest pain, diarrhea, constipation    ROS:  - All ROS is negative unless indicated in HPI    Code Status: DNR/DNI, HCP is brother Erich    ALLERGIES:  No Known Allergies    MEDICATIONS:  STANDING MEDICATIONS  atorvastatin 20 milliGRAM(s) Oral at bedtime  dextrose 5%. 1000 milliLiter(s) IV Continuous <Continuous>  dextrose 5%. 1000 milliLiter(s) IV Continuous <Continuous>  dextrose 50% Injectable 12.5 Gram(s) IV Push once  dextrose 50% Injectable 25 Gram(s) IV Push once  dextrose 50% Injectable 25 Gram(s) IV Push once  glucagon  Injectable 1 milliGRAM(s) IntraMuscular once  heparin   Injectable 5000 Unit(s) SubCutaneous every 8 hours  insulin lispro (ADMELOG) corrective regimen sliding scale   SubCutaneous three times a day before meals  insulin lispro (ADMELOG) corrective regimen sliding scale   SubCutaneous at bedtime  meropenem  IVPB      pantoprazole    Tablet 40 milliGRAM(s) Oral before breakfast  sodium bicarbonate 650 milliGRAM(s) Oral three times a day    PRN MEDICATIONS  aluminum hydroxide/magnesium hydroxide/simethicone Suspension 30 milliLiter(s) Oral every 4 hours PRN  dextrose Oral Gel 15 Gram(s) Oral once PRN  melatonin 3 milliGRAM(s) Oral at bedtime PRN  ondansetron Injectable 4 milliGRAM(s) IV Push every 8 hours PRN    VITALS LAST 24H:  Vital Signs Last 24 Hrs  T(C): 36.9 (12 Jul 2024 08:24), Max: 36.9 (12 Jul 2024 08:24)  T(F): 98.4 (12 Jul 2024 08:24), Max: 98.4 (12 Jul 2024 08:24)  HR: 91 (12 Jul 2024 08:24) (79 - 91)  BP: 113/74 (12 Jul 2024 08:24) (91/58 - 113/74)  BP(mean): 69 (11 Jul 2024 23:37) (69 - 69)  RR: 17 (12 Jul 2024 08:24) (17 - 17)  SpO2: 98% (12 Jul 2024 08:24) (91% - 98%)    Parameters below as of 12 Jul 2024 08:24  Patient On (Oxygen Delivery Method): room air      PHYSICAL EXAM:  GENERAL: NAD, lying in bed comfortably  HEENT:  EOMI, moist mucous membranes, poor dentition  CHEST/LUNG: Clear to auscultation bilaterally; normal respiratory effort, no coughing, wheezes, crackles, or rales.  HEART: Regular rate and rhythm  ABDOMEN: Soft, +epigastric TTP improved from yesterday, nondistended, +SPC w/ clear yellow urine draining. +R PCN w/ bloody drainage (~100cc)  EXTREMITIES:  2+ Peripheral Pulses, brisk capillary refill. +chronic venous stasis ulcers w/ dermatitis bilaterally. No lower extremity edema bilaterally.   NERVOUS SYSTEM:  A&Ox3, no focal deficits     LABS:                        9.9    19.07 )-----------( 248      ( 12 Jul 2024 06:22 )             29.1     07-12    134<L>  |  98  |  108<HH>  ----------------------------<  189<H>  3.0<L>   |  21  |  5.3<HH>    Ca    7.6<L>      12 Jul 2024 06:22  Phos  4.2     07-12  Mg     1.8     07-12        Urinalysis Basic - ( 12 Jul 2024 06:22 )    Color: x / Appearance: x / SG: x / pH: x  Gluc: 189 mg/dL / Ketone: x  / Bili: x / Urobili: x   Blood: x / Protein: x / Nitrite: x   Leuk Esterase: x / RBC: x / WBC x   Sq Epi: x / Non Sq Epi: x / Bacteria: x            Culture - Blood (collected 09 Jul 2024 14:20)  Source: .Blood Blood-Peripheral  Gram Stain (11 Jul 2024 01:20):    Growth in anaerobic bottle: Gram Negative Rods  Preliminary Report (11 Jul 2024 01:20):    Growth in anaerobic bottle: Gram Negative Rods    Direct identification is available within approximately 3-5    hours either by Blood Panel Multiplexed PCR or Direct    MALDI-TOF. Details: [email or web address]  Organism: Blood Culture PCR (11 Jul 2024 03:23)  Organism: Blood Culture PCR (11 Jul 2024 03:23)    Culture - Blood (collected 09 Jul 2024 14:20)  Source: .Blood Blood-Peripheral  Gram Stain (12 Jul 2024 04:50):    Growth in anaerobic bottle: Gram Negative Rods  Preliminary Report (12 Jul 2024 04:50):    Growth in anaerobic bottle: Gram Negative Rods     Patient is a 69y old Male who presents with a chief complaint of UTI w/ obstructing stone (11 Jul 2024 09:17)    Today is hospital day 5    Overnight events/Interval History:  - No acute overnight events  - At bedside, patient has no acute complaints. Reports 3/10 abdominal pain in epigastric region, occasional nausea and vomiting. Per RN, vomit is NBNB. Breathing comfortably on room air. Denies fevers, chills, SOB, chest pain, diarrhea, constipation    ROS:  - All ROS is negative unless indicated in HPI    Code Status: DNR/DNI, HCP is brother Erich    ALLERGIES:  No Known Allergies    MEDICATIONS:  STANDING MEDICATIONS  atorvastatin 20 milliGRAM(s) Oral at bedtime  dextrose 5%. 1000 milliLiter(s) IV Continuous <Continuous>  dextrose 5%. 1000 milliLiter(s) IV Continuous <Continuous>  dextrose 50% Injectable 12.5 Gram(s) IV Push once  dextrose 50% Injectable 25 Gram(s) IV Push once  dextrose 50% Injectable 25 Gram(s) IV Push once  glucagon  Injectable 1 milliGRAM(s) IntraMuscular once  heparin   Injectable 5000 Unit(s) SubCutaneous every 8 hours  insulin lispro (ADMELOG) corrective regimen sliding scale   SubCutaneous three times a day before meals  insulin lispro (ADMELOG) corrective regimen sliding scale   SubCutaneous at bedtime  meropenem  IVPB      pantoprazole    Tablet 40 milliGRAM(s) Oral before breakfast  sodium bicarbonate 650 milliGRAM(s) Oral three times a day    PRN MEDICATIONS  aluminum hydroxide/magnesium hydroxide/simethicone Suspension 30 milliLiter(s) Oral every 4 hours PRN  dextrose Oral Gel 15 Gram(s) Oral once PRN  melatonin 3 milliGRAM(s) Oral at bedtime PRN  ondansetron Injectable 4 milliGRAM(s) IV Push every 8 hours PRN    VITALS LAST 24H:  Vital Signs Last 24 Hrs  T(C): 36.9 (12 Jul 2024 08:24), Max: 36.9 (12 Jul 2024 08:24)  T(F): 98.4 (12 Jul 2024 08:24), Max: 98.4 (12 Jul 2024 08:24)  HR: 91 (12 Jul 2024 08:24) (79 - 91)  BP: 113/74 (12 Jul 2024 08:24) (91/58 - 113/74)  BP(mean): 69 (11 Jul 2024 23:37) (69 - 69)  RR: 17 (12 Jul 2024 08:24) (17 - 17)  SpO2: 98% (12 Jul 2024 08:24) (91% - 98%)    Parameters below as of 12 Jul 2024 08:24  Patient On (Oxygen Delivery Method): room air      PHYSICAL EXAM:  GENERAL: NAD, lying in bed comfortably  HEENT:  EOMI, moist mucous membranes, poor dentition  CHEST/LUNG: Clear to auscultation bilaterally; normal respiratory effort, no coughing, wheezes, crackles, or rales.  HEART: Regular rate and rhythm  ABDOMEN: Soft, +epigastric TTP improved from yesterday, nondistended, +SPC w/ clear yellow urine draining. +R PCN w/ clear urine   EXTREMITIES:  2+ Peripheral Pulses, brisk capillary refill. +chronic venous stasis ulcers w/ dermatitis bilaterally. No lower extremity edema bilaterally.   NERVOUS SYSTEM:  A&Ox3, no focal deficits     LABS:                        9.9    19.07 )-----------( 248      ( 12 Jul 2024 06:22 )             29.1     07-12    134<L>  |  98  |  108<HH>  ----------------------------<  189<H>  3.0<L>   |  21  |  5.3<HH>    Ca    7.6<L>      12 Jul 2024 06:22  Phos  4.2     07-12  Mg     1.8     07-12        Urinalysis Basic - ( 12 Jul 2024 06:22 )    Color: x / Appearance: x / SG: x / pH: x  Gluc: 189 mg/dL / Ketone: x  / Bili: x / Urobili: x   Blood: x / Protein: x / Nitrite: x   Leuk Esterase: x / RBC: x / WBC x   Sq Epi: x / Non Sq Epi: x / Bacteria: x            Culture - Blood (collected 09 Jul 2024 14:20)  Source: .Blood Blood-Peripheral  Gram Stain (11 Jul 2024 01:20):    Growth in anaerobic bottle: Gram Negative Rods  Preliminary Report (11 Jul 2024 01:20):    Growth in anaerobic bottle: Gram Negative Rods    Direct identification is available within approximately 3-5    hours either by Blood Panel Multiplexed PCR or Direct    MALDI-TOF. Details: [email or web address]  Organism: Blood Culture PCR (11 Jul 2024 03:23)  Organism: Blood Culture PCR (11 Jul 2024 03:23)    Culture - Blood (collected 09 Jul 2024 14:20)  Source: .Blood Blood-Peripheral  Gram Stain (12 Jul 2024 04:50):    Growth in anaerobic bottle: Gram Negative Rods  Preliminary Report (12 Jul 2024 04:50):    Growth in anaerobic bottle: Gram Negative Rods

## 2024-07-14 NOTE — PROGRESS NOTE ADULT - ASSESSMENT
70 yo M with hx of urinary incontinence w/ chronic SPC, nephrolithiasis, spinal abscess w/ quadriplegia, DM2, CKD5 (baseline Cr 4.4), HTN, HLD, DM II, CKD stage 5 (baseline Cr 4.4)) presented to ED with abdominal pain, n/v, found to have severe R hydroureteronephrosis w/ numerous R-sided calculi including obstructing stone in R ureter as well as nonobstructive L calculi w/o hydro. Admitted for acute obstructive pyelonephritis w/ R hydronephrosis 2/2 nephrolithiasis resulting in JOÃO on CKD5, course complicated by GNR bacteremia and HAGMA. Now s/p R PCN and R nephroureteral catheter placement with IR (7/10), continuing on antibiotics    #JOÃO on CKD5, likely multifactorial - improving slightly today.   #Acute obstructive pyelonephritis with right hydronephrosis 2/2 R obstructing calculi  #GNR bacteremia   #Bilateral renal calculi   #HAGMA - improving  - Afebrile, hypotensive but improving, leukocytosis  - CKD stage 5 baseline Cr ~ 4.4, urinary incontinence with chronic supra pubic catheter  - CTAP shows severe hydro on R w/ obstructive calculi and non obs calculi on Left  - BCx w/ GNR, UCx on admission >3 spp likely contaminated   - s/p R PCN and nephroureteral catheter with IR on 7/10 - drained pus in OR and now w/ bloody drainage   - monitor PCN and SPC output   - urology consulted: f/u PCNL outpatient, no plan for L PCN at this time per urology  - ID consulted, appreciate recs   - Nephro consulted, appreciate recs  - c/w IV meropenem 500mg q12h per ID (transitioned from IV cefepime 2g q24h)  - Trend CBC, fever  - f/u BCx spp, f/u UCx from PCN  - c/w oral bicarb 650 mg TID   - Trend BUN/Cr, Mg, phos  - replete electrolytes PRN  - Strict I/Os    #Chronic venous stasis ulcers w/ dermatitis  - wounds do not appear infected, no edema or tenderness   - wound care consulted, appreciate recs  - cleanse wound w/ soap and water, pat dry  - apply dermaphor bid to bilateral legs and feet     #HTN  #HLD  - c/w atorvastatin  - d/c nifedipine iso hypotension     #DMII   - monitor FS  - ISS TIDAC, qhs    ---------------------  DVT ppx: HSQ  Diet: renal/CCC/DASHTLC  Activity: IAT  GOC: DNR/DNI (MOLST in chart), [facility] brother Erich [phone]  Dispo: pending clinical improvement 70 yo M with hx of urinary incontinence w/ chronic SPC, nephrolithiasis, spinal abscess w/ quadriplegia, DM2, CKD5 (baseline Cr 4.4), HTN, HLD, DM II, CKD stage 5 (baseline Cr 4.4)) presented to ED with abdominal pain, n/v, found to have severe R hydroureteronephrosis w/ numerous R-sided calculi including obstructing stone in R ureter as well as nonobstructive L calculi w/o hydro. Admitted for acute obstructive pyelonephritis w/ R hydronephrosis 2/2 nephrolithiasis resulting in JOÃO on CKD5, course complicated by GNR bacteremia and HAGMA. Now s/p R PCN and R nephroureteral catheter placement with IR (7/10), continuing on antibiotics    #Right-sided hydronephrosis 2/2 R obstructive ureteral calculi  #JOÃO on CKD5, postrenal, improving  #Pyelonephritis/complicated cystitis 2/2 P. mirabilis, S. marcescens E. Faecalis  #GNR bacteremia 2/2 B. fragilis  #Bilateral renal calculi   #HAGMA - improving  - CKD stage 5 baseline Cr ~ 4.4, urinary incontinence with chronic supra pubic catheter  - CTAP shows severe hydro on R w/ obstructive calculi and non obs calculi on Left  - s/p R PCN and R nephroureteral catheter with IR on 7/10 - drained pus in OR and now w/ bloody drainage   - BCx: GNR, B. fragilis  - UCx: P. mirabilis, S. marcescens, E. faecalis  - Afebrile, normotensive  - Leukocytosis improving  - Cr improving  - monitor PCN and SPC output   - urology consulted: R PCNL and L JJ stent in 2 weeks pending medical optimization  - ID consulted, appreciate recs   - Nephro consulted, appreciate recs  - c/w IV meropenem 500mg q12h per ID (transitioned from IV cefepime 2g q24h)  - Trend CBC, fever  - c/w oral bicarb 650 mg TID   - Trend BUN/Cr, Mg, phos  - replete electrolytes PRN  - Strict I/Os    #Chronic venous stasis ulcers w/ dermatitis  - wounds do not appear infected, no edema or tenderness   - wound care consulted, appreciate recs  - cleanse wound w/ soap and water, pat dry  - apply dermaphor bid to bilateral legs and feet     #HTN  #HLD  - c/w atorvastatin  - holding nifedipine iso hypotension     #DM2  - monitor FS  - ISS TIDAC, qhs    ---------------------  DVT ppx: HSQ  Diet: renal/CCC/DASHTLC  Activity: IAT  GOC: DNR/DNI (MOLST in chart), [facility] brothlachelle Jung [phone]  Dispo: pending clinical improvement

## 2024-07-14 NOTE — PROGRESS NOTE ADULT - ASSESSMENT
70 y/o man with PMH of HTN, hyperlipidemia, DM II, CKD 5 (baseline Cr 4.4) Urinary incontinence with chronic suprapubic catheter, Nephrolithiasis, Spinal abscess with quadriplegia and chronic LE wounds receiving dressing changes by visiting nurse now presented to the ED for crampy abdominal pain along with nausea and vomiting.   patient seen and examined    # JOÃO over CKD 5  due to obstructive uropathy   # Complicated UTI / pyelonephritis  # Bacteroids bacteremia  - CT abd/pelvis: Severe right hydroureteronephrosis with cortical thinning and numerous large intrarenal and proximal to mid ureteral calculi, obstructing calculus in the right mid ureter measures 1.1 x 1 x 1.8 cm with normal caliber distal ureter.  Multiple nonobstructive left intrarenal calculi and a 0.8 cm left distal ureteral calculus; no left hydronephrosis.  Few prominent retroperitoneal lymph nodes, likely reactive. Cholelithiasis.  - s/p eval by Urology in ED --> recommended b/l nephrostomy placement by IR.   - s/p right PCN by IR on 7/10 - 10 cc of thick, viscous green-yellow, malodorous right kidney urine sent for cultures  - s/p SPC exchange in the ED  - ID following- continue meropenem 500 mg BID  - blood cultures 1/2- bateroids(sn not performed); 1/2- GNR- monmitor; urine culture- Proteus, Serratia(both sn to dwain)  - acidosis improved - bicarb in IVF stopped - continue PO bicarbonate  - phos level 6.5 -> 4.5  - avoid nephrotoxic meds  -  renal following  - further stone management as outpt per - planning in 2 weeks    # hypokalemia- replete and monitor    #  HTN  - stable  monitor BP  nifedipine on hold    # Hyperlipidemia on statin    #  DM   A1C 6.9  insulin sliding scale    # Chronic LE wounds - wound care per advanced nursing recommendation    # Nausea - zofran prn    DVT ppx: Heparin SQ restarted    PT /OT consult  DNR/DNI Trial of NIV  Plan of care d/w patient; Left voice message with brother Erich  guarded prognosis      Pending: monitor culture (blood/urine/OR),  monitor urine output and right nephrostomy output, ID follow up  Disposition: home with care .

## 2024-07-14 NOTE — PROGRESS NOTE ADULT - SUBJECTIVE AND OBJECTIVE BOX
Nephrology progress note    THIS IS AN INCOMPLETE NOTE . FULL NOTE TO FOLLOW SHORTLY    Patient is seen and examined, events over the last 24 h noted .    Allergies:  No Known Allergies    Hospital Medications:   MEDICATIONS  (STANDING):  atorvastatin 20 milliGRAM(s) Oral at bedtime  dextrose 5%. 1000 milliLiter(s) (100 mL/Hr) IV Continuous <Continuous>  dextrose 5%. 1000 milliLiter(s) (50 mL/Hr) IV Continuous <Continuous>  dextrose 50% Injectable 12.5 Gram(s) IV Push once  dextrose 50% Injectable 25 Gram(s) IV Push once  dextrose 50% Injectable 25 Gram(s) IV Push once  glucagon  Injectable 1 milliGRAM(s) IntraMuscular once  heparin   Injectable 5000 Unit(s) SubCutaneous every 8 hours  insulin lispro (ADMELOG) corrective regimen sliding scale   SubCutaneous three times a day before meals  insulin lispro (ADMELOG) corrective regimen sliding scale   SubCutaneous at bedtime  meropenem  IVPB 500 milliGRAM(s) IV Intermittent every 12 hours  meropenem  IVPB      pantoprazole    Tablet 40 milliGRAM(s) Oral before breakfast  sodium bicarbonate 650 milliGRAM(s) Oral three times a day        VITALS:  T(F): 97.2 (07-13-24 @ 23:41), Max: 98.9 (07-13-24 @ 16:26)  HR: 72 (07-13-24 @ 23:41)  BP: 118/65 (07-13-24 @ 23:41)  RR: 18 (07-13-24 @ 23:41)  SpO2: 94% (07-13-24 @ 23:41)  Wt(kg): --    07-12 @ 07:01  -  07-13 @ 07:00  --------------------------------------------------------  IN: 0 mL / OUT: 900 mL / NET: -900 mL    07-13 @ 07:01  -  07-14 @ 07:00  --------------------------------------------------------  IN: 0 mL / OUT: 1375 mL / NET: -1375 mL          PHYSICAL EXAM:  Constitutional: NAD  HEENT: anicteric sclera, oropharynx clear, MMM  Neck: No JVD  Respiratory: CTAB, no wheezes, rales or rhonchi  Cardiovascular: S1, S2, RRR  Gastrointestinal: BS+, soft, NT/ND  Extremities: No cyanosis or clubbing. No peripheral edema  :  No angelo.   Skin: No rashes    LABS:  07-14    137  |  98  |  108<HH>  ----------------------------<  100<H>  3.4<L>   |  22  |  5.0<HH>    Ca    8.0<L>      14 Jul 2024 07:12  Phos  4.5     07-14  Mg     1.9     07-14    TPro  7.1  /  Alb  2.3<L>  /  TBili  0.4  /  DBili      /  AST  19  /  ALT  13  /  AlkPhos  132<H>  07-14                          9.7    19.03 )-----------( 220      ( 13 Jul 2024 06:12 )             29.3       Urine Studies:  Urinalysis Basic - ( 14 Jul 2024 07:12 )    Color:  / Appearance:  / SG:  / pH:   Gluc: 100 mg/dL / Ketone:   / Bili:  / Urobili:    Blood:  / Protein:  / Nitrite:    Leuk Esterase:  / RBC:  / WBC    Sq Epi:  / Non Sq Epi:  / Bacteria:           Iron 30, TIBC 143, %sat 21      [09-07-23 @ 09:17]  Ferritin 533      [09-07-23 @ 09:17]  PTH -- (Ca 8.3)      [07-10-24 @ 21:08]   43  Vitamin D (25OH) 13      [09-07-23 @ 09:17]  Lipid: chol 134, , HDL 30, LDL --      [09-07-23 @ 09:17]    HBsAg Nonreact      [09-17-23 @ 07:23]  HCV 0.16, Nonreact      [09-17-23 @ 07:23]    TAY: titer Negative, pattern --      [09-17-23 @ 07:23]  dsDNA <12      [09-17-23 @ 07:23]  C3 Complement 132      [09-16-23 @ 15:44]  C4 Complement 35      [09-16-23 @ 15:44]  ANCA: cANCA Negative, pANCA Negative, atypical ANCA Negative      [09-17-23 @ 07:23]  PLA2R: ARLEEN <1.8, IFA --      [09-17-23 @ 07:43]  Free Light Chains: kappa 35.40, lambda 35.96, ratio = 0.98      [09-17 @ 07:23]  Immunofixation Serum:   No Monoclonal Band Identified      Reference Range: None Detected      [09-17-23 @ 07:23]  SPEP Interpretation: Polyclonal Gammopathy      [09-17-23 @ 07:23]      RADIOLOGY & ADDITIONAL STUDIES:   Nephrology progress note    Patient is seen and examined, events over the last 24 h noted .  Comfortable lying in bed     Allergies:  No Known Allergies    Hospital Medications:   MEDICATIONS  (STANDING):  atorvastatin 20 milliGRAM(s) Oral at bedtime  glucagon  Injectable 1 milliGRAM(s) IntraMuscular once  heparin   Injectable 5000 Unit(s) SubCutaneous every 8 hours  insulin lispro (ADMELOG) corrective regimen sliding scale   SubCutaneous three times a day before meals  insulin lispro (ADMELOG) corrective regimen sliding scale   SubCutaneous at bedtime  meropenem  IVPB 500 milliGRAM(s) IV Intermittent every 12 hours    pantoprazole    Tablet 40 milliGRAM(s) Oral before breakfast  sodium bicarbonate 650 milliGRAM(s) Oral three times a day        VITALS:  T(F): 97.2 (07-13-24 @ 23:41), Max: 98.9 (07-13-24 @ 16:26)  HR: 72 (07-13-24 @ 23:41)  BP: 118/65 (07-13-24 @ 23:41)  RR: 18 (07-13-24 @ 23:41)  SpO2: 94% (07-13-24 @ 23:41)      07-12 @ 07:01  -  07-13 @ 07:00  --------------------------------------------------------  IN: 0 mL / OUT: 900 mL / NET: -900 mL    07-13 @ 07:01  -  07-14 @ 07:00  --------------------------------------------------------  IN: 0 mL / OUT: 1375 mL / NET: -1375 mL          PHYSICAL EXAM:  Constitutional: NAD  Respiratory: CTAB,   Cardiovascular: S1, S2, RRR  Gastrointestinal: BS+, soft, NT/ND  Extremities: No cyanosis or clubbing. No peripheral edema  Skin: No rashes    LABS:  07-14    137  |  98  |  108<HH>  ----------------------------<  100<H>  3.4<L>   |  22  |  5.0<HH>      Creatinine Trend: 5.0<--, 5.0<--, 5.3<--, 5.8<--, 5.8<--, 5.7<--    Ca    8.0<L>      14 Jul 2024 07:12  Phos  4.5     07-14  Mg     1.9     07-14    TPro  7.1  /  Alb  2.3<L>  /  TBili  0.4  /  DBili      /  AST  19  /  ALT  13  /  AlkPhos  132<H>  07-14                          9.7    19.03 )-----------( 220      ( 13 Jul 2024 06:12 )             29.3       Urine Studies:  Urinalysis Basic - ( 14 Jul 2024 07:12 )    Color:  / Appearance:  / SG:  / pH:   Gluc: 100 mg/dL / Ketone:   / Bili:  / Urobili:    Blood:  / Protein:  / Nitrite:    Leuk Esterase:  / RBC:  / WBC    Sq Epi:  / Non Sq Epi:  / Bacteria:           Iron 30, TIBC 143, %sat 21      [09-07-23 @ 09:17]  Ferritin 533      [09-07-23 @ 09:17]  PTH -- (Ca 8.3)      [07-10-24 @ 21:08]   43  Vitamin D (25OH) 13      [09-07-23 @ 09:17]  Lipid: chol 134, , HDL 30, LDL --      [09-07-23 @ 09:17]    HBsAg Nonreact      [09-17-23 @ 07:23]  HCV 0.16, Nonreact      [09-17-23 @ 07:23]    TAY: titer Negative, pattern --      [09-17-23 @ 07:23]  dsDNA <12      [09-17-23 @ 07:23]  C3 Complement 132      [09-16-23 @ 15:44]  C4 Complement 35      [09-16-23 @ 15:44]  ANCA: cANCA Negative, pANCA Negative, atypical ANCA Negative      [09-17-23 @ 07:23]  PLA2R: ARLEEN <1.8, IFA --      [09-17-23 @ 07:43]  Free Light Chains: kappa 35.40, lambda 35.96, ratio = 0.98      [09-17 @ 07:23]  Immunofixation Serum:   No Monoclonal Band Identified      Reference Range: None Detected      [09-17-23 @ 07:23]  SPEP Interpretation: Polyclonal Gammopathy      [09-17-23 @ 07:23]      RADIOLOGY & ADDITIONAL STUDIES:

## 2024-07-15 ENCOUNTER — TRANSCRIPTION ENCOUNTER (OUTPATIENT)
Age: 69
End: 2024-07-15

## 2024-07-15 LAB
ALBUMIN SERPL ELPH-MCNC: 2.4 G/DL — LOW (ref 3.5–5.2)
ALP SERPL-CCNC: 140 U/L — HIGH (ref 30–115)
ALT FLD-CCNC: 14 U/L — SIGNIFICANT CHANGE UP (ref 0–41)
ANION GAP SERPL CALC-SCNC: 16 MMOL/L — HIGH (ref 7–14)
AST SERPL-CCNC: 21 U/L — SIGNIFICANT CHANGE UP (ref 0–41)
BASOPHILS # BLD AUTO: 0.03 K/UL — SIGNIFICANT CHANGE UP (ref 0–0.2)
BASOPHILS NFR BLD AUTO: 0.2 % — SIGNIFICANT CHANGE UP (ref 0–1)
BILIRUB SERPL-MCNC: 0.5 MG/DL — SIGNIFICANT CHANGE UP (ref 0.2–1.2)
BUN SERPL-MCNC: 106 MG/DL — CRITICAL HIGH (ref 10–20)
CALCIUM SERPL-MCNC: 7.8 MG/DL — LOW (ref 8.4–10.5)
CHLORIDE SERPL-SCNC: 100 MMOL/L — SIGNIFICANT CHANGE UP (ref 98–110)
CO2 SERPL-SCNC: 23 MMOL/L — SIGNIFICANT CHANGE UP (ref 17–32)
CREAT SERPL-MCNC: 4.8 MG/DL — CRITICAL HIGH (ref 0.7–1.5)
EGFR: 12 ML/MIN/1.73M2 — LOW
EOSINOPHIL # BLD AUTO: 0.09 K/UL — SIGNIFICANT CHANGE UP (ref 0–0.7)
EOSINOPHIL NFR BLD AUTO: 0.7 % — SIGNIFICANT CHANGE UP (ref 0–8)
GLUCOSE BLDC GLUCOMTR-MCNC: 112 MG/DL — HIGH (ref 70–99)
GLUCOSE BLDC GLUCOMTR-MCNC: 115 MG/DL — HIGH (ref 70–99)
GLUCOSE BLDC GLUCOMTR-MCNC: 121 MG/DL — HIGH (ref 70–99)
GLUCOSE BLDC GLUCOMTR-MCNC: 96 MG/DL — SIGNIFICANT CHANGE UP (ref 70–99)
GLUCOSE SERPL-MCNC: 100 MG/DL — HIGH (ref 70–99)
HCT VFR BLD CALC: 31.6 % — LOW (ref 42–52)
HGB BLD-MCNC: 10.1 G/DL — LOW (ref 14–18)
IMM GRANULOCYTES NFR BLD AUTO: 1.1 % — HIGH (ref 0.1–0.3)
LYMPHOCYTES # BLD AUTO: 1.6 K/UL — SIGNIFICANT CHANGE UP (ref 1.2–3.4)
LYMPHOCYTES # BLD AUTO: 12.7 % — LOW (ref 20.5–51.1)
MAGNESIUM SERPL-MCNC: 2 MG/DL — SIGNIFICANT CHANGE UP (ref 1.8–2.4)
MCHC RBC-ENTMCNC: 29.7 PG — SIGNIFICANT CHANGE UP (ref 27–31)
MCHC RBC-ENTMCNC: 32 G/DL — SIGNIFICANT CHANGE UP (ref 32–37)
MCV RBC AUTO: 92.9 FL — SIGNIFICANT CHANGE UP (ref 80–94)
MONOCYTES # BLD AUTO: 0.73 K/UL — HIGH (ref 0.1–0.6)
MONOCYTES NFR BLD AUTO: 5.8 % — SIGNIFICANT CHANGE UP (ref 1.7–9.3)
NEUTROPHILS # BLD AUTO: 10.05 K/UL — HIGH (ref 1.4–6.5)
NEUTROPHILS NFR BLD AUTO: 79.5 % — HIGH (ref 42.2–75.2)
NRBC # BLD: 0 /100 WBCS — SIGNIFICANT CHANGE UP (ref 0–0)
PHOSPHATE SERPL-MCNC: 4.6 MG/DL — SIGNIFICANT CHANGE UP (ref 2.1–4.9)
PLATELET # BLD AUTO: 256 K/UL — SIGNIFICANT CHANGE UP (ref 130–400)
PMV BLD: 11.1 FL — HIGH (ref 7.4–10.4)
POTASSIUM SERPL-MCNC: 4.2 MMOL/L — SIGNIFICANT CHANGE UP (ref 3.5–5)
POTASSIUM SERPL-SCNC: 4.2 MMOL/L — SIGNIFICANT CHANGE UP (ref 3.5–5)
PROT SERPL-MCNC: 7 G/DL — SIGNIFICANT CHANGE UP (ref 6–8)
RBC # BLD: 3.4 M/UL — LOW (ref 4.7–6.1)
RBC # FLD: 14.2 % — SIGNIFICANT CHANGE UP (ref 11.5–14.5)
SODIUM SERPL-SCNC: 139 MMOL/L — SIGNIFICANT CHANGE UP (ref 135–146)
WBC # BLD: 12.64 K/UL — HIGH (ref 4.8–10.8)
WBC # FLD AUTO: 12.64 K/UL — HIGH (ref 4.8–10.8)

## 2024-07-15 PROCEDURE — 99232 SBSQ HOSP IP/OBS MODERATE 35: CPT | Mod: GC

## 2024-07-15 RX ORDER — PANTOPRAZOLE SODIUM 20 MG/1
1 TABLET, DELAYED RELEASE ORAL
Qty: 30 | Refills: 0
Start: 2024-07-15 | End: 2024-08-13

## 2024-07-15 RX ADMIN — HEPARIN SODIUM 5000 UNIT(S): 1000 INJECTION, SOLUTION INTRAVENOUS; SUBCUTANEOUS at 13:07

## 2024-07-15 RX ADMIN — ATORVASTATIN CALCIUM 20 MILLIGRAM(S): 40 TABLET, FILM COATED ORAL at 21:15

## 2024-07-15 RX ADMIN — HEPARIN SODIUM 5000 UNIT(S): 1000 INJECTION, SOLUTION INTRAVENOUS; SUBCUTANEOUS at 05:19

## 2024-07-15 RX ADMIN — PANTOPRAZOLE SODIUM 40 MILLIGRAM(S): 20 TABLET, DELAYED RELEASE ORAL at 05:20

## 2024-07-15 RX ADMIN — MEROPENEM 100 MILLIGRAM(S): 1 INJECTION, POWDER, FOR SOLUTION INTRAVENOUS at 06:22

## 2024-07-15 RX ADMIN — MEROPENEM 100 MILLIGRAM(S): 1 INJECTION, POWDER, FOR SOLUTION INTRAVENOUS at 17:23

## 2024-07-15 RX ADMIN — HEPARIN SODIUM 5000 UNIT(S): 1000 INJECTION, SOLUTION INTRAVENOUS; SUBCUTANEOUS at 21:13

## 2024-07-15 RX ADMIN — Medication 4 MILLIGRAM(S): at 12:06

## 2024-07-15 RX ADMIN — POTASSIUM CHLORIDE 40 MILLIEQUIVALENT(S): 1500 TABLET, EXTENDED RELEASE ORAL at 05:20

## 2024-07-15 RX ADMIN — Medication 650 MILLIGRAM(S): at 13:07

## 2024-07-15 RX ADMIN — Medication 650 MILLIGRAM(S): at 05:20

## 2024-07-15 RX ADMIN — Medication 4 MILLIGRAM(S): at 21:12

## 2024-07-15 RX ADMIN — Medication 650 MILLIGRAM(S): at 21:15

## 2024-07-15 NOTE — DISCHARGE NOTE PROVIDER - NSDCFUADDAPPT_GEN_ALL_CORE_FT
ID follow-up with Dr. Augusto Mathias for Telehealth. We will call the patient between 8:00-4:30      [id number]        [phone]       Fax [phone]

## 2024-07-15 NOTE — PROGRESS NOTE ADULT - ASSESSMENT
68 yo M with hx of urinary incontinence w/ chronic SPC, nephrolithiasis, spinal abscess w/ quadriplegia, DM2, CKD5 (baseline Cr 4.4), HTN, HLD, DM II, CKD stage 5 (baseline Cr 4.4)) presented to ED with abdominal pain, n/v, found to have severe R hydroureteronephrosis w/ numerous R-sided calculi including obstructing stone in R ureter as well as nonobstructive L calculi w/o hydro. Admitted for acute pyelonephritis and bacteremia 2/2 R obstructing ureteral stone w/ hydronephrosis. Course complicated by postrenal JOÃO on CKD5 and HAGMA, improving s/p R PCN and R nephroureteral catheter placement with IR (7/10), continuing on antibiotics    #Pyelonephritis/complicated cystitis 2/2 P. mirabilis, S. marcescens E. Faecalis  #GNR bacteremia 2/2 B. fragilis  #Right-sided hydronephrosis 2/2 R obstructive ureteral calculi s/p PCN  #JOÃO on CKD5, postrenal, improving  #Bilateral renal calculi   #HAGMA - improving  - CKD stage 5 baseline Cr ~4.4, urinary incontinence with chronic supra pubic catheter  - CTAP shows severe hydro on R w/ obstructive calculi and non obs calculi on Left  - s/p R PCN and R nephroureteral catheter with IR on 7/10 - drained pus in OR, draining minimally  - BCx: GNR, B. fragilis  - UCx: P. mirabilis, S. marcescens, E. faecalis   - Afebrile, normotensive  - Leukocytosis improving  - Cr improving, now close to baseline  - monitor PCN and SPC output   - Nephro consulted, appreciate recs  - urology consulted: R PCNL and L JJ stent in 2 weeks pending medical optimization  - ID consulted, f/u recs for abx  - c/w IV meropenem 500mg q12h per ID (transitioned from IV cefepime 2g q24h)  - Trend CBC, fever  - c/w oral bicarb 650 mg TID   - Trend BUN/Cr, Mg, phos  - replete electrolytes PRN  - Strict I/Os    #Chronic venous stasis ulcers w/ dermatitis  - wounds do not appear infected, no edema or tenderness   - wound care consulted, appreciate recs  - cleanse wound w/ soap and water, pat dry  - apply dermaphor bid to bilateral legs and feet     #HTN  #HLD  - c/w atorvastatin  - holding nifedipine iso hypotension     #DM2  - monitor FS  - ISS TIDAC, qhs    ---------------------  DVT ppx: HSQ  Diet: renal/CCC/DASHTLC  Activity: IAT  GOC: DNR/DNI (MOLST in chart), HCP brother Erich [phone]  Dispo: pending clinical improvement    #Summary/Handoff  - f/u ID recs for antibiotics 70 yo M with hx of urinary incontinence w/ chronic SPC, nephrolithiasis, spinal abscess w/ quadriplegia, DM2, CKD5 (baseline Cr 4.4), HTN, HLD, DM II, CKD stage 5 (baseline Cr 4.4)) presented to ED with abdominal pain, n/v, found to have severe R hydroureteronephrosis w/ numerous R-sided calculi including obstructing stone in R ureter as well as nonobstructive L calculi w/o hydro. Admitted for acute pyelonephritis and bacteremia 2/2 R obstructing ureteral stone w/ hydronephrosis. Course complicated by postrenal JOÃO on CKD5 and HAGMA, improving s/p R PCN and R nephroureteral catheter placement with IR (7/10), continuing on antibiotics    #Pyelonephritis/complicated cystitis 2/2 P. mirabilis, S. marcescens E. Faecalis  #GNR bacteremia 2/2 B. fragilis  #Right-sided hydronephrosis 2/2 R obstructive ureteral calculi s/p PCN  #JOÃO on CKD5, postrenal, improving  #Bilateral renal calculi   #HAGMA - improving  - CKD stage 5 baseline Cr ~4.4, urinary incontinence with chronic supra pubic catheter  - CTAP shows severe hydro on R w/ obstructive calculi and non obs calculi on Left  - s/p R PCN and R nephroureteral catheter with IR on 7/10 - drained pus in OR, draining minimally  - BCx: GNR, B. fragilis  - UCx: P. mirabilis, S. marcescens, E. faecalis   - Afebrile, normotensive  - Leukocytosis improving  - Cr improving, now close to baseline  - monitor PCN and SPC output   - Nephro consulted, appreciate recs  - urology consulted: R PCNL and L JJ stent in 2 weeks pending medical optimization  - ID consulted, appreciate recs  - c/w IV meropenem 500mg q12h per ID (transitioned from IV cefepime 2g q24h)  - Trend CBC, fever  - c/w oral bicarb 650 mg TID   - Trend BUN/Cr, Mg, phos  - replete electrolytes PRN  - Strict I/Os    #Chronic venous stasis ulcers w/ dermatitis  - wounds do not appear infected, no edema or tenderness   - wound care consulted, appreciate recs  - cleanse wound w/ soap and water, pat dry  - apply dermaphor bid to bilateral legs and feet     #HTN  #HLD  - c/w atorvastatin  - holding nifedipine iso hypotension     #DM2  - monitor FS  - ISS TIDAC, qhs    ---------------------  DVT ppx: HSQ  Diet: renal/CCC/DASHTLC  Activity: IAT  GOC: DNR/DNI (MOLST in chart), HCP brother Erich [phone]  Dispo: pending clinical improvement    #Summary/Handoff  - f/u ID recs for abx

## 2024-07-15 NOTE — DISCHARGE NOTE PROVIDER - HOSPITAL COURSE
HPI:  68yo male w/ pmhx of HTN, HLD, DMII, CKD stage 5 baseline Cr ~ 4.4, urinary incontinence with chronic supra pubic catheter, Nephrolithiasis, spinal abscess leading to quadriplegia presenting w. NBNB vomiting and nausea every time after he eats. Reports generalized crampy abdominal pain. Reports is non compliant w. medications and has not take is prescribed medications for the past few months.  Denies fever chills, flank pain, chest pain, sob, urinary sxs, oliguria.     in the ED,   Vitals: 136/80. HR 80. RR 18. T 98.2, satting 98% on RA   Labs: significant for wbc 20; UA grossly positive  CTAP shows severe R hydro; L non-obs calculi  SPC replaced   Urology consulted recommending emergency b/l nephrostomy tubes be placed    Patient admitted for acute on chronic JOÃO in s/o obstructing stones w/ UTI (09 Jul 2024 19:07)    Hospital Course: Patient was admitted for JOÃO on CKD in the setting of a R obstructing ureteral stone with R hydronpehrosis c/b acute pyelonephritis. Also found to have Left sided non-obstructing calculi. Patient received cefepime, vancomycin, and flagyl in ED. Cultures (blood, urine) were drawn before antibiotics. Urology and IR were consulted for emergent management. SPC tube was exchanged. IR placed Right PCN on 7/10, drained 10cc of thick pus, urine sent for culture. Pt was continued on cefepime and ID was consulted. ID recommended IV meropenem 500mg q12h. Urology recommending outpatient follow-up in 2 weeks   Nephrology was also consulted for JOÃO on CKD (Cr 6.1, baseline 4.4) and severe HAGMA (HCO3 12). Patient was placed on sodium bicarb infusion until bicarb normalized. Continued on oral bicarbonate 650mg TID.   Of note, patient was hypotensive during hospital admission so nifedipine was held. Confirmed with brother Erich that patient does not really take it at home.    Also, patient experiencing vomiting so patient was started on 40mg pantoprazole qd PO. Remainder of home medications were continued.       Important Medication Changes and Reason:    Active or Pending Issues Requiring Follow-up:    Advanced Directives:   [ ] Full code  [ ] DNR  [ ] Hospice    Discharge Diagnoses:         HPI:  68yo male w/ pmhx of HTN, HLD, DMII, CKD stage 5 baseline Cr ~ 4.4, urinary incontinence with chronic supra pubic catheter, Nephrolithiasis, spinal abscess leading to quadriplegia presenting w. NBNB vomiting and nausea every time after he eats. Reports generalized crampy abdominal pain. Reports is non compliant w. medications and has not take is prescribed medications for the past few months.  Denies fever chills, flank pain, chest pain, sob, urinary sxs, oliguria.     in the ED,   Vitals: 136/80. HR 80. RR 18. T 98.2, satting 98% on RA   Labs: significant for wbc 20; UA grossly positive  CTAP shows severe R hydro; L non-obs calculi  SPC replaced   Urology consulted recommending emergency b/l nephrostomy tubes be placed    Patient admitted for acute on chronic JOÃO in s/o obstructing stones w/ UTI (09 Jul 2024 19:07)    Hospital Course: Patient was admitted for JOÃO on CKD in the setting of a R obstructing ureteral stone with R hydronpehrosis c/b acute pyelonephritis. Also found to have Left sided non-obstructing calculi. Patient received cefepime, vancomycin, and flagyl in ED. Cultures (blood, urine) were drawn before antibiotics. Urology and IR were consulted for emergent management. SPC tube was exchanged. IR placed Right PCN on 7/10, drained 10cc of thick pus, urine sent for culture. Pt was continued on cefepime and ID was consulted. Urine culture resulted growing Proteus mirabilis, Serratia marcescens, and Enterococcus faecalis. Blood cultures were positive for GNR, Bacteroides fragilis.ID recommended IV meropenem 500mg q12h. Urology recommending outpatient follow-up in 2 weeks for L PCNL and R JJ stent.   Nephrology was also consulted for JOÃO on CKD (Cr 6.1, baseline 4.4) and severe HAGMA (HCO3 12). Patient was placed on sodium bicarb infusion until bicarb normalized. Continued on oral bicarbonate 650mg TID.   Of note, patient was hypotensive during hospital admission so nifedipine was held. Confirmed with brother Erich that patient does not really take it at home.    Also, patient experiencing vomiting, likely self-induced, so patient was started on 40mg pantoprazole qd PO. Remainder of home medications were continued.     Important Medication Changes:  [] ABX  [] sodium bicarbonate 650mg PO three times daily  [] pantoprazole 40mg PO daily before breakfast    Active or Pending Issues Requiring Follow-up:  [] follow up with infectious disease  [] follow up with nephrology  [] follow up with primary care provider     Advanced Directives: DNR/DNI    Discharge Diagnoses:  [] Bacteremia due to complicated cystitis/acute pyelonephritis   [] Hydronephrosis due to obstructing calculus  [] JOÃO on CKD  [] Chronic suprapubic catheter  [] Chronic venous stasis ulcers w/ dermatitis     HPI:  68yo male w/ pmhx of HTN, HLD, DMII, CKD stage 5 baseline Cr ~ 4.4, urinary incontinence with chronic supra pubic catheter, Nephrolithiasis, spinal abscess leading to quadriplegia presenting w. NBNB vomiting and nausea every time after he eats. Reports generalized crampy abdominal pain. Reports is non compliant w. medications and has not take is prescribed medications for the past few months.  Denies fever chills, flank pain, chest pain, sob, urinary sxs, oliguria.     in the ED,   Vitals: 136/80. HR 80. RR 18. T 98.2, satting 98% on RA   Labs: significant for wbc 20; UA grossly positive  CTAP shows severe R hydro; L non-obs calculi  SPC replaced   Urology consulted recommending emergency b/l nephrostomy tubes be placed    Patient admitted for acute on chronic JOÃO in s/o obstructing stones w/ UTI (09 Jul 2024 19:07)    Hospital Course: Patient was admitted for JOÃO on CKD in the setting of a R obstructing ureteral stone with R hydronpehrosis c/b acute pyelonephritis. Also found to have Left sided non-obstructing calculi. Patient received cefepime, vancomycin, and flagyl in ED. Cultures (blood, urine) were drawn before antibiotics. Urology and IR were consulted for emergent management. SPC tube was exchanged. IR placed Right PCN on 7/10, drained 10cc of thick pus, urine sent for culture. Pt was continued on cefepime and ID was consulted. Urine culture resulted growing Proteus mirabilis, Serratia marcescens, and Enterococcus faecalis. Blood cultures were positive for GNR, Bacteroides fragilis.ID recommended IV meropenem 500mg q12h. Urology recommending outpatient follow-up in 2 weeks for L PCNL and R JJ stent.   Nephrology was also consulted for JOÃO on CKD (Cr 6.1, baseline 4.4) and severe HAGMA (HCO3 12). Patient was placed on sodium bicarb infusion until bicarb normalized. Continued on oral bicarbonate 650mg TID.   Of note, patient was hypotensive during hospital admission so nifedipine was held. Confirmed with brother Erich that patient does not really take it at home.    Also, patient experiencing vomiting, likely self-induced. Patient has globus sensation. So patient was started on 40mg pantoprazole qd PO. Remainder of home medications were continued.     Important Medication Changes:  [] ABX- for augmentin and levofloxacin till 2 days after urological procedures  [] sodium bicarbonate 650mg PO three times daily  [] pantoprazole 40mg PO daily before breakfast    Active or Pending Issues Requiring Follow-up:  [] follow up with Infectious disease  [] follow up with urologist- Dr Dean  [] follow up with nephrology  [] follow up with primary care provider     Advanced Directives: DNR/DNI    Discharge Diagnoses:    [] Bacteremia due to complicated cystitis/acute pyelonephritis   [] Hydronephrosis due to obstructing calculus  [] JOÃO on CKD  [] Chronic suprapubic catheter  [] Chronic venous stasis ulcers w/ dermatitis     HPI:  68yo male w/ pmhx of HTN, HLD, DMII, CKD stage 5 baseline Cr ~ 4.4, urinary incontinence with chronic supra pubic catheter, Nephrolithiasis, spinal abscess leading to quadriplegia presenting w. NBNB vomiting and nausea every time after he eats. Reports generalized crampy abdominal pain. Reports is non compliant w. medications and has not take is prescribed medications for the past few months.  Denies fever chills, flank pain, chest pain, sob, urinary sxs, oliguria.     in the ED,   Vitals: 136/80. HR 80. RR 18. T 98.2, satting 98% on RA   Labs: significant for wbc 20; UA grossly positive  CTAP shows severe R hydro; L non-obs calculi  SPC replaced   Urology consulted recommending emergency b/l nephrostomy tubes be placed    Patient admitted for acute on chronic JOÃO in s/o obstructing stones w/ UTI (09 Jul 2024 19:07)    Hospital Course: Patient was admitted for JOÃO on CKD in the setting of a R obstructing ureteral stone with R hydronpehrosis c/b acute pyelonephritis. Also found to have Left sided non-obstructing calculi. Patient received cefepime, vancomycin, and flagyl in ED. Cultures (blood, urine) were drawn before antibiotics. Urology and IR were consulted for emergent management. SPC tube was exchanged. IR placed Right PCN on 7/10, drained 10cc of thick pus, urine sent for culture. Pt was continued on cefepime and ID was consulted. Urine culture resulted growing Proteus mirabilis, Serratia marcescens, and Enterococcus faecalis. Blood cultures were positive for GNR, Bacteroides fragilis.ID recommended IV meropenem 500mg q12h. Urology recommending outpatient follow-up in 2 weeks for L PCNL and R JJ stent.   Nephrology was also consulted for JOÃO on CKD (Cr 6.1, baseline 4.4) and severe HAGMA (HCO3 12). Patient was placed on sodium bicarb infusion until bicarb normalized. Continued on oral bicarbonate 650mg TID.   Of note, patient was hypotensive during hospital admission so nifedipine was held. Confirmed with brother Erich that patient does not really take it at home.    Also, patient experiencing vomiting, likely self-induced. Patient has globus sensation. So patient was started on 40mg pantoprazole qd PO. Remainder of home medications were continued.     Important Medication Changes:  [] Augmentin PO 500mg qd and Levofloxacin 500mg q48h until 2 days after urological procedure (last dose 7/25/24)  [] pantoprazole 40mg PO daily before breakfast    Active or Pending Issues Requiring Follow-up:  [] follow up with Infectious disease  [] follow up with urologist- Dr. Akins  [] follow up with nephrology  [] follow up with primary care provider     Advanced Directives: DNR/DNI    Discharge Diagnoses:  [] Bacteremia due to complicated cystitis/acute pyelonephritis   [] Hydronephrosis due to obstructing calculus  [] JOÃO on CKD  [] Chronic suprapubic catheter  [] Chronic venous stasis ulcers w/ dermatitis     HPI:  70yo male w/ pmhx of HTN, HLD, DMII, CKD stage 5 baseline Cr ~ 4.4, urinary incontinence with chronic supra pubic catheter, Nephrolithiasis, spinal abscess leading to quadriplegia presenting w. NBNB vomiting and nausea every time after he eats. Reports generalized crampy abdominal pain. Reports is non compliant w. medications and has not take is prescribed medications for the past few months.  Denies fever chills, flank pain, chest pain, sob, urinary sxs, oliguria.     in the ED,   Vitals: 136/80. HR 80. RR 18. T 98.2, satting 98% on RA   Labs: significant for wbc 20; UA grossly positive  CTAP shows severe R hydro; L non-obs calculi  SPC replaced   Urology consulted recommending emergency b/l nephrostomy tubes be placed    Patient admitted for acute on chronic JOÃO in s/o obstructing stones w/ UTI (09 Jul 2024 19:07)    Hospital Course: Patient was admitted for JOÃO on CKD in the setting of a R obstructing ureteral stone with R hydronpehrosis c/b acute pyelonephritis. Also found to have Left sided non-obstructing calculi. Patient received cefepime, vancomycin, and flagyl in ED. Cultures (blood, urine) were drawn before antibiotics. Urology and IR were consulted for emergent management. SPC tube was exchanged. IR placed Right PCN on 7/10, drained 10cc of thick pus, urine sent for culture. Pt was continued on cefepime and ID was consulted. Urine culture resulted growing Proteus mirabilis, Serratia marcescens, and Enterococcus faecalis. Blood cultures were positive for GNR, Bacteroides fragilis.ID recommended IV meropenem 500mg q12h. Urology recommending outpatient follow-up in 2 weeks for L PCNL and R JJ stent.   Nephrology was also consulted for JOÃO on CKD (Cr 6.1, baseline 4.4) and severe HAGMA (HCO3 12). Patient was placed on sodium bicarb infusion until bicarb normalized. Continued on oral bicarbonate 650mg TID.   Of note, patient was hypotensive during hospital admission so nifedipine was held. Confirmed with brother Erich that patient does not really take it at home.    Also, patient experiencing vomiting, likely self-induced. Patient has globus sensation. So patient was started on 40mg pantoprazole qd PO. Remainder of home medications were continued.     Important Medication Changes:  [] Augmentin PO 500mg qd   [] Levofloxacin 500mg q48h until 2 days after urological procedure (last dose 7/25/24)  [] Pantoprazole 40mg PO daily before breakfast    Active or Pending Issues Requiring Follow-up:  [] follow up with Infectious disease  [] follow up with urologist- Dr. Akins  [] follow up with nephrology  [] follow up with primary care provider     Advanced Directives: DNR/DNI    Discharge Diagnoses:  [] Bacteremia due to complicated cystitis/acute pyelonephritis   [] Hydronephrosis due to obstructing calculus  [] JOÃO on CKD  [] Chronic suprapubic catheter  [] Chronic venous stasis ulcers w/ dermatitis     HPI:  68yo male w/ pmhx of HTN, HLD, DMII, CKD stage 5 baseline Cr ~ 4.4, urinary incontinence with chronic supra pubic catheter, Nephrolithiasis, spinal abscess leading to quadriplegia presenting w. NBNB vomiting and nausea every time after he eats. Reports generalized crampy abdominal pain. Reports is non compliant w. medications and has not take is prescribed medications for the past few months.  Denies fever chills, flank pain, chest pain, sob, urinary sxs, oliguria.     in the ED,   Vitals: 136/80. HR 80. RR 18. T 98.2, satting 98% on RA   Labs: significant for wbc 20; UA grossly positive  CTAP shows severe R hydro; L non-obs calculi  SPC replaced   Urology consulted recommending emergency b/l nephrostomy tubes be placed    Patient admitted for acute on chronic JOÃO in s/o obstructing stones w/ UTI (09 Jul 2024 19:07)    Hospital Course: Patient was admitted for JOÃO on CKD in the setting of a R obstructing ureteral stone with R hydronpehrosis c/b acute pyelonephritis. Also found to have Left sided non-obstructing calculi. Patient received cefepime, vancomycin, and flagyl in ED. Cultures (blood, urine) were drawn before antibiotics. Urology and IR were consulted for emergent management. SPC tube was exchanged. IR placed Right PCN on 7/10, drained 10cc of thick pus, urine sent for culture. Pt was continued on cefepime and ID was consulted. Urine culture resulted growing Proteus mirabilis, Serratia marcescens, and Enterococcus faecalis. Blood cultures were positive for GNR, Bacteroides fragilis. ID recommended IV meropenem 500mg q12h. Discharge recommendations were IV ertapenem 500mg qd and PO amoxicillin 500mg qd to continue until 2 days after urologic procedure (tentative 7/23). A sterile midline was placed via procedure team. Urology recommending outpatient follow-up in 2 weeks for L PCNL and R JJ stent.   Nephrology was also consulted for JOÃO on CKD (Cr 6.1, baseline 4.4) and severe HAGMA (HCO3 12). Patient was placed on sodium bicarb infusion until bicarb normalized. Continued on oral bicarbonate 650mg TID.   Of note, patient was hypotensive during hospital admission so nifedipine was held. Confirmed with brother Erich that patient does not really take it at home.    Also, patient experiencing vomiting, likely self-induced. Patient has globus sensation. So patient was started on 40mg pantoprazole qd PO. Remainder of home medications were continued.     Important Medication Changes:  [] PO Amoxicillin 500mg qd (last dose 7/25/24)  [] IV ertapenem 500mg qd (last dose 7/25/24)  [] Pantoprazole 40mg PO daily before breakfast    Active or Pending Issues Requiring Follow-up:  [] follow up with Infectious disease  [] follow up with urologist- Dr. Akins  [] follow up with nephrology  [] follow up with primary care provider     Advanced Directives: DNR/DNI    Discharge Diagnoses:  [] Bacteremia due to complicated cystitis/acute pyelonephritis   [] Hydronephrosis due to obstructing calculus  [] JOÃO on CKD  [] Chronic suprapubic catheter  [] Chronic venous stasis ulcers w/ dermatitis     HPI:  68yo male w/ pmhx of HTN, HLD, DMII, CKD stage 5 baseline Cr ~ 4.4, urinary incontinence with chronic supra pubic catheter, Nephrolithiasis, spinal abscess leading to quadriplegia presenting w. NBNB vomiting and nausea every time after he eats. Reports generalized crampy abdominal pain. Reports is non compliant w. medications and has not take is prescribed medications for the past few months.  Denies fever chills, flank pain, chest pain, sob, urinary sxs, oliguria.     in the ED,   Vitals: 136/80. HR 80. RR 18. T 98.2, satting 98% on RA   Labs: significant for wbc 20; UA grossly positive  CTAP shows severe R hydro; L non-obs calculi  SPC replaced   Urology consulted recommending emergency b/l nephrostomy tubes be placed    Patient admitted for acute on chronic JOÃO in s/o obstructing stones w/ UTI (09 Jul 2024 19:07)    Hospital Course: Patient was admitted for JOÃO on CKD in the setting of a R obstructing ureteral stone with R hydronpehrosis c/b acute pyelonephritis. Also found to have Left sided non-obstructing calculi. Patient received cefepime, vancomycin, and flagyl in ED. Cultures (blood, urine) were drawn before antibiotics. Urology and IR were consulted for emergent management. SPC tube was exchanged. IR placed Right PCN on 7/10, drained 10cc of thick pus, urine sent for culture. Pt was continued on cefepime and ID was consulted. Urine culture resulted growing Proteus mirabilis, Serratia marcescens, and Enterococcus faecalis. Blood cultures were positive for GNR, Bacteroides fragilis. ID recommended IV meropenem 500mg q12h. Discharge recommendations were IV ertapenem 500mg qd and PO amoxicillin 500mg qd to continue until 2 days after urologic procedure (tentative 7/23). A sterile midline was placed via procedure team on 7/17. Urology recommending outpatient follow-up 07/23 for L PCNL and R JJ stent.   Nephrology was also consulted for JOÃO on CKD (Cr 6.1, baseline 4.4) and severe HAGMA (HCO3 12). Patient was placed on sodium bicarb infusion until bicarb normalized. Continued on oral bicarbonate 650mg TID.   Of note, patient was hypotensive during hospital admission so nifedipine was held. Confirmed with brother Erich that patient does not really take it at home.    Also, patient experiencing vomiting, likely self-induced due to globus sensation. Patient was started on 40mg pantoprazole qd PO. All remaining home medications were continued.       Important Medication Changes:  [] PO Amoxicillin 500mg qd until 48h postop (last dose 7/25/24)  [] IV ertapenem 500mg qd until 48h postop (last dose 7/25/24)  [] Pantoprazole 40mg PO daily before breakfast    Active or Pending Issues Requiring Follow-up:  [] follow up with Infectious disease- Dr. Augusto Jane via telehealth tuesdays  [] follow up with urologist- Dr. Akins  [] follow up with nephrology- Dr. Demetrio Bourne  [] follow up with primary care provider     Advanced Directives: DNR/DNI    Discharge Diagnoses:  [] Bacteremia due to complicated cystitis/acute pyelonephritis   [] Hydronephrosis due to obstructing calculus  [] JOÃO on CKD  [] Chronic suprapubic catheter  [] Chronic venous stasis ulcers w/ dermatitis     HPI:  70yo male w/ pmhx of HTN, HLD, DMII, CKD stage 5 baseline Cr ~ 4.4, urinary incontinence with chronic supra pubic catheter, Nephrolithiasis, spinal abscess leading to quadriplegia presenting w. NBNB vomiting and nausea every time after he eats.     in the ED,   Vitals: 136/80. HR 80. RR 18. T 98.2, satting 98% on RA   Labs: significant for wbc 20; UA grossly positive  CTAP shows severe R hydro; L non-obs calculi  SPC replaced   Urology consulted recommending emergency b/l nephrostomy tubes be placed    Patient admitted for acute on chronic JOÃO in s/o obstructing stones w/ UTI (09 Jul 2024 19:07)    Hospital Course: Patient was admitted for JOÃO on CKD in the setting of a R obstructing ureteral stone with R hydronpehrosis c/b acute pyelonephritis. Also found to have Left sided non-obstructing calculi. Patient received cefepime, vancomycin, and flagyl in ED. Cultures (blood, urine) were drawn before antibiotics. Urology and IR were consulted for emergent management. SPC tube was exchanged. IR placed Right PCN on 7/10, drained 10cc of thick pus, urine sent for culture. Pt was continued on cefepime and ID was consulted. Urine culture resulted growing Proteus mirabilis, Serratia marcescens, and Enterococcus faecalis. Blood cultures were positive for GNR, Bacteroides fragilis. ID recommended IV meropenem 500mg q12h.     - Urology was following and had Cystoscopic removal of urethral stone, Cystoscopic insertion of ureteric stent, Percutaneous nephrostolithotomy with basket extraction of large calculus, Replacement, suprapubic tube on 7/23 for right renal stone, ureteral, bladder and urethral stone.    - Patient had follow up CT stentogram which showed - Post bilateral nephroureteral catheters with resolution of hydronephrosis and decreased renal stone burden. Residual bilateral renal and ureteral stone burden present.    IR did  R PCNU access for urological intervention on 7/26:    Nephrology was also consulted for JOÃO on CKD (Cr 6.1, baseline 4.4) and severe HAGMA (HCO3 12). Patient was placed on sodium bicarb infusion until bicarb normalized. Continued on oral bicarbonate 650mg TID.   Of note, patient was hypotensive during hospital admission so nifedipine was held. Confirmed with brother Erich that patient does not really take it at home.    Also, patient experiencing vomiting, likely self-induced due to globus sensation. Patient was started on 40mg pantoprazole qd PO. All remaining home medications were continued.       Important Medication Changes:  [] Pantoprazole 40mg PO daily before breakfast    Active or Pending Issues Requiring Follow-up:  [] follow up with urologist- Dr. Akins  [] follow up with nephrology- Dr. Demetrio Bouren  [] follow up with primary care provider     Advanced Directives: DNR/DNI    Discharge Diagnoses:  [] Bacteremia due to complicated cystitis/acute pyelonephritis   [] Hydronephrosis due to obstructing calculus  [] JOÃO on CKD  [] Chronic suprapubic catheter  [] Chronic venous stasis ulcers w/ dermatitis

## 2024-07-15 NOTE — DISCHARGE NOTE PROVIDER - ATTENDING DISCHARGE PHYSICAL EXAMINATION:
P/e on 7/28      PHYSICAL EXAM  GEN: no distress, comfortable  PULM: BS heard b/l equal, No wheezing  CVS: S1S2 present, no rubs or gallops  ABD: Soft, non-distended, no guarding; non-tender  EXT: No lower extremity edema  NEURO: A&Ox3

## 2024-07-15 NOTE — OCCUPATIONAL THERAPY INITIAL EVALUATION ADULT - ADDITIONAL COMMENTS
Pt rec's assistance from brother for adls and has a full body lift for transfers. Pt has hospital bed and wheelchair

## 2024-07-15 NOTE — PROGRESS NOTE ADULT - ASSESSMENT
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 NBNB vomiting and nausea every time after he eats.       IMPRESSION  #Bacteroides fragilis bacteremia secondary to Pyelonephritis/Pyonephrosis with obstructing stone    7/10 R nephrostomy   >100,000 CFU/ml Proteus mirabilis R fluoro R bactrim     10,000 - 49,000 CFU/mL Serratia marcescens    50,000 - 99,000 CFU/mL Enterococcus faecalis    7/9 BCX GNR- Bacteroides fragilis    7/9 BCX NGTD   - s/p percutaneous right nephrostomy followed by placement of right nephroureteral catheter    WBC 20 on admission ; Afebrile     7/9 UA pyuria WBC 50 ; UCX   >=3 organisms. Probable collection contamination.    SPT (replaced in ER)    CTAP 1.  Severe right hydroureteronephrosis with cortical thinning and   numerous large intrarenal and proximal to mid ureteral calculi;   obstructing calculus in the right mid ureter measures 1.1 x 1 x 1.8 cm (   ) with normal caliber distal ureter.  2.  Multiple nonobstructing left intrarenal calculi and a 0.8 cm left   distal ureteral calculus; no left hydronephrosis.  3.  Few prominent retroperitoneal lymph nodes, likely reactive.  4.  Cholelithiasis.  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   - Appreciate Urology consult- planning on urologic intervention in two weeks, please continue medical optimization and medical clearance for general anesthesia and RIGHT PCNL and LEFT JJ stent placement  - Appreciate IR consult    If any questions, please send a message or call on [employer] Teams  Please continue to update ID with any pertinent new laboratory, radiographic findings, or change in clinical status   ASSESSMENT  68yo male w/ pmhx of HTN, HLD, DMII, CKD stage 5 baseline Cr ~ 4.4, urinary incontinence with chronic supra pubic catheter, Nephrolithiasis, spinal abscess leading to quadriplegia presenting w. NBNB vomiting and nausea every time after he eats.       IMPRESSION  #Bacteroides fragilis bacteremia secondary to Pyelonephritis/Pyonephrosis with obstructing stone    7/10 R nephrostomy   >100,000 CFU/ml Proteus mirabilis R fluoro R bactrim  Amoxicillin/Clavulanic Acid: S <=8/4    10,000 - 49,000 CFU/mL Serratia marcescens Levofloxacin: S <=0.5    50,000 - 99,000 CFU/mL Enterococcus faecalis Levofloxacin: S 1    7/9 BCX GNR- Bacteroides fragilis    7/9 BCX NGTD   - s/p percutaneous right nephrostomy followed by placement of right nephroureteral catheter    WBC 20 on admission ; Afebrile     7/9 UA pyuria WBC 50 ; UCX   >=3 organisms. Probable collection contamination.    SPT (replaced in ER)    CTAP 1.  Severe right hydroureteronephrosis with cortical thinning and   numerous large intrarenal and proximal to mid ureteral calculi;   obstructing calculus in the right mid ureter measures 1.1 x 1 x 1.8 cm (   ) with normal caliber distal ureter.  2.  Multiple nonobstructing left intrarenal calculi and a 0.8 cm left   distal ureteral calculus; no left hydronephrosis.  3.  Few prominent retroperitoneal lymph nodes, likely reactive.  4.  Cholelithiasis.  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 which could results in poor clinical outcomes  #Abx allergy: No Known Allergies  #CKD  Creatinine: 5.3 (07-10-24 @ 06:36)  QTC Calculation(Bazett) 439 ms  Height (cm): 185.4 (09-18-23 @ 13:48)  Weight (kg): 79.379 (09-18-23 @ 13:48)    RECOMMENDATIONS  - Continue meropenem 500mg q12h IV, On D/C Can change to PO Levaquin 500mg every 48h and PO augmentin 500mg daily , would continue until 48h post-op after stent placement  - Appreciate Urology consult- planning on urologic intervention in two weeks, please continue medical optimization and medical clearance for general anesthesia and RIGHT PCNL and LEFT JJ stent placement  - Appreciate IR consult    If any questions, please send a message or call on [employer] Teams  Please continue to update ID with any pertinent new laboratory, radiographic findings, or change in clinical status

## 2024-07-15 NOTE — PHYSICAL THERAPY INITIAL EVALUATION ADULT - PERTINENT HX OF CURRENT PROBLEM, REHAB EVAL
Pt. is currently bedbound at baseline and uses a ana lift for transferring from bed to wheelchair. Pt. is non-ambulatory and a quadriplegic. Pt. is not an appropriate candidate for PT IE at this time. If necessary, please reconsult.

## 2024-07-15 NOTE — DISCHARGE NOTE PROVIDER - DISCHARGE SERVICE FOR PATIENT
on the discharge service for the patient. I have reviewed and made amendments to the documentation where necessary. 26-Mar-2020 17:13

## 2024-07-15 NOTE — OCCUPATIONAL THERAPY INITIAL EVALUATION ADULT - PERTINENT HX OF CURRENT PROBLEM, REHAB EVAL
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 NBNB vomiting and nausea every time after he eats. Reports generalized crampy abdominal pain. Reports is non compliant w. medications and has not take is prescribed medications for the past few months.  Denies fever chills, flank pain, chest pain, sob, urinary sxs, oliguria.

## 2024-07-15 NOTE — PROGRESS NOTE ADULT - SUBJECTIVE AND OBJECTIVE BOX
Patient is a 69y old Male who presents with a chief complaint of UTI w/ obstructing stone (15 Jul 2024 06:31)    Today is hospital day 6    Overnight events/Interval History:  - No acute overnight events  - At bedside, patient has been sleeping and eating well. Reports 2 episodes of vomiting yesterday, worse with food. Breathing comfortably on room air. Denies fevers, chills, SOB, chest pain, D/C, abdominal pain.    ROS:  - All ROS is negative unless indicated in HPI    Code Status: DNR/DNI, trial NIV    ALLERGIES:  No Known Allergies    MEDICATIONS:  STANDING MEDICATIONS  atorvastatin 20 milliGRAM(s) Oral at bedtime  dextrose 5%. 1000 milliLiter(s) IV Continuous <Continuous>  dextrose 5%. 1000 milliLiter(s) IV Continuous <Continuous>  dextrose 50% Injectable 12.5 Gram(s) IV Push once  dextrose 50% Injectable 25 Gram(s) IV Push once  dextrose 50% Injectable 25 Gram(s) IV Push once  glucagon  Injectable 1 milliGRAM(s) IntraMuscular once  heparin   Injectable 5000 Unit(s) SubCutaneous every 8 hours  insulin lispro (ADMELOG) corrective regimen sliding scale   SubCutaneous three times a day before meals  insulin lispro (ADMELOG) corrective regimen sliding scale   SubCutaneous at bedtime  meropenem  IVPB 500 milliGRAM(s) IV Intermittent every 12 hours  meropenem  IVPB      pantoprazole    Tablet 40 milliGRAM(s) Oral before breakfast  sodium bicarbonate 650 milliGRAM(s) Oral three times a day    PRN MEDICATIONS  aluminum hydroxide/magnesium hydroxide/simethicone Suspension 30 milliLiter(s) Oral every 4 hours PRN  dextrose Oral Gel 15 Gram(s) Oral once PRN  melatonin 3 milliGRAM(s) Oral at bedtime PRN  ondansetron Injectable 4 milliGRAM(s) IV Push every 8 hours PRN      VITALS LAST 24H:  Vital Signs Last 24 Hrs  T(C): 36.4 (15 Jul 2024 08:12), Max: 37 (15 Jul 2024 00:00)  T(F): 97.5 (15 Jul 2024 08:12), Max: 98.6 (15 Jul 2024 00:00)  HR: 68 (15 Jul 2024 08:12) (67 - 78)  BP: 138/73 (15 Jul 2024 08:12) (101/64 - 138/73)  BP(mean): --  RR: 17 (15 Jul 2024 08:12) (17 - 18)  SpO2: 97% (15 Jul 2024 08:12) (97% - 97%)    Parameters below as of 15 Jul 2024 08:12  Patient On (Oxygen Delivery Method): room air        PHYSICAL EXAM:  GENERAL: NAD, lying in bed comfortably  HEENT:  EOMI, Moist mucous membranes, poor dentition  CHEST/LUNG: Clear to auscultation bilaterally; normal respiratory effort, no coughing, wheezes, crackles, or rales.  HEART: Regular rate and rhythm  ABDOMEN: Soft, non-tender, nondistended, +SPC w/ clear yellow urine draining. +R PCN w/ clear urine   EXTREMITIES:  2+ Peripheral Pulses, brisk capillary refill. +chronic venous stasis ulcers w/ dermatitis bilaterally. 1+ nonpitting pedal edema   NERVOUS SYSTEM:  A&Ox3, no focal deficits   SKIN: No rashes or lesions    LABS:                        10.1   12.64 )-----------( 256      ( 15 Jul 2024 05:43 )             31.6     07-15    139  |  100  |  106<HH>  ----------------------------<  100<H>  4.2   |  23  |  4.8<HH>    Ca    7.8<L>      15 Jul 2024 05:43  Phos  4.6     07-15  Mg     2.0     07-15    TPro  7.0  /  Alb  2.4<L>  /  TBili  0.5  /  DBili  x   /  AST  21  /  ALT  14  /  AlkPhos  140<H>  07-15      Urinalysis Basic - ( 15 Jul 2024 05:43 )    Color: x / Appearance: x / SG: x / pH: x  Gluc: 100 mg/dL / Ketone: x  / Bili: x / Urobili: x   Blood: x / Protein: x / Nitrite: x   Leuk Esterase: x / RBC: x / WBC x   Sq Epi: x / Non Sq Epi: x / Bacteria: x                RADIOLOGY:  CXR      CT           Patient is a 69y old Male who presents with a chief complaint of UTI w/ obstructing stone (15 Jul 2024 06:31)    Today is hospital day 6    Overnight events/Interval History:  - No acute overnight events  - At bedside, patient has been sleeping and eating well. Reports 2 episodes of vomiting yesterday, worse with food. Breathing comfortably on room air. Denies fevers, chills, SOB, chest pain, D/C, abdominal pain.    ROS:  - All ROS is negative unless indicated in HPI    Code Status: DNR/DNI, trial NIV    ALLERGIES:  No Known Allergies    MEDICATIONS:  STANDING MEDICATIONS  atorvastatin 20 milliGRAM(s) Oral at bedtime  dextrose 5%. 1000 milliLiter(s) IV Continuous <Continuous>  dextrose 5%. 1000 milliLiter(s) IV Continuous <Continuous>  dextrose 50% Injectable 12.5 Gram(s) IV Push once  dextrose 50% Injectable 25 Gram(s) IV Push once  dextrose 50% Injectable 25 Gram(s) IV Push once  glucagon  Injectable 1 milliGRAM(s) IntraMuscular once  heparin   Injectable 5000 Unit(s) SubCutaneous every 8 hours  insulin lispro (ADMELOG) corrective regimen sliding scale   SubCutaneous three times a day before meals  insulin lispro (ADMELOG) corrective regimen sliding scale   SubCutaneous at bedtime  meropenem  IVPB 500 milliGRAM(s) IV Intermittent every 12 hours  meropenem  IVPB      pantoprazole    Tablet 40 milliGRAM(s) Oral before breakfast  sodium bicarbonate 650 milliGRAM(s) Oral three times a day    PRN MEDICATIONS  aluminum hydroxide/magnesium hydroxide/simethicone Suspension 30 milliLiter(s) Oral every 4 hours PRN  dextrose Oral Gel 15 Gram(s) Oral once PRN  melatonin 3 milliGRAM(s) Oral at bedtime PRN  ondansetron Injectable 4 milliGRAM(s) IV Push every 8 hours PRN      VITALS LAST 24H:  Vital Signs Last 24 Hrs  T(C): 36.4 (15 Jul 2024 08:12), Max: 37 (15 Jul 2024 00:00)  T(F): 97.5 (15 Jul 2024 08:12), Max: 98.6 (15 Jul 2024 00:00)  HR: 68 (15 Jul 2024 08:12) (67 - 78)  BP: 138/73 (15 Jul 2024 08:12) (101/64 - 138/73)  BP(mean): --  RR: 17 (15 Jul 2024 08:12) (17 - 18)  SpO2: 97% (15 Jul 2024 08:12) (97% - 97%)    Parameters below as of 15 Jul 2024 08:12  Patient On (Oxygen Delivery Method): room air        PHYSICAL EXAM:  GENERAL: NAD, lying in bed comfortably  HEENT:  EOMI, Moist mucous membranes, poor dentition  CHEST/LUNG: Clear to auscultation bilaterally; normal respiratory effort, no coughing, wheezes, crackles, or rales.  HEART: Regular rate and rhythm  ABDOMEN: Soft, non-tender, nondistended, +SPC w/ clear yellow urine draining. +R PCN w/ clear urine   EXTREMITIES:  2+ Peripheral Pulses, brisk capillary refill. +chronic venous stasis ulcers w/ dermatitis bilaterally. 1+ nonpitting pedal edema   NERVOUS SYSTEM:  A&Ox3, no focal deficits   SKIN: No rashes or lesions    LABS:                        10.1   12.64 )-----------( 256      ( 15 Jul 2024 05:43 )             31.6     07-15    139  |  100  |  106<HH>  ----------------------------<  100<H>  4.2   |  23  |  4.8<HH>    Ca    7.8<L>      15 Jul 2024 05:43  Phos  4.6     07-15  Mg     2.0     07-15    TPro  7.0  /  Alb  2.4<L>  /  TBili  0.5  /  DBili  x   /  AST  21  /  ALT  14  /  AlkPhos  140<H>  07-15      Urinalysis Basic - ( 15 Jul 2024 05:43 )    Color: x / Appearance: x / SG: x / pH: x  Gluc: 100 mg/dL / Ketone: x  / Bili: x / Urobili: x   Blood: x / Protein: x / Nitrite: x   Leuk Esterase: x / RBC: x / WBC x   Sq Epi: x / Non Sq Epi: x / Bacteria: x

## 2024-07-15 NOTE — OCCUPATIONAL THERAPY INITIAL EVALUATION ADULT - REHAB POTENTIAL, OT EVAL
Pt currently at baseline and has equipment and assistance in place at home. OT to discharge at this time

## 2024-07-15 NOTE — DISCHARGE NOTE PROVIDER - CARE PROVIDER_API CALL
Pietro Akins  Urology  [address] [place] [address]  Phone: [phone]  Fax: [phone]  Follow Up Time:     Demetrio Bourne  Nephrology  [address] [place] [address]  Phone: [phone]  Fax: [phone]  Follow Up Time:    Pietro Akins  Urology  [address] [place] [address]  Phone: [phone]  Fax: [phone]  Follow Up Time:     Demetrio Bourne  Nephrology  [address] [place] [address]  Phone: [phone]  Fax: [phone]  Follow Up Time:     Primary care physician,   Phone: (   )    -  Fax: (   )    -  Follow Up Time: 1-3 days    Oncologist,   Phone: (   )    -  Fax: (   )    -  Follow Up Time: 1 week   Pietro Akins  Urology  [address] [place] [address]  Phone: [phone]  Fax: [phone]  Follow Up Time:     Demetrio Bourne  Nephrology  [address] [place] [address]  Phone: [phone]  Fax: [phone]  Follow Up Time:     Oncologist,   Phone: (   )    -  Fax: (   )    -  Follow Up Time: 1 week    Kwaku Garibay  [facility]  [address] [place] [address]  Phone: [phone]  Fax: [phone]  Follow Up Time:    Pietro Akins  Urology  [address] [place] [address]  Phone: [phone]  Fax: [phone]  Follow Up Time:     Demetrio Bourne  Nephrology  [address] [place] [address]  Phone: [phone]  Fax: [phone]  Follow Up Time:     Kwaku Garibay  Family Medicine  [address] [place] [address]  Phone: [phone]  Fax: [phone]  Follow Up Time:     Oncologist,   Phone: (   )    -  Fax: (   )    -  Follow Up Time: 1 week    Augusto Jane  Infectious Disease  [address] [place] [address]  Phone: [phone]  Fax: [phone]  Follow Up Time:    Pietro Akins  Urology  [address] [place] [address]  Phone: [phone]  Fax: [phone]  Scheduled Appointment: 08/01/2024    Demetrio Bourne  Nephrology  [address] [place] [address]  Phone: [phone]  Fax: [phone]  Follow Up Time: 2 weeks    Kwaku Garibay  Family Medicine  [address] [place] [address]  Phone: [phone]  Fax: [phone]  Follow Up Time: 1 week

## 2024-07-15 NOTE — DISCHARGE NOTE PROVIDER - NPI NUMBER (FOR [employer] USE ONLY) :
[[id number]],[[id number]] [[id number]],[[id number]],[UNKNOWN],[UNKNOWN] [[id number]],[[id number]],[UNKNOWN],[[id number]] [[id number]],[[id number]],[[id number]],[UNKNOWN],[[id number]] [[id number]],[[id number]],[[id number]]

## 2024-07-15 NOTE — DISCHARGE NOTE PROVIDER - NSDCMRMEDTOKEN_GEN_ALL_CORE_FT
atorvastatin 20 mg oral tablet: 1 tab(s) orally once a day (at bedtime)  NIFEdipine 30 mg oral tablet, extended release: 1 tab(s) orally once a day  petrolatum topical ointment: 1 Apply topically to affected area 2 times a day  sodium bicarbonate 650 mg oral tablet: 1 tab(s) orally 3 times a day  Vitamin D2 1.25 mg (50,000 intl units) oral capsule: 1 cap(s) orally once a week   atorvastatin 20 mg oral tablet: 1 tab(s) orally once a day (at bedtime)  Multiple Vitamins oral tablet, chewable: 1 tab(s) orally once a day  pantoprazole 40 mg oral delayed release tablet: 1 tab(s) orally once a day (before a meal)  petrolatum topical ointment: 1 Apply topically to affected area 2 times a day  sodium bicarbonate 650 mg oral tablet: 1 tab(s) orally 3 times a day  Vitamin D2 1.25 mg (50,000 intl units) oral capsule: 1 cap(s) orally once a week   atorvastatin 20 mg oral tablet: 1 tab(s) orally once a day (at bedtime)  Augmentin 500 mg-125 mg oral tablet: 500 milligram(s) orally once a day finish last dose on 7/25/24  Multiple Vitamins oral tablet, chewable: 1 tab(s) orally once a day  pantoprazole 40 mg oral delayed release tablet: 1 tab(s) orally once a day (before a meal)  petrolatum topical ointment: 1 Apply topically to affected area 2 times a day  sodium bicarbonate 650 mg oral tablet: 1 tab(s) orally 3 times a day  Vitamin D2 1.25 mg (50,000 intl units) oral capsule: 1 cap(s) orally once a week   atorvastatin 20 mg oral tablet: 1 tab(s) orally once a day (at bedtime)  Augmentin 500 mg-125 mg oral tablet: 500 milligram(s) orally once a day finish last dose on 7/25/24  levoFLOXacin 500 mg oral tablet: 1 tab(s) orally every other day finish last dose on 7/25/24  Multiple Vitamins oral tablet, chewable: 1 tab(s) orally once a day  pantoprazole 40 mg oral delayed release tablet: 1 tab(s) orally once a day (before a meal)  petrolatum topical ointment: 1 Apply topically to affected area 2 times a day  sodium bicarbonate 650 mg oral tablet: 1 tab(s) orally 3 times a day  Vitamin D2 1.25 mg (50,000 intl units) oral capsule: 1 cap(s) orally once a week   atorvastatin 20 mg oral tablet: 1 tab(s) orally once a day (at bedtime)  ertapenem 1 g injection: 500 milligram(s) intravenous once a day Last dose 48h post-op with urology, 7/25/24  Multiple Vitamins oral tablet, chewable: 1 tab(s) orally once a day  pantoprazole 40 mg oral delayed release tablet: 1 tab(s) orally once a day (before a meal)  petrolatum topical ointment: 1 Apply topically to affected area 2 times a day  sodium bicarbonate 650 mg oral tablet: 1 tab(s) orally 3 times a day  Vitamin D2 1.25 mg (50,000 intl units) oral capsule: 1 cap(s) orally once a week

## 2024-07-15 NOTE — DISCHARGE NOTE PROVIDER - NSDCFUSCHEDAPPT_GEN_ALL_CORE_FT
Pietro Akins  [facility] Physician [facility]  UROLOGY [place] [address]  Scheduled Appointment: 07/23/2024     Pietro Akins  [facility] Physician [facility]  UROLOGY [place] [address]  Scheduled Appointment: 07/23/2024     Pietro Akins  [facility] Physician [facility]  UROLOGY [place] [address]  Scheduled Appointment: 08/01/2024

## 2024-07-15 NOTE — DISCHARGE NOTE PROVIDER - PROVIDER TOKENS
PROVIDER:[TOKEN:[[id number]]],PROVIDER:[TOKEN:[[id number]]] PROVIDER:[TOKEN:[[id number]]],PROVIDER:[TOKEN:[[id number]]],FREE:[LAST:[Primary care physician],PHONE:[(   )    -],FAX:[(   )    -],FOLLOWUP:[1-3 days]],FREE:[LAST:[Oncologist],PHONE:[(   )    -],FAX:[(   )    -],FOLLOWUP:[1 week]] PROVIDER:[TOKEN:[[id number]]],PROVIDER:[TOKEN:[[id number]]],FREE:[LAST:[Oncologist],PHONE:[(   )    -],FAX:[(   )    -],FOLLOWUP:[1 week]],PROVIDER:[TOKEN:[[id number]]] PROVIDER:[TOKEN:[[id number]]],PROVIDER:[TOKEN:[[id number]]],PROVIDER:[TOKEN:[[id number]]],FREE:[LAST:[Oncologist],PHONE:[(   )    -],FAX:[(   )    -],FOLLOWUP:[1 week]],PROVIDER:[TOKEN:[[id number]]] PROVIDER:[TOKEN:[[id number]],SCHEDULEDAPPT:[08/01/2024]],PROVIDER:[TOKEN:[[id number]],FOLLOWUP:[2 weeks]],PROVIDER:[TOKEN:[[id number]],FOLLOWUP:[1 week]]

## 2024-07-15 NOTE — PROGRESS NOTE ADULT - SUBJECTIVE AND OBJECTIVE BOX
Nephrology Progress Note    KRISTY GARCIA  MRN-[id number]  69y  Male    S:  Patient is seen and examined, events over the last 24h noted.    O:  Allergies:  No Known Allergies    Hospital Medications:   MEDICATIONS  (STANDING):  atorvastatin 20 milliGRAM(s) Oral at bedtime  dextrose 5%. 1000 milliLiter(s) (100 mL/Hr) IV Continuous <Continuous>  dextrose 5%. 1000 milliLiter(s) (50 mL/Hr) IV Continuous <Continuous>  dextrose 50% Injectable 12.5 Gram(s) IV Push once  dextrose 50% Injectable 25 Gram(s) IV Push once  dextrose 50% Injectable 25 Gram(s) IV Push once  glucagon  Injectable 1 milliGRAM(s) IntraMuscular once  heparin   Injectable 5000 Unit(s) SubCutaneous every 8 hours  insulin lispro (ADMELOG) corrective regimen sliding scale   SubCutaneous three times a day before meals  insulin lispro (ADMELOG) corrective regimen sliding scale   SubCutaneous at bedtime  meropenem  IVPB 500 milliGRAM(s) IV Intermittent every 12 hours  meropenem  IVPB      pantoprazole    Tablet 40 milliGRAM(s) Oral before breakfast  sodium bicarbonate 650 milliGRAM(s) Oral three times a day    MEDICATIONS  (PRN):  aluminum hydroxide/magnesium hydroxide/simethicone Suspension 30 milliLiter(s) Oral every 4 hours PRN Dyspepsia  dextrose Oral Gel 15 Gram(s) Oral once PRN Blood Glucose LESS THAN 70 milliGRAM(s)/deciliter  melatonin 3 milliGRAM(s) Oral at bedtime PRN Insomnia  ondansetron Injectable 4 milliGRAM(s) IV Push every 8 hours PRN Nausea and/or Vomiting    Home Medications:  atorvastatin 20 mg oral tablet: 1 tab(s) orally once a day (at bedtime) (09 Jul 2024 19:56)  petrolatum topical ointment: 1 Apply topically to affected area 2 times a day (09 Jul 2024 19:56)      VITALS:  Daily     Daily   T(F): 97.5 (07-15-24 @ 08:12), Max: 98.6 (07-15-24 @ 00:00)  HR: 68 (07-15-24 @ 08:12)  BP: 138/73 (07-15-24 @ 08:12)  RR: 17 (07-15-24 @ 08:12)  SpO2: 97% (07-15-24 @ 08:12)  Wt(kg): --  I&O's Detail    14 Jul 2024 07:01  -  15 Jul 2024 07:00  --------------------------------------------------------  IN:  Total IN: 0 mL    OUT:    Indwelling Catheter - Suprapubic (mL): 1000 mL    Nephrostomy Tube (mL): 750 mL    Nephrostomy Tube (mL): 50 mL  Total OUT: 1800 mL    Total NET: -1800 mL      15 Jul 2024 07:01  -  15 Jul 2024 12:05  --------------------------------------------------------  IN:    Oral Fluid: 360 mL  Total IN: 360 mL    OUT:  Total OUT: 0 mL    Total NET: 360 mL        I&O's Summary    14 Jul 2024 07:01  -  15 Jul 2024 07:00  --------------------------------------------------------  IN: 0 mL / OUT: 1800 mL / NET: -1800 mL    15 Jul 2024 07:01  -  15 Jul 2024 12:05  --------------------------------------------------------  IN: 360 mL / OUT: 0 mL / NET: 360 mL          PHYSICAL EXAM:  Gen: NAD  Chest: b/l breath sounds  Abd: soft  Extremities: no edema      LABS:      07-15    139  |  100  |  106<HH>  ----------------------------<  100<H>  4.2   |  23  |  4.8<HH>    Ca    7.8<L>      15 Jul 2024 05:43  Phos  4.6     07-15  Mg     2.0     07-15    TPro  7.0  /  Alb  2.4<L>  /  TBili  0.5  /  DBili      /  AST  21  /  ALT  14  /  AlkPhos  140<H>  07-15    Phosphorus: 4.6 mg/dL (07-15-24 @ 05:43)  Phosphorus: 4.5 mg/dL (07-14-24 @ 07:12)    Intact PTH: 43 pg/mL (07-10-24 @ 21:08)  Vitamin D, 25-Hydroxy: 13 ng/mL (09-07-23 @ 09:17)                          10.1   12.64 )-----------( 256      ( 15 Jul 2024 05:43 )             31.6     Mean Cell Volume: 92.9 fL (07-15-24 @ 05:43)    Creatinine trend:  Creatinine: 4.8 mg/dL (07-15-24 @ 05:43)  Creatinine: 5.0 mg/dL (07-14-24 @ 07:12)  Creatinine: 5.0 mg/dL (07-13-24 @ 06:12)  Creatinine: 5.3 mg/dL (07-12-24 @ 06:22)  Creatinine: 5.8 mg/dL (07-11-24 @ 07:28)

## 2024-07-15 NOTE — DISCHARGE NOTE PROVIDER - NSDCCPCAREPLAN_GEN_ALL_CORE_FT
PRINCIPAL DISCHARGE DIAGNOSIS  Diagnosis: Hydronephrosis with obstructing calculus  Assessment and Plan of Treatment:       SECONDARY DISCHARGE DIAGNOSES  Diagnosis: Bacteremia due to Gram-negative bacteria  Assessment and Plan of Treatment:     Diagnosis: Acute pyelonephritis  Assessment and Plan of Treatment:     Diagnosis: Acute kidney injury superimposed on CKD  Assessment and Plan of Treatment:     Diagnosis: Chronic suprapubic catheter  Assessment and Plan of Treatment:     Diagnosis: Chronic cutaneous venous stasis ulcer  Assessment and Plan of Treatment:      PRINCIPAL DISCHARGE DIAGNOSIS  Diagnosis: Hydronephrosis with obstructing calculus  Assessment and Plan of Treatment: You came to the ED with abdominal pain, nausea, and vomiting and found to have an elevated white blood cell count, bacteria in your urine, and an obstructing stone in the urinary tract on imaging with fluid backing up into your kidney. You were also found to have kidney stones in your left kidney. This was concerning for an acute infection of your urinary tract and kidney. Interventional radiology (IR) and urology (kidney surgeons) were consulted for an urgent procedure and IR placed a tube in your back to decompress the fluid backup and drain the infection in your kidney. Your suprapubic catheter that you came in with was also exchanged. We started you on antibiotics as well and consulted the infectious disease experts. Your urine and blood were found to be growing bacteria and we adjusted your antibiotics to make sure this infection is being treated. The exact bacteria in your blood is still pending identification.   Please continue your antibiotics for 10 days as prescribed. A visiting nurse should be coming to your home to help with caring for the tube in your back. Please follow up with urology as they are planning an outpatient surgery for next week, possibly on 7/23. Please also follow up with your primary care provider.   Do you need a primary care doctor or follow-up with a specialist? Our care coordinators will help you find providers near you and schedule any follow-up care visits.  Monday-Friday: 9am-5pm  Call our CareBridge team: [phone]        SECONDARY DISCHARGE DIAGNOSES  Diagnosis: Bacteremia due to Gram-negative bacteria  Assessment and Plan of Treatment: You were found to have bacteria in your blood, that most likely came from the urinary tract and kidney infection you had. The infectious disease experts were consulted and recommended an antibiotic regimen for you. The exact bacteria has not been identified, so please continue taking these antibiotics as prescribed and follow up with your primary care provider and infectious disease doctor when these results return.    Diagnosis: Acute pyelonephritis  Assessment and Plan of Treatment: You were found to have an infection of your urinary tract and kidney with multiple bacteria. You had a tube placed in the back with interventional radiology, to decompress the fluid and drain the infection in the kidney. You were also started on antibiotics as recommended by the infectious disease experts. Please continue these antibiotics as prescribed and follow up with urology and your primary care provider.    Diagnosis: Acute kidney injury superimposed on CKD  Assessment and Plan of Treatment: You were found to have acute kidney injury as a result of the infection in your kidney in addition to your history of kidney disease. We treated you with special fluids and drained the infection in your kidney which improved your kidney function. Your kidney function returned to baseline. Please follow up with your kidney doctor and continue your prescribed medications.    Diagnosis: Chronic suprapubic catheter  Assessment and Plan of Treatment: You came to the ED with a suprapubic catheter to drain urine from the bladder. It was exchanged here to a new catheter as you had an infection of the bladder and urinary tract. Please follow up with your urologist for continued care.    Diagnosis: Chronic cutaneous venous stasis ulcer  Assessment and Plan of Treatment: You came to the ED with chronic ulcers on your legs. Wound care recommended cleansing the legs with soap and water daily, and pat to dry. Please continue this care at home to prevent infection. Please follow up with your primary care provider.    Diagnosis: Globus sensation  Assessment and Plan of Treatment: You described having a sensation in the back of your throat that causes you to vomit. We started on you pantoprazole which helps reduce the acidity of your stomach and protect your GI tract. Please continue this medication and follow up with your primary care provider.     PRINCIPAL DISCHARGE DIAGNOSIS  Diagnosis: Hydronephrosis with obstructing calculus  Assessment and Plan of Treatment: You came to the ED with abdominal pain, nausea, and vomiting and found to have an elevated white blood cell count, bacteria in your urine, and an obstructing stone in the urinary tract on imaging with fluid backing up into your kidney. You were also found to have kidney stones in your left kidney. This was concerning for an acute infection of your urinary tract and kidney. Interventional radiology (IR) and urology (kidney surgeons) were consulted for an urgent procedure and IR placed a tube in your back to decompress the fluid backup and drain the infection in your kidney. Your suprapubic catheter that you came in with was also exchanged. We started you on antibiotics as well and consulted the infectious disease experts. Your urine and blood were found to be growing bacteria and we adjusted your antibiotics to make sure this infection is being treated. The exact bacteria in your blood is still pending identification.   Please continue your IV and oral antibiotics for 10 days as prescribed. A visiting nurse should be coming to your home to help with caring for the tube in your back and IV medications. Please follow up with urology as they are planning an outpatient surgery for next week, possibly on 7/23. Please also follow up with your primary care provider.   Do you need a primary care doctor or follow-up with a specialist? Our care coordinators will help you find providers near you and schedule any follow-up care visits.  Monday-Friday: 9am-5pm  Call our CareBridge team: [phone]        SECONDARY DISCHARGE DIAGNOSES  Diagnosis: Bacteremia due to Gram-negative bacteria  Assessment and Plan of Treatment: You were found to have bacteria in your blood, that most likely came from the urinary tract and kidney infection you had. The infectious disease experts were consulted and recommended IV antibiotics for you. The exact bacteria has not been identified, so please continue taking these antibiotics as prescribed and follow up with your primary care provider and infectious disease doctor when these results return.    Diagnosis: Acute pyelonephritis  Assessment and Plan of Treatment: You were found to have an infection of your urinary tract and kidney with multiple bacteria. You had a tube placed in the back with interventional radiology, to decompress the fluid and drain the infection in the kidney. You were also started on antibiotics as recommended by the infectious disease experts. Please continue these antibiotics as prescribed and follow up with urology and your primary care provider.    Diagnosis: Acute kidney injury superimposed on CKD  Assessment and Plan of Treatment: You were found to have acute kidney injury as a result of the infection in your kidney in addition to your history of kidney disease. We treated you with special fluids and drained the infection in your kidney which improved your kidney function. Your kidney function returned to baseline. Please follow up with your kidney doctor and continue your prescribed medications.    Diagnosis: Chronic suprapubic catheter  Assessment and Plan of Treatment: You came to the ED with a suprapubic catheter to drain urine from the bladder. It was exchanged here to a new catheter as you had an infection of the bladder and urinary tract. Please follow up with your urologist for continued care.    Diagnosis: Chronic cutaneous venous stasis ulcer  Assessment and Plan of Treatment: You came to the ED with chronic ulcers on your legs. Wound care recommended cleansing the legs with soap and water daily, and pat to dry. Please continue this care at home to prevent infection. Please follow up with your primary care provider.    Diagnosis: Globus sensation  Assessment and Plan of Treatment: You described having a sensation in the back of your throat that causes you to vomit. We started on you pantoprazole which helps reduce the acidity of your stomach and protect your GI tract. Please continue this medication and follow up with your primary care provider.     PRINCIPAL DISCHARGE DIAGNOSIS  Diagnosis: Hydronephrosis with obstructing calculus  Assessment and Plan of Treatment: You came to the ED with abdominal pain, nausea, and vomiting and found to have an elevated white blood cell count, bacteria in your urine, and an obstructing stone in the urinary tract on imaging with fluid backing up into your kidney. You were also found to have kidney stones in your left kidney. This was concerning for an acute infection of your urinary tract and kidney. Interventional radiology (IR) and urology (kidney surgeons) were consulted for an urgent procedure and IR placed a tube in your back to decompress the fluid backup and drain the infection in your kidney. Your suprapubic catheter that you came in with was also exchanged. We started you on antibiotics as well and consulted the infectious disease experts. Your urine and blood were found to be growing bacteria and we adjusted your antibiotics to make sure this infection is being treated. The exact bacteria in your blood is still pending identification.   Please continue your IV and oral antibiotics for 7 days as prescribed, until 7/25/24. Please follow up with urology as they are planning an outpatient surgery for next week, possibly on 7/23. Please also follow up with your primary care provider.   Please also follow up with infectious disease doctor, Dr. Jane, via telehealth on Tuesdays.  Do you need a primary care doctor or follow-up with a specialist? Our care coordinators will help you find providers near you and schedule any follow-up care visits.  Monday-Friday: 9am-5pm  Call our CareBridge team: [phone]        SECONDARY DISCHARGE DIAGNOSES  Diagnosis: Bacteremia due to Gram-negative bacteria  Assessment and Plan of Treatment: You were found to have bacteria in your blood, that most likely came from the urinary tract and kidney infection you had. The infectious disease experts were consulted and recommended IV antibiotics for you. The exact bacteria has not been identified, so please continue taking these antibiotics as prescribed and follow up with your primary care provider and infectious disease doctor when these results return.    Diagnosis: Acute pyelonephritis  Assessment and Plan of Treatment: You were found to have an infection of your urinary tract and kidney with multiple bacteria. You had a tube placed in the back with interventional radiology, to decompress the fluid and drain the infection in the kidney. You were also started on antibiotics as recommended by the infectious disease experts. Please continue these antibiotics as prescribed and follow up with urology and your primary care provider.    Diagnosis: Acute kidney injury superimposed on CKD  Assessment and Plan of Treatment: You were found to have acute kidney injury as a result of the infection in your kidney in addition to your history of kidney disease. We treated you with special fluids and drained the infection in your kidney which improved your kidney function. Your kidney function returned to baseline. Please follow up with your kidney doctor and continue your prescribed medications.    Diagnosis: Chronic suprapubic catheter  Assessment and Plan of Treatment: You came to the ED with a suprapubic catheter to drain urine from the bladder. It was exchanged here to a new catheter as you had an infection of the bladder and urinary tract. Please follow up with your urologist for continued care.    Diagnosis: Chronic cutaneous venous stasis ulcer  Assessment and Plan of Treatment: You came to the ED with chronic ulcers on your legs. Wound care recommended cleansing the legs with soap and water daily, and pat to dry. Please continue this care at home to prevent infection. Please follow up with your primary care provider.    Diagnosis: Globus sensation  Assessment and Plan of Treatment: You described having a sensation in the back of your throat that causes you to vomit. We started on you pantoprazole which helps reduce the acidity of your stomach and protect your GI tract. Please continue this medication and follow up with your primary care provider.     PRINCIPAL DISCHARGE DIAGNOSIS  Diagnosis: Hydronephrosis with obstructing calculus  Assessment and Plan of Treatment: You came to the ED with abdominal pain, nausea, and vomiting and found to have an elevated white blood cell count, bacteria in your urine, and an obstructing stone in the urinary tract on imaging with fluid backing up into your kidney. You were also found to have kidney stones in your left kidney. This was concerning for an acute infection of your urinary tract and kidney. Interventional radiology (IR) and urology (kidney surgeons) were consulted for an urgent procedure and IR placed a tube in your back to decompress the fluid backup and drain the infection in your kidney. Your suprapubic catheter that you came in with was also exchanged. We started you on antibiotics as well and consulted the infectious disease experts. Your urine and blood were found to be growing bacteria and we adjusted your antibiotics to make sure this infection is being treated. The exact bacteria in your blood is still pending identification.   You completed antibiotic course. Please follow up with urology as they are planning an outpatient surgery for next week, possibly on 8/1. Follow up with Dr adams at [facility]  - Please also follow up with your primary care provider.   Do you need a primary care doctor or follow-up with a specialist? Our care coordinators will help you find providers near you and schedule any follow-up care visits.  Monday-Friday: 9am-5pm  Call our CareBridge team: [phone] [facility]-CARE        SECONDARY DISCHARGE DIAGNOSES  Diagnosis: Bacteremia due to Gram-negative bacteria  Assessment and Plan of Treatment: You were found to have bacteria in your blood, that most likely came from the urinary tract and kidney infection you had. The infectious disease experts were consulted and recommended IV antibiotics for you. The exact bacteria has not been identified, so please continue taking these antibiotics as prescribed and follow up with your primary care provider and infectious disease doctor when these results return.    Diagnosis: Acute pyelonephritis  Assessment and Plan of Treatment: You were found to have an infection of your urinary tract and kidney with multiple bacteria. You had a tube placed in the back with interventional radiology, to decompress the fluid and drain the infection in the kidney. You were also started on antibiotics as recommended by the infectious disease experts. Please continue these antibiotics as prescribed and follow up with urology and your primary care provider.    Diagnosis: Acute kidney injury superimposed on CKD  Assessment and Plan of Treatment: You were found to have acute kidney injury as a result of the infection in your kidney in addition to your history of kidney disease. We treated you with special fluids and drained the infection in your kidney which improved your kidney function. Your kidney function returned to baseline. Please follow up with your kidney doctor and continue your prescribed medications.    Diagnosis: Chronic suprapubic catheter  Assessment and Plan of Treatment: You came to the ED with a suprapubic catheter to drain urine from the bladder. It was exchanged here to a new catheter as you had an infection of the bladder and urinary tract. Please follow up with your urologist for continued care.    Diagnosis: Chronic cutaneous venous stasis ulcer  Assessment and Plan of Treatment: You came to the ED with chronic ulcers on your legs. Wound care recommended cleansing the legs with soap and water daily, and pat to dry. Please continue this care at home to prevent infection. Please follow up with your primary care provider.    Diagnosis: Globus sensation  Assessment and Plan of Treatment: You described having a sensation in the back of your throat that causes you to vomit. We started on you pantoprazole which helps reduce the acidity of your stomach and protect your GI tract. Please continue this medication and follow up with your primary care provider.

## 2024-07-15 NOTE — DISCHARGE NOTE PROVIDER - CARE PROVIDERS DIRECT ADDRESSES
,[email or web address]_Unknown ,[email or web address],DirectAddress_[email or web address]_Unknown,DirectAddress_Unknown ,[email or web address],DirectAddress_[email or web address]_Unknown,[email or web address]_Unknown,[email or web address],[email or web address],[email or web address],[email or web address]

## 2024-07-15 NOTE — PROGRESS NOTE ADULT - SUBJECTIVE AND OBJECTIVE BOX
KRISTY GARCIA  69y, Male  Allergy: No Known Allergies      LOS  6d    CHIEF COMPLAINT: UTI w/ obstructing stone (14 Jul 2024 14:40)      INTERVAL EVENTS/HPI  - No acute events overnight  - T(F): , Max: 98.6 (07-15-24 @ 00:00)  - Tolerating medication  - WBC Count: 16.76 (07-14-24 @ 07:12)  WBC Count: 19.03 (07-13-24 @ 06:12)     - Creatinine: 5.0 (07-14-24 @ 07:12)       ROS  ***    VITALS:  T(F): 98.6, Max: 98.6 (07-15-24 @ 00:00)  HR: 78  BP: 101/64  RR: 18Vital Signs Last 24 Hrs  T(C): 37 (15 Jul 2024 00:00), Max: 37 (15 Jul 2024 00:00)  T(F): 98.6 (15 Jul 2024 00:00), Max: 98.6 (15 Jul 2024 00:00)  HR: 78 (15 Jul 2024 00:00) (67 - 79)  BP: 101/64 (15 Jul 2024 00:00) (101/64 - 136/58)  BP(mean): --  RR: 18 (15 Jul 2024 00:00) (17 - 19)  SpO2: 97% (14 Jul 2024 08:06) (97% - 97%)    Parameters below as of 14 Jul 2024 08:06  Patient On (Oxygen Delivery Method): room air        PHYSICAL EXAM:  ***    FH: Non-contributory  Social Hx: Non-contributory    TESTS & MEASUREMENTS:                        10.1   16.76 )-----------( 239      ( 14 Jul 2024 07:12 )             30.6     07-14    137  |  98  |  108<HH>  ----------------------------<  100<H>  3.4<L>   |  22  |  5.0<HH>    Ca    8.0<L>      14 Jul 2024 07:12  Phos  4.5     07-14  Mg     1.9     07-14    TPro  7.1  /  Alb  2.3<L>  /  TBili  0.4  /  DBili  x   /  AST  19  /  ALT  13  /  AlkPhos  132<H>  07-14      LIVER FUNCTIONS - ( 14 Jul 2024 07:12 )  Alb: 2.3 g/dL / Pro: 7.1 g/dL / ALK PHOS: 132 U/L / ALT: 13 U/L / AST: 19 U/L / GGT: x           Urinalysis Basic - ( 14 Jul 2024 07:12 )    Color: x / Appearance: x / SG: x / pH: x  Gluc: 100 mg/dL / Ketone: x  / Bili: x / Urobili: x   Blood: x / Protein: x / Nitrite: x   Leuk Esterase: x / RBC: x / WBC x   Sq Epi: x / Non Sq Epi: x / Bacteria: x        Culture - Urine (collected 07-10-24 @ 18:14)  Source: .Urine Nephrostomy - Right  Final Report (07-14-24 @ 20:48):    >100,000 CFU/ml Proteus mirabilis    10,000 - 49,000 CFU/mL Serratia marcescens    50,000 - 99,000 CFU/mL Enterococcus faecalis  Organism: Proteus mirabilis  Serratia marcescens  Enterococcus faecalis (07-14-24 @ 20:48)  Organism: Enterococcus faecalis (07-14-24 @ 20:48)      Method Type: ALF      -  Ampicillin: S <=2 Predicts results to ampicillin/sulbactam, amoxacillin-clavulanate and  piperacillin-tazobactam.      -  Ciprofloxacin: S <=1      -  Levofloxacin: S 1      -  Nitrofurantoin: S <=32 Should not be used to treat pyelonephritis.      -  Tetracycline: R >8      -  Vancomycin: S 1  Organism: Serratia marcescens (07-14-24 @ 20:48)      Method Type: ALF      -  Amoxicillin/Clavulanic Acid: R >16/8      -  Ampicillin: R 16 These ampicillin results predict results for amoxicillin      -  Ampicillin/Sulbactam: R 16/8      -  Aztreonam: S <=4      -  Cefazolin: R >16      -  Cefepime: S <=2      -  Cefoxitin: R <=8      -  Ceftriaxone: S <=1      -  Ciprofloxacin: S <=0.25      -  Ertapenem: S <=0.5      -  Gentamicin: S <=2      -  Levofloxacin: S <=0.5      -  Meropenem: S <=1      -  Nitrofurantoin: R >64 Should not be used to treat pyelonephritis      -  Piperacillin/Tazobactam: S <=8      -  Tobramycin: S <=2      -  Trimethoprim/Sulfamethoxazole: S <=0.5/9.5  Organism: Proteus mirabilis (07-14-24 @ 20:48)      Method Type: ALF      -  Amoxicillin/Clavulanic Acid: S <=8/4      -  Ampicillin: R >16 These ampicillin results predict results for amoxicillin      -  Ampicillin/Sulbactam: I 16/8      -  Aztreonam: S <=4      -  Cefazolin: S 8 For uncomplicated UTI with K. pneumoniae, E. coli, or P. mirablis: ALF <=16 is sensitive and ALF >=32 is resistant. This also predicts results for oral agents cefaclor, cefdinir, cefpodoxime, cefprozil, cefuroxime axetil, cephalexin and locarbef for uncomplicated UTI. Note that some isolates may be susceptible to these agents while testing resistant to cefazolin.      -  Cefepime: S <=2      -  Cefoxitin: S <=8      -  Ceftriaxone: S <=1      -  Cefuroxime: S <=4      -  Ciprofloxacin: R >2      -  Ertapenem: S <=0.5      -  Gentamicin: I 4      -  Levofloxacin: R >4      -  Meropenem: S <=1      -  Nitrofurantoin: R 64 Should not be used to treat pyelonephritis      -  Piperacillin/Tazobactam: S <=8      -  Tobramycin: S <=2      -  Trimethoprim/Sulfamethoxazole: R >2/38    Culture - Blood (collected 07-09-24 @ 14:20)  Source: .Blood Blood-Peripheral  Gram Stain (07-11-24 @ 01:20):    Growth in anaerobic bottle: Gram Negative Rods  Preliminary Report (07-11-24 @ 01:20):    Growth in anaerobic bottle: Gram Negative Rods    Direct identification is available within approximately 3-5    hours either by Blood Panel Multiplexed PCR or Direct    MALDI-TOF. Details: [email or web address]  Organism: Blood Culture PCR (07-11-24 @ 03:23)  Organism: Blood Culture PCR (07-11-24 @ 03:23)      Method Type: PCR      -  Blood PCR Panel: NEG    Culture - Blood (collected 07-09-24 @ 14:20)  Source: .Blood Blood-Peripheral  Gram Stain (07-12-24 @ 04:50):    Growth in anaerobic bottle: Gram Negative Rods  Final Report (07-13-24 @ 09:30):    Growth in anaerobic bottle: Bacteroides fragilis    "Susceptibilities not performed"    Culture - Urine (collected 07-09-24 @ 13:50)  Source: Clean Catch Clean Catch (Midstream)  Final Report (07-10-24 @ 23:26):    >=3 organisms. Probable collection contamination.        Lactate, Blood: 0.8 mmol/L (07-10-24 @ 06:36)      INFECTIOUS DISEASES TESTING  strept    INFLAMMATORY MARKERS      RADIOLOGY & ADDITIONAL TESTS:  I have personally reviewed the last available Chest xray  CXR      CT      CARDIOLOGY TESTING  12 Lead ECG:   Ventricular Rate 71 BPM    Atrial Rate 71 BPM    P-R Interval 186 ms    QRS Duration 96 ms    Q-T Interval 404 ms    QTC Calculation(Bazett) 439 ms    P Axis 62 degrees    R Axis 49 degrees    T Axis 9 degrees    Diagnosis Line Normal sinus rhythm  Normal ECG    Confirmed by Julius Don ([id number]) on 7/9/2024 12:41:31 PM (07-09-24 @ 12:14)      MEDICATIONS  atorvastatin 20 Oral at bedtime  dextrose 5%. 1000 IV Continuous <Continuous>  dextrose 5%. 1000 IV Continuous <Continuous>  dextrose 50% Injectable 12.5 IV Push once  dextrose 50% Injectable 25 IV Push once  dextrose 50% Injectable 25 IV Push once  glucagon  Injectable 1 IntraMuscular once  heparin   Injectable 5000 SubCutaneous every 8 hours  insulin lispro (ADMELOG) corrective regimen sliding scale  SubCutaneous three times a day before meals  insulin lispro (ADMELOG) corrective regimen sliding scale  SubCutaneous at bedtime  meropenem  IVPB 500 IV Intermittent every 12 hours  meropenem  IVPB     pantoprazole    Tablet 40 Oral before breakfast  sodium bicarbonate 650 Oral three times a day      WEIGHT  Weight (kg): 81.6 (07-10-24 @ 14:34)  Creatinine: 5.0 mg/dL (07-14-24 @ 07:12)      ANTIBIOTICS:  meropenem  IVPB 500 milliGRAM(s) IV Intermittent every 12 hours  meropenem  IVPB          All available historical records have been reviewed       KRISTY GARCIA  69y, Male  Allergy: No Known Allergies      LOS  6d    CHIEF COMPLAINT: UTI w/ obstructing stone (14 Jul 2024 14:40)      INTERVAL EVENTS/HPI  - No acute events overnight  - T(F): , Max: 98.6 (07-15-24 @ 00:00)  - Tolerating medication  - WBC Count: 16.76 (07-14-24 @ 07:12)  WBC Count: 19.03 (07-13-24 @ 06:12)     - Creatinine: 5.0 (07-14-24 @ 07:12)       ROS  General: Denies rigors, nightsweats  HEENT: Denies headache, rhinorrhea, sore throat, eye pain  CV: Denies CP, palpitations  PULM: Denies wheezing, hemoptysis  GI: Denies hematemesis, hematochezia, melena  : Denies discharge, hematuria  MSK: Denies arthralgias, myalgias  SKIN: Denies rash, lesions  NEURO: Denies paresthesias, weakness  PSYCH: Denies depression, anxiety     VITALS:  T(F): 98.6, Max: 98.6 (07-15-24 @ 00:00)  HR: 78  BP: 101/64  RR: 18Vital Signs Last 24 Hrs  T(C): 37 (15 Jul 2024 00:00), Max: 37 (15 Jul 2024 00:00)  T(F): 98.6 (15 Jul 2024 00:00), Max: 98.6 (15 Jul 2024 00:00)  HR: 78 (15 Jul 2024 00:00) (67 - 79)  BP: 101/64 (15 Jul 2024 00:00) (101/64 - 136/58)  BP(mean): --  RR: 18 (15 Jul 2024 00:00) (17 - 19)  SpO2: 97% (14 Jul 2024 08:06) (97% - 97%)    Parameters below as of 14 Jul 2024 08:06  Patient On (Oxygen Delivery Method): room air        PHYSICAL EXAM:  Gen: chronically ill appearing   HEENT: Normocephalic, atraumatic  Neck: supple, no lymphadenopathy  CV: Regular rate & regular rhythm  Lungs: decreased BS at bases, no fremitus  Abdomen: Soft, BS present R PCN clear urine  Ext: Warm, well perfused  Neuro: non focal,  Skin: no rash, no erythema  Lines: no phlebitis     FH: Non-contributory  Social Hx: Non-contributory    TESTS & MEASUREMENTS:                        10.1   16.76 )-----------( 239      ( 14 Jul 2024 07:12 )             30.6     07-14    137  |  98  |  108<HH>  ----------------------------<  100<H>  3.4<L>   |  22  |  5.0<HH>    Ca    8.0<L>      14 Jul 2024 07:12  Phos  4.5     07-14  Mg     1.9     07-14    TPro  7.1  /  Alb  2.3<L>  /  TBili  0.4  /  DBili  x   /  AST  19  /  ALT  13  /  AlkPhos  132<H>  07-14      LIVER FUNCTIONS - ( 14 Jul 2024 07:12 )  Alb: 2.3 g/dL / Pro: 7.1 g/dL / ALK PHOS: 132 U/L / ALT: 13 U/L / AST: 19 U/L / GGT: x           Urinalysis Basic - ( 14 Jul 2024 07:12 )    Color: x / Appearance: x / SG: x / pH: x  Gluc: 100 mg/dL / Ketone: x  / Bili: x / Urobili: x   Blood: x / Protein: x / Nitrite: x   Leuk Esterase: x / RBC: x / WBC x   Sq Epi: x / Non Sq Epi: x / Bacteria: x        Culture - Urine (collected 07-10-24 @ 18:14)  Source: .Urine Nephrostomy - Right  Final Report (07-14-24 @ 20:48):    >100,000 CFU/ml Proteus mirabilis    10,000 - 49,000 CFU/mL Serratia marcescens    50,000 - 99,000 CFU/mL Enterococcus faecalis  Organism: Proteus mirabilis  Serratia marcescens  Enterococcus faecalis (07-14-24 @ 20:48)  Organism: Enterococcus faecalis (07-14-24 @ 20:48)      Method Type: ALF      -  Ampicillin: S <=2 Predicts results to ampicillin/sulbactam, amoxacillin-clavulanate and  piperacillin-tazobactam.      -  Ciprofloxacin: S <=1      -  Levofloxacin: S 1      -  Nitrofurantoin: S <=32 Should not be used to treat pyelonephritis.      -  Tetracycline: R >8      -  Vancomycin: S 1  Organism: Serratia marcescens (07-14-24 @ 20:48)      Method Type: ALF      -  Amoxicillin/Clavulanic Acid: R >16/8      -  Ampicillin: R 16 These ampicillin results predict results for amoxicillin      -  Ampicillin/Sulbactam: R 16/8      -  Aztreonam: S <=4      -  Cefazolin: R >16      -  Cefepime: S <=2      -  Cefoxitin: R <=8      -  Ceftriaxone: S <=1      -  Ciprofloxacin: S <=0.25      -  Ertapenem: S <=0.5      -  Gentamicin: S <=2      -  Levofloxacin: S <=0.5      -  Meropenem: S <=1      -  Nitrofurantoin: R >64 Should not be used to treat pyelonephritis      -  Piperacillin/Tazobactam: S <=8      -  Tobramycin: S <=2      -  Trimethoprim/Sulfamethoxazole: S <=0.5/9.5  Organism: Proteus mirabilis (07-14-24 @ 20:48)      Method Type: ALF      -  Amoxicillin/Clavulanic Acid: S <=8/4      -  Ampicillin: R >16 These ampicillin results predict results for amoxicillin      -  Ampicillin/Sulbactam: I 16/8      -  Aztreonam: S <=4      -  Cefazolin: S 8 For uncomplicated UTI with K. pneumoniae, E. coli, or P. mirablis: ALF <=16 is sensitive and ALF >=32 is resistant. This also predicts results for oral agents cefaclor, cefdinir, cefpodoxime, cefprozil, cefuroxime axetil, cephalexin and locarbef for uncomplicated UTI. Note that some isolates may be susceptible to these agents while testing resistant to cefazolin.      -  Cefepime: S <=2      -  Cefoxitin: S <=8      -  Ceftriaxone: S <=1      -  Cefuroxime: S <=4      -  Ciprofloxacin: R >2      -  Ertapenem: S <=0.5      -  Gentamicin: I 4      -  Levofloxacin: R >4      -  Meropenem: S <=1      -  Nitrofurantoin: R 64 Should not be used to treat pyelonephritis      -  Piperacillin/Tazobactam: S <=8      -  Tobramycin: S <=2      -  Trimethoprim/Sulfamethoxazole: R >2/38    Culture - Blood (collected 07-09-24 @ 14:20)  Source: .Blood Blood-Peripheral  Gram Stain (07-11-24 @ 01:20):    Growth in anaerobic bottle: Gram Negative Rods  Preliminary Report (07-11-24 @ 01:20):    Growth in anaerobic bottle: Gram Negative Rods    Direct identification is available within approximately 3-5    hours either by Blood Panel Multiplexed PCR or Direct    MALDI-TOF. Details: [email or web address]  Organism: Blood Culture PCR (07-11-24 @ 03:23)  Organism: Blood Culture PCR (07-11-24 @ 03:23)      Method Type: PCR      -  Blood PCR Panel: NEG    Culture - Blood (collected 07-09-24 @ 14:20)  Source: .Blood Blood-Peripheral  Gram Stain (07-12-24 @ 04:50):    Growth in anaerobic bottle: Gram Negative Rods  Final Report (07-13-24 @ 09:30):    Growth in anaerobic bottle: Bacteroides fragilis    "Susceptibilities not performed"    Culture - Urine (collected 07-09-24 @ 13:50)  Source: Clean Catch Clean Catch (Midstream)  Final Report (07-10-24 @ 23:26):    >=3 organisms. Probable collection contamination.        Lactate, Blood: 0.8 mmol/L (07-10-24 @ 06:36)      INFECTIOUS DISEASES TESTING  strept    INFLAMMATORY MARKERS      RADIOLOGY & ADDITIONAL TESTS:  I have personally reviewed the last available Chest xray  CXR      CT      CARDIOLOGY TESTING  12 Lead ECG:   Ventricular Rate 71 BPM    Atrial Rate 71 BPM    P-R Interval 186 ms    QRS Duration 96 ms    Q-T Interval 404 ms    QTC Calculation(Bazett) 439 ms    P Axis 62 degrees    R Axis 49 degrees    T Axis 9 degrees    Diagnosis Line Normal sinus rhythm  Normal ECG    Confirmed by Juilus Don ([id number]) on 7/9/2024 12:41:31 PM (07-09-24 @ 12:14)      MEDICATIONS  atorvastatin 20 Oral at bedtime  dextrose 5%. 1000 IV Continuous <Continuous>  dextrose 5%. 1000 IV Continuous <Continuous>  dextrose 50% Injectable 12.5 IV Push once  dextrose 50% Injectable 25 IV Push once  dextrose 50% Injectable 25 IV Push once  glucagon  Injectable 1 IntraMuscular once  heparin   Injectable 5000 SubCutaneous every 8 hours  insulin lispro (ADMELOG) corrective regimen sliding scale  SubCutaneous three times a day before meals  insulin lispro (ADMELOG) corrective regimen sliding scale  SubCutaneous at bedtime  meropenem  IVPB 500 IV Intermittent every 12 hours  meropenem  IVPB     pantoprazole    Tablet 40 Oral before breakfast  sodium bicarbonate 650 Oral three times a day      WEIGHT  Weight (kg): 81.6 (07-10-24 @ 14:34)  Creatinine: 5.0 mg/dL (07-14-24 @ 07:12)      ANTIBIOTICS:  meropenem  IVPB 500 milliGRAM(s) IV Intermittent every 12 hours  meropenem  IVPB          All available historical records have been reviewed

## 2024-07-15 NOTE — PROGRESS NOTE ADULT - ASSESSMENT
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 NBNB vomiting and nausea every time after he eats  #Obstructing R mid ureter calculus with severe hydroureteronephrosis s/p Stent   #B/L nephrolithiasis   #Post-renal JOÃO on CKD 5   #HAGMA   #Chronic suprapubic catheter   - baseline creatinine ~4.4  - creat trending down, near baseline  - urology and IR on board, s/p Percutaneous right nephrostomy followed by placement of right nephroureteral catheter  - continue sodium bicarbonate po   - ph at goal   - followed by ID on meropenem / dose once daily  - BP controlled  - no need to start RRT at this time  will follow  70yo male w/ pmhx of HTN, HLD, DMII, CKD stage 5 baseline Cr ~ 4.4, urinary incontinence with chronic supra pubic catheter, Nephrolithiasis, spinal abscess leading to quadriplegia presenting w. NBNB vomiting and nausea every time after he eats  #Obstructing R mid ureter calculus with severe hydroureteronephrosis s/p Stent   #B/L nephrolithiasis   #Post-renal JOÃO on CKD 5   #HAGMA   #Chronic suprapubic catheter   - baseline creatinine ~4.4  - creat trending down, near baseline  - urology and IR on board, s/p Percutaneous right nephrostomy followed by placement of right nephroureteral catheter / plan for R PCNL and L JJ stent placement  - continue sodium bicarbonate po   - ph at goal   - followed by ID on meropenem / dose once daily  - BP controlled  - no need to start RRT at this time  will follow

## 2024-07-16 LAB
ALBUMIN SERPL ELPH-MCNC: 2.3 G/DL — LOW (ref 3.5–5.2)
ALP SERPL-CCNC: 146 U/L — HIGH (ref 30–115)
ALT FLD-CCNC: 12 U/L — SIGNIFICANT CHANGE UP (ref 0–41)
ANION GAP SERPL CALC-SCNC: 19 MMOL/L — HIGH (ref 7–14)
AST SERPL-CCNC: 24 U/L — SIGNIFICANT CHANGE UP (ref 0–41)
BASOPHILS # BLD AUTO: 0.03 K/UL — SIGNIFICANT CHANGE UP (ref 0–0.2)
BASOPHILS NFR BLD AUTO: 0.2 % — SIGNIFICANT CHANGE UP (ref 0–1)
BILIRUB SERPL-MCNC: 0.4 MG/DL — SIGNIFICANT CHANGE UP (ref 0.2–1.2)
BUN SERPL-MCNC: 101 MG/DL — CRITICAL HIGH (ref 10–20)
CALCIUM SERPL-MCNC: 7.6 MG/DL — LOW (ref 8.4–10.5)
CHLORIDE SERPL-SCNC: 99 MMOL/L — SIGNIFICANT CHANGE UP (ref 98–110)
CO2 SERPL-SCNC: 20 MMOL/L — SIGNIFICANT CHANGE UP (ref 17–32)
CREAT SERPL-MCNC: 4.2 MG/DL — CRITICAL HIGH (ref 0.7–1.5)
EGFR: 15 ML/MIN/1.73M2 — LOW
EOSINOPHIL # BLD AUTO: 0.14 K/UL — SIGNIFICANT CHANGE UP (ref 0–0.7)
EOSINOPHIL NFR BLD AUTO: 1.1 % — SIGNIFICANT CHANGE UP (ref 0–8)
GLUCOSE BLDC GLUCOMTR-MCNC: 112 MG/DL — HIGH (ref 70–99)
GLUCOSE BLDC GLUCOMTR-MCNC: 124 MG/DL — HIGH (ref 70–99)
GLUCOSE BLDC GLUCOMTR-MCNC: 133 MG/DL — HIGH (ref 70–99)
GLUCOSE BLDC GLUCOMTR-MCNC: 97 MG/DL — SIGNIFICANT CHANGE UP (ref 70–99)
GLUCOSE SERPL-MCNC: 84 MG/DL — SIGNIFICANT CHANGE UP (ref 70–99)
HCT VFR BLD CALC: 31.5 % — LOW (ref 42–52)
HGB BLD-MCNC: 10 G/DL — LOW (ref 14–18)
IMM GRANULOCYTES NFR BLD AUTO: 1.7 % — HIGH (ref 0.1–0.3)
LYMPHOCYTES # BLD AUTO: 1.89 K/UL — SIGNIFICANT CHANGE UP (ref 1.2–3.4)
LYMPHOCYTES # BLD AUTO: 15.5 % — LOW (ref 20.5–51.1)
MAGNESIUM SERPL-MCNC: 1.9 MG/DL — SIGNIFICANT CHANGE UP (ref 1.8–2.4)
MCHC RBC-ENTMCNC: 29.9 PG — SIGNIFICANT CHANGE UP (ref 27–31)
MCHC RBC-ENTMCNC: 31.7 G/DL — LOW (ref 32–37)
MCV RBC AUTO: 94.3 FL — HIGH (ref 80–94)
MONOCYTES # BLD AUTO: 1 K/UL — HIGH (ref 0.1–0.6)
MONOCYTES NFR BLD AUTO: 8.2 % — SIGNIFICANT CHANGE UP (ref 1.7–9.3)
NEUTROPHILS # BLD AUTO: 8.95 K/UL — HIGH (ref 1.4–6.5)
NEUTROPHILS NFR BLD AUTO: 73.3 % — SIGNIFICANT CHANGE UP (ref 42.2–75.2)
NRBC # BLD: 0 /100 WBCS — SIGNIFICANT CHANGE UP (ref 0–0)
PHOSPHATE SERPL-MCNC: 4 MG/DL — SIGNIFICANT CHANGE UP (ref 2.1–4.9)
PLATELET # BLD AUTO: 255 K/UL — SIGNIFICANT CHANGE UP (ref 130–400)
PMV BLD: 11 FL — HIGH (ref 7.4–10.4)
POTASSIUM SERPL-MCNC: 4.4 MMOL/L — SIGNIFICANT CHANGE UP (ref 3.5–5)
POTASSIUM SERPL-SCNC: 4.4 MMOL/L — SIGNIFICANT CHANGE UP (ref 3.5–5)
PROT SERPL-MCNC: 7.1 G/DL — SIGNIFICANT CHANGE UP (ref 6–8)
RBC # BLD: 3.34 M/UL — LOW (ref 4.7–6.1)
RBC # FLD: 14.1 % — SIGNIFICANT CHANGE UP (ref 11.5–14.5)
SODIUM SERPL-SCNC: 138 MMOL/L — SIGNIFICANT CHANGE UP (ref 135–146)
WBC # BLD: 12.22 K/UL — HIGH (ref 4.8–10.8)
WBC # FLD AUTO: 12.22 K/UL — HIGH (ref 4.8–10.8)

## 2024-07-16 PROCEDURE — 99232 SBSQ HOSP IP/OBS MODERATE 35: CPT | Mod: GC

## 2024-07-16 RX ORDER — CHLORHEXIDINE GLUCONATE 500 MG/1
1 CLOTH TOPICAL DAILY
Refills: 0 | Status: DISCONTINUED | OUTPATIENT
Start: 2024-07-16 | End: 2024-07-28

## 2024-07-16 RX ORDER — LEVOFLOXACIN 25 MG/ML
1 SOLUTION ORAL
Qty: 5 | Refills: 0
Start: 2024-07-16 | End: 2024-07-25

## 2024-07-16 RX ORDER — PANTOPRAZOLE SODIUM 20 MG/1
1 TABLET, DELAYED RELEASE ORAL
Qty: 30 | Refills: 0
Start: 2024-07-16 | End: 2024-08-14

## 2024-07-16 RX ADMIN — ATORVASTATIN CALCIUM 20 MILLIGRAM(S): 40 TABLET, FILM COATED ORAL at 21:25

## 2024-07-16 RX ADMIN — PANTOPRAZOLE SODIUM 40 MILLIGRAM(S): 20 TABLET, DELAYED RELEASE ORAL at 05:55

## 2024-07-16 RX ADMIN — Medication 650 MILLIGRAM(S): at 05:54

## 2024-07-16 RX ADMIN — Medication 650 MILLIGRAM(S): at 13:10

## 2024-07-16 RX ADMIN — Medication 650 MILLIGRAM(S): at 21:25

## 2024-07-16 RX ADMIN — HEPARIN SODIUM 5000 UNIT(S): 1000 INJECTION, SOLUTION INTRAVENOUS; SUBCUTANEOUS at 05:52

## 2024-07-16 RX ADMIN — HEPARIN SODIUM 5000 UNIT(S): 1000 INJECTION, SOLUTION INTRAVENOUS; SUBCUTANEOUS at 21:25

## 2024-07-16 RX ADMIN — MEROPENEM 100 MILLIGRAM(S): 1 INJECTION, POWDER, FOR SOLUTION INTRAVENOUS at 17:17

## 2024-07-16 RX ADMIN — Medication 3 MILLIGRAM(S): at 21:25

## 2024-07-16 RX ADMIN — MEROPENEM 100 MILLIGRAM(S): 1 INJECTION, POWDER, FOR SOLUTION INTRAVENOUS at 05:57

## 2024-07-16 RX ADMIN — CHLORHEXIDINE GLUCONATE 1 APPLICATION(S): 500 CLOTH TOPICAL at 11:29

## 2024-07-16 RX ADMIN — HEPARIN SODIUM 5000 UNIT(S): 1000 INJECTION, SOLUTION INTRAVENOUS; SUBCUTANEOUS at 13:11

## 2024-07-16 NOTE — PROGRESS NOTE ADULT - ASSESSMENT
70 yo M with hx of urinary incontinence w/ chronic SPC, nephrolithiasis, spinal abscess w/ quadriplegia, DM2, CKD5 (baseline Cr 4.4), HTN, HLD, DM II, CKD stage 5 (baseline Cr 4.4)) presented to ED with abdominal pain, n/v, found to have severe R hydroureteronephrosis w/ numerous R-sided calculi including obstructing stone in R ureter as well as nonobstructive L calculi w/o hydro. Admitted for acute pyelonephritis and bacteremia 2/2 R obstructing ureteral stone w/ hydronephrosis. Course complicated by postrenal JOÃO on CKD5 and HAGMA, improving s/p R PCN and R nephroureteral catheter placement with IR (7/10), continuing on antibiotics    #Pyelonephritis/complicated cystitis 2/2 P. mirabilis, S. marcescens E. Faecalis  #GNR bacteremia 2/2 B. fragilis  #Right-sided hydronephrosis 2/2 R obstructive ureteral calculi s/p PCN  #JOÃO on CKD5, postrenal, improving  #Bilateral renal calculi   #HAGMA - improving  - CKD stage 5 baseline Cr ~4.4, urinary incontinence with chronic supra pubic catheter  - CTAP shows severe hydro on R w/ obstructive calculi and non obs calculi on Left  - s/p R PCN and R nephroureteral catheter with IR on 7/10 - drained pus in OR, draining minimally  - BCx: GNR, B. fragilis  - UCx: P. mirabilis, S. marcescens, E. faecalis   - Afebrile, normotensive  - Leukocytosis improving  - Cr improving, now close to baseline  - monitor PCN and SPC output   - Nephro consulted, appreciate recs  - urology consulted: R PCNL and L JJ stent on 7/23 pending medical clearance  - ID consulted, appreciate recs  - c/w IV meropenem 500mg q12h per ID (transitioned from IV cefepime 2g q24h); d/c recs: midline w/ ertapenem 500mg q24h IV, PO amoxicillin 500mg qd until 48 hours post-op  - Trend CBC, fever  - c/w oral bicarb 650 mg TID   - Trend BUN/Cr, Mg, phos  - replete electrolytes PRN  - Strict I/Os  - consult procedure team for midline    #Chronic venous stasis ulcers w/ dermatitis  - wounds do not appear infected, no edema or tenderness   - wound care consulted, appreciate recs  - cleanse wound w/ soap and water, pat dry  - apply dermaphor bid to bilateral legs and feet     #HTN  #HLD  - c/w atorvastatin  - holding nifedipine iso hypotension     #DM2  - monitor FS  - ISS TIDAC, qhs    ---------------------  DVT ppx: HSQ  Diet: renal/CCC/DASHTLC  Activity: IAT  GOC: DNR/DNI (MOLST in chart), HCP brother Erich [phone]  Dispo: pending clinical improvement, midline placement    #Summary/Handoff  - f/u ID recs for abx, procedure team for midline

## 2024-07-16 NOTE — PROGRESS NOTE ADULT - ASSESSMENT
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 NBNB vomiting and nausea every time after he eats.       IMPRESSION  #Bacteroides fragilis bacteremia secondary to Pyelonephritis/Pyonephrosis with obstructing stone    7/10 R nephrostomy   >100,000 CFU/ml Proteus mirabilis R fluoro R bactrim  Amoxicillin/Clavulanic Acid: S <=8/4    10,000 - 49,000 CFU/mL Serratia marcescens Levofloxacin: S <=0.5    50,000 - 99,000 CFU/mL Enterococcus faecalis Levofloxacin: S 1    7/9 BCX GNR- Bacteroides fragilis    7/9 BCX NGTD   - s/p percutaneous right nephrostomy followed by placement of right nephroureteral catheter    WBC 20 on admission ; Afebrile     7/9 UA pyuria WBC 50 ; UCX   >=3 organisms. Probable collection contamination.    SPT (replaced in ER)    CTAP 1.  Severe right hydroureteronephrosis with cortical thinning and   numerous large intrarenal and proximal to mid ureteral calculi;   obstructing calculus in the right mid ureter measures 1.1 x 1 x 1.8 cm (   ) with normal caliber distal ureter.  2.  Multiple nonobstructing left intrarenal calculi and a 0.8 cm left   distal ureteral calculus; no left hydronephrosis.  3.  Few prominent retroperitoneal lymph nodes, likely reactive.  4.  Cholelithiasis.  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 which could results in poor clinical outcomes  #Abx allergy: No Known Allergies  #CKD  Creatinine: 5.3 (07-10-24 @ 06:36)  QTC Calculation(Bazett) 439 ms  Height (cm): 185.4 (09-18-23 @ 13:48)  Weight (kg): 79.379 (09-18-23 @ 13:48)    RECOMMENDATIONS  - Continue meropenem 500mg q12h IV, On D/C given unidentified GNR, recommend midline x ertapenem 500mg q24h IV + PO amoxicillin 500mg daily (to cover enterococcus) until 48h post-op   - Appreciate Urology consult- planning on urologic intervention in two weeks, please continue medical optimization and medical clearance for general anesthesia and RIGHT PCNL and LEFT JJ stent placement  - Appreciate IR consult    If any questions, please send a message or call on [employer] Teams  Please continue to update ID with any pertinent new laboratory, radiographic findings, or change in clinical status

## 2024-07-16 NOTE — CHART NOTE - NSCHARTNOTEFT_GEN_A_CORE
Patient discussed with Dr. Akins, relevant cultures & documentation reviewed.    Plan for R PCNL next Tuesday, 7/23. Patient to be discharged per primary team, possibly today, on PO abx & will follow-up outpatient for procedure. Discussed with patient & will discuss with pt's brother Erich - to call  office tomorrow & office to assist arranging return for procedure.

## 2024-07-16 NOTE — PROGRESS NOTE ADULT - SUBJECTIVE AND OBJECTIVE BOX
LISAKRISTY MARIE  69y, Male  Allergy: No Known Allergies      LOS  7d    CHIEF COMPLAINT: UTI w/ obstructing stone (16 Jul 2024 09:03)      INTERVAL EVENTS/HPI  - No acute events overnight  - T(F): , Max: 97.6 (07-15-24 @ 16:16)  - Denies any worsening symptoms  - Tolerating medication  - WBC Count: 12.22 (07-16-24 @ 06:17)  WBC Count: 12.64 (07-15-24 @ 05:43)     - Creatinine: 4.2 (07-16-24 @ 06:17)  Creatinine: 4.8 (07-15-24 @ 05:43)       ROS  General: Denies rigors, nightsweats  HEENT: Denies headache, rhinorrhea, sore throat, eye pain  CV: Denies CP, palpitations  PULM: Denies wheezing, hemoptysis  GI: Denies hematemesis, hematochezia, melena  : Denies discharge, hematuria  MSK: Denies arthralgias, myalgias  SKIN: Denies rash, lesions  NEURO: Denies paresthesias, weakness  PSYCH: Denies depression, anxiety    VITALS:  T(F): 97.4, Max: 97.6 (07-15-24 @ 16:16)  HR: 73  BP: 123/73  RR: 18Vital Signs Last 24 Hrs  T(C): 36.3 (16 Jul 2024 07:55), Max: 36.4 (15 Jul 2024 16:16)  T(F): 97.4 (16 Jul 2024 07:55), Max: 97.6 (15 Jul 2024 16:16)  HR: 73 (16 Jul 2024 07:55) (70 - 73)  BP: 123/73 (16 Jul 2024 07:55) (108/67 - 123/73)  BP(mean): --  RR: 18 (16 Jul 2024 07:55) (18 - 18)  SpO2: 98% (16 Jul 2024 07:55) (97% - 98%)    Parameters below as of 16 Jul 2024 07:55  Patient On (Oxygen Delivery Method): room air        PHYSICAL EXAM:  Gen: NAD, resting in bed chronically ill appearing   HEENT: Normocephalic, atraumatic  Neck: supple, no lymphadenopathy  CV: Regular rate & regular rhythm  Lungs: decreased BS at bases, no fremitus  Abdomen: Soft, BS present PCN  Ext: Warm, well perfused  Neuro: non focal, awake  Skin: no rash, no erythema  Lines: no phlebitis    FH: Non-contributory  Social Hx: Non-contributory    TESTS & MEASUREMENTS:                        10.0   12.22 )-----------( 255      ( 16 Jul 2024 06:17 )             31.5     07-16    138  |  99  |  101<HH>  ----------------------------<  84  4.4   |  20  |  4.2<HH>    Ca    7.6<L>      16 Jul 2024 06:17  Phos  4.0     07-16  Mg     1.9     07-16    TPro  7.1  /  Alb  2.3<L>  /  TBili  0.4  /  DBili  x   /  AST  24  /  ALT  12  /  AlkPhos  146<H>  07-16      LIVER FUNCTIONS - ( 16 Jul 2024 06:17 )  Alb: 2.3 g/dL / Pro: 7.1 g/dL / ALK PHOS: 146 U/L / ALT: 12 U/L / AST: 24 U/L / GGT: x           Urinalysis Basic - ( 16 Jul 2024 06:17 )    Color: x / Appearance: x / SG: x / pH: x  Gluc: 84 mg/dL / Ketone: x  / Bili: x / Urobili: x   Blood: x / Protein: x / Nitrite: x   Leuk Esterase: x / RBC: x / WBC x   Sq Epi: x / Non Sq Epi: x / Bacteria: x        Culture - Urine (collected 07-10-24 @ 18:14)  Source: .Urine Nephrostomy - Right  Final Report (07-14-24 @ 20:48):    >100,000 CFU/ml Proteus mirabilis    10,000 - 49,000 CFU/mL Serratia marcescens    50,000 - 99,000 CFU/mL Enterococcus faecalis  Organism: Proteus mirabilis  Serratia marcescens  Enterococcus faecalis (07-14-24 @ 20:48)  Organism: Enterococcus faecalis (07-14-24 @ 20:48)      Method Type: ALF      -  Ampicillin: S <=2 Predicts results to ampicillin/sulbactam, amoxacillin-clavulanate and  piperacillin-tazobactam.      -  Ciprofloxacin: S <=1      -  Levofloxacin: S 1      -  Nitrofurantoin: S <=32 Should not be used to treat pyelonephritis.      -  Tetracycline: R >8      -  Vancomycin: S 1  Organism: Serratia marcescens (07-14-24 @ 20:48)      Method Type: ALF      -  Amoxicillin/Clavulanic Acid: R >16/8      -  Ampicillin: R 16 These ampicillin results predict results for amoxicillin      -  Ampicillin/Sulbactam: R 16/8      -  Aztreonam: S <=4      -  Cefazolin: R >16      -  Cefepime: S <=2      -  Cefoxitin: R <=8      -  Ceftriaxone: S <=1      -  Ciprofloxacin: S <=0.25      -  Ertapenem: S <=0.5      -  Gentamicin: S <=2      -  Levofloxacin: S <=0.5      -  Meropenem: S <=1      -  Nitrofurantoin: R >64 Should not be used to treat pyelonephritis      -  Piperacillin/Tazobactam: S <=8      -  Tobramycin: S <=2      -  Trimethoprim/Sulfamethoxazole: S <=0.5/9.5  Organism: Proteus mirabilis (07-14-24 @ 20:48)      Method Type: ALF      -  Amoxicillin/Clavulanic Acid: S <=8/4      -  Ampicillin: R >16 These ampicillin results predict results for amoxicillin      -  Ampicillin/Sulbactam: I 16/8      -  Aztreonam: S <=4      -  Cefazolin: S 8 For uncomplicated UTI with K. pneumoniae, E. coli, or P. mirablis: ALF <=16 is sensitive and ALF >=32 is resistant. This also predicts results for oral agents cefaclor, cefdinir, cefpodoxime, cefprozil, cefuroxime axetil, cephalexin and locarbef for uncomplicated UTI. Note that some isolates may be susceptible to these agents while testing resistant to cefazolin.      -  Cefepime: S <=2      -  Cefoxitin: S <=8      -  Ceftriaxone: S <=1      -  Cefuroxime: S <=4      -  Ciprofloxacin: R >2      -  Ertapenem: S <=0.5      -  Gentamicin: I 4      -  Levofloxacin: R >4      -  Meropenem: S <=1      -  Nitrofurantoin: R 64 Should not be used to treat pyelonephritis      -  Piperacillin/Tazobactam: S <=8      -  Tobramycin: S <=2      -  Trimethoprim/Sulfamethoxazole: R >2/38    Culture - Blood (collected 07-09-24 @ 14:20)  Source: .Blood Blood-Peripheral  Gram Stain (07-11-24 @ 01:20):    Growth in anaerobic bottle: Gram Negative Rods  Preliminary Report (07-16-24 @ 09:11):    Growth in anaerobic bottle: Gram Negative Rods    Growth in anaerobic bottle: Gram Negative Rods Sent to    [place] [facility]  for Identification    .    Direct identification is available within approximately 3-5    hours eitherby Blood Panel Multiplexed PCR or Direct    MALDI-TOF. Details: [email or web address]  Organism: Blood Culture PCR (07-11-24 @ 03:23)  Organism: Blood Culture PCR (07-11-24 @ 03:23)      Method Type: PCR      -  Blood PCR Panel: NEG    Culture - Blood (collected 07-09-24 @ 14:20)  Source: .Blood Blood-Peripheral  Gram Stain (07-12-24 @ 04:50):    Growth in anaerobic bottle: Gram Negative Rods  Final Report (07-13-24 @ 09:30):    Growth in anaerobic bottle: Bacteroides fragilis    "Susceptibilities not performed"    Culture - Urine (collected 07-09-24 @ 13:50)  Source: Clean Catch Clean Catch (Midstream)  Final Report (07-10-24 @ 23:26):    >=3 organisms. Probable collection contamination.            INFECTIOUS DISEASES TESTING      INFLAMMATORY MARKERS      RADIOLOGY & ADDITIONAL TESTS:  I have personally reviewed the last available Chest xray  CXR      CT      CARDIOLOGY TESTING  12 Lead ECG:   Ventricular Rate 71 BPM    Atrial Rate 71 BPM    P-R Interval 186 ms    QRS Duration 96 ms    Q-T Interval 404 ms    QTC Calculation(Bazett) 439 ms    P Axis 62 degrees    R Axis 49 degrees    T Axis 9 degrees    Diagnosis Line Normal sinus rhythm  Normal ECG    Confirmed by Julius Don ([id number]) on 7/9/2024 12:41:31 PM (07-09-24 @ 12:14)      MEDICATIONS  atorvastatin 20 Oral at bedtime  chlorhexidine 2% Cloths 1 Topical daily  dextrose 5%. 1000 IV Continuous <Continuous>  dextrose 5%. 1000 IV Continuous <Continuous>  dextrose 50% Injectable 12.5 IV Push once  dextrose 50% Injectable 25 IV Push once  dextrose 50% Injectable 25 IV Push once  glucagon  Injectable 1 IntraMuscular once  heparin   Injectable 5000 SubCutaneous every 8 hours  insulin lispro (ADMELOG) corrective regimen sliding scale  SubCutaneous three times a day before meals  insulin lispro (ADMELOG) corrective regimen sliding scale  SubCutaneous at bedtime  meropenem  IVPB 500 IV Intermittent every 12 hours  meropenem  IVPB     pantoprazole    Tablet 40 Oral before breakfast  sodium bicarbonate 650 Oral three times a day      WEIGHT  Weight (kg): 81.6 (07-10-24 @ 14:34)  Creatinine: 4.2 mg/dL (07-16-24 @ 06:17)      ANTIBIOTICS:  meropenem  IVPB 500 milliGRAM(s) IV Intermittent every 12 hours  meropenem  IVPB          All available historical records have been reviewed

## 2024-07-16 NOTE — PROGRESS NOTE ADULT - SUBJECTIVE AND OBJECTIVE BOX
Nephrology progress note    THIS IS AN INCOMPLETE NOTE . FULL NOTE TO FOLLOW SHORTLY    Patient is seen and examined, events over the last 24 h noted .    Allergies:  No Known Allergies    Hospital Medications:   MEDICATIONS  (STANDING):  atorvastatin 20 milliGRAM(s) Oral at bedtime  dextrose 5%. 1000 milliLiter(s) (100 mL/Hr) IV Continuous <Continuous>  dextrose 5%. 1000 milliLiter(s) (50 mL/Hr) IV Continuous <Continuous>  dextrose 50% Injectable 25 Gram(s) IV Push once  dextrose 50% Injectable 12.5 Gram(s) IV Push once  dextrose 50% Injectable 25 Gram(s) IV Push once  glucagon  Injectable 1 milliGRAM(s) IntraMuscular once  heparin   Injectable 5000 Unit(s) SubCutaneous every 8 hours  insulin lispro (ADMELOG) corrective regimen sliding scale   SubCutaneous at bedtime  insulin lispro (ADMELOG) corrective regimen sliding scale   SubCutaneous three times a day before meals  meropenem  IVPB 500 milliGRAM(s) IV Intermittent every 12 hours  meropenem  IVPB      pantoprazole    Tablet 40 milliGRAM(s) Oral before breakfast  sodium bicarbonate 650 milliGRAM(s) Oral three times a day        VITALS:  T(F): 97.4 (07-16-24 @ 07:55), Max: 97.6 (07-15-24 @ 16:16)  HR: 73 (07-16-24 @ 07:55)  BP: 123/73 (07-16-24 @ 07:55)  RR: 18 (07-16-24 @ 07:55)  SpO2: 98% (07-16-24 @ 07:55)  Wt(kg): --    07-14 @ 07:01  -  07-15 @ 07:00  --------------------------------------------------------  IN: 0 mL / OUT: 1800 mL / NET: -1800 mL    07-15 @ 07:01  -  07-16 @ 07:00  --------------------------------------------------------  IN: 600 mL / OUT: 1000 mL / NET: -400 mL          PHYSICAL EXAM:  Constitutional: NAD  HEENT: anicteric sclera, oropharynx clear, MMM  Neck: No JVD  Respiratory: CTAB, no wheezes, rales or rhonchi  Cardiovascular: S1, S2, RRR  Gastrointestinal: BS+, soft, NT/ND  Extremities: No cyanosis or clubbing. No peripheral edema  :  No angelo.   Skin: No rashes    LABS:  07-16    138  |  99  |  101<HH>  ----------------------------<  84  4.4   |  20  |  4.2<HH>    Ca    7.6<L>      16 Jul 2024 06:17  Phos  4.0     07-16  Mg     1.9     07-16    TPro  7.1  /  Alb  2.3<L>  /  TBili  0.4  /  DBili      /  AST  24  /  ALT  12  /  AlkPhos  146<H>  07-16                          10.0   12.22 )-----------( 255      ( 16 Jul 2024 06:17 )             31.5       Urine Studies:  Urinalysis Basic - ( 16 Jul 2024 06:17 )    Color:  / Appearance:  / SG:  / pH:   Gluc: 84 mg/dL / Ketone:   / Bili:  / Urobili:    Blood:  / Protein:  / Nitrite:    Leuk Esterase:  / RBC:  / WBC    Sq Epi:  / Non Sq Epi:  / Bacteria:           Iron 30, TIBC 143, %sat 21      [09-07-23 @ 09:17]  Ferritin 533      [09-07-23 @ 09:17]  PTH -- (Ca 8.3)      [07-10-24 @ 21:08]   43  Vitamin D (25OH) 13      [09-07-23 @ 09:17]  Lipid: chol 134, , HDL 30, LDL --      [09-07-23 @ 09:17]    HBsAg Nonreact      [09-17-23 @ 07:23]  HCV 0.16, Nonreact      [09-17-23 @ 07:23]    TAY: titer Negative, pattern --      [09-17-23 @ 07:23]  dsDNA <12      [09-17-23 @ 07:23]  C3 Complement 132      [09-16-23 @ 15:44]  C4 Complement 35      [09-16-23 @ 15:44]  ANCA: cANCA Negative, pANCA Negative, atypical ANCA Negative      [09-17-23 @ 07:23]  PLA2R: ARLEEN <1.8, IFA --      [09-17-23 @ 07:43]  Free Light Chains: kappa 35.40, lambda 35.96, ratio = 0.98      [09-17 @ 07:23]  Immunofixation Serum:   No Monoclonal Band Identified      Reference Range: None Detected      [09-17-23 @ 07:23]  SPEP Interpretation: Polyclonal Gammopathy      [09-17-23 @ 07:23]      RADIOLOGY & ADDITIONAL STUDIES:   Nephrology progress note    Patient is seen and examined, events over the last 24 h noted .  Lying in bed comfortable    Allergies:  No Known Allergies    Hospital Medications:   MEDICATIONS  (STANDING):  atorvastatin 20 milliGRAM(s) Oral at bedtime  glucagon  Injectable 1 milliGRAM(s) IntraMuscular once  heparin   Injectable 5000 Unit(s) SubCutaneous every 8 hours  insulin lispro (ADMELOG) corrective regimen sliding scale   SubCutaneous at bedtime  insulin lispro (ADMELOG) corrective regimen sliding scale   SubCutaneous three times a day before meals  meropenem  IVPB 500 milliGRAM(s) IV Intermittent every 12 hours  pantoprazole    Tablet 40 milliGRAM(s) Oral before breakfast  sodium bicarbonate 650 milliGRAM(s) Oral three times a day        VITALS:  T(F): 97.4 (07-16-24 @ 07:55), Max: 97.6 (07-15-24 @ 16:16)  HR: 73 (07-16-24 @ 07:55)  BP: 123/73 (07-16-24 @ 07:55)  RR: 18 (07-16-24 @ 07:55)  SpO2: 98% (07-16-24 @ 07:55)      07-14 @ 07:01  -  07-15 @ 07:00  --------------------------------------------------------  IN: 0 mL / OUT: 1800 mL / NET: -1800 mL    07-15 @ 07:01  -  07-16 @ 07:00  --------------------------------------------------------  IN: 600 mL / OUT: 1000 mL / NET: -400 mL          PHYSICAL EXAM:  Constitutional: NAD  Respiratory: CTAB,  Cardiovascular: S1, S2, RRR  Gastrointestinal: BS+, soft, NT/ND  Extremities: No cyanosis or clubbing. No peripheral edema  :  No angelo.   Skin: No rashes    LABS:  07-16    138  |  99  |  101<HH>  ----------------------------<  84  4.4   |  20  |  4.2<HH>    Ca    7.6<L>      16 Jul 2024 06:17  Phos  4.0     07-16  Mg     1.9     07-16    TPro  7.1  /  Alb  2.3<L>  /  TBili  0.4  /  DBili      /  AST  24  /  ALT  12  /  AlkPhos  146<H>  07-16                          10.0   12.22 )-----------( 255      ( 16 Jul 2024 06:17 )             31.5       Urine Studies:  Urinalysis Basic - ( 16 Jul 2024 06:17 )    Color:  / Appearance:  / SG:  / pH:   Gluc: 84 mg/dL / Ketone:   / Bili:  / Urobili:    Blood:  / Protein:  / Nitrite:    Leuk Esterase:  / RBC:  / WBC    Sq Epi:  / Non Sq Epi:  / Bacteria:           Iron 30, TIBC 143, %sat 21      [09-07-23 @ 09:17]  Ferritin 533      [09-07-23 @ 09:17]  PTH -- (Ca 8.3)      [07-10-24 @ 21:08]   43  Vitamin D (25OH) 13      [09-07-23 @ 09:17]  Lipid: chol 134, , HDL 30, LDL --      [09-07-23 @ 09:17]    HBsAg Nonreact      [09-17-23 @ 07:23]  HCV 0.16, Nonreact      [09-17-23 @ 07:23]    TAY: titer Negative, pattern --      [09-17-23 @ 07:23]  dsDNA <12      [09-17-23 @ 07:23]  C3 Complement 132      [09-16-23 @ 15:44]  C4 Complement 35      [09-16-23 @ 15:44]  ANCA: cANCA Negative, pANCA Negative, atypical ANCA Negative      [09-17-23 @ 07:23]  PLA2R: ARLEEN <1.8, IFA --      [09-17-23 @ 07:43]  Free Light Chains: kappa 35.40, lambda 35.96, ratio = 0.98      [09-17 @ 07:23]  Immunofixation Serum:   No Monoclonal Band Identified      Reference Range: None Detected      [09-17-23 @ 07:23]  SPEP Interpretation: Polyclonal Gammopathy      [09-17-23 @ 07:23]      RADIOLOGY & ADDITIONAL STUDIES:

## 2024-07-16 NOTE — PROGRESS NOTE ADULT - SUBJECTIVE AND OBJECTIVE BOX
Patient is a 69y old Male who presents with a chief complaint of UTI w/ obstructing stone (15 Jul 2024 06:31)    Today is hospital day 7    Overnight events/Interval History:  - No acute overnight events  - At bedside, patient has been sleeping and eating well. Breathing comfortably on room air. Denies fevers, chills, SOB, chest pain, N/V/D/C, abdominal pain.    ROS:  - All ROS is negative unless indicated in HPI    Code Status: DNR/DNI, trial NIV    ALLERGIES:  No Known Allergies    MEDICATIONS:  STANDING MEDICATIONS  atorvastatin 20 milliGRAM(s) Oral at bedtime  chlorhexidine 2% Cloths 1 Application(s) Topical daily  dextrose 5%. 1000 milliLiter(s) IV Continuous <Continuous>  dextrose 5%. 1000 milliLiter(s) IV Continuous <Continuous>  dextrose 50% Injectable 12.5 Gram(s) IV Push once  dextrose 50% Injectable 25 Gram(s) IV Push once  dextrose 50% Injectable 25 Gram(s) IV Push once  glucagon  Injectable 1 milliGRAM(s) IntraMuscular once  heparin   Injectable 5000 Unit(s) SubCutaneous every 8 hours  insulin lispro (ADMELOG) corrective regimen sliding scale   SubCutaneous three times a day before meals  insulin lispro (ADMELOG) corrective regimen sliding scale   SubCutaneous at bedtime  meropenem  IVPB 500 milliGRAM(s) IV Intermittent every 12 hours  meropenem  IVPB      pantoprazole    Tablet 40 milliGRAM(s) Oral before breakfast  sodium bicarbonate 650 milliGRAM(s) Oral three times a day    PRN MEDICATIONS  aluminum hydroxide/magnesium hydroxide/simethicone Suspension 30 milliLiter(s) Oral every 4 hours PRN  dextrose Oral Gel 15 Gram(s) Oral once PRN  melatonin 3 milliGRAM(s) Oral at bedtime PRN  ondansetron Injectable 4 milliGRAM(s) IV Push every 8 hours PRN    VITALS LAST 24H:  Vital Signs Last 24 Hrs  T(C): 36.3 (16 Jul 2024 07:55), Max: 36.4 (15 Jul 2024 16:16)  T(F): 97.4 (16 Jul 2024 07:55), Max: 97.6 (15 Jul 2024 16:16)  HR: 73 (16 Jul 2024 07:55) (70 - 73)  BP: 123/73 (16 Jul 2024 07:55) (108/67 - 123/73)  BP(mean): --  RR: 18 (16 Jul 2024 07:55) (18 - 18)  SpO2: 98% (16 Jul 2024 07:55) (97% - 98%)    Parameters below as of 16 Jul 2024 07:55  Patient On (Oxygen Delivery Method): room air      PHYSICAL EXAM:  GENERAL: NAD, lying in bed comfortably  HEENT:  EOMI, Moist mucous membranes, poor dentition  CHEST/LUNG: Clear to auscultation bilaterally; normal respiratory effort, no coughing, wheezes, crackles, or rales.  HEART: Regular rate and rhythm  ABDOMEN: Soft, non-tender, nondistended, +SPC w/ clear yellow urine draining. +R PCN w/ clear urine   EXTREMITIES:  2+ Peripheral Pulses, brisk capillary refill. +chronic venous stasis ulcers w/ dermatitis bilaterally. 1+ nonpitting pedal edema   NERVOUS SYSTEM:  A&Ox3, no focal deficits   SKIN: No rashes or lesions    LABS:                        10.0   12.22 )-----------( 255      ( 16 Jul 2024 06:17 )             31.5     07-16    138  |  99  |  101<HH>  ----------------------------<  84  4.4   |  20  |  4.2<HH>    Ca    7.6<L>      16 Jul 2024 06:17  Phos  4.0     07-16  Mg     1.9     07-16    TPro  7.1  /  Alb  2.3<L>  /  TBili  0.4  /  DBili  x   /  AST  24  /  ALT  12  /  AlkPhos  146<H>  07-16            Urinalysis Basic - ( 15 Jul 2024 05:43 )    Color: x / Appearance: x / SG: x / pH: x  Gluc: 100 mg/dL / Ketone: x  / Bili: x / Urobili: x   Blood: x / Protein: x / Nitrite: x   Leuk Esterase: x / RBC: x / WBC x   Sq Epi: x / Non Sq Epi: x / Bacteria: x

## 2024-07-16 NOTE — PROGRESS NOTE ADULT - ASSESSMENT
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

## 2024-07-17 ENCOUNTER — APPOINTMENT (OUTPATIENT)
Dept: UROLOGY | Facility: CLINIC | Age: 69
End: 2024-07-17

## 2024-07-17 LAB
ALBUMIN SERPL ELPH-MCNC: 2.2 G/DL — LOW (ref 3.5–5.2)
ALP SERPL-CCNC: 139 U/L — HIGH (ref 30–115)
ALT FLD-CCNC: 11 U/L — SIGNIFICANT CHANGE UP (ref 0–41)
ANION GAP SERPL CALC-SCNC: 12 MMOL/L — SIGNIFICANT CHANGE UP (ref 7–14)
AST SERPL-CCNC: 26 U/L — SIGNIFICANT CHANGE UP (ref 0–41)
BASOPHILS # BLD AUTO: 0.03 K/UL — SIGNIFICANT CHANGE UP (ref 0–0.2)
BASOPHILS NFR BLD AUTO: 0.3 % — SIGNIFICANT CHANGE UP (ref 0–1)
BILIRUB SERPL-MCNC: 0.3 MG/DL — SIGNIFICANT CHANGE UP (ref 0.2–1.2)
BUN SERPL-MCNC: 94 MG/DL — CRITICAL HIGH (ref 10–20)
CALCIUM SERPL-MCNC: 7.7 MG/DL — LOW (ref 8.4–10.4)
CHLORIDE SERPL-SCNC: 103 MMOL/L — SIGNIFICANT CHANGE UP (ref 98–110)
CO2 SERPL-SCNC: 24 MMOL/L — SIGNIFICANT CHANGE UP (ref 17–32)
CREAT SERPL-MCNC: 4.5 MG/DL — CRITICAL HIGH (ref 0.7–1.5)
EGFR: 13 ML/MIN/1.73M2 — LOW
EOSINOPHIL # BLD AUTO: 0.11 K/UL — SIGNIFICANT CHANGE UP (ref 0–0.7)
EOSINOPHIL NFR BLD AUTO: 1 % — SIGNIFICANT CHANGE UP (ref 0–8)
GLUCOSE BLDC GLUCOMTR-MCNC: 110 MG/DL — HIGH (ref 70–99)
GLUCOSE BLDC GLUCOMTR-MCNC: 126 MG/DL — HIGH (ref 70–99)
GLUCOSE BLDC GLUCOMTR-MCNC: 128 MG/DL — HIGH (ref 70–99)
GLUCOSE BLDC GLUCOMTR-MCNC: 133 MG/DL — HIGH (ref 70–99)
GLUCOSE SERPL-MCNC: 124 MG/DL — HIGH (ref 70–99)
HCT VFR BLD CALC: 30.8 % — LOW (ref 42–52)
HGB BLD-MCNC: 9.6 G/DL — LOW (ref 14–18)
IMM GRANULOCYTES NFR BLD AUTO: 1.6 % — HIGH (ref 0.1–0.3)
LYMPHOCYTES # BLD AUTO: 1.91 K/UL — SIGNIFICANT CHANGE UP (ref 1.2–3.4)
LYMPHOCYTES # BLD AUTO: 16.9 % — LOW (ref 20.5–51.1)
MAGNESIUM SERPL-MCNC: 1.7 MG/DL — LOW (ref 1.8–2.4)
MCHC RBC-ENTMCNC: 29.4 PG — SIGNIFICANT CHANGE UP (ref 27–31)
MCHC RBC-ENTMCNC: 31.2 G/DL — LOW (ref 32–37)
MCV RBC AUTO: 94.5 FL — HIGH (ref 80–94)
MONOCYTES # BLD AUTO: 0.79 K/UL — HIGH (ref 0.1–0.6)
MONOCYTES NFR BLD AUTO: 7 % — SIGNIFICANT CHANGE UP (ref 1.7–9.3)
NEUTROPHILS # BLD AUTO: 8.25 K/UL — HIGH (ref 1.4–6.5)
NEUTROPHILS NFR BLD AUTO: 73.2 % — SIGNIFICANT CHANGE UP (ref 42.2–75.2)
NRBC # BLD: 0 /100 WBCS — SIGNIFICANT CHANGE UP (ref 0–0)
PLATELET # BLD AUTO: 229 K/UL — SIGNIFICANT CHANGE UP (ref 130–400)
PMV BLD: 10.5 FL — HIGH (ref 7.4–10.4)
POTASSIUM SERPL-MCNC: 4.2 MMOL/L — SIGNIFICANT CHANGE UP (ref 3.5–5)
POTASSIUM SERPL-SCNC: 4.2 MMOL/L — SIGNIFICANT CHANGE UP (ref 3.5–5)
PROT SERPL-MCNC: 6.4 G/DL — SIGNIFICANT CHANGE UP (ref 6–8)
RBC # BLD: 3.26 M/UL — LOW (ref 4.7–6.1)
RBC # FLD: 13.8 % — SIGNIFICANT CHANGE UP (ref 11.5–14.5)
SODIUM SERPL-SCNC: 139 MMOL/L — SIGNIFICANT CHANGE UP (ref 135–146)
WBC # BLD: 11.27 K/UL — HIGH (ref 4.8–10.8)
WBC # FLD AUTO: 11.27 K/UL — HIGH (ref 4.8–10.8)

## 2024-07-17 PROCEDURE — 76937 US GUIDE VASCULAR ACCESS: CPT | Mod: 26

## 2024-07-17 PROCEDURE — 99232 SBSQ HOSP IP/OBS MODERATE 35: CPT

## 2024-07-17 PROCEDURE — 36410 VNPNXR 3YR/> PHY/QHP DX/THER: CPT

## 2024-07-17 RX ORDER — ERTAPENEM SODIUM 1 G/20ML
500 INJECTION INTRAMUSCULAR; INTRAVENOUS
Qty: 0 | Refills: 0 | DISCHARGE
Start: 2024-07-17 | End: 2024-07-25

## 2024-07-17 RX ORDER — MAGNESIUM SULFATE 500 MG/ML
2 VIAL (ML) INJECTION ONCE
Refills: 0 | Status: COMPLETED | OUTPATIENT
Start: 2024-07-17 | End: 2024-07-17

## 2024-07-17 RX ORDER — MEROPENEM 1 G/20ML
500 INJECTION, POWDER, FOR SOLUTION INTRAVENOUS ONCE
Refills: 0 | Status: COMPLETED | OUTPATIENT
Start: 2024-07-17 | End: 2024-07-17

## 2024-07-17 RX ORDER — ERTAPENEM SODIUM 1 G/20ML
500 INJECTION INTRAMUSCULAR; INTRAVENOUS EVERY 24 HOURS
Refills: 0 | Status: DISCONTINUED | OUTPATIENT
Start: 2024-07-17 | End: 2024-07-17

## 2024-07-17 RX ORDER — ERTAPENEM SODIUM 1 G/20ML
500 INJECTION INTRAMUSCULAR; INTRAVENOUS EVERY 24 HOURS
Refills: 0 | Status: DISCONTINUED | OUTPATIENT
Start: 2024-07-18 | End: 2024-07-22

## 2024-07-17 RX ADMIN — CHLORHEXIDINE GLUCONATE 1 APPLICATION(S): 500 CLOTH TOPICAL at 15:16

## 2024-07-17 RX ADMIN — MEROPENEM 100 MILLIGRAM(S): 1 INJECTION, POWDER, FOR SOLUTION INTRAVENOUS at 06:10

## 2024-07-17 RX ADMIN — HEPARIN SODIUM 5000 UNIT(S): 1000 INJECTION, SOLUTION INTRAVENOUS; SUBCUTANEOUS at 06:09

## 2024-07-17 RX ADMIN — ATORVASTATIN CALCIUM 20 MILLIGRAM(S): 40 TABLET, FILM COATED ORAL at 21:29

## 2024-07-17 RX ADMIN — HEPARIN SODIUM 5000 UNIT(S): 1000 INJECTION, SOLUTION INTRAVENOUS; SUBCUTANEOUS at 21:29

## 2024-07-17 RX ADMIN — MEROPENEM 100 MILLIGRAM(S): 1 INJECTION, POWDER, FOR SOLUTION INTRAVENOUS at 19:07

## 2024-07-17 RX ADMIN — Medication 650 MILLIGRAM(S): at 15:16

## 2024-07-17 RX ADMIN — Medication 25 GRAM(S): at 10:39

## 2024-07-17 RX ADMIN — Medication 650 MILLIGRAM(S): at 06:08

## 2024-07-17 RX ADMIN — Medication 650 MILLIGRAM(S): at 21:29

## 2024-07-17 RX ADMIN — HEPARIN SODIUM 5000 UNIT(S): 1000 INJECTION, SOLUTION INTRAVENOUS; SUBCUTANEOUS at 15:16

## 2024-07-17 RX ADMIN — PANTOPRAZOLE SODIUM 40 MILLIGRAM(S): 20 TABLET, DELAYED RELEASE ORAL at 06:08

## 2024-07-17 NOTE — PROGRESS NOTE ADULT - SUBJECTIVE AND OBJECTIVE BOX
LISAKRISTY MARIE  69y, Male  Allergy: No Known Allergies      LOS  8d    CHIEF COMPLAINT: UTI w/ obstructing stone (17 Jul 2024 13:36)      INTERVAL EVENTS/HPI  - No acute events overnight  - T(F): , Max: 97.9 (07-17-24 @ 07:56)  - Denies any worsening symptoms  - Tolerating medication  - WBC Count: 11.27 (07-17-24 @ 06:17)  WBC Count: 12.22 (07-16-24 @ 06:17)     - Creatinine: 4.5 (07-17-24 @ 06:17)  Creatinine: 4.2 (07-16-24 @ 06:17)       ROS  General: Denies rigors, nightsweats  HEENT: Denies headache, rhinorrhea, sore throat, eye pain  CV: Denies CP, palpitations  PULM: Denies wheezing, hemoptysis  GI: Denies hematemesis, hematochezia, melena  : Denies discharge, hematuria  MSK: Denies arthralgias, myalgias  SKIN: Denies rash, lesions  NEURO: Denies paresthesias, weakness  PSYCH: Denies depression, anxiety    VITALS:  T(F): 97.9, Max: 97.9 (07-17-24 @ 07:56)  HR: 66  BP: 138/82  RR: 18Vital Signs Last 24 Hrs  T(C): 36.6 (17 Jul 2024 07:56), Max: 36.6 (17 Jul 2024 07:56)  T(F): 97.9 (17 Jul 2024 07:56), Max: 97.9 (17 Jul 2024 07:56)  HR: 66 (17 Jul 2024 07:56) (66 - 71)  BP: 138/82 (17 Jul 2024 07:56) (122/80 - 138/82)  BP(mean): --  RR: 18 (17 Jul 2024 07:56) (18 - 18)  SpO2: 97% (17 Jul 2024 07:56) (97% - 97%)    Parameters below as of 17 Jul 2024 07:56  Patient On (Oxygen Delivery Method): room air        PHYSICAL EXAM:  Gen: NAD, resting in bed  HEENT: Normocephalic, atraumatic  Neck: supple, no lymphadenopathy  CV: Regular rate & regular rhythm  Lungs: decreased BS at bases, no fremitus  Abdomen: Soft, BS present PCN spt  Ext: Warm, well perfused  Neuro: non focal, awake  Skin: no rash, no erythema  Lines: no phlebitis    FH: Non-contributory  Social Hx: Non-contributory    TESTS & MEASUREMENTS:                        9.6    11.27 )-----------( 229      ( 17 Jul 2024 06:17 )             30.8     07-17    139  |  103  |  94<HH>  ----------------------------<  124<H>  4.2   |  24  |  4.5<HH>    Ca    7.7<L>      17 Jul 2024 06:17  Phos  4.0     07-16  Mg     1.7     07-17    TPro  6.4  /  Alb  2.2<L>  /  TBili  0.3  /  DBili  x   /  AST  26  /  ALT  11  /  AlkPhos  139<H>  07-17      LIVER FUNCTIONS - ( 17 Jul 2024 06:17 )  Alb: 2.2 g/dL / Pro: 6.4 g/dL / ALK PHOS: 139 U/L / ALT: 11 U/L / AST: 26 U/L / GGT: x           Urinalysis Basic - ( 17 Jul 2024 06:17 )    Color: x / Appearance: x / SG: x / pH: x  Gluc: 124 mg/dL / Ketone: x  / Bili: x / Urobili: x   Blood: x / Protein: x / Nitrite: x   Leuk Esterase: x / RBC: x / WBC x   Sq Epi: x / Non Sq Epi: x / Bacteria: x        Culture - Urine (collected 07-10-24 @ 18:14)  Source: .Urine Nephrostomy - Right  Final Report (07-14-24 @ 20:48):    >100,000 CFU/ml Proteus mirabilis    10,000 - 49,000 CFU/mL Serratia marcescens    50,000 - 99,000 CFU/mL Enterococcus faecalis  Organism: Proteus mirabilis  Serratia marcescens  Enterococcus faecalis (07-14-24 @ 20:48)  Organism: Enterococcus faecalis (07-14-24 @ 20:48)      Method Type: ALF      -  Ampicillin: S <=2 Predicts results to ampicillin/sulbactam, amoxacillin-clavulanate and  piperacillin-tazobactam.      -  Ciprofloxacin: S <=1      -  Levofloxacin: S 1      -  Nitrofurantoin: S <=32 Should not be used to treat pyelonephritis.      -  Tetracycline: R >8      -  Vancomycin: S 1  Organism: Serratia marcescens (07-14-24 @ 20:48)      Method Type: ALF      -  Amoxicillin/Clavulanic Acid: R >16/8      -  Ampicillin: R 16 These ampicillin results predict results for amoxicillin      -  Ampicillin/Sulbactam: R 16/8      -  Aztreonam: S <=4      -  Cefazolin: R >16      -  Cefepime: S <=2      -  Cefoxitin: R <=8      -  Ceftriaxone: S <=1      -  Ciprofloxacin: S <=0.25      -  Ertapenem: S <=0.5      -  Gentamicin: S <=2      -  Levofloxacin: S <=0.5      -  Meropenem: S <=1      -  Nitrofurantoin: R >64 Should not be used to treat pyelonephritis      -  Piperacillin/Tazobactam: S <=8      -  Tobramycin: S <=2      -  Trimethoprim/Sulfamethoxazole: S <=0.5/9.5  Organism: Proteus mirabilis (07-14-24 @ 20:48)      Method Type: ALF      -  Amoxicillin/Clavulanic Acid: S <=8/4      -  Ampicillin: R >16 These ampicillin results predict results for amoxicillin      -  Ampicillin/Sulbactam: I 16/8      -  Aztreonam: S <=4      -  Cefazolin: S 8 For uncomplicated UTI with K. pneumoniae, E. coli, or P. mirablis: ALF <=16 is sensitive and ALF >=32 is resistant. This also predicts results for oral agents cefaclor, cefdinir, cefpodoxime, cefprozil, cefuroxime axetil, cephalexin and locarbef for uncomplicated UTI. Note that some isolates may be susceptible to these agents while testing resistant to cefazolin.      -  Cefepime: S <=2      -  Cefoxitin: S <=8      -  Ceftriaxone: S <=1      -  Cefuroxime: S <=4      -  Ciprofloxacin: R >2      -  Ertapenem: S <=0.5      -  Gentamicin: I 4      -  Levofloxacin: R >4      -  Meropenem: S <=1      -  Nitrofurantoin: R 64 Should not be used to treat pyelonephritis      -  Piperacillin/Tazobactam: S <=8      -  Tobramycin: S <=2      -  Trimethoprim/Sulfamethoxazole: R >2/38    Culture - Blood (collected 07-09-24 @ 14:20)  Source: .Blood Blood-Peripheral  Gram Stain (07-11-24 @ 01:20):    Growth in anaerobic bottle: Gram Negative Rods  Preliminary Report (07-16-24 @ 09:11):    Growth in anaerobic bottle: Gram Negative Rods    Growth in anaerobic bottle: Gram Negative Rods Sent to    [place] [facility]  for Identification    .    Direct identification is available within approximately 3-5    hours eitherby Blood Panel Multiplexed PCR or Direct    MALDI-TOF. Details: [email or web address]  Organism: Blood Culture PCR (07-11-24 @ 03:23)  Organism: Blood Culture PCR (07-11-24 @ 03:23)      Method Type: PCR      -  Blood PCR Panel: NEG    Culture - Blood (collected 07-09-24 @ 14:20)  Source: .Blood Blood-Peripheral  Gram Stain (07-12-24 @ 04:50):    Growth in anaerobic bottle: Gram Negative Rods  Final Report (07-13-24 @ 09:30):    Growth in anaerobic bottle: Bacteroides fragilis    "Susceptibilities not performed"    Culture - Urine (collected 07-09-24 @ 13:50)  Source: Clean Catch Clean Catch (Midstream)  Final Report (07-10-24 @ 23:26):    >=3 organisms. Probable collection contamination.            INFECTIOUS DISEASES TESTING      INFLAMMATORY MARKERS      RADIOLOGY & ADDITIONAL TESTS:  I have personally reviewed the last available Chest xray  CXR      CT      CARDIOLOGY TESTING  12 Lead ECG:   Ventricular Rate 71 BPM    Atrial Rate 71 BPM    P-R Interval 186 ms    QRS Duration 96 ms    Q-T Interval 404 ms    QTC Calculation(Bazett) 439 ms    P Axis 62 degrees    R Axis 49 degrees    T Axis 9 degrees    Diagnosis Line Normal sinus rhythm  Normal ECG    Confirmed by Julius Don ([id number]) on 7/9/2024 12:41:31 PM (07-09-24 @ 12:14)      MEDICATIONS  atorvastatin 20 Oral at bedtime  chlorhexidine 2% Cloths 1 Topical daily  dextrose 5%. 1000 IV Continuous <Continuous>  dextrose 5%. 1000 IV Continuous <Continuous>  dextrose 50% Injectable 12.5 IV Push once  dextrose 50% Injectable 25 IV Push once  dextrose 50% Injectable 25 IV Push once  glucagon  Injectable 1 IntraMuscular once  heparin   Injectable 5000 SubCutaneous every 8 hours  insulin lispro (ADMELOG) corrective regimen sliding scale  SubCutaneous at bedtime  insulin lispro (ADMELOG) corrective regimen sliding scale  SubCutaneous three times a day before meals  meropenem  IVPB 500 IV Intermittent once  pantoprazole    Tablet 40 Oral before breakfast  sodium bicarbonate 650 Oral three times a day      WEIGHT  Weight (kg): 81.6 (07-10-24 @ 14:34)  Creatinine: 4.5 mg/dL (07-17-24 @ 06:17)      ANTIBIOTICS:  meropenem  IVPB 500 milliGRAM(s) IV Intermittent once      All available historical records have been reviewed

## 2024-07-17 NOTE — PROGRESS NOTE ADULT - SUBJECTIVE AND OBJECTIVE BOX
Nephrology Progress Note    KRISTY GARCIA  [id number]  69y  Male    S:  Patient is seen and examined, events over the last 24h noted.    O:  Allergies:  No Known Allergies    Hospital Medications:   MEDICATIONS  (STANDING):  atorvastatin 20 milliGRAM(s) Oral at bedtime  heparin   Injectable 5000 Unit(s) SubCutaneous every 8 hours  insulin lispro (ADMELOG) corrective regimen sliding scale   SubCutaneous three times a day before meals  insulin lispro (ADMELOG) corrective regimen sliding scale   SubCutaneous at bedtime  meropenem  IVPB 500 milliGRAM(s) IV Intermittent every 12 hours  pantoprazole    Tablet 40 milliGRAM(s) Oral before breakfast  sodium bicarbonate 650 milliGRAM(s) Oral three times a day    MEDICATIONS  (PRN):  aluminum hydroxide/magnesium hydroxide/simethicone Suspension 30 milliLiter(s) Oral every 4 hours PRN Dyspepsia  dextrose Oral Gel 15 Gram(s) Oral once PRN Blood Glucose LESS THAN 70 milliGRAM(s)/deciliter  melatonin 3 milliGRAM(s) Oral at bedtime PRN Insomnia  ondansetron Injectable 4 milliGRAM(s) IV Push every 8 hours PRN Nausea and/or Vomiting    Home Medications:  atorvastatin 20 mg oral tablet: 1 tab(s) orally once a day (at bedtime) (09 Jul 2024 19:56)  petrolatum topical ointment: 1 Apply topically to affected area 2 times a day (09 Jul 2024 19:56)      VITALS:  Daily     Daily   T(F): 97.9 (07-17-24 @ 07:56), Max: 97.9 (07-17-24 @ 07:56)  HR: 66 (07-17-24 @ 07:56)  BP: 138/82 (07-17-24 @ 07:56)  RR: 18 (07-17-24 @ 07:56)  SpO2: 97% (07-17-24 @ 07:56)  Wt(kg): --  I&O's Detail    16 Jul 2024 07:01  -  17 Jul 2024 07:00  --------------------------------------------------------  IN:    Oral Fluid: 360 mL  Total IN: 360 mL    OUT:    Indwelling Catheter - Suprapubic (mL): 900 mL    Nephrostomy Tube (mL): 0 mL    Voided (mL): 800 mL  Total OUT: 1700 mL    Total NET: -1340 mL      17 Jul 2024 07:01  -  17 Jul 2024 11:37  --------------------------------------------------------  IN:    Oral Fluid: 360 mL  Total IN: 360 mL    OUT:  Total OUT: 0 mL    Total NET: 360 mL        I&O's Summary    16 Jul 2024 07:01  -  17 Jul 2024 07:00  --------------------------------------------------------  IN: 360 mL / OUT: 1700 mL / NET: -1340 mL    17 Jul 2024 07:01  -  17 Jul 2024 11:37  --------------------------------------------------------  IN: 360 mL / OUT: 0 mL / NET: 360 mL          PHYSICAL EXAM:  Gen: NAD  Chest: b/l breath sounds  Abd: soft  Extremities: no edema      LABS:      07-17    139  |  103  |  94<HH>  ----------------------------<  124<H>  4.2   |  24  |  4.5<HH>    Ca    7.7<L>      17 Jul 2024 06:17  Phos  4.0     07-16  Mg     1.7     07-17    TPro  6.4  /  Alb  2.2<L>  /  TBili  0.3  /  DBili      /  AST  26  /  ALT  11  /  AlkPhos  139<H>  07-17    Phosphorus: 4.0 mg/dL (07-16-24 @ 06:17)  Phosphorus: 4.6 mg/dL (07-15-24 @ 05:43)    Intact PTH: 43 pg/mL (07-10-24 @ 21:08)                          9.6    11.27 )-----------( 229      ( 17 Jul 2024 06:17 )             30.8     Mean Cell Volume: 94.5 fL (07-17-24 @ 06:17)      Creatinine trend:  Creatinine: 4.5 mg/dL (07-17-24 @ 06:17)  Creatinine: 4.2 mg/dL (07-16-24 @ 06:17)  Creatinine: 4.8 mg/dL (07-15-24 @ 05:43)  Creatinine: 5.0 mg/dL (07-14-24 @ 07:12)  Creatinine: 5.0 mg/dL (07-13-24 @ 06:12)  Creatinine: 5.3 mg/dL (07-12-24 @ 06:22)

## 2024-07-17 NOTE — PROCEDURE NOTE - SUPERVISORY STATEMENT
Procedure team was consulted to perform midline insertion urgent procedure as IV antibiotic therapy needs to be continued upon discharge. I was present for the key critical aspects of the procedure performed during the care of the patient.

## 2024-07-17 NOTE — PROGRESS NOTE ADULT - ASSESSMENT
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 NBNB vomiting and nausea every time after he eats  #Obstructing R mid ureter calculus with severe hydroureteronephrosis s/p Stent   #B/L nephrolithiasis   #Post-renal JOÃO on CKD 5   #HAGMA   #Chronic suprapubic catheter   - baseline creatinine ~4.4 creat back to baseline now   - urology and IR on board, s/p Percutaneous right nephrostomy followed by placement of right nephroureteral catheter / plan for R PCNL and L JJ stent placement  - continue sodium bicarbonate po / d/c if bicarb up-trending  - ph at goal   - followed by ID on meropenem / dose once daily  - BP controlled  - no need to start RRT at this time  will follow

## 2024-07-17 NOTE — PROGRESS NOTE ADULT - ASSESSMENT
70 yo M with hx of urinary incontinence w/ chronic SPC, nephrolithiasis, spinal abscess w/ quadriplegia, DM2, CKD5 (baseline Cr 4.4), HTN, HLD, DM II, CKD stage 5 (baseline Cr 4.4)) presented to ED with abdominal pain, n/v, found to have severe R hydroureteronephrosis w/ numerous R-sided calculi including obstructing stone in R ureter as well as nonobstructive L calculi w/o hydro. Admitted for acute pyelonephritis and bacteremia 2/2 R obstructing ureteral stone w/ hydronephrosis. Course complicated by postrenal JOÃO on CKD5 and HAGMA, improving s/p R PCN and R nephroureteral catheter placement with IR (7/10), continuing on antibiotics    #GNR bacteremia 2/2 ?B. fragilis, final spp pending  #Right-sided hydronephrosis 2/2 R obstructive ureteral calculi s/p PCN  #Pyelonephritis/complicated cystitis 2/2 P. mirabilis, S. marcescens E. Faecalis  #JOÃO on CKD5, postrenal, improving  #Bilateral renal calculi   #HAGMA - improving  - CKD stage 5 baseline Cr ~4.4, urinary incontinence with chronic supra pubic catheter  - CTAP shows severe hydro on R w/ obstructive calculi and non obs calculi on Left  - s/p R PCN and R nephroureteral catheter with IR on 7/10 - drained pus in OR, draining minimally  - BCx: GNR, B. fragilis  - UCx: P. mirabilis, S. marcescens, E. faecalis   - Afebrile, normotensive  - Leukocytosis improving  - Cr improving, now close to baseline  - monitor PCN and SPC output   - Nephro consulted, appreciate recs  - urology consulted: R PCNL and L JJ stent on 7/23 pending medical clearance  - ID consulted, appreciate recs  - c/w IV meropenem 500mg q12h per ID (transitioned from IV cefepime 2g q24h); d/c recs: midline w/ ertapenem 500mg q24h IV, PO amoxicillin 500mg qd until 48 hours post-op  - Trend CBC, fever  - c/w oral bicarb 650 mg TID   - Trend BUN/Cr, Mg, phos  - replete electrolytes PRN  - Strict I/Os  - consult procedure team for midline    #Chronic venous stasis ulcers w/ dermatitis  - wounds do not appear infected, no edema or tenderness   - wound care consulted, appreciate recs  - cleanse wound w/ soap and water, pat dry  - apply dermaphor bid to bilateral legs and feet     #Globus sensation w/ vomiting - resolved  - self-induced vomiting 2/2 globus sensation  - c/w pantoprazole 40mg PO qd    #HTN  #HLD  - c/w atorvastatin  - holding nifedipine iso hypotension     #DM2  - monitor FS  - ISS TIDAC, qhs    ---------------------  DVT ppx: HSQ  Diet: renal/CCC/DASHTLC  Activity: IAT  GOC: DNR/DNI (MOLST in chart), HCP brother Erich [phone]  Dispo: pending clinical improvement, midline placement    #Summary/Handoff  - f/u procedure team for midline

## 2024-07-17 NOTE — PROGRESS NOTE ADULT - SUBJECTIVE AND OBJECTIVE BOX
Patient is a 69y old Male who presents with a chief complaint of UTI w/ obstructing stone (15 Jul 2024 06:31)    Today is hospital day 7    Overnight events/Interval History:  - No acute overnight events  - At bedside, patient has been sleeping and eating well. Breathing comfortably on room air. Denies fevers, chills, SOB, chest pain, N/V/D/C, abdominal pain.    ROS:  - All ROS is negative unless indicated in HPI    Code Status: DNR/DNI, trial NIV    ALLERGIES:  No Known Allergies    MEDICATIONS:  STANDING MEDICATIONS  atorvastatin 20 milliGRAM(s) Oral at bedtime  chlorhexidine 2% Cloths 1 Application(s) Topical daily  dextrose 5%. 1000 milliLiter(s) IV Continuous <Continuous>  dextrose 5%. 1000 milliLiter(s) IV Continuous <Continuous>  dextrose 50% Injectable 12.5 Gram(s) IV Push once  dextrose 50% Injectable 25 Gram(s) IV Push once  dextrose 50% Injectable 25 Gram(s) IV Push once  glucagon  Injectable 1 milliGRAM(s) IntraMuscular once  heparin   Injectable 5000 Unit(s) SubCutaneous every 8 hours  insulin lispro (ADMELOG) corrective regimen sliding scale   SubCutaneous three times a day before meals  insulin lispro (ADMELOG) corrective regimen sliding scale   SubCutaneous at bedtime  magnesium sulfate  IVPB 2 Gram(s) IV Intermittent once  meropenem  IVPB 500 milliGRAM(s) IV Intermittent every 12 hours  meropenem  IVPB      pantoprazole    Tablet 40 milliGRAM(s) Oral before breakfast  sodium bicarbonate 650 milliGRAM(s) Oral three times a day    PRN MEDICATIONS  aluminum hydroxide/magnesium hydroxide/simethicone Suspension 30 milliLiter(s) Oral every 4 hours PRN  dextrose Oral Gel 15 Gram(s) Oral once PRN  melatonin 3 milliGRAM(s) Oral at bedtime PRN  ondansetron Injectable 4 milliGRAM(s) IV Push every 8 hours PRN    VITALS LAST 24H:  Vital Signs Last 24 Hrs  T(C): 36.6 (17 Jul 2024 07:56), Max: 36.6 (17 Jul 2024 07:56)  T(F): 97.9 (17 Jul 2024 07:56), Max: 97.9 (17 Jul 2024 07:56)  HR: 66 (17 Jul 2024 07:56) (66 - 71)  BP: 138/82 (17 Jul 2024 07:56) (122/80 - 138/82)  BP(mean): --  RR: 18 (17 Jul 2024 07:56) (18 - 21)  SpO2: 97% (17 Jul 2024 07:56) (97% - 99%)    Parameters below as of 17 Jul 2024 07:56  Patient On (Oxygen Delivery Method): room air      HEENT:  EOMI, Moist mucous membranes, poor dentition  CHEST/LUNG: Clear to auscultation bilaterally; normal respiratory effort, no coughing, wheezes, crackles, or rales.  HEART: Regular rate and rhythm  ABDOMEN: Soft, non-tender, nondistended, +SPC w/ clear yellow urine draining. +R PCN w/ clear urine   EXTREMITIES:  2+ Peripheral Pulses, brisk capillary refill. +chronic venous stasis ulcers w/ dermatitis bilaterally. 1+ nonpitting pedal edema   NERVOUS SYSTEM:  A&Ox3, no focal deficits   SKIN: No rashes or lesions    LABS:                        9.6    11.27 )-----------( 229      ( 17 Jul 2024 06:17 )             30.8     07-17    139  |  103  |  94<HH>  ----------------------------<  124<H>  4.2   |  24  |  4.5<HH>    Ca    7.7<L>      17 Jul 2024 06:17  Phos  4.0     07-16  Mg     1.7     07-17    TPro  6.4  /  Alb  2.2<L>  /  TBili  0.3  /  DBili  x   /  AST  26  /  ALT  11  /  AlkPhos  139<H>  07-17             Patient is a 69y old Male who presents with a chief complaint of UTI w/ obstructing stone (15 Jul 2024 06:31)    Today is hospital day 8    Overnight events/Interval History:  - No acute overnight events  - At bedside, patient has been sleeping and eating well. Breathing comfortably on room air. Denies fevers, chills, SOB, chest pain, N/V/D/C, abdominal pain.    ROS:  - All ROS is negative unless indicated in HPI    Code Status: DNR/DNI, trial NIV    ALLERGIES:  No Known Allergies    MEDICATIONS:  STANDING MEDICATIONS  atorvastatin 20 milliGRAM(s) Oral at bedtime  chlorhexidine 2% Cloths 1 Application(s) Topical daily  dextrose 5%. 1000 milliLiter(s) IV Continuous <Continuous>  dextrose 5%. 1000 milliLiter(s) IV Continuous <Continuous>  dextrose 50% Injectable 12.5 Gram(s) IV Push once  dextrose 50% Injectable 25 Gram(s) IV Push once  dextrose 50% Injectable 25 Gram(s) IV Push once  glucagon  Injectable 1 milliGRAM(s) IntraMuscular once  heparin   Injectable 5000 Unit(s) SubCutaneous every 8 hours  insulin lispro (ADMELOG) corrective regimen sliding scale   SubCutaneous three times a day before meals  insulin lispro (ADMELOG) corrective regimen sliding scale   SubCutaneous at bedtime  magnesium sulfate  IVPB 2 Gram(s) IV Intermittent once  meropenem  IVPB 500 milliGRAM(s) IV Intermittent every 12 hours  meropenem  IVPB      pantoprazole    Tablet 40 milliGRAM(s) Oral before breakfast  sodium bicarbonate 650 milliGRAM(s) Oral three times a day    PRN MEDICATIONS  aluminum hydroxide/magnesium hydroxide/simethicone Suspension 30 milliLiter(s) Oral every 4 hours PRN  dextrose Oral Gel 15 Gram(s) Oral once PRN  melatonin 3 milliGRAM(s) Oral at bedtime PRN  ondansetron Injectable 4 milliGRAM(s) IV Push every 8 hours PRN    VITALS LAST 24H:  Vital Signs Last 24 Hrs  T(C): 36.6 (17 Jul 2024 07:56), Max: 36.6 (17 Jul 2024 07:56)  T(F): 97.9 (17 Jul 2024 07:56), Max: 97.9 (17 Jul 2024 07:56)  HR: 66 (17 Jul 2024 07:56) (66 - 71)  BP: 138/82 (17 Jul 2024 07:56) (122/80 - 138/82)  BP(mean): --  RR: 18 (17 Jul 2024 07:56) (18 - 21)  SpO2: 97% (17 Jul 2024 07:56) (97% - 99%)    Parameters below as of 17 Jul 2024 07:56  Patient On (Oxygen Delivery Method): room air      HEENT:  EOMI, Moist mucous membranes, poor dentition  CHEST/LUNG: Clear to auscultation bilaterally; normal respiratory effort, no coughing, wheezes, crackles, or rales.  HEART: Regular rate and rhythm  ABDOMEN: Soft, non-tender, nondistended, +SPC w/ clear yellow urine draining. +R PCN w/ clear urine   EXTREMITIES:  2+ Peripheral Pulses, brisk capillary refill. +chronic venous stasis ulcers w/ dermatitis bilaterally. 1+ nonpitting pedal edema   NERVOUS SYSTEM:  A&Ox3, no focal deficits   SKIN: No rashes or lesions    LABS:                        9.6    11.27 )-----------( 229      ( 17 Jul 2024 06:17 )             30.8     07-17    139  |  103  |  94<HH>  ----------------------------<  124<H>  4.2   |  24  |  4.5<HH>    Ca    7.7<L>      17 Jul 2024 06:17  Phos  4.0     07-16  Mg     1.7     07-17    TPro  6.4  /  Alb  2.2<L>  /  TBili  0.3  /  DBili  x   /  AST  26  /  ALT  11  /  AlkPhos  139<H>  07-17

## 2024-07-17 NOTE — PROGRESS NOTE ADULT - ASSESSMENT
70 y/o man with PMH of HTN, hyperlipidemia, DM II, CKD 5 (baseline Cr 4.4) Urinary incontinence with chronic suprapubic catheter, Nephrolithiasis, Spinal abscess with quadriplegia and chronic LE wounds receiving dressing changes by visiting nurse now presented to the ED for crampy abdominal pain along with nausea and vomiting.   patient seen and examined    # JOÃO over CKD 5  due to obstructive uropathy   # Complicated UTI / pyelonephritis  # Bacteroids bacteremia  - CT abd/pelvis: Severe right hydroureteronephrosis with cortical thinning and numerous large intrarenal and proximal to mid ureteral calculi, obstructing calculus in the right mid ureter measures 1.1 x 1 x 1.8 cm with normal caliber distal ureter.  Multiple nonobstructive left intrarenal calculi and a 0.8 cm left distal ureteral calculus; no left hydronephrosis.  Few prominent retroperitoneal lymph nodes, likely reactive. Cholelithiasis.  - s/p eval by Urology in ED --> recommended b/l nephrostomy placement by IR.   - s/p right PCN by IR on 7/10 - 10 cc of thick, viscous green-yellow, malodorous right kidney urine sent for cultures  - s/p SPC exchange in the ED  - blood cultures 1/2- bateroids(sn not performed); 1/2- GNR- monmitor; urine culture- Proteus, Serratia(both sn to dwain) and enterococcus  - acidosis improved - bicarb in IVF stopped - continue PO bicarbonate  - further stone management as outpatient per Urology- planning in 2 weeks - plan for right PCNL and left JJ stent placement- Follow up with Dr Akins  - ID following-  meropenem 500 mg BID; On D/C, recommend midline and ertapenem 500mg q24h IV + PO amoxicillin 500mg daily (to cover enterococcus) until 48h post-op     #  HTN- stable  nifedipine stopped    # Hyperlipidemia on statin    #  DM   A1C 6.9  insulin sliding scale    # Chronic LE wounds - wound care per advanced nursing recommendation    # Nausea - zofran prn      DNR/DNI Trial of NIV  Plan of care d/w patient    DC home today--spent more than 30mins. 68 y/o man with PMH of HTN, hyperlipidemia, DM II, CKD 5 (baseline Cr 4.4) Urinary incontinence with chronic suprapubic catheter, Nephrolithiasis, Spinal abscess with quadriplegia and chronic LE wounds receiving dressing changes by visiting nurse now presented to the ED for crampy abdominal pain along with nausea and vomiting.   patient seen and examined    # JOÃO over CKD 5  due to obstructive uropathy   # Complicated UTI / pyelonephritis  # Bacteroids bacteremia  - CT abd/pelvis: Severe right hydroureteronephrosis with cortical thinning and numerous large intrarenal and proximal to mid ureteral calculi, obstructing calculus in the right mid ureter measures 1.1 x 1 x 1.8 cm with normal caliber distal ureter.  Multiple nonobstructive left intrarenal calculi and a 0.8 cm left distal ureteral calculus; no left hydronephrosis.  Few prominent retroperitoneal lymph nodes, likely reactive. Cholelithiasis.  - s/p eval by Urology in ED --> recommended b/l nephrostomy placement by IR.   - s/p right PCN by IR on 7/10 - 10 cc of thick, viscous green-yellow, malodorous right kidney urine sent for cultures  - s/p SPC exchange in the ED  - blood cultures 1/2- bateroids(sn not performed); 1/2- GNR- monmitor; urine culture- Proteus, Serratia(both sn to dwain) and enterococcus  - acidosis improved - bicarb in IVF stopped - continue PO bicarbonate  - further stone management as outpatient per Urology- planning in 2 weeks - plan for right PCNL and left JJ stent placement- Follow up with Dr Akins  - ID following-  meropenem 500 mg BID; On D/C, recommend midline and ertapenem 500mg q24h IV + PO amoxicillin 500mg daily (to cover enterococcus) until 48h post-op     #  HTN- stable  nifedipine stopped    # Hyperlipidemia on statin    #  DM   A1C 6.9  insulin sliding scale not used here his blood sugar was stable--    # Chronic LE wounds - wound care per advanced nursing recommendation    # Nausea - zofran prn      DNR/DNI Trial of NIV  Plan of care d/w patient    DC home today--spent more than 30mins.

## 2024-07-17 NOTE — PROGRESS NOTE ADULT - ASSESSMENT
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 NBNB vomiting and nausea every time after he eats.       IMPRESSION  #Bacteroides fragilis and other GNR bacteremia secondary to Pyelonephritis/Pyonephrosis with obstructing stone    7/10 R nephrostomy   >100,000 CFU/ml Proteus mirabilis R fluoro R bactrim  Amoxicillin/Clavulanic Acid: S <=8/4    10,000 - 49,000 CFU/mL Serratia marcescens Levofloxacin: S <=0.5    50,000 - 99,000 CFU/mL Enterococcus faecalis Levofloxacin: S 1    7/9 BCX GNR- Bacteroides fragilis    7/9 BCX GNR  - s/p percutaneous right nephrostomy followed by placement of right nephroureteral catheter    WBC 20 on admission ; Afebrile     7/9 UA pyuria WBC 50 ; UCX   >=3 organisms. Probable collection contamination.    SPT (replaced in ER)    CTAP 1.  Severe right hydroureteronephrosis with cortical thinning and   numerous large intrarenal and proximal to mid ureteral calculi;   obstructing calculus in the right mid ureter measures 1.1 x 1 x 1.8 cm (   ) with normal caliber distal ureter.  2.  Multiple nonobstructing left intrarenal calculi and a 0.8 cm left   distal ureteral calculus; no left hydronephrosis.  3.  Few prominent retroperitoneal lymph nodes, likely reactive.  4.  Cholelithiasis.  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 which could results in poor clinical outcomes  #Abx allergy: No Known Allergies  #CKD  Creatinine: 5.3 (07-10-24 @ 06:36)  QTC Calculation(Bazett) 439 ms  Height (cm): 185.4 (09-18-23 @ 13:48)  Weight (kg): 79.379 (09-18-23 @ 13:48)    RECOMMENDATIONS  - Continue meropenem 500mg q12h IV, On D/C given unidentified GNR, recommend midline x ertapenem 500mg q24h IV + PO amoxicillin 500mg daily (to cover enterococcus) until 48h post-op   - Appreciate Urology consult- planning on urologic intervention in two weeks, please continue medical optimization and medical clearance for general anesthesia and RIGHT PCNL and LEFT JJ stent placement  - Appreciate IR consult  - Please order Weekly CBC, CMP, ESR/CRP  - ID follow-up with Dr. Augusto Mathias for Telehealth. We will call the patient between 8:00-4:30      [id number]        [phone]       Fax [phone]     If any questions, please send a message or call on [employer]  Please continue to update ID with any pertinent new laboratory, radiographic findings, or change in clinical status

## 2024-07-17 NOTE — PROGRESS NOTE ADULT - SUBJECTIVE AND OBJECTIVE BOX
SUBJECTIVE:    Patient is a 69y old Male who presents with a chief complaint of UTI w/ obstructing stone (17 Jul 2024 11:36)    Currently admitted to medicine with the primary diagnosis of Hydronephrosis with obstructing calculus       Today is hospital day 8d.     PAST MEDICAL & SURGICAL HISTORY  DM (diabetes mellitus)    HTN (hypertension)    H/O paraplegia    Spinal abscess    Renal stones    Spinal abscess  S/P Surgery    Encounter for care or replacement of suprapubic tube      ALLERGIES:  No Known Allergies    MEDICATIONS:  STANDING MEDICATIONS  atorvastatin 20 milliGRAM(s) Oral at bedtime  chlorhexidine 2% Cloths 1 Application(s) Topical daily  dextrose 5%. 1000 milliLiter(s) IV Continuous <Continuous>  dextrose 5%. 1000 milliLiter(s) IV Continuous <Continuous>  dextrose 50% Injectable 12.5 Gram(s) IV Push once  dextrose 50% Injectable 25 Gram(s) IV Push once  dextrose 50% Injectable 25 Gram(s) IV Push once  glucagon  Injectable 1 milliGRAM(s) IntraMuscular once  heparin   Injectable 5000 Unit(s) SubCutaneous every 8 hours  insulin lispro (ADMELOG) corrective regimen sliding scale   SubCutaneous three times a day before meals  insulin lispro (ADMELOG) corrective regimen sliding scale   SubCutaneous at bedtime  meropenem  IVPB 500 milliGRAM(s) IV Intermittent every 12 hours  meropenem  IVPB      pantoprazole    Tablet 40 milliGRAM(s) Oral before breakfast  sodium bicarbonate 650 milliGRAM(s) Oral three times a day    PRN MEDICATIONS  aluminum hydroxide/magnesium hydroxide/simethicone Suspension 30 milliLiter(s) Oral every 4 hours PRN  dextrose Oral Gel 15 Gram(s) Oral once PRN  melatonin 3 milliGRAM(s) Oral at bedtime PRN  ondansetron Injectable 4 milliGRAM(s) IV Push every 8 hours PRN    VITALS:   T(F): 97.9  HR: 66  BP: 138/82  RR: 18  SpO2: 97%    LABS:                        9.6    11.27 )-----------( 229      ( 17 Jul 2024 06:17 )             30.8     07-17    139  |  103  |  94<HH>  ----------------------------<  124<H>  4.2   |  24  |  4.5<HH>    Ca    7.7<L>      17 Jul 2024 06:17  Phos  4.0     07-16  Mg     1.7     07-17    TPro  6.4  /  Alb  2.2<L>  /  TBili  0.3  /  DBili  x   /  AST  26  /  ALT  11  /  AlkPhos  139<H>  07-17      Urinalysis Basic - ( 17 Jul 2024 06:17 )    Color: x / Appearance: x / SG: x / pH: x  Gluc: 124 mg/dL / Ketone: x  / Bili: x / Urobili: x   Blood: x / Protein: x / Nitrite: x   Leuk Esterase: x / RBC: x / WBC x   Sq Epi: x / Non Sq Epi: x / Bacteria: x                RADIOLOGY:    PHYSICAL EXAM:  GEN: No acute distress  LUNGS: Clear to auscultation bilaterally   HEART: S1/S2 present. RRR.   ABD/ GI: Soft, non-tender, non-distended. Bowel sounds present, RT PCN present  EXT: NC/NC/NE/2+PP/PANTOJA  NEURO: AAOX3

## 2024-07-18 LAB
ALBUMIN SERPL ELPH-MCNC: 2.3 G/DL — LOW (ref 3.5–5.2)
ALP SERPL-CCNC: 132 U/L — HIGH (ref 30–115)
ALT FLD-CCNC: 11 U/L — SIGNIFICANT CHANGE UP (ref 0–41)
ANION GAP SERPL CALC-SCNC: 12 MMOL/L — SIGNIFICANT CHANGE UP (ref 7–14)
AST SERPL-CCNC: 29 U/L — SIGNIFICANT CHANGE UP (ref 0–41)
BASOPHILS # BLD AUTO: 0.05 K/UL — SIGNIFICANT CHANGE UP (ref 0–0.2)
BASOPHILS NFR BLD AUTO: 0.5 % — SIGNIFICANT CHANGE UP (ref 0–1)
BILIRUB SERPL-MCNC: 0.4 MG/DL — SIGNIFICANT CHANGE UP (ref 0.2–1.2)
BUN SERPL-MCNC: 86 MG/DL — CRITICAL HIGH (ref 10–20)
CALCIUM SERPL-MCNC: 7.7 MG/DL — LOW (ref 8.4–10.4)
CHLORIDE SERPL-SCNC: 102 MMOL/L — SIGNIFICANT CHANGE UP (ref 98–110)
CO2 SERPL-SCNC: 23 MMOL/L — SIGNIFICANT CHANGE UP (ref 17–32)
CREAT SERPL-MCNC: 4.1 MG/DL — CRITICAL HIGH (ref 0.7–1.5)
EGFR: 15 ML/MIN/1.73M2 — LOW
EOSINOPHIL # BLD AUTO: 0.18 K/UL — SIGNIFICANT CHANGE UP (ref 0–0.7)
EOSINOPHIL NFR BLD AUTO: 1.7 % — SIGNIFICANT CHANGE UP (ref 0–8)
GLUCOSE BLDC GLUCOMTR-MCNC: 109 MG/DL — HIGH (ref 70–99)
GLUCOSE BLDC GLUCOMTR-MCNC: 119 MG/DL — HIGH (ref 70–99)
GLUCOSE BLDC GLUCOMTR-MCNC: 136 MG/DL — HIGH (ref 70–99)
GLUCOSE BLDC GLUCOMTR-MCNC: 174 MG/DL — HIGH (ref 70–99)
GLUCOSE SERPL-MCNC: 114 MG/DL — HIGH (ref 70–99)
HCT VFR BLD CALC: 30.2 % — LOW (ref 42–52)
HGB BLD-MCNC: 9.3 G/DL — LOW (ref 14–18)
IMM GRANULOCYTES NFR BLD AUTO: 1.8 % — HIGH (ref 0.1–0.3)
LYMPHOCYTES # BLD AUTO: 18.9 % — LOW (ref 20.5–51.1)
LYMPHOCYTES # BLD AUTO: 2.05 K/UL — SIGNIFICANT CHANGE UP (ref 1.2–3.4)
MAGNESIUM SERPL-MCNC: 2 MG/DL — SIGNIFICANT CHANGE UP (ref 1.8–2.4)
MCHC RBC-ENTMCNC: 29.6 PG — SIGNIFICANT CHANGE UP (ref 27–31)
MCHC RBC-ENTMCNC: 30.8 G/DL — LOW (ref 32–37)
MCV RBC AUTO: 96.2 FL — HIGH (ref 80–94)
MONOCYTES # BLD AUTO: 0.86 K/UL — HIGH (ref 0.1–0.6)
MONOCYTES NFR BLD AUTO: 7.9 % — SIGNIFICANT CHANGE UP (ref 1.7–9.3)
NEUTROPHILS # BLD AUTO: 7.52 K/UL — HIGH (ref 1.4–6.5)
NEUTROPHILS NFR BLD AUTO: 69.2 % — SIGNIFICANT CHANGE UP (ref 42.2–75.2)
NRBC # BLD: 0 /100 WBCS — SIGNIFICANT CHANGE UP (ref 0–0)
PHOSPHATE SERPL-MCNC: 4.5 MG/DL — SIGNIFICANT CHANGE UP (ref 2.1–4.9)
PLATELET # BLD AUTO: 233 K/UL — SIGNIFICANT CHANGE UP (ref 130–400)
PMV BLD: 11.1 FL — HIGH (ref 7.4–10.4)
POTASSIUM SERPL-MCNC: 4.3 MMOL/L — SIGNIFICANT CHANGE UP (ref 3.5–5)
POTASSIUM SERPL-SCNC: 4.3 MMOL/L — SIGNIFICANT CHANGE UP (ref 3.5–5)
PROT SERPL-MCNC: 6.4 G/DL — SIGNIFICANT CHANGE UP (ref 6–8)
RBC # BLD: 3.14 M/UL — LOW (ref 4.7–6.1)
RBC # FLD: 13.6 % — SIGNIFICANT CHANGE UP (ref 11.5–14.5)
SODIUM SERPL-SCNC: 137 MMOL/L — SIGNIFICANT CHANGE UP (ref 135–146)
WBC # BLD: 10.86 K/UL — HIGH (ref 4.8–10.8)
WBC # FLD AUTO: 10.86 K/UL — HIGH (ref 4.8–10.8)

## 2024-07-18 PROCEDURE — 99232 SBSQ HOSP IP/OBS MODERATE 35: CPT

## 2024-07-18 RX ORDER — AMOXICILLIN 500 MG
1 CAPSULE ORAL
Qty: 8 | Refills: 0
Start: 2024-07-18 | End: 2024-07-25

## 2024-07-18 RX ADMIN — ATORVASTATIN CALCIUM 20 MILLIGRAM(S): 40 TABLET, FILM COATED ORAL at 21:19

## 2024-07-18 RX ADMIN — Medication 650 MILLIGRAM(S): at 15:17

## 2024-07-18 RX ADMIN — HEPARIN SODIUM 5000 UNIT(S): 1000 INJECTION, SOLUTION INTRAVENOUS; SUBCUTANEOUS at 21:19

## 2024-07-18 RX ADMIN — PANTOPRAZOLE SODIUM 40 MILLIGRAM(S): 20 TABLET, DELAYED RELEASE ORAL at 06:03

## 2024-07-18 RX ADMIN — HEPARIN SODIUM 5000 UNIT(S): 1000 INJECTION, SOLUTION INTRAVENOUS; SUBCUTANEOUS at 06:03

## 2024-07-18 RX ADMIN — Medication 650 MILLIGRAM(S): at 06:03

## 2024-07-18 RX ADMIN — CHLORHEXIDINE GLUCONATE 1 APPLICATION(S): 500 CLOTH TOPICAL at 11:49

## 2024-07-18 RX ADMIN — HEPARIN SODIUM 5000 UNIT(S): 1000 INJECTION, SOLUTION INTRAVENOUS; SUBCUTANEOUS at 15:17

## 2024-07-18 RX ADMIN — Medication 650 MILLIGRAM(S): at 21:19

## 2024-07-18 RX ADMIN — Medication 2: at 11:49

## 2024-07-18 NOTE — PROGRESS NOTE ADULT - ASSESSMENT
70 y/o man with PMH of HTN, hyperlipidemia, DM II, CKD 5 (baseline Cr 4.4) Urinary incontinence with chronic suprapubic catheter, Nephrolithiasis, Spinal abscess with quadriplegia and chronic LE wounds receiving dressing changes by visiting nurse now presented to the ED for crampy abdominal pain along with nausea and vomiting.   patient seen and examined    # JOÃO over CKD 5  due to obstructive uropathy   # Complicated UTI / pyelonephritis  # Bacteroids bacteremia  - CT abd/pelvis: Severe right hydroureteronephrosis with cortical thinning and numerous large intrarenal and proximal to mid ureteral calculi, obstructing calculus in the right mid ureter measures 1.1 x 1 x 1.8 cm with normal caliber distal ureter.  Multiple nonobstructive left intrarenal calculi and a 0.8 cm left distal ureteral calculus; no left hydronephrosis.  Few prominent retroperitoneal lymph nodes, likely reactive. Cholelithiasis.  - s/p eval by Urology in ED --> recommended b/l nephrostomy placement by IR.   - s/p right PCN by IR on 7/10 - 10 cc of thick, viscous green-yellow, malodorous right kidney urine sent for cultures  - s/p SPC exchange in the ED  - blood cultures 1/2- bateroids(sn not performed); 1/2- GNR- monmitor; urine culture- Proteus, Serratia(both sn to dwain) and enterococcus  - acidosis improved - bicarb in IVF stopped - continue PO bicarbonate  - further stone management as  per Urology- planning next weeks - plan for right PCNL and left JJ stent placement- Follow up with Dr Akins  - ID following-  meropenem 500 mg BID; On D/C, recommend midline and ertapenem 500mg q24h IV + PO amoxicillin 500mg daily (to cover enterococcus) until 48h post-op -- will continue for now --surgery on tuesday 7/23    #  HTN- stable  nifedipine stopped    # Hyperlipidemia on statin    #  DM   A1C 6.9  insulin sliding scale not used here his blood sugar was stable--    # Chronic LE wounds - wound care per advanced nursing recommendation    # Nausea - zofran prn      DNR/DNI Trial of NIV  Plan of care d/w patient    DC home today--spent more than 30mins.

## 2024-07-18 NOTE — CHART NOTE - NSCHARTNOTEFT_GEN_A_CORE
Pt seen for LOS assessment.     Reports optimum PO/appetite PTA, consumes 2-3 meals daily. UBW: 77.2-79.5kg vs. wt at admit: 81.6kg. No wt loss noted or reported. NKFA. No MVI.     At admit, pt reports optimum PO/appetite, consuming >75% during all meals. No chewing/swallowing difficulty reported. No GI discomfort, last BM 7/17.     low risk f/u 7-10 days  RD remains available: Alida Whitney RDN [phone]

## 2024-07-18 NOTE — PROGRESS NOTE ADULT - SUBJECTIVE AND OBJECTIVE BOX
Patient is a 69y old Male who presents with a chief complaint of UTI w/ obstructing stone (15 Jul 2024 06:31)    Today is hospital day 9    Overnight events/Interval History:  - No acute overnight events  - At bedside, patient has been sleeping and eating well. Breathing comfortably on room air. Denies fevers, chills, SOB, chest pain, N/V/D/C, abdominal pain.    ROS:  - All ROS is negative unless indicated in HPI    Code Status: DNR/DNI, trial NIV    ALLERGIES:  No Known Allergies    MEDICATIONS:  STANDING MEDICATIONS  atorvastatin 20 milliGRAM(s) Oral at bedtime  chlorhexidine 2% Cloths 1 Application(s) Topical daily  dextrose 5%. 1000 milliLiter(s) IV Continuous <Continuous>  dextrose 5%. 1000 milliLiter(s) IV Continuous <Continuous>  dextrose 50% Injectable 25 Gram(s) IV Push once  dextrose 50% Injectable 12.5 Gram(s) IV Push once  dextrose 50% Injectable 25 Gram(s) IV Push once  ertapenem  IVPB 500 milliGRAM(s) IV Intermittent every 24 hours  glucagon  Injectable 1 milliGRAM(s) IntraMuscular once  heparin   Injectable 5000 Unit(s) SubCutaneous every 8 hours  insulin lispro (ADMELOG) corrective regimen sliding scale   SubCutaneous three times a day before meals  insulin lispro (ADMELOG) corrective regimen sliding scale   SubCutaneous at bedtime  pantoprazole    Tablet 40 milliGRAM(s) Oral before breakfast  sodium bicarbonate 650 milliGRAM(s) Oral three times a day    PRN MEDICATIONS  aluminum hydroxide/magnesium hydroxide/simethicone Suspension 30 milliLiter(s) Oral every 4 hours PRN  dextrose Oral Gel 15 Gram(s) Oral once PRN  melatonin 3 milliGRAM(s) Oral at bedtime PRN  ondansetron Injectable 4 milliGRAM(s) IV Push every 8 hours PRN    VITALS LAST 24H:  Vital Signs Last 24 Hrs  T(C): 36.7 (18 Jul 2024 07:21), Max: 36.7 (17 Jul 2024 15:25)  T(F): 98.1 (18 Jul 2024 07:21), Max: 98.1 (18 Jul 2024 07:21)  HR: 70 (18 Jul 2024 07:21) (68 - 72)  BP: 155/79 (18 Jul 2024 07:21) (118/74 - 155/79)  BP(mean): --  RR: 18 (18 Jul 2024 07:21) (18 - 18)  SpO2: 97% (18 Jul 2024 07:21) (96% - 97%)    Parameters below as of 18 Jul 2024 07:21  Patient On (Oxygen Delivery Method): room air      PHYSICAL EXAM  HEENT:  EOMI, Moist mucous membranes, poor dentition  CHEST/LUNG: Clear to auscultation bilaterally; normal respiratory effort, no coughing, wheezes, crackles, or rales.  HEART: Regular rate and rhythm  ABDOMEN: Soft, non-tender, nondistended, +SPC w/ mild cloudy urine. +R PCN w/ clear yellow urine draining + sediment  EXTREMITIES:  2+ Peripheral Pulses, brisk capillary refill. +chronic venous stasis ulcers w/ dermatitis bilaterally. 1+ nonpitting pedal edema   NERVOUS SYSTEM:  A&Ox3, no focal deficits   SKIN: No rashes or lesions    LABS:                        9.6    11.27 )-----------( 229      ( 17 Jul 2024 06:17 )             30.8     07-17    139  |  103  |  94<HH>  ----------------------------<  124<H>  4.2   |  24  |  4.5<HH>    Ca    7.7<L>      17 Jul 2024 06:17  Phos  4.0     07-16  Mg     1.7     07-17    TPro  6.4  /  Alb  2.2<L>  /  TBili  0.3  /  DBili  x   /  AST  26  /  ALT  11  /  AlkPhos  139<H>  07-17             Patient is a 69y old Male who presents with a chief complaint of UTI w/ obstructing stone (15 Jul 2024 06:31)    Today is hospital day 9    Overnight events/Interval History:  - No acute overnight events  - At bedside, patient has been sleeping and eating well. Breathing comfortably on room air. Denies fevers, chills, SOB, chest pain, N/V/D/C, abdominal pain.    ROS:  - All ROS is negative unless indicated in HPI    Code Status: DNR/DNI, trial NIV    ALLERGIES:  No Known Allergies    MEDICATIONS:  STANDING MEDICATIONS  atorvastatin 20 milliGRAM(s) Oral at bedtime  chlorhexidine 2% Cloths 1 Application(s) Topical daily  dextrose 5%. 1000 milliLiter(s) IV Continuous <Continuous>  dextrose 5%. 1000 milliLiter(s) IV Continuous <Continuous>  dextrose 50% Injectable 12.5 Gram(s) IV Push once  dextrose 50% Injectable 25 Gram(s) IV Push once  dextrose 50% Injectable 25 Gram(s) IV Push once  ertapenem  IVPB 500 milliGRAM(s) IV Intermittent every 24 hours  glucagon  Injectable 1 milliGRAM(s) IntraMuscular once  heparin   Injectable 5000 Unit(s) SubCutaneous every 8 hours  insulin lispro (ADMELOG) corrective regimen sliding scale   SubCutaneous three times a day before meals  insulin lispro (ADMELOG) corrective regimen sliding scale   SubCutaneous at bedtime  pantoprazole    Tablet 40 milliGRAM(s) Oral before breakfast  sodium bicarbonate 650 milliGRAM(s) Oral three times a day    PRN MEDICATIONS  aluminum hydroxide/magnesium hydroxide/simethicone Suspension 30 milliLiter(s) Oral every 4 hours PRN  dextrose Oral Gel 15 Gram(s) Oral once PRN  melatonin 3 milliGRAM(s) Oral at bedtime PRN  ondansetron Injectable 4 milliGRAM(s) IV Push every 8 hours PRN    VITALS LAST 24H:  Vital Signs Last 24 Hrs  T(C): 36.7 (18 Jul 2024 07:21), Max: 36.7 (17 Jul 2024 15:25)  T(F): 98.1 (18 Jul 2024 07:21), Max: 98.1 (18 Jul 2024 07:21)  HR: 70 (18 Jul 2024 07:21) (68 - 72)  BP: 155/79 (18 Jul 2024 07:21) (118/74 - 155/79)  BP(mean): --  RR: 18 (18 Jul 2024 07:21) (18 - 18)  SpO2: 97% (18 Jul 2024 07:21) (96% - 97%)    Parameters below as of 18 Jul 2024 07:21  Patient On (Oxygen Delivery Method): room air    PHYSICAL EXAM  HEENT:  EOMI, Moist mucous membranes, poor dentition  CHEST/LUNG: Clear to auscultation bilaterally; normal respiratory effort, no coughing, wheezes, crackles, or rales.  HEART: Regular rate and rhythm  ABDOMEN: Soft, non-tender, nondistended, +SPC w/ mild cloudy urine. +R PCN w/ clear yellow urine draining + sediment  EXTREMITIES:  2+ Peripheral Pulses, brisk capillary refill. +chronic venous stasis ulcers w/ dermatitis bilaterally. no lower extremity edema bilaterally   NERVOUS SYSTEM:  A&Ox3, no focal deficits   SKIN: No rashes or lesions    LABS:                        9.3    10.86 )-----------( 233      ( 18 Jul 2024 06:54 )             30.2     07-18    137  |  102  |  86<HH>  ----------------------------<  114<H>  4.3   |  23  |  4.1<HH>    Ca    7.7<L>      18 Jul 2024 06:54  Phos  4.5     07-18  Mg     2.0     07-18    TPro  6.4  /  Alb  2.3<L>  /  TBili  0.4  /  DBili  x   /  AST  29  /  ALT  11  /  AlkPhos  132<H>  07-18      Urinalysis Basic - ( 18 Jul 2024 06:54 )    Color: x / Appearance: x / SG: x / pH: x  Gluc: 114 mg/dL / Ketone: x  / Bili: x / Urobili: x   Blood: x / Protein: x / Nitrite: x   Leuk Esterase: x / RBC: x / WBC x   Sq Epi: x / Non Sq Epi: x / Bacteria: x

## 2024-07-18 NOTE — PROGRESS NOTE ADULT - SUBJECTIVE AND OBJECTIVE BOX
SUBJECTIVE:    Patient is a 69y old Male who presents with a chief complaint of UTI w/ obstructing stone (18 Jul 2024 08:56)    Currently admitted to medicine with the primary diagnosis of Hydronephrosis with obstructing calculus       Today is hospital day 9d.     PAST MEDICAL & SURGICAL HISTORY  DM (diabetes mellitus)    HTN (hypertension)    H/O paraplegia    Spinal abscess    Renal stones    Spinal abscess  S/P Surgery    Encounter for care or replacement of suprapubic tube      ALLERGIES:  No Known Allergies    MEDICATIONS:  STANDING MEDICATIONS  atorvastatin 20 milliGRAM(s) Oral at bedtime  chlorhexidine 2% Cloths 1 Application(s) Topical daily  dextrose 5%. 1000 milliLiter(s) IV Continuous <Continuous>  dextrose 5%. 1000 milliLiter(s) IV Continuous <Continuous>  dextrose 50% Injectable 25 Gram(s) IV Push once  dextrose 50% Injectable 12.5 Gram(s) IV Push once  dextrose 50% Injectable 25 Gram(s) IV Push once  ertapenem  IVPB 500 milliGRAM(s) IV Intermittent every 24 hours  glucagon  Injectable 1 milliGRAM(s) IntraMuscular once  heparin   Injectable 5000 Unit(s) SubCutaneous every 8 hours  insulin lispro (ADMELOG) corrective regimen sliding scale   SubCutaneous three times a day before meals  insulin lispro (ADMELOG) corrective regimen sliding scale   SubCutaneous at bedtime  pantoprazole    Tablet 40 milliGRAM(s) Oral before breakfast  sodium bicarbonate 650 milliGRAM(s) Oral three times a day    PRN MEDICATIONS  aluminum hydroxide/magnesium hydroxide/simethicone Suspension 30 milliLiter(s) Oral every 4 hours PRN  dextrose Oral Gel 15 Gram(s) Oral once PRN  melatonin 3 milliGRAM(s) Oral at bedtime PRN  ondansetron Injectable 4 milliGRAM(s) IV Push every 8 hours PRN    VITALS:   T(F): 98.1  HR: 70  BP: 155/79  RR: 18  SpO2: 97%    LABS:                        9.3    10.86 )-----------( 233      ( 18 Jul 2024 06:54 )             30.2     07-18    137  |  102  |  86<HH>  ----------------------------<  114<H>  4.3   |  23  |  4.1<HH>    Ca    7.7<L>      18 Jul 2024 06:54  Phos  4.5     07-18  Mg     2.0     07-18    TPro  6.4  /  Alb  2.3<L>  /  TBili  0.4  /  DBili  x   /  AST  29  /  ALT  11  /  AlkPhos  132<H>  07-18      Urinalysis Basic - ( 18 Jul 2024 06:54 )    Color: x / Appearance: x / SG: x / pH: x  Gluc: 114 mg/dL / Ketone: x  / Bili: x / Urobili: x   Blood: x / Protein: x / Nitrite: x   Leuk Esterase: x / RBC: x / WBC x   Sq Epi: x / Non Sq Epi: x / Bacteria: x                RADIOLOGY:    PHYSICAL EXAM:  GEN: No acute distress  LUNGS: Clear to auscultation bilaterally   HEART: S1/S2 present. RRR.   ABD/ GI: Soft, non-tender, non-distended. Bowel sounds present  EXT: rt side nephrostomy tube in place  NEURO: AAOX3

## 2024-07-18 NOTE — PROGRESS NOTE ADULT - ASSESSMENT
ASSESSMENT  70yo male w/ pmhx of HTN, HLD, DMII, CKD stage 5 baseline Cr ~ 4.4, urinary incontinence with chronic supra pubic catheter, Nephrolithiasis, spinal abscess leading to quadriplegia presenting w. NBNB vomiting and nausea every time after he eats.       IMPRESSION  #Bacteroides fragilis and other GNR bacteremia secondary to Pyelonephritis/Pyonephrosis with obstructing stone    7/10 R nephrostomy   >100,000 CFU/ml Proteus mirabilis R fluoro R bactrim  Amoxicillin/Clavulanic Acid: S <=8/4    10,000 - 49,000 CFU/mL Serratia marcescens Levofloxacin: S <=0.5    50,000 - 99,000 CFU/mL Enterococcus faecalis Levofloxacin: S 1    7/9 BCX GNR- Bacteroides fragilis    7/9 BCX GNR  - s/p percutaneous right nephrostomy followed by placement of right nephroureteral catheter    WBC 20 on admission ; Afebrile     7/9 UA pyuria WBC 50 ; UCX   >=3 organisms. Probable collection contamination.    SPT (replaced in ER)    CTAP 1.  Severe right hydroureteronephrosis with cortical thinning and   numerous large intrarenal and proximal to mid ureteral calculi;   obstructing calculus in the right mid ureter measures 1.1 x 1 x 1.8 cm (   ) with normal caliber distal ureter.  2.  Multiple nonobstructing left intrarenal calculi and a 0.8 cm left   distal ureteral calculus; no left hydronephrosis.  3.  Few prominent retroperitoneal lymph nodes, likely reactive.  4.  Cholelithiasis.  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 which could results in poor clinical outcomes  #Abx allergy: No Known Allergies  #CKD  Creatinine: 5.3 (07-10-24 @ 06:36)  QTC Calculation(Bazett) 439 ms  Height (cm): 185.4 (09-18-23 @ 13:48)  Weight (kg): 79.379 (09-18-23 @ 13:48)    RECOMMENDATIONS  - f/u nephrostomy culture  - please try to add on UA  - Continue  midline x ertapenem 500mg q24h IV + PO amoxicillin 500mg daily (to cover enterococcus) until 48h post-op   - Appreciate Urology consult- planning on urologic intervention in two weeks, please continue medical optimization and medical clearance for general anesthesia and RIGHT PCNL and LEFT JJ stent placement  - Appreciate IR consult  - Please order Weekly CBC, CMP, ESR/CRP  - ID follow-up with Dr. Augusto Mathias for Telehealth. We will call the patient between 8:00-4:30      [address]        [phone]       Fax [phone]     If any questions, please send a message or call on [employer] Teams  Please continue to update ID with any pertinent new laboratory, radiographic findings, or change in clinical status

## 2024-07-18 NOTE — PROGRESS NOTE ADULT - ASSESSMENT
68 yo M with hx of urinary incontinence w/ chronic SPC, nephrolithiasis, spinal abscess w/ quadriplegia, DM2, CKD5 (baseline Cr 4.4), HTN, HLD, DM II, CKD stage 5 (baseline Cr 4.4)) presented to ED with abdominal pain, n/v, found to have severe R hydroureteronephrosis w/ numerous R-sided calculi including obstructing stone in R ureter as well as nonobstructive L calculi w/o hydro. Admitted for acute pyelonephritis and bacteremia 2/2 R obstructing ureteral stone w/ hydronephrosis. Course complicated by postrenal JOÃO on CKD5 and HAGMA, improving s/p R PCN and R nephroureteral catheter placement with IR (7/10), continuing on antibiotics    #GNR bacteremia 2/2 ?B. fragilis, final spp pending  #Right-sided hydronephrosis 2/2 R obstructive ureteral calculi s/p PCN  #Pyelonephritis/complicated cystitis 2/2 P. mirabilis, S. marcescens E. Faecalis  #JOÃO on CKD5, postrenal, improving  #Bilateral renal calculi   #HAGMA - improving  - CKD stage 5 baseline Cr ~4.4, urinary incontinence with chronic supra pubic catheter  - CTAP shows severe hydro on R w/ obstructive calculi and non obs calculi on Left  - s/p R PCN and R nephroureteral catheter with IR on 7/10 - drained pus in OR, draining minimally  - BCx: GNR, B. fragilis  - UCx: P. mirabilis, S. marcescens, E. faecalis   - Afebrile, normotensive  - Leukocytosis improving  - Cr improving, now close to baseline  - monitor PCN and SPC output   - Nephro consulted, appreciate recs  - urology consulted: R PCNL and L JJ stent on 7/23 pending medical clearance  - ID consulted, appreciate recs  - c/w IV meropenem 500mg q12h per ID (transitioned from IV cefepime 2g q24h); d/c recs: midline w/ ertapenem 500mg q24h IV, PO amoxicillin 500mg qd until 48 hours post-op  - Trend CBC, fever  - c/w oral bicarb 650 mg TID   - Trend BUN/Cr, Mg, phos  - replete electrolytes PRN  - Strict I/Os  - consult procedure team for midline    #Chronic venous stasis ulcers w/ dermatitis  - wounds do not appear infected, no edema or tenderness   - wound care consulted, appreciate recs  - cleanse wound w/ soap and water, pat dry  - apply dermaphor bid to bilateral legs and feet     #Globus sensation w/ vomiting - resolved  - self-induced vomiting 2/2 globus sensation  - c/w pantoprazole 40mg PO qd    #HTN  #HLD  - c/w atorvastatin  - holding nifedipine iso hypotension     #DM2  - monitor FS  - ISS TIDAC, qhs    ---------------------  DVT ppx: HSQ  Diet: renal/CCC/DASHTLC  Activity: IAT  GOC: DNR/DNI (MOLST in chart), HCP brother Erich [phone]  Dispo: pending clinical improvement, midline placement    #Summary/Handoff  - f/u procedure team for midline 68 yo M with hx of urinary incontinence w/ chronic SPC, nephrolithiasis, spinal abscess w/ quadriplegia, DM2, CKD5 (baseline Cr 4.4), HTN, HLD, DM II, CKD stage 5 (baseline Cr 4.4)) presented to ED with abdominal pain, n/v, found to have severe R hydroureteronephrosis w/ numerous R-sided calculi including obstructing stone in R ureter as well as nonobstructive L calculi w/o hydro. Admitted for acute pyelonephritis and bacteremia 2/2 R obstructing ureteral stone w/ hydronephrosis. Course complicated by postrenal JOÃO on CKD5 and HAGMA, improving s/p R PCN and R nephroureteral catheter placement with IR (7/10), continuing on antibiotics    #GNR bacteremia 2/2 ?B. fragilis, final spp pending  #Right-sided hydronephrosis 2/2 R obstructive ureteral calculi s/p PCN  #Pyelonephritis/complicated cystitis 2/2 P. mirabilis, S. marcescens E. Faecalis  #JOÃO on CKD5, postrenal, improving  #Bilateral renal calculi   #HAGMA - improving  - CKD stage 5 baseline Cr ~4.4, urinary incontinence with chronic supra pubic catheter  - CTAP shows severe hydro on R w/ obstructive calculi and non obs calculi on Left  - s/p R PCN and R nephroureteral catheter with IR on 7/10 - drained pus in OR, draining minimally  - BCx: GNR, ?B. fragilis  - UCx: P. mirabilis, S. marcescens, E. faecalis   - Afebrile, normotensive  - Leukocytosis improving  - Cr improving, now close to baseline  - monitor PCN and SPC output   - Nephro consulted, appreciate recs  - urology consulted: R PCNL and L JJ stent on 7/23 pending medical clearance  - ID consulted, appreciate recs  - c/w IV meropenem 500mg q12h per ID (transitioned from IV cefepime 2g q24h); d/c recs: midline w/ ertapenem 500mg q24h IV, PO amoxicillin 500mg qd until 48 hours post-op  - f/u UCx from PCN 7/17  - Trend CBC, fever  - c/w oral bicarb 650 mg TID   - Trend BUN/Cr, Mg, phos  - replete electrolytes PRN  - Strict I/Os  - s/p midline with procedure team 7/17    #Chronic venous stasis ulcers w/ dermatitis  - wounds do not appear infected, no edema or tenderness   - wound care consulted, appreciate recs  - cleanse wound w/ soap and water, pat dry  - apply dermaphor bid to bilateral legs and feet     #Globus sensation w/ vomiting - resolved  - self-induced vomiting 2/2 globus sensation  - c/w pantoprazole 40mg PO qd    #HTN  #HLD  - c/w atorvastatin  - holding nifedipine iso hypotension     #DM2  - monitor FS  - ISS TIDAC, qhs    ---------------------  DVT ppx: HSQ  Diet: renal/CCC/DASHTLC  Activity: IAT  GOC: DNR/DNI (MOLST in chart), HCP brother Erich [phone]  Dispo: pending clinical improvement    #Summary/Handoff  - f/u urine culture PCN

## 2024-07-18 NOTE — PROGRESS NOTE ADULT - ASSESSMENT
68yo male w/ pmhx of HTN, HLD, DMII, CKD stage 5 baseline Cr ~ 4.4, urinary incontinence with chronic supra pubic catheter, Nephrolithiasis, spinal abscess leading to quadriplegia presenting w. NBNB vomiting and nausea every time after he eats  #Obstructing R mid ureter calculus with severe hydroureteronephrosis s/p Stent   #B/L nephrolithiasis   #Post-renal JOÃO on CKD 5   #HAGMA   #Chronic suprapubic catheter   - baseline creatinine ~4.4 creat back to baseline now / check repeat today   - urology and IR on board, s/p Percutaneous right nephrostomy followed by placement of right nephroureteral catheter / plan for R PCNL and L JJ stent placement  - d/c sodium bicarbonate if repeated level > 24   -  last ph at goal   - followed by ID on  ertapenem   - BP controlled  - no need to start RRT at this time  will follow

## 2024-07-18 NOTE — PROGRESS NOTE ADULT - SUBJECTIVE AND OBJECTIVE BOX
LISAKRISTY MARIE  69y, Male  Allergy: No Known Allergies      LOS  9d    CHIEF COMPLAINT: UTI w/ obstructing stone (18 Jul 2024 11:31)      INTERVAL EVENTS/HPI  - No acute events overnight; yesterday fluid taken from nephrostomy was thick and purulent appearing  - T(F): , Max: 98.1 (07-18-24 @ 07:21)  - Denies any worsening symptoms  - Tolerating medication  - WBC Count: 10.86 (07-18-24 @ 06:54)  WBC Count: 11.27 (07-17-24 @ 06:17)     - Creatinine: 4.1 (07-18-24 @ 06:54)  Creatinine: 4.5 (07-17-24 @ 06:17)       ROS  General: Denies rigors, nightsweats  HEENT: Denies headache, rhinorrhea, sore throat, eye pain  CV: Denies CP, palpitations  PULM: Denies wheezing, hemoptysis  GI: Denies hematemesis, hematochezia, melena  : Denies discharge, hematuria  MSK: Denies arthralgias, myalgias  SKIN: Denies rash, lesions  NEURO: Denies paresthesias, weakness  PSYCH: Denies depression, anxiety    VITALS:  T(F): 98.1, Max: 98.1 (07-18-24 @ 07:21)  HR: 70  BP: 155/79  RR: 18Vital Signs Last 24 Hrs  T(C): 36.7 (18 Jul 2024 07:21), Max: 36.7 (17 Jul 2024 15:25)  T(F): 98.1 (18 Jul 2024 07:21), Max: 98.1 (18 Jul 2024 07:21)  HR: 70 (18 Jul 2024 07:21) (68 - 72)  BP: 155/79 (18 Jul 2024 07:21) (118/74 - 155/79)  BP(mean): --  RR: 18 (18 Jul 2024 07:21) (18 - 18)  SpO2: 97% (18 Jul 2024 07:21) (96% - 97%)    Parameters below as of 18 Jul 2024 07:21  Patient On (Oxygen Delivery Method): room air        PHYSICAL EXAM:  Gen: NAD, resting in bed  HEENT: Normocephalic, atraumatic  Neck: supple, no lymphadenopathy  CV: Regular rate & regular rhythm  Lungs: decreased BS at bases, no fremitus  Abdomen: Soft, BS present R PCN clear urine  spt  Ext: Warm, well perfused  Neuro: non focal, awake  Skin: no rash, no erythema  Lines: no phlebitis    FH: Non-contributory  Social Hx: Non-contributory    TESTS & MEASUREMENTS:                        9.3    10.86 )-----------( 233      ( 18 Jul 2024 06:54 )             30.2     07-18    137  |  102  |  86<HH>  ----------------------------<  114<H>  4.3   |  23  |  4.1<HH>    Ca    7.7<L>      18 Jul 2024 06:54  Phos  4.5     07-18  Mg     2.0     07-18    TPro  6.4  /  Alb  2.3<L>  /  TBili  0.4  /  DBili  x   /  AST  29  /  ALT  11  /  AlkPhos  132<H>  07-18      LIVER FUNCTIONS - ( 18 Jul 2024 06:54 )  Alb: 2.3 g/dL / Pro: 6.4 g/dL / ALK PHOS: 132 U/L / ALT: 11 U/L / AST: 29 U/L / GGT: x           Urinalysis Basic - ( 18 Jul 2024 06:54 )    Color: x / Appearance: x / SG: x / pH: x  Gluc: 114 mg/dL / Ketone: x  / Bili: x / Urobili: x   Blood: x / Protein: x / Nitrite: x   Leuk Esterase: x / RBC: x / WBC x   Sq Epi: x / Non Sq Epi: x / Bacteria: x        Culture - Urine (collected 07-10-24 @ 18:14)  Source: .Urine Nephrostomy - Right  Final Report (07-14-24 @ 20:48):    >100,000 CFU/ml Proteus mirabilis    10,000 - 49,000 CFU/mL Serratia marcescens    50,000 - 99,000 CFU/mL Enterococcus faecalis  Organism: Proteus mirabilis  Serratia marcescens  Enterococcus faecalis (07-14-24 @ 20:48)  Organism: Enterococcus faecalis (07-14-24 @ 20:48)      Method Type: ALF      -  Ampicillin: S <=2 Predicts results to ampicillin/sulbactam, amoxacillin-clavulanate and  piperacillin-tazobactam.      -  Ciprofloxacin: S <=1      -  Levofloxacin: S 1      -  Nitrofurantoin: S <=32 Should not be used to treat pyelonephritis.      -  Tetracycline: R >8      -  Vancomycin: S 1  Organism: Serratia marcescens (07-14-24 @ 20:48)      Method Type: ALF      -  Amoxicillin/Clavulanic Acid: R >16/8      -  Ampicillin: R 16 These ampicillin results predict results for amoxicillin      -  Ampicillin/Sulbactam: R 16/8      -  Aztreonam: S <=4      -  Cefazolin: R >16      -  Cefepime: S <=2      -  Cefoxitin: R <=8      -  Ceftriaxone: S <=1      -  Ciprofloxacin: S <=0.25      -  Ertapenem: S <=0.5      -  Gentamicin: S <=2      -  Levofloxacin: S <=0.5      -  Meropenem: S <=1      -  Nitrofurantoin: R >64 Should not be used to treat pyelonephritis      -  Piperacillin/Tazobactam: S <=8      -  Tobramycin: S <=2      -  Trimethoprim/Sulfamethoxazole: S <=0.5/9.5  Organism: Proteus mirabilis (07-14-24 @ 20:48)      Method Type: ALF      -  Amoxicillin/Clavulanic Acid: S <=8/4      -  Ampicillin: R >16 These ampicillin results predict results for amoxicillin      -  Ampicillin/Sulbactam: I 16/8      -  Aztreonam: S <=4      -  Cefazolin: S 8 For uncomplicated UTI with K. pneumoniae, E. coli, or P. mirablis: ALF <=16 is sensitive and ALF >=32 is resistant. This also predicts results for oral agents cefaclor, cefdinir, cefpodoxime, cefprozil, cefuroxime axetil, cephalexin and locarbef for uncomplicated UTI. Note that some isolates may be susceptible to these agents while testing resistant to cefazolin.      -  Cefepime: S <=2      -  Cefoxitin: S <=8      -  Ceftriaxone: S <=1      -  Cefuroxime: S <=4      -  Ciprofloxacin: R >2      -  Ertapenem: S <=0.5      -  Gentamicin: I 4      -  Levofloxacin: R >4      -  Meropenem: S <=1      -  Nitrofurantoin: R 64 Should not be used to treat pyelonephritis      -  Piperacillin/Tazobactam: S <=8      -  Tobramycin: S <=2      -  Trimethoprim/Sulfamethoxazole: R >2/38    Culture - Blood (collected 07-09-24 @ 14:20)  Source: .Blood Blood-Peripheral  Gram Stain (07-11-24 @ 01:20):    Growth in anaerobic bottle: Gram Negative Rods  Preliminary Report (07-16-24 @ 09:11):    Growth in anaerobic bottle: Gram Negative Rods    Growth in anaerobic bottle: Gram Negative Rods Sent to    [place] [facility]  for Identification    .    Direct identification is available within approximately 3-5    hours eitherby Blood Panel Multiplexed PCR or Direct    MALDI-TOF. Details: [email or web address]  Organism: Blood Culture PCR (07-11-24 @ 03:23)  Organism: Blood Culture PCR (07-11-24 @ 03:23)      Method Type: PCR      -  Blood PCR Panel: NEG    Culture - Blood (collected 07-09-24 @ 14:20)  Source: .Blood Blood-Peripheral  Gram Stain (07-12-24 @ 04:50):    Growth in anaerobic bottle: Gram Negative Rods  Final Report (07-13-24 @ 09:30):    Growth in anaerobic bottle: Bacteroides fragilis    "Susceptibilities not performed"    Culture - Urine (collected 07-09-24 @ 13:50)  Source: Clean Catch Clean Catch (Midstream)  Final Report (07-10-24 @ 23:26):    >=3 organisms. Probable collection contamination.            INFECTIOUS DISEASES TESTING      INFLAMMATORY MARKERS      RADIOLOGY & ADDITIONAL TESTS:  I have personally reviewed the last available Chest xray  CXR      CT      CARDIOLOGY TESTING  12 Lead ECG:   Ventricular Rate 71 BPM    Atrial Rate 71 BPM    P-R Interval 186 ms    QRS Duration 96 ms    Q-T Interval 404 ms    QTC Calculation(Bazett) 439 ms    P Axis 62 degrees    R Axis 49 degrees    T Axis 9 degrees    Diagnosis Line Normal sinus rhythm  Normal ECG    Confirmed by Julius Don ([id number]) on 7/9/2024 12:41:31 PM (07-09-24 @ 12:14)      MEDICATIONS  atorvastatin 20 Oral at bedtime  chlorhexidine 2% Cloths 1 Topical daily  dextrose 5%. 1000 IV Continuous <Continuous>  dextrose 5%. 1000 IV Continuous <Continuous>  dextrose 50% Injectable 12.5 IV Push once  dextrose 50% Injectable 25 IV Push once  dextrose 50% Injectable 25 IV Push once  ertapenem  IVPB 500 IV Intermittent every 24 hours  glucagon  Injectable 1 IntraMuscular once  heparin   Injectable 5000 SubCutaneous every 8 hours  insulin lispro (ADMELOG) corrective regimen sliding scale  SubCutaneous three times a day before meals  insulin lispro (ADMELOG) corrective regimen sliding scale  SubCutaneous at bedtime  pantoprazole    Tablet 40 Oral before breakfast  sodium bicarbonate 650 Oral three times a day      WEIGHT  Weight (kg): 81.6 (07-10-24 @ 14:34)  Creatinine: 4.1 mg/dL (07-18-24 @ 06:54)      ANTIBIOTICS:  ertapenem  IVPB 500 milliGRAM(s) IV Intermittent every 24 hours      All available historical records have been reviewed

## 2024-07-18 NOTE — PROGRESS NOTE ADULT - SUBJECTIVE AND OBJECTIVE BOX
seen and examined  24 h events noted   no distress         PAST HISTORY  --------------------------------------------------------------------------------  No significant changes to PMH, PSH, FHx, SHx, unless otherwise noted    ALLERGIES & MEDICATIONS  --------------------------------------------------------------------------------  Allergies    No Known Allergies    Intolerances      Standing Inpatient Medications  atorvastatin 20 milliGRAM(s) Oral at bedtime  chlorhexidine 2% Cloths 1 Application(s) Topical daily  dextrose 5%. 1000 milliLiter(s) IV Continuous <Continuous>  dextrose 5%. 1000 milliLiter(s) IV Continuous <Continuous>  dextrose 50% Injectable 12.5 Gram(s) IV Push once  dextrose 50% Injectable 25 Gram(s) IV Push once  dextrose 50% Injectable 25 Gram(s) IV Push once  ertapenem  IVPB 500 milliGRAM(s) IV Intermittent every 24 hours  glucagon  Injectable 1 milliGRAM(s) IntraMuscular once  heparin   Injectable 5000 Unit(s) SubCutaneous every 8 hours  insulin lispro (ADMELOG) corrective regimen sliding scale   SubCutaneous three times a day before meals  insulin lispro (ADMELOG) corrective regimen sliding scale   SubCutaneous at bedtime  pantoprazole    Tablet 40 milliGRAM(s) Oral before breakfast  sodium bicarbonate 650 milliGRAM(s) Oral three times a day    PRN Inpatient Medications  aluminum hydroxide/magnesium hydroxide/simethicone Suspension 30 milliLiter(s) Oral every 4 hours PRN  dextrose Oral Gel 15 Gram(s) Oral once PRN  melatonin 3 milliGRAM(s) Oral at bedtime PRN  ondansetron Injectable 4 milliGRAM(s) IV Push every 8 hours PRN        VITALS/PHYSICAL EXAM  --------------------------------------------------------------------------------  T(C): 36.7 (07-17-24 @ 23:51), Max: 36.7 (07-17-24 @ 15:25)  HR: 72 (07-17-24 @ 23:51) (66 - 72)  BP: 118/74 (07-17-24 @ 23:51) (118/74 - 148/84)  RR: 18 (07-17-24 @ 23:51) (18 - 18)  SpO2: 97% (07-17-24 @ 23:51) (96% - 97%)  Wt(kg): --        07-17-24 @ 07:01  -  07-18-24 @ 07:00  --------------------------------------------------------  IN: 720 mL / OUT: 900 mL / NET: -180 mL      Physical Exam:  	Gen: NAD  	Pulm: decrease BS  B/L  	CV:  S1S2; no rub  	Abd: soft, nondistended  	LE:no edema  	    LABS/STUDIES  --------------------------------------------------------------------------------              9.6    11.27 >-----------<  229      [07-17-24 @ 06:17]              30.8     139  |  103  |  94  ----------------------------<  124      [07-17-24 @ 06:17]  4.2   |  24  |  4.5        Ca     7.7     [07-17-24 @ 06:17]      Mg     1.7     [07-17-24 @ 06:17]    TPro  6.4  /  Alb  2.2  /  TBili  0.3  /  DBili  x   /  AST  26  /  ALT  11  /  AlkPhos  139  [07-17-24 @ 06:17]      Creatinine Trend:  SCr 4.5 [07-17 @ 06:17]  SCr 4.2 [07-16 @ 06:17]  SCr 4.8 [07-15 @ 05:43]  SCr 5.0 [07-14 @ 07:12]  SCr 5.0 [07-13 @ 06:12]    Urinalysis - [07-17-24 @ 06:17]      Color  / Appearance  / SG  / pH       Gluc 124 / Ketone   / Bili  / Urobili        Blood  / Protein  / Leuk Est  / Nitrite       RBC  / WBC  / Hyaline  / Gran  / Sq Epi  / Non Sq Epi  / Bacteria       Iron 30, TIBC 143, %sat 21      [09-07-23 @ 09:17]  Ferritin 533      [09-07-23 @ 09:17]  PTH -- (Ca 8.3)      [07-10-24 @ 21:08]   43  Vitamin D (25OH) 13      [09-07-23 @ 09:17]  Lipid: chol 134, , HDL 30, LDL --      [09-07-23 @ 09:17]

## 2024-07-19 LAB
ANION GAP SERPL CALC-SCNC: 13 MMOL/L — SIGNIFICANT CHANGE UP (ref 7–14)
BUN SERPL-MCNC: 75 MG/DL — CRITICAL HIGH (ref 10–20)
CALCIUM SERPL-MCNC: 7.6 MG/DL — LOW (ref 8.4–10.5)
CHLORIDE SERPL-SCNC: 101 MMOL/L — SIGNIFICANT CHANGE UP (ref 98–110)
CO2 SERPL-SCNC: 22 MMOL/L — SIGNIFICANT CHANGE UP (ref 17–32)
CREAT SERPL-MCNC: 3.8 MG/DL — HIGH (ref 0.7–1.5)
EGFR: 16 ML/MIN/1.73M2 — LOW
GLUCOSE BLDC GLUCOMTR-MCNC: 107 MG/DL — HIGH (ref 70–99)
GLUCOSE BLDC GLUCOMTR-MCNC: 117 MG/DL — HIGH (ref 70–99)
GLUCOSE BLDC GLUCOMTR-MCNC: 139 MG/DL — HIGH (ref 70–99)
GLUCOSE BLDC GLUCOMTR-MCNC: 157 MG/DL — HIGH (ref 70–99)
GLUCOSE SERPL-MCNC: 99 MG/DL — SIGNIFICANT CHANGE UP (ref 70–99)
HCT VFR BLD CALC: 30.1 % — LOW (ref 42–52)
HGB BLD-MCNC: 9.2 G/DL — LOW (ref 14–18)
MAGNESIUM SERPL-MCNC: 1.7 MG/DL — LOW (ref 1.8–2.4)
MCHC RBC-ENTMCNC: 29.5 PG — SIGNIFICANT CHANGE UP (ref 27–31)
MCHC RBC-ENTMCNC: 30.6 G/DL — LOW (ref 32–37)
MCV RBC AUTO: 96.5 FL — HIGH (ref 80–94)
NRBC # BLD: 0 /100 WBCS — SIGNIFICANT CHANGE UP (ref 0–0)
PHOSPHATE SERPL-MCNC: 4.8 MG/DL — SIGNIFICANT CHANGE UP (ref 2.1–4.9)
PLATELET # BLD AUTO: 212 K/UL — SIGNIFICANT CHANGE UP (ref 130–400)
PMV BLD: 11 FL — HIGH (ref 7.4–10.4)
POTASSIUM SERPL-MCNC: 4.1 MMOL/L — SIGNIFICANT CHANGE UP (ref 3.5–5)
POTASSIUM SERPL-SCNC: 4.1 MMOL/L — SIGNIFICANT CHANGE UP (ref 3.5–5)
RBC # BLD: 3.12 M/UL — LOW (ref 4.7–6.1)
RBC # FLD: 13.7 % — SIGNIFICANT CHANGE UP (ref 11.5–14.5)
SODIUM SERPL-SCNC: 136 MMOL/L — SIGNIFICANT CHANGE UP (ref 135–146)
WBC # BLD: 11.78 K/UL — HIGH (ref 4.8–10.8)
WBC # FLD AUTO: 11.78 K/UL — HIGH (ref 4.8–10.8)

## 2024-07-19 PROCEDURE — 99232 SBSQ HOSP IP/OBS MODERATE 35: CPT

## 2024-07-19 RX ORDER — NIFEDIPINE 20 MG/1
30 CAPSULE, LIQUID FILLED ORAL DAILY
Refills: 0 | Status: DISCONTINUED | OUTPATIENT
Start: 2024-07-19 | End: 2024-07-28

## 2024-07-19 RX ORDER — SODIUM BICARBONATE 0.9MEQ/ML
650 SYRINGE (ML) INTRAVENOUS
Refills: 0 | Status: DISCONTINUED | OUTPATIENT
Start: 2024-07-20 | End: 2024-07-28

## 2024-07-19 RX ORDER — MAGNESIUM SULFATE 500 MG/ML
2 VIAL (ML) INJECTION ONCE
Refills: 0 | Status: COMPLETED | OUTPATIENT
Start: 2024-07-19 | End: 2024-07-19

## 2024-07-19 RX ORDER — AMOXICILLIN 500 MG
500 CAPSULE ORAL DAILY
Refills: 0 | Status: DISCONTINUED | OUTPATIENT
Start: 2024-07-19 | End: 2024-07-22

## 2024-07-19 RX ADMIN — HEPARIN SODIUM 5000 UNIT(S): 1000 INJECTION, SOLUTION INTRAVENOUS; SUBCUTANEOUS at 12:08

## 2024-07-19 RX ADMIN — Medication 650 MILLIGRAM(S): at 12:07

## 2024-07-19 RX ADMIN — ATORVASTATIN CALCIUM 20 MILLIGRAM(S): 40 TABLET, FILM COATED ORAL at 21:36

## 2024-07-19 RX ADMIN — Medication 3 MILLIGRAM(S): at 21:35

## 2024-07-19 RX ADMIN — HEPARIN SODIUM 5000 UNIT(S): 1000 INJECTION, SOLUTION INTRAVENOUS; SUBCUTANEOUS at 05:40

## 2024-07-19 RX ADMIN — ERTAPENEM SODIUM 100 MILLIGRAM(S): 1 INJECTION INTRAMUSCULAR; INTRAVENOUS at 12:11

## 2024-07-19 RX ADMIN — Medication 2: at 12:06

## 2024-07-19 RX ADMIN — Medication 650 MILLIGRAM(S): at 05:38

## 2024-07-19 RX ADMIN — Medication 25 GRAM(S): at 11:08

## 2024-07-19 RX ADMIN — PANTOPRAZOLE SODIUM 40 MILLIGRAM(S): 20 TABLET, DELAYED RELEASE ORAL at 05:38

## 2024-07-19 RX ADMIN — HEPARIN SODIUM 5000 UNIT(S): 1000 INJECTION, SOLUTION INTRAVENOUS; SUBCUTANEOUS at 21:35

## 2024-07-19 RX ADMIN — Medication 500 MILLIGRAM(S): at 12:08

## 2024-07-19 RX ADMIN — CHLORHEXIDINE GLUCONATE 1 APPLICATION(S): 500 CLOTH TOPICAL at 12:07

## 2024-07-19 NOTE — PROGRESS NOTE ADULT - ASSESSMENT
68 yo M with hx of urinary incontinence w/ chronic SPC, nephrolithiasis, spinal abscess w/ quadriplegia, DM2, CKD5 (baseline Cr 4.4), HTN, HLD, DM II, CKD stage 5 (baseline Cr 4.4)) presented to ED with abdominal pain, n/v, found to have severe R hydroureteronephrosis w/ numerous R-sided calculi including obstructing stone in R ureter as well as nonobstructive L calculi w/o hydro. Admitted for acute pyelonephritis and bacteremia 2/2 R obstructing ureteral stone w/ hydronephrosis. Course complicated by postrenal JOÃO on CKD5 and HAGMA, improving s/p R PCN and R nephroureteral catheter placement with IR (7/10), continuing on antibiotics    #GNR bacteremia 2/2 ?B. fragilis, final spp pending  #Right-sided hydronephrosis 2/2 R obstructive ureteral calculi s/p PCN  #Pyelonephritis/complicated cystitis 2/2 P. mirabilis, S. marcescens E. Faecalis  #Bilateral renal calculi   #JOÃO on CKD5, postrenal - resolved  #HAGMA - resolved  - CKD stage 5 baseline Cr ~4.4, urinary incontinence with chronic supra pubic catheter  - CTAP shows severe hydro on R w/ obstructive calculi and non obs calculi on Left  - s/p R PCN and R nephroureteral catheter with IR on 7/10 - drained pus in OR, draining minimally  - BCx: GNR, ?B. fragilis  - UCx: P. mirabilis, S. marcescens, E. faecalis   - Afebrile, normotensive  - Leukocytosis improving  - Cr improving, now close to baseline  - monitor PCN and SPC output   - Nephro consulted, appreciate recs  - urology consulted: R PCNL and L JJ stent on 7/23 pending medical clearance  - ID consulted, appreciate recs  - c/w IV ertapenem 500mg q24h IV, PO amoxicillin 500mg qd until 48 hours post-op  - f/u UCx from PCN 7/17  - Trend CBC, fever  - c/w oral bicarb 650 mg TID   - Trend BUN/Cr, Mg, phos  - replete electrolytes PRN  - Strict I/Os  - s/p midline with procedure team 7/17    #Chronic venous stasis ulcers w/ dermatitis  - wounds do not appear infected, no edema or tenderness   - wound care consulted, appreciate recs  - cleanse wound w/ soap and water, pat dry  - apply dermaphor bid to bilateral legs and feet     #Globus sensation w/ vomiting - resolved  - self-induced vomiting 2/2 globus sensation  - c/w pantoprazole 40mg PO qd    #HTN  #HLD  - c/w atorvastatin  - holding nifedipine iso hypotension     #DM2  - monitor FS  - ISS TIDAC, qhs    ---------------------  DVT ppx: HSQ  Diet: renal/CCC/DASHTLC  Activity: IAT  GOC: DNR/DNI (MOLST in chart), HCP brother Erich [phone]  Dispo: pending clinical improvement    #Summary/Handoff  - f/u urine culture PCN

## 2024-07-19 NOTE — PROGRESS NOTE ADULT - SUBJECTIVE AND OBJECTIVE BOX
LISAKRISTY MARIE  69y, Male  Allergy: No Known Allergies      LOS  10d    CHIEF COMPLAINT: UTI w/ obstructing stone (19 Jul 2024 16:16)      INTERVAL EVENTS/HPI  - No acute events overnight, feeling well   - T(F): , Max: 97.9 (07-19-24 @ 15:54)  - Denies any worsening symptoms  - Tolerating medication  - WBC Count: 11.78 (07-19-24 @ 06:35)  WBC Count: 10.86 (07-18-24 @ 06:54)     - Creatinine: 3.8 (07-19-24 @ 06:35)  Creatinine: 4.1 (07-18-24 @ 06:54)       ROS  General: Denies rigors, nightsweats  HEENT: Denies headache, rhinorrhea, sore throat, eye pain  CV: Denies CP, palpitations  PULM: Denies wheezing, hemoptysis  GI: Denies hematemesis, hematochezia, melena  : Denies discharge, hematuria  MSK: Denies arthralgias, myalgias  SKIN: Denies rash, lesions  NEURO: Denies paresthesias, weakness  PSYCH: Denies depression, anxiety    VITALS:  T(F): 97.9, Max: 97.9 (07-19-24 @ 15:54)  HR: 80  BP: 123/80  RR: 18Vital Signs Last 24 Hrs  T(C): 36.6 (19 Jul 2024 15:54), Max: 36.6 (19 Jul 2024 07:20)  T(F): 97.9 (19 Jul 2024 15:54), Max: 97.9 (19 Jul 2024 15:54)  HR: 80 (19 Jul 2024 15:54) (70 - 80)  BP: 123/80 (19 Jul 2024 15:54) (121/78 - 154/77)  BP(mean): --  RR: 18 (19 Jul 2024 07:20) (18 - 18)  SpO2: 98% (19 Jul 2024 15:54) (96% - 98%)    Parameters below as of 19 Jul 2024 07:20  Patient On (Oxygen Delivery Method): room air        PHYSICAL EXAM:  Gen: NAD, resting in bed  HEENT: Normocephalic, atraumatic  Neck: supple, no lymphadenopathy  CV: Regular rate & regular rhythm  Lungs: decreased BS at bases, no fremitus  Abdomen: Soft, BS present R PCN clear urine, SPT   Ext: Warm, well perfused  Neuro: non focal, awake  Skin: no rash, no erythema  Lines: no phlebitis    FH: Non-contributory  Social Hx: Non-contributory    TESTS & MEASUREMENTS:                        9.2    11.78 )-----------( 212      ( 19 Jul 2024 06:35 )             30.1     07-19    136  |  101  |  75<HH>  ----------------------------<  99  4.1   |  22  |  3.8<H>    Ca    7.6<L>      19 Jul 2024 06:35  Phos  4.8     07-19  Mg     1.7     07-19    TPro  6.4  /  Alb  2.3<L>  /  TBili  0.4  /  DBili  x   /  AST  29  /  ALT  11  /  AlkPhos  132<H>  07-18      LIVER FUNCTIONS - ( 18 Jul 2024 06:54 )  Alb: 2.3 g/dL / Pro: 6.4 g/dL / ALK PHOS: 132 U/L / ALT: 11 U/L / AST: 29 U/L / GGT: x           Urinalysis Basic - ( 19 Jul 2024 06:35 )    Color: x / Appearance: x / SG: x / pH: x  Gluc: 99 mg/dL / Ketone: x  / Bili: x / Urobili: x   Blood: x / Protein: x / Nitrite: x   Leuk Esterase: x / RBC: x / WBC x   Sq Epi: x / Non Sq Epi: x / Bacteria: x        Culture - Urine (collected 07-17-24 @ 19:20)  Source: OR Collect Nephrostomy - Right  Preliminary Report (07-19-24 @ 09:51):    Culture in progress    Culture - Urine (collected 07-10-24 @ 18:14)  Source: .Urine Nephrostomy - Right  Final Report (07-14-24 @ 20:48):    >100,000 CFU/ml Proteus mirabilis    10,000 - 49,000 CFU/mL Serratia marcescens    50,000 - 99,000 CFU/mL Enterococcus faecalis  Organism: Proteus mirabilis  Serratia marcescens  Enterococcus faecalis (07-14-24 @ 20:48)  Organism: Enterococcus faecalis (07-14-24 @ 20:48)      Method Type: ALF      -  Ampicillin: S <=2 Predicts results to ampicillin/sulbactam, amoxacillin-clavulanate and  piperacillin-tazobactam.      -  Ciprofloxacin: S <=1      -  Levofloxacin: S 1      -  Nitrofurantoin: S <=32 Should not be used to treat pyelonephritis.      -  Tetracycline: R >8      -  Vancomycin: S 1  Organism: Serratia marcescens (07-14-24 @ 20:48)      Method Type: ALF      -  Amoxicillin/Clavulanic Acid: R >16/8      -  Ampicillin: R 16 These ampicillin results predict results for amoxicillin      -  Ampicillin/Sulbactam: R 16/8      -  Aztreonam: S <=4      -  Cefazolin: R >16      -  Cefepime: S <=2      -  Cefoxitin: R <=8      -  Ceftriaxone: S <=1      -  Ciprofloxacin: S <=0.25      -  Ertapenem: S <=0.5      -  Gentamicin: S <=2      -  Levofloxacin: S <=0.5      -  Meropenem: S <=1      -  Nitrofurantoin: R >64 Should not be used to treat pyelonephritis      -  Piperacillin/Tazobactam: S <=8      -  Tobramycin: S <=2      -  Trimethoprim/Sulfamethoxazole: S <=0.5/9.5  Organism: Proteus mirabilis (07-14-24 @ 20:48)      Method Type: ALF      -  Amoxicillin/Clavulanic Acid: S <=8/4      -  Ampicillin: R >16 These ampicillin results predict results for amoxicillin      -  Ampicillin/Sulbactam: I 16/8      -  Aztreonam: S <=4      -  Cefazolin: S 8 For uncomplicated UTI with K. pneumoniae, E. coli, or P. mirablis: ALF <=16 is sensitive and ALF >=32 is resistant. This also predicts results for oral agents cefaclor, cefdinir, cefpodoxime, cefprozil, cefuroxime axetil, cephalexin and locarbef for uncomplicated UTI. Note that some isolates may be susceptible to these agents while testing resistant to cefazolin.      -  Cefepime: S <=2      -  Cefoxitin: S <=8      -  Ceftriaxone: S <=1      -  Cefuroxime: S <=4      -  Ciprofloxacin: R >2      -  Ertapenem: S <=0.5      -  Gentamicin: I 4      -  Levofloxacin: R >4      -  Meropenem: S <=1      -  Nitrofurantoin: R 64 Should not be used to treat pyelonephritis      -  Piperacillin/Tazobactam: S <=8      -  Tobramycin: S <=2      -  Trimethoprim/Sulfamethoxazole: R >2/38    Culture - Blood (collected 07-09-24 @ 14:20)  Source: .Blood Blood-Peripheral  Gram Stain (07-11-24 @ 01:20):    Growth in anaerobic bottle: Gram Negative Rods  Preliminary Report (07-16-24 @ 09:11):    Growth in anaerobic bottle: Gram Negative Rods    Growth in anaerobic bottle: Gram Negative Rods Sent to    [place] [facility]  for Identification    .    Direct identification is available within approximately 3-5    hours eitherby Blood Panel Multiplexed PCR or Direct    MALDI-TOF. Details: [email or web address]  Organism: Blood Culture PCR (07-11-24 @ 03:23)  Organism: Blood Culture PCR (07-11-24 @ 03:23)      Method Type: PCR      -  Blood PCR Panel: NEG    Culture - Blood (collected 07-09-24 @ 14:20)  Source: .Blood Blood-Peripheral  Gram Stain (07-12-24 @ 04:50):    Growth in anaerobic bottle: Gram Negative Rods  Final Report (07-13-24 @ 09:30):    Growth in anaerobic bottle: Bacteroides fragilis    "Susceptibilities not performed"    Culture - Urine (collected 07-09-24 @ 13:50)  Source: Clean Catch Clean Catch (Midstream)  Final Report (07-10-24 @ 23:26):    >=3 organisms. Probable collection contamination.            INFECTIOUS DISEASES TESTING      INFLAMMATORY MARKERS      RADIOLOGY & ADDITIONAL TESTS:  I have personally reviewed the last available Chest xray  CXR      CT      CARDIOLOGY TESTING  12 Lead ECG:   Ventricular Rate 71 BPM    Atrial Rate 71 BPM    P-R Interval 186 ms    QRS Duration 96 ms    Q-T Interval 404 ms    QTC Calculation(Bazett) 439 ms    P Axis 62 degrees    R Axis 49 degrees    T Axis 9 degrees    Diagnosis Line Normal sinus rhythm  Normal ECG    Confirmed by Julius Don ([id number]) on 7/9/2024 12:41:31 PM (07-09-24 @ 12:14)      MEDICATIONS  amoxicillin 500 Oral daily  atorvastatin 20 Oral at bedtime  chlorhexidine 2% Cloths 1 Topical daily  dextrose 5%. 1000 IV Continuous <Continuous>  dextrose 5%. 1000 IV Continuous <Continuous>  dextrose 50% Injectable 12.5 IV Push once  dextrose 50% Injectable 25 IV Push once  dextrose 50% Injectable 25 IV Push once  ertapenem  IVPB 500 IV Intermittent every 24 hours  glucagon  Injectable 1 IntraMuscular once  heparin   Injectable 5000 SubCutaneous every 8 hours  insulin lispro (ADMELOG) corrective regimen sliding scale  SubCutaneous three times a day before meals  insulin lispro (ADMELOG) corrective regimen sliding scale  SubCutaneous at bedtime  NIFEdipine XL 30 Oral daily  pantoprazole    Tablet 40 Oral before breakfast      WEIGHT  Weight (kg): 81.6 (07-10-24 @ 14:34)  Creatinine: 3.8 mg/dL (07-19-24 @ 06:35)      ANTIBIOTICS:  amoxicillin 500 milliGRAM(s) Oral daily  ertapenem  IVPB 500 milliGRAM(s) IV Intermittent every 24 hours      All available historical records have been reviewed

## 2024-07-19 NOTE — PROGRESS NOTE ADULT - TIME-BASED
35
Posterior Auricular Interpolation Flap Text: A decision was made to reconstruct the defect utilizing an interpolation axial flap and a staged reconstruction.  A telfa template was made of the defect.  This telfa template was then used to outline the posterior auricular interpolation flap.  The donor area for the pedicle flap was then injected with anesthesia.  The flap was excised through the skin and subcutaneous tissue down to the layer of the underlying musculature.  The pedicle flap was carefully excised within this deep plane to maintain its blood supply.  The edges of the donor site were undermined.   The donor site was closed in a primary fashion.  The pedicle was then rotated into position and sutured.  Once the tube was sutured into place, adequate blood supply was confirmed with blanching and refill.  The pedicle was then wrapped with xeroform gauze and dressed appropriately with a telfa and gauze bandage to ensure continued blood supply and protect the attached pedicle.

## 2024-07-19 NOTE — PROGRESS NOTE ADULT - SUBJECTIVE AND OBJECTIVE BOX
Patient is a 69y old Male who presents with a chief complaint of UTI w/ obstructing stone (15 Jul 2024 06:31)    Today is hospital day 10    Overnight events/Interval History:  - No acute overnight events  - At bedside, patient has been sleeping and eating well. Breathing comfortably on room air. Denies fevers, chills, SOB, chest pain, N/V/D/C, abdominal pain.    ROS:  - All ROS is negative unless indicated in HPI    Code Status: DNR/DNI, trial NIV    ALLERGIES:  No Known Allergies    MEDICATIONS:  STANDING MEDICATIONS  amoxicillin 500 milliGRAM(s) Oral daily  atorvastatin 20 milliGRAM(s) Oral at bedtime  chlorhexidine 2% Cloths 1 Application(s) Topical daily  dextrose 5%. 1000 milliLiter(s) IV Continuous <Continuous>  dextrose 5%. 1000 milliLiter(s) IV Continuous <Continuous>  dextrose 50% Injectable 12.5 Gram(s) IV Push once  dextrose 50% Injectable 25 Gram(s) IV Push once  dextrose 50% Injectable 25 Gram(s) IV Push once  ertapenem  IVPB 500 milliGRAM(s) IV Intermittent every 24 hours  glucagon  Injectable 1 milliGRAM(s) IntraMuscular once  heparin   Injectable 5000 Unit(s) SubCutaneous every 8 hours  insulin lispro (ADMELOG) corrective regimen sliding scale   SubCutaneous three times a day before meals  insulin lispro (ADMELOG) corrective regimen sliding scale   SubCutaneous at bedtime  magnesium sulfate  IVPB 2 Gram(s) IV Intermittent once  pantoprazole    Tablet 40 milliGRAM(s) Oral before breakfast  sodium bicarbonate 650 milliGRAM(s) Oral three times a day    PRN MEDICATIONS  aluminum hydroxide/magnesium hydroxide/simethicone Suspension 30 milliLiter(s) Oral every 4 hours PRN  dextrose Oral Gel 15 Gram(s) Oral once PRN  melatonin 3 milliGRAM(s) Oral at bedtime PRN  ondansetron Injectable 4 milliGRAM(s) IV Push every 8 hours PRN    VITALS LAST 24H:  Vital Signs Last 24 Hrs  T(C): 36.1 (19 Jul 2024 00:00), Max: 36.7 (18 Jul 2024 15:00)  T(F): 97 (19 Jul 2024 00:00), Max: 98 (18 Jul 2024 15:00)  HR: 70 (19 Jul 2024 00:00) (70 - 73)  BP: 121/78 (19 Jul 2024 00:00) (121/78 - 128/77)  BP(mean): --  RR: 18 (19 Jul 2024 00:00) (18 - 18)  SpO2: 96% (19 Jul 2024 00:00) (96% - 97%)    Parameters below as of 19 Jul 2024 00:00  Patient On (Oxygen Delivery Method): room air      PHYSICAL EXAM  HEENT:  EOMI, Moist mucous membranes, poor dentition  CHEST/LUNG: Clear to auscultation bilaterally; normal respiratory effort, no coughing, wheezes, crackles, or rales.  HEART: Regular rate and rhythm  ABDOMEN: Soft, non-tender, nondistended, +SPC w/ clear urine. +R PCN w/ clear yellow urine draining + sediment  EXTREMITIES:  2+ Peripheral Pulses, brisk capillary refill. +chronic venous stasis ulcers w/ dermatitis bilaterally. no lower extremity edema bilaterally   NERVOUS SYSTEM:  A&Ox3, no focal deficits   SKIN: No rashes or lesions    LABS:                        9.2    11.78 )-----------( 212      ( 19 Jul 2024 06:35 )             30.1     07-19    136  |  101  |  75<HH>  ----------------------------<  99  4.1   |  22  |  3.8<H>    Ca    7.6<L>      19 Jul 2024 06:35  Phos  4.8     07-19  Mg     1.7     07-19    TPro  6.4  /  Alb  2.3<L>  /  TBili  0.4  /  DBili  x   /  AST  29  /  ALT  11  /  AlkPhos  132<H>  07-18      Urinalysis Basic - ( 19 Jul 2024 06:35 )    Color: x / Appearance: x / SG: x / pH: x  Gluc: 99 mg/dL / Ketone: x  / Bili: x / Urobili: x   Blood: x / Protein: x / Nitrite: x   Leuk Esterase: x / RBC: x / WBC x   Sq Epi: x / Non Sq Epi: x / Bacteria: x

## 2024-07-19 NOTE — PROGRESS NOTE ADULT - ASSESSMENT
68 y/o man with PMH of HTN, hyperlipidemia, DM II, CKD 5 (baseline Cr 4.4) Urinary incontinence with chronic suprapubic catheter, Nephrolithiasis, Spinal abscess with quadriplegia and chronic LE wounds receiving dressing changes by visiting nurse now presented to the ED for crampy abdominal pain along with nausea and vomiting.   patient seen and examined    # JOÃO over CKD 5  due to obstructive uropathy   # Complicated UTI / pyelonephritis  # Bacteroids bacteremia  - CT abd/pelvis: Severe right hydroureteronephrosis with cortical thinning and numerous large intrarenal and proximal to mid ureteral calculi, obstructing calculus in the right mid ureter measures 1.1 x 1 x 1.8 cm with normal caliber distal ureter.  Multiple nonobstructive left intrarenal calculi and a 0.8 cm left distal ureteral calculus; no left hydronephrosis.  Few prominent retroperitoneal lymph nodes, likely reactive. Cholelithiasis.  - s/p eval by Urology in ED --> recommended b/l nephrostomy placement by IR.   - s/p right PCN by IR on 7/10 - 10 cc of thick, viscous green-yellow, malodorous right kidney urine sent for cultures  - s/p SPC exchange in the ED  - blood cultures 1/2- bateroids(sn not performed); 1/2- GNR- monmitor; urine culture- Proteus, Serratia(both sn to dwain) and enterococcus  - acidosis improved - bicarb in IVF stopped - continue PO bicarbonate  - further stone management as  per Urology- planning next weeks - plan for right PCNL and left JJ stent placement- Follow up with Dr Akins  - ID following-  meropenem 500 mg BID; On D/C, recommend midline and ertapenem 500mg q24h IV + PO amoxicillin 500mg daily (to cover enterococcus) until 48h post-op -- will continue for now --surgery on tuesday 7/23  urine culture from nephrostomy wound is pending    #  HTN- stable  nifedipine stopped    # Hyperlipidemia on statin    #  DM   A1C 6.9  insulin sliding scale not used here his blood sugar was stable--    # Chronic LE wounds - wound care per advanced nursing recommendation    # Nausea - zofran prn      DNR/DNI Trial of NIV  Plan of care d/w patient    plan is OR on tuesday

## 2024-07-19 NOTE — PROGRESS NOTE ADULT - TIME BILLING
I have personally seen and examined this patient.  I have reviewed all pertinent clinical information and reviewed all relevant imaging and diagnostic studies personally.   If possible, I counseled the patient about diagnostic testing and treatment plan.   I discussed my recommendations with the primary team and any family members at bedside.
I have personally seen and examined this patient.  I have reviewed all pertinent clinical information and reviewed all relevant imaging and diagnostic studies personally.   If possible, I counseled the patient about diagnostic testing and treatment plan.   I discussed my recommendations with the primary team and any family members at bedside.  - I discussed the case in detail with Dr. Akins
I have personally seen and examined this patient.  I have reviewed all pertinent clinical information and reviewed all relevant imaging and diagnostic studies personally.   If possible, I counseled the patient about diagnostic testing and treatment plan.   I discussed my recommendations with the primary team and any family members at bedside.  - I discussed the case in detail with Dr. Akins

## 2024-07-19 NOTE — PROGRESS NOTE ADULT - ASSESSMENT
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 NBNB vomiting and nausea every time after he eats  #Obstructing R mid ureter calculus with severe hydroureteronephrosis s/p Stent   #B/L nephrolithiasis   #Post-renal JOÃO on CKD 5   #HAGMA   #Chronic suprapubic catheter   - baseline creatinine ~4.4 creat back to baseline now   - urology and IR on board, s/p Percutaneous right nephrostomy followed by placement of right nephroureteral catheter / plan for R PCNL and L JJ stent placement  - can decrease  PO bicarb to BID  -  last ph at goal   - followed by ID on  ertapenem   - BP controlled  - no need to start RRT at this time  will follow

## 2024-07-19 NOTE — PROGRESS NOTE ADULT - SUBJECTIVE AND OBJECTIVE BOX
SUBJECTIVE:    Patient is a 69y old Male who presents with a chief complaint of UTI w/ obstructing stone (19 Jul 2024 10:05)    Currently admitted to medicine with the primary diagnosis of Hydronephrosis with obstructing calculus       Today is hospital day 10d.     PAST MEDICAL & SURGICAL HISTORY  DM (diabetes mellitus)    HTN (hypertension)    H/O paraplegia    Spinal abscess    Renal stones    Spinal abscess  S/P Surgery    Encounter for care or replacement of suprapubic tube      ALLERGIES:  No Known Allergies    MEDICATIONS:  STANDING MEDICATIONS  amoxicillin 500 milliGRAM(s) Oral daily  atorvastatin 20 milliGRAM(s) Oral at bedtime  chlorhexidine 2% Cloths 1 Application(s) Topical daily  dextrose 5%. 1000 milliLiter(s) IV Continuous <Continuous>  dextrose 5%. 1000 milliLiter(s) IV Continuous <Continuous>  dextrose 50% Injectable 12.5 Gram(s) IV Push once  dextrose 50% Injectable 25 Gram(s) IV Push once  dextrose 50% Injectable 25 Gram(s) IV Push once  ertapenem  IVPB 500 milliGRAM(s) IV Intermittent every 24 hours  glucagon  Injectable 1 milliGRAM(s) IntraMuscular once  heparin   Injectable 5000 Unit(s) SubCutaneous every 8 hours  insulin lispro (ADMELOG) corrective regimen sliding scale   SubCutaneous three times a day before meals  insulin lispro (ADMELOG) corrective regimen sliding scale   SubCutaneous at bedtime  pantoprazole    Tablet 40 milliGRAM(s) Oral before breakfast    PRN MEDICATIONS  aluminum hydroxide/magnesium hydroxide/simethicone Suspension 30 milliLiter(s) Oral every 4 hours PRN  dextrose Oral Gel 15 Gram(s) Oral once PRN  melatonin 3 milliGRAM(s) Oral at bedtime PRN  ondansetron Injectable 4 milliGRAM(s) IV Push every 8 hours PRN    VITALS:   T(F): 97.8  HR: 77  BP: 154/77  RR: 18  SpO2: 97%    LABS:                        9.2    11.78 )-----------( 212      ( 19 Jul 2024 06:35 )             30.1     07-19    136  |  101  |  75<HH>  ----------------------------<  99  4.1   |  22  |  3.8<H>    Ca    7.6<L>      19 Jul 2024 06:35  Phos  4.8     07-19  Mg     1.7     07-19    TPro  6.4  /  Alb  2.3<L>  /  TBili  0.4  /  DBili  x   /  AST  29  /  ALT  11  /  AlkPhos  132<H>  07-18      Urinalysis Basic - ( 19 Jul 2024 06:35 )    Color: x / Appearance: x / SG: x / pH: x  Gluc: 99 mg/dL / Ketone: x  / Bili: x / Urobili: x   Blood: x / Protein: x / Nitrite: x   Leuk Esterase: x / RBC: x / WBC x   Sq Epi: x / Non Sq Epi: x / Bacteria: x            Culture - Urine (collected 17 Jul 2024 19:20)  Source: OR Collect Nephrostomy - Right  Preliminary Report (19 Jul 2024 09:51):    Culture in progress          RADIOLOGY:    PHYSICAL EXAM:  GEN: No acute distress  LUNGS: Clear to auscultation bilaterally   HEART: S1/S2 present. RRR.   ABD/ GI: Soft, non-tender, non-distended. Bowel sounds present  EXT: NC/NC/NE/2+PP/PANTOJA  NEURO: AAOX3

## 2024-07-19 NOTE — PROGRESS NOTE ADULT - ASSESSMENT
ASSESSMENT  70yo male w/ pmhx of HTN, HLD, DMII, CKD stage 5 baseline Cr ~ 4.4, urinary incontinence with chronic supra pubic catheter, Nephrolithiasis, spinal abscess leading to quadriplegia presenting w. NBNB vomiting and nausea every time after he eats.       IMPRESSION  #Bacteroides fragilis and other GNR bacteremia secondary to Pyelonephritis/Pyonephrosis with obstructing stone    7/10 R nephrostomy   >100,000 CFU/ml Proteus mirabilis R fluoro R bactrim  Amoxicillin/Clavulanic Acid: S <=8/4    10,000 - 49,000 CFU/mL Serratia marcescens Levofloxacin: S <=0.5    50,000 - 99,000 CFU/mL Enterococcus faecalis Levofloxacin: S 1    7/9 BCX GNR- Bacteroides fragilis    7/9 BCX GNR  - s/p percutaneous right nephrostomy followed by placement of right nephroureteral catheter    WBC 20 on admission ; Afebrile     7/9 UA pyuria WBC 50 ; UCX   >=3 organisms. Probable collection contamination.    SPT (replaced in ER)    CTAP 1.  Severe right hydroureteronephrosis with cortical thinning and   numerous large intrarenal and proximal to mid ureteral calculi;   obstructing calculus in the right mid ureter measures 1.1 x 1 x 1.8 cm (   ) with normal caliber distal ureter.  2.  Multiple nonobstructing left intrarenal calculi and a 0.8 cm left   distal ureteral calculus; no left hydronephrosis.  3.  Few prominent retroperitoneal lymph nodes, likely reactive.  4.  Cholelithiasis.  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 which could results in poor clinical outcomes  #Abx allergy: No Known Allergies  #CKD  Creatinine: 5.3 (07-10-24 @ 06:36)  QTC Calculation(Bazett) 439 ms  Height (cm): 185.4 (09-18-23 @ 13:48)  Weight (kg): 79.379 (09-18-23 @ 13:48)    RECOMMENDATIONS  - f/u nephrostomy culture  - Continue  midline x ertapenem 500mg q24h IV + PO amoxicillin 500mg daily (to cover enterococcus) until 48h post-op   - Appreciate Urology consult- planning on urologic intervention in two weeks, please continue medical optimization and medical clearance for general anesthesia and RIGHT PCNL and LEFT JJ stent placement  - Appreciate IR consult  - Please order Weekly CBC, CMP, ESR/CRP  - ID follow-up with Dr. Augusto Mathias for Telehealth. We will call the patient between 8:00-4:30      [id number]        [phone]       Fax [phone]     If any questions, please send a message or call on [employer] Teams  Please continue to update ID with any pertinent new laboratory, radiographic findings, or change in clinical status

## 2024-07-19 NOTE — PROGRESS NOTE ADULT - SUBJECTIVE AND OBJECTIVE BOX
Nephrology progress note    Patient was seen and examined, events over the last 24 h noted .    Allergies:  No Known Allergies    Hospital Medications:   MEDICATIONS  (STANDING):  amoxicillin 500 milliGRAM(s) Oral daily  atorvastatin 20 milliGRAM(s) Oral at bedtime  chlorhexidine 2% Cloths 1 Application(s) Topical daily  dextrose 5%. 1000 milliLiter(s) (100 mL/Hr) IV Continuous <Continuous>  dextrose 5%. 1000 milliLiter(s) (50 mL/Hr) IV Continuous <Continuous>  dextrose 50% Injectable 25 Gram(s) IV Push once  dextrose 50% Injectable 12.5 Gram(s) IV Push once  dextrose 50% Injectable 25 Gram(s) IV Push once  ertapenem  IVPB 500 milliGRAM(s) IV Intermittent every 24 hours  glucagon  Injectable 1 milliGRAM(s) IntraMuscular once  heparin   Injectable 5000 Unit(s) SubCutaneous every 8 hours  insulin lispro (ADMELOG) corrective regimen sliding scale   SubCutaneous three times a day before meals  insulin lispro (ADMELOG) corrective regimen sliding scale   SubCutaneous at bedtime  magnesium sulfate  IVPB 2 Gram(s) IV Intermittent once  pantoprazole    Tablet 40 milliGRAM(s) Oral before breakfast  sodium bicarbonate 650 milliGRAM(s) Oral three times a day        VITALS:  T(F): 97.8 (07-19-24 @ 07:20), Max: 98 (07-18-24 @ 15:00)  HR: 77 (07-19-24 @ 07:20)  BP: 154/77 (07-19-24 @ 07:20)  RR: 18 (07-19-24 @ 07:20)  SpO2: 97% (07-19-24 @ 07:20)  Wt(kg): --    07-17 @ 07:01  -  07-18 @ 07:00  --------------------------------------------------------  IN: 720 mL / OUT: 900 mL / NET: -180 mL    07-18 @ 07:01  -  07-19 @ 07:00  --------------------------------------------------------  IN: 600 mL / OUT: 2580 mL / NET: -1980 mL          PHYSICAL EXAM:  Constitutional: NAD  HEENT: anicteric sclera, oropharynx clear, MMM  Neck: No JVD  Respiratory: CTAB, no wheezes, rales or rhonchi  Cardiovascular: S1, S2, RRR  Gastrointestinal: BS+, soft, NT/ND  Extremities: No cyanosis or clubbing. No peripheral edema  :  No angelo.   Skin: No rashes    LABS:  07-19    136  |  101  |  75<HH>  ----------------------------<  99  4.1   |  22  |  3.8<H>    Ca    7.6<L>      19 Jul 2024 06:35  Phos  4.8     07-19  Mg     1.7     07-19    TPro  6.4  /  Alb  2.3<L>  /  TBili  0.4  /  DBili      /  AST  29  /  ALT  11  /  AlkPhos  132<H>  07-18                          9.2    11.78 )-----------( 212      ( 19 Jul 2024 06:35 )             30.1       Urine Studies:  Urinalysis Basic - ( 19 Jul 2024 06:35 )    Color:  / Appearance:  / SG:  / pH:   Gluc: 99 mg/dL / Ketone:   / Bili:  / Urobili:    Blood:  / Protein:  / Nitrite:    Leuk Esterase:  / RBC:  / WBC    Sq Epi:  / Non Sq Epi:  / Bacteria:         RADIOLOGY & ADDITIONAL STUDIES:

## 2024-07-20 LAB
ANION GAP SERPL CALC-SCNC: 11 MMOL/L — SIGNIFICANT CHANGE UP (ref 7–14)
BUN SERPL-MCNC: 68 MG/DL — CRITICAL HIGH (ref 10–20)
CALCIUM SERPL-MCNC: 7.4 MG/DL — LOW (ref 8.4–10.5)
CHLORIDE SERPL-SCNC: 101 MMOL/L — SIGNIFICANT CHANGE UP (ref 98–110)
CO2 SERPL-SCNC: 24 MMOL/L — SIGNIFICANT CHANGE UP (ref 17–32)
CREAT SERPL-MCNC: 3.9 MG/DL — HIGH (ref 0.7–1.5)
EGFR: 16 ML/MIN/1.73M2 — LOW
GLUCOSE BLDC GLUCOMTR-MCNC: 121 MG/DL — HIGH (ref 70–99)
GLUCOSE BLDC GLUCOMTR-MCNC: 132 MG/DL — HIGH (ref 70–99)
GLUCOSE BLDC GLUCOMTR-MCNC: 191 MG/DL — HIGH (ref 70–99)
GLUCOSE BLDC GLUCOMTR-MCNC: 92 MG/DL — SIGNIFICANT CHANGE UP (ref 70–99)
GLUCOSE SERPL-MCNC: 100 MG/DL — HIGH (ref 70–99)
HCT VFR BLD CALC: 30 % — LOW (ref 42–52)
HGB BLD-MCNC: 9 G/DL — LOW (ref 14–18)
MAGNESIUM SERPL-MCNC: 1.6 MG/DL — LOW (ref 1.8–2.4)
MCHC RBC-ENTMCNC: 29.4 PG — SIGNIFICANT CHANGE UP (ref 27–31)
MCHC RBC-ENTMCNC: 30 G/DL — LOW (ref 32–37)
MCV RBC AUTO: 98 FL — HIGH (ref 80–94)
NRBC # BLD: 0 /100 WBCS — SIGNIFICANT CHANGE UP (ref 0–0)
PHOSPHATE SERPL-MCNC: 5.1 MG/DL — HIGH (ref 2.1–4.9)
PLATELET # BLD AUTO: 234 K/UL — SIGNIFICANT CHANGE UP (ref 130–400)
PMV BLD: 11.5 FL — HIGH (ref 7.4–10.4)
POTASSIUM SERPL-MCNC: 4.4 MMOL/L — SIGNIFICANT CHANGE UP (ref 3.5–5)
POTASSIUM SERPL-SCNC: 4.4 MMOL/L — SIGNIFICANT CHANGE UP (ref 3.5–5)
RBC # BLD: 3.06 M/UL — LOW (ref 4.7–6.1)
RBC # FLD: 13.5 % — SIGNIFICANT CHANGE UP (ref 11.5–14.5)
SODIUM SERPL-SCNC: 136 MMOL/L — SIGNIFICANT CHANGE UP (ref 135–146)
WBC # BLD: 11.62 K/UL — HIGH (ref 4.8–10.8)
WBC # FLD AUTO: 11.62 K/UL — HIGH (ref 4.8–10.8)

## 2024-07-20 PROCEDURE — 99232 SBSQ HOSP IP/OBS MODERATE 35: CPT

## 2024-07-20 RX ADMIN — PANTOPRAZOLE SODIUM 40 MILLIGRAM(S): 20 TABLET, DELAYED RELEASE ORAL at 05:46

## 2024-07-20 RX ADMIN — ATORVASTATIN CALCIUM 20 MILLIGRAM(S): 40 TABLET, FILM COATED ORAL at 21:09

## 2024-07-20 RX ADMIN — Medication 500 MILLIGRAM(S): at 11:23

## 2024-07-20 RX ADMIN — CHLORHEXIDINE GLUCONATE 1 APPLICATION(S): 500 CLOTH TOPICAL at 11:23

## 2024-07-20 RX ADMIN — ERTAPENEM SODIUM 100 MILLIGRAM(S): 1 INJECTION INTRAMUSCULAR; INTRAVENOUS at 11:23

## 2024-07-20 RX ADMIN — Medication 650 MILLIGRAM(S): at 17:25

## 2024-07-20 RX ADMIN — HEPARIN SODIUM 5000 UNIT(S): 1000 INJECTION, SOLUTION INTRAVENOUS; SUBCUTANEOUS at 05:46

## 2024-07-20 RX ADMIN — HEPARIN SODIUM 5000 UNIT(S): 1000 INJECTION, SOLUTION INTRAVENOUS; SUBCUTANEOUS at 21:09

## 2024-07-20 RX ADMIN — Medication 650 MILLIGRAM(S): at 05:46

## 2024-07-20 RX ADMIN — NIFEDIPINE 30 MILLIGRAM(S): 20 CAPSULE, LIQUID FILLED ORAL at 05:47

## 2024-07-20 NOTE — PROGRESS NOTE ADULT - ASSESSMENT
68 yo M with hx of urinary incontinence w/ chronic SPC, nephrolithiasis, spinal abscess w/ quadriplegia, DM2, CKD5 (baseline Cr 4.4), HTN, HLD, DM II, CKD stage 5 (baseline Cr 4.4)) presented to ED with abdominal pain, n/v, found to have severe R hydroureteronephrosis w/ numerous R-sided calculi including obstructing stone in R ureter as well as nonobstructive L calculi w/o hydro. Admitted for acute pyelonephritis and bacteremia 2/2 R obstructing ureteral stone w/ hydronephrosis. Course complicated by postrenal JOÃO on CKD5 and HAGMA, improving s/p R PCN and R nephroureteral catheter placement with IR (7/10), continuing on antibiotics    #GNR bacteremia 2/2 ?B. fragilis, final spp pending  #Right-sided hydronephrosis 2/2 R obstructive ureteral calculi s/p PCN  #Pyelonephritis/complicated cystitis 2/2 P. mirabilis, S. marcescens E. Faecalis  #Bilateral renal calculi   #JOÃO on CKD5, postrenal - resolved  #HAGMA - resolved  - CKD stage 5 baseline Cr ~4.4, urinary incontinence with chronic supra pubic catheter  - CTAP shows severe hydro on R w/ obstructive calculi and non obs calculi on Left  - s/p R PCN and R nephroureteral catheter with IR on 7/10 - drained pus in OR, draining minimally  - BCx: GNR, ?B. fragilis  - UCx: P. mirabilis, S. marcescens, E. faecalis   - Afebrile, normotensive  - Leukocytosis improving  - Cr improving, now close to baseline  - monitor PCN and SPC output   - Nephro consulted, appreciate recs  - urology consulted: R PCNL and L JJ stent on 7/23 pending medical clearance  - ID consulted, appreciate recs  - c/w IV ertapenem 500mg q24h IV, PO amoxicillin 500mg qd until 48 hours post-op  - f/u UCx from PCN 7/17  - Trend CBC, fever  - c/w oral bicarb 650 mg TID   - Trend BUN/Cr, Mg, phos  - replete electrolytes PRN  - Strict I/Os  - s/p midline with procedure team 7/17    #Chronic venous stasis ulcers w/ dermatitis  - wounds do not appear infected, no edema or tenderness   - wound care consulted, appreciate recs  - cleanse wound w/ soap and water, pat dry  - apply dermaphor bid to bilateral legs and feet     #Globus sensation w/ vomiting - resolved  - self-induced vomiting 2/2 globus sensation  - c/w pantoprazole 40mg PO qd    #HTN  #HLD  - c/w atorvastatin  - holding nifedipine iso hypotension     #DM2  - monitor FS  - ISS TIDAC, qhs    ---------------------  DVT ppx: HSQ  Diet: renal/CCC/DASHTLC  Activity: IAT  GOC: DNR/DNI (MOLST in chart), HCP brother Erich [phone]  Dispo: pending clinical improvement    #Summary/Handoff  - f/u urine culture PCN 68 yo M with hx of urinary incontinence w/ chronic SPC, nephrolithiasis, spinal abscess w/ quadriplegia, DM2, CKD5 (baseline Cr 4.4), HTN, HLD, DM II, CKD stage 5 (baseline Cr 4.4)) presented to ED with abdominal pain, n/v, found to have severe R hydroureteronephrosis w/ numerous R-sided calculi including obstructing stone in R ureter as well as nonobstructive L calculi w/o hydro. Admitted for acute pyelonephritis and bacteremia 2/2 R obstructing ureteral stone w/ hydronephrosis. Course complicated by postrenal JOÃO on CKD5 and HAGMA, improving s/p R PCN and R nephroureteral catheter placement with IR (7/10), continuing on antibiotics    #GNR bacteremia 2/2 ?B. fragilis, final spp pending  #Right-sided hydronephrosis 2/2 R obstructive ureteral calculi s/p PCN  #Pyelonephritis/complicated cystitis 2/2 P. mirabilis, S. marcescens E. Faecalis  #Bilateral renal calculi   #JOÃO on CKD5, postrenal - resolved  #HAGMA - resolved  - CKD stage 5 baseline Cr ~4.4, urinary incontinence with chronic supra pubic catheter  - CTAP shows severe hydro on R w/ obstructive calculi and non obs calculi on Left  - s/p R PCN and R nephroureteral catheter with IR on 7/10 - drained pus in OR, draining minimally  - BCx: GNR, ?B. fragilis  - UCx: P. mirabilis, S. marcescens, E. faecalis   - Afebrile, normotensive  - Leukocytosis improving  - Cr improving, now close to baseline  - monitor PCN and SPC output   - Nephro consulted, appreciate recs  - urology consulted: R PCNL and L JJ stent on 7/23 pending medical risk stratification  - ID consulted, appreciate recs  - c/w IV ertapenem 500mg q24h IV, PO amoxicillin 500mg qd until 48 hours post-op  - f/u UCx from PCN 7/17  - Trend CBC, fever  - c/w oral bicarb 650 mg TID   - Trend BUN/Cr, Mg, phos  - replete electrolytes PRN  - Strict I/Os  - s/p midline with procedure team 7/17    #Chronic venous stasis ulcers w/ dermatitis  - wounds do not appear infected, no edema or tenderness   - wound care consulted, appreciate recs  - cleanse wound w/ soap and water, pat dry  - apply dermaphor bid to bilateral legs and feet     #Globus sensation w/ vomiting - resolved  - self-induced vomiting 2/2 globus sensation  - c/w pantoprazole 40mg PO qd    #HTN  #HLD  - c/w atorvastatin  - holding nifedipine iso hypotension     #DM2  - monitor FS  - ISS TIDAC, qhs    ---------------------  DVT ppx: HSQ  Diet: renal/CCC/DASHTLC  Activity: IAT  GOC: DNR/DNI (MOLST in chart), HCP brother Erich [phone]  Dispo: pending clinical improvement    #Summary/Handoff  - f/u urine culture PCN

## 2024-07-20 NOTE — PROGRESS NOTE ADULT - ASSESSMENT
68yo male w/ pmhx of HTN, HLD, DMII, CKD stage 5 baseline Cr ~ 4.4, urinary incontinence with chronic supra pubic catheter, Nephrolithiasis, spinal abscess leading to quadriplegia presenting w. NBNB vomiting and nausea every time after he eats  #Obstructing R mid ureter calculus with severe hydroureteronephrosis s/p Stent   #B/L nephrolithiasis   #Post-renal JOÃO on CKD 5   #HAGMA   #Chronic suprapubic catheter   - baseline creatinine ~4.4 creat back to baseline now   - urology and IR on board, s/p Percutaneous right nephrostomy followed by placement of right nephroureteral catheter / plan for R PCNL and L JJ stent placement  -  PO bicarb  decreased to BID  -  last ph at goal   - followed by ID on   ATB   - BP controlled  - no need to start RRT at this time  will follow

## 2024-07-20 NOTE — PROGRESS NOTE ADULT - SUBJECTIVE AND OBJECTIVE BOX
Patient is a 69y old Male who presents with a chief complaint of UTI w/ obstructing stone (15 Jul 2024 06:31)    Today is hospital day 11    Overnight events/Interval History:  - No acute overnight events    ROS:  - All ROS is negative unless indicated in HPI    Code Status: DNR/DNI, trial NIV    ALLERGIES:  No Known Allergies    MEDICATIONS:  STANDING MEDICATIONS  amoxicillin 500 milliGRAM(s) Oral daily  atorvastatin 20 milliGRAM(s) Oral at bedtime  chlorhexidine 2% Cloths 1 Application(s) Topical daily  dextrose 5%. 1000 milliLiter(s) IV Continuous <Continuous>  dextrose 5%. 1000 milliLiter(s) IV Continuous <Continuous>  dextrose 50% Injectable 12.5 Gram(s) IV Push once  dextrose 50% Injectable 25 Gram(s) IV Push once  dextrose 50% Injectable 25 Gram(s) IV Push once  ertapenem  IVPB 500 milliGRAM(s) IV Intermittent every 24 hours  glucagon  Injectable 1 milliGRAM(s) IntraMuscular once  heparin   Injectable 5000 Unit(s) SubCutaneous every 8 hours  insulin lispro (ADMELOG) corrective regimen sliding scale   SubCutaneous three times a day before meals  insulin lispro (ADMELOG) corrective regimen sliding scale   SubCutaneous at bedtime  NIFEdipine XL 30 milliGRAM(s) Oral daily  pantoprazole    Tablet 40 milliGRAM(s) Oral before breakfast  sodium bicarbonate 650 milliGRAM(s) Oral two times a day    PRN MEDICATIONS  aluminum hydroxide/magnesium hydroxide/simethicone Suspension 30 milliLiter(s) Oral every 4 hours PRN  dextrose Oral Gel 15 Gram(s) Oral once PRN  melatonin 3 milliGRAM(s) Oral at bedtime PRN  ondansetron Injectable 4 milliGRAM(s) IV Push every 8 hours PRN    VITALS:   T(F): 97.8  HR: 66  BP: 150/78  RR: 18  SpO2: 100%      PHYSICAL EXAM  HEENT:  EOMI, Moist mucous membranes, poor dentition  CHEST/LUNG: Clear to auscultation bilaterally; normal respiratory effort, no coughing, wheezes, crackles, or rales.  HEART: Regular rate and rhythm  ABDOMEN: Soft, non-tender, nondistended, +SPC w/ clear urine. +R PCN w/ clear yellow urine draining + sediment  EXTREMITIES:  2+ Peripheral Pulses, brisk capillary refill. +chronic venous stasis ulcers w/ dermatitis bilaterally. no lower extremity edema bilaterally   NERVOUS SYSTEM:  A&Ox3, no focal deficits   SKIN: No rashes or lesions    LABS:                        9.0    11.62 )-----------( 234      ( 20 Jul 2024 06:09 )             30.0     07-20    136  |  101  |  68<HH>  ----------------------------<  100<H>  4.4   |  24  |  3.9<H>    Ca    7.4<L>      20 Jul 2024 06:09  Phos  5.1     07-20  Mg     1.6     07-20        Urinalysis Basic - ( 20 Jul 2024 06:09 )    Color: x / Appearance: x / SG: x / pH: x  Gluc: 100 mg/dL / Ketone: x  / Bili: x / Urobili: x   Blood: x / Protein: x / Nitrite: x   Leuk Esterase: x / RBC: x / WBC x   Sq Epi: x / Non Sq Epi: x / Bacteria: x            Culture - Urine (collected 17 Jul 2024 19:20)  Source: OR Collect Nephrostomy - Right  Preliminary Report (19 Jul 2024 09:51):    Culture in progress

## 2024-07-20 NOTE — PROGRESS NOTE ADULT - ASSESSMENT
70 y/o man with PMH of HTN, hyperlipidemia, DM II, CKD 5 (baseline Cr 4.4) Urinary incontinence with chronic suprapubic catheter, Nephrolithiasis, Spinal abscess with quadriplegia and chronic LE wounds receiving dressing changes by visiting nurse now presented to the ED for crampy abdominal pain along with nausea and vomiting.   patient seen and examined    # JOÃO over CKD 5  due to obstructive uropathy   # Complicated UTI / pyelonephritis  # Bacteroids bacteremia  - CT abd/pelvis: Severe right hydroureteronephrosis with cortical thinning and numerous large intrarenal and proximal to mid ureteral calculi, obstructing calculus in the right mid ureter measures 1.1 x 1 x 1.8 cm with normal caliber distal ureter.  Multiple nonobstructive left intrarenal calculi and a 0.8 cm left distal ureteral calculus; no left hydronephrosis.  Few prominent retroperitoneal lymph nodes, likely reactive. Cholelithiasis.  - s/p eval by Urology in ED --> recommended b/l nephrostomy placement by IR.   - s/p right PCN by IR on 7/10 - 10 cc of thick, viscous green-yellow, malodorous right kidney urine sent for cultures  - s/p SPC exchange in the ED  - blood cultures 1/2- bateroids(sn not performed); 1/2- GNR- monmitor; urine culture- Proteus, Serratia(both sn to dwain) and enterococcus  - acidosis improved - bicarb in IVF stopped - continue PO bicarbonate  - further stone management as  per Urology-  - plan for right PCNL and left JJ stent placement- Follow up with Dr Akins  - ID following-  meropenem 500 mg BID; On D/C, recommend midline and ertapenem 500mg q24h IV + PO amoxicillin 500mg daily (to cover enterococcus) until 48h post-op -- will continue for now --surgery on tuesday 7/23  urine culture from nephrostomy  is still pending    #  HTN- stable  nifedipine stopped    # Hyperlipidemia on statin    #  DM   A1C 6.9  insulin sliding scale not needed--his blood sugar was stable--    # Chronic LE wounds - wound care per advanced nursing recommendation    # Nausea - zofran prn      DNR/DNI Trial of NIV  Plan of care d/w patient    plan is OR on tuesday

## 2024-07-20 NOTE — PROGRESS NOTE ADULT - SUBJECTIVE AND OBJECTIVE BOX
SUBJECTIVE:    Patient is a 69y old Male who presents with a chief complaint of UTI w/ obstructing stone (20 Jul 2024 10:55)    Currently admitted to medicine with the primary diagnosis of Hydronephrosis with obstructing calculus       Today is hospital day 11d.     PAST MEDICAL & SURGICAL HISTORY  DM (diabetes mellitus)    HTN (hypertension)    H/O paraplegia    Spinal abscess    Renal stones    Spinal abscess  S/P Surgery    Encounter for care or replacement of suprapubic tube      ALLERGIES:  No Known Allergies    MEDICATIONS:  STANDING MEDICATIONS  amoxicillin 500 milliGRAM(s) Oral daily  atorvastatin 20 milliGRAM(s) Oral at bedtime  chlorhexidine 2% Cloths 1 Application(s) Topical daily  dextrose 5%. 1000 milliLiter(s) IV Continuous <Continuous>  dextrose 5%. 1000 milliLiter(s) IV Continuous <Continuous>  dextrose 50% Injectable 12.5 Gram(s) IV Push once  dextrose 50% Injectable 25 Gram(s) IV Push once  dextrose 50% Injectable 25 Gram(s) IV Push once  ertapenem  IVPB 500 milliGRAM(s) IV Intermittent every 24 hours  glucagon  Injectable 1 milliGRAM(s) IntraMuscular once  heparin   Injectable 5000 Unit(s) SubCutaneous every 8 hours  insulin lispro (ADMELOG) corrective regimen sliding scale   SubCutaneous three times a day before meals  insulin lispro (ADMELOG) corrective regimen sliding scale   SubCutaneous at bedtime  NIFEdipine XL 30 milliGRAM(s) Oral daily  pantoprazole    Tablet 40 milliGRAM(s) Oral before breakfast  sodium bicarbonate 650 milliGRAM(s) Oral two times a day    PRN MEDICATIONS  aluminum hydroxide/magnesium hydroxide/simethicone Suspension 30 milliLiter(s) Oral every 4 hours PRN  dextrose Oral Gel 15 Gram(s) Oral once PRN  melatonin 3 milliGRAM(s) Oral at bedtime PRN  ondansetron Injectable 4 milliGRAM(s) IV Push every 8 hours PRN    VITALS:   T(F): 97.8  HR: 66  BP: 150/78  RR: 18  SpO2: 100%    LABS:                        9.0    11.62 )-----------( 234      ( 20 Jul 2024 06:09 )             30.0     07-20    136  |  101  |  68<HH>  ----------------------------<  100<H>  4.4   |  24  |  3.9<H>    Ca    7.4<L>      20 Jul 2024 06:09  Phos  5.1     07-20  Mg     1.6     07-20        Urinalysis Basic - ( 20 Jul 2024 06:09 )    Color: x / Appearance: x / SG: x / pH: x  Gluc: 100 mg/dL / Ketone: x  / Bili: x / Urobili: x   Blood: x / Protein: x / Nitrite: x   Leuk Esterase: x / RBC: x / WBC x   Sq Epi: x / Non Sq Epi: x / Bacteria: x            Culture - Urine (collected 17 Jul 2024 19:20)  Source: OR Collect Nephrostomy - Right  Preliminary Report (19 Jul 2024 09:51):    Culture in progress          RADIOLOGY:    PHYSICAL EXAM:  GEN: No acute distress  LUNGS: Clear to auscultation bilaterally   HEART: S1/S2 present. RRR.   ABD/ GI: Soft, non-tender, non-distended. Bowel sounds present  EXT: NC/NC/NE/2+PP/PANTOJA  NEURO: AAOX3

## 2024-07-20 NOTE — PROGRESS NOTE ADULT - SUBJECTIVE AND OBJECTIVE BOX
seen and examined  24 h events noted   no distress        PAST HISTORY  --------------------------------------------------------------------------------  No significant changes to PMH, PSH, FHx, SHx, unless otherwise noted    ALLERGIES & MEDICATIONS  --------------------------------------------------------------------------------  Allergies    No Known Allergies    Intolerances      Standing Inpatient Medications  amoxicillin 500 milliGRAM(s) Oral daily  atorvastatin 20 milliGRAM(s) Oral at bedtime  chlorhexidine 2% Cloths 1 Application(s) Topical daily  dextrose 5%. 1000 milliLiter(s) IV Continuous <Continuous>  dextrose 5%. 1000 milliLiter(s) IV Continuous <Continuous>  dextrose 50% Injectable 12.5 Gram(s) IV Push once  dextrose 50% Injectable 25 Gram(s) IV Push once  dextrose 50% Injectable 25 Gram(s) IV Push once  ertapenem  IVPB 500 milliGRAM(s) IV Intermittent every 24 hours  glucagon  Injectable 1 milliGRAM(s) IntraMuscular once  heparin   Injectable 5000 Unit(s) SubCutaneous every 8 hours  insulin lispro (ADMELOG) corrective regimen sliding scale   SubCutaneous three times a day before meals  insulin lispro (ADMELOG) corrective regimen sliding scale   SubCutaneous at bedtime  NIFEdipine XL 30 milliGRAM(s) Oral daily  pantoprazole    Tablet 40 milliGRAM(s) Oral before breakfast  sodium bicarbonate 650 milliGRAM(s) Oral two times a day    PRN Inpatient Medications  aluminum hydroxide/magnesium hydroxide/simethicone Suspension 30 milliLiter(s) Oral every 4 hours PRN  dextrose Oral Gel 15 Gram(s) Oral once PRN  melatonin 3 milliGRAM(s) Oral at bedtime PRN  ondansetron Injectable 4 milliGRAM(s) IV Push every 8 hours PRN    VITALS/PHYSICAL EXAM  --------------------------------------------------------------------------------  T(C): 36.6 (07-20-24 @ 07:00), Max: 36.8 (07-20-24 @ 00:00)  HR: 66 (07-20-24 @ 07:00) (66 - 80)  BP: 150/78 (07-20-24 @ 07:00) (123/80 - 150/78)  RR: 18 (07-20-24 @ 07:00) (18 - 18)  SpO2: 100% (07-20-24 @ 07:00) (98% - 100%)  Wt(kg): --        07-19-24 @ 07:01  -  07-20-24 @ 07:00  --------------------------------------------------------  IN: 0 mL / OUT: 2300 mL / NET: -2300 mL      Physical Exam:  	Gen: NAD  	Pulm: decrease BS  B/L  	CV:  S1S2; no rub  	Abd:  soft, /nondistended  	LE: no edema  	    LABS/STUDIES  --------------------------------------------------------------------------------              9.0    11.62 >-----------<  234      [07-20-24 @ 06:09]              30.0     136  |  101  |  68  ----------------------------<  100      [07-20-24 @ 06:09]  4.4   |  24  |  3.9        Ca     7.4     [07-20-24 @ 06:09]      Mg     1.6     [07-20-24 @ 06:09]      Phos  5.1     [07-20-24 @ 06:09]      Creatinine Trend:  SCr 3.9 [07-20 @ 06:09]  SCr 3.8 [07-19 @ 06:35]  SCr 4.1 [07-18 @ 06:54]  SCr 4.5 [07-17 @ 06:17]  SCr 4.2 [07-16 @ 06:17]    Urinalysis - [07-20-24 @ 06:09]      Color  / Appearance  / SG  / pH       Gluc 100 / Ketone   / Bili  / Urobili        Blood  / Protein  / Leuk Est  / Nitrite       RBC  / WBC  / Hyaline  / Gran  / Sq Epi  / Non Sq Epi  / Bacteria       Iron 30, TIBC 143, %sat 21      [09-07-23 @ 09:17]  Ferritin 533      [09-07-23 @ 09:17]  PTH -- (Ca 8.3)      [07-10-24 @ 21:08]   43  Vitamin D (25OH) 13      [09-07-23 @ 09:17]  Lipid: chol 134, , HDL 30, LDL --      [09-07-23 @ 09:17]

## 2024-07-21 LAB
-  AMPICILLIN: SIGNIFICANT CHANGE UP
-  CIPROFLOXACIN: SIGNIFICANT CHANGE UP
-  LEVOFLOXACIN: SIGNIFICANT CHANGE UP
-  VANCOMYCIN: SIGNIFICANT CHANGE UP
ANION GAP SERPL CALC-SCNC: 15 MMOL/L — HIGH (ref 7–14)
BUN SERPL-MCNC: 67 MG/DL — CRITICAL HIGH (ref 10–20)
CALCIUM SERPL-MCNC: 7.9 MG/DL — LOW (ref 8.4–10.5)
CHLORIDE SERPL-SCNC: 102 MMOL/L — SIGNIFICANT CHANGE UP (ref 98–110)
CO2 SERPL-SCNC: 21 MMOL/L — SIGNIFICANT CHANGE UP (ref 17–32)
CREAT SERPL-MCNC: 3.6 MG/DL — HIGH (ref 0.7–1.5)
EGFR: 18 ML/MIN/1.73M2 — LOW
GLUCOSE BLDC GLUCOMTR-MCNC: 101 MG/DL — HIGH (ref 70–99)
GLUCOSE BLDC GLUCOMTR-MCNC: 132 MG/DL — HIGH (ref 70–99)
GLUCOSE BLDC GLUCOMTR-MCNC: 137 MG/DL — HIGH (ref 70–99)
GLUCOSE BLDC GLUCOMTR-MCNC: 141 MG/DL — HIGH (ref 70–99)
GLUCOSE SERPL-MCNC: 91 MG/DL — SIGNIFICANT CHANGE UP (ref 70–99)
HCT VFR BLD CALC: 31.2 % — LOW (ref 42–52)
HGB BLD-MCNC: 9.3 G/DL — LOW (ref 14–18)
MAGNESIUM SERPL-MCNC: 1.8 MG/DL — SIGNIFICANT CHANGE UP (ref 1.8–2.4)
MCHC RBC-ENTMCNC: 29.2 PG — SIGNIFICANT CHANGE UP (ref 27–31)
MCHC RBC-ENTMCNC: 29.8 G/DL — LOW (ref 32–37)
MCV RBC AUTO: 97.8 FL — HIGH (ref 80–94)
METHOD TYPE: SIGNIFICANT CHANGE UP
NRBC # BLD: 0 /100 WBCS — SIGNIFICANT CHANGE UP (ref 0–0)
PHOSPHATE SERPL-MCNC: 4.9 MG/DL — SIGNIFICANT CHANGE UP (ref 2.1–4.9)
PLATELET # BLD AUTO: 239 K/UL — SIGNIFICANT CHANGE UP (ref 130–400)
PMV BLD: 11.7 FL — HIGH (ref 7.4–10.4)
POTASSIUM SERPL-MCNC: 4.3 MMOL/L — SIGNIFICANT CHANGE UP (ref 3.5–5)
POTASSIUM SERPL-SCNC: 4.3 MMOL/L — SIGNIFICANT CHANGE UP (ref 3.5–5)
RBC # BLD: 3.19 M/UL — LOW (ref 4.7–6.1)
RBC # FLD: 13.4 % — SIGNIFICANT CHANGE UP (ref 11.5–14.5)
SODIUM SERPL-SCNC: 138 MMOL/L — SIGNIFICANT CHANGE UP (ref 135–146)
WBC # BLD: 10.01 K/UL — SIGNIFICANT CHANGE UP (ref 4.8–10.8)
WBC # FLD AUTO: 10.01 K/UL — SIGNIFICANT CHANGE UP (ref 4.8–10.8)

## 2024-07-21 PROCEDURE — 99232 SBSQ HOSP IP/OBS MODERATE 35: CPT

## 2024-07-21 RX ADMIN — HEPARIN SODIUM 5000 UNIT(S): 1000 INJECTION, SOLUTION INTRAVENOUS; SUBCUTANEOUS at 11:19

## 2024-07-21 RX ADMIN — HEPARIN SODIUM 5000 UNIT(S): 1000 INJECTION, SOLUTION INTRAVENOUS; SUBCUTANEOUS at 21:16

## 2024-07-21 RX ADMIN — ATORVASTATIN CALCIUM 20 MILLIGRAM(S): 40 TABLET, FILM COATED ORAL at 21:16

## 2024-07-21 RX ADMIN — Medication 650 MILLIGRAM(S): at 18:23

## 2024-07-21 RX ADMIN — HEPARIN SODIUM 5000 UNIT(S): 1000 INJECTION, SOLUTION INTRAVENOUS; SUBCUTANEOUS at 05:18

## 2024-07-21 RX ADMIN — PANTOPRAZOLE SODIUM 40 MILLIGRAM(S): 20 TABLET, DELAYED RELEASE ORAL at 05:18

## 2024-07-21 RX ADMIN — NIFEDIPINE 30 MILLIGRAM(S): 20 CAPSULE, LIQUID FILLED ORAL at 05:18

## 2024-07-21 RX ADMIN — Medication 650 MILLIGRAM(S): at 05:18

## 2024-07-21 RX ADMIN — Medication 500 MILLIGRAM(S): at 11:19

## 2024-07-21 RX ADMIN — ERTAPENEM SODIUM 100 MILLIGRAM(S): 1 INJECTION INTRAMUSCULAR; INTRAVENOUS at 11:20

## 2024-07-21 RX ADMIN — CHLORHEXIDINE GLUCONATE 1 APPLICATION(S): 500 CLOTH TOPICAL at 11:20

## 2024-07-21 NOTE — PROGRESS NOTE ADULT - SUBJECTIVE AND OBJECTIVE BOX
SUBJECTIVE:    Patient is a 69y old Male who presents with a chief complaint of UTI w/ obstructing stone (21 Jul 2024 08:48)    Currently admitted to medicine with the primary diagnosis of Hydronephrosis with obstructing calculus       Today is hospital day 12d.     PAST MEDICAL & SURGICAL HISTORY  DM (diabetes mellitus)    HTN (hypertension)    H/O paraplegia    Spinal abscess    Renal stones    Spinal abscess  S/P Surgery    Encounter for care or replacement of suprapubic tube      ALLERGIES:  No Known Allergies    MEDICATIONS:  STANDING MEDICATIONS  amoxicillin 500 milliGRAM(s) Oral daily  atorvastatin 20 milliGRAM(s) Oral at bedtime  chlorhexidine 2% Cloths 1 Application(s) Topical daily  dextrose 5%. 1000 milliLiter(s) IV Continuous <Continuous>  dextrose 5%. 1000 milliLiter(s) IV Continuous <Continuous>  dextrose 50% Injectable 12.5 Gram(s) IV Push once  dextrose 50% Injectable 25 Gram(s) IV Push once  dextrose 50% Injectable 25 Gram(s) IV Push once  ertapenem  IVPB 500 milliGRAM(s) IV Intermittent every 24 hours  glucagon  Injectable 1 milliGRAM(s) IntraMuscular once  heparin   Injectable 5000 Unit(s) SubCutaneous every 8 hours  insulin lispro (ADMELOG) corrective regimen sliding scale   SubCutaneous three times a day before meals  insulin lispro (ADMELOG) corrective regimen sliding scale   SubCutaneous at bedtime  NIFEdipine XL 30 milliGRAM(s) Oral daily  pantoprazole    Tablet 40 milliGRAM(s) Oral before breakfast  sodium bicarbonate 650 milliGRAM(s) Oral two times a day    PRN MEDICATIONS  aluminum hydroxide/magnesium hydroxide/simethicone Suspension 30 milliLiter(s) Oral every 4 hours PRN  dextrose Oral Gel 15 Gram(s) Oral once PRN  melatonin 3 milliGRAM(s) Oral at bedtime PRN  ondansetron Injectable 4 milliGRAM(s) IV Push every 8 hours PRN    VITALS:   T(F): 97.4  HR: 74  BP: 106/61  RR: 19  SpO2: 97%    LABS:                        9.3    10.01 )-----------( 239      ( 21 Jul 2024 04:30 )             31.2     07-21    138  |  102  |  67<HH>  ----------------------------<  91  4.3   |  21  |  3.6<H>    Ca    7.9<L>      21 Jul 2024 04:30  Phos  4.9     07-21  Mg     1.8     07-21        Urinalysis Basic - ( 21 Jul 2024 04:30 )    Color: x / Appearance: x / SG: x / pH: x  Gluc: 91 mg/dL / Ketone: x  / Bili: x / Urobili: x   Blood: x / Protein: x / Nitrite: x   Leuk Esterase: x / RBC: x / WBC x   Sq Epi: x / Non Sq Epi: x / Bacteria: x                RADIOLOGY:    PHYSICAL EXAM:  GEN: No acute distress  LUNGS: Clear to auscultation bilaterally   HEART: S1/S2 present. RRR.   ABD/ GI: Soft, non-tender, non-distended. Bowel sounds present, rt nephrostomy tube present  EXT: NC/NC/NE/2+PP/PANTOJA  NEURO: AAOX3

## 2024-07-21 NOTE — CHART NOTE - NSCHARTNOTEFT_GEN_A_CORE
7/17 nephrostomy cultures prelim <10K GNR & Enterococcus -- discussed with Dr. Jane, as pt is remaining inpatient until procedure, can switch to Meropenem alone & f/u final cultures & sensitivities.    Patient scheduled for RIGHT PCNL on Tuesday, 7/23 16:30 at [facility].  -Preop tomorrow (labs, NPO after midnight) with round-trip transfer to be arranged for Tuesday's procedure

## 2024-07-21 NOTE — PROGRESS NOTE ADULT - SUBJECTIVE AND OBJECTIVE BOX
Nephrology progress note    THIS IS AN INCOMPLETE NOTE . FULL NOTE TO FOLLOW SHORTLY    Patient is seen and examined, events over the last 24 h noted .    Allergies:  No Known Allergies    Hospital Medications:   MEDICATIONS  (STANDING):  amoxicillin 500 milliGRAM(s) Oral daily  atorvastatin 20 milliGRAM(s) Oral at bedtime  chlorhexidine 2% Cloths 1 Application(s) Topical daily  dextrose 5%. 1000 milliLiter(s) (50 mL/Hr) IV Continuous <Continuous>  dextrose 5%. 1000 milliLiter(s) (100 mL/Hr) IV Continuous <Continuous>  dextrose 50% Injectable 12.5 Gram(s) IV Push once  dextrose 50% Injectable 25 Gram(s) IV Push once  dextrose 50% Injectable 25 Gram(s) IV Push once  ertapenem  IVPB 500 milliGRAM(s) IV Intermittent every 24 hours  glucagon  Injectable 1 milliGRAM(s) IntraMuscular once  heparin   Injectable 5000 Unit(s) SubCutaneous every 8 hours  insulin lispro (ADMELOG) corrective regimen sliding scale   SubCutaneous three times a day before meals  insulin lispro (ADMELOG) corrective regimen sliding scale   SubCutaneous at bedtime  NIFEdipine XL 30 milliGRAM(s) Oral daily  pantoprazole    Tablet 40 milliGRAM(s) Oral before breakfast  sodium bicarbonate 650 milliGRAM(s) Oral two times a day        VITALS:  T(F): 97.4 (07-21-24 @ 07:00), Max: 98.2 (07-20-24 @ 15:00)  HR: 74 (07-21-24 @ 07:00)  BP: 106/61 (07-21-24 @ 07:00)  RR: 19 (07-21-24 @ 07:00)  SpO2: 97% (07-21-24 @ 07:00)  Wt(kg): --    07-19 @ 07:01  -  07-20 @ 07:00  --------------------------------------------------------  IN: 0 mL / OUT: 2300 mL / NET: -2300 mL    07-20 @ 07:01  -  07-21 @ 07:00  --------------------------------------------------------  IN: 0 mL / OUT: 480 mL / NET: -480 mL          PHYSICAL EXAM:  Constitutional: NAD  HEENT: anicteric sclera, oropharynx clear, MMM  Neck: No JVD  Respiratory: CTAB, no wheezes, rales or rhonchi  Cardiovascular: S1, S2, RRR  Gastrointestinal: BS+, soft, NT/ND  Extremities: No cyanosis or clubbing. No peripheral edema  :  No angelo.   Skin: No rashes    LABS:  07-20    136  |  101  |  68<HH>  ----------------------------<  100<H>  4.4   |  24  |  3.9<H>    Ca    7.4<L>      20 Jul 2024 06:09  Phos  5.1     07-20  Mg     1.6     07-20                            9.3    10.01 )-----------( 239      ( 21 Jul 2024 04:30 )             31.2       Urine Studies:  Urinalysis Basic - ( 20 Jul 2024 06:09 )    Color:  / Appearance:  / SG:  / pH:   Gluc: 100 mg/dL / Ketone:   / Bili:  / Urobili:    Blood:  / Protein:  / Nitrite:    Leuk Esterase:  / RBC:  / WBC    Sq Epi:  / Non Sq Epi:  / Bacteria:           Iron 30, TIBC 143, %sat 21      [09-07-23 @ 09:17]  Ferritin 533      [09-07-23 @ 09:17]  PTH -- (Ca 8.3)      [07-10-24 @ 21:08]   43  Vitamin D (25OH) 13      [09-07-23 @ 09:17]  Lipid: chol 134, , HDL 30, LDL --      [09-07-23 @ 09:17]    HBsAg Nonreact      [09-17-23 @ 07:23]  HCV 0.16, Nonreact      [09-17-23 @ 07:23]    TAY: titer Negative, pattern --      [09-17-23 @ 07:23]  dsDNA <12      [09-17-23 @ 07:23]  C3 Complement 132      [09-16-23 @ 15:44]  C4 Complement 35      [09-16-23 @ 15:44]  ANCA: cANCA Negative, pANCA Negative, atypical ANCA Negative      [09-17-23 @ 07:23]  PLA2R: ARLEEN <1.8, IFA --      [09-17-23 @ 07:43]  Free Light Chains: kappa 35.40, lambda 35.96, ratio = 0.98      [09-17 @ 07:23]  Immunofixation Serum:   No Monoclonal Band Identified      Reference Range: None Detected      [09-17-23 @ 07:23]  SPEP Interpretation: Polyclonal Gammopathy      [09-17-23 @ 07:23]      RADIOLOGY & ADDITIONAL STUDIES:   Nephrology progress note    Patient is seen and examined, events over the last 24 h noted .  comfortable no events   Allergies:  No Known Allergies    Hospital Medications:   MEDICATIONS  (STANDING):  amoxicillin 500 milliGRAM(s) Oral daily  atorvastatin 20 milliGRAM(s) Oral at bedtime  ertapenem  IVPB 500 milliGRAM(s) IV Intermittent every 24 hours  glucagon  Injectable 1 milliGRAM(s) IntraMuscular once  heparin   Injectable 5000 Unit(s) SubCutaneous every 8 hours  insulin lispro (ADMELOG) corrective regimen sliding scale   SubCutaneous three times a day before meals  insulin lispro (ADMELOG) corrective regimen sliding scale   SubCutaneous at bedtime  NIFEdipine XL 30 milliGRAM(s) Oral daily  pantoprazole    Tablet 40 milliGRAM(s) Oral before breakfast  sodium bicarbonate 650 milliGRAM(s) Oral two times a day        VITALS:  T(F): 97.4 (07-21-24 @ 07:00), Max: 98.2 (07-20-24 @ 15:00)  HR: 74 (07-21-24 @ 07:00)  BP: 106/61 (07-21-24 @ 07:00)  RR: 19 (07-21-24 @ 07:00)  SpO2: 97% (07-21-24 @ 07:00)      07-19 @ 07:01  -  07-20 @ 07:00  --------------------------------------------------------  IN: 0 mL / OUT: 2300 mL / NET: -2300 mL    07-20 @ 07:01  -  07-21 @ 07:00  --------------------------------------------------------  IN: 0 mL / OUT: 480 mL / NET: -480 mL          PHYSICAL EXAM:  Constitutional: NAD  Respiratory: CTAB,   Cardiovascular: S1, S2, RRR  Gastrointestinal: BS+, soft, NT/ND  Extremities: No cyanosis or clubbing. No peripheral edema  :   angelo.   Skin: No rashes    LABS:  07-21    138  |  102  |  67<HH>  ----------------------------<  91  4.3   |  21  |  3.6<H>    Ca    7.9<L>      21 Jul 2024 04:30  Phos  4.9     07-21  Mg     1.8     07-21 07-20    136  |  101  |  68<HH>  ----------------------------<  100<H>  4.4   |  24  |  3.9<H>    Ca    7.4<L>      20 Jul 2024 06:09  Phos  5.1     07-20  Mg     1.6     07-20                            9.3    10.01 )-----------( 239      ( 21 Jul 2024 04:30 )             31.2       Urine Studies:  Urinalysis Basic - ( 20 Jul 2024 06:09 )    Color:  / Appearance:  / SG:  / pH:   Gluc: 100 mg/dL / Ketone:   / Bili:  / Urobili:    Blood:  / Protein:  / Nitrite:    Leuk Esterase:  / RBC:  / WBC    Sq Epi:  / Non Sq Epi:  / Bacteria:           Iron 30, TIBC 143, %sat 21      [09-07-23 @ 09:17]  Ferritin 533      [09-07-23 @ 09:17]  PTH -- (Ca 8.3)      [07-10-24 @ 21:08]   43  Vitamin D (25OH) 13      [09-07-23 @ 09:17]  Lipid: chol 134, , HDL 30, LDL --      [09-07-23 @ 09:17]    HBsAg Nonreact      [09-17-23 @ 07:23]  HCV 0.16, Nonreact      [09-17-23 @ 07:23]    TAY: titer Negative, pattern --      [09-17-23 @ 07:23]  dsDNA <12      [09-17-23 @ 07:23]  C3 Complement 132      [09-16-23 @ 15:44]  C4 Complement 35      [09-16-23 @ 15:44]  ANCA: cANCA Negative, pANCA Negative, atypical ANCA Negative      [09-17-23 @ 07:23]  PLA2R: ARLEEN <1.8, IFA --      [09-17-23 @ 07:43]  Free Light Chains: kappa 35.40, lambda 35.96, ratio = 0.98      [09-17 @ 07:23]  Immunofixation Serum:   No Monoclonal Band Identified      Reference Range: None Detected      [09-17-23 @ 07:23]  SPEP Interpretation: Polyclonal Gammopathy      [09-17-23 @ 07:23]      RADIOLOGY & ADDITIONAL STUDIES:

## 2024-07-21 NOTE — PROGRESS NOTE ADULT - ASSESSMENT
68 y/o man with PMH of HTN, hyperlipidemia, DM II, CKD 5 (baseline Cr 4.4) Urinary incontinence with chronic suprapubic catheter, Nephrolithiasis, Spinal abscess with quadriplegia and chronic LE wounds receiving dressing changes by visiting nurse now presented to the ED for crampy abdominal pain along with nausea and vomiting.   patient seen and examined    # JOÃO over CKD 5  due to obstructive uropathy   # Complicated UTI / pyelonephritis  # Bacteroids bacteremia  - CT abd/pelvis: Severe right hydroureteronephrosis with cortical thinning and numerous large intrarenal and proximal to mid ureteral calculi, obstructing calculus in the right mid ureter measures 1.1 x 1 x 1.8 cm with normal caliber distal ureter.  Multiple nonobstructive left intrarenal calculi and a 0.8 cm left distal ureteral calculus; no left hydronephrosis.  Few prominent retroperitoneal lymph nodes, likely reactive. Cholelithiasis.  - s/p eval by Urology in ED --> recommended b/l nephrostomy placement by IR.   - s/p right PCN by IR on 7/10 - 10 cc of thick, viscous green-yellow, malodorous right kidney urine sent for cultures  - s/p SPC exchange in the ED  - blood cultures 1/2- bateroids(sn not performed); 1/2- GNR- monmitor; urine culture- Proteus, Serratia(both sn to dwain) and enterococcus  - acidosis improved - bicarb in IVF stopped - continue PO bicarbonate  - further stone management as  per Urology-  - plan for right PCNL and left JJ stent placement- Follow up with Dr Akins  - ID following-  meropenem 500 mg BID; On D/C, recommend midline and ertapenem 500mg q24h IV + PO amoxicillin 500mg daily (to cover enterococcus) until 48h post-op -- will continue for now --surgery on tuesday 7/23  urine culture from nephrostomy  is gm positive rods and enterococcus-- full report is pending    #  HTN- stable  nifedipine stopped    # Hyperlipidemia on statin    #  DM   A1C 6.9  insulin sliding scale not needed--his blood sugar was stable--    # Chronic LE wounds - wound care per advanced nursing recommendation          DNR/DNI Trial of NIV      plan is OR on tuesday-- will get ID follow up in AM

## 2024-07-21 NOTE — PROGRESS NOTE ADULT - ASSESSMENT
68yo male w/ pmhx of HTN, HLD, DMII, CKD stage 5 baseline Cr ~ 4.4, urinary incontinence with chronic supra pubic catheter, Nephrolithiasis, spinal abscess leading to quadriplegia presenting w. NBNB vomiting and nausea every time after he eats  #Obstructing R mid ureter calculus with severe hydroureteronephrosis s/p Stent   #B/L nephrolithiasis   #Post-renal JOÃO on CKD 5   #HAGMA   #Chronic suprapubic catheter   - baseline creatinine ~4.4 creat back to baseline now / better - strict I and O   - urology and IR on board, s/p Percutaneous right nephrostomy followed by placement of right nephroureteral catheter / plan for R PCNL and L JJ stent placement this week   -  PO bicarb  decreased to BID keep current   -  last ph at goal   - followed by ID on   ATB   - BP controlled  - no need to start RRT at this time  will follow

## 2024-07-21 NOTE — PROGRESS NOTE ADULT - ASSESSMENT
68 yo M with hx of urinary incontinence w/ chronic SPC, nephrolithiasis, spinal abscess w/ quadriplegia, DM2, CKD5 (baseline Cr 4.4), HTN, HLD, DM II, CKD stage 5 (baseline Cr 4.4)) presented to ED with abdominal pain, n/v, found to have severe R hydroureteronephrosis w/ numerous R-sided calculi including obstructing stone in R ureter as well as nonobstructive L calculi w/o hydro. Admitted for acute pyelonephritis and bacteremia 2/2 R obstructing ureteral stone w/ hydronephrosis. Course complicated by postrenal JOÃO on CKD5 and HAGMA, improving s/p R PCN and R nephroureteral catheter placement with IR (7/10), continuing on antibiotics.    #GNR bacteremia 2/2 ?B. fragilis, final spp pending  #Right-sided hydronephrosis 2/2 R obstructive ureteral calculi s/p PCN  #Pyelonephritis/complicated cystitis 2/2 P. mirabilis, S. marcescens E. Faecalis  #Bilateral renal calculi   #JOÃO on CKD5, postrenal - resolved  #HAGMA - resolved  - CKD stage 5 baseline Cr ~4.4, urinary incontinence with chronic supra pubic catheter  - CTAP shows severe hydro on R w/ obstructive calculi and non obs calculi on Left  - s/p R PCN and R nephroureteral catheter with IR on 7/10 - drained pus in OR, draining minimally  - BCx: GNR, ?B. fragilis  - UCx: P. mirabilis, S. marcescens, E. faecalis   - Afebrile, normotensive  - Leukocytosis improving  - Cr improving, now close to baseline  - monitor PCN and SPC output   - Nephro consulted, appreciate recs  - urology consulted: R PCNL and L JJ stent on 7/23 pending medical risk stratification  - ID consulted, appreciate recs  - c/w IV ertapenem 500mg q24h IV, PO amoxicillin 500mg qd until 48 hours post-op  -  ucxs from nephrostomy tube - <10,000 CFU/ml gram - rods and e. faecalis (prelim)  - Trend CBC, fever  - c/w oral bicarb 650 mg TID   - Trend BUN/Cr, Mg, phos  - replete electrolytes PRN  - Strict I/Os      #Chronic venous stasis ulcers w/ dermatitis  - wounds do not appear infected, no edema or tenderness   - wound care consulted, appreciate recs  - cleanse wound w/ soap and water, pat dry  - apply dermaphor bid to bilateral legs and feet     #Globus sensation w/ vomiting - resolved  - self-induced vomiting 2/2 globus sensation  - c/w pantoprazole 40mg PO qd    #HTN  #HLD  - c/w atorvastatin  - holding nifedipine iso hypotension     #DM2  - monitor FS  - ISS TIDAC, qhs    DVT ppx: HSQ  Diet: renal/CCC/DASHTLC  Activity: IAT  GOC: DNR/DNI (MOLST in chart), HCP brother Erich [phone]

## 2024-07-21 NOTE — PROGRESS NOTE ADULT - SUBJECTIVE AND OBJECTIVE BOX
SUBJECTIVE/OVERNIGHT EVENTS  Today is hospital day 12d. This morning patient was seen and examined at bedside, resting comfortably in bed. No acute or major events overnight.    HOSPITAL COURSE    68 yo M with hx of urinary incontinence w/ chronic SPC, nephrolithiasis, spinal abscess w/ quadriplegia, DM2, CKD5 (baseline Cr 4.4), HTN, HLD, DM II, CKD stage 5 (baseline Cr 4.4)) presented to ED with abdominal pain, n/v, found to have severe R hydroureteronephrosis w/ numerous R-sided calculi including obstructing stone in R ureter as well as nonobstructive L calculi w/o hydro. Admitted for acute pyelonephritis and bacteremia 2/2 R obstructing ureteral stone w/ hydronephrosis. Course complicated by postrenal JOÃO on CKD5 and HAGMA, improving s/p R PCN and R nephroureteral catheter placement with IR (7/10), continuing on antibiotics.    CODE STATUS:    FAMILY COMMUNICATION  Contact date:  Name of person contacted:  Relationship to patient:  Communication details:    MEDICATIONS  STANDING MEDICATIONS  amoxicillin 500 milliGRAM(s) Oral daily  atorvastatin 20 milliGRAM(s) Oral at bedtime  chlorhexidine 2% Cloths 1 Application(s) Topical daily  dextrose 5%. 1000 milliLiter(s) IV Continuous <Continuous>  dextrose 5%. 1000 milliLiter(s) IV Continuous <Continuous>  dextrose 50% Injectable 12.5 Gram(s) IV Push once  dextrose 50% Injectable 25 Gram(s) IV Push once  dextrose 50% Injectable 25 Gram(s) IV Push once  ertapenem  IVPB 500 milliGRAM(s) IV Intermittent every 24 hours  glucagon  Injectable 1 milliGRAM(s) IntraMuscular once  heparin   Injectable 5000 Unit(s) SubCutaneous every 8 hours  insulin lispro (ADMELOG) corrective regimen sliding scale   SubCutaneous three times a day before meals  insulin lispro (ADMELOG) corrective regimen sliding scale   SubCutaneous at bedtime  NIFEdipine XL 30 milliGRAM(s) Oral daily  pantoprazole    Tablet 40 milliGRAM(s) Oral before breakfast  sodium bicarbonate 650 milliGRAM(s) Oral two times a day    PRN MEDICATIONS  aluminum hydroxide/magnesium hydroxide/simethicone Suspension 30 milliLiter(s) Oral every 4 hours PRN  dextrose Oral Gel 15 Gram(s) Oral once PRN  melatonin 3 milliGRAM(s) Oral at bedtime PRN  ondansetron Injectable 4 milliGRAM(s) IV Push every 8 hours PRN    VITALS  T(F): 98.2 (07-20-24 @ 15:00), Max: 98.2 (07-20-24 @ 15:00)  HR: 70 (07-20-24 @ 15:00) (66 - 70)  BP: 140/82 (07-20-24 @ 15:00) (140/82 - 150/78)  RR: 18 (07-20-24 @ 15:00) (18 - 18)  SpO2: 100% (07-20-24 @ 15:00) (100% - 100%)  POCT Blood Glucose.: 132 mg/dL (07-20-24 @ 21:02)  POCT Blood Glucose.: 121 mg/dL (07-20-24 @ 16:32)  POCT Blood Glucose.: 191 mg/dL (07-20-24 @ 11:24)  POCT Blood Glucose.: 92 mg/dL (07-20-24 @ 07:45)    PHYSICAL EXAM  GENERAL  ( x ) NAD, lying in bed comfortably     (  ) obtunded     (  ) lethargic     (  ) somnolent    HEAD  (  ) Atraumatic     (  ) hematoma     (  ) laceration (specify location:       )     NECK  (  ) Supple     (  ) neck stiffness     (  ) nuchal rigidity     (  )  no JVD     (  ) JVD present ( -- cm)    HEART  Rate -->  ( x ) normal rate    (  ) bradycardic    (  ) tachycardic  Rhythm -->  (  x regular    (  ) regularly irregular    (  ) irregularly irregular  Murmurs -->  (  ) normal s1/s2    (  ) systolic murmur    (  ) diastolic murmur    (  ) continuous murmur     (  ) S3 present    (  ) S4 present    LUNGS  ( x )Unlabored respirations     (  ) tachypnea  ( x ) B/L air entry     (  ) decreased breath sounds in:  (location     )    (  ) no adventitious sound     (  ) crackles     (  ) wheezing      (  ) rhonchi      (specify location:       )  (  ) chest wall tenderness (specify location:       )    ABDOMEN  ( x ) Soft     (  ) tense   |   (  ) nondistended     (  ) distended   |   (  ) +BS     (  ) hypoactive bowel sounds     (  ) hyperactive bowel sounds  ( x ) nontender     (  ) RUQ tenderness     (  ) RLQ tenderness     (  ) LLQ tenderness     (  ) epigastric tenderness     (  ) diffuse tenderness  (  ) Splenomegaly      (  ) Hepatomegaly      (  ) Jaundice     (  ) ecchymosis     EXTREMITIES  (  ) Normal     (  ) Rash     (  ) ecchymosis     (  ) varicose veins      (  ) pitting edema     (  ) non-pitting edema   (  ) ulceration     (  ) gangrene:     (location:     )    NERVOUS SYSTEM  (  ) A&Ox3     (  ) confused     (  ) lethargic  CN II-XII:     (  ) Intact     (  ) focal deficits  (Specify:     )   Upper extremities:     (  ) strength X/5     (  ) focal deficit (specify:    )  Lower extremities:     (  ) strength  X/5    (  ) focal deficit (specify:    )    SKIN  (  ) No rashes or lesions     (  ) maculopapular rash     (  ) pustules     (  ) vesicles     (  ) ulcer     (  ) ecchymosis     (specify location:     )    (  ) Indwelling Dye Catheter   Date insterted:    Reason (  ) Critical illness     (  ) urinary retention    (  ) Accurate Ins/Outs Monitoring     (  ) CMO patient    (  ) Central Line  Date inserted:  Location: (  ) Right IJ   (  ) Left IJ   (  ) Right Fem   (  ) Left Fem    (  ) SPC  (  ) pigtail  (  ) PEG tube  (  ) colostomy  (  ) jejunostomy  (  ) U-Dall    LABS             9.0    11.62 )-----------( 234      ( 07-20-24 @ 06:09 )             30.0     136  |  101  |  68  -------------------------<  100   07-20-24 @ 06:09  4.4  |  24  |  3.9    Ca      7.4     07-20-24 @ 06:09  Phos   5.1     07-20-24 @ 06:09  Mg     1.6     07-20-24 @ 06:09          Urinalysis Basic - ( 20 Jul 2024 06:09 )    Color: x / Appearance: x / SG: x / pH: x  Gluc: 100 mg/dL / Ketone: x  / Bili: x / Urobili: x   Blood: x / Protein: x / Nitrite: x   Leuk Esterase: x / RBC: x / WBC x   Sq Epi: x / Non Sq Epi: x / Bacteria: x          IMAGING

## 2024-07-22 LAB
ALBUMIN SERPL ELPH-MCNC: 2.4 G/DL — LOW (ref 3.5–5.2)
ALP SERPL-CCNC: 128 U/L — HIGH (ref 30–115)
ALT FLD-CCNC: 10 U/L — SIGNIFICANT CHANGE UP (ref 0–41)
ANION GAP SERPL CALC-SCNC: 12 MMOL/L — SIGNIFICANT CHANGE UP (ref 7–14)
ANION GAP SERPL CALC-SCNC: 12 MMOL/L — SIGNIFICANT CHANGE UP (ref 7–14)
APTT BLD: 34.9 SEC — SIGNIFICANT CHANGE UP (ref 27–39.2)
AST SERPL-CCNC: 22 U/L — SIGNIFICANT CHANGE UP (ref 0–41)
BILIRUB SERPL-MCNC: 0.3 MG/DL — SIGNIFICANT CHANGE UP (ref 0.2–1.2)
BUN SERPL-MCNC: 59 MG/DL — HIGH (ref 10–20)
BUN SERPL-MCNC: 62 MG/DL — CRITICAL HIGH (ref 10–20)
CALCIUM SERPL-MCNC: 7.6 MG/DL — LOW (ref 8.4–10.5)
CALCIUM SERPL-MCNC: 7.7 MG/DL — LOW (ref 8.4–10.5)
CHLORIDE SERPL-SCNC: 100 MMOL/L — SIGNIFICANT CHANGE UP (ref 98–110)
CHLORIDE SERPL-SCNC: 101 MMOL/L — SIGNIFICANT CHANGE UP (ref 98–110)
CO2 SERPL-SCNC: 22 MMOL/L — SIGNIFICANT CHANGE UP (ref 17–32)
CO2 SERPL-SCNC: 22 MMOL/L — SIGNIFICANT CHANGE UP (ref 17–32)
CREAT SERPL-MCNC: 3.2 MG/DL — HIGH (ref 0.7–1.5)
CREAT SERPL-MCNC: 3.7 MG/DL — HIGH (ref 0.7–1.5)
EGFR: 17 ML/MIN/1.73M2 — LOW
EGFR: 20 ML/MIN/1.73M2 — LOW
GLUCOSE BLDC GLUCOMTR-MCNC: 115 MG/DL — HIGH (ref 70–99)
GLUCOSE BLDC GLUCOMTR-MCNC: 126 MG/DL — HIGH (ref 70–99)
GLUCOSE BLDC GLUCOMTR-MCNC: 142 MG/DL — HIGH (ref 70–99)
GLUCOSE BLDC GLUCOMTR-MCNC: 149 MG/DL — HIGH (ref 70–99)
GLUCOSE SERPL-MCNC: 126 MG/DL — HIGH (ref 70–99)
GLUCOSE SERPL-MCNC: 99 MG/DL — SIGNIFICANT CHANGE UP (ref 70–99)
HCT VFR BLD CALC: 29.5 % — LOW (ref 42–52)
HCT VFR BLD CALC: 30.2 % — LOW (ref 42–52)
HGB BLD-MCNC: 9.1 G/DL — LOW (ref 14–18)
HGB BLD-MCNC: 9.3 G/DL — LOW (ref 14–18)
INR BLD: 1.07 RATIO — SIGNIFICANT CHANGE UP (ref 0.65–1.3)
MAGNESIUM SERPL-MCNC: 1.4 MG/DL — LOW (ref 1.8–2.4)
MAGNESIUM SERPL-MCNC: 2.3 MG/DL — SIGNIFICANT CHANGE UP (ref 1.8–2.4)
MCHC RBC-ENTMCNC: 29.5 PG — SIGNIFICANT CHANGE UP (ref 27–31)
MCHC RBC-ENTMCNC: 29.7 PG — SIGNIFICANT CHANGE UP (ref 27–31)
MCHC RBC-ENTMCNC: 30.8 G/DL — LOW (ref 32–37)
MCHC RBC-ENTMCNC: 30.8 G/DL — LOW (ref 32–37)
MCV RBC AUTO: 95.9 FL — HIGH (ref 80–94)
MCV RBC AUTO: 96.4 FL — HIGH (ref 80–94)
NRBC # BLD: 0 /100 WBCS — SIGNIFICANT CHANGE UP (ref 0–0)
NRBC # BLD: 0 /100 WBCS — SIGNIFICANT CHANGE UP (ref 0–0)
PHOSPHATE SERPL-MCNC: 4.8 MG/DL — SIGNIFICANT CHANGE UP (ref 2.1–4.9)
PLATELET # BLD AUTO: 275 K/UL — SIGNIFICANT CHANGE UP (ref 130–400)
PLATELET # BLD AUTO: 329 K/UL — SIGNIFICANT CHANGE UP (ref 130–400)
PMV BLD: 11.3 FL — HIGH (ref 7.4–10.4)
PMV BLD: 11.4 FL — HIGH (ref 7.4–10.4)
POTASSIUM SERPL-MCNC: 4.6 MMOL/L — SIGNIFICANT CHANGE UP (ref 3.5–5)
POTASSIUM SERPL-MCNC: 4.6 MMOL/L — SIGNIFICANT CHANGE UP (ref 3.5–5)
POTASSIUM SERPL-SCNC: 4.6 MMOL/L — SIGNIFICANT CHANGE UP (ref 3.5–5)
POTASSIUM SERPL-SCNC: 4.6 MMOL/L — SIGNIFICANT CHANGE UP (ref 3.5–5)
PROT SERPL-MCNC: 7.2 G/DL — SIGNIFICANT CHANGE UP (ref 6–8)
PROTHROM AB SERPL-ACNC: 12.2 SEC — SIGNIFICANT CHANGE UP (ref 9.95–12.87)
RBC # BLD: 3.06 M/UL — LOW (ref 4.7–6.1)
RBC # BLD: 3.15 M/UL — LOW (ref 4.7–6.1)
RBC # FLD: 13.2 % — SIGNIFICANT CHANGE UP (ref 11.5–14.5)
RBC # FLD: 13.3 % — SIGNIFICANT CHANGE UP (ref 11.5–14.5)
SODIUM SERPL-SCNC: 134 MMOL/L — LOW (ref 135–146)
SODIUM SERPL-SCNC: 135 MMOL/L — SIGNIFICANT CHANGE UP (ref 135–146)
WBC # BLD: 11.82 K/UL — HIGH (ref 4.8–10.8)
WBC # BLD: 14.64 K/UL — HIGH (ref 4.8–10.8)
WBC # FLD AUTO: 11.82 K/UL — HIGH (ref 4.8–10.8)
WBC # FLD AUTO: 14.64 K/UL — HIGH (ref 4.8–10.8)

## 2024-07-22 PROCEDURE — 99232 SBSQ HOSP IP/OBS MODERATE 35: CPT

## 2024-07-22 RX ORDER — MAGNESIUM SULFATE 500 MG/ML
2 VIAL (ML) INJECTION
Refills: 0 | Status: COMPLETED | OUTPATIENT
Start: 2024-07-22 | End: 2024-07-22

## 2024-07-22 RX ORDER — MEROPENEM 1 G/20ML
500 INJECTION, POWDER, FOR SOLUTION INTRAVENOUS EVERY 12 HOURS
Refills: 0 | Status: DISCONTINUED | OUTPATIENT
Start: 2024-07-22 | End: 2024-07-27

## 2024-07-22 RX ADMIN — ERTAPENEM SODIUM 100 MILLIGRAM(S): 1 INJECTION INTRAMUSCULAR; INTRAVENOUS at 13:36

## 2024-07-22 RX ADMIN — Medication 650 MILLIGRAM(S): at 17:14

## 2024-07-22 RX ADMIN — CHLORHEXIDINE GLUCONATE 1 APPLICATION(S): 500 CLOTH TOPICAL at 12:23

## 2024-07-22 RX ADMIN — HEPARIN SODIUM 5000 UNIT(S): 1000 INJECTION, SOLUTION INTRAVENOUS; SUBCUTANEOUS at 05:33

## 2024-07-22 RX ADMIN — HEPARIN SODIUM 5000 UNIT(S): 1000 INJECTION, SOLUTION INTRAVENOUS; SUBCUTANEOUS at 13:38

## 2024-07-22 RX ADMIN — Medication 25 GRAM(S): at 15:01

## 2024-07-22 RX ADMIN — ATORVASTATIN CALCIUM 20 MILLIGRAM(S): 40 TABLET, FILM COATED ORAL at 21:12

## 2024-07-22 RX ADMIN — Medication 650 MILLIGRAM(S): at 05:34

## 2024-07-22 RX ADMIN — MEROPENEM 100 MILLIGRAM(S): 1 INJECTION, POWDER, FOR SOLUTION INTRAVENOUS at 18:00

## 2024-07-22 RX ADMIN — PANTOPRAZOLE SODIUM 40 MILLIGRAM(S): 20 TABLET, DELAYED RELEASE ORAL at 05:33

## 2024-07-22 RX ADMIN — Medication 500 MILLIGRAM(S): at 12:23

## 2024-07-22 RX ADMIN — Medication 25 GRAM(S): at 12:25

## 2024-07-22 RX ADMIN — NIFEDIPINE 30 MILLIGRAM(S): 20 CAPSULE, LIQUID FILLED ORAL at 05:33

## 2024-07-22 NOTE — PROGRESS NOTE ADULT - SUBJECTIVE AND OBJECTIVE BOX
24H events:    Patient is a 69y old Male who presents with a chief complaint of UTI w/ obstructing stone (22 Jul 2024 10:09)    Primary diagnosis of Hydronephrosis with obstructing calculus    Today is 13d of hospitalization. This morning patient was seen and examined at bedside, resting comfortably in bed.    No acute or major events overnight.      PAST MEDICAL & SURGICAL HISTORY  DM (diabetes mellitus)    HTN (hypertension)    H/O paraplegia    Spinal abscess    Renal stones    Spinal abscess  S/P Surgery    Encounter for care or replacement of suprapubic tube      SOCIAL HISTORY:  Social History:      ALLERGIES:  No Known Allergies    MEDICATIONS:  STANDING MEDICATIONS  amoxicillin 500 milliGRAM(s) Oral daily  atorvastatin 20 milliGRAM(s) Oral at bedtime  chlorhexidine 2% Cloths 1 Application(s) Topical daily  dextrose 5%. 1000 milliLiter(s) IV Continuous <Continuous>  dextrose 5%. 1000 milliLiter(s) IV Continuous <Continuous>  dextrose 50% Injectable 12.5 Gram(s) IV Push once  dextrose 50% Injectable 25 Gram(s) IV Push once  dextrose 50% Injectable 25 Gram(s) IV Push once  ertapenem  IVPB 500 milliGRAM(s) IV Intermittent every 24 hours  glucagon  Injectable 1 milliGRAM(s) IntraMuscular once  heparin   Injectable 5000 Unit(s) SubCutaneous every 8 hours  insulin lispro (ADMELOG) corrective regimen sliding scale   SubCutaneous three times a day before meals  insulin lispro (ADMELOG) corrective regimen sliding scale   SubCutaneous at bedtime  magnesium sulfate  IVPB 2 Gram(s) IV Intermittent every 2 hours  NIFEdipine XL 30 milliGRAM(s) Oral daily  pantoprazole    Tablet 40 milliGRAM(s) Oral before breakfast  sodium bicarbonate 650 milliGRAM(s) Oral two times a day    PRN MEDICATIONS  aluminum hydroxide/magnesium hydroxide/simethicone Suspension 30 milliLiter(s) Oral every 4 hours PRN  dextrose Oral Gel 15 Gram(s) Oral once PRN  melatonin 3 milliGRAM(s) Oral at bedtime PRN  ondansetron Injectable 4 milliGRAM(s) IV Push every 8 hours PRN    VITALS:   T(F): 98.2  HR: 80  BP: 125/75  RR: 18  SpO2: 98%    PHYSICAL EXAM:  GENERAL:   ( x) NAD, lying in bed comfortably     (  ) obtunded     (  ) lethargic     (  ) somnolent      NECK:  (x) Supple     (  ) neck stiffness     (  ) nuchal rigidity     (  )  no JVD     (  ) JVD present ( -- cm)    HEART:  Rate -->     (x) normal rate     (  ) bradycardic     (  ) tachycardic  Rhythm -->     (x) regular     (  ) regularly irregular     (  ) irregularly irregular  Murmurs -->     (x) normal s1s2     (  ) systolic murmur     (  ) diastolic murmur     (  ) continuous murmur      (  ) S3 present     (  ) S4 present    LUNGS:   ( x)Unlabored respirations     (  ) tachypnea  ( x) B/L air entry     (  ) decreased breath sounds in:  (location     )    ( x) no adventitious sound     (  ) crackles     (  ) wheezing      (  ) rhonchi      (specify location:       )  (  ) chest wall tenderness (specify location:       )    ABDOMEN:   ( x) Soft     (  ) tense   |   (  ) nondistended     (  ) distended   |   (  ) +BS     (  ) hypoactive bowel sounds     (  ) hyperactive bowel sounds  ( x) nontender     (  ) RUQ tenderness     (  ) RLQ tenderness     (  ) LLQ tenderness     (  ) epigastric tenderness     (  ) diffuse tenderness  (  ) Splenomegaly      (  ) Hepatomegaly      (  ) Jaundice     (  ) ecchymosis     EXTREMITIES:  ( x) Normal     (  ) Rash     (  ) ecchymosis     (  ) varicose veins      (  ) pitting edema     (  ) non-pitting edema   (  ) ulceration     (  ) gangrene:     (location:     )    NERVOUS SYSTEM:    ( x) A&Ox3     (  ) confused     (  ) lethargic  CN II-XII:     ( x) Intact     (  ) deficits found     (Specify:     )   Upper extremities:     (  ) no sensorimotor deficits     (  ) weakness     (  ) loss of proprioception/vibration     (  ) loss of touch/temperature (specify:    )  Lower extremities:     (  ) no sensorimotor deficits     (  ) weakness     (  ) loss of proprioception/vibration     (  ) loss of touch/temperature (specify:    )    SKIN:   (  ) No rashes or lesions     (  ) maculopapular rash     (  ) pustules     (  ) vesicles     (  ) ulcer     (  ) ecchymosis     (specify location:     )      LABS:                        9.1    11.82 )-----------( 275      ( 22 Jul 2024 07:11 )             29.5     07-22    135  |  101  |  62<HH>  ----------------------------<  99  4.6   |  22  |  3.2<H>    Ca    7.6<L>      22 Jul 2024 07:11  Phos  4.8     07-22  Mg     1.4     07-22        Urinalysis Basic - ( 22 Jul 2024 07:11 )    Color: x / Appearance: x / SG: x / pH: x  Gluc: 99 mg/dL / Ketone: x  / Bili: x / Urobili: x   Blood: x / Protein: x / Nitrite: x   Leuk Esterase: x / RBC: x / WBC x   Sq Epi: x / Non Sq Epi: x / Bacteria: x

## 2024-07-22 NOTE — PROGRESS NOTE ADULT - SUBJECTIVE AND OBJECTIVE BOX
seen and examined  24 h events noted   no distress       PAST HISTORY  --------------------------------------------------------------------------------  No significant changes to PMH, PSH, FHx, SHx, unless otherwise noted    ALLERGIES & MEDICATIONS  --------------------------------------------------------------------------------  Allergies    No Known Allergies    Intolerances      Standing Inpatient Medications  amoxicillin 500 milliGRAM(s) Oral daily  atorvastatin 20 milliGRAM(s) Oral at bedtime  chlorhexidine 2% Cloths 1 Application(s) Topical daily  dextrose 5%. 1000 milliLiter(s) IV Continuous <Continuous>  dextrose 5%. 1000 milliLiter(s) IV Continuous <Continuous>  dextrose 50% Injectable 12.5 Gram(s) IV Push once  dextrose 50% Injectable 25 Gram(s) IV Push once  dextrose 50% Injectable 25 Gram(s) IV Push once  ertapenem  IVPB 500 milliGRAM(s) IV Intermittent every 24 hours  glucagon  Injectable 1 milliGRAM(s) IntraMuscular once  heparin   Injectable 5000 Unit(s) SubCutaneous every 8 hours  insulin lispro (ADMELOG) corrective regimen sliding scale   SubCutaneous three times a day before meals  insulin lispro (ADMELOG) corrective regimen sliding scale   SubCutaneous at bedtime  NIFEdipine XL 30 milliGRAM(s) Oral daily  pantoprazole    Tablet 40 milliGRAM(s) Oral before breakfast  sodium bicarbonate 650 milliGRAM(s) Oral two times a day    PRN Inpatient Medications  aluminum hydroxide/magnesium hydroxide/simethicone Suspension 30 milliLiter(s) Oral every 4 hours PRN  dextrose Oral Gel 15 Gram(s) Oral once PRN  melatonin 3 milliGRAM(s) Oral at bedtime PRN  ondansetron Injectable 4 milliGRAM(s) IV Push every 8 hours PRN        VITALS/PHYSICAL EXAM  --------------------------------------------------------------------------------  T(C): 36.8 (07-22-24 @ 08:20), Max: 36.8 (07-22-24 @ 08:20)  HR: 80 (07-22-24 @ 08:20) (78 - 90)  BP: 125/75 (07-22-24 @ 08:20) (121/65 - 125/75)  RR: 18 (07-22-24 @ 08:20) (18 - 18)  SpO2: 98% (07-22-24 @ 08:20) (96% - 98%)  Wt(kg): --        07-21-24 @ 07:01  -  07-22-24 @ 07:00  --------------------------------------------------------  IN: 0 mL / OUT: 900 mL / NET: -900 mL      Physical Exam:  	Gen: NAD  	Pulm: decrease BS B/L  	CV:  S1S2; no rub  	Abd:  soft, nontender/nondistended  	LE:no edema  	    LABS/STUDIES  --------------------------------------------------------------------------------              9.1    11.82 >-----------<  275      [07-22-24 @ 07:11]              29.5     135  |  101  |  62  ----------------------------<  99      [07-22-24 @ 07:11]  4.6   |  22  |  3.2        Ca     7.6     [07-22-24 @ 07:11]      Mg     1.4     [07-22-24 @ 07:11]      Phos  4.8     [07-22-24 @ 07:11]        Creatinine Trend:  SCr 3.2 [07-22 @ 07:11]  SCr 3.6 [07-21 @ 04:30]  SCr 3.9 [07-20 @ 06:09]  SCr 3.8 [07-19 @ 06:35]  SCr 4.1 [07-18 @ 06:54]    Urinalysis - [07-22-24 @ 07:11]      Color  / Appearance  / SG  / pH       Gluc 99 / Ketone   / Bili  / Urobili        Blood  / Protein  / Leuk Est  / Nitrite       RBC  / WBC  / Hyaline  / Gran  / Sq Epi  / Non Sq Epi  / Bacteria       Iron 30, TIBC 143, %sat 21      [09-07-23 @ 09:17]  Ferritin 533      [09-07-23 @ 09:17]  PTH -- (Ca 8.3)      [07-10-24 @ 21:08]   43  Vitamin D (25OH) 13      [09-07-23 @ 09:17]  Lipid: chol 134, , HDL 30, LDL --      [09-07-23 @ 09:17]

## 2024-07-22 NOTE — PROGRESS NOTE ADULT - ASSESSMENT
68 y/o man with PMH of HTN, hyperlipidemia, DM II, CKD 5 (baseline Cr 4.4) Urinary incontinence with chronic suprapubic catheter, Nephrolithiasis, Spinal abscess with quadriplegia and chronic LE wounds receiving dressing changes by visiting nurse now presented to the ED for crampy abdominal pain along with nausea and vomiting.   patient seen and examined    # JOÃO over CKD 5  due to obstructive uropathy, Cr trended down today 7/22   # Complicated UTI / pyelonephritis  # Bacteroids bacteremia  - CT abd/pelvis: Severe right hydroureteronephrosis with cortical thinning and numerous large intrarenal and proximal to mid ureteral calculi, obstructing calculus in the right mid ureter measures 1.1 x 1 x 1.8 cm with normal caliber distal ureter.  Multiple nonobstructive left intrarenal calculi and a 0.8 cm left distal ureteral calculus; no left hydronephrosis.  Few prominent retroperitoneal lymph nodes, likely reactive. Cholelithiasis.  - s/p eval by Urology in ED --> recommended b/l nephrostomy placement by IR.   - s/p right PCN by IR on 7/10 - 10 cc of thick, viscous green-yellow, malodorous right kidney urine sent for cultures  - s/p SPC exchange in the ED  - blood cultures 1/2- bateroids(sn not performed); 1/2- GNR- monmitor; urine culture- Proteus, Serratia(both sn to dwain) and enterococcus  - acidosis improved - bicarb in IVF stopped - continue PO bicarbonate  - ID following-  s/p meropenem 500 mg BID; On D/C, recommend midline and ertapenem 500mg q24h IV + PO amoxicillin 500mg daily (to cover enterococcus) until 48h post-op -- will continue for now --surgery on tuesday 7/23  urine culture from nephrostomy  is gm positive rods and enterococcus-- full report is pending  - further stone management as per Urology; plan for right PCNL and left JJ stent placement in the AM, ordered preop labs  - nephrology recs appreciated, no indication for RRT at this time    #  HTN- stable  nifedipine stopped    # Hyperlipidemia on statin    #  DM   A1C 6.9  insulin sliding scale not needed--his blood sugar was stable--    # Chronic LE wounds - wound care per advanced nursing recommendation      DNR/DNI Trial of NIV

## 2024-07-22 NOTE — PROGRESS NOTE ADULT - SUBJECTIVE AND OBJECTIVE BOX
LISAKRISTY MARIE  69y, Male  Allergy: No Known Allergies      LOS  13d    CHIEF COMPLAINT: UTI w/ obstructing stone (22 Jul 2024 13:31)      INTERVAL EVENTS/HPI  - No acute events overnight  - T(F): , Max: 98.2 (07-22-24 @ 08:20)  - Denies any worsening symptoms  - Tolerating medication  - WBC Count: 11.82 (07-22-24 @ 07:11)  WBC Count: 10.01 (07-21-24 @ 04:30)     - Creatinine: 3.2 (07-22-24 @ 07:11)  Creatinine: 3.6 (07-21-24 @ 04:30)       ROS  General: Denies rigors, nightsweats  HEENT: Denies headache, rhinorrhea, sore throat, eye pain  CV: Denies CP, palpitations  PULM: Denies wheezing, hemoptysis  GI: Denies hematemesis, hematochezia, melena  : Denies discharge, hematuria  MSK: Denies arthralgias, myalgias  SKIN: Denies rash, lesions  NEURO: Denies paresthesias, weakness  PSYCH: Denies depression, anxiety    VITALS:  T(F): 98.2, Max: 98.2 (07-22-24 @ 08:20)  HR: 80  BP: 125/75  RR: 18Vital Signs Last 24 Hrs  T(C): 36.8 (22 Jul 2024 08:20), Max: 36.8 (22 Jul 2024 08:20)  T(F): 98.2 (22 Jul 2024 08:20), Max: 98.2 (22 Jul 2024 08:20)  HR: 80 (22 Jul 2024 08:20) (78 - 90)  BP: 125/75 (22 Jul 2024 08:20) (121/65 - 125/75)  BP(mean): 84 (21 Jul 2024 23:48) (84 - 84)  RR: 18 (22 Jul 2024 08:20) (18 - 18)  SpO2: 98% (22 Jul 2024 08:20) (96% - 98%)    Parameters below as of 22 Jul 2024 08:20  Patient On (Oxygen Delivery Method): room air        PHYSICAL EXAM:  Gen: NAD, resting in bed  HEENT: Normocephalic, atraumatic  Neck: supple, no lymphadenopathy  CV: Regular rate & regular rhythm  Lungs: decreased BS at bases, no fremitus  Abdomen: Soft, BS present PCN clear urine  spt  Ext: Warm, well perfused  Neuro: non focal, awake  Skin: no rash, no erythema  Lines: no phlebitis    FH: Non-contributory  Social Hx: Non-contributory    TESTS & MEASUREMENTS:                        9.1    11.82 )-----------( 275      ( 22 Jul 2024 07:11 )             29.5     07-22    135  |  101  |  62<HH>  ----------------------------<  99  4.6   |  22  |  3.2<H>    Ca    7.6<L>      22 Jul 2024 07:11  Phos  4.8     07-22  Mg     1.4     07-22          Urinalysis Basic - ( 22 Jul 2024 07:11 )    Color: x / Appearance: x / SG: x / pH: x  Gluc: 99 mg/dL / Ketone: x  / Bili: x / Urobili: x   Blood: x / Protein: x / Nitrite: x   Leuk Esterase: x / RBC: x / WBC x   Sq Epi: x / Non Sq Epi: x / Bacteria: x        Culture - Urine (collected 07-17-24 @ 19:20)  Source: OR Collect Nephrostomy - Right  Preliminary Report (07-20-24 @ 22:06):    <10,000 CFU/ml Gram Negative Rods    <10,000 CFU/ml Enterococcus faecalis  Organism: Enterococcus faecalis (07-21-24 @ 20:37)  Organism: Enterococcus faecalis (07-21-24 @ 20:37)      Method Type: ALF      -  Ampicillin: S <=2 Predicts results to ampicillin/sulbactam, amoxacillin-clavulanate and  piperacillin-tazobactam.      -  Ciprofloxacin: S <=1      -  Levofloxacin: S 1      -  Vancomycin: S 1    Culture - Urine (collected 07-10-24 @ 18:14)  Source: .Urine Nephrostomy - Right  Final Report (07-14-24 @ 20:48):    >100,000 CFU/ml Proteus mirabilis    10,000 - 49,000 CFU/mL Serratia marcescens    50,000 - 99,000 CFU/mL Enterococcus faecalis  Organism: Proteus mirabilis  Serratia marcescens  Enterococcus faecalis (07-14-24 @ 20:48)  Organism: Enterococcus faecalis (07-14-24 @ 20:48)      Method Type: ALF      -  Ampicillin: S <=2 Predicts results to ampicillin/sulbactam, amoxacillin-clavulanate and  piperacillin-tazobactam.      -  Ciprofloxacin: S <=1      -  Levofloxacin: S 1      -  Nitrofurantoin: S <=32 Should not be used to treat pyelonephritis.      -  Tetracycline: R >8      -  Vancomycin: S 1  Organism: Serratia marcescens (07-14-24 @ 20:48)      Method Type: ALF      -  Amoxicillin/Clavulanic Acid: R >16/8      -  Ampicillin: R 16 These ampicillin results predict results for amoxicillin      -  Ampicillin/Sulbactam: R 16/8      -  Aztreonam: S <=4      -  Cefazolin: R >16      -  Cefepime: S <=2      -  Cefoxitin: R <=8      -  Ceftriaxone: S <=1      -  Ciprofloxacin: S <=0.25      -  Ertapenem: S <=0.5      -  Gentamicin: S <=2      -  Levofloxacin: S <=0.5      -  Meropenem: S <=1      -  Nitrofurantoin: R >64 Should not be used to treat pyelonephritis      -  Piperacillin/Tazobactam: S <=8      -  Tobramycin: S <=2      -  Trimethoprim/Sulfamethoxazole: S <=0.5/9.5  Organism: Proteus mirabilis (07-14-24 @ 20:48)      Method Type: ALF      -  Amoxicillin/Clavulanic Acid: S <=8/4      -  Ampicillin: R >16 These ampicillin results predict results for amoxicillin      -  Ampicillin/Sulbactam: I 16/8      -  Aztreonam: S <=4      -  Cefazolin: S 8 For uncomplicated UTI with K. pneumoniae, E. coli, or P. mirablis: ALF <=16 is sensitive and ALF >=32 is resistant. This also predicts results for oral agents cefaclor, cefdinir, cefpodoxime, cefprozil, cefuroxime axetil, cephalexin and locarbef for uncomplicated UTI. Note that some isolates may be susceptible to these agents while testing resistant to cefazolin.      -  Cefepime: S <=2      -  Cefoxitin: S <=8      -  Ceftriaxone: S <=1      -  Cefuroxime: S <=4      -  Ciprofloxacin: R >2      -  Ertapenem: S <=0.5      -  Gentamicin: I 4      -  Levofloxacin: R >4      -  Meropenem: S <=1      -  Nitrofurantoin: R 64 Should not be used to treat pyelonephritis      -  Piperacillin/Tazobactam: S <=8      -  Tobramycin: S <=2      -  Trimethoprim/Sulfamethoxazole: R >2/38    Culture - Blood (collected 07-09-24 @ 14:20)  Source: .Blood Blood-Peripheral  Gram Stain (07-11-24 @ 01:20):    Growth in anaerobic bottle: Gram Negative Rods  Preliminary Report (07-16-24 @ 09:11):    Growth in anaerobic bottle: Gram Negative Rods    Growth in anaerobic bottle: Gram Negative Rods Sent to    [place] [facility]  for Identification    .    Direct identification is available within approximately 3-5    hours eitherby Blood Panel Multiplexed PCR or Direct    MALDI-TOF. Details: [email or web address]  Organism: Blood Culture PCR (07-11-24 @ 03:23)  Organism: Blood Culture PCR (07-11-24 @ 03:23)      Method Type: PCR      -  Blood PCR Panel: NEG    Culture - Blood (collected 07-09-24 @ 14:20)  Source: .Blood Blood-Peripheral  Gram Stain (07-12-24 @ 04:50):    Growth in anaerobic bottle: Gram Negative Rods  Final Report (07-13-24 @ 09:30):    Growth in anaerobic bottle: Bacteroides fragilis    "Susceptibilities not performed"    Culture - Urine (collected 07-09-24 @ 13:50)  Source: Clean Catch Clean Catch (Midstream)  Final Report (07-10-24 @ 23:26):    >=3 organisms. Probable collection contamination.            INFECTIOUS DISEASES TESTING      INFLAMMATORY MARKERS      RADIOLOGY & ADDITIONAL TESTS:  I have personally reviewed the last available Chest xray  CXR      CT      CARDIOLOGY TESTING  12 Lead ECG:   Ventricular Rate 71 BPM    Atrial Rate 71 BPM    P-R Interval 186 ms    QRS Duration 96 ms    Q-T Interval 404 ms    QTC Calculation(Bazett) 439 ms    P Axis 62 degrees    R Axis 49 degrees    T Axis 9 degrees    Diagnosis Line Normal sinus rhythm  Normal ECG    Confirmed by Julius Don ([id number]) on 7/9/2024 12:41:31 PM (07-09-24 @ 12:14)      MEDICATIONS  amoxicillin 500 Oral daily  atorvastatin 20 Oral at bedtime  chlorhexidine 2% Cloths 1 Topical daily  dextrose 5%. 1000 IV Continuous <Continuous>  dextrose 5%. 1000 IV Continuous <Continuous>  dextrose 50% Injectable 12.5 IV Push once  dextrose 50% Injectable 25 IV Push once  dextrose 50% Injectable 25 IV Push once  ertapenem  IVPB 500 IV Intermittent every 24 hours  glucagon  Injectable 1 IntraMuscular once  heparin   Injectable 5000 SubCutaneous every 8 hours  insulin lispro (ADMELOG) corrective regimen sliding scale  SubCutaneous three times a day before meals  insulin lispro (ADMELOG) corrective regimen sliding scale  SubCutaneous at bedtime  magnesium sulfate  IVPB 2 IV Intermittent every 2 hours  NIFEdipine XL 30 Oral daily  pantoprazole    Tablet 40 Oral before breakfast  sodium bicarbonate 650 Oral two times a day      WEIGHT  Weight (kg): 81.6 (07-10-24 @ 14:34)  Creatinine: 3.2 mg/dL (07-22-24 @ 07:11)      ANTIBIOTICS:  amoxicillin 500 milliGRAM(s) Oral daily  ertapenem  IVPB 500 milliGRAM(s) IV Intermittent every 24 hours      All available historical records have been reviewed

## 2024-07-22 NOTE — PROGRESS NOTE ADULT - ASSESSMENT
68yo male w/ pmhx of HTN, HLD, DMII, CKD stage 5 baseline Cr ~ 4.4, urinary incontinence with chronic supra pubic catheter, Nephrolithiasis, spinal abscess leading to quadriplegia presenting w. NBNB vomiting and nausea every time after he eats  #Obstructing R mid ureter calculus with severe hydroureteronephrosis s/p Stent   #B/L nephrolithiasis   #Post-renal JOÃO on CKD 5   #HAGMA   #Chronic suprapubic catheter   - cr trending down    - strict I and O   - urology and IR on board, s/p Percutaneous right nephrostomy followed by placement of right nephroureteral catheter / plan for R PCNL and L JJ stent placement  in am   -  PO bicarb / continue   -  last ph at goal   - followed by ID on   ATB   - BP controlled  - no need to start RRT at this time  will follow

## 2024-07-22 NOTE — PROGRESS NOTE ADULT - SUBJECTIVE AND OBJECTIVE BOX
SUBJECTIVE:    Patient is a 69y old Male who presents with a chief complaint of UTI w/ obstructing stone (22 Jul 2024 13:31)    Currently admitted to medicine with the primary diagnosis of Hydronephrosis with obstructing calculus       Today is hospital day 13d.     PAST MEDICAL & SURGICAL HISTORY  DM (diabetes mellitus)    HTN (hypertension)    H/O paraplegia    Spinal abscess    Renal stones    Spinal abscess  S/P Surgery    Encounter for care or replacement of suprapubic tube      ALLERGIES:  No Known Allergies    MEDICATIONS:  STANDING MEDICATIONS  amoxicillin 500 milliGRAM(s) Oral daily  atorvastatin 20 milliGRAM(s) Oral at bedtime  chlorhexidine 2% Cloths 1 Application(s) Topical daily  dextrose 5%. 1000 milliLiter(s) IV Continuous <Continuous>  dextrose 5%. 1000 milliLiter(s) IV Continuous <Continuous>  dextrose 50% Injectable 12.5 Gram(s) IV Push once  dextrose 50% Injectable 25 Gram(s) IV Push once  dextrose 50% Injectable 25 Gram(s) IV Push once  ertapenem  IVPB 500 milliGRAM(s) IV Intermittent every 24 hours  glucagon  Injectable 1 milliGRAM(s) IntraMuscular once  heparin   Injectable 5000 Unit(s) SubCutaneous every 8 hours  insulin lispro (ADMELOG) corrective regimen sliding scale   SubCutaneous three times a day before meals  insulin lispro (ADMELOG) corrective regimen sliding scale   SubCutaneous at bedtime  magnesium sulfate  IVPB 2 Gram(s) IV Intermittent every 2 hours  NIFEdipine XL 30 milliGRAM(s) Oral daily  pantoprazole    Tablet 40 milliGRAM(s) Oral before breakfast  sodium bicarbonate 650 milliGRAM(s) Oral two times a day    PRN MEDICATIONS  aluminum hydroxide/magnesium hydroxide/simethicone Suspension 30 milliLiter(s) Oral every 4 hours PRN  dextrose Oral Gel 15 Gram(s) Oral once PRN  melatonin 3 milliGRAM(s) Oral at bedtime PRN  ondansetron Injectable 4 milliGRAM(s) IV Push every 8 hours PRN    VITALS:   T(F): 98.2  HR: 80  BP: 125/75  RR: 18  SpO2: 98%    LABS:                        9.1    11.82 )-----------( 275      ( 22 Jul 2024 07:11 )             29.5     07-22    135  |  101  |  62<HH>  ----------------------------<  99  4.6   |  22  |  3.2<H>    Ca    7.6<L>      22 Jul 2024 07:11  Phos  4.8     07-22  Mg     1.4     07-22        Urinalysis Basic - ( 22 Jul 2024 07:11 )    Color: x / Appearance: x / SG: x / pH: x  Gluc: 99 mg/dL / Ketone: x  / Bili: x / Urobili: x   Blood: x / Protein: x / Nitrite: x   Leuk Esterase: x / RBC: x / WBC x   Sq Epi: x / Non Sq Epi: x / Bacteria: x                RADIOLOGY:    PHYSICAL EXAM:  GEN: No acute distress  LUNGS: Clear to auscultation bilaterally   HEART: S1/S2 present. RRR.   ABD/ GI: Soft, non-tender, non-distended. Bowel sounds present  EXT: NC/NC/NE/2+PP/PANTOJA  NEURO: AAOX3

## 2024-07-22 NOTE — PROGRESS NOTE ADULT - ASSESSMENT
ASSESSMENT  70yo male w/ pmhx of HTN, HLD, DMII, CKD stage 5 baseline Cr ~ 4.4, urinary incontinence with chronic supra pubic catheter, Nephrolithiasis, spinal abscess leading to quadriplegia presenting w. NBNB vomiting and nausea every time after he eats.       IMPRESSION  #Bacteroides fragilis and other GNR bacteremia secondary to Pyelonephritis/Pyonephrosis with obstructing stone    7/17 nephrostomy culture- not sterile-   <10,000 CFU/ml Gram Negative Rods    <10,000 CFU/ml Enterococcus faecalis    7/10 R nephrostomy   >100,000 CFU/ml Proteus mirabilis R fluoro R bactrim  Amoxicillin/Clavulanic Acid: S <=8/4    10,000 - 49,000 CFU/mL Serratia marcescens Levofloxacin: S <=0.5    50,000 - 99,000 CFU/mL Enterococcus faecalis Levofloxacin: S 1    7/9 BCX GNR- Bacteroides fragilis    7/9 BCX GNR  - s/p percutaneous right nephrostomy followed by placement of right nephroureteral catheter    WBC 20 on admission ; Afebrile     7/9 UA pyuria WBC 50 ; UCX   >=3 organisms. Probable collection contamination.    SPT (replaced in ER)    CTAP 1.  Severe right hydroureteronephrosis with cortical thinning and   numerous large intrarenal and proximal to mid ureteral calculi;   obstructing calculus in the right mid ureter measures 1.1 x 1 x 1.8 cm (   ) with normal caliber distal ureter.  2.  Multiple nonobstructing left intrarenal calculi and a 0.8 cm left   distal ureteral calculus; no left hydronephrosis.  3.  Few prominent retroperitoneal lymph nodes, likely reactive.  4.  Cholelithiasis.  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 Nephrostomy repeat cx was not sterile, < 100k organisms  - Appreciate Urology consult- planning on urologic intervention in two weeks, please continue medical optimization and medical clearance for general anesthesia and RIGHT PCNL and LEFT JJ stent placement  - Appreciate IR consult      If any questions, please send a message or call on [employer] Teams  Please continue to update ID with any pertinent new laboratory, radiographic findings, or change in clinical status

## 2024-07-22 NOTE — PROGRESS NOTE ADULT - ASSESSMENT
70 y/o man with PMH of HTN, hyperlipidemia, DM II, CKD 5 (baseline Cr 4.4) Urinary incontinence with chronic suprapubic catheter, Nephrolithiasis, Spinal abscess with quadriplegia and chronic LE wounds receiving dressing changes by visiting nurse now presented to the ED for crampy abdominal pain along with nausea and vomiting.   patient seen and examined    # JOÃO over CKD 5  due to obstructive uropathy   # Complicated UTI / pyelonephritis  # Bacteroids bacteremia  - CT abd/pelvis: Severe right hydroureteronephrosis with cortical thinning and numerous large intrarenal and proximal to mid ureteral calculi, obstructing calculus in the right mid ureter measures 1.1 x 1 x 1.8 cm with normal caliber distal ureter.  Multiple nonobstructive left intrarenal calculi and a 0.8 cm left distal ureteral calculus; no left hydronephrosis.  Few prominent retroperitoneal lymph nodes, likely reactive. Cholelithiasis.  - s/p eval by Urology in ED --> recommended b/l nephrostomy placement by IR.   - s/p right PCN by IR on 7/10 - 10 cc of thick, viscous green-yellow, malodorous right kidney urine sent for cultures  - s/p SPC exchange in the ED  - blood cultures 1/2- bateroids(sn not performed); 1/2- GNR- monmitor; urine culture- Proteus, Serratia(both sn to dwain) and enterococcus  - acidosis improved - bicarb in IVF stopped - continue PO bicarbonate  - further stone management as  per Urology-  - plan for right PCNL and left JJ stent placement- Follow up with Dr Akins  - ID following-  meropenem 500 mg BID; On D/C, recommend midline and ertapenem 500mg q24h IV + PO amoxicillin 500mg daily (to cover enterococcus) until 48h post-op -- will continue for now --surgery on tuesday 7/23  urine culture from nephrostomy  is gm positive rods and enterococcus-- full report is pending    #  HTN- stable  nifedipine stopped    # Hyperlipidemia on statin    #  DM   A1C 6.9  insulin sliding scale not needed--his blood sugar was stable--    # Chronic LE wounds - wound care per advanced nursing recommendation    # Functional quadriplegia-- bed bound-- uses ana lift      DNR/DNI Trial of NIV      plan is OR on tuesday-- will get ID follow up in AM

## 2024-07-22 NOTE — CHART NOTE - NSCHARTNOTEFT_GEN_A_CORE
TARYN. Preop note    Urology Pre-Op     Diagnosis: nephrolithiasis  Procedure: right PCNL  Surgeon: Dr. Akins    Labs:                         9.1    11.82 )-----------( 275      ( 22 Jul 2024 07:11 )             29.5     07-22    135  |  101  |  62<HH>  ----------------------------<  99  4.6   |  22  |  3.2<H>    Ca    7.6<L>      22 Jul 2024 07:11  Phos  4.8     07-22  Mg     1.4     07-22          Urinalysis Basic - ( 22 Jul 2024 07:11 )    Color: x / Appearance: x / SG: x / pH: x  Gluc: 99 mg/dL / Ketone: x  / Bili: x / Urobili: x   Blood: x / Protein: x / Nitrite: x   Leuk Esterase: x / RBC: x / WBC x   Sq Epi: x / Non Sq Epi: x / Bacteria: x        Assessment & Plan:  69y Male with a right staghorn calculus. Patient is planned for Right PCNL tomorrow. Optimized from IM/cardiac standpoint for operation.    [] NPO after midnight  [] T&S -- 2 in system, ordered   [] 2U pRBC on hold for OR  [] Anticoagulation -- ordered to be held before OR tomorrow ordered.  [] COVID -- negative   [] Consent TARYN. Preop note    Urology Pre-Op     Diagnosis: nephrolithiasis  Procedure: right PCNL, left jj  Surgeon: Dr. Akins    Labs:                         9.1    11.82 )-----------( 275      ( 22 Jul 2024 07:11 )             29.5     07-22    135  |  101  |  62<HH>  ----------------------------<  99  4.6   |  22  |  3.2<H>    Ca    7.6<L>      22 Jul 2024 07:11  Phos  4.8     07-22  Mg     1.4     07-22          Urinalysis Basic - ( 22 Jul 2024 07:11 )    Color: x / Appearance: x / SG: x / pH: x  Gluc: 99 mg/dL / Ketone: x  / Bili: x / Urobili: x   Blood: x / Protein: x / Nitrite: x   Leuk Esterase: x / RBC: x / WBC x   Sq Epi: x / Non Sq Epi: x / Bacteria: x        Assessment & Plan:  69y Male with a right staghorn calculus. Patient is planned for Right PCNL tomorrow. Left renal stones for left JJ. Optimized from IM/cardiac standpoint for operation.    [] NPO after midnight  [] T&S -- 2 in system, ordered   [] 2U pRBC on hold for OR  [] Anticoagulation -- ordered to be held before OR tomorrow ordered.  [] COVID -- negative   [] Consent

## 2024-07-23 ENCOUNTER — NON-APPOINTMENT (OUTPATIENT)
Age: 69
End: 2024-07-23

## 2024-07-23 ENCOUNTER — APPOINTMENT (OUTPATIENT)
Dept: UROLOGY | Facility: HOSPITAL | Age: 69
End: 2024-07-23

## 2024-07-23 DIAGNOSIS — Z87.448 PERSONAL HISTORY OF OTHER DISEASES OF URINARY SYSTEM: ICD-10-CM

## 2024-07-23 DIAGNOSIS — N36.9 URETHRAL DISORDER, UNSPECIFIED: ICD-10-CM

## 2024-07-23 LAB
-  AMOXICILLIN/CLAVULANIC ACID: SIGNIFICANT CHANGE UP
-  AMPICILLIN/SULBACTAM: SIGNIFICANT CHANGE UP
-  AMPICILLIN: SIGNIFICANT CHANGE UP
-  AZTREONAM: SIGNIFICANT CHANGE UP
-  CEFAZOLIN: SIGNIFICANT CHANGE UP
-  CEFEPIME: SIGNIFICANT CHANGE UP
-  CEFOXITIN: SIGNIFICANT CHANGE UP
-  CEFTRIAXONE: SIGNIFICANT CHANGE UP
-  CEFUROXIME: SIGNIFICANT CHANGE UP
-  CIPROFLOXACIN: SIGNIFICANT CHANGE UP
-  ERTAPENEM: SIGNIFICANT CHANGE UP
-  GENTAMICIN: SIGNIFICANT CHANGE UP
-  LEVOFLOXACIN: SIGNIFICANT CHANGE UP
-  MEROPENEM: SIGNIFICANT CHANGE UP
-  PIPERACILLIN/TAZOBACTAM: SIGNIFICANT CHANGE UP
-  TOBRAMYCIN: SIGNIFICANT CHANGE UP
-  TRIMETHOPRIM/SULFAMETHOXAZOLE: SIGNIFICANT CHANGE UP
ALBUMIN SERPL ELPH-MCNC: 2.2 G/DL — LOW (ref 3.5–5.2)
ALP SERPL-CCNC: 126 U/L — HIGH (ref 30–115)
ALT FLD-CCNC: 8 U/L — SIGNIFICANT CHANGE UP (ref 0–41)
ANION GAP SERPL CALC-SCNC: 16 MMOL/L — HIGH (ref 7–14)
AST SERPL-CCNC: 18 U/L — SIGNIFICANT CHANGE UP (ref 0–41)
BILIRUB SERPL-MCNC: 0.3 MG/DL — SIGNIFICANT CHANGE UP (ref 0.2–1.2)
BUN SERPL-MCNC: 60 MG/DL — HIGH (ref 10–20)
CALCIUM SERPL-MCNC: 7.7 MG/DL — LOW (ref 8.4–10.5)
CHLORIDE SERPL-SCNC: 102 MMOL/L — SIGNIFICANT CHANGE UP (ref 98–110)
CO2 SERPL-SCNC: 20 MMOL/L — SIGNIFICANT CHANGE UP (ref 17–32)
CREAT SERPL-MCNC: 3.5 MG/DL — HIGH (ref 0.7–1.5)
CULTURE RESULTS: ABNORMAL
EGFR: 18 ML/MIN/1.73M2 — LOW
GLUCOSE BLDC GLUCOMTR-MCNC: 106 MG/DL — HIGH (ref 70–99)
GLUCOSE BLDC GLUCOMTR-MCNC: 125 MG/DL — HIGH (ref 70–99)
GLUCOSE SERPL-MCNC: 113 MG/DL — HIGH (ref 70–99)
HCT VFR BLD CALC: 30.2 % — LOW (ref 42–52)
HGB BLD-MCNC: 9.3 G/DL — LOW (ref 14–18)
MAGNESIUM SERPL-MCNC: 2.1 MG/DL — SIGNIFICANT CHANGE UP (ref 1.8–2.4)
MCHC RBC-ENTMCNC: 29.8 PG — SIGNIFICANT CHANGE UP (ref 27–31)
MCHC RBC-ENTMCNC: 30.8 G/DL — LOW (ref 32–37)
MCV RBC AUTO: 96.8 FL — HIGH (ref 80–94)
METHOD TYPE: SIGNIFICANT CHANGE UP
NRBC # BLD: 0 /100 WBCS — SIGNIFICANT CHANGE UP (ref 0–0)
ORGANISM # SPEC MICROSCOPIC CNT: ABNORMAL
ORGANISM # SPEC MICROSCOPIC CNT: ABNORMAL
ORGANISM # SPEC MICROSCOPIC CNT: SIGNIFICANT CHANGE UP
PLATELET # BLD AUTO: 329 K/UL — SIGNIFICANT CHANGE UP (ref 130–400)
PMV BLD: 11.2 FL — HIGH (ref 7.4–10.4)
POTASSIUM SERPL-MCNC: 4.6 MMOL/L — SIGNIFICANT CHANGE UP (ref 3.5–5)
POTASSIUM SERPL-SCNC: 4.6 MMOL/L — SIGNIFICANT CHANGE UP (ref 3.5–5)
PROT SERPL-MCNC: 7.2 G/DL — SIGNIFICANT CHANGE UP (ref 6–8)
RBC # BLD: 3.12 M/UL — LOW (ref 4.7–6.1)
RBC # FLD: 13.2 % — SIGNIFICANT CHANGE UP (ref 11.5–14.5)
SODIUM SERPL-SCNC: 138 MMOL/L — SIGNIFICANT CHANGE UP (ref 135–146)
SPECIMEN SOURCE: SIGNIFICANT CHANGE UP
WBC # BLD: 17.13 K/UL — HIGH (ref 4.8–10.8)
WBC # FLD AUTO: 17.13 K/UL — HIGH (ref 4.8–10.8)

## 2024-07-23 PROCEDURE — 52318 REMOVE BLADDER STONE: CPT

## 2024-07-23 PROCEDURE — 74425 UROGRAPHY ANTEGRADE RS&I: CPT | Mod: 26,59,RT

## 2024-07-23 PROCEDURE — 99232 SBSQ HOSP IP/OBS MODERATE 35: CPT

## 2024-07-23 PROCEDURE — 50081 PERQ NL/PL LITHOTRP CPLX>2CM: CPT | Mod: RT

## 2024-07-23 PROCEDURE — 50387 CHANGE NEPHROURETERAL CATH: CPT | Mod: 59,RT

## 2024-07-23 PROCEDURE — 52005 CYSTO W/URTRL CATHJ: CPT | Mod: LT,59

## 2024-07-23 PROCEDURE — 50951 ENDOSCOPY OF URETER: CPT | Mod: RT

## 2024-07-23 PROCEDURE — 50684 INJECTION FOR URETER X-RAY: CPT | Mod: 59,RT

## 2024-07-23 PROCEDURE — 52332 CYSTOSCOPY AND TREATMENT: CPT | Mod: LT

## 2024-07-23 RX ORDER — HYDROMORPHONE HCL IN 0.9% NACL 0.2 MG/ML
0.5 PLASTIC BAG, INJECTION (ML) INTRAVENOUS
Refills: 0 | Status: DISCONTINUED | OUTPATIENT
Start: 2024-07-23 | End: 2024-07-23

## 2024-07-23 RX ORDER — HYDROMORPHONE HCL IN 0.9% NACL 0.2 MG/ML
1 PLASTIC BAG, INJECTION (ML) INTRAVENOUS
Refills: 0 | Status: DISCONTINUED | OUTPATIENT
Start: 2024-07-23 | End: 2024-07-23

## 2024-07-23 RX ORDER — DEXTROSE MONOHYDRATE, SODIUM CHLORIDE, SODIUM LACTATE, CALCIUM CHLORIDE, MAGNESIUM CHLORIDE 1.5; 538; 448; 18.4; 5.08 G/100ML; MG/100ML; MG/100ML; MG/100ML; MG/100ML
1000 SOLUTION INTRAPERITONEAL
Refills: 0 | Status: DISCONTINUED | OUTPATIENT
Start: 2024-07-23 | End: 2024-07-26

## 2024-07-23 RX ORDER — DEXTROSE MONOHYDRATE, SODIUM CHLORIDE, SODIUM LACTATE, CALCIUM CHLORIDE, MAGNESIUM CHLORIDE 1.5; 538; 448; 18.4; 5.08 G/100ML; MG/100ML; MG/100ML; MG/100ML; MG/100ML
1000 SOLUTION INTRAPERITONEAL
Refills: 0 | Status: DISCONTINUED | OUTPATIENT
Start: 2024-07-23 | End: 2024-07-24

## 2024-07-23 RX ORDER — ONDANSETRON HCL/PF 4 MG/2 ML
4 VIAL (ML) INJECTION ONCE
Refills: 0 | Status: DISCONTINUED | OUTPATIENT
Start: 2024-07-23 | End: 2024-07-23

## 2024-07-23 RX ADMIN — MEROPENEM 100 MILLIGRAM(S): 1 INJECTION, POWDER, FOR SOLUTION INTRAVENOUS at 05:30

## 2024-07-23 RX ADMIN — NIFEDIPINE 30 MILLIGRAM(S): 20 CAPSULE, LIQUID FILLED ORAL at 05:30

## 2024-07-23 RX ADMIN — CHLORHEXIDINE GLUCONATE 1 APPLICATION(S): 500 CLOTH TOPICAL at 11:27

## 2024-07-23 RX ADMIN — Medication 650 MILLIGRAM(S): at 05:30

## 2024-07-23 RX ADMIN — PANTOPRAZOLE SODIUM 40 MILLIGRAM(S): 20 TABLET, DELAYED RELEASE ORAL at 05:30

## 2024-07-23 NOTE — BRIEF OPERATIVE NOTE - OPERATION/FINDINGS
there was a stone in the urethra, then there were several thania mucous bladder stones that obscured the left UO so we took them out  the left ureter was tortuous and required a retrograde to help us manipulate the wire and open ended into the left kidney  right kidney was filled with mucous stones, we were no able to wash out the lower pole stones  the right ureter was filled with mucous and one solid stones we took out what we could but I felt we will need to go back and work from below with a second access via the lower pole

## 2024-07-23 NOTE — PROGRESS NOTE ADULT - SUBJECTIVE AND OBJECTIVE BOX
24H events:    Patient is a 69y old Male who presents with a chief complaint of UTI w/ obstructing stone (23 Jul 2024 13:47)    Primary diagnosis of Hydronephrosis with obstructing calculus    Today is 14d of hospitalization. This morning patient was seen and examined at bedside, resting comfortably in bed.    No acute or major events overnight.      PAST MEDICAL & SURGICAL HISTORY  DM (diabetes mellitus)    HTN (hypertension)    H/O paraplegia    Spinal abscess    Renal stones    Spinal abscess  S/P Surgery    Encounter for care or replacement of suprapubic tube      SOCIAL HISTORY:  Social History:      ALLERGIES:  No Known Allergies    MEDICATIONS:  STANDING MEDICATIONS  atorvastatin 20 milliGRAM(s) Oral at bedtime  chlorhexidine 2% Cloths 1 Application(s) Topical daily  dextrose 5%. 1000 milliLiter(s) IV Continuous <Continuous>  dextrose 5%. 1000 milliLiter(s) IV Continuous <Continuous>  dextrose 50% Injectable 25 Gram(s) IV Push once  dextrose 50% Injectable 12.5 Gram(s) IV Push once  dextrose 50% Injectable 25 Gram(s) IV Push once  glucagon  Injectable 1 milliGRAM(s) IntraMuscular once  heparin   Injectable 5000 Unit(s) SubCutaneous every 8 hours  insulin lispro (ADMELOG) corrective regimen sliding scale   SubCutaneous three times a day before meals  insulin lispro (ADMELOG) corrective regimen sliding scale   SubCutaneous at bedtime  meropenem  IVPB 500 milliGRAM(s) IV Intermittent every 12 hours  NIFEdipine XL 30 milliGRAM(s) Oral daily  pantoprazole    Tablet 40 milliGRAM(s) Oral before breakfast  sodium bicarbonate 650 milliGRAM(s) Oral two times a day    PRN MEDICATIONS  aluminum hydroxide/magnesium hydroxide/simethicone Suspension 30 milliLiter(s) Oral every 4 hours PRN  dextrose Oral Gel 15 Gram(s) Oral once PRN  melatonin 3 milliGRAM(s) Oral at bedtime PRN  ondansetron Injectable 4 milliGRAM(s) IV Push every 8 hours PRN    VITALS:   T(F): 98.3  HR: 85  BP: 122/75  RR: 18  SpO2: 96%    PHYSICAL EXAM:  GENERAL:   ( x) NAD, lying in bed comfortably     (  ) obtunded     (  ) lethargic     (  ) somnolent      NECK:  (x) Supple     (  ) neck stiffness     (  ) nuchal rigidity     (  )  no JVD     (  ) JVD present ( -- cm)    HEART:  Rate -->     (x) normal rate     (  ) bradycardic     (  ) tachycardic  Rhythm -->     (x) regular     (  ) regularly irregular     (  ) irregularly irregular  Murmurs -->     (x) normal s1s2     (  ) systolic murmur     (  ) diastolic murmur     (  ) continuous murmur      (  ) S3 present     (  ) S4 present    LUNGS:   ( x)Unlabored respirations     (  ) tachypnea  ( x) B/L air entry     (  ) decreased breath sounds in:  (location     )    ( ) no adventitious sound     (  ) crackles     (  ) wheezing      (  ) rhonchi      (specify location:       )  (  ) chest wall tenderness (specify location:       )    ABDOMEN:   ( x) Soft     (  ) tense   |   (  ) nondistended     (  ) distended   |   (  ) +BS     (  ) hypoactive bowel sounds     (  ) hyperactive bowel sounds  ( x) nontender     (  ) RUQ tenderness     (  ) RLQ tenderness     (  ) LLQ tenderness     (  ) epigastric tenderness     (  ) diffuse tenderness  (  ) Splenomegaly      (  ) Hepatomegaly      (  ) Jaundice     (  ) ecchymosis     EXTREMITIES:  ( x) Normal     (  ) Rash     (  ) ecchymosis     (  ) varicose veins      (  ) pitting edema     (  ) non-pitting edema   (  ) ulceration     (  ) gangrene:     (location:     )    NERVOUS SYSTEM:    ( x) A&Ox3     (  ) confused     (  ) lethargic  CN II-XII:     ( x) Intact     (  ) deficits found     (Specify:     )   Upper extremities:     (  ) no sensorimotor deficits     (  ) weakness     (  ) loss of proprioception/vibration     (  ) loss of touch/temperature (specify:    )  Lower extremities:     (  ) no sensorimotor deficits     (  ) weakness     (  ) loss of proprioception/vibration     (  ) loss of touch/temperature (specify:    )    SKIN:   (  ) No rashes or lesions     (  ) maculopapular rash     (  ) pustules     (  ) vesicles     (  ) ulcer     (  ) ecchymosis     (specify location:     )      LABS:                        9.3    17.13 )-----------( 329      ( 23 Jul 2024 07:18 )             30.2     07-23    138  |  102  |  60<H>  ----------------------------<  113<H>  4.6   |  20  |  3.5<H>    Ca    7.7<L>      23 Jul 2024 07:18  Phos  4.8     07-22  Mg     2.1     07-23    TPro  7.2  /  Alb  2.2<L>  /  TBili  0.3  /  DBili  x   /  AST  18  /  ALT  8   /  AlkPhos  126<H>  07-23    PT/INR - ( 22 Jul 2024 20:00 )   PT: 12.20 sec;   INR: 1.07 ratio         PTT - ( 22 Jul 2024 20:00 )  PTT:34.9 sec  Urinalysis Basic - ( 23 Jul 2024 07:18 )    Color: x / Appearance: x / SG: x / pH: x  Gluc: 113 mg/dL / Ketone: x  / Bili: x / Urobili: x   Blood: x / Protein: x / Nitrite: x   Leuk Esterase: x / RBC: x / WBC x   Sq Epi: x / Non Sq Epi: x / Bacteria: x

## 2024-07-23 NOTE — CHART NOTE - NSCHARTNOTESELECT_GEN_ALL_CORE
Interventional Radiology/Event Note
Urology/Event Note
Nutrition/Event Note
Transfer Note
Urology
pacu/Transfer Note

## 2024-07-23 NOTE — BRIEF OPERATIVE NOTE - NSICDXBRIEFPROCEDURE_GEN_ALL_CORE_FT
PROCEDURES:  Cystoscopic removal of urethral stone 23-Jul-2024 22:46:50  Pietro Akins  Complex cystolitholapaxy 23-Jul-2024 22:48:06  Pietro Akins  Catheterization, ureter, cystoscopic 23-Jul-2024 22:48:43 left for length Pietro Akins  Cystoscopic insertion of ureteric stent 23-Jul-2024 22:49:16 left Pietro Akins  Antegrade ureteroscopy 23-Jul-2024 22:50:06 right Pietro Akins  Change external ureteral stent 23-Jul-2024 22:50:37 right Pietro Akins  Percutaneous nephrostolithotomy for calculus greater than 2 cm in diameter 23-Jul-2024 22:51:20 right renal Pietro Akins  Nephrostogram 23-Jul-2024 22:51:49 right Pietro Akins  Antegrade ureterography 23-Jul-2024 22:52:15 right Pietro Akins  Percutaneous nephrostolithotomy with basket extraction of large calculus 23-Jul-2024 22:53:11 right ureter Pietro Akins  Replacement, suprapubic tube 23-Jul-2024 23:09:11  Pietro Akins

## 2024-07-23 NOTE — PROGRESS NOTE ADULT - ASSESSMENT
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 NBNB vomiting and nausea every time after he eats  #Obstructing R mid ureter calculus with severe hydroureteronephrosis s/p Stent   #B/L nephrolithiasis   #Post-renal JOÃO on CKD 5   #HAGMA   #Chronic suprapubic catheter   - cr trending down    - strict I and O   - urology and IR on board, s/p Percutaneous right nephrostomy followed by placement of right nephroureteral catheter / plan for R PCNL today  -  PO bicarb / continue   -  last ph at goal   - followed by ID on   ATB   - BP controlled  - no need to start RRT at this time  will follow

## 2024-07-23 NOTE — BRIEF OPERATIVE NOTE - NSICDXBRIEFPOSTOP_GEN_ALL_CORE_FT
POST-OP DIAGNOSIS:  Right renal stone 23-Jul-2024 22:54:29  Pietro Akins  Right ureteral stone 23-Jul-2024 22:54:41  Pietro Akins  Bladder stone 23-Jul-2024 22:55:11  Pietro Akins  Urethral stone 23-Jul-2024 22:55:20  Pietro Akins  Urinary retention 23-Jul-2024 23:09:41  Pietro Akins

## 2024-07-23 NOTE — BRIEF OPERATIVE NOTE - NSICDXBRIEFPREOP_GEN_ALL_CORE_FT
PRE-OP DIAGNOSIS:  Right renal stone 23-Jul-2024 22:54:21  Pietro Akins  Right ureteral stone 23-Jul-2024 22:54:49  Pietro Akins  Bladder stone 23-Jul-2024 22:55:04  Pietro Akins  Urinary retention 23-Jul-2024 23:09:30  Pietro Akins

## 2024-07-23 NOTE — CHART NOTE - NSCHARTNOTEFT_GEN_A_CORE
PACU ANESTHESIA ADMISSION NOTE      Procedure: Cystoscopic removal of urethral stone    Complex cystolitholapaxy    Catheterization, ureter, cystoscopic  left for length    Cystoscopic insertion of ureteric stent  left    Antegrade ureteroscopy  right    Change external ureteral stent  right    Percutaneous nephrostolithotomy for calculus greater than 2 cm in diameter  right renal    Nephrostogram  right    Antegrade ureterography  right    Percutaneous nephrostolithotomy with basket extraction of large calculus  right ureter      Post op diagnosis:  Right renal stone    Right ureteral stone    Bladder stone    Urethral stone        ____  Intubated  TV:______       Rate: ______      FiO2: ______    __x__  Patent Airway    ___x_  Full return of protective reflexes    __x__  Full recovery from anesthesia / back to baseline     Vitals:   T: 96.2          R: 17             BP: 114/66              Sat:  96%                  P: 81       Mental Status:  __x__ Awake   ___x__ Alert   _____ Drowsy   _____ Sedated    Nausea/Vomiting:  __x__ NO  ______Yes,   See Post - Op Orders          Pain Scale (0-10):  _____    Treatment: ____ None    __x__ See Post - Op/PCA Orders    Post - Operative Fluids:   ____ Oral   __x__ See Post - Op Orders    Plan: Discharge:   ___Home       _____Floor     _____Critical Care    _____  Other:_[place]________________

## 2024-07-23 NOTE — PROGRESS NOTE ADULT - ASSESSMENT
70 y/o man with PMH of HTN, hyperlipidemia, DM II, CKD 5 (baseline Cr 4.4) Urinary incontinence with chronic suprapubic catheter, Nephrolithiasis, Spinal abscess with quadriplegia and chronic LE wounds receiving dressing changes by visiting nurse now presented to the ED for crampy abdominal pain along with nausea and vomiting.   patient seen and examined    # JOÃO over CKD 5  due to obstructive uropathy   # Complicated UTI / pyelonephritis  # Bacteroids bacteremia  - CT abd/pelvis: Severe right hydroureteronephrosis with cortical thinning and numerous large intrarenal and proximal to mid ureteral calculi, obstructing calculus in the right mid ureter measures 1.1 x 1 x 1.8 cm with normal caliber distal ureter.  Multiple nonobstructive left intrarenal calculi and a 0.8 cm left distal ureteral calculus; no left hydronephrosis.  Few prominent retroperitoneal lymph nodes, likely reactive. Cholelithiasis.  - s/p eval by Urology in ED --> recommended b/l nephrostomy placement by IR.   - s/p right PCN by IR on 7/10 - 10 cc of thick, viscous green-yellow, malodorous right kidney urine sent for cultures  - s/p SPC exchange in the ED  - blood cultures 1/2- bateroids(sn not performed); 1/2- GNR- monmitor; urine culture- Proteus, Serratia(both sn to dwain) and enterococcus  - acidosis improved -  - continue PO bicarbonate  - further stone management as  per Urology-  - plan for right PCNL and left JJ stent placement- Follow up with Dr Akins  - ID following-  meropenem 500 mg BID; On D/C, recommend midline and ertapenem 500mg q24h IV + PO amoxicillin 500mg daily (to cover enterococcus) until 48h post-op -- will continue for now --surgery on tuesday 7/23  urine culture from nephrostomy  is gm positive rods and enterococcus-- full report is pending    #  HTN- stable  nifedipine stopped    # Hyperlipidemia on statin    #  DM   A1C 6.9  insulin sliding scale not needed--his blood sugar was stable--    # Chronic LE wounds - wound care per advanced nursing recommendation    # Functional quadriplegia-- bed bound-- uses ana lift      DNR/DNI Trial of NIV      plan is OR 7/23-- continue meropenem for now.         68 y/o man with PMH of HTN, hyperlipidemia, DM II, CKD 5 (baseline Cr 4.4) Urinary incontinence with chronic suprapubic catheter, Nephrolithiasis, Spinal abscess with quadriplegia and chronic LE wounds receiving dressing changes by visiting nurse now presented to the ED for crampy abdominal pain along with nausea and vomiting.   patient seen and examined    # JOÃO over CKD 5  due to obstructive uropathy   # Complicated UTI / pyelonephritis  # Bacteroids bacteremia  - CT abd/pelvis: Severe right hydroureteronephrosis with cortical thinning and numerous large intrarenal and proximal to mid ureteral calculi, obstructing calculus in the right mid ureter measures 1.1 x 1 x 1.8 cm with normal caliber distal ureter.  Multiple nonobstructive left intrarenal calculi and a 0.8 cm left distal ureteral calculus; no left hydronephrosis.  Few prominent retroperitoneal lymph nodes, likely reactive. Cholelithiasis.  - s/p eval by Urology in ED --> recommended b/l nephrostomy placement by IR.   - s/p right PCN by IR on 7/10 - 10 cc of thick, viscous green-yellow, malodorous right kidney urine sent for cultures  - s/p SPC exchange in the ED  - blood cultures 1/2- bateroids(sn not performed); 1/2- GNR- monmitor; urine culture- Proteus, Serratia(both sn to dwain) and enterococcus  - acidosis improved -  - continue PO bicarbonate  - further stone management as  per Urology-  - plan for right PCNL and left JJ stent placement- Follow up with Dr Akins  - ID following-  meropenem 500 mg BID; On D/C, recommend midline and ertapenem 500mg q24h IV + PO amoxicillin 500mg daily (to cover enterococcus) until 48h post-op -- will continue for now --surgery on tuesday 7/23  urine culture from nephrostomy  is gm positive rods and enterococcus-- full report is pending    #  HTN- stable  nifedipine stopped    # Hyperlipidemia on statin    #  DM   A1C 6.9  insulin sliding scale not needed--his blood sugar was stable--    # Chronic LE wounds - wound care per advanced nursing recommendation    # Functional quadriplegia-- bed bound-- uses ana lift      DNR/DNI Trial of NIV      plan is OR 7/23-- continue meropenem for now. wiil return from [facility] to [place] post procedure tonight.

## 2024-07-23 NOTE — PROGRESS NOTE ADULT - SUBJECTIVE AND OBJECTIVE BOX
SUBJECTIVE:    Patient is a 69y old Male who presents with a chief complaint of UTI w/ obstructing stone (23 Jul 2024 09:19)    Currently admitted to medicine with the primary diagnosis of Hydronephrosis with obstructing calculus       Today is hospital day 14d.     PAST MEDICAL & SURGICAL HISTORY  DM (diabetes mellitus)    HTN (hypertension)    H/O paraplegia    Spinal abscess    Renal stones    Spinal abscess  S/P Surgery    Encounter for care or replacement of suprapubic tube      ALLERGIES:  No Known Allergies    MEDICATIONS:  STANDING MEDICATIONS  atorvastatin 20 milliGRAM(s) Oral at bedtime  chlorhexidine 2% Cloths 1 Application(s) Topical daily  dextrose 5%. 1000 milliLiter(s) IV Continuous <Continuous>  dextrose 5%. 1000 milliLiter(s) IV Continuous <Continuous>  dextrose 50% Injectable 12.5 Gram(s) IV Push once  dextrose 50% Injectable 25 Gram(s) IV Push once  dextrose 50% Injectable 25 Gram(s) IV Push once  glucagon  Injectable 1 milliGRAM(s) IntraMuscular once  heparin   Injectable 5000 Unit(s) SubCutaneous every 8 hours  insulin lispro (ADMELOG) corrective regimen sliding scale   SubCutaneous three times a day before meals  insulin lispro (ADMELOG) corrective regimen sliding scale   SubCutaneous at bedtime  meropenem  IVPB 500 milliGRAM(s) IV Intermittent every 12 hours  NIFEdipine XL 30 milliGRAM(s) Oral daily  pantoprazole    Tablet 40 milliGRAM(s) Oral before breakfast  sodium bicarbonate 650 milliGRAM(s) Oral two times a day    PRN MEDICATIONS  aluminum hydroxide/magnesium hydroxide/simethicone Suspension 30 milliLiter(s) Oral every 4 hours PRN  dextrose Oral Gel 15 Gram(s) Oral once PRN  melatonin 3 milliGRAM(s) Oral at bedtime PRN  ondansetron Injectable 4 milliGRAM(s) IV Push every 8 hours PRN    VITALS:   T(F): 98.3  HR: 85  BP: 122/75  RR: 18  SpO2: 96%    LABS:                        9.3    17.13 )-----------( 329      ( 23 Jul 2024 07:18 )             30.2     07-23    138  |  102  |  60<H>  ----------------------------<  113<H>  4.6   |  20  |  3.5<H>    Ca    7.7<L>      23 Jul 2024 07:18  Phos  4.8     07-22  Mg     2.1     07-23    TPro  7.2  /  Alb  2.2<L>  /  TBili  0.3  /  DBili  x   /  AST  18  /  ALT  8   /  AlkPhos  126<H>  07-23    PT/INR - ( 22 Jul 2024 20:00 )   PT: 12.20 sec;   INR: 1.07 ratio         PTT - ( 22 Jul 2024 20:00 )  PTT:34.9 sec  Urinalysis Basic - ( 23 Jul 2024 07:18 )    Color: x / Appearance: x / SG: x / pH: x  Gluc: 113 mg/dL / Ketone: x  / Bili: x / Urobili: x   Blood: x / Protein: x / Nitrite: x   Leuk Esterase: x / RBC: x / WBC x   Sq Epi: x / Non Sq Epi: x / Bacteria: x                RADIOLOGY:    PHYSICAL EXAM:  GEN: No acute distress  LUNGS: Clear to auscultation bilaterally   HEART: S1/S2 present. RRR.   ABD/ GI: Soft, non-tender, non-distended. Bowel sounds present  EXT: NC/NC/NE/2+PP/PANTOJA  NEURO: AAOX3

## 2024-07-23 NOTE — PROGRESS NOTE ADULT - ASSESSMENT
70 y/o man with PMH of HTN, hyperlipidemia, DM II, CKD 5 (baseline Cr 4.4) Urinary incontinence with chronic suprapubic catheter, Nephrolithiasis, Spinal abscess with quadriplegia and chronic LE wounds receiving dressing changes by visiting nurse now presented to the ED for crampy abdominal pain along with nausea and vomiting.   patient seen and examined    # JOÃO over CKD 5  due to obstructive uropathy, Cr trended down today   # Complicated UTI / pyelonephritis  # Bacteroids bacteremia  - CT abd/pelvis: Severe right hydroureteronephrosis with cortical thinning and numerous large intrarenal and proximal to mid ureteral calculi, obstructing calculus in the right mid ureter measures 1.1 x 1 x 1.8 cm with normal caliber distal ureter.  Multiple nonobstructive left intrarenal calculi and a 0.8 cm left distal ureteral calculus; no left hydronephrosis.  Few prominent retroperitoneal lymph nodes, likely reactive. Cholelithiasis.  - s/p eval by Urology in ED --> recommended b/l nephrostomy placement by IR.   - s/p right PCN by IR on 7/10 - 10 cc of thick, viscous green-yellow, malodorous right kidney urine sent for cultures  - s/p SPC exchange in the ED  - blood cultures 1/2- bateroids(sn not performed); 1/2- GNR- monmitor; urine culture- Proteus, Serratia(both sn to dwain) and enterococcus  - acidosis improved - bicarb in IVF stopped - continue PO bicarbonate  - ID following-  s/p meropenem 500 mg BID; On D/C, recommend midline and ertapenem 500mg q24h IV + PO amoxicillin 500mg daily (to cover enterococcus) until 48h post-op - will continue for now   urine culture from nephrostomy  is gm positive rods and enterococcus  - further stone management as per Urology; plan for right PCNL and left JJ stent placement today at [facility]  - nephrology recs appreciated, no indication for RRT at this time    #  HTN- stable  nifedipine stopped    # Hyperlipidemia on statin    #  DM   A1C 6.9  insulin sliding scale not needed--his blood sugar was stable    # Chronic LE wounds - wound care per advanced nursing recommendation    DNR/DNI Trial of NIV

## 2024-07-24 PROBLEM — Z87.448 HISTORY OF BLADDER STONE: Status: ACTIVE | Noted: 2024-07-24

## 2024-07-24 PROBLEM — N20.1 RIGHT URETERAL STONE: Status: ACTIVE | Noted: 2024-07-24

## 2024-07-24 PROBLEM — N36.9 URETHRA DISORDER: Status: ACTIVE | Noted: 2024-07-24

## 2024-07-24 LAB
ALBUMIN SERPL ELPH-MCNC: 2.3 G/DL — LOW (ref 3.5–5.2)
ALP SERPL-CCNC: 110 U/L — SIGNIFICANT CHANGE UP (ref 30–115)
ALT FLD-CCNC: 7 U/L — SIGNIFICANT CHANGE UP (ref 0–41)
ANION GAP SERPL CALC-SCNC: 16 MMOL/L — HIGH (ref 7–14)
AST SERPL-CCNC: 14 U/L — SIGNIFICANT CHANGE UP (ref 0–41)
BILIRUB SERPL-MCNC: 0.3 MG/DL — SIGNIFICANT CHANGE UP (ref 0.2–1.2)
BUN SERPL-MCNC: 63 MG/DL — CRITICAL HIGH (ref 10–20)
CALCIUM SERPL-MCNC: 7.7 MG/DL — LOW (ref 8.4–10.5)
CHLORIDE SERPL-SCNC: 100 MMOL/L — SIGNIFICANT CHANGE UP (ref 98–110)
CO2 SERPL-SCNC: 20 MMOL/L — SIGNIFICANT CHANGE UP (ref 17–32)
CREAT SERPL-MCNC: 3.7 MG/DL — HIGH (ref 0.7–1.5)
EGFR: 17 ML/MIN/1.73M2 — LOW
GLUCOSE BLDC GLUCOMTR-MCNC: 119 MG/DL — HIGH (ref 70–99)
GLUCOSE BLDC GLUCOMTR-MCNC: 129 MG/DL — HIGH (ref 70–99)
GLUCOSE BLDC GLUCOMTR-MCNC: 135 MG/DL — HIGH (ref 70–99)
GLUCOSE BLDC GLUCOMTR-MCNC: 136 MG/DL — HIGH (ref 70–99)
GLUCOSE BLDC GLUCOMTR-MCNC: 98 MG/DL — SIGNIFICANT CHANGE UP (ref 70–99)
GLUCOSE SERPL-MCNC: 92 MG/DL — SIGNIFICANT CHANGE UP (ref 70–99)
HCT VFR BLD CALC: 29.1 % — LOW (ref 42–52)
HGB BLD-MCNC: 8.8 G/DL — LOW (ref 14–18)
MCHC RBC-ENTMCNC: 29.8 PG — SIGNIFICANT CHANGE UP (ref 27–31)
MCHC RBC-ENTMCNC: 30.2 G/DL — LOW (ref 32–37)
MCV RBC AUTO: 98.6 FL — HIGH (ref 80–94)
NRBC # BLD: 0 /100 WBCS — SIGNIFICANT CHANGE UP (ref 0–0)
PLATELET # BLD AUTO: 322 K/UL — SIGNIFICANT CHANGE UP (ref 130–400)
PMV BLD: 11.3 FL — HIGH (ref 7.4–10.4)
POTASSIUM SERPL-MCNC: 5 MMOL/L — SIGNIFICANT CHANGE UP (ref 3.5–5)
POTASSIUM SERPL-SCNC: 5 MMOL/L — SIGNIFICANT CHANGE UP (ref 3.5–5)
PROT SERPL-MCNC: 6.7 G/DL — SIGNIFICANT CHANGE UP (ref 6–8)
RBC # BLD: 2.95 M/UL — LOW (ref 4.7–6.1)
RBC # FLD: 13.4 % — SIGNIFICANT CHANGE UP (ref 11.5–14.5)
SODIUM SERPL-SCNC: 136 MMOL/L — SIGNIFICANT CHANGE UP (ref 135–146)
WBC # BLD: 20.66 K/UL — HIGH (ref 4.8–10.8)
WBC # FLD AUTO: 20.66 K/UL — HIGH (ref 4.8–10.8)

## 2024-07-24 PROCEDURE — 99232 SBSQ HOSP IP/OBS MODERATE 35: CPT | Mod: GC

## 2024-07-24 PROCEDURE — 74176 CT ABD & PELVIS W/O CONTRAST: CPT | Mod: 26

## 2024-07-24 RX ORDER — MEROPENEM 2 G/1
INJECTION, POWDER, FOR SOLUTION INTRAVENOUS
Refills: 0 | Status: ACTIVE | COMMUNITY

## 2024-07-24 RX ADMIN — PANTOPRAZOLE SODIUM 40 MILLIGRAM(S): 20 TABLET, DELAYED RELEASE ORAL at 05:06

## 2024-07-24 RX ADMIN — MEROPENEM 100 MILLIGRAM(S): 1 INJECTION, POWDER, FOR SOLUTION INTRAVENOUS at 05:07

## 2024-07-24 RX ADMIN — ATORVASTATIN CALCIUM 20 MILLIGRAM(S): 40 TABLET, FILM COATED ORAL at 21:12

## 2024-07-24 RX ADMIN — DEXTROSE MONOHYDRATE, SODIUM CHLORIDE, SODIUM LACTATE, CALCIUM CHLORIDE, MAGNESIUM CHLORIDE 75 MILLILITER(S): 1.5; 538; 448; 18.4; 5.08 SOLUTION INTRAPERITONEAL at 21:12

## 2024-07-24 RX ADMIN — MEROPENEM 100 MILLIGRAM(S): 1 INJECTION, POWDER, FOR SOLUTION INTRAVENOUS at 17:01

## 2024-07-24 RX ADMIN — HEPARIN SODIUM 5000 UNIT(S): 1000 INJECTION, SOLUTION INTRAVENOUS; SUBCUTANEOUS at 13:22

## 2024-07-24 RX ADMIN — HEPARIN SODIUM 5000 UNIT(S): 1000 INJECTION, SOLUTION INTRAVENOUS; SUBCUTANEOUS at 05:05

## 2024-07-24 RX ADMIN — NIFEDIPINE 30 MILLIGRAM(S): 20 CAPSULE, LIQUID FILLED ORAL at 08:59

## 2024-07-24 RX ADMIN — HEPARIN SODIUM 5000 UNIT(S): 1000 INJECTION, SOLUTION INTRAVENOUS; SUBCUTANEOUS at 21:12

## 2024-07-24 RX ADMIN — Medication 650 MILLIGRAM(S): at 17:01

## 2024-07-24 RX ADMIN — CHLORHEXIDINE GLUCONATE 1 APPLICATION(S): 500 CLOTH TOPICAL at 11:32

## 2024-07-24 RX ADMIN — Medication 650 MILLIGRAM(S): at 05:06

## 2024-07-24 NOTE — PROGRESS NOTE ADULT - ASSESSMENT
70 y/o man with PMH of HTN, hyperlipidemia, DM II, CKD 5 (baseline Cr 4.4) Urinary incontinence with chronic suprapubic catheter, Nephrolithiasis, Spinal abscess with quadriplegia and chronic LE wounds receiving dressing changes by visiting nurse now presented to the ED for crampy abdominal pain along with nausea and vomiting.     # JOÃO over CKD 5 due to obstructive uropathy   # Complicated UTI / pyelonephritis  # Bacteroids bacteremia  - CT abd/pelvis: Severe right hydroureteronephrosis with cortical thinning and numerous large intrarenal and proximal to mid ureteral calculi, obstructing calculus in the right mid ureter measures 1.1 x 1 x 1.8 cm with normal caliber distal ureter.  Multiple nonobstructive left intrarenal calculi and a 0.8 cm left distal ureteral calculus; no left hydronephrosis.  Few prominent retroperitoneal lymph nodes, likely reactive. Cholelithiasis.  - s/p eval by Urology in ED --> recommended b/l nephrostomy placement by IR.   - s/p right PCN by IR on 7/10 - 10 cc of thick, viscous green-yellow, malodorous right kidney urine sent for cultures  - s/p SPC exchange in the ED  - blood cultures 1/2- bateroids(sn not performed); 1/2- GNR- monmitor; urine culture- Proteus, Serratia(both sn to dwain) and enterococcus  - acidosis improved -  - continue PO bicarbonate  - ID following-  meropenem 500 mg BID; On D/C, recommend midline and ertapenem 500mg q24h IV + PO amoxicillin 500mg daily (to cover enterococcus) until 48h post-op -- will continue for now --surgery on tuesday 7/23  - urine culture from nephrostomy  is gm positive rods and enterococcus  - urology operative note reviewed, planned for further intervention?  - WBC trended up, f/u OR Cx  - c/w meropenem 500mg q12h IV  - appreciate nephrology recs, no indication to start RRT at this time      #  HTN- stable  nifedipine stopped    # Hyperlipidemia on statin    #  DM   A1C 6.9  insulin sliding scale not needed--his blood sugar was stable--    # Chronic LE wounds - wound care per advanced nursing recommendation    # Functional quadriplegia-- bed bound-- uses ana lift      DNR/DNI Trial of NIV

## 2024-07-24 NOTE — PROGRESS NOTE ADULT - SUBJECTIVE AND OBJECTIVE BOX
Post op Check    Pt seen and examined without complaints. Pain is controlled. Denies SOB/CP/N/V.     Vital Signs Last 24 Hrs  T(C): 35.2 (24 Jul 2024 00:09), Max: 36.8 (23 Jul 2024 13:22)  T(F): 95.3 (24 Jul 2024 00:09), Max: 98.3 (23 Jul 2024 13:22)  HR: 79 (24 Jul 2024 00:09) (76 - 85)  BP: 115/73 (24 Jul 2024 00:09) (106/67 - 132/75)  BP(mean): 84 (23 Jul 2024 20:42) (84 - 84)  RR: 18 (24 Jul 2024 00:09) (14 - 21)  SpO2: 97% (23 Jul 2024 23:17) (95% - 98%)    Parameters below as of 23 Jul 2024 22:47  Patient On (Oxygen Delivery Method): room air        I&O's Summary    22 Jul 2024 07:01  -  23 Jul 2024 07:00  --------------------------------------------------------  IN: 600 mL / OUT: 1600 mL / NET: -1000 mL    23 Jul 2024 07:01  -  24 Jul 2024 05:56  --------------------------------------------------------  IN: 0 mL / OUT: 1000 mL / NET: -1000 mL        Physical Exam  Gen: NAD  Pulm: No respiratory distress, no subcostal retractions  CV: RRR, no JVD  Abd: Soft, NT, ND  Back: Right PCN draining blood tinged urine  : No suprapubic fullness or tenderness, no scrotal tenderness                          9.3    17.13 )-----------( 329      ( 23 Jul 2024 07:18 )             30.2       07-23    138  |  102  |  60<H>  ----------------------------<  113<H>  4.6   |  20  |  3.5<H>    Ca    7.7<L>      23 Jul 2024 07:18  Phos  4.8     07-22  Mg     2.1     07-23    TPro  7.2  /  Alb  2.2<L>  /  TBili  0.3  /  DBili  x   /  AST  18  /  ALT  8   /  AlkPhos  126<H>  07-23      A/P: 69y Male s/p Rt PCNL  Incentive spirometry  Strict I&O's  Analgesia and antiemetics as needed  Diet NPO  AM CT Stentogram by Interventional Radiology to evaluate residual stone burden

## 2024-07-24 NOTE — PROGRESS NOTE ADULT - ASSESSMENT
ASSESSMENT  70yo male w/ pmhx of HTN, HLD, DMII, CKD stage 5 baseline Cr ~ 4.4, urinary incontinence with chronic supra pubic catheter, Nephrolithiasis, spinal abscess leading to quadriplegia presenting w. NBNB vomiting and nausea every time after he eats.       IMPRESSION  #Bacteroides fragilis and other GNR bacteremia secondary to Pyelonephritis/Pyonephrosis with obstructing stone  PROCEDURES:  Cystoscopic removal of urethral stone 23-Jul-2024 22:46:50  Pietro Akins  Complex cystolitholapaxy 23-Jul-2024 22:48:06  Pietro Akins  Catheterization, ureter, cystoscopic 23-Jul-2024 22:48:43 left for length Pietro Akins  Cystoscopic insertion of ureteric stent 23-Jul-2024 22:49:16 left Pietro Akins  Antegrade ureteroscopy 23-Jul-2024 22:50:06 right Pietro Akins  Change external ureteral stent 23-Jul-2024 22:50:37 right Pietro Akins  Percutaneous nephrostolithotomy for calculus greater than 2 cm in diameter 23-Jul-2024 22:51:20 right renal Pietro Akins  Nephrostogram 23-Jul-2024 22:51:49 right Pietro Akins  Antegrade ureterography 23-Jul-2024 22:52:15 right Pietro Akins  Percutaneous nephrostolithotomy with basket extraction of large calculus 23-Jul-2024 22:53:11 right ureter Pietro Akins  Replacement, suprapubic tube 23-Jul-2024 23:09:11  Pietro Akins  there was a stone in the urethra, then there were several large mucous bladder stones that obscured the left UO so we took them out  the left ureter was tortuous and required a retrograde to help us manipulate the wire and open ended into the left kidney  right kidney was filled with mucous stones, we were no able to wash out the lower pole stones  the right ureter was filled with mucous and one solid stones we took out what we could but I felt we will need to go back and work from below with a second access via the lower pole    7/17 nephrostomy culture- not sterile-   <10,000 CFU/ml Gram Negative Rods    <10,000 CFU/ml Enterococcus faecalis    7/10 R nephrostomy   >100,000 CFU/ml Proteus mirabilis R fluoro R bactrim  Amoxicillin/Clavulanic Acid: S <=8/4    10,000 - 49,000 CFU/mL Serratia marcescens Levofloxacin: S <=0.5    50,000 - 99,000 CFU/mL Enterococcus faecalis Levofloxacin: S 1    7/9 BCX GNR- Bacteroides fragilis    7/9 BCX GNR  - s/p percutaneous right nephrostomy followed by placement of right nephroureteral catheter    WBC 20 on admission ; Afebrile     7/9 UA pyuria WBC 50 ; UCX   >=3 organisms. Probable collection contamination.    SPT (replaced in ER)    CTAP 1.  Severe right hydroureteronephrosis with cortical thinning and   numerous large intrarenal and proximal to mid ureteral calculi;   obstructing calculus in the right mid ureter measures 1.1 x 1 x 1.8 cm (   ) with normal caliber distal ureter.  2.  Multiple nonobstructing left intrarenal calculi and a 0.8 cm left   distal ureteral calculus; no left hydronephrosis.  3.  Few prominent retroperitoneal lymph nodes, likely reactive.  4.  Cholelithiasis.  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 which could results in poor clinical outcomes  #Abx allergy: No Known Allergies  #CKD  Creatinine: 5.3 (07-10-24 @ 06:36)  QTC Calculation(Bazett) 439 ms  Height (cm): 185.4 (09-18-23 @ 13:48)  Weight (kg): 79.379 (09-18-23 @ 13:48)    RECOMMENDATIONS  - f/u WBC, OR CX  - meropenem 500mg q12h IV   - Appreciate Urology consult- will need further intevention  - Appreciate IR consult      If any questions, please send a message or call on [employer]  Please continue to update ID with any pertinent new laboratory, radiographic findings, or change in clinical status   ASSESSMENT  70yo male w/ pmhx of HTN, HLD, DMII, CKD stage 5 baseline Cr ~ 4.4, urinary incontinence with chronic supra pubic catheter, Nephrolithiasis, spinal abscess leading to quadriplegia presenting w. NBNB vomiting and nausea every time after he eats.       IMPRESSION  #Bacteroides fragilis and other GNR bacteremia secondary to Pyelonephritis/Pyonephrosis with obstructing stone  < from: CT Abdomen and Pelvis No Cont (07.24.24 @ 08:05) >  Post bilateral nephroureteral catheters with resolution of hydronephrosis   and decreased renal stone burden. Residual bilateral renal and ureteral   stone burden as described.  Right renal pelvis and periureteral inflammatory changes may be due to recent procedure.  Cholelithiasis and mild gallbladder wall thickening. Correlate with physical exam to assess for Gilesias's sign.  New moderate right perihepatic ascites.  New small right pleural effusion with adjacent consolidative opacities. Differential includes infectious process.  Mild gas distended loops of small bowel may represent an ileus.  PROCEDURES:  Cystoscopic removal of urethral stone 23-Jul-2024 22:46:50  Pietro Akins  Complex cystolitholapaxy 23-Jul-2024 22:48:06  Pietro Akins  Catheterization, ureter, cystoscopic 23-Jul-2024 22:48:43 left for length Pietro Akins  Cystoscopic insertion of ureteric stent 23-Jul-2024 22:49:16 left Pietro Akins  Antegrade ureteroscopy 23-Jul-2024 22:50:06 right Pietro Akins  Change external ureteral stent 23-Jul-2024 22:50:37 right Pietro Akins  Percutaneous nephrostolithotomy for calculus greater than 2 cm in diameter 23-Jul-2024 22:51:20 right renal Pietro Akins  Nephrostogram 23-Jul-2024 22:51:49 right Pietro Akins  Antegrade ureterography 23-Jul-2024 22:52:15 right Pietro Akins  Percutaneous nephrostolithotomy with basket extraction of large calculus 23-Jul-2024 22:53:11 right ureter Pietro Akins  Replacement, suprapubic tube 23-Jul-2024 23:09:11  Pietro Akins  there was a stone in the urethra, then there were several large mucous bladder stones that obscured the left UO so we took them out  the left ureter was tortuous and required a retrograde to help us manipulate the wire and open ended into the left kidney  right kidney was filled with mucous stones, we were no able to wash out the lower pole stones  the right ureter was filled with mucous and one solid stones we took out what we could but I felt we will need to go back and work from below with a second access via the lower pole    7/17 nephrostomy culture- not sterile-   <10,000 CFU/ml Gram Negative Rods    <10,000 CFU/ml Enterococcus faecalis    7/10 R nephrostomy   >100,000 CFU/ml Proteus mirabilis R fluoro R bactrim  Amoxicillin/Clavulanic Acid: S <=8/4    10,000 - 49,000 CFU/mL Serratia marcescens Levofloxacin: S <=0.5    50,000 - 99,000 CFU/mL Enterococcus faecalis Levofloxacin: S 1    7/9 BCX GNR- Bacteroides fragilis    7/9 BCX GNR  - s/p percutaneous right nephrostomy followed by placement of right nephroureteral catheter    WBC 20 on admission ; Afebrile     7/9 UA pyuria WBC 50 ; UCX   >=3 organisms. Probable collection contamination.    SPT (replaced in ER)    CTAP 1.  Severe right hydroureteronephrosis with cortical thinning and   numerous large intrarenal and proximal to mid ureteral calculi;   obstructing calculus in the right mid ureter measures 1.1 x 1 x 1.8 cm (   ) with normal caliber distal ureter.  2.  Multiple nonobstructing left intrarenal calculi and a 0.8 cm left   distal ureteral calculus; no left hydronephrosis.  3.  Few prominent retroperitoneal lymph nodes, likely reactive.  4.  Cholelithiasis.  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 which could results in poor clinical outcomes  #Abx allergy: No Known Allergies  #CKD  Creatinine: 5.3 (07-10-24 @ 06:36)  QTC Calculation(Bazett) 439 ms  Height (cm): 185.4 (09-18-23 @ 13:48)  Weight (kg): 79.379 (09-18-23 @ 13:48)    RECOMMENDATIONS  - f/u WBC, OR CX  - meropenem 500mg q12h IV for now as rising WBC   - Appreciate Urology consult- will need further intervention  - Appreciate IR consult      If any questions, please send a message or call on [employer]  Please continue to update ID with any pertinent new laboratory, radiographic findings, or change in clinical status

## 2024-07-24 NOTE — PROGRESS NOTE ADULT - SUBJECTIVE AND OBJECTIVE BOX
Nephrology Progress Note    KRISTY GARCIA  MRN-[id number]  69y  Male    S:  Patient is seen and examined, events over the last 24h noted.    O:  Allergies:  No Known Allergies    Hospital Medications:   MEDICATIONS  (STANDING):  atorvastatin 20 milliGRAM(s) Oral at bedtime  heparin   Injectable 5000 Unit(s) SubCutaneous every 8 hours  insulin lispro (ADMELOG) corrective regimen sliding scale   SubCutaneous three times a day before meals  insulin lispro (ADMELOG) corrective regimen sliding scale   SubCutaneous at bedtime  lactated ringers. 1000 milliLiter(s) (75 mL/Hr) IV Continuous <Continuous>  meropenem  IVPB 500 milliGRAM(s) IV Intermittent every 12 hours  NIFEdipine XL 30 milliGRAM(s) Oral daily  pantoprazole    Tablet 40 milliGRAM(s) Oral before breakfast  sodium bicarbonate 650 milliGRAM(s) Oral two times a day    MEDICATIONS  (PRN):  aluminum hydroxide/magnesium hydroxide/simethicone Suspension 30 milliLiter(s) Oral every 4 hours PRN Dyspepsia  dextrose Oral Gel 15 Gram(s) Oral once PRN Blood Glucose LESS THAN 70 milliGRAM(s)/deciliter  HYDROmorphone  Injectable 0.5 milliGRAM(s) IV Push every 10 minutes PRN Moderate Pain (4 - 6)  HYDROmorphone  Injectable 1 milliGRAM(s) IV Push every 10 minutes PRN Severe Pain (7 - 10)  melatonin 3 milliGRAM(s) Oral at bedtime PRN Insomnia  ondansetron Injectable 4 milliGRAM(s) IV Push once PRN Nausea and/or Vomiting  ondansetron Injectable 4 milliGRAM(s) IV Push every 8 hours PRN Nausea and/or Vomiting    Home Medications:  atorvastatin 20 mg oral tablet: 1 tab(s) orally once a day (at bedtime) (09 Jul 2024 19:56)  ertapenem 1 g injection: 500 milligram(s) intravenous once a day Last dose 48h post-op with urology, 7/25/24 (17 Jul 2024 16:43)  petrolatum topical ointment: 1 Apply topically to affected area 2 times a day (09 Jul 2024 19:56)      VITALS:  Daily Height in cm: 182.88 (23 Jul 2024 20:42)    Daily   T(F): 97.9 (07-24-24 @ 07:48), Max: 98.3 (07-23-24 @ 13:22)  HR: 90 (07-24-24 @ 07:48)  BP: 125/74 (07-24-24 @ 07:48)  RR: 18 (07-24-24 @ 07:48)  SpO2: 96% (07-24-24 @ 07:48)  Wt(kg): --  I&O's Detail    23 Jul 2024 07:01  -  24 Jul 2024 07:00  --------------------------------------------------------  IN:  Total IN: 0 mL    OUT:    Indwelling Catheter - Suprapubic (mL): 100 mL    Nephrostomy Tube (mL): 150 mL    Urostomy (mL): 1000 mL  Total OUT: 1250 mL    Total NET: -1250 mL        I&O's Summary    23 Jul 2024 07:01  -  24 Jul 2024 07:00  --------------------------------------------------------  IN: 0 mL / OUT: 1250 mL / NET: -1250 mL      Height (cm): 182.9 (07-23 @ 20:42)  Weight (kg): 81.6 (07-23 @ 20:42)  BMI (kg/m2): 24.4 (07-23 @ 20:42)  BSA (m2): 2.04 (07-23 @ 20:42)    PHYSICAL EXAM:  Gen: NAD  Chest: b/l breath sounds  Abd: soft, R PCN  Extremities: no edema      LABS:      07-24    136  |  100  |  63<HH>  ----------------------------<  92  5.0   |  20  |  3.7<H>    Ca    7.7<L>      24 Jul 2024 06:28  Mg     2.1     07-23    TPro  6.7  /  Alb  2.3<L>  /  TBili  0.3  /  DBili      /  AST  14  /  ALT  7   /  AlkPhos  110  07-24    Phosphorus: 4.8 mg/dL (07-22-24 @ 07:11)  Phosphorus: 4.9 mg/dL (07-21-24 @ 04:30)    Intact PTH: 43 pg/mL (07-10-24 @ 21:08)                          8.8    20.66 )-----------( 322      ( 24 Jul 2024 06:28 )             29.1     Mean Cell Volume: 98.6 fL (07-24-24 @ 06:28)    Creatinine trend:  Creatinine: 3.7 mg/dL (07-24-24 @ 06:28)  Creatinine: 3.5 mg/dL (07-23-24 @ 07:18)  Creatinine: 3.7 mg/dL (07-22-24 @ 20:00)  Creatinine: 3.2 mg/dL (07-22-24 @ 07:11)  Creatinine: 3.6 mg/dL (07-21-24 @ 04:30)

## 2024-07-24 NOTE — PROGRESS NOTE ADULT - SUBJECTIVE AND OBJECTIVE BOX
24H events:    Patient is a 69y old Male who presents with a chief complaint of UTI w/ obstructing stone (24 Jul 2024 13:13)    Primary diagnosis of Hydronephrosis with obstructing calculus    Today is 15d of hospitalization. This morning patient was seen and examined at bedside, resting comfortably in bed.    No acute or major events overnight.      PAST MEDICAL & SURGICAL HISTORY  DM (diabetes mellitus)    HTN (hypertension)    H/O paraplegia    Spinal abscess    Renal stones    Spinal abscess  S/P Surgery    Encounter for care or replacement of suprapubic tube      SOCIAL HISTORY:  Social History:      ALLERGIES:  No Known Allergies    MEDICATIONS:  STANDING MEDICATIONS  atorvastatin 20 milliGRAM(s) Oral at bedtime  chlorhexidine 2% Cloths 1 Application(s) Topical daily  dextrose 5%. 1000 milliLiter(s) IV Continuous <Continuous>  dextrose 5%. 1000 milliLiter(s) IV Continuous <Continuous>  dextrose 50% Injectable 25 Gram(s) IV Push once  dextrose 50% Injectable 12.5 Gram(s) IV Push once  dextrose 50% Injectable 25 Gram(s) IV Push once  glucagon  Injectable 1 milliGRAM(s) IntraMuscular once  heparin   Injectable 5000 Unit(s) SubCutaneous every 8 hours  insulin lispro (ADMELOG) corrective regimen sliding scale   SubCutaneous three times a day before meals  insulin lispro (ADMELOG) corrective regimen sliding scale   SubCutaneous at bedtime  lactated ringers. 1000 milliLiter(s) IV Continuous <Continuous>  meropenem  IVPB 500 milliGRAM(s) IV Intermittent every 12 hours  NIFEdipine XL 30 milliGRAM(s) Oral daily  pantoprazole    Tablet 40 milliGRAM(s) Oral before breakfast  sodium bicarbonate 650 milliGRAM(s) Oral two times a day    PRN MEDICATIONS  aluminum hydroxide/magnesium hydroxide/simethicone Suspension 30 milliLiter(s) Oral every 4 hours PRN  dextrose Oral Gel 15 Gram(s) Oral once PRN  HYDROmorphone  Injectable 0.5 milliGRAM(s) IV Push every 10 minutes PRN  HYDROmorphone  Injectable 1 milliGRAM(s) IV Push every 10 minutes PRN  melatonin 3 milliGRAM(s) Oral at bedtime PRN  ondansetron Injectable 4 milliGRAM(s) IV Push once PRN  ondansetron Injectable 4 milliGRAM(s) IV Push every 8 hours PRN    VITALS:   T(F): 97.9  HR: 90  BP: 125/74  RR: 18  SpO2: 96%    PHYSICAL EXAM:  GENERAL:   ( x) NAD, lying in bed comfortably     (  ) obtunded     (  ) lethargic     (  ) somnolent      NECK:  (x) Supple     (  ) neck stiffness     (  ) nuchal rigidity     (  )  no JVD     (  ) JVD present ( -- cm)    HEART:  Rate -->     (x) normal rate     (  ) bradycardic     (  ) tachycardic  Rhythm -->     (x) regular     (  ) regularly irregular     (  ) irregularly irregular  Murmurs -->     (x) normal s1s2     (  ) systolic murmur     (  ) diastolic murmur     (  ) continuous murmur      (  ) S3 present     (  ) S4 present    LUNGS:   ( x)Unlabored respirations     (  ) tachypnea  ( x) B/L air entry     (  ) decreased breath sounds in:  (location     )    ( x) no adventitious sound     (  ) crackles     (  ) wheezing      (  ) rhonchi      (specify location:       )  (  ) chest wall tenderness (specify location:       )    ABDOMEN:   ( x) Soft     (  ) tense   |   (  ) nondistended     (  ) distended   |   (  ) +BS     (  ) hypoactive bowel sounds     (  ) hyperactive bowel sounds  ( x) nontender     (  ) RUQ tenderness     (  ) RLQ tenderness     (  ) LLQ tenderness     (  ) epigastric tenderness     (  ) diffuse tenderness  (  ) Splenomegaly      (  ) Hepatomegaly      (  ) Jaundice     (  ) ecchymosis     EXTREMITIES:  ( x) Normal     (  ) Rash     (  ) ecchymosis     (  ) varicose veins      (  ) pitting edema     (  ) non-pitting edema   (  ) ulceration     (  ) gangrene:     (location:     )    NERVOUS SYSTEM:    ( x) A&Ox3     (  ) confused     (  ) lethargic  CN II-XII:     ( x) Intact     (  ) deficits found     (Specify:     )   Upper extremities:     (  ) no sensorimotor deficits     (  ) weakness     (  ) loss of proprioception/vibration     (  ) loss of touch/temperature (specify:    )  Lower extremities:     (  ) no sensorimotor deficits     (  ) weakness     (  ) loss of proprioception/vibration     (  ) loss of touch/temperature (specify:    )    SKIN:   (  ) No rashes or lesions     (  ) maculopapular rash     (  ) pustules     (  ) vesicles     (  ) ulcer     (  ) ecchymosis     (specify location:     )      LABS:                        8.8    20.66 )-----------( 322      ( 24 Jul 2024 06:28 )             29.1     07-24    136  |  100  |  63<HH>  ----------------------------<  92  5.0   |  20  |  3.7<H>    Ca    7.7<L>      24 Jul 2024 06:28  Mg     2.1     07-23    TPro  6.7  /  Alb  2.3<L>  /  TBili  0.3  /  DBili  x   /  AST  14  /  ALT  7   /  AlkPhos  110  07-24    PT/INR - ( 22 Jul 2024 20:00 )   PT: 12.20 sec;   INR: 1.07 ratio         PTT - ( 22 Jul 2024 20:00 )  PTT:34.9 sec  Urinalysis Basic - ( 24 Jul 2024 06:28 )    Color: x / Appearance: x / SG: x / pH: x  Gluc: 92 mg/dL / Ketone: x  / Bili: x / Urobili: x   Blood: x / Protein: x / Nitrite: x   Leuk Esterase: x / RBC: x / WBC x   Sq Epi: x / Non Sq Epi: x / Bacteria: x            Culture - Blood (collected 22 Jul 2024 11:55)  Source: .Blood None  Preliminary Report (23 Jul 2024 22:01):    No growth at 24 hours

## 2024-07-24 NOTE — PROGRESS NOTE ADULT - SUBJECTIVE AND OBJECTIVE BOX
BETTINANEREIDAKRISTY MARIE  69y, Male  Allergy: No Known Allergies      LOS  15d    CHIEF COMPLAINT: UTI w/ obstructing stone (24 Jul 2024 05:55)      INTERVAL EVENTS/HPI  - No acute events overnight s/p OR   - T(F): , Max: 98.3 (07-23-24 @ 13:22)  - Tolerating medication  - WBC Count: 17.13 (07-23-24 @ 07:18) uptrending   WBC Count: 14.64 (07-22-24 @ 20:00)     - Creatinine: 3.5 (07-23-24 @ 07:18)  Creatinine: 3.7 (07-22-24 @ 20:00)       ROS  ***    VITALS:  T(F): 97.9, Max: 98.3 (07-23-24 @ 13:22)  HR: 90  BP: 125/74  RR: 18Vital Signs Last 24 Hrs  T(C): 36.6 (24 Jul 2024 07:48), Max: 36.8 (23 Jul 2024 13:22)  T(F): 97.9 (24 Jul 2024 07:48), Max: 98.3 (23 Jul 2024 13:22)  HR: 90 (24 Jul 2024 07:48) (78 - 90)  BP: 125/74 (24 Jul 2024 07:48) (106/67 - 125/74)  BP(mean): 84 (23 Jul 2024 20:42) (84 - 84)  RR: 18 (24 Jul 2024 07:48) (14 - 21)  SpO2: 96% (24 Jul 2024 07:48) (96% - 98%)    Parameters below as of 24 Jul 2024 07:48  Patient On (Oxygen Delivery Method): room air        PHYSICAL EXAM:  ***    FH: Non-contributory  Social Hx: Non-contributory    TESTS & MEASUREMENTS:                        9.3    17.13 )-----------( 329      ( 23 Jul 2024 07:18 )             30.2     07-23    138  |  102  |  60<H>  ----------------------------<  113<H>  4.6   |  20  |  3.5<H>    Ca    7.7<L>      23 Jul 2024 07:18  Mg     2.1     07-23    TPro  7.2  /  Alb  2.2<L>  /  TBili  0.3  /  DBili  x   /  AST  18  /  ALT  8   /  AlkPhos  126<H>  07-23      LIVER FUNCTIONS - ( 23 Jul 2024 07:18 )  Alb: 2.2 g/dL / Pro: 7.2 g/dL / ALK PHOS: 126 U/L / ALT: 8 U/L / AST: 18 U/L / GGT: x           Urinalysis Basic - ( 23 Jul 2024 07:18 )    Color: x / Appearance: x / SG: x / pH: x  Gluc: 113 mg/dL / Ketone: x  / Bili: x / Urobili: x   Blood: x / Protein: x / Nitrite: x   Leuk Esterase: x / RBC: x / WBC x   Sq Epi: x / Non Sq Epi: x / Bacteria: x        Culture - Blood (collected 07-22-24 @ 11:55)  Source: .Blood None  Preliminary Report (07-23-24 @ 22:01):    No growth at 24 hours    Culture - Urine (collected 07-17-24 @ 19:20)  Source: OR Collect Nephrostomy - Right  Final Report (07-23-24 @ 00:22):    <10,000 CFU/ml Proteus mirabilis    <10,000 CFU/ml Enterococcus faecalis  Organism: Proteus mirabilis  Enterococcus faecalis (07-23-24 @ 00:22)  Organism: Enterococcus faecalis (07-23-24 @ 00:22)      Method Type: ALF      -  Ampicillin: S <=2 Predicts results to ampicillin/sulbactam, amoxacillin-clavulanate and  piperacillin-tazobactam.      -  Ciprofloxacin: S <=1      -  Levofloxacin: S 1      -  Vancomycin: S 1  Organism: Proteus mirabilis (07-23-24 @ 00:22)      Method Type: ALF      -  Amoxicillin/Clavulanic Acid: I 16/8      -  Ampicillin: R >16 These ampicillin results predict results for amoxicillin      -  Ampicillin/Sulbactam: R >16/8      -  Aztreonam: S <=4      -  Cefazolin: R >16 For uncomplicated UTI with K. pneumoniae, E. coli, or P. mirablis: ALF <=16 is sensitive and ALF >=32 is resistant. This also predicts results for oral agents cefaclor, cefdinir, cefpodoxime, cefprozil, cefuroxime axetil, cephalexin and locarbef for uncomplicated UTI. Note that some isolates may be susceptible to these agents while testing resistant to cefazolin.      -  Cefepime: S <=2      -  Cefoxitin: S <=8      -  Ceftriaxone: S <=1      -  Cefuroxime: S <=4      -  Ciprofloxacin: R >2      -  Ertapenem: S <=0.5      -  Gentamicin: R 8      -  Levofloxacin: R >4      -  Meropenem: S <=1      -  Piperacillin/Tazobactam: S <=8      -  Tobramycin: I 4      -  Trimethoprim/Sulfamethoxazole: R >2/38    Culture - Urine (collected 07-10-24 @ 18:14)  Source: .Urine Nephrostomy - Right  Final Report (07-14-24 @ 20:48):    >100,000 CFU/ml Proteus mirabilis    10,000 - 49,000 CFU/mL Serratia marcescens    50,000 - 99,000 CFU/mL Enterococcus faecalis  Organism: Proteus mirabilis  Serratia marcescens  Enterococcus faecalis (07-14-24 @ 20:48)  Organism: Enterococcus faecalis (07-14-24 @ 20:48)      Method Type: ALF      -  Ampicillin: S <=2 Predicts results to ampicillin/sulbactam, amoxacillin-clavulanate and  piperacillin-tazobactam.      -  Ciprofloxacin: S <=1      -  Levofloxacin: S 1      -  Nitrofurantoin: S <=32 Should not be used to treat pyelonephritis.      -  Tetracycline: R >8      -  Vancomycin: S 1  Organism: Serratia marcescens (07-14-24 @ 20:48)      Method Type: ALF      -  Amoxicillin/Clavulanic Acid: R >16/8      -  Ampicillin: R 16 These ampicillin results predict results for amoxicillin      -  Ampicillin/Sulbactam: R 16/8      -  Aztreonam: S <=4      -  Cefazolin: R >16      -  Cefepime: S <=2      -  Cefoxitin: R <=8      -  Ceftriaxone: S <=1      -  Ciprofloxacin: S <=0.25      -  Ertapenem: S <=0.5      -  Gentamicin: S <=2      -  Levofloxacin: S <=0.5      -  Meropenem: S <=1      -  Nitrofurantoin: R >64 Should not be used to treat pyelonephritis      -  Piperacillin/Tazobactam: S <=8      -  Tobramycin: S <=2      -  Trimethoprim/Sulfamethoxazole: S <=0.5/9.5  Organism: Proteus mirabilis (07-14-24 @ 20:48)      Method Type: ALF      -  Amoxicillin/Clavulanic Acid: S <=8/4      -  Ampicillin: R >16 These ampicillin results predict results for amoxicillin      -  Ampicillin/Sulbactam: I 16/8      -  Aztreonam: S <=4      -  Cefazolin: S 8 For uncomplicated UTI with K. pneumoniae, E. coli, or P. mirablis: ALF <=16 is sensitive and ALF >=32 is resistant. This also predicts results for oral agents cefaclor, cefdinir, cefpodoxime, cefprozil, cefuroxime axetil, cephalexin and locarbef for uncomplicated UTI. Note that some isolates may be susceptible to these agents while testing resistant to cefazolin.      -  Cefepime: S <=2      -  Cefoxitin: S <=8      -  Ceftriaxone: S <=1      -  Cefuroxime: S <=4      -  Ciprofloxacin: R >2      -  Ertapenem: S <=0.5      -  Gentamicin: I 4      -  Levofloxacin: R >4      -  Meropenem: S <=1      -  Nitrofurantoin: R 64 Should not be used to treat pyelonephritis      -  Piperacillin/Tazobactam: S <=8      -  Tobramycin: S <=2      -  Trimethoprim/Sulfamethoxazole: R >2/38    Culture - Blood (collected 07-09-24 @ 14:20)  Source: .Blood Blood-Peripheral  Gram Stain (07-11-24 @ 01:20):    Growth in anaerobic bottle: Gram Negative Rods  Preliminary Report (07-16-24 @ 09:11):    Growth in anaerobic bottle: Gram Negative Rods    Growth in anaerobic bottle: Gram Negative Rods Sent to    [place] [facility]  for Identification    .    Direct identification is available within approximately 3-5    hours eitherby Blood Panel Multiplexed PCR or Direct    MALDI-TOF. Details: [email or web address]  Organism: Blood Culture PCR (07-11-24 @ 03:23)  Organism: Blood Culture PCR (07-11-24 @ 03:23)      Method Type: PCR      -  Blood PCR Panel: NEG    Culture - Blood (collected 07-09-24 @ 14:20)  Source: .Blood Blood-Peripheral  Gram Stain (07-12-24 @ 04:50):    Growth in anaerobic bottle: Gram Negative Rods  Final Report (07-13-24 @ 09:30):    Growth in anaerobic bottle: Bacteroides fragilis    "Susceptibilities not performed"    Culture - Urine (collected 07-09-24 @ 13:50)  Source: Clean Catch Clean Catch (Midstream)  Final Report (07-10-24 @ 23:26):    >=3 organisms. Probable collection contamination.            INFECTIOUS DISEASES TESTING  strept    INFLAMMATORY MARKERS      RADIOLOGY & ADDITIONAL TESTS:  I have personally reviewed the last available Chest xray  CXR      CT  CT Abdomen and Pelvis No Cont:   ACC: [id number] EXAM:  CT ABDOMEN AND PELVIS   ORDERED BY: VERONIKA DURAN     PROCEDURE DATE:  07/24/2024          INTERPRETATION:  Clinical Indication: Assess stone burden. Post right   PCNL.    Technique: Multidetector-row CT images of the abdomen and pelvis were   obtained from the xiphoid through the symphysis pubis. No contrast was   administered. Coronal and sagittal reconstructions were performed.    Comparison: CT abdomen pelvis 7/9/2024.    Findings:    01. LIVER: Normal morphology. Punctate calcification may represent   calcified granulomas.  02. SPLEEN: Normal unenhanced.  03. PANCREAS: Normal unenhanced.  04. GALLBLADDER/BILIARY TREE: Cholelithiasis with mild wall thickening.   No biliary dilation.  05. ADRENALS: Normal.  06. KIDNEYS: Post right percutaneous nephroureteral catheter with the   pigtails terminating in the right renal pelvis and urinary bladder. Left   nephroureteral catheter with the pigtail catheter in the left renal   pelvis and urinary bladder. No hydronephrosis. Right renal collecting   system air likely due to recent procedure. Inflammatory changes are noted   along the right renal pelvis and ureter.  Improved renal/ureteral stone burden with residual stone burden as   follows:  -1.4 x 1.3 cm right renal lower pole stone (series 8 image 58)  -1.3 x 0.7 cm right renal upper pole cyst (series 6 image 177)  -0.7 x 0.5 right mid ureteral stone (series 8 image 15)  -0.5 x 0.3 cm right mid ureteral stone (series 8 image 50)  -1 x 0.9 cm left renal lower pole stone (series 8 image 69)  -1.3 x 0.6 cm left renal midpole. An (series 8 image 70)  -0.6 cm left renal midpole stone (series 8 image 67)  Additional punctate left renal upper pole stones  07. LYMPHADENOPATHY/RETROPERITONEUM: No lymphadenopathy.  08. BOWEL: Mild gas distended loops of small bowel may represent a   developing ileus. Moderate rectal stool burden.  09. PELVIC VISCERA: Prostate measures 5 cm in transverse dimension   (series 4 image 150). Punctate focus of air in the anterior prostate   versus urinary bladder, is nonspecific. Suprapubic catheter and under   distended urinary bladder. Air within the urinary bladder likely due to   instrumentation.  10. PELVIC LYMPH NODES: No lymphadenopathy.  11. VASCULATURE: No abdominal aortic aneurysm. Calcified atherosclerotic   disease of the aorta and its branches.  12. PERITONEUM/ABDOMINAL WALL: New moderate volume perihepatic ascites.   Tiny fat-containing umbilical hernia. Bilateral small to moderate   fat-containing inguinal hernias.  13. SKELETAL: Degenerative changes.  14. LUNG BASES: New small right pleural effusion with adjacent   consolidative opacities. Areas of small atelectasis. Ascending thoracic   aorta aneurysm measuring 4.1 cm (series 6 image 8). Coronary artery   calcification. Bilateral gynecomastia.    IMPRESSION:    Post bilateral nephroureteral catheters with resolution of hydronephrosis   and decreased renal stone burden. Residual bilateral renal and ureteral   stone burden as described.    Right renal pelvis and periureteral inflammatory changes may be due to   recent procedure.    Cholelithiasis and mild gallbladder wall thickening. Correlate with   physical exam to assess for Iglesias's sign.    New moderate right perihepatic ascites.    New small right pleural effusion with adjacent consolidative opacities.   Differential includes infectious process.    Mild gas distended loops of small bowel may represent an ileus.    Additional findings are detailed in the body of the report.      --- End of Report ---            CHANDLER BERMUDEZ MD; Attending Radiologist  This document has been electronically signed. Jul 24 2024  8:31AM (07-24-24 @ 08:05)      CARDIOLOGY TESTING      MEDICATIONS  atorvastatin 20 Oral at bedtime  chlorhexidine 2% Cloths 1 Topical daily  dextrose 5%. 1000 IV Continuous <Continuous>  dextrose 5%. 1000 IV Continuous <Continuous>  dextrose 50% Injectable 12.5 IV Push once  dextrose 50% Injectable 25 IV Push once  dextrose 50% Injectable 25 IV Push once  glucagon  Injectable 1 IntraMuscular once  heparin   Injectable 5000 SubCutaneous every 8 hours  insulin lispro (ADMELOG) corrective regimen sliding scale  SubCutaneous three times a day before meals  insulin lispro (ADMELOG) corrective regimen sliding scale  SubCutaneous at bedtime  lactated ringers. 1000 IV Continuous <Continuous>  meropenem  IVPB 500 IV Intermittent every 12 hours  NIFEdipine XL 30 Oral daily  pantoprazole    Tablet 40 Oral before breakfast  sodium bicarbonate 650 Oral two times a day      WEIGHT  Weight (kg): 81.6 (07-23-24 @ 20:42)      ANTIBIOTICS:  meropenem  IVPB 500 milliGRAM(s) IV Intermittent every 12 hours      All available historical records have been reviewed       LISAKRISTY MARIE  69y, Male  Allergy: No Known Allergies      LOS  15d    CHIEF COMPLAINT: UTI w/ obstructing stone (24 Jul 2024 05:55)      INTERVAL EVENTS/HPI  - No acute events overnight s/p OR   - T(F): , Max: 98.3 (07-23-24 @ 13:22)  - Tolerating medication  - WBC Count: 17.13 (07-23-24 @ 07:18) uptrending --> 20   WBC Count: 14.64 (07-22-24 @ 20:00)     - Creatinine: 3.5 (07-23-24 @ 07:18)  Creatinine: 3.7 (07-22-24 @ 20:00)       ROS  General: Denies rigors, nightsweats  HEENT: Denies headache, rhinorrhea, sore throat, eye pain  CV: Denies CP, palpitations  PULM: Denies wheezing, hemoptysis  GI: Denies hematemesis, hematochezia, melena  : Denies discharge, hematuria  MSK: Denies arthralgias, myalgias  SKIN: Denies rash, lesions  NEURO: Denies paresthesias, weakness  PSYCH: Denies depression, anxiety     VITALS:  T(F): 97.9, Max: 98.3 (07-23-24 @ 13:22)  HR: 90  BP: 125/74  RR: 18Vital Signs Last 24 Hrs  T(C): 36.6 (24 Jul 2024 07:48), Max: 36.8 (23 Jul 2024 13:22)  T(F): 97.9 (24 Jul 2024 07:48), Max: 98.3 (23 Jul 2024 13:22)  HR: 90 (24 Jul 2024 07:48) (78 - 90)  BP: 125/74 (24 Jul 2024 07:48) (106/67 - 125/74)  BP(mean): 84 (23 Jul 2024 20:42) (84 - 84)  RR: 18 (24 Jul 2024 07:48) (14 - 21)  SpO2: 96% (24 Jul 2024 07:48) (96% - 98%)    Parameters below as of 24 Jul 2024 07:48  Patient On (Oxygen Delivery Method): room air        PHYSICAL EXAM:  Gen: NAD, resting in bed  HEENT: Normocephalic, atraumatic  Neck: supple, no lymphadenopathy  CV: Regular rate & regular rhythm  Lungs: decreased BS at bases, no fremitus  Abdomen: Soft, BS present SPT, R PCN sanguinous  Ext: Warm, well perfused  Neuro: non focal, awake  Skin: no rash, no erythema  Lines: no phlebitis     FH: Non-contributory  Social Hx: Non-contributory    TESTS & MEASUREMENTS:                        9.3    17.13 )-----------( 329      ( 23 Jul 2024 07:18 )             30.2     07-23    138  |  102  |  60<H>  ----------------------------<  113<H>  4.6   |  20  |  3.5<H>    Ca    7.7<L>      23 Jul 2024 07:18  Mg     2.1     07-23    TPro  7.2  /  Alb  2.2<L>  /  TBili  0.3  /  DBili  x   /  AST  18  /  ALT  8   /  AlkPhos  126<H>  07-23      LIVER FUNCTIONS - ( 23 Jul 2024 07:18 )  Alb: 2.2 g/dL / Pro: 7.2 g/dL / ALK PHOS: 126 U/L / ALT: 8 U/L / AST: 18 U/L / GGT: x           Urinalysis Basic - ( 23 Jul 2024 07:18 )    Color: x / Appearance: x / SG: x / pH: x  Gluc: 113 mg/dL / Ketone: x  / Bili: x / Urobili: x   Blood: x / Protein: x / Nitrite: x   Leuk Esterase: x / RBC: x / WBC x   Sq Epi: x / Non Sq Epi: x / Bacteria: x        Culture - Blood (collected 07-22-24 @ 11:55)  Source: .Blood None  Preliminary Report (07-23-24 @ 22:01):    No growth at 24 hours    Culture - Urine (collected 07-17-24 @ 19:20)  Source: OR Collect Nephrostomy - Right  Final Report (07-23-24 @ 00:22):    <10,000 CFU/ml Proteus mirabilis    <10,000 CFU/ml Enterococcus faecalis  Organism: Proteus mirabilis  Enterococcus faecalis (07-23-24 @ 00:22)  Organism: Enterococcus faecalis (07-23-24 @ 00:22)      Method Type: ALF      -  Ampicillin: S <=2 Predicts results to ampicillin/sulbactam, amoxacillin-clavulanate and  piperacillin-tazobactam.      -  Ciprofloxacin: S <=1      -  Levofloxacin: S 1      -  Vancomycin: S 1  Organism: Proteus mirabilis (07-23-24 @ 00:22)      Method Type: ALF      -  Amoxicillin/Clavulanic Acid: I 16/8      -  Ampicillin: R >16 These ampicillin results predict results for amoxicillin      -  Ampicillin/Sulbactam: R >16/8      -  Aztreonam: S <=4      -  Cefazolin: R >16 For uncomplicated UTI with K. pneumoniae, E. coli, or P. mirablis: ALF <=16 is sensitive and ALF >=32 is resistant. This also predicts results for oral agents cefaclor, cefdinir, cefpodoxime, cefprozil, cefuroxime axetil, cephalexin and locarbef for uncomplicated UTI. Note that some isolates may be susceptible to these agents while testing resistant to cefazolin.      -  Cefepime: S <=2      -  Cefoxitin: S <=8      -  Ceftriaxone: S <=1      -  Cefuroxime: S <=4      -  Ciprofloxacin: R >2      -  Ertapenem: S <=0.5      -  Gentamicin: R 8      -  Levofloxacin: R >4      -  Meropenem: S <=1      -  Piperacillin/Tazobactam: S <=8      -  Tobramycin: I 4      -  Trimethoprim/Sulfamethoxazole: R >2/38    Culture - Urine (collected 07-10-24 @ 18:14)  Source: .Urine Nephrostomy - Right  Final Report (07-14-24 @ 20:48):    >100,000 CFU/ml Proteus mirabilis    10,000 - 49,000 CFU/mL Serratia marcescens    50,000 - 99,000 CFU/mL Enterococcus faecalis  Organism: Proteus mirabilis  Serratia marcescens  Enterococcus faecalis (07-14-24 @ 20:48)  Organism: Enterococcus faecalis (07-14-24 @ 20:48)      Method Type: ALF      -  Ampicillin: S <=2 Predicts results to ampicillin/sulbactam, amoxacillin-clavulanate and  piperacillin-tazobactam.      -  Ciprofloxacin: S <=1      -  Levofloxacin: S 1      -  Nitrofurantoin: S <=32 Should not be used to treat pyelonephritis.      -  Tetracycline: R >8      -  Vancomycin: S 1  Organism: Serratia marcescens (07-14-24 @ 20:48)      Method Type: ALF      -  Amoxicillin/Clavulanic Acid: R >16/8      -  Ampicillin: R 16 These ampicillin results predict results for amoxicillin      -  Ampicillin/Sulbactam: R 16/8      -  Aztreonam: S <=4      -  Cefazolin: R >16      -  Cefepime: S <=2      -  Cefoxitin: R <=8      -  Ceftriaxone: S <=1      -  Ciprofloxacin: S <=0.25      -  Ertapenem: S <=0.5      -  Gentamicin: S <=2      -  Levofloxacin: S <=0.5      -  Meropenem: S <=1      -  Nitrofurantoin: R >64 Should not be used to treat pyelonephritis      -  Piperacillin/Tazobactam: S <=8      -  Tobramycin: S <=2      -  Trimethoprim/Sulfamethoxazole: S <=0.5/9.5  Organism: Proteus mirabilis (07-14-24 @ 20:48)      Method Type: ALF      -  Amoxicillin/Clavulanic Acid: S <=8/4      -  Ampicillin: R >16 These ampicillin results predict results for amoxicillin      -  Ampicillin/Sulbactam: I 16/8      -  Aztreonam: S <=4      -  Cefazolin: S 8 For uncomplicated UTI with K. pneumoniae, E. coli, or P. mirablis: ALF <=16 is sensitive and ALF >=32 is resistant. This also predicts results for oral agents cefaclor, cefdinir, cefpodoxime, cefprozil, cefuroxime axetil, cephalexin and locarbef for uncomplicated UTI. Note that some isolates may be susceptible to these agents while testing resistant to cefazolin.      -  Cefepime: S <=2      -  Cefoxitin: S <=8      -  Ceftriaxone: S <=1      -  Cefuroxime: S <=4      -  Ciprofloxacin: R >2      -  Ertapenem: S <=0.5      -  Gentamicin: I 4      -  Levofloxacin: R >4      -  Meropenem: S <=1      -  Nitrofurantoin: R 64 Should not be used to treat pyelonephritis      -  Piperacillin/Tazobactam: S <=8      -  Tobramycin: S <=2      -  Trimethoprim/Sulfamethoxazole: R >2/38    Culture - Blood (collected 07-09-24 @ 14:20)  Source: .Blood Blood-Peripheral  Gram Stain (07-11-24 @ 01:20):    Growth in anaerobic bottle: Gram Negative Rods  Preliminary Report (07-16-24 @ 09:11):    Growth in anaerobic bottle: Gram Negative Rods    Growth in anaerobic bottle: Gram Negative Rods Sent to    [place] [facility]  for Identification    .    Direct identification is available within approximately 3-5    hours eitherby Blood Panel Multiplexed PCR or Direct    MALDI-TOF. Details: [email or web address]  Organism: Blood Culture PCR (07-11-24 @ 03:23)  Organism: Blood Culture PCR (07-11-24 @ 03:23)      Method Type: PCR      -  Blood PCR Panel: NEG    Culture - Blood (collected 07-09-24 @ 14:20)  Source: .Blood Blood-Peripheral  Gram Stain (07-12-24 @ 04:50):    Growth in anaerobic bottle: Gram Negative Rods  Final Report (07-13-24 @ 09:30):    Growth in anaerobic bottle: Bacteroides fragilis    "Susceptibilities not performed"    Culture - Urine (collected 07-09-24 @ 13:50)  Source: Clean Catch Clean Catch (Midstream)  Final Report (07-10-24 @ 23:26):    >=3 organisms. Probable collection contamination.            INFECTIOUS DISEASES TESTING  strept    INFLAMMATORY MARKERS      RADIOLOGY & ADDITIONAL TESTS:  I have personally reviewed the last available Chest xray  CXR      CT  CT Abdomen and Pelvis No Cont:   ACC: [id number] EXAM:  CT ABDOMEN AND PELVIS   ORDERED BY: VERONIKA DURAN     PROCEDURE DATE:  07/24/2024          INTERPRETATION:  Clinical Indication: Assess stone burden. Post right   PCNL.    Technique: Multidetector-row CT images of the abdomen and pelvis were   obtained from the xiphoid through the symphysis pubis. No contrast was   administered. Coronal and sagittal reconstructions were performed.    Comparison: CT abdomen pelvis 7/9/2024.    Findings:    01. LIVER: Normal morphology. Punctate calcification may represent   calcified granulomas.  02. SPLEEN: Normal unenhanced.  03. PANCREAS: Normal unenhanced.  04. GALLBLADDER/BILIARY TREE: Cholelithiasis with mild wall thickening.   No biliary dilation.  05. ADRENALS: Normal.  06. KIDNEYS: Post right percutaneous nephroureteral catheter with the   pigtails terminating in the right renal pelvis and urinary bladder. Left   nephroureteral catheter with the pigtail catheter in the left renal   pelvis and urinary bladder. No hydronephrosis. Right renal collecting   system air likely due to recent procedure. Inflammatory changes are noted   along the right renal pelvis and ureter.  Improved renal/ureteral stone burden with residual stone burden as   follows:  -1.4 x 1.3 cm right renal lower pole stone (series 8 image 58)  -1.3 x 0.7 cm right renal upper pole cyst (series 6 image 177)  -0.7 x 0.5 right mid ureteral stone (series 8 image 15)  -0.5 x 0.3 cm right mid ureteral stone (series 8 image 50)  -1 x 0.9 cm left renal lower pole stone (series 8 image 69)  -1.3 x 0.6 cm left renal midpole. An (series 8 image 70)  -0.6 cm left renal midpole stone (series 8 image 67)  Additional punctate left renal upper pole stones  07. LYMPHADENOPATHY/RETROPERITONEUM: No lymphadenopathy.  08. BOWEL: Mild gas distended loops of small bowel may represent a   developing ileus. Moderate rectal stool burden.  09. PELVIC VISCERA: Prostate measures 5 cm in transverse dimension   (series 4 image 150). Punctate focus of air in the anterior prostate   versus urinary bladder, is nonspecific. Suprapubic catheter and under   distended urinary bladder. Air within the urinary bladder likely due to   instrumentation.  10. PELVIC LYMPH NODES: No lymphadenopathy.  11. VASCULATURE: No abdominal aortic aneurysm. Calcified atherosclerotic   disease of the aorta and its branches.  12. PERITONEUM/ABDOMINAL WALL: New moderate volume perihepatic ascites.   Tiny fat-containing umbilical hernia. Bilateral small to moderate   fat-containing inguinal hernias.  13. SKELETAL: Degenerative changes.  14. LUNG BASES: New small right pleural effusion with adjacent   consolidative opacities. Areas of small atelectasis. Ascending thoracic   aorta aneurysm measuring 4.1 cm (series 6 image 8). Coronary artery   calcification. Bilateral gynecomastia.    IMPRESSION:    Post bilateral nephroureteral catheters with resolution of hydronephrosis   and decreased renal stone burden. Residual bilateral renal and ureteral   stone burden as described.    Right renal pelvis and periureteral inflammatory changes may be due to   recent procedure.    Cholelithiasis and mild gallbladder wall thickening. Correlate with   physical exam to assess for Iglesias's sign.    New moderate right perihepatic ascites.    New small right pleural effusion with adjacent consolidative opacities.   Differential includes infectious process.    Mild gas distended loops of small bowel may represent an ileus.    Additional findings are detailed in the body of the report.      --- End of Report ---            CHANDLER BERMUDEZ MD; Attending Radiologist  This document has been electronically signed. Jul 24 2024  8:31AM (07-24-24 @ 08:05)      CARDIOLOGY TESTING      MEDICATIONS  atorvastatin 20 Oral at bedtime  chlorhexidine 2% Cloths 1 Topical daily  dextrose 5%. 1000 IV Continuous <Continuous>  dextrose 5%. 1000 IV Continuous <Continuous>  dextrose 50% Injectable 12.5 IV Push once  dextrose 50% Injectable 25 IV Push once  dextrose 50% Injectable 25 IV Push once  glucagon  Injectable 1 IntraMuscular once  heparin   Injectable 5000 SubCutaneous every 8 hours  insulin lispro (ADMELOG) corrective regimen sliding scale  SubCutaneous three times a day before meals  insulin lispro (ADMELOG) corrective regimen sliding scale  SubCutaneous at bedtime  lactated ringers. 1000 IV Continuous <Continuous>  meropenem  IVPB 500 IV Intermittent every 12 hours  NIFEdipine XL 30 Oral daily  pantoprazole    Tablet 40 Oral before breakfast  sodium bicarbonate 650 Oral two times a day      WEIGHT  Weight (kg): 81.6 (07-23-24 @ 20:42)      ANTIBIOTICS:  meropenem  IVPB 500 milliGRAM(s) IV Intermittent every 12 hours      All available historical records have been reviewed

## 2024-07-24 NOTE — PROGRESS NOTE ADULT - ASSESSMENT
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 NBNB vomiting and nausea every time after he eats  # Obstructing R mid ureter calculus with severe hydroureteronephrosis s/p Stent   # B/L nephrolithiasis   # Post-renal JOÃO on advanced CKD  # HAGMA   # Chronic suprapubic catheter   - s/p cystoscopic removal of bladder and ureteral stones on 7/23 / appreciate  f/u   - creat stable at baseline  - cont strict I and O   - cont po bicarb  - last ph at goal   - appreciate ID f/u / cont meropenem  - BP controlled  - no need to start RRT at this time  will follow

## 2024-07-25 LAB
ALBUMIN SERPL ELPH-MCNC: 2.2 G/DL — LOW (ref 3.5–5.2)
ALP SERPL-CCNC: 119 U/L — HIGH (ref 30–115)
ALT FLD-CCNC: 5 U/L — SIGNIFICANT CHANGE UP (ref 0–41)
ANION GAP SERPL CALC-SCNC: 14 MMOL/L — SIGNIFICANT CHANGE UP (ref 7–14)
AST SERPL-CCNC: 12 U/L — SIGNIFICANT CHANGE UP (ref 0–41)
BILIRUB SERPL-MCNC: 0.2 MG/DL — SIGNIFICANT CHANGE UP (ref 0.2–1.2)
BUN SERPL-MCNC: 59 MG/DL — HIGH (ref 10–20)
CALCIUM SERPL-MCNC: 7.6 MG/DL — LOW (ref 8.4–10.5)
CHLORIDE SERPL-SCNC: 103 MMOL/L — SIGNIFICANT CHANGE UP (ref 98–110)
CO2 SERPL-SCNC: 22 MMOL/L — SIGNIFICANT CHANGE UP (ref 17–32)
CREAT SERPL-MCNC: 4 MG/DL — HIGH (ref 0.7–1.5)
EGFR: 15 ML/MIN/1.73M2 — LOW
GLUCOSE BLDC GLUCOMTR-MCNC: 127 MG/DL — HIGH (ref 70–99)
GLUCOSE BLDC GLUCOMTR-MCNC: 128 MG/DL — HIGH (ref 70–99)
GLUCOSE BLDC GLUCOMTR-MCNC: 135 MG/DL — HIGH (ref 70–99)
GLUCOSE BLDC GLUCOMTR-MCNC: 169 MG/DL — HIGH (ref 70–99)
GLUCOSE SERPL-MCNC: 121 MG/DL — HIGH (ref 70–99)
GRAM STN FLD: SIGNIFICANT CHANGE UP
GRAM STN FLD: SIGNIFICANT CHANGE UP
HCT VFR BLD CALC: 28.3 % — LOW (ref 42–52)
HGB BLD-MCNC: 8.5 G/DL — LOW (ref 14–18)
MCHC RBC-ENTMCNC: 29.5 PG — SIGNIFICANT CHANGE UP (ref 27–31)
MCHC RBC-ENTMCNC: 30 G/DL — LOW (ref 32–37)
MCV RBC AUTO: 98.3 FL — HIGH (ref 80–94)
NIGHT BLUE STAIN TISS: SIGNIFICANT CHANGE UP
NRBC # BLD: 0 /100 WBCS — SIGNIFICANT CHANGE UP (ref 0–0)
PLATELET # BLD AUTO: 356 K/UL — SIGNIFICANT CHANGE UP (ref 130–400)
PMV BLD: 11.1 FL — HIGH (ref 7.4–10.4)
POTASSIUM SERPL-MCNC: 5 MMOL/L — SIGNIFICANT CHANGE UP (ref 3.5–5)
POTASSIUM SERPL-SCNC: 5 MMOL/L — SIGNIFICANT CHANGE UP (ref 3.5–5)
PROT SERPL-MCNC: 6.7 G/DL — SIGNIFICANT CHANGE UP (ref 6–8)
RBC # BLD: 2.88 M/UL — LOW (ref 4.7–6.1)
RBC # FLD: 13.5 % — SIGNIFICANT CHANGE UP (ref 11.5–14.5)
SODIUM SERPL-SCNC: 139 MMOL/L — SIGNIFICANT CHANGE UP (ref 135–146)
SPECIMEN SOURCE: SIGNIFICANT CHANGE UP
WBC # BLD: 18.03 K/UL — HIGH (ref 4.8–10.8)
WBC # FLD AUTO: 18.03 K/UL — HIGH (ref 4.8–10.8)

## 2024-07-25 PROCEDURE — 99448 NTRPROF PH1/NTRNET/EHR 21-30: CPT

## 2024-07-25 PROCEDURE — 99232 SBSQ HOSP IP/OBS MODERATE 35: CPT | Mod: GC

## 2024-07-25 RX ADMIN — HEPARIN SODIUM 5000 UNIT(S): 1000 INJECTION, SOLUTION INTRAVENOUS; SUBCUTANEOUS at 05:36

## 2024-07-25 RX ADMIN — Medication 650 MILLIGRAM(S): at 05:36

## 2024-07-25 RX ADMIN — CHLORHEXIDINE GLUCONATE 1 APPLICATION(S): 500 CLOTH TOPICAL at 11:02

## 2024-07-25 RX ADMIN — ATORVASTATIN CALCIUM 20 MILLIGRAM(S): 40 TABLET, FILM COATED ORAL at 21:11

## 2024-07-25 RX ADMIN — HEPARIN SODIUM 5000 UNIT(S): 1000 INJECTION, SOLUTION INTRAVENOUS; SUBCUTANEOUS at 13:20

## 2024-07-25 RX ADMIN — DEXTROSE MONOHYDRATE, SODIUM CHLORIDE, SODIUM LACTATE, CALCIUM CHLORIDE, MAGNESIUM CHLORIDE 75 MILLILITER(S): 1.5; 538; 448; 18.4; 5.08 SOLUTION INTRAPERITONEAL at 21:13

## 2024-07-25 RX ADMIN — Medication 650 MILLIGRAM(S): at 17:17

## 2024-07-25 RX ADMIN — NIFEDIPINE 30 MILLIGRAM(S): 20 CAPSULE, LIQUID FILLED ORAL at 05:36

## 2024-07-25 RX ADMIN — MEROPENEM 100 MILLIGRAM(S): 1 INJECTION, POWDER, FOR SOLUTION INTRAVENOUS at 17:17

## 2024-07-25 RX ADMIN — MEROPENEM 100 MILLIGRAM(S): 1 INJECTION, POWDER, FOR SOLUTION INTRAVENOUS at 05:35

## 2024-07-25 RX ADMIN — PANTOPRAZOLE SODIUM 40 MILLIGRAM(S): 20 TABLET, DELAYED RELEASE ORAL at 05:36

## 2024-07-25 NOTE — PROGRESS NOTE ADULT - ASSESSMENT
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 NBNB vomiting and nausea every time after he eats.       IMPRESSION  #Bacteroides fragilis and other GNR bacteremia secondary to Pyelonephritis/Pyonephrosis with obstructing stone    7/23 OR cx NG   < from: CT Abdomen and Pelvis No Cont (07.24.24 @ 08:05) >  Post bilateral nephroureteral catheters with resolution of hydronephrosis   and decreased renal stone burden. Residual bilateral renal and ureteral   stone burden as described.  Right renal pelvis and periureteral inflammatory changes may be due to recent procedure.  Cholelithiasis and mild gallbladder wall thickening. Correlate with physical exam to assess for Iglesias's sign.  New moderate right perihepatic ascites.  New small right pleural effusion with adjacent consolidative opacities. Differential includes infectious process.  Mild gas distended loops of small bowel may represent an ileus.  PROCEDURES:  Cystoscopic removal of urethral stone 23-Jul-2024 22:46:50  Pietro Akins  Complex cystolitholapaxy 23-Jul-2024 22:48:06  Pietro Akins  Catheterization, ureter, cystoscopic 23-Jul-2024 22:48:43 left for length Pietro Akins  Cystoscopic insertion of ureteric stent 23-Jul-2024 22:49:16 left Pietro Akins  Antegrade ureteroscopy 23-Jul-2024 22:50:06 right Pietro Akins  Change external ureteral stent 23-Jul-2024 22:50:37 right Pietro Akins  Percutaneous nephrostolithotomy for calculus greater than 2 cm in diameter 23-Jul-2024 22:51:20 right renal Pietro Akins  Nephrostogram 23-Jul-2024 22:51:49 right Pietro Akins  Antegrade ureterography 23-Jul-2024 22:52:15 right Pietro Akins  Percutaneous nephrostolithotomy with basket extraction of large calculus 23-Jul-2024 22:53:11 right ureter Pietro Akins  Replacement, suprapubic tube 23-Jul-2024 23:09:11  Pietro Akins  there was a stone in the urethra, then there were several large mucous bladder stones that obscured the left UO so we took them out  the left ureter was tortuous and required a retrograde to help us manipulate the wire and open ended into the left kidney  right kidney was filled with mucous stones, we were no able to wash out the lower pole stones  the right ureter was filled with mucous and one solid stones we took out what we could but I felt we will need to go back and work from below with a second access via the lower pole    7/17 nephrostomy culture- not sterile-   <10,000 CFU/ml Gram Negative Rods    <10,000 CFU/ml Enterococcus faecalis    7/10 R nephrostomy   >100,000 CFU/ml Proteus mirabilis R fluoro R bactrim  Amoxicillin/Clavulanic Acid: S <=8/4    10,000 - 49,000 CFU/mL Serratia marcescens Levofloxacin: S <=0.5    50,000 - 99,000 CFU/mL Enterococcus faecalis Levofloxacin: S 1    7/9 BCX GNR- Bacteroides fragilis    7/9 BCX GNR  - s/p percutaneous right nephrostomy followed by placement of right nephroureteral catheter    WBC 20 on admission ; Afebrile     7/9 UA pyuria WBC 50 ; UCX   >=3 organisms. Probable collection contamination.    SPT (replaced in ER)    CTAP 1.  Severe right hydroureteronephrosis with cortical thinning and   numerous large intrarenal and proximal to mid ureteral calculi;   obstructing calculus in the right mid ureter measures 1.1 x 1 x 1.8 cm (   ) with normal caliber distal ureter.  2.  Multiple nonobstructing left intrarenal calculi and a 0.8 cm left   distal ureteral calculus; no left hydronephrosis.  3.  Few prominent retroperitoneal lymph nodes, likely reactive.  4.  Cholelithiasis.  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 which could results in poor clinical outcomes  #Abx allergy: No Known Allergies  #CKD  Creatinine: 5.3 (07-10-24 @ 06:36)  QTC Calculation(Bazett) 439 ms  Height (cm): 185.4 (09-18-23 @ 13:48)  Weight (kg): 79.379 (09-18-23 @ 13:48)    RECOMMENDATIONS  - OR cx NG  - Continue meropenem 500mg q12h IV   -  Patient planned to return to IR for RIGHT PCN placement for new access (upper pole to lower pole of R kidney) tomorrow   - Then if no complications and no signs of sepsis , D/C antibiotics in the evening 7/27  - Appreciate Urology consult- will need further intervention  - Appreciate IR consult      If any questions, please send a message or call on [employer] Teams  Please continue to update ID with any pertinent new laboratory, radiographic findings, or change in clinical status

## 2024-07-25 NOTE — PROGRESS NOTE ADULT - ASSESSMENT
68yo male w/ pmhx of HTN, HLD, DMII, CKD stage 5 baseline Cr ~ 4.4, urinary incontinence with chronic supra pubic catheter, Nephrolithiasis, spinal abscess leading to quadriplegia presenting w. NBNB vomiting and nausea every time after he eats  # Obstructing R mid ureter calculus with severe hydroureteronephrosis s/p Stent   # B/L nephrolithiasis   # Post-renal JOÃO on advanced CKD  # HAGMA   # Chronic suprapubic catheter   - s/p cystoscopic removal of bladder and ureteral stones on 7/23 / appreciate  f/u   - creat up today consider IVF LR at 75 cc per hour   - cont strict I and O   - cont po bicarb  - last ph at goal   - appreciate ID f/u / cont meropenem  - BP controlled  - no need to start RRT at this time  will follow

## 2024-07-25 NOTE — PROGRESS NOTE ADULT - SUBJECTIVE AND OBJECTIVE BOX
24H events:    Patient is a 69y old Male who presents with a chief complaint of UTI w/ obstructing stone (25 Jul 2024 08:50)    Primary diagnosis of Hydronephrosis with obstructing calculus        Today is 16d of hospitalization. This morning patient was seen and examined at bedside, resting comfortably in bed.    No acute or major events overnight.      PAST MEDICAL & SURGICAL HISTORY  DM (diabetes mellitus)    HTN (hypertension)    H/O paraplegia    Spinal abscess    Renal stones    Spinal abscess  S/P Surgery    Encounter for care or replacement of suprapubic tube      SOCIAL HISTORY:  Social History:      ALLERGIES:  No Known Allergies    MEDICATIONS:  STANDING MEDICATIONS  atorvastatin 20 milliGRAM(s) Oral at bedtime  chlorhexidine 2% Cloths 1 Application(s) Topical daily  dextrose 5%. 1000 milliLiter(s) IV Continuous <Continuous>  dextrose 5%. 1000 milliLiter(s) IV Continuous <Continuous>  dextrose 50% Injectable 12.5 Gram(s) IV Push once  dextrose 50% Injectable 25 Gram(s) IV Push once  dextrose 50% Injectable 25 Gram(s) IV Push once  glucagon  Injectable 1 milliGRAM(s) IntraMuscular once  heparin   Injectable 5000 Unit(s) SubCutaneous every 8 hours  insulin lispro (ADMELOG) corrective regimen sliding scale   SubCutaneous three times a day before meals  insulin lispro (ADMELOG) corrective regimen sliding scale   SubCutaneous at bedtime  lactated ringers. 1000 milliLiter(s) IV Continuous <Continuous>  meropenem  IVPB 500 milliGRAM(s) IV Intermittent every 12 hours  NIFEdipine XL 30 milliGRAM(s) Oral daily  pantoprazole    Tablet 40 milliGRAM(s) Oral before breakfast  sodium bicarbonate 650 milliGRAM(s) Oral two times a day    PRN MEDICATIONS  aluminum hydroxide/magnesium hydroxide/simethicone Suspension 30 milliLiter(s) Oral every 4 hours PRN  dextrose Oral Gel 15 Gram(s) Oral once PRN  HYDROmorphone  Injectable 0.5 milliGRAM(s) IV Push every 10 minutes PRN  HYDROmorphone  Injectable 1 milliGRAM(s) IV Push every 10 minutes PRN  melatonin 3 milliGRAM(s) Oral at bedtime PRN  ondansetron Injectable 4 milliGRAM(s) IV Push every 8 hours PRN  ondansetron Injectable 4 milliGRAM(s) IV Push once PRN    VITALS:   T(F): 97.7  HR: 87  BP: 119/72  RR: 18  SpO2: 93%    PHYSICAL EXAM:  GENERAL:   ( x) NAD, lying in bed comfortably     (  ) obtunded     (  ) lethargic     (  ) somnolent      NECK:  (x) Supple     (  ) neck stiffness     (  ) nuchal rigidity     (  )  no JVD     (  ) JVD present ( -- cm)    HEART:  Rate -->     (x) normal rate     (  ) bradycardic     (  ) tachycardic  Rhythm -->     (x) regular     (  ) regularly irregular     (  ) irregularly irregular  Murmurs -->     (x) normal s1s2     (  ) systolic murmur     (  ) diastolic murmur     (  ) continuous murmur      (  ) S3 present     (  ) S4 present    LUNGS:   ( x)Unlabored respirations     (  ) tachypnea  ( x) B/L air entry     (  ) decreased breath sounds in:  (location     )    ( x) no adventitious sound     (  ) crackles     (  ) wheezing      (  ) rhonchi      (specify location:       )  (  ) chest wall tenderness (specify location:       )    ABDOMEN:   ( x) Soft     (  ) tense   |   (  ) nondistended     (  ) distended   |   (  ) +BS     (  ) hypoactive bowel sounds     (  ) hyperactive bowel sounds  ( x) nontender     (  ) RUQ tenderness     (  ) RLQ tenderness     (  ) LLQ tenderness     (  ) epigastric tenderness     (  ) diffuse tenderness  (  ) Splenomegaly      (  ) Hepatomegaly      (  ) Jaundice     (  ) ecchymosis     EXTREMITIES:  ( x) Normal     (  ) Rash     (  ) ecchymosis     (  ) varicose veins      (  ) pitting edema     (  ) non-pitting edema   (  ) ulceration     (  ) gangrene:     (location:     )    NERVOUS SYSTEM:    ( x) A&Ox3     (  ) confused     (  ) lethargic  CN II-XII:     ( x) Intact     (  ) deficits found     (Specify:     )   Upper extremities:     (  ) no sensorimotor deficits     (  ) weakness     (  ) loss of proprioception/vibration     (  ) loss of touch/temperature (specify:    )  Lower extremities:     (  ) no sensorimotor deficits     (  ) weakness     (  ) loss of proprioception/vibration     (  ) loss of touch/temperature (specify:    )    SKIN:   (  ) No rashes or lesions     (  ) maculopapular rash     (  ) pustules     (  ) vesicles     (  ) ulcer     (  ) ecchymosis     (specify location:     )      LABS:                        8.5    18.03 )-----------( 356      ( 25 Jul 2024 07:27 )             28.3     07-25    139  |  103  |  59<H>  ----------------------------<  121<H>  5.0   |  22  |  4.0<H>    Ca    7.6<L>      25 Jul 2024 07:27    TPro  6.7  /  Alb  2.2<L>  /  TBili  0.2  /  DBili  x   /  AST  12  /  ALT  5   /  AlkPhos  119<H>  07-25      Urinalysis Basic - ( 25 Jul 2024 07:27 )    Color: x / Appearance: x / SG: x / pH: x  Gluc: 121 mg/dL / Ketone: x  / Bili: x / Urobili: x   Blood: x / Protein: x / Nitrite: x   Leuk Esterase: x / RBC: x / WBC x   Sq Epi: x / Non Sq Epi: x / Bacteria: x            Culture - Tissue with Gram Stain (collected 23 Jul 2024 22:38)  Source: .Tissue RIGHT RENAL AND URETERAL MUCOUS STONES  Gram Stain (25 Jul 2024 01:35):    No polymorphonuclear leukocytes seen per low power field    No organisms seen per oil power field    Culture - Tissue with Gram Stain (collected 23 Jul 2024 21:36)  Source: .Tissue RIGHT RENAL MUCOUS STONE FOR C&amp;S AND FUNGUS  Gram Stain (25 Jul 2024 02:24):    No polymorphonuclear leukocytes seen per low power field    No organisms seen per oil power field    Culture - Fungal, Tissue (collected 23 Jul 2024 21:36)  Source: .Tissue None  Preliminary Report (25 Jul 2024 09:54):    Testing in progress

## 2024-07-25 NOTE — ADVANCED PRACTICE NURSE CONSULT - ASSESSMENT
Reason for Admission: UTI w/ obstructing stone  History of Present Illness:   68yo male w/ pmhx of HTN, HLD, DMII, CKD stage 5 baseline Cr ~ 4.4, urinary incontinence with chronic supra pubic catheter, Nephrolithiasis, spinal abscess leading to quadriplegia presenting w. NBNB vomiting and nausea every time after he eats. Reports generalized crampy abdominal pain. Reports is non compliant w. medications and has not take is prescribed medications for the past few months.  Denies fever chills, flank pain, chest pain, sob, urinary sxs, oliguria.        Allergies:  	No Known Allergies:     Home Medications:   * Patient Currently Takes Medications as of 09-Jul-2024 19:56 documented in Structured Notes  •?	NIFEdipine 30 mg oral tablet, extended release: Last Dose Taken:  , 1 tab(s) orally once a day  •?	sodium bicarbonate 650 mg oral tablet: Last Dose Taken:  , 1 tab(s) orally 3 times a day  •?	Vitamin D2 1.25 mg (50,000 intl units) oral capsule: Last Dose Taken:  , 1 cap(s) orally once a week  •?	petrolatum topical ointment: Last Dose Taken:  , 1 Apply topically to affected area 2 times a day  •?	atorvastatin 20 mg oral tablet: Last Dose Taken:  , 1 tab(s) orally once a day (at bedtime)    Patient received in bed, limited mobility, high risk for pressure injury development or progression.    Right heel assessed for follow up – RN’s believed he had a deep tissue injury.  Patient known to wound care team had xerosis and discoloration in the area which has now peeled and exposed brown hyperpigmented tissue.  Not a new pressure injury – skin intact.

## 2024-07-25 NOTE — PROGRESS NOTE ADULT - SUBJECTIVE AND OBJECTIVE BOX
LISAKRISTY MARIE  69y, Male  Allergy: No Known Allergies      LOS  16d    CHIEF COMPLAINT: UTI w/ obstructing stone (25 Jul 2024 14:12)      INTERVAL EVENTS/HPI  - No acute events overnight  - T(F): , Max: 97.8 (07-25-24 @ 01:30)  - Denies any worsening symptoms  - Tolerating medication  - WBC Count: 18.03 (07-25-24 @ 07:27) downtrending   WBC Count: 20.66 (07-24-24 @ 06:28)     - Creatinine: 4.0 (07-25-24 @ 07:27)  Creatinine: 3.7 (07-24-24 @ 06:28)       ROS  General: Denies rigors, nightsweats  HEENT: Denies headache, rhinorrhea, sore throat, eye pain  CV: Denies CP, palpitations  PULM: Denies wheezing, hemoptysis  GI: Denies hematemesis, hematochezia, melena  : Denies discharge, hematuria  MSK: Denies arthralgias, myalgias  SKIN: Denies rash, lesions  NEURO: Denies paresthesias, weakness  PSYCH: Denies depression, anxiety    VITALS:  T(F): 97.7, Max: 97.8 (07-25-24 @ 01:30)  HR: 87  BP: 119/72  RR: 18Vital Signs Last 24 Hrs  T(C): 36.5 (25 Jul 2024 08:23), Max: 36.6 (25 Jul 2024 01:30)  T(F): 97.7 (25 Jul 2024 08:23), Max: 97.8 (25 Jul 2024 01:30)  HR: 87 (25 Jul 2024 08:23) (65 - 96)  BP: 119/72 (25 Jul 2024 08:23) (109/70 - 119/72)  BP(mean): --  RR: 18 (25 Jul 2024 08:23) (18 - 19)  SpO2: 93% (25 Jul 2024 08:23) (92% - 93%)    Parameters below as of 25 Jul 2024 08:23  Patient On (Oxygen Delivery Method): room air        PHYSICAL EXAM:  Gen: NAD, resting in bed  HEENT: Normocephalic, atraumatic  Neck: supple, no lymphadenopathy  CV: Regular rate & regular rhythm  Lungs: decreased BS at bases, no fremitus  Abdomen: Soft, BS present R PCN SPT  Ext: Warm, well perfused  Neuro: non focal, awake  Skin: no rash, no erythema  Lines: no phlebitis    FH: Non-contributory  Social Hx: Non-contributory    TESTS & MEASUREMENTS:                        8.5    18.03 )-----------( 356      ( 25 Jul 2024 07:27 )             28.3     07-25    139  |  103  |  59<H>  ----------------------------<  121<H>  5.0   |  22  |  4.0<H>    Ca    7.6<L>      25 Jul 2024 07:27    TPro  6.7  /  Alb  2.2<L>  /  TBili  0.2  /  DBili  x   /  AST  12  /  ALT  5   /  AlkPhos  119<H>  07-25      LIVER FUNCTIONS - ( 25 Jul 2024 07:27 )  Alb: 2.2 g/dL / Pro: 6.7 g/dL / ALK PHOS: 119 U/L / ALT: 5 U/L / AST: 12 U/L / GGT: x           Urinalysis Basic - ( 25 Jul 2024 07:27 )    Color: x / Appearance: x / SG: x / pH: x  Gluc: 121 mg/dL / Ketone: x  / Bili: x / Urobili: x   Blood: x / Protein: x / Nitrite: x   Leuk Esterase: x / RBC: x / WBC x   Sq Epi: x / Non Sq Epi: x / Bacteria: x        Culture - Tissue with Gram Stain (collected 07-23-24 @ 22:38)  Source: .Tissue RIGHT RENAL AND URETERAL MUCOUS STONES  Gram Stain (07-25-24 @ 01:35):    No polymorphonuclear leukocytes seen per low power field    No organisms seen per oil power field    Culture - Acid Fast - Tissue w/Smear (collected 07-23-24 @ 21:36)  Source: .Tissue None    Culture - Fungal, Tissue (collected 07-23-24 @ 21:36)  Source: .Tissue None  Preliminary Report (07-25-24 @ 09:54):    Testing in progress    Culture - Tissue with Gram Stain (collected 07-23-24 @ 21:36)  Source: .Tissue RIGHT RENAL MUCOUS STONE FOR C&amp;S AND FUNGUS  Gram Stain (07-25-24 @ 02:24):    No polymorphonuclear leukocytes seen per low power field    No organisms seen per oil power field    Culture - Blood (collected 07-22-24 @ 11:55)  Source: .Blood None  Preliminary Report (07-24-24 @ 22:02):    No growth at 48 Hours    Culture - Urine (collected 07-17-24 @ 19:20)  Source: OR Collect Nephrostomy - Right  Final Report (07-23-24 @ 00:22):    <10,000 CFU/ml Proteus mirabilis    <10,000 CFU/ml Enterococcus faecalis  Organism: Proteus mirabilis  Enterococcus faecalis (07-23-24 @ 00:22)  Organism: Enterococcus faecalis (07-23-24 @ 00:22)      -  Levofloxacin: S 1      -  Vancomycin: S 1      -  Ciprofloxacin: S <=1      -  Ampicillin: S <=2 Predicts results to ampicillin/sulbactam, amoxacillin-clavulanate and  piperacillin-tazobactam.      Method Type: ALF  Organism: Proteus mirabilis (07-23-24 @ 00:22)      -  Levofloxacin: R >4      -  Tobramycin: I 4      -  Aztreonam: S <=4      -  Gentamicin: R 8      -  Cefepime: S <=2      -  Cefazolin: R >16 For uncomplicated UTI with K. pneumoniae, E. coli, or P. mirablis: ALF <=16 is sensitive and ALF >=32 is resistant. This also predicts results for oral agents cefaclor, cefdinir, cefpodoxime, cefprozil, cefuroxime axetil, cephalexin and locarbef for uncomplicated UTI. Note that some isolates may be susceptible to these agents while testing resistant to cefazolin.      -  Piperacillin/Tazobactam: S <=8      -  Ciprofloxacin: R >2      -  Ceftriaxone: S <=1      -  Ampicillin: R >16 These ampicillin results predict results for amoxicillin      Method Type: ALF      -  Meropenem: S <=1      -  Ampicillin/Sulbactam: R >16/8      -  Cefoxitin: S <=8      -  Cefuroxime: S <=4      -  Amoxicillin/Clavulanic Acid: I 16/8      -  Trimethoprim/Sulfamethoxazole: R >2/38      -  Ertapenem: S <=0.5    Culture - Urine (collected 07-10-24 @ 18:14)  Source: .Urine Nephrostomy - Right  Final Report (07-14-24 @ 20:48):    >100,000 CFU/ml Proteus mirabilis    10,000 - 49,000 CFU/mL Serratia marcescens    50,000 - 99,000 CFU/mL Enterococcus faecalis  Organism: Proteus mirabilis  Serratia marcescens  Enterococcus faecalis (07-14-24 @ 20:48)  Organism: Enterococcus faecalis (07-14-24 @ 20:48)      -  Levofloxacin: S 1      -  Nitrofurantoin: S <=32 Should not be used to treat pyelonephritis.      -  Vancomycin: S 1      -  Ciprofloxacin: S <=1      -  Ampicillin: S <=2 Predicts results to ampicillin/sulbactam, amoxacillin-clavulanate and  piperacillin-tazobactam.      -  Tetracycline: R >8      Method Type: ALF  Organism: Serratia marcescens (07-14-24 @ 20:48)      -  Levofloxacin: S <=0.5      -  Tobramycin: S <=2      -  Nitrofurantoin: R >64 Should not be used to treat pyelonephritis      -  Aztreonam: S <=4      -  Gentamicin: S <=2      -  Cefazolin: R >16      -  Cefepime: S <=2      -  Piperacillin/Tazobactam: S <=8      -  Ciprofloxacin: S <=0.25      -  Ceftriaxone: S <=1      -  Ampicillin: R 16 These ampicillin results predict results for amoxicillin      Method Type: ALF      -  Meropenem: S <=1      -  Ampicillin/Sulbactam: R 16/8      -  Cefoxitin: R <=8      -  Amoxicillin/Clavulanic Acid: R >16/8      -  Trimethoprim/Sulfamethoxazole: S <=0.5/9.5      -  Ertapenem: S <=0.5  Organism: Proteus mirabilis (07-14-24 @ 20:48)      -  Levofloxacin: R >4      -  Tobramycin: S <=2      -  Nitrofurantoin: R 64 Should not be used to treat pyelonephritis      -  Aztreonam: S <=4      -  Gentamicin: I 4      -  Cefazolin: S 8 For uncomplicated UTI with K. pneumoniae, E. coli, or P. mirablis: ALF <=16 is sensitive and ALF >=32 is resistant. This also predicts results for oral agents cefaclor, cefdinir, cefpodoxime, cefprozil, cefuroxime axetil, cephalexin and locarbef for uncomplicated UTI. Note that some isolates may be susceptible to these agents while testing resistant to cefazolin.      -  Cefepime: S <=2      -  Piperacillin/Tazobactam: S <=8      -  Ciprofloxacin: R >2      -  Ceftriaxone: S <=1      -  Ampicillin: R >16 These ampicillin results predict results for amoxicillin      Method Type: ALF      -  Meropenem: S <=1      -  Ampicillin/Sulbactam: I 16/8      -  Cefoxitin: S <=8      -  Cefuroxime: S <=4      -  Amoxicillin/Clavulanic Acid: S <=8/4      -  Trimethoprim/Sulfamethoxazole: R >2/38      -  Ertapenem: S <=0.5    Culture - Blood (collected 07-09-24 @ 14:20)  Source: .Blood Blood-Peripheral  Gram Stain (07-11-24 @ 01:20):    Growth in anaerobic bottle: Gram Negative Rods  Preliminary Report (07-16-24 @ 09:11):    Growth in anaerobic bottle: Gram Negative Rods    Growth in anaerobic bottle: Gram Negative Rods Sent to    [place] [facility]  for Identification    .    Direct identification is available within approximately 3-5    hours eitherby Blood Panel Multiplexed PCR or Direct    MALDI-TOF. Details: [email or web address]  Organism: Blood Culture PCR (07-11-24 @ 03:23)  Organism: Blood Culture PCR (07-11-24 @ 03:23)      Method Type: PCR      -  Blood PCR Panel: NEG    Culture - Blood (collected 07-09-24 @ 14:20)  Source: .Blood Blood-Peripheral  Gram Stain (07-12-24 @ 04:50):    Growth in anaerobic bottle: Gram Negative Rods  Final Report (07-13-24 @ 09:30):    Growth in anaerobic bottle: Bacteroides fragilis    "Susceptibilities not performed"    Culture - Urine (collected 07-09-24 @ 13:50)  Source: Clean Catch Clean Catch (Midstream)  Final Report (07-10-24 @ 23:26):    >=3 organisms. Probable collection contamination.            INFECTIOUS DISEASES TESTING      INFLAMMATORY MARKERS      RADIOLOGY & ADDITIONAL TESTS:  I have personally reviewed the last available Chest xray  CXR      CT  CT Abdomen and Pelvis No Cont:   ACC: [id number] EXAM:  CT ABDOMEN AND PELVIS   ORDERED BY: VERONIKA DURAN     PROCEDURE DATE:  07/24/2024          INTERPRETATION:  Clinical Indication: Assess stone burden. Post right   PCNL.    Technique: Multidetector-row CT images of the abdomen and pelvis were   obtained from the xiphoid through the symphysis pubis. No contrast was   administered. Coronal and sagittal reconstructions were performed.    Comparison: CT abdomen pelvis 7/9/2024.    Findings:    01. LIVER: Normal morphology. Punctate calcification may represent   calcified granulomas.  02. SPLEEN: Normal unenhanced.  03. PANCREAS: Normal unenhanced.  04. GALLBLADDER/BILIARY TREE: Cholelithiasis with mild wall thickening.   No biliary dilation.  05. ADRENALS: Normal.  06. KIDNEYS: Post right percutaneous nephroureteral catheter with the   pigtails terminating in the right renal pelvis and urinary bladder. Left   nephroureteral catheter with the pigtail catheter in the left renal   pelvis and urinary bladder. No hydronephrosis. Right renal collecting   system air likely due to recent procedure. Inflammatory changes are noted   along the right renal pelvis and ureter.  Improved renal/ureteral stone burden with residual stone burden as   follows:  -1.4 x 1.3 cm right renal lower pole stone (series 8 image 58)  -1.3 x 0.7 cm right renal upper pole cyst (series 6 image 177)  -0.7 x 0.5 right mid ureteral stone (series 8 image 15)  -0.5 x 0.3 cm right mid ureteral stone (series 8 image 50)  -1 x 0.9 cm left renal lower pole stone (series 8 image 69)  -1.3 x 0.6 cm left renal midpole. An (series 8 image 70)  -0.6 cm left renal midpole stone (series 8 image 67)  Additional punctate left renal upper pole stones  07. LYMPHADENOPATHY/RETROPERITONEUM: No lymphadenopathy.  08. BOWEL: Mild gas distended loops of small bowel may represent a   developing ileus. Moderate rectal stool burden.  09. PELVIC VISCERA: Prostate measures 5 cm in transverse dimension   (series 4 image 150). Punctate focus of air in the anterior prostate   versus urinary bladder, is nonspecific. Suprapubic catheter and under   distended urinary bladder. Air within the urinary bladder likely due to   instrumentation.  10. PELVIC LYMPH NODES: No lymphadenopathy.  11. VASCULATURE: No abdominal aortic aneurysm. Calcified atherosclerotic   disease of the aorta and its branches.  12. PERITONEUM/ABDOMINAL WALL: New moderate volume perihepatic ascites.   Tiny fat-containing umbilical hernia. Bilateral small to moderate   fat-containing inguinal hernias.  13. SKELETAL: Degenerative changes.  14. LUNG BASES: New small right pleural effusion with adjacent   consolidative opacities. Areas of small atelectasis. Ascending thoracic   aorta aneurysm measuring 4.1 cm (series 6 image 8). Coronary artery   calcification. Bilateral gynecomastia.    IMPRESSION:    Post bilateral nephroureteral catheters with resolution of hydronephrosis   and decreased renal stone burden. Residual bilateral renal and ureteral   stone burden as described.    Right renal pelvis and periureteral inflammatory changes may be due to   recent procedure.    Cholelithiasis and mild gallbladder wall thickening. Correlate with   physical exam to assess for Iglesias's sign.    New moderate right perihepatic ascites.    New small right pleural effusion with adjacent consolidative opacities.   Differential includes infectious process.    Mild gas distended loops of small bowel may represent an ileus.    Additional findings are detailed in the body of the report.      --- End of Report ---            CHANDLER BERMUDEZ MD; Attending Radiologist  This document has been electronically signed. Jul 24 2024  8:31AM (07-24-24 @ 08:05)      CARDIOLOGY TESTING      MEDICATIONS  atorvastatin 20 Oral at bedtime  chlorhexidine 2% Cloths 1 Topical daily  dextrose 5%. 1000 IV Continuous <Continuous>  dextrose 5%. 1000 IV Continuous <Continuous>  dextrose 50% Injectable 12.5 IV Push once  dextrose 50% Injectable 25 IV Push once  dextrose 50% Injectable 25 IV Push once  glucagon  Injectable 1 IntraMuscular once  insulin lispro (ADMELOG) corrective regimen sliding scale  SubCutaneous three times a day before meals  insulin lispro (ADMELOG) corrective regimen sliding scale  SubCutaneous at bedtime  lactated ringers. 1000 IV Continuous <Continuous>  meropenem  IVPB 500 IV Intermittent every 12 hours  NIFEdipine XL 30 Oral daily  pantoprazole    Tablet 40 Oral before breakfast  sodium bicarbonate 650 Oral two times a day      WEIGHT  Weight (kg): 81.6 (07-23-24 @ 20:42)  Creatinine: 4.0 mg/dL (07-25-24 @ 07:27)      ANTIBIOTICS:  meropenem  IVPB 500 milliGRAM(s) IV Intermittent every 12 hours      All available historical records have been reviewed

## 2024-07-25 NOTE — PROGRESS NOTE ADULT - ASSESSMENT
Pt is a 69y Male s/p RIGHT PCNL, Complex cystolitholapaxy  POD#1    Plan:   - Patient planned to return to IR for nephrostomy exchange from upper pole to lower pole of R kidney tomorrow   - Continue IV abx - Meropenem   - Strict I&O's  - Analgesia and antiemetics as needed  - Patient will need f/u OP on Thursday 8/1 for RIGHT PCNL at [facility] with Dr. Akins   - discussed plan with patient and patient's son, all questions asked and answered. Patient's son aware and agreeable with plan.   - spoke with primary team   - d/w attending  Pt is a 69y Male s/p RIGHT PCNL, Complex cystolitholapaxy  POD#1    Plan:   - Patient planned to return to IR for RIGHT PCN placement for new access (upper pole to lower pole of R kidney) tomorrow   - Continue IV abx - Meropenem   - Strict I&O's  - Keep NPO after MN for procedure   - Analgesia and antiemetics as needed  - Patient will need f/u OP on Thursday 8/1 for RIGHT PCNL at [facility] with Dr. Akins   - discussed plan with patient and patient's son, all questions asked and answered. Patient's son aware and agreeable with plan.   - spoke with primary team   - d/w attending

## 2024-07-25 NOTE — PROGRESS NOTE ADULT - SUBJECTIVE AND OBJECTIVE BOX
Nephrology progress note    THIS IS AN INCOMPLETE NOTE . FULL NOTE TO FOLLOW SHORTLY    Patient is seen and examined, events over the last 24 h noted .    Allergies:  No Known Allergies    Hospital Medications:   MEDICATIONS  (STANDING):  atorvastatin 20 milliGRAM(s) Oral at bedtime  chlorhexidine 2% Cloths 1 Application(s) Topical daily  dextrose 5%. 1000 milliLiter(s) (50 mL/Hr) IV Continuous <Continuous>  dextrose 5%. 1000 milliLiter(s) (100 mL/Hr) IV Continuous <Continuous>  dextrose 50% Injectable 12.5 Gram(s) IV Push once  dextrose 50% Injectable 25 Gram(s) IV Push once  dextrose 50% Injectable 25 Gram(s) IV Push once  glucagon  Injectable 1 milliGRAM(s) IntraMuscular once  heparin   Injectable 5000 Unit(s) SubCutaneous every 8 hours  insulin lispro (ADMELOG) corrective regimen sliding scale   SubCutaneous at bedtime  insulin lispro (ADMELOG) corrective regimen sliding scale   SubCutaneous three times a day before meals  lactated ringers. 1000 milliLiter(s) (75 mL/Hr) IV Continuous <Continuous>  meropenem  IVPB 500 milliGRAM(s) IV Intermittent every 12 hours  NIFEdipine XL 30 milliGRAM(s) Oral daily  pantoprazole    Tablet 40 milliGRAM(s) Oral before breakfast  sodium bicarbonate 650 milliGRAM(s) Oral two times a day        VITALS:  T(F): 97.7 (07-25-24 @ 08:23), Max: 98.2 (07-24-24 @ 15:00)  HR: 87 (07-25-24 @ 08:23)  BP: 119/72 (07-25-24 @ 08:23)  RR: 18 (07-25-24 @ 08:23)  SpO2: 93% (07-25-24 @ 08:23)  Wt(kg): --    07-23 @ 07:01  -  07-24 @ 07:00  --------------------------------------------------------  IN: 0 mL / OUT: 1250 mL / NET: -1250 mL    07-24 @ 07:01  -  07-25 @ 07:00  --------------------------------------------------------  IN: 0 mL / OUT: 800 mL / NET: -800 mL          PHYSICAL EXAM:  Constitutional: NAD  HEENT: anicteric sclera, oropharynx clear, MMM  Neck: No JVD  Respiratory: CTAB, no wheezes, rales or rhonchi  Cardiovascular: S1, S2, RRR  Gastrointestinal: BS+, soft, NT/ND  Extremities: No cyanosis or clubbing. No peripheral edema  :  No angelo.   Skin: No rashes    LABS:  07-24    136  |  100  |  63<HH>  ----------------------------<  92  5.0   |  20  |  3.7<H>    Ca    7.7<L>      24 Jul 2024 06:28    TPro  6.7  /  Alb  2.3<L>  /  TBili  0.3  /  DBili      /  AST  14  /  ALT  7   /  AlkPhos  110  07-24                          8.5    18.03 )-----------( 356      ( 25 Jul 2024 07:27 )             28.3       Urine Studies:  Urinalysis Basic - ( 24 Jul 2024 06:28 )    Color:  / Appearance:  / SG:  / pH:   Gluc: 92 mg/dL / Ketone:   / Bili:  / Urobili:    Blood:  / Protein:  / Nitrite:    Leuk Esterase:  / RBC:  / WBC    Sq Epi:  / Non Sq Epi:  / Bacteria:           Iron 30, TIBC 143, %sat 21      [09-07-23 @ 09:17]  Ferritin 533      [09-07-23 @ 09:17]  PTH -- (Ca 8.3)      [07-10-24 @ 21:08]   43  Vitamin D (25OH) 13      [09-07-23 @ 09:17]  Lipid: chol 134, , HDL 30, LDL --      [09-07-23 @ 09:17]    HBsAg Nonreact      [09-17-23 @ 07:23]  HCV 0.16, Nonreact      [09-17-23 @ 07:23]    TAY: titer Negative, pattern --      [09-17-23 @ 07:23]  dsDNA <12      [09-17-23 @ 07:23]  C3 Complement 132      [09-16-23 @ 15:44]  C4 Complement 35      [09-16-23 @ 15:44]  ANCA: cANCA Negative, pANCA Negative, atypical ANCA Negative      [09-17-23 @ 07:23]  PLA2R: ARLEEN <1.8, IFA --      [09-17-23 @ 07:43]  Free Light Chains: kappa 35.40, lambda 35.96, ratio = 0.98      [09-17 @ 07:23]  Immunofixation Serum:   No Monoclonal Band Identified      Reference Range: None Detected      [09-17-23 @ 07:23]  SPEP Interpretation: Polyclonal Gammopathy      [09-17-23 @ 07:23]      RADIOLOGY & ADDITIONAL STUDIES:   Nephrology progress note    Patient is seen and examined, events over the last 24 h noted .  Lying in bed comfortable     Allergies:  No Known Allergies    Hospital Medications:   MEDICATIONS  (STANDING):  atorvastatin 20 milliGRAM(s) Oral at bedtime  glucagon  Injectable 1 milliGRAM(s) IntraMuscular once  heparin   Injectable 5000 Unit(s) SubCutaneous every 8 hours  insulin lispro (ADMELOG) corrective regimen sliding scale   SubCutaneous at bedtime  insulin lispro (ADMELOG) corrective regimen sliding scale   SubCutaneous three times a day before meals  lactated ringers. 1000 milliLiter(s) (75 mL/Hr) IV Continuous <Continuous>  meropenem  IVPB 500 milliGRAM(s) IV Intermittent every 12 hours  NIFEdipine XL 30 milliGRAM(s) Oral daily  pantoprazole    Tablet 40 milliGRAM(s) Oral before breakfast  sodium bicarbonate 650 milliGRAM(s) Oral two times a day        VITALS:  T(F): 97.7 (07-25-24 @ 08:23), Max: 98.2 (07-24-24 @ 15:00)  HR: 87 (07-25-24 @ 08:23)  BP: 119/72 (07-25-24 @ 08:23)  RR: 18 (07-25-24 @ 08:23)  SpO2: 93% (07-25-24 @ 08:23)      07-23 @ 07:01  -  07-24 @ 07:00  --------------------------------------------------------  IN: 0 mL / OUT: 1250 mL / NET: -1250 mL    07-24 @ 07:01  -  07-25 @ 07:00  --------------------------------------------------------  IN: 0 mL / OUT: 800 mL / NET: -800 mL          PHYSICAL EXAM:  Constitutional: NAD  Respiratory: CTAB,  Cardiovascular: S1, S2, RRR  Gastrointestinal: BS+, soft, NT/ND  Extremities: No cyanosis or clubbing. No peripheral edema  :   angelo.   Skin: No rashes    LABS:  07-25    139  |  103  |  59<H>  ----------------------------<  121<H>  5.0   |  22  |  4.0<H>    Ca    7.6<L>      25 Jul 2024 07:27    TPro  6.7  /  Alb  2.2<L>  /  TBili  0.2  /  DBili  x   /  AST  12  /  ALT  5   /  AlkPhos  119<H>  07-25    Creatinine Trend: 4.0<--, 3.7<--, 3.5<--, 3.7<--, 3.2<--, 3.6<--  07-24    136  |  100  |  63<HH>  ----------------------------<  92  5.0   |  20  |  3.7<H>    Ca    7.7<L>      24 Jul 2024 06:28    TPro  6.7  /  Alb  2.3<L>  /  TBili  0.3  /  DBili      /  AST  14  /  ALT  7   /  AlkPhos  110  07-24                          8.5    18.03 )-----------( 356      ( 25 Jul 2024 07:27 )             28.3       Urine Studies:  Urinalysis Basic - ( 24 Jul 2024 06:28 )    Color:  / Appearance:  / SG:  / pH:   Gluc: 92 mg/dL / Ketone:   / Bili:  / Urobili:    Blood:  / Protein:  / Nitrite:    Leuk Esterase:  / RBC:  / WBC    Sq Epi:  / Non Sq Epi:  / Bacteria:           Iron 30, TIBC 143, %sat 21      [09-07-23 @ 09:17]  Ferritin 533      [09-07-23 @ 09:17]  PTH -- (Ca 8.3)      [07-10-24 @ 21:08]   43  Vitamin D (25OH) 13      [09-07-23 @ 09:17]  Lipid: chol 134, , HDL 30, LDL --      [09-07-23 @ 09:17]    HBsAg Nonreact      [09-17-23 @ 07:23]  HCV 0.16, Nonreact      [09-17-23 @ 07:23]    TAY: titer Negative, pattern --      [09-17-23 @ 07:23]  dsDNA <12      [09-17-23 @ 07:23]  C3 Complement 132      [09-16-23 @ 15:44]  C4 Complement 35      [09-16-23 @ 15:44]  ANCA: cANCA Negative, pANCA Negative, atypical ANCA Negative      [09-17-23 @ 07:23]  PLA2R: ARLEEN <1.8, IFA --      [09-17-23 @ 07:43]  Free Light Chains: kappa 35.40, lambda 35.96, ratio = 0.98      [09-17 @ 07:23]  Immunofixation Serum:   No Monoclonal Band Identified      Reference Range: None Detected      [09-17-23 @ 07:23]  SPEP Interpretation: Polyclonal Gammopathy      [09-17-23 @ 07:23]      RADIOLOGY & ADDITIONAL STUDIES:

## 2024-07-25 NOTE — PROGRESS NOTE ADULT - ASSESSMENT
70 y/o man with PMH of HTN, hyperlipidemia, DM II, CKD 5 (baseline Cr 4.4) Urinary incontinence with chronic suprapubic catheter, Nephrolithiasis, Spinal abscess with quadriplegia and chronic LE wounds receiving dressing changes by visiting nurse now presented to the ED for crampy abdominal pain along with nausea and vomiting.     # JOÃO over CKD 5 due to obstructive uropathy   # Complicated UTI / pyelonephritis  # Bacteroids bacteremia  #Elevated white count likely due to procedure  - CT abd/pelvis: Severe right hydroureteronephrosis with cortical thinning and numerous large intrarenal and proximal to mid ureteral calculi, obstructing calculus in the right mid ureter measures 1.1 x 1 x 1.8 cm with normal caliber distal ureter.  Multiple nonobstructive left intrarenal calculi and a 0.8 cm left distal ureteral calculus; no left hydronephrosis.  Few prominent retroperitoneal lymph nodes, likely reactive. Cholelithiasis.  - s/p eval by Urology in ED --> recommended b/l nephrostomy placement by IR.   - s/p right PCN by IR on 7/10 - 10 cc of thick, viscous green-yellow, malodorous right kidney urine sent for cultures  - s/p SPC exchange in the ED  - blood cultures 1/2- bateroids(sn not performed); 1/2- GNR- monmitor; urine culture- Proteus, Serratia(both sn to dwain) and enterococcus  - acidosis improved - continue PO bicarbonate  - ID following-  meropenem 500 mg BID; On D/C, recommend midline and ertapenem 500mg q24h IV + PO amoxicillin 500mg daily (to cover enterococcus) until 48h post-op -- will continue for now --surgery on tuesday 7/23  - urine culture from nephrostomy  is gm positive rods and enterococcus  - urology operative note reviewed, planned for further intervention?  - WBC trended down, f/u OR Cx  - c/w meropenem 500mg q12h IV  - appreciate nephrology recs, no indication to start RRT at this time  - Cr trended up today, ordered IVF LR at 75 cc per hour   - Per Urology recs, requested IR consult for conversion of right upper pole Nephrostomy to right lower pole Nephrostomy  - Scheduled for Urology procedure tentatively coming Thursday    #  HTN- stable  nifedipine stopped    # Hyperlipidemia on statin    #  DM   A1C 6.9  insulin sliding scale not needed--his blood sugar was stable--    # Chronic LE wounds - wound care per advanced nursing recommendation    # Functional quadriplegia-- bed bound-- uses ana lift    DNR/DNI Trial of NIV

## 2024-07-25 NOTE — ADVANCED PRACTICE NURSE CONSULT - RECOMMEDATIONS
Cleanse wound with soap and water, pat dry.  Apply Dermaphor twice daily to bilateral legs and feet.  Skin and incontinence care.  Pressure Injury Prevention.  Assess wound daily and inform primary provider of any changes.   Case discussed with primary RN.  Wound/ ostomy specialist to f/u as needed.   Other recommendations per Primary Team. 
  Cleanse heel with soap and water, pat dry.  Apply Cavalon no sting skin barrier to bilateral heels for prevention – patient can benefit from Spry offloading boots.   Skin and incontinence care.  Pressure Injury Prevention.  Assess wound daily and inform primary provider of any changes.   Case discussed with primary RN.  Wound/ ostomy specialist to f/u as needed.   Other recommendations per Primary Team.

## 2024-07-25 NOTE — CONSULT NOTE ADULT - SUBJECTIVE AND OBJECTIVE BOX
INTERVENTIONAL RADIOLOGY CONSULT:     Procedure Requested: R PCNU placement    HPI:  70yo male w/ pmhx of HTN, HLD, DMII, CKD stage 5 baseline Cr ~ 4.4, urinary incontinence with chronic supra pubic catheter, Nephrolithiasis, spinal abscess leading to quadriplegia presenting w. NBNB vomiting and nausea every time after he eats. Reports generalized crampy abdominal pain. Reports is non compliant w. medications and has not take is prescribed medications for the past few months.  Denies fever chills, flank pain, chest pain, sob, urinary sxs, oliguria.     in the ED,   Vitals: 136/80. HR 80. RR 18. T 98.2, satting 98% on RA   Labs: significant for wbc 20; UA grossly positive  CTAP shows severe R hydro; L non-obs calculi  SPC replaced   Urology consulted recommending emergency b/l nephrostomy tubes be placed    Patient admitted for acute on chronic JOÃO in s/o obstructing stones w/ UTI (09 Jul 2024 19:07)      PAST MEDICAL & SURGICAL HISTORY:  DM (diabetes mellitus)      HTN (hypertension)      H/O paraplegia      Spinal abscess      Renal stones      Spinal abscess  S/P Surgery      Encounter for care or replacement of suprapubic tube          MEDICATIONS  (STANDING):  atorvastatin 20 milliGRAM(s) Oral at bedtime  chlorhexidine 2% Cloths 1 Application(s) Topical daily  dextrose 5%. 1000 milliLiter(s) (100 mL/Hr) IV Continuous <Continuous>  dextrose 5%. 1000 milliLiter(s) (50 mL/Hr) IV Continuous <Continuous>  dextrose 50% Injectable 25 Gram(s) IV Push once  dextrose 50% Injectable 12.5 Gram(s) IV Push once  dextrose 50% Injectable 25 Gram(s) IV Push once  glucagon  Injectable 1 milliGRAM(s) IntraMuscular once  heparin   Injectable 5000 Unit(s) SubCutaneous every 8 hours  insulin lispro (ADMELOG) corrective regimen sliding scale   SubCutaneous three times a day before meals  insulin lispro (ADMELOG) corrective regimen sliding scale   SubCutaneous at bedtime  lactated ringers. 1000 milliLiter(s) (75 mL/Hr) IV Continuous <Continuous>  meropenem  IVPB 500 milliGRAM(s) IV Intermittent every 12 hours  NIFEdipine XL 30 milliGRAM(s) Oral daily  pantoprazole    Tablet 40 milliGRAM(s) Oral before breakfast  sodium bicarbonate 650 milliGRAM(s) Oral two times a day    MEDICATIONS  (PRN):  aluminum hydroxide/magnesium hydroxide/simethicone Suspension 30 milliLiter(s) Oral every 4 hours PRN Dyspepsia  dextrose Oral Gel 15 Gram(s) Oral once PRN Blood Glucose LESS THAN 70 milliGRAM(s)/deciliter  HYDROmorphone  Injectable 0.5 milliGRAM(s) IV Push every 10 minutes PRN Moderate Pain (4 - 6)  HYDROmorphone  Injectable 1 milliGRAM(s) IV Push every 10 minutes PRN Severe Pain (7 - 10)  melatonin 3 milliGRAM(s) Oral at bedtime PRN Insomnia  ondansetron Injectable 4 milliGRAM(s) IV Push once PRN Nausea and/or Vomiting  ondansetron Injectable 4 milliGRAM(s) IV Push every 8 hours PRN Nausea and/or Vomiting      Allergies    No Known Allergies    Intolerances    FAMILY HISTORY:  FH: HTN (hypertension)    FH: breast cancer    FH: melanoma    FH: heart disease        Physical Exam:   Vital Signs Last 24 Hrs  T(C): 36.5 (25 Jul 2024 08:23), Max: 36.8 (24 Jul 2024 15:00)  T(F): 97.7 (25 Jul 2024 08:23), Max: 98.2 (24 Jul 2024 15:00)  HR: 87 (25 Jul 2024 08:23) (65 - 96)  BP: 119/72 (25 Jul 2024 08:23) (109/70 - 129/77)  BP(mean): --  RR: 18 (25 Jul 2024 08:23) (18 - 19)  SpO2: 93% (25 Jul 2024 08:23) (92% - 98%)    Parameters below as of 25 Jul 2024 08:23  Patient On (Oxygen Delivery Method): room air      Labs:                         8.5    18.03 )-----------( 356      ( 25 Jul 2024 07:27 )             28.3     07-25    139  |  103  |  59<H>  ----------------------------<  121<H>  5.0   |  22  |  4.0<H>    Ca    7.6<L>      25 Jul 2024 07:27    TPro  6.7  /  Alb  2.2<L>  /  TBili  0.2  /  DBili  x   /  AST  12  /  ALT  5   /  AlkPhos  119<H>  07-25        Pertinent labs:                      8.5    18.03 )-----------( 356      ( 25 Jul 2024 07:27 )             28.3       07-25    139  |  103  |  59<H>  ----------------------------<  121<H>  5.0   |  22  |  4.0<H>    Ca    7.6<L>      25 Jul 2024 07:27    TPro  6.7  /  Alb  2.2<L>  /  TBili  0.2  /  DBili  x   /  AST  12  /  ALT  5   /  AlkPhos  119<H>  07-25    Radiology & Additional Studies:   Radiology imaging reviewed.       ASSESSMENT/ PLAN:   70yo male w/ pmhx of HTN, HLD, DMII, CKD stage 5 baseline Cr ~ 4.4, urinary incontinence with chronic supra pubic catheter, Nephrolithiasis, spinal abscess leading to quadriplegia presenting w. NBNB vomiting and nausea every time after he eats. Reports generalized crampy abdominal pain. Reports is non compliant w. medications and has not take is prescribed medications for the past few months. Patient s/p R PCNL, requesting IR for CT stentogram to evaluate residual stone burden.   - Dr. Akins spoke with Dr. Horton, will plan for new R PCNU access for urological intervention  - plan for R PCNU placement tomorrow 7/26:  - keep patient NPO after midnight tonight  - repeat CBC, CMP, PT/INR  - hold 2 doses of Lovenox 40mg, hold morning dose of heparin subQ    To contact Interventional Radiology with any questions, concerns or issues please utilize the following:  M-F 7am-5pm: [email or web address] on teams,  consult spectra: [phone]  After hours/weekends/holidays: [phone]

## 2024-07-26 LAB
ALBUMIN SERPL ELPH-MCNC: 2.1 G/DL — LOW (ref 3.5–5.2)
ALBUMIN SERPL ELPH-MCNC: 2.2 G/DL — LOW (ref 3.5–5.2)
ALP SERPL-CCNC: 132 U/L — HIGH (ref 30–115)
ALP SERPL-CCNC: 138 U/L — HIGH (ref 30–115)
ALT FLD-CCNC: 5 U/L — SIGNIFICANT CHANGE UP (ref 0–41)
ALT FLD-CCNC: <5 U/L — SIGNIFICANT CHANGE UP (ref 0–41)
ANION GAP SERPL CALC-SCNC: 13 MMOL/L — SIGNIFICANT CHANGE UP (ref 7–14)
ANION GAP SERPL CALC-SCNC: 17 MMOL/L — HIGH (ref 7–14)
APTT BLD: 34.5 SEC — SIGNIFICANT CHANGE UP (ref 27–39.2)
AST SERPL-CCNC: 12 U/L — SIGNIFICANT CHANGE UP (ref 0–41)
AST SERPL-CCNC: 15 U/L — SIGNIFICANT CHANGE UP (ref 0–41)
BILIRUB SERPL-MCNC: 0.3 MG/DL — SIGNIFICANT CHANGE UP (ref 0.2–1.2)
BILIRUB SERPL-MCNC: 0.4 MG/DL — SIGNIFICANT CHANGE UP (ref 0.2–1.2)
BUN SERPL-MCNC: 55 MG/DL — HIGH (ref 10–20)
BUN SERPL-MCNC: 55 MG/DL — HIGH (ref 10–20)
CALCIUM SERPL-MCNC: 7.4 MG/DL — LOW (ref 8.4–10.5)
CALCIUM SERPL-MCNC: 7.9 MG/DL — LOW (ref 8.4–10.4)
CHLORIDE SERPL-SCNC: 101 MMOL/L — SIGNIFICANT CHANGE UP (ref 98–110)
CHLORIDE SERPL-SCNC: 103 MMOL/L — SIGNIFICANT CHANGE UP (ref 98–110)
CO2 SERPL-SCNC: 20 MMOL/L — SIGNIFICANT CHANGE UP (ref 17–32)
CO2 SERPL-SCNC: 22 MMOL/L — SIGNIFICANT CHANGE UP (ref 17–32)
CREAT SERPL-MCNC: 3.7 MG/DL — HIGH (ref 0.7–1.5)
CREAT SERPL-MCNC: 3.8 MG/DL — HIGH (ref 0.7–1.5)
CULTURE RESULTS: ABNORMAL
EGFR: 16 ML/MIN/1.73M2 — LOW
EGFR: 17 ML/MIN/1.73M2 — LOW
GLUCOSE BLDC GLUCOMTR-MCNC: 113 MG/DL — HIGH (ref 70–99)
GLUCOSE BLDC GLUCOMTR-MCNC: 122 MG/DL — HIGH (ref 70–99)
GLUCOSE BLDC GLUCOMTR-MCNC: 125 MG/DL — HIGH (ref 70–99)
GLUCOSE BLDC GLUCOMTR-MCNC: 133 MG/DL — HIGH (ref 70–99)
GLUCOSE SERPL-MCNC: 103 MG/DL — HIGH (ref 70–99)
GLUCOSE SERPL-MCNC: 157 MG/DL — HIGH (ref 70–99)
HCT VFR BLD CALC: 26.6 % — LOW (ref 42–52)
HCT VFR BLD CALC: 30.9 % — LOW (ref 42–52)
HGB BLD-MCNC: 8 G/DL — LOW (ref 14–18)
HGB BLD-MCNC: 9.2 G/DL — LOW (ref 14–18)
INR BLD: 1.13 RATIO — SIGNIFICANT CHANGE UP (ref 0.65–1.3)
MAGNESIUM SERPL-MCNC: 1.7 MG/DL — LOW (ref 1.8–2.4)
MCHC RBC-ENTMCNC: 29.4 PG — SIGNIFICANT CHANGE UP (ref 27–31)
MCHC RBC-ENTMCNC: 29.4 PG — SIGNIFICANT CHANGE UP (ref 27–31)
MCHC RBC-ENTMCNC: 29.8 G/DL — LOW (ref 32–37)
MCHC RBC-ENTMCNC: 30.1 G/DL — LOW (ref 32–37)
MCV RBC AUTO: 97.8 FL — HIGH (ref 80–94)
MCV RBC AUTO: 98.7 FL — HIGH (ref 80–94)
NRBC # BLD: 0 /100 WBCS — SIGNIFICANT CHANGE UP (ref 0–0)
NRBC # BLD: 0 /100 WBCS — SIGNIFICANT CHANGE UP (ref 0–0)
ORGANISM # SPEC MICROSCOPIC CNT: ABNORMAL
ORGANISM # SPEC MICROSCOPIC CNT: SIGNIFICANT CHANGE UP
PLATELET # BLD AUTO: 362 K/UL — SIGNIFICANT CHANGE UP (ref 130–400)
PLATELET # BLD AUTO: 398 K/UL — SIGNIFICANT CHANGE UP (ref 130–400)
PMV BLD: 10.8 FL — HIGH (ref 7.4–10.4)
PMV BLD: 11 FL — HIGH (ref 7.4–10.4)
POTASSIUM SERPL-MCNC: 4.7 MMOL/L — SIGNIFICANT CHANGE UP (ref 3.5–5)
POTASSIUM SERPL-MCNC: 5.1 MMOL/L — HIGH (ref 3.5–5)
POTASSIUM SERPL-SCNC: 4.7 MMOL/L — SIGNIFICANT CHANGE UP (ref 3.5–5)
POTASSIUM SERPL-SCNC: 5.1 MMOL/L — HIGH (ref 3.5–5)
PROT SERPL-MCNC: 6.3 G/DL — SIGNIFICANT CHANGE UP (ref 6–8)
PROT SERPL-MCNC: 6.9 G/DL — SIGNIFICANT CHANGE UP (ref 6–8)
PROTHROM AB SERPL-ACNC: 12.9 SEC — HIGH (ref 9.95–12.87)
RBC # BLD: 2.72 M/UL — LOW (ref 4.7–6.1)
RBC # BLD: 3.13 M/UL — LOW (ref 4.7–6.1)
RBC # FLD: 13.4 % — SIGNIFICANT CHANGE UP (ref 11.5–14.5)
RBC # FLD: 13.6 % — SIGNIFICANT CHANGE UP (ref 11.5–14.5)
SODIUM SERPL-SCNC: 138 MMOL/L — SIGNIFICANT CHANGE UP (ref 135–146)
SODIUM SERPL-SCNC: 138 MMOL/L — SIGNIFICANT CHANGE UP (ref 135–146)
SPECIMEN SOURCE: SIGNIFICANT CHANGE UP
WBC # BLD: 13.13 K/UL — HIGH (ref 4.8–10.8)
WBC # BLD: 15.68 K/UL — HIGH (ref 4.8–10.8)
WBC # FLD AUTO: 13.13 K/UL — HIGH (ref 4.8–10.8)
WBC # FLD AUTO: 15.68 K/UL — HIGH (ref 4.8–10.8)

## 2024-07-26 PROCEDURE — 50434 CONVERT NEPHROSTOMY CATHETER: CPT | Mod: RT

## 2024-07-26 PROCEDURE — 99232 SBSQ HOSP IP/OBS MODERATE 35: CPT | Mod: GC

## 2024-07-26 RX ADMIN — Medication 650 MILLIGRAM(S): at 06:03

## 2024-07-26 RX ADMIN — Medication 650 MILLIGRAM(S): at 17:10

## 2024-07-26 RX ADMIN — MEROPENEM 100 MILLIGRAM(S): 1 INJECTION, POWDER, FOR SOLUTION INTRAVENOUS at 17:10

## 2024-07-26 RX ADMIN — MEROPENEM 100 MILLIGRAM(S): 1 INJECTION, POWDER, FOR SOLUTION INTRAVENOUS at 06:02

## 2024-07-26 RX ADMIN — PANTOPRAZOLE SODIUM 40 MILLIGRAM(S): 20 TABLET, DELAYED RELEASE ORAL at 06:02

## 2024-07-26 RX ADMIN — NIFEDIPINE 30 MILLIGRAM(S): 20 CAPSULE, LIQUID FILLED ORAL at 06:02

## 2024-07-26 RX ADMIN — ATORVASTATIN CALCIUM 20 MILLIGRAM(S): 40 TABLET, FILM COATED ORAL at 21:07

## 2024-07-26 NOTE — PRE PROCEDURE NOTE - PRE PROCEDURE EVALUATION
PREOPERATIVE DAY OF PROCEDURE EVALUATION:     I have personally seen and examined this patient. I agree with the history and physical which I have reviewed and noted any changes below:     Plan is for R PCNU    Procedure/ risks/ benefits/ goals/ alternatives were explained. All questions answered. Informed content obtained from patient. Consent placed in chart.

## 2024-07-26 NOTE — PROGRESS NOTE ADULT - SUBJECTIVE AND OBJECTIVE BOX
INTERVENTIONAL RADIOLOGY BRIEF-OPERATIVE NOTE    Procedure:  Percutaneous right nephrostomy with placement of right nephroureteral catheter    Pre-Op Diagnosis:  Right nephroureterolithiasis    Post-Op Diagnosis:  Same    Attending:  Isrrael (IR) / Sharad (Anaesthesia)  Resident:  None    Anesthesia (type):  [ ] General Anesthesia  [X] Sedation-- see anaesthesia record  [ ] Spinal Anesthesia  [X] Local/Regional    Contrast:  Visipaque, 40 cc, drained    Estimated Blood Loss:  2 cc    Condition:   [ ] Critical  [ ] Serious  [ ] Fair   [X] Good    Findings/Follow up Plan of Care:  Mid-pole posterolateral right renal calyx accessed under fluoroscopic guidance.  4-Samoan, 65 cm Tebbetts Berenstein catheter placed uneventfully.  Original dedicated 8-Samoan nephroureteral catheter access preserved.    Specimens Removed:  None    Implants:  None    Complications:  None immediate    Disposition:  Back to floor;  f/u urology.      Please call Interventional Radiology [phone] with any questions, concerns, or issues.

## 2024-07-26 NOTE — PROGRESS NOTE ADULT - SUBJECTIVE AND OBJECTIVE BOX
24H events:    Patient is a 69y old Male who presents with a chief complaint of UTI w/ obstructing stone (26 Jul 2024 10:34)    Primary diagnosis of Hydronephrosis with obstructing calculus        Today is 17d of hospitalization. This morning patient was seen and examined at bedside, resting comfortably in bed.    No acute or major events overnight. Pt underwent IR procedure for Right lower pole nephrostomy.      PAST MEDICAL & SURGICAL HISTORY  DM (diabetes mellitus)    HTN (hypertension)    H/O paraplegia    Spinal abscess    Renal stones    Spinal abscess  S/P Surgery    Encounter for care or replacement of suprapubic tube      SOCIAL HISTORY:  Social History:      ALLERGIES:  No Known Allergies    MEDICATIONS:  STANDING MEDICATIONS  atorvastatin 20 milliGRAM(s) Oral at bedtime  chlorhexidine 2% Cloths 1 Application(s) Topical daily  dextrose 5%. 1000 milliLiter(s) IV Continuous <Continuous>  dextrose 5%. 1000 milliLiter(s) IV Continuous <Continuous>  dextrose 50% Injectable 25 Gram(s) IV Push once  dextrose 50% Injectable 25 Gram(s) IV Push once  dextrose 50% Injectable 12.5 Gram(s) IV Push once  glucagon  Injectable 1 milliGRAM(s) IntraMuscular once  insulin lispro (ADMELOG) corrective regimen sliding scale   SubCutaneous three times a day before meals  insulin lispro (ADMELOG) corrective regimen sliding scale   SubCutaneous at bedtime  lactated ringers. 1000 milliLiter(s) IV Continuous <Continuous>  meropenem  IVPB 500 milliGRAM(s) IV Intermittent every 12 hours  NIFEdipine XL 30 milliGRAM(s) Oral daily  pantoprazole    Tablet 40 milliGRAM(s) Oral before breakfast  sodium bicarbonate 650 milliGRAM(s) Oral two times a day    PRN MEDICATIONS  aluminum hydroxide/magnesium hydroxide/simethicone Suspension 30 milliLiter(s) Oral every 4 hours PRN  dextrose Oral Gel 15 Gram(s) Oral once PRN  HYDROmorphone  Injectable 0.5 milliGRAM(s) IV Push every 10 minutes PRN  HYDROmorphone  Injectable 1 milliGRAM(s) IV Push every 10 minutes PRN  melatonin 3 milliGRAM(s) Oral at bedtime PRN  ondansetron Injectable 4 milliGRAM(s) IV Push every 8 hours PRN  ondansetron Injectable 4 milliGRAM(s) IV Push once PRN    VITALS:   T(F): 97.2  HR: 73  BP: 106/62  RR: 16  SpO2: 95%    PHYSICAL EXAM:  GENERAL:   ( x) NAD, lying in bed comfortably     (  ) obtunded     (  ) lethargic     (  ) somnolent      NECK:  (x) Supple     (  ) neck stiffness     (  ) nuchal rigidity     (  )  no JVD     (  ) JVD present ( -- cm)    HEART:  Rate -->     (x) normal rate     (  ) bradycardic     (  ) tachycardic  Rhythm -->     (x) regular     (  ) regularly irregular     (  ) irregularly irregular  Murmurs -->     (x) normal s1s2     (  ) systolic murmur     (  ) diastolic murmur     (  ) continuous murmur      (  ) S3 present     (  ) S4 present    LUNGS:   ( x)Unlabored respirations     (  ) tachypnea  ( x) B/L air entry     (  ) decreased breath sounds in:  (location     )    ( ) no adventitious sound     (  ) crackles     (  ) wheezing      (  ) rhonchi      (specify location:       )  (  ) chest wall tenderness (specify location:       )    ABDOMEN:   ( x) Soft     (  ) tense   |   (  ) nondistended     (  ) distended   |   (  ) +BS     (  ) hypoactive bowel sounds     (  ) hyperactive bowel sounds  ( x) nontender     (  ) RUQ tenderness     (  ) RLQ tenderness     (  ) LLQ tenderness     (  ) epigastric tenderness     (  ) diffuse tenderness  (  ) Splenomegaly      (  ) Hepatomegaly      (  ) Jaundice     (  ) ecchymosis     EXTREMITIES:  ( x) Normal     (  ) Rash     (  ) ecchymosis     (  ) varicose veins      (  ) pitting edema     (  ) non-pitting edema   (  ) ulceration     (  ) gangrene:     (location:     )    NERVOUS SYSTEM:    ( x) A&Ox3     (  ) confused     (  ) lethargic  CN II-XII:     ( x) Intact     (  ) deficits found     (Specify:     )   Upper extremities:     (  ) no sensorimotor deficits     (  ) weakness     (  ) loss of proprioception/vibration     (  ) loss of touch/temperature (specify:    )  Lower extremities:     (  ) no sensorimotor deficits     (  ) weakness     (  ) loss of proprioception/vibration     (  ) loss of touch/temperature (specify:    )    SKIN:   (  ) No rashes or lesions     (  ) maculopapular rash     (  ) pustules     (  ) vesicles     (  ) ulcer     (  ) ecchymosis     (specify location:     )      LABS:                        8.0    15.68 )-----------( 362      ( 25 Jul 2024 23:53 )             26.6     07-25    138  |  103  |  55<H>  ----------------------------<  157<H>  4.7   |  22  |  3.8<H>    Ca    7.4<L>      25 Jul 2024 23:53  Mg     1.7     07-25    TPro  6.3  /  Alb  2.1<L>  /  TBili  0.4  /  DBili  x   /  AST  12  /  ALT  <5  /  AlkPhos  132<H>  07-25    PT/INR - ( 25 Jul 2024 23:53 )   PT: 12.90 sec;   INR: 1.13 ratio         PTT - ( 25 Jul 2024 23:53 )  PTT:34.5 sec  Urinalysis Basic - ( 25 Jul 2024 23:53 )    Color: x / Appearance: x / SG: x / pH: x  Gluc: 157 mg/dL / Ketone: x  / Bili: x / Urobili: x   Blood: x / Protein: x / Nitrite: x   Leuk Esterase: x / RBC: x / WBC x   Sq Epi: x / Non Sq Epi: x / Bacteria: x            Culture - Tissue with Gram Stain (collected 23 Jul 2024 22:38)  Source: .Tissue RIGHT RENAL AND URETERAL MUCOUS STONES  Gram Stain (25 Jul 2024 01:35):    No polymorphonuclear leukocytes seen per low power field    No organisms seen per oil power field  Preliminary Report (25 Jul 2024 17:40):    No growth to date.    Culture - Acid Fast - Tissue w/Smear (collected 23 Jul 2024 21:36)  Source: .Tissue None    Culture - Fungal, Tissue (collected 23 Jul 2024 21:36)  Source: .Tissue None  Preliminary Report (25 Jul 2024 09:54):    Testing in progress    Culture - Tissue with Gram Stain (collected 23 Jul 2024 21:36)  Source: .Tissue RIGHT RENAL MUCOUS STONE FOR C&amp;S AND FUNGUS  Gram Stain (25 Jul 2024 02:24):    No polymorphonuclear leukocytes seen per low power field    No organisms seen per oil power field  Preliminary Report (25 Jul 2024 17:54):    No growth to date.                       24H events:    Patient is a 69y old Male who presents with a chief complaint of UTI w/ obstructing stone (26 Jul 2024 10:34)    Primary diagnosis of Hydronephrosis with obstructing calculus        Today is 17d of hospitalization. This morning patient was seen and examined at bedside, resting comfortably in bed.    No acute or major events overnight. Pt underwent IR procedure for Right lower pole nephrostomy. Pt reports no acute complaints.      PAST MEDICAL & SURGICAL HISTORY  DM (diabetes mellitus)    HTN (hypertension)    H/O paraplegia    Spinal abscess    Renal stones    Spinal abscess  S/P Surgery    Encounter for care or replacement of suprapubic tube      SOCIAL HISTORY:  Social History:      ALLERGIES:  No Known Allergies    MEDICATIONS:  STANDING MEDICATIONS  atorvastatin 20 milliGRAM(s) Oral at bedtime  chlorhexidine 2% Cloths 1 Application(s) Topical daily  dextrose 5%. 1000 milliLiter(s) IV Continuous <Continuous>  dextrose 5%. 1000 milliLiter(s) IV Continuous <Continuous>  dextrose 50% Injectable 25 Gram(s) IV Push once  dextrose 50% Injectable 25 Gram(s) IV Push once  dextrose 50% Injectable 12.5 Gram(s) IV Push once  glucagon  Injectable 1 milliGRAM(s) IntraMuscular once  insulin lispro (ADMELOG) corrective regimen sliding scale   SubCutaneous three times a day before meals  insulin lispro (ADMELOG) corrective regimen sliding scale   SubCutaneous at bedtime  lactated ringers. 1000 milliLiter(s) IV Continuous <Continuous>  meropenem  IVPB 500 milliGRAM(s) IV Intermittent every 12 hours  NIFEdipine XL 30 milliGRAM(s) Oral daily  pantoprazole    Tablet 40 milliGRAM(s) Oral before breakfast  sodium bicarbonate 650 milliGRAM(s) Oral two times a day    PRN MEDICATIONS  aluminum hydroxide/magnesium hydroxide/simethicone Suspension 30 milliLiter(s) Oral every 4 hours PRN  dextrose Oral Gel 15 Gram(s) Oral once PRN  HYDROmorphone  Injectable 0.5 milliGRAM(s) IV Push every 10 minutes PRN  HYDROmorphone  Injectable 1 milliGRAM(s) IV Push every 10 minutes PRN  melatonin 3 milliGRAM(s) Oral at bedtime PRN  ondansetron Injectable 4 milliGRAM(s) IV Push every 8 hours PRN  ondansetron Injectable 4 milliGRAM(s) IV Push once PRN    VITALS:   T(F): 97.2  HR: 73  BP: 106/62  RR: 16  SpO2: 95%    PHYSICAL EXAM:  GENERAL:   ( x) NAD, lying in bed comfortably     (  ) obtunded     (  ) lethargic     (  ) somnolent      NECK:  (x) Supple     (  ) neck stiffness     (  ) nuchal rigidity     (  )  no JVD     (  ) JVD present ( -- cm)    HEART:  Rate -->     (x) normal rate     (  ) bradycardic     (  ) tachycardic  Rhythm -->     (x) regular     (  ) regularly irregular     (  ) irregularly irregular  Murmurs -->     (x) normal s1s2     (  ) systolic murmur     (  ) diastolic murmur     (  ) continuous murmur      (  ) S3 present     (  ) S4 present    LUNGS:   ( x)Unlabored respirations     (  ) tachypnea  ( x) B/L air entry     (  ) decreased breath sounds in:  (location     )    ( ) no adventitious sound     (  ) crackles     (  ) wheezing      (  ) rhonchi      (specify location:       )  (  ) chest wall tenderness (specify location:       )    ABDOMEN:   ( x) Soft     (  ) tense   |   (  ) nondistended     (  ) distended   |   (  ) +BS     (  ) hypoactive bowel sounds     (  ) hyperactive bowel sounds  ( x) nontender     (  ) RUQ tenderness     (  ) RLQ tenderness     (  ) LLQ tenderness     (  ) epigastric tenderness     (  ) diffuse tenderness  (  ) Splenomegaly      (  ) Hepatomegaly      (  ) Jaundice     (  ) ecchymosis     EXTREMITIES:  ( x) Normal     (  ) Rash     (  ) ecchymosis     (  ) varicose veins      (  ) pitting edema     (  ) non-pitting edema   (  ) ulceration     (  ) gangrene:     (location:     )    NERVOUS SYSTEM:    ( x) A&Ox3     (  ) confused     (  ) lethargic  CN II-XII:     ( x) Intact     (  ) deficits found     (Specify:     )   Upper extremities:     (  ) no sensorimotor deficits     (  ) weakness     (  ) loss of proprioception/vibration     (  ) loss of touch/temperature (specify:    )  Lower extremities:     (  ) no sensorimotor deficits     (  ) weakness     (  ) loss of proprioception/vibration     (  ) loss of touch/temperature (specify:    )    SKIN:   (  ) No rashes or lesions     (  ) maculopapular rash     (  ) pustules     (  ) vesicles     (  ) ulcer     (  ) ecchymosis     (specify location:     )      LABS:                        8.0    15.68 )-----------( 362      ( 25 Jul 2024 23:53 )             26.6     07-25    138  |  103  |  55<H>  ----------------------------<  157<H>  4.7   |  22  |  3.8<H>    Ca    7.4<L>      25 Jul 2024 23:53  Mg     1.7     07-25    TPro  6.3  /  Alb  2.1<L>  /  TBili  0.4  /  DBili  x   /  AST  12  /  ALT  <5  /  AlkPhos  132<H>  07-25    PT/INR - ( 25 Jul 2024 23:53 )   PT: 12.90 sec;   INR: 1.13 ratio         PTT - ( 25 Jul 2024 23:53 )  PTT:34.5 sec  Urinalysis Basic - ( 25 Jul 2024 23:53 )    Color: x / Appearance: x / SG: x / pH: x  Gluc: 157 mg/dL / Ketone: x  / Bili: x / Urobili: x   Blood: x / Protein: x / Nitrite: x   Leuk Esterase: x / RBC: x / WBC x   Sq Epi: x / Non Sq Epi: x / Bacteria: x            Culture - Tissue with Gram Stain (collected 23 Jul 2024 22:38)  Source: .Tissue RIGHT RENAL AND URETERAL MUCOUS STONES  Gram Stain (25 Jul 2024 01:35):    No polymorphonuclear leukocytes seen per low power field    No organisms seen per oil power field  Preliminary Report (25 Jul 2024 17:40):    No growth to date.    Culture - Acid Fast - Tissue w/Smear (collected 23 Jul 2024 21:36)  Source: .Tissue None    Culture - Fungal, Tissue (collected 23 Jul 2024 21:36)  Source: .Tissue None  Preliminary Report (25 Jul 2024 09:54):    Testing in progress    Culture - Tissue with Gram Stain (collected 23 Jul 2024 21:36)  Source: .Tissue RIGHT RENAL MUCOUS STONE FOR C&amp;S AND FUNGUS  Gram Stain (25 Jul 2024 02:24):    No polymorphonuclear leukocytes seen per low power field    No organisms seen per oil power field  Preliminary Report (25 Jul 2024 17:54):    No growth to date.

## 2024-07-26 NOTE — PROGRESS NOTE ADULT - SUBJECTIVE AND OBJECTIVE BOX
Nephrology progress note    Patient was seen and examined, events over the last 24 h noted .    Allergies:  No Known Allergies    Hospital Medications:   MEDICATIONS  (STANDING):  atorvastatin 20 milliGRAM(s) Oral at bedtime  chlorhexidine 2% Cloths 1 Application(s) Topical daily  dextrose 5%. 1000 milliLiter(s) (50 mL/Hr) IV Continuous <Continuous>  dextrose 5%. 1000 milliLiter(s) (100 mL/Hr) IV Continuous <Continuous>  dextrose 50% Injectable 12.5 Gram(s) IV Push once  dextrose 50% Injectable 25 Gram(s) IV Push once  dextrose 50% Injectable 25 Gram(s) IV Push once  glucagon  Injectable 1 milliGRAM(s) IntraMuscular once  insulin lispro (ADMELOG) corrective regimen sliding scale   SubCutaneous three times a day before meals  insulin lispro (ADMELOG) corrective regimen sliding scale   SubCutaneous at bedtime  lactated ringers. 1000 milliLiter(s) (75 mL/Hr) IV Continuous <Continuous>  meropenem  IVPB 500 milliGRAM(s) IV Intermittent every 12 hours  NIFEdipine XL 30 milliGRAM(s) Oral daily  pantoprazole    Tablet 40 milliGRAM(s) Oral before breakfast  sodium bicarbonate 650 milliGRAM(s) Oral two times a day        VITALS:  T(F): 97.2 (07-26-24 @ 10:25), Max: 98.4 (07-25-24 @ 15:43)  HR: 84 (07-26-24 @ 10:25)  BP: 95/50 (07-26-24 @ 10:25)  RR: 16 (07-26-24 @ 10:25)  SpO2: 95% (07-26-24 @ 10:25)  Wt(kg): --    07-24 @ 07:01  -  07-25 @ 07:00  --------------------------------------------------------  IN: 75 mL / OUT: 800 mL / NET: -725 mL    07-25 @ 07:01  -  07-26 @ 07:00  --------------------------------------------------------  IN: 1425 mL / OUT: 1700 mL / NET: -275 mL      Height (cm): 182.9 (07-26 @ 07:55)  Weight (kg): 81.6 (07-26 @ 07:55)  BMI (kg/m2): 24.4 (07-26 @ 07:55)  BSA (m2): 2.04 (07-26 @ 07:55)    PHYSICAL EXAM:  Constitutional: NAD  HEENT: anicteric sclera, oropharynx clear, MMM  Neck: No JVD  Respiratory: CTAB, no wheezes, rales or rhonchi  Cardiovascular: S1, S2, RRR  Gastrointestinal: BS+, soft, NT/ND  Extremities: No cyanosis or clubbing. No peripheral edema  :  No angelo.   Skin: No rashes    LABS:  07-25    138  |  103  |  55<H>  ----------------------------<  157<H>  4.7   |  22  |  3.8<H>    Ca    7.4<L>      25 Jul 2024 23:53  Mg     1.7     07-25    TPro  6.3  /  Alb  2.1<L>  /  TBili  0.4  /  DBili      /  AST  12  /  ALT  <5  /  AlkPhos  132<H>  07-25                          8.0    15.68 )-----------( 362      ( 25 Jul 2024 23:53 )             26.6       Urine Studies:  Urinalysis Basic - ( 25 Jul 2024 23:53 )    Color:  / Appearance:  / SG:  / pH:   Gluc: 157 mg/dL / Ketone:   / Bili:  / Urobili:    Blood:  / Protein:  / Nitrite:    Leuk Esterase:  / RBC:  / WBC    Sq Epi:  / Non Sq Epi:  / Bacteria:         RADIOLOGY & ADDITIONAL STUDIES:

## 2024-07-26 NOTE — PROGRESS NOTE ADULT - ASSESSMENT
68 y/o man with PMH of HTN, hyperlipidemia, DM II, CKD 5 (baseline Cr 4.4) Urinary incontinence with chronic suprapubic catheter, Nephrolithiasis, Spinal abscess with quadriplegia and chronic LE wounds receiving dressing changes by visiting nurse now presented to the ED for crampy abdominal pain along with nausea and vomiting.     # JOÃO over CKD 5 due to obstructive uropathy   # Complicated UTI / pyelonephritis  # Bacteroids bacteremia  #Elevated white count likely due to procedure  - CT abd/pelvis: Severe right hydroureteronephrosis with cortical thinning and numerous large intrarenal and proximal to mid ureteral calculi, obstructing calculus in the right mid ureter measures 1.1 x 1 x 1.8 cm with normal caliber distal ureter.  Multiple nonobstructive left intrarenal calculi and a 0.8 cm left distal ureteral calculus; no left hydronephrosis.  Few prominent retroperitoneal lymph nodes, likely reactive. Cholelithiasis.  - s/p eval by Urology in ED --> recommended b/l nephrostomy placement by IR.   - s/p right PCN by IR on 7/10 - 10 cc of thick, viscous green-yellow, malodorous right kidney urine sent for cultures  - s/p SPC exchange in the ED  - blood cultures 1/2- bateroids(sn not performed); 1/2- GNR- monmitor; urine culture- Proteus, Serratia(both sn to dwain) and enterococcus  - acidosis improved - continue PO bicarbonate  - ID following-  meropenem 500 mg BID; On D/C, recommend midline and ertapenem 500mg q24h IV + PO amoxicillin 500mg daily (to cover enterococcus) until 48h post-op -- will continue for now --surgery on tuesday 7/23  - urine culture from nephrostomy  is gm positive rods and enterococcus  - urology operative note reviewed, planned for further intervention?  - WBC trended down  - OR Cx - NGTD  - c/w meropenem 500mg q12h IV  - appreciate nephrology recs, no indication to start RRT at this time  - Cr trended up yesterday, ordered IVF LR at 75 cc per hour, f/u repeat labs at 4pm  - Reviewed IR note  - Scheduled for Urology procedure tentatively coming Thursday    #  HTN- stable  nifedipine stopped    # Hyperlipidemia on statin    #  DM   A1C 6.9  insulin sliding scale not needed--his blood sugar was stable--    # Chronic LE wounds - wound care per advanced nursing recommendation    # Functional quadriplegia-- bed bound-- uses ana lift    DNR/DNI Trial of NIV 70 y/o man with PMH of HTN, hyperlipidemia, DM II, CKD 5 (baseline Cr 4.4) Urinary incontinence with chronic suprapubic catheter, Nephrolithiasis, Spinal abscess with quadriplegia and chronic LE wounds receiving dressing changes by visiting nurse now presented to the ED for crampy abdominal pain along with nausea and vomiting.     # JOÃO over CKD 5 due to obstructive uropathy   # Complicated UTI / pyelonephritis  # Bacteroids bacteremia  #Elevated white count likely due to procedure  - CT abd/pelvis: Severe right hydroureteronephrosis with cortical thinning and numerous large intrarenal and proximal to mid ureteral calculi, obstructing calculus in the right mid ureter measures 1.1 x 1 x 1.8 cm with normal caliber distal ureter.  Multiple nonobstructive left intrarenal calculi and a 0.8 cm left distal ureteral calculus; no left hydronephrosis.  Few prominent retroperitoneal lymph nodes, likely reactive. Cholelithiasis.  - s/p eval by Urology in ED --> recommended b/l nephrostomy placement by IR.   - s/p right PCN by IR on 7/10 - 10 cc of thick, viscous green-yellow, malodorous right kidney urine sent for cultures  - s/p SPC exchange in the ED  - blood cultures 1/2- bateroids(sn not performed); 1/2- GNR- monmitor; urine culture- Proteus, Serratia(both sn to dwain) and enterococcus  - acidosis improved - continue PO bicarbonate  - ID following-  meropenem 500 mg BID; On D/C, recommend midline and ertapenem 500mg q24h IV + PO amoxicillin 500mg daily (to cover enterococcus) until 48h post-op -- will continue for now --surgery on tuesday 7/23  - urine culture from nephrostomy  is gm positive rods and enterococcus  - urology operative note reviewed, planned for further intervention?  - WBC trended down  - OR Cx - NGTD  - c/w meropenem 500mg q12h IV  - appreciate nephrology recs, no indication to start RRT at this time  - Cr trended up yesterday, ordered IVF LR at 75 cc per hour, f/u repeat labs at 4pm  - Reviewed IR note, Nephrology note  - Scheduled for Urology procedure tentatively coming Thursday    #  HTN- stable  nifedipine stopped    # Hyperlipidemia on statin    #  DM   A1C 6.9  insulin sliding scale not needed--his blood sugar was stable--    # Chronic LE wounds - wound care per advanced nursing recommendation    # Functional quadriplegia-- bed bound-- uses ana lift    DNR/DNI Trial of NIV

## 2024-07-26 NOTE — PRE-ANESTHESIA EVALUATION ADULT - NSANTHOSAYNRD_GEN_A_CORE
No. MARGE screening performed.  STOP BANG Legend: 0-2 = LOW Risk; 3-4 = INTERMEDIATE Risk; 5-8 = HIGH Risk

## 2024-07-26 NOTE — PROGRESS NOTE ADULT - ASSESSMENT
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 NBNB vomiting and nausea every time after he eats  # Obstructing R mid ureter calculus with severe hydroureteronephrosis s/p Percutaneous right nephrostomy with placement of right nephroureteral catheter (7/26)  # B/L nephrolithiasis   # Post-renal JOÃO on advanced CKD  # HAGMA   # Chronic suprapubic catheter   - s/p cystoscopic removal of bladder and ureteral stones on 7/23 / appreciate  f/u  for R PCN today  - cont strict I and O   - cont po bicarb  - last phos at goal   - appreciate ID f/u / cont meropenem  - BP controlled  - no need to start RRT at this time  will follow

## 2024-07-27 LAB
ANION GAP SERPL CALC-SCNC: 15 MMOL/L — HIGH (ref 7–14)
BASOPHILS # BLD AUTO: 0.07 K/UL — SIGNIFICANT CHANGE UP (ref 0–0.2)
BASOPHILS NFR BLD AUTO: 0.6 % — SIGNIFICANT CHANGE UP (ref 0–1)
BUN SERPL-MCNC: 54 MG/DL — HIGH (ref 10–20)
CALCIUM SERPL-MCNC: 7.8 MG/DL — LOW (ref 8.4–10.5)
CHLORIDE SERPL-SCNC: 103 MMOL/L — SIGNIFICANT CHANGE UP (ref 98–110)
CO2 SERPL-SCNC: 21 MMOL/L — SIGNIFICANT CHANGE UP (ref 17–32)
CREAT SERPL-MCNC: 3.7 MG/DL — HIGH (ref 0.7–1.5)
CULTURE RESULTS: SIGNIFICANT CHANGE UP
EGFR: 17 ML/MIN/1.73M2 — LOW
EOSINOPHIL # BLD AUTO: 0.05 K/UL — SIGNIFICANT CHANGE UP (ref 0–0.7)
EOSINOPHIL NFR BLD AUTO: 0.4 % — SIGNIFICANT CHANGE UP (ref 0–8)
GLUCOSE BLDC GLUCOMTR-MCNC: 101 MG/DL — HIGH (ref 70–99)
GLUCOSE BLDC GLUCOMTR-MCNC: 139 MG/DL — HIGH (ref 70–99)
GLUCOSE BLDC GLUCOMTR-MCNC: 164 MG/DL — HIGH (ref 70–99)
GLUCOSE BLDC GLUCOMTR-MCNC: 189 MG/DL — HIGH (ref 70–99)
GLUCOSE SERPL-MCNC: 97 MG/DL — SIGNIFICANT CHANGE UP (ref 70–99)
HCT VFR BLD CALC: 28.2 % — LOW (ref 42–52)
HGB BLD-MCNC: 8.4 G/DL — LOW (ref 14–18)
IMM GRANULOCYTES NFR BLD AUTO: 0.9 % — HIGH (ref 0.1–0.3)
LYMPHOCYTES # BLD AUTO: 1.18 K/UL — LOW (ref 1.2–3.4)
LYMPHOCYTES # BLD AUTO: 10 % — LOW (ref 20.5–51.1)
MAGNESIUM SERPL-MCNC: 1.6 MG/DL — LOW (ref 1.8–2.4)
MCHC RBC-ENTMCNC: 29.5 PG — SIGNIFICANT CHANGE UP (ref 27–31)
MCHC RBC-ENTMCNC: 29.8 G/DL — LOW (ref 32–37)
MCV RBC AUTO: 98.9 FL — HIGH (ref 80–94)
MONOCYTES # BLD AUTO: 0.86 K/UL — HIGH (ref 0.1–0.6)
MONOCYTES NFR BLD AUTO: 7.3 % — SIGNIFICANT CHANGE UP (ref 1.7–9.3)
NEUTROPHILS # BLD AUTO: 9.52 K/UL — HIGH (ref 1.4–6.5)
NEUTROPHILS NFR BLD AUTO: 80.8 % — HIGH (ref 42.2–75.2)
NRBC # BLD: 0 /100 WBCS — SIGNIFICANT CHANGE UP (ref 0–0)
PLATELET # BLD AUTO: 381 K/UL — SIGNIFICANT CHANGE UP (ref 130–400)
PMV BLD: 10.8 FL — HIGH (ref 7.4–10.4)
POTASSIUM SERPL-MCNC: 5.2 MMOL/L — HIGH (ref 3.5–5)
POTASSIUM SERPL-SCNC: 5.2 MMOL/L — HIGH (ref 3.5–5)
RBC # BLD: 2.85 M/UL — LOW (ref 4.7–6.1)
RBC # FLD: 13.6 % — SIGNIFICANT CHANGE UP (ref 11.5–14.5)
SODIUM SERPL-SCNC: 139 MMOL/L — SIGNIFICANT CHANGE UP (ref 135–146)
SPECIMEN SOURCE: SIGNIFICANT CHANGE UP
WBC # BLD: 11.79 K/UL — HIGH (ref 4.8–10.8)
WBC # FLD AUTO: 11.79 K/UL — HIGH (ref 4.8–10.8)

## 2024-07-27 PROCEDURE — 99232 SBSQ HOSP IP/OBS MODERATE 35: CPT

## 2024-07-27 PROCEDURE — 99232 SBSQ HOSP IP/OBS MODERATE 35: CPT | Mod: GC

## 2024-07-27 RX ORDER — MAGNESIUM SULFATE 500 MG/ML
2 VIAL (ML) INJECTION
Refills: 0 | Status: COMPLETED | OUTPATIENT
Start: 2024-07-27 | End: 2024-07-27

## 2024-07-27 RX ADMIN — NIFEDIPINE 30 MILLIGRAM(S): 20 CAPSULE, LIQUID FILLED ORAL at 05:10

## 2024-07-27 RX ADMIN — Medication 25 GRAM(S): at 11:13

## 2024-07-27 RX ADMIN — Medication 0: at 16:36

## 2024-07-27 RX ADMIN — MEROPENEM 100 MILLIGRAM(S): 1 INJECTION, POWDER, FOR SOLUTION INTRAVENOUS at 05:10

## 2024-07-27 RX ADMIN — CHLORHEXIDINE GLUCONATE 1 APPLICATION(S): 500 CLOTH TOPICAL at 11:13

## 2024-07-27 RX ADMIN — ATORVASTATIN CALCIUM 20 MILLIGRAM(S): 40 TABLET, FILM COATED ORAL at 21:01

## 2024-07-27 RX ADMIN — Medication 0: at 07:40

## 2024-07-27 RX ADMIN — Medication 650 MILLIGRAM(S): at 05:10

## 2024-07-27 RX ADMIN — Medication 25 GRAM(S): at 09:24

## 2024-07-27 RX ADMIN — MEROPENEM 100 MILLIGRAM(S): 1 INJECTION, POWDER, FOR SOLUTION INTRAVENOUS at 17:15

## 2024-07-27 RX ADMIN — Medication 650 MILLIGRAM(S): at 17:15

## 2024-07-27 RX ADMIN — PANTOPRAZOLE SODIUM 40 MILLIGRAM(S): 20 TABLET, DELAYED RELEASE ORAL at 05:10

## 2024-07-27 RX ADMIN — Medication 2: at 11:13

## 2024-07-27 NOTE — PROGRESS NOTE ADULT - NS ATTEND AMEND GEN_ALL_CORE FT
Patient seen  Agree with above
Agree with above
Patient seen and examined  Nephrostomy and SPT drainage clear  Agree with above
Patient seen and examined July 13, 2024 CT abdomen pelvis images reviewed showing significant stone burden in the right kidney.  Patient is sp R PCNU insertion and urine is draining into the bag at the bedside.  Urine culture shows Proteus.  Cont Antibiotics as per infectious disease  Surgical plan will be decided by Dr. adams

## 2024-07-27 NOTE — PROGRESS NOTE ADULT - ASSESSMENT
68yo male w/ pmhx of HTN, HLD, DMII, CKD stage 5 baseline Cr ~ 4.4, urinary incontinence with chronic supra pubic catheter, Nephrolithiasis, spinal abscess leading to quadriplegia presenting w. NBNB vomiting and nausea every time after he eats  # Obstructing R mid ureter calculus with severe hydroureteronephrosis s/p Percutaneous right nephrostomy with placement of right nephroureteral catheter (7/26)  # B/L nephrolithiasis   # Post-renal JOÃO on advanced CKD  # HAGMA   # Chronic suprapubic catheter   - s/p cystoscopic removal of bladder and ureteral stones on 7/23 / appreciate  f/u  for R PCN today  - cont strict I and O   - cont po bicarb  - last phos at goal   - appreciate ID f/u / cont meropenem  - BP controlled  - no need to start RRT at this time  will follow     70yo male w/ pmhx of HTN, HLD, DMII, CKD stage 5 baseline Cr ~ 4.4, urinary incontinence with chronic supra pubic catheter, Nephrolithiasis, spinal abscess leading to quadriplegia presenting w. NBNB vomiting and nausea.    # Obstructing R mid ureter calculus with severe hydroureteronephrosis s/p Percutaneous right nephrostomy with placement of right nephroureteral catheter (7/26)  # B/L nephrolithiasis   # Post-renal JOÃO on advanced CKD  # HAGMA   # Chronic suprapubic catheter   Renal function stable, nonoliguric  - s/p cystoscopic removal of bladder and ureteral stones on 7/23 / appreciate  f/u  - cont strict I and O   - cont po bicarb  - last phos at goal   - appreciate ID f/u / cont meropenem  - BP controlled  - no need to start RRT at this time    will follow

## 2024-07-27 NOTE — PROGRESS NOTE ADULT - SUBJECTIVE AND OBJECTIVE BOX
24H events:    Patient is a 69y old Male who presents with a chief complaint of UTI w/ obstructing stone (27 Jul 2024 09:29)    Primary diagnosis of Hydronephrosis with obstructing calculus        Today is 18d of hospitalization. This morning patient was seen and examined at bedside, resting comfortably in bed.    No acute or major events overnight.      PAST MEDICAL & SURGICAL HISTORY  DM (diabetes mellitus)    HTN (hypertension)    H/O paraplegia    Spinal abscess    Renal stones    Spinal abscess  S/P Surgery    Encounter for care or replacement of suprapubic tube      SOCIAL HISTORY:  Social History:      ALLERGIES:  No Known Allergies    MEDICATIONS:  STANDING MEDICATIONS  atorvastatin 20 milliGRAM(s) Oral at bedtime  chlorhexidine 2% Cloths 1 Application(s) Topical daily  dextrose 5%. 1000 milliLiter(s) IV Continuous <Continuous>  dextrose 5%. 1000 milliLiter(s) IV Continuous <Continuous>  dextrose 50% Injectable 12.5 Gram(s) IV Push once  dextrose 50% Injectable 25 Gram(s) IV Push once  dextrose 50% Injectable 25 Gram(s) IV Push once  glucagon  Injectable 1 milliGRAM(s) IntraMuscular once  insulin lispro (ADMELOG) corrective regimen sliding scale   SubCutaneous three times a day before meals  insulin lispro (ADMELOG) corrective regimen sliding scale   SubCutaneous at bedtime  meropenem  IVPB 500 milliGRAM(s) IV Intermittent every 12 hours  NIFEdipine XL 30 milliGRAM(s) Oral daily  pantoprazole    Tablet 40 milliGRAM(s) Oral before breakfast  sodium bicarbonate 650 milliGRAM(s) Oral two times a day    PRN MEDICATIONS  aluminum hydroxide/magnesium hydroxide/simethicone Suspension 30 milliLiter(s) Oral every 4 hours PRN  dextrose Oral Gel 15 Gram(s) Oral once PRN  HYDROmorphone  Injectable 0.5 milliGRAM(s) IV Push every 10 minutes PRN  HYDROmorphone  Injectable 1 milliGRAM(s) IV Push every 10 minutes PRN  melatonin 3 milliGRAM(s) Oral at bedtime PRN  ondansetron Injectable 4 milliGRAM(s) IV Push once PRN  ondansetron Injectable 4 milliGRAM(s) IV Push every 8 hours PRN    VITALS:   T(F): 98  HR: 88  BP: 102/65  RR: 17  SpO2: 92%    PHYSICAL EXAM:  GENERAL:   ( x) NAD, lying in bed comfortably     (  ) obtunded     (  ) lethargic     (  ) somnolent      NECK:  (x) Supple     (  ) neck stiffness     (  ) nuchal rigidity     (  )  no JVD     (  ) JVD present ( -- cm)    HEART:  Rate -->     (x) normal rate     (  ) bradycardic     (  ) tachycardic  Rhythm -->     (x) regular     (  ) regularly irregular     (  ) irregularly irregular  Murmurs -->     (x) normal s1s2     (  ) systolic murmur     (  ) diastolic murmur     (  ) continuous murmur      (  ) S3 present     (  ) S4 present    LUNGS:   ( x)Unlabored respirations     (  ) tachypnea  ( x) B/L air entry     (  ) decreased breath sounds in:  (location     )    ( ) no adventitious sound     (  ) crackles     (  ) wheezing      (  ) rhonchi      (specify location:       )  (  ) chest wall tenderness (specify location:       )    ABDOMEN:   ( x) Soft     (  ) tense   |   (  ) nondistended     (  ) distended   |   (  ) +BS     (  ) hypoactive bowel sounds     (  ) hyperactive bowel sounds  ( ) nontender     (  ) RUQ tenderness     (  ) RLQ tenderness     (  ) LLQ tenderness     (  ) epigastric tenderness     (  ) diffuse tenderness  (  ) Splenomegaly      (  ) Hepatomegaly      (  ) Jaundice     (  ) ecchymosis     EXTREMITIES:  ( x) Normal     (  ) Rash     (  ) ecchymosis     (  ) varicose veins      (  ) pitting edema     (  ) non-pitting edema   (  ) ulceration     (  ) gangrene:     (location:     )    NERVOUS SYSTEM:    ( x) A&Ox3     (  ) confused     (  ) lethargic  CN II-XII:     ( x) Intact     (  ) deficits found     (Specify:     )   Upper extremities:     (  ) no sensorimotor deficits     (  ) weakness     (  ) loss of proprioception/vibration     (  ) loss of touch/temperature (specify:    )  Lower extremities:     (  ) no sensorimotor deficits     (  ) weakness     (  ) loss of proprioception/vibration     (  ) loss of touch/temperature (specify:    )    SKIN:   (  ) No rashes or lesions     (  ) maculopapular rash     (  ) pustules     (  ) vesicles     (  ) ulcer     (  ) ecchymosis     (specify location:     )      LABS:                        8.4    11.79 )-----------( 381      ( 27 Jul 2024 06:44 )             28.2     07-27    139  |  103  |  54<H>  ----------------------------<  97  5.2<H>   |  21  |  3.7<H>    Ca    7.8<L>      27 Jul 2024 06:44  Mg     1.6     07-27    TPro  6.9  /  Alb  2.2<L>  /  TBili  0.3  /  DBili  x   /  AST  15  /  ALT  5   /  AlkPhos  138<H>  07-26    PT/INR - ( 25 Jul 2024 23:53 )   PT: 12.90 sec;   INR: 1.13 ratio         PTT - ( 25 Jul 2024 23:53 )  PTT:34.5 sec  Urinalysis Basic - ( 27 Jul 2024 06:44 )    Color: x / Appearance: x / SG: x / pH: x  Gluc: 97 mg/dL / Ketone: x  / Bili: x / Urobili: x   Blood: x / Protein: x / Nitrite: x   Leuk Esterase: x / RBC: x / WBC x   Sq Epi: x / Non Sq Epi: x / Bacteria: x

## 2024-07-27 NOTE — PROGRESS NOTE ADULT - ASSESSMENT
69y Male s/p RIGHT PCNL, Complex cystolitholapaxy  7/23/24   s/p Right PCNU placement by IR yesterday.   Pt. seen and examined at bedside together with Dr. Lorenzo. Pt. in NAD. Right PCN drains yellow urine. SPT in place drains yellow urine. Afebrile.          Plan:   Please obtain Urine cx from SPT and Right nephrostomy after last dose of IV Meropenem(today evening 7/27)  As per phone conversation with medical resident plan is for d/c on Sunday. OK for d/c from  standpoint , Prior d/c please obtain Urine cx from Right PCN and SPT . F/U as OP for PCNL most likely be scheduled for Thursday 8/1/24 with Dr. Mable Cook. Current medical management a sper primary medical team

## 2024-07-27 NOTE — PROGRESS NOTE ADULT - NS ATTEST RISK PROBLEM GEN_ALL_CORE FT
- I assisted with prescription drug management (i.e discharge antibiotics)  I have personally seen and examined this patient.  I have reviewed all pertinent clinical information and reviewed all relevant imaging and diagnostic studies personally.   If possible, I counseled the patient about diagnostic testing and treatment plan.   I discussed my recommendations with the primary team and any family members at bedside.
One acute complicated injury- pyonephrosis    - I independently interpreted the most recent microbiological data   - I discussed my recommendations with the primary team housestaff / Attending
- Patient has an illness which poses a threat to life or bodily function without treatment  I have personally seen and examined this patient.  I have reviewed all pertinent clinical information and reviewed all relevant imaging and diagnostic studies personally.   If possible, I counseled the patient about diagnostic testing and treatment plan.   I discussed my recommendations with the primary team and any family members at bedside.
- I assisted with prescription drug management (i.e discharge antibiotics)  I have personally seen and examined this patient.  I have reviewed all pertinent clinical information and reviewed all relevant imaging and diagnostic studies personally.   If possible, I counseled the patient about diagnostic testing and treatment plan.   I discussed my recommendations with the primary team and any family members at bedside.
- I independently interpreted the most recent microbiological data   - I discussed my recommendations with the primary team housestaff / Attending  & Urology PA  - I assisted with prescription drug management (i.e discharge antibiotics), plan to D/C
- Patient has an illness which poses a threat to life or bodily function without treatment  - I independently interpreted the most recent microbiological data   - I discussed my recommendations with the primary team housestaff / Attending & Dr. Akins of Urology  - I assisted in the decision regarding continued need for hospitalization
- Patient has an illness which poses a threat to life or bodily function without treatment  I have personally seen and examined this patient.  I have reviewed all pertinent clinical information and reviewed all relevant imaging and diagnostic studies personally.   If possible, I counseled the patient about diagnostic testing and treatment plan.   I discussed my recommendations with the primary team and any family members at bedside.
- Patient has an illness which poses a threat to life or bodily function without treatment, rising WBC post-op   - I independently interpreted the most recent microbiological data   - I discussed my recommendations with the primary team housestaff / Attending & Dr. Akins of Urology  - I assisted in the decision regarding continued need for hospitalization

## 2024-07-27 NOTE — PROGRESS NOTE ADULT - SUBJECTIVE AND OBJECTIVE BOX
Nephrology progress note    THIS IS AN INCOMPLETE NOTE . FULL NOTE TO FOLLOW SHORTLY    Patient is seen and examined, events over the last 24 h noted .    Allergies:  No Known Allergies    Hospital Medications:   MEDICATIONS  (STANDING):  atorvastatin 20 milliGRAM(s) Oral at bedtime  chlorhexidine 2% Cloths 1 Application(s) Topical daily  dextrose 5%. 1000 milliLiter(s) (50 mL/Hr) IV Continuous <Continuous>  dextrose 5%. 1000 milliLiter(s) (100 mL/Hr) IV Continuous <Continuous>  dextrose 50% Injectable 12.5 Gram(s) IV Push once  dextrose 50% Injectable 25 Gram(s) IV Push once  dextrose 50% Injectable 25 Gram(s) IV Push once  glucagon  Injectable 1 milliGRAM(s) IntraMuscular once  insulin lispro (ADMELOG) corrective regimen sliding scale   SubCutaneous three times a day before meals  insulin lispro (ADMELOG) corrective regimen sliding scale   SubCutaneous at bedtime  meropenem  IVPB 500 milliGRAM(s) IV Intermittent every 12 hours  NIFEdipine XL 30 milliGRAM(s) Oral daily  pantoprazole    Tablet 40 milliGRAM(s) Oral before breakfast  sodium bicarbonate 650 milliGRAM(s) Oral two times a day        VITALS:  T(F): 98 (07-27-24 @ 07:00), Max: 98 (07-27-24 @ 07:00)  HR: 88 (07-27-24 @ 07:00)  BP: 102/65 (07-27-24 @ 07:00)  RR: 17 (07-27-24 @ 07:00)  SpO2: 92% (07-27-24 @ 07:00)  Wt(kg): --    07-25 @ 07:01  -  07-26 @ 07:00  --------------------------------------------------------  IN: 1425 mL / OUT: 1700 mL / NET: -275 mL    07-26 @ 07:01  -  07-27 @ 07:00  --------------------------------------------------------  IN: 0 mL / OUT: 700 mL / NET: -700 mL          PHYSICAL EXAM:  Constitutional: NAD  HEENT: anicteric sclera, oropharynx clear, MMM  Neck: No JVD  Respiratory: CTAB, no wheezes, rales or rhonchi  Cardiovascular: S1, S2, RRR  Gastrointestinal: BS+, soft, NT/ND  Extremities: No cyanosis or clubbing. No peripheral edema  :  No angelo.   Skin: No rashes      LABS:  07-26    138  |  101  |  55<H>  ----------------------------<  103<H>  5.1<H>   |  20  |  3.7<H>    Ca    7.9<L>      26 Jul 2024 16:00  Mg     1.7     07-25    TPro  6.9  /  Alb  2.2<L>  /  TBili  0.3  /  DBili      /  AST  15  /  ALT  5   /  AlkPhos  138<H>  07-26                          8.4    11.79 )-----------( 381      ( 27 Jul 2024 06:44 )             28.2       Urine Studies:  Urinalysis Basic - ( 26 Jul 2024 16:00 )    Color:  / Appearance:  / SG:  / pH:   Gluc: 103 mg/dL / Ketone:   / Bili:  / Urobili:    Blood:  / Protein:  / Nitrite:    Leuk Esterase:  / RBC:  / WBC    Sq Epi:  / Non Sq Epi:  / Bacteria:         RADIOLOGY & ADDITIONAL STUDIES:   Nephrology progress note    Patient is seen and examined, events over the last 24 h noted .    Allergies:  No Known Allergies    Hospital Medications:   MEDICATIONS  (STANDING):  atorvastatin 20 milliGRAM(s) Oral at bedtime  chlorhexidine 2% Cloths 1 Application(s) Topical daily  dextrose 5%. 1000 milliLiter(s) (50 mL/Hr) IV Continuous <Continuous>  dextrose 5%. 1000 milliLiter(s) (100 mL/Hr) IV Continuous <Continuous>  dextrose 50% Injectable 12.5 Gram(s) IV Push once  dextrose 50% Injectable 25 Gram(s) IV Push once  dextrose 50% Injectable 25 Gram(s) IV Push once  glucagon  Injectable 1 milliGRAM(s) IntraMuscular once  insulin lispro (ADMELOG) corrective regimen sliding scale   SubCutaneous three times a day before meals  insulin lispro (ADMELOG) corrective regimen sliding scale   SubCutaneous at bedtime  meropenem  IVPB 500 milliGRAM(s) IV Intermittent every 12 hours  NIFEdipine XL 30 milliGRAM(s) Oral daily  pantoprazole    Tablet 40 milliGRAM(s) Oral before breakfast  sodium bicarbonate 650 milliGRAM(s) Oral two times a day        VITALS:  T(F): 98 (07-27-24 @ 07:00), Max: 98 (07-27-24 @ 07:00)  HR: 88 (07-27-24 @ 07:00)  BP: 102/65 (07-27-24 @ 07:00)  RR: 17 (07-27-24 @ 07:00)  SpO2: 92% (07-27-24 @ 07:00)  Wt(kg): --    07-25 @ 07:01  -  07-26 @ 07:00  --------------------------------------------------------  IN: 1425 mL / OUT: 1700 mL / NET: -275 mL    07-26 @ 07:01  -  07-27 @ 07:00  --------------------------------------------------------  IN: 0 mL / OUT: 700 mL / NET: -700 mL          PHYSICAL EXAM:  Constitutional: NAD  HEENT: anicteric sclera, oropharynx clear, MMM  Neck: No JVD  Respiratory: CTAB, no wheezes, rales or rhonchi  Cardiovascular: S1, S2, RRR  Gastrointestinal: BS+, soft, NT/ND  Extremities: No cyanosis or clubbing. No peripheral edema  :  No angelo.   Skin: No rashes      LABS:  07-26    138  |  101  |  55<H>  ----------------------------<  103<H>  5.1<H>   |  20  |  3.7<H>    Ca    7.9<L>      26 Jul 2024 16:00  Mg     1.7     07-25    TPro  6.9  /  Alb  2.2<L>  /  TBili  0.3  /  DBili      /  AST  15  /  ALT  5   /  AlkPhos  138<H>  07-26                          8.4    11.79 )-----------( 381      ( 27 Jul 2024 06:44 )             28.2       Urine Studies:  Urinalysis Basic - ( 26 Jul 2024 16:00 )    Color:  / Appearance:  / SG:  / pH:   Gluc: 103 mg/dL / Ketone:   / Bili:  / Urobili:    Blood:  / Protein:  / Nitrite:    Leuk Esterase:  / RBC:  / WBC    Sq Epi:  / Non Sq Epi:  / Bacteria:         RADIOLOGY & ADDITIONAL STUDIES:

## 2024-07-27 NOTE — PROGRESS NOTE ADULT - ASSESSMENT
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 NBNB vomiting and nausea every time after he eats.       IMPRESSION  #Bacteroides fragilis and other GNR bacteremia secondary to Pyelonephritis/Pyonephrosis with obstructing stone    7/23 OR cx NG   < from: CT Abdomen and Pelvis No Cont (07.24.24 @ 08:05) >  Post bilateral nephroureteral catheters with resolution of hydronephrosis   and decreased renal stone burden. Residual bilateral renal and ureteral   stone burden as described.  Right renal pelvis and periureteral inflammatory changes may be due to recent procedure.  Cholelithiasis and mild gallbladder wall thickening. Correlate with physical exam to assess for Iglesias's sign.  New moderate right perihepatic ascites.  New small right pleural effusion with adjacent consolidative opacities. Differential includes infectious process.  Mild gas distended loops of small bowel may represent an ileus.  PROCEDURES:  Cystoscopic removal of urethral stone 23-Jul-2024 22:46:50  Pietro Akins  Complex cystolitholapaxy 23-Jul-2024 22:48:06  Pietro Akins  Catheterization, ureter, cystoscopic 23-Jul-2024 22:48:43 left for length Pietro Akins  Cystoscopic insertion of ureteric stent 23-Jul-2024 22:49:16 left Pietro Akins  Antegrade ureteroscopy 23-Jul-2024 22:50:06 right Pietro Akins  Change external ureteral stent 23-Jul-2024 22:50:37 right Pietro Akins  Percutaneous nephrostolithotomy for calculus greater than 2 cm in diameter 23-Jul-2024 22:51:20 right renal Pietro Akins  Nephrostogram 23-Jul-2024 22:51:49 right Pietro Akins  Antegrade ureterography 23-Jul-2024 22:52:15 right Pietro Akins  Percutaneous nephrostolithotomy with basket extraction of large calculus 23-Jul-2024 22:53:11 right ureter Pietro Akins  Replacement, suprapubic tube 23-Jul-2024 23:09:11  Pietro Akins  there was a stone in the urethra, then there were several large mucous bladder stones that obscured the left UO so we took them out  the left ureter was tortuous and required a retrograde to help us manipulate the wire and open ended into the left kidney  right kidney was filled with mucous stones, we were no able to wash out the lower pole stones  the right ureter was filled with mucous and one solid stones we took out what we could but I felt we will need to go back and work from below with a second access via the lower pole    7/17 nephrostomy culture- not sterile-   <10,000 CFU/ml Gram Negative Rods    <10,000 CFU/ml Enterococcus faecalis    7/10 R nephrostomy   >100,000 CFU/ml Proteus mirabilis R fluoro R bactrim  Amoxicillin/Clavulanic Acid: S <=8/4    10,000 - 49,000 CFU/mL Serratia marcescens Levofloxacin: S <=0.5    50,000 - 99,000 CFU/mL Enterococcus faecalis Levofloxacin: S 1    7/9 BCX GNR- Bacteroides fragilis    7/9 BCX GNR  - s/p percutaneous right nephrostomy followed by placement of right nephroureteral catheter    WBC 20 on admission ; Afebrile     7/9 UA pyuria WBC 50 ; UCX   >=3 organisms. Probable collection contamination.    SPT (replaced in ER)    CTAP 1.  Severe right hydroureteronephrosis with cortical thinning and   numerous large intrarenal and proximal to mid ureteral calculi;   obstructing calculus in the right mid ureter measures 1.1 x 1 x 1.8 cm (   ) with normal caliber distal ureter.  2.  Multiple nonobstructing left intrarenal calculi and a 0.8 cm left   distal ureteral calculus; no left hydronephrosis.  3.  Few prominent retroperitoneal lymph nodes, likely reactive.  4.  Cholelithiasis.  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 which could results in poor clinical outcomes  #Abx allergy: No Known Allergies  #CKD  Creatinine: 5.3 (07-10-24 @ 06:36)  QTC Calculation(Bazett) 439 ms  Height (cm): 185.4 (09-18-23 @ 13:48)  Weight (kg): 79.379 (09-18-23 @ 13:48)    RECOMMENDATIONS  - D/C ABX and monitor  - OK to send culture from PCN, please do not send from SPC as will represent colonization  - Recent OR cx NG  - Appreciate Urology consult- will need further intervention  - Appreciate IR consult      If any questions, please send a message or call on [employer] Teams  Please continue to update ID with any pertinent new laboratory, radiographic findings, or change in clinical status

## 2024-07-27 NOTE — PROGRESS NOTE ADULT - SUBJECTIVE AND OBJECTIVE BOX
UROLOGY DAILY PROGRESS NOTE  69y Male s/p RIGHT PCNL, Complex cystolitholapaxy  7/23/24   s/p Right PCNU placement by IR yesterday.   Pt. seen and examined at bedside together with Dr. Lorenzo. Pt. in NAD. Right PCN drains yellow urine. SPT in place drains yellow urine. Afebrile.     MEDICATIONS  (STANDING):  atorvastatin 20 milliGRAM(s) Oral at bedtime  chlorhexidine 2% Cloths 1 Application(s) Topical daily  dextrose 5%. 1000 milliLiter(s) (50 mL/Hr) IV Continuous <Continuous>  dextrose 5%. 1000 milliLiter(s) (100 mL/Hr) IV Continuous <Continuous>  dextrose 50% Injectable 12.5 Gram(s) IV Push once  dextrose 50% Injectable 25 Gram(s) IV Push once  dextrose 50% Injectable 25 Gram(s) IV Push once  glucagon  Injectable 1 milliGRAM(s) IntraMuscular once  insulin lispro (ADMELOG) corrective regimen sliding scale   SubCutaneous three times a day before meals  insulin lispro (ADMELOG) corrective regimen sliding scale   SubCutaneous at bedtime  magnesium sulfate  IVPB 2 Gram(s) IV Intermittent every 2 hours  meropenem  IVPB 500 milliGRAM(s) IV Intermittent every 12 hours  NIFEdipine XL 30 milliGRAM(s) Oral daily  pantoprazole    Tablet 40 milliGRAM(s) Oral before breakfast  sodium bicarbonate 650 milliGRAM(s) Oral two times a day    MEDICATIONS  (PRN):  aluminum hydroxide/magnesium hydroxide/simethicone Suspension 30 milliLiter(s) Oral every 4 hours PRN Dyspepsia  dextrose Oral Gel 15 Gram(s) Oral once PRN Blood Glucose LESS THAN 70 milliGRAM(s)/deciliter  HYDROmorphone  Injectable 0.5 milliGRAM(s) IV Push every 10 minutes PRN Moderate Pain (4 - 6)  HYDROmorphone  Injectable 1 milliGRAM(s) IV Push every 10 minutes PRN Severe Pain (7 - 10)  melatonin 3 milliGRAM(s) Oral at bedtime PRN Insomnia  ondansetron Injectable 4 milliGRAM(s) IV Push every 8 hours PRN Nausea and/or Vomiting  ondansetron Injectable 4 milliGRAM(s) IV Push once PRN Nausea and/or Vomiting      REVIEW OF SYSTEMS   [x] A ten-point review of systems was otherwise negative except as noted.     Vital Signs Last 24 Hrs  T(C): 36.7 (27 Jul 2024 07:00), Max: 36.7 (27 Jul 2024 07:00)  T(F): 98 (27 Jul 2024 07:00), Max: 98 (27 Jul 2024 07:00)  HR: 88 (27 Jul 2024 07:00) (73 - 88)  BP: 102/65 (27 Jul 2024 07:00) (95/50 - 112/66)  RR: 17 (27 Jul 2024 07:00) (16 - 19)  SpO2: 92% (27 Jul 2024 07:00) (92% - 96%)    Parameters below as of 27 Jul 2024 07:00  Patient On (Oxygen Delivery Method): room air        PHYSICAL EXAM:  GEN: NAD, awake and alert.  SKIN: Good color, non diaphoretic.  ABDO: Soft, NT/ND, no palpable bladder, + SPT in place drains yellow urine   BACK: Right PCN in place drains yellow urine        I&O's Summary    26 Jul 2024 07:01  -  27 Jul 2024 07:00  --------------------------------------------------------  IN: 0 mL / OUT: 700 mL / NET: -700 mL    27 Jul 2024 07:01  -  27 Jul 2024 09:30  --------------------------------------------------------  IN: 50 mL / OUT: 0 mL / NET: 50 mL        LABS:                        8.4    11.79 )-----------( 381      ( 27 Jul 2024 06:44 )             28.2     07-27    139  |  103  |  54<H>  ----------------------------<  97  5.2<H>   |  21  |  3.7<H>    Ca    7.8<L>      27 Jul 2024 06:44  Mg     1.6     07-27    TPro  6.9  /  Alb  2.2<L>  /  TBili  0.3  /  DBili  x   /  AST  15  /  ALT  5   /  AlkPhos  138<H>  07-26    PT/INR - ( 25 Jul 2024 23:53 )   PT: 12.90 sec;   INR: 1.13 ratio         PTT - ( 25 Jul 2024 23:53 )  PTT:34.5 sec     Culture - Urine in AM (07.17.24 @ 19:20)    -  Trimethoprim/Sulfamethoxazole: R >2/38   -  Ceftriaxone: S <=1   -  Cefuroxime: S <=4   -  Ampicillin: R >16 These ampicillin results predict results for amoxicillin   -  Ciprofloxacin: R >2   -  Ciprofloxacin: S <=1   -  Ertapenem: S <=0.5   -  Gentamicin: R 8   -  Levofloxacin: R >4   -  Meropenem: S <=1   -  Levofloxacin: S 1   -  Piperacillin/Tazobactam: S <=8   -  Tobramycin: I 4   -  Vancomycin: S 1   -  Amoxicillin/Clavulanic Acid: I 16/8   -  Ampicillin: S <=2 Predicts results to ampicillin/sulbactam, amoxacillin-clavulanate and  piperacillin-tazobactam.   -  Ampicillin/Sulbactam: R >16/8   -  Aztreonam: S <=4   -  Cefazolin: R >16 For uncomplicated UTI with K. pneumoniae, E. coli, or P. mirablis: ALF <=16 is sensitive and ALF >=32 is resistant. This also predicts results for oral agents cefaclor, cefdinir, cefpodoxime, cefprozil, cefuroxime axetil, cephalexin and locarbef for uncomplicated UTI. Note that some isolates may be susceptible to these agents while testing resistant to cefazolin.   -  Cefepime: S <=2   -  Cefoxitin: S <=8   Specimen Source: OR Collect Nephrostomy - Right   Culture Results:   <10,000 CFU/ml Proteus mirabilis  <10,000 CFU/ml Enterococcus faecalis   Organism Identification: Proteus mirabilis  Enterococcus faecalis   Organism: Proteus mirabilis   Organism: Enterococcus faecalis   Method Type: ALF   Method Type: ALF

## 2024-07-27 NOTE — PROGRESS NOTE ADULT - ATTENDING COMMENTS
68 y/o man with PMH of HTN, hyperlipidemia, DM II, CKD 5 (baseline Cr 4.4) Urinary incontinence with chronic suprapubic catheter, Nephrolithiasis, Spinal abscess with quadriplegia and chronic LE wounds receiving dressing changes by visiting nurse now presented to the ED for crampy abdominal pain along with nausea and vomiting.   patient seen and examined    # JOÃO over CKD 5  due to obstructive uropathy   # Complicated UTI / pyelonephritis  # Bacteroids bacteremia  - CT abd/pelvis: Severe right hydroureteronephrosis with cortical thinning and numerous large intrarenal and proximal to mid ureteral calculi, obstructing calculus in the right mid ureter measures 1.1 x 1 x 1.8 cm with normal caliber distal ureter.  Multiple nonobstructive left intrarenal calculi and a 0.8 cm left distal ureteral calculus; no left hydronephrosis.  Few prominent retroperitoneal lymph nodes, likely reactive. Cholelithiasis.  - s/p eval by Urology in ED --> recommended b/l nephrostomy placement by IR.   - s/p right PCN by IR on 7/10 - 10 cc of thick, viscous green-yellow, malodorous right kidney urine sent for cultures  - s/p SPC exchange in the ED  - blood cultures 1/2- bateroids(sn not performed); 1/2- GNR- monmitor; urine culture- Proteus, Serratia(both sn to dwain) and enterococcus  - acidosis improved - bicarb in IVF stopped - continue PO bicarbonate  - further stone management as outpatient per Urology- planning in 2 weeks - plan for right PCNL and left JJ stent placement- Follow up with Dr Akins  - ID following- continue meropenem 500 mg BID; On D/C given unidentified GNR, recommend midline and ertapenem 500mg q24h IV + PO amoxicillin 500mg daily (to cover enterococcus) until 48h post-op     #  HTN- stable  monitor BP  nifedipine on hold    # Hyperlipidemia on statin    #  DM   A1C 6.9  insulin sliding scale    # Chronic LE wounds - wound care per advanced nursing recommendation    # Nausea - zofran prn    DVT ppx: Heparin SQ     DNR/DNI Trial of NIV  Plan of care d/w patient    Pending: midline placement, antibiotics arrangements  Disposition: home with home care .
70 y/o man with PMH of HTN, hyperlipidemia, DM II, CKD 5 (baseline Cr 4.4) Urinary incontinence with chronic suprapubic catheter, Nephrolithiasis, Spinal abscess with quadriplegia and chronic LE wounds receiving dressing changes by visiting nurse now presented to the ED for crampy abdominal pain along with nausea and vomiting.   patient seen and examined    # JOÃO over CKD 5  due to obstructive uropathy   # Complicated UTI / pyelonephritis  # GNR bacteremia  - CT abd/pelvis: Severe right hydroureteronephrosis with cortical thinning and numerous large intrarenal and proximal to mid ureteral calculi, obstructing calculus in the right mid ureter measures 1.1 x 1 x 1.8 cm with normal caliber distal ureter.  Multiple nonobstructive left intrarenal calculi and a 0.8 cm left distal ureteral calculus; no left hydronephrosis.  Few prominent retroperitoneal lymph nodes, likely reactive. Cholelithiasis.  - s/p eval by Urology in ED --> recommended b/l nephrostomy placement by IR.   - s/p right PCN by IR on 7/10 - 10 cc of thick, viscous green-yellow, malodorous right kidney urine sent for cultures  - s/p SPC exchange in the ED  - now on meropenem per ID  - blood cultures 1/2- bateroids; 1/2- GNR; urine culture- proteus- monitor  - acidosis improved - bicarb in IVF stopped - continue PO bicarbonate  - phos level 6.5 -> 4.6 (improved)  - avoid nephrotoxic meds  -  renal following  - further stone management as outpt per - planning in 2 weeks    #  HTN    monitor BP  nifedipine on hold    # Hyperlipidemia on statin    #  DM   A1C 6.9  insulin sliding scale    # Chronic LE wounds - wound care per advanced nursing recommendation    # Nausea - zofran prn    # Hypokalemia repleted    1: 1 feeding    DVT ppx: Heparin SQ restarted      DNR/DNI  guarded prognosis      Pending: monitor culture (blood/urine/OR), renal f/u, monitor urine output and right nephrostomy output  Disposition: home with care
Patient seen and examined. Case discussed with resident physician, nursing staff and case management.     # Complicated UTI / pyelonephritis  # Bacteroids bacteremia  # obstructive uropathy  CT stentogram reviewed  Urology to follow up - await plans for further intervention  monitor leucocytosis  ID following- continue meropenem    Time spent 40 mnts
Patient seen and examined. Case discussed with resident physician, nursing staff and case management.   s/p IR procedure for changing nephrostomy tube 7/26  continue meroepenem till today eveneing  leucocytosis improving  nephrostomy urine cultures today  - discharge plan on Sunday   plan for Op follow up with urology at [facility] on 8/1 for further intervention  Plan of care d/w patient. Also d/w brother  Time spent 35  mnts .
Patient seen and examined. Case discussed with resident physician, nursing staff and case management.   urology, IR and ID recommendation reviewed  NPo after midnight for IR procedure for L PCN lower pole, pre op labs today  antibiotics till 7/27  urology planning OP procedure on 8/1  plan of care d/w patient  time spent 35 mnts
70 y/o man with PMH of HTN, hyperlipidemia, DM II, CKD 5 (baseline Cr 4.4) Urinary incontinence with chronic suprapubic catheter, Nephrolithiasis, Spinal abscess with quadriplegia and chronic LE wounds receiving dressing changes by visiting nurse now presented to the ED for crampy abdominal pain along with nausea and vomiting.   patient seen and examined    # JOÃO over CKD 5  due to obstructive uropathy   # Complicated UTI / pyelonephritis  # Bacteroids bacteremia  - CT abd/pelvis: Severe right hydroureteronephrosis with cortical thinning and numerous large intrarenal and proximal to mid ureteral calculi, obstructing calculus in the right mid ureter measures 1.1 x 1 x 1.8 cm with normal caliber distal ureter.  Multiple nonobstructive left intrarenal calculi and a 0.8 cm left distal ureteral calculus; no left hydronephrosis.  Few prominent retroperitoneal lymph nodes, likely reactive. Cholelithiasis.  - s/p eval by Urology in ED --> recommended b/l nephrostomy placement by IR.   - s/p right PCN by IR on 7/10 - 10 cc of thick, viscous green-yellow, malodorous right kidney urine sent for cultures  - s/p SPC exchange in the ED  - blood cultures 1/2- bateroids(sn not performed); 1/2- GNR- monmitor; urine culture- Proteus, Serratia(both sn to dwain) and enterococcus  - acidosis improved - bicarb in IVF stopped - continue PO bicarbonate  - further stone management as outpatient per Urology- planning in 2 weeks - plan for right PCNL and left JJ stent placement- Follow up with Dr Akins  - ID following- continue meropenem 500 mg BID  - On D/C Can change to PO Levaquin 500mg every 48h and PO augmentin 500mg daily , would continue until 48h post-op after stent placement    #  HTN- stable  monitor BP  nifedipine on hold    # Hyperlipidemia on statin    #  DM   A1C 6.9  insulin sliding scale    # Chronic LE wounds - wound care per advanced nursing recommendation    # Nausea - zofran prn    DVT ppx: Heparin SQ     PT /OT consult  DNR/DNI Trial of NIV  Plan of care d/w patient    Pending: Disposition arrangements  Disposition: home with home care .
Patient seen and examined. Case discussed with resident physician, nursing staff and case management.   s/p IR procedure for chaning nephrostomy tube  continue meroepenem till tomorrow eveneing  monitor leucocytosis and clinical condition  if stable discharge plan on Sunday   plan for Op follow up with urology at [facility] on 8/1 for further intervention  stop IV fluids  monitor CBC, BMP    Time spent 35  mnts

## 2024-07-27 NOTE — PROGRESS NOTE ADULT - SUBJECTIVE AND OBJECTIVE BOX
LISAKRISTY MARIE  69y, Male  Allergy: No Known Allergies      LOS  18d    CHIEF COMPLAINT: UTI w/ obstructing stone (27 Jul 2024 11:26)      INTERVAL EVENTS/HPI  - No acute events overnight  - T(F): , Max: 98 (07-27-24 @ 07:00)  - Denies any worsening symptoms  - Tolerating medication  - WBC Count: 11.79 (07-27-24 @ 06:44)  WBC Count: 13.13 (07-26-24 @ 16:00)     - Creatinine: 3.7 (07-27-24 @ 06:44)  Creatinine: 3.7 (07-26-24 @ 16:00)       ROS  General: Denies rigors, nightsweats  HEENT: Denies headache, rhinorrhea, sore throat, eye pain  CV: Denies CP, palpitations  PULM: Denies wheezing, hemoptysis  GI: Denies hematemesis, hematochezia, melena  : Denies discharge, hematuria  MSK: Denies arthralgias, myalgias  SKIN: Denies rash, lesions  NEURO: Denies paresthesias, weakness  PSYCH: Denies depression, anxiety    VITALS:  T(F): 97.7, Max: 98 (07-27-24 @ 07:00)  HR: 86  BP: 119/73  RR: 18Vital Signs Last 24 Hrs  T(C): 36.5 (27 Jul 2024 15:00), Max: 36.7 (27 Jul 2024 07:00)  T(F): 97.7 (27 Jul 2024 15:00), Max: 98 (27 Jul 2024 07:00)  HR: 86 (27 Jul 2024 15:00) (86 - 88)  BP: 119/73 (27 Jul 2024 15:00) (102/65 - 119/73)  BP(mean): --  RR: 18 (27 Jul 2024 15:00) (17 - 18)  SpO2: 92% (27 Jul 2024 15:00) (92% - 92%)    Parameters below as of 27 Jul 2024 15:00  Patient On (Oxygen Delivery Method): room air        PHYSICAL EXAM:  Gen: NAD, resting in bed  HEENT: Normocephalic, atraumatic  Neck: supple, no lymphadenopathy  CV: Regular rate & regular rhythm  Lungs: decreased BS at bases, no fremitus  Abdomen: Soft, BS present PCN SPT  Ext: Warm, well perfused  Neuro: non focal, awake  Skin: no rash, no erythema  Lines: no phlebitis    FH: Non-contributory  Social Hx: Non-contributory    TESTS & MEASUREMENTS:                        8.4    11.79 )-----------( 381      ( 27 Jul 2024 06:44 )             28.2     07-27    139  |  103  |  54<H>  ----------------------------<  97  5.2<H>   |  21  |  3.7<H>    Ca    7.8<L>      27 Jul 2024 06:44  Mg     1.6     07-27    TPro  6.9  /  Alb  2.2<L>  /  TBili  0.3  /  DBili  x   /  AST  15  /  ALT  5   /  AlkPhos  138<H>  07-26      LIVER FUNCTIONS - ( 26 Jul 2024 16:00 )  Alb: 2.2 g/dL / Pro: 6.9 g/dL / ALK PHOS: 138 U/L / ALT: 5 U/L / AST: 15 U/L / GGT: x           Urinalysis Basic - ( 27 Jul 2024 06:44 )    Color: x / Appearance: x / SG: x / pH: x  Gluc: 97 mg/dL / Ketone: x  / Bili: x / Urobili: x   Blood: x / Protein: x / Nitrite: x   Leuk Esterase: x / RBC: x / WBC x   Sq Epi: x / Non Sq Epi: x / Bacteria: x        Culture - Tissue with Gram Stain (collected 07-23-24 @ 22:38)  Source: .Tissue RIGHT RENAL AND URETERAL MUCOUS STONES  Gram Stain (07-25-24 @ 01:35):    No polymorphonuclear leukocytes seen per low power field    No organisms seen per oil power field  Preliminary Report (07-25-24 @ 17:40):    No growth to date.    Culture - Acid Fast - Tissue w/Smear (collected 07-23-24 @ 21:36)  Source: .Tissue None    Culture - Fungal, Tissue (collected 07-23-24 @ 21:36)  Source: .Tissue None  Preliminary Report (07-25-24 @ 09:54):    Testing in progress    Culture - Tissue with Gram Stain (collected 07-23-24 @ 21:36)  Source: .Tissue RIGHT RENAL MUCOUS STONE FOR C&amp;S AND FUNGUS  Gram Stain (07-25-24 @ 02:24):    No polymorphonuclear leukocytes seen per low power field    No organisms seen per oil power field  Preliminary Report (07-25-24 @ 17:54):    No growth to date.    Culture - Blood (collected 07-22-24 @ 11:55)  Source: .Blood None  Preliminary Report (07-26-24 @ 22:01):    No growth at 4 days    Culture - Urine (collected 07-17-24 @ 19:20)  Source: OR Collect Nephrostomy - Right  Final Report (07-23-24 @ 00:22):    <10,000 CFU/ml Proteus mirabilis    <10,000 CFU/ml Enterococcus faecalis  Organism: Proteus mirabilis  Enterococcus faecalis (07-23-24 @ 00:22)  Organism: Enterococcus faecalis (07-23-24 @ 00:22)      Method Type: ALF      -  Ampicillin: S <=2 Predicts results to ampicillin/sulbactam, amoxacillin-clavulanate and  piperacillin-tazobactam.      -  Ciprofloxacin: S <=1      -  Levofloxacin: S 1      -  Vancomycin: S 1  Organism: Proteus mirabilis (07-23-24 @ 00:22)      Method Type: ALF      -  Amoxicillin/Clavulanic Acid: I 16/8      -  Ampicillin: R >16 These ampicillin results predict results for amoxicillin      -  Ampicillin/Sulbactam: R >16/8      -  Aztreonam: S <=4      -  Cefazolin: R >16 For uncomplicated UTI with K. pneumoniae, E. coli, or P. mirablis: ALF <=16 is sensitive and ALF >=32 is resistant. This also predicts results for oral agents cefaclor, cefdinir, cefpodoxime, cefprozil, cefuroxime axetil, cephalexin and locarbef for uncomplicated UTI. Note that some isolates may be susceptible to these agents while testing resistant to cefazolin.      -  Cefepime: S <=2      -  Cefoxitin: S <=8      -  Ceftriaxone: S <=1      -  Cefuroxime: S <=4      -  Ciprofloxacin: R >2      -  Ertapenem: S <=0.5      -  Gentamicin: R 8      -  Levofloxacin: R >4      -  Meropenem: S <=1      -  Piperacillin/Tazobactam: S <=8      -  Tobramycin: I 4      -  Trimethoprim/Sulfamethoxazole: R >2/38    Culture - Urine (collected 07-10-24 @ 18:14)  Source: .Urine Nephrostomy - Right  Final Report (07-14-24 @ 20:48):    >100,000 CFU/ml Proteus mirabilis    10,000 - 49,000 CFU/mL Serratia marcescens    50,000 - 99,000 CFU/mL Enterococcus faecalis  Organism: Proteus mirabilis  Serratia marcescens  Enterococcus faecalis (07-14-24 @ 20:48)  Organism: Enterococcus faecalis (07-14-24 @ 20:48)      Method Type: ALF      -  Ampicillin: S <=2 Predicts results to ampicillin/sulbactam, amoxacillin-clavulanate and  piperacillin-tazobactam.      -  Ciprofloxacin: S <=1      -  Levofloxacin: S 1      -  Nitrofurantoin: S <=32 Should not be used to treat pyelonephritis.      -  Tetracycline: R >8      -  Vancomycin: S 1  Organism: Serratia marcescens (07-14-24 @ 20:48)      Method Type: ALF      -  Amoxicillin/Clavulanic Acid: R >16/8      -  Ampicillin: R 16 These ampicillin results predict results for amoxicillin      -  Ampicillin/Sulbactam: R 16/8      -  Aztreonam: S <=4      -  Cefazolin: R >16      -  Cefepime: S <=2      -  Cefoxitin: R <=8      -  Ceftriaxone: S <=1      -  Ciprofloxacin: S <=0.25      -  Ertapenem: S <=0.5      -  Gentamicin: S <=2      -  Levofloxacin: S <=0.5      -  Meropenem: S <=1      -  Nitrofurantoin: R >64 Should not be used to treat pyelonephritis      -  Piperacillin/Tazobactam: S <=8      -  Tobramycin: S <=2      -  Trimethoprim/Sulfamethoxazole: S <=0.5/9.5  Organism: Proteus mirabilis (07-14-24 @ 20:48)      Method Type: ALF      -  Amoxicillin/Clavulanic Acid: S <=8/4      -  Ampicillin: R >16 These ampicillin results predict results for amoxicillin      -  Ampicillin/Sulbactam: I 16/8      -  Aztreonam: S <=4      -  Cefazolin: S 8 For uncomplicated UTI with K. pneumoniae, E. coli, or P. mirablis: ALF <=16 is sensitive and ALF >=32 is resistant. This also predicts results for oral agents cefaclor, cefdinir, cefpodoxime, cefprozil, cefuroxime axetil, cephalexin and locarbef for uncomplicated UTI. Note that some isolates may be susceptible to these agents while testing resistant to cefazolin.      -  Cefepime: S <=2      -  Cefoxitin: S <=8      -  Ceftriaxone: S <=1      -  Cefuroxime: S <=4      -  Ciprofloxacin: R >2      -  Ertapenem: S <=0.5      -  Gentamicin: I 4      -  Levofloxacin: R >4      -  Meropenem: S <=1      -  Nitrofurantoin: R 64 Should not be used to treat pyelonephritis      -  Piperacillin/Tazobactam: S <=8      -  Tobramycin: S <=2      -  Trimethoprim/Sulfamethoxazole: R >2/38    Culture - Blood (collected 07-09-24 @ 14:20)  Source: .Blood Blood-Peripheral  Gram Stain (07-11-24 @ 01:20):    Growth in anaerobic bottle: Gram Negative Rods  Final Report (07-26-24 @ 13:40):    Growth in anaerobic bottle: Gram Negative Rods Unable to Identify further    performed by Replaced by [facility]    [address] [address]    .    Direct identification is available within approximately 3-5    hours either by Blood Panel Multiplexed PCR or Direct    MALDI-TOF. Details: [email or web address]  Organism: Blood Culture PCR (07-26-24 @ 13:40)  Organism: Blood Culture PCR (07-26-24 @ 13:40)      Method Type: PCR      -  Blood PCR Panel: NEG    Culture - Blood (collected 07-09-24 @ 14:20)  Source: .Blood Blood-Peripheral  Gram Stain (07-12-24 @ 04:50):    Growth in anaerobic bottle: Gram Negative Rods  Final Report (07-13-24 @ 09:30):    Growth in anaerobic bottle: Bacteroides fragilis    "Susceptibilities not performed"    Culture - Urine (collected 07-09-24 @ 13:50)  Source: Clean Catch Clean Catch (Midstream)  Final Report (07-10-24 @ 23:26):    >=3 organisms. Probable collection contamination.            INFECTIOUS DISEASES TESTING      INFLAMMATORY MARKERS      RADIOLOGY & ADDITIONAL TESTS:  I have personally reviewed the last available Chest xray  CXR      CT      CARDIOLOGY TESTING      MEDICATIONS  atorvastatin 20 Oral at bedtime  chlorhexidine 2% Cloths 1 Topical daily  dextrose 5%. 1000 IV Continuous <Continuous>  dextrose 5%. 1000 IV Continuous <Continuous>  dextrose 50% Injectable 25 IV Push once  dextrose 50% Injectable 12.5 IV Push once  dextrose 50% Injectable 25 IV Push once  glucagon  Injectable 1 IntraMuscular once  insulin lispro (ADMELOG) corrective regimen sliding scale  SubCutaneous three times a day before meals  insulin lispro (ADMELOG) corrective regimen sliding scale  SubCutaneous at bedtime  meropenem  IVPB 500 IV Intermittent every 12 hours  NIFEdipine XL 30 Oral daily  pantoprazole    Tablet 40 Oral before breakfast  sodium bicarbonate 650 Oral two times a day      WEIGHT  Weight (kg): 81.6 (07-26-24 @ 07:55)  Creatinine: 3.7 mg/dL (07-27-24 @ 06:44)  Creatinine: 3.7 mg/dL (07-26-24 @ 16:00)      ANTIBIOTICS:  meropenem  IVPB 500 milliGRAM(s) IV Intermittent every 12 hours      All available historical records have been reviewed

## 2024-07-27 NOTE — PROGRESS NOTE ADULT - ASSESSMENT
70 y/o man with PMH of HTN, hyperlipidemia, DM II, CKD 5 (baseline Cr 4.4) Urinary incontinence with chronic suprapubic catheter, Nephrolithiasis, Spinal abscess with quadriplegia and chronic LE wounds receiving dressing changes by visiting nurse now presented to the ED for crampy abdominal pain along with nausea and vomiting.     # JOÃO over CKD 5 due to obstructive uropathy   # Complicated UTI / pyelonephritis  # Bacteroids bacteremia  #Elevated white count likely due to procedure  - CT abd/pelvis: Severe right hydroureteronephrosis with cortical thinning and numerous large intrarenal and proximal to mid ureteral calculi, obstructing calculus in the right mid ureter measures 1.1 x 1 x 1.8 cm with normal caliber distal ureter.  Multiple nonobstructive left intrarenal calculi and a 0.8 cm left distal ureteral calculus; no left hydronephrosis.  Few prominent retroperitoneal lymph nodes, likely reactive. Cholelithiasis.  - s/p eval by Urology in ED --> recommended b/l nephrostomy placement by IR.   - s/p right PCN by IR on 7/10 - 10 cc of thick, viscous green-yellow, malodorous right kidney urine sent for cultures  - s/p SPC exchange in the ED  - blood cultures 1/2- bateroids(sn not performed); 1/2- GNR- monmitor; urine culture- Proteus, Serratia(both sn to dwain) and enterococcus  - acidosis improved - continue PO bicarbonate  - ID following-  meropenem 500 mg BID; On D/C, recommend midline and ertapenem 500mg q24h IV + PO amoxicillin 500mg daily (to cover enterococcus) until 48h post-op -- will continue for now --surgery on tuesday 7/23  - urine culture from nephrostomy  is gm positive rods and enterococcus  - urology operative note reviewed, planned for further intervention?  - WBC trended down  - OR Cx - NGTD  - c/w meropenem 500mg q12h IV, dc after evening dose, per ID  - appreciate nephrology recs, no indication to start RRT at this time  - Cr elevated, monitor CMP  - Reviewed IR note, Nephrology note  - Scheduled for Urology procedure tentatively coming Thursday  - drawn Nephrostomy culture, per Uro    #  HTN- stable  nifedipine stopped    # Hyperlipidemia on statin    #  DM   A1C 6.9  insulin sliding scale not needed--his blood sugar was stable--    # Chronic LE wounds - wound care per advanced nursing recommendation    # Functional quadriplegia-- bed bound-- uses ana lift    DNR/DNI Trial of NIV

## 2024-07-28 VITALS
DIASTOLIC BLOOD PRESSURE: 65 MMHG | TEMPERATURE: 98 F | SYSTOLIC BLOOD PRESSURE: 129 MMHG | RESPIRATION RATE: 18 BRPM | OXYGEN SATURATION: 95 % | HEART RATE: 85 BPM

## 2024-07-28 LAB
ANION GAP SERPL CALC-SCNC: 10 MMOL/L — SIGNIFICANT CHANGE UP (ref 7–14)
BASOPHILS # BLD AUTO: 0.05 K/UL — SIGNIFICANT CHANGE UP (ref 0–0.2)
BASOPHILS NFR BLD AUTO: 0.5 % — SIGNIFICANT CHANGE UP (ref 0–1)
BUN SERPL-MCNC: 56 MG/DL — HIGH (ref 10–20)
CALCIUM SERPL-MCNC: 7.6 MG/DL — LOW (ref 8.4–10.5)
CHLORIDE SERPL-SCNC: 105 MMOL/L — SIGNIFICANT CHANGE UP (ref 98–110)
CO2 SERPL-SCNC: 24 MMOL/L — SIGNIFICANT CHANGE UP (ref 17–32)
CREAT SERPL-MCNC: 4 MG/DL — HIGH (ref 0.7–1.5)
EGFR: 15 ML/MIN/1.73M2 — LOW
EOSINOPHIL # BLD AUTO: 0.09 K/UL — SIGNIFICANT CHANGE UP (ref 0–0.7)
EOSINOPHIL NFR BLD AUTO: 1 % — SIGNIFICANT CHANGE UP (ref 0–8)
GLUCOSE BLDC GLUCOMTR-MCNC: 126 MG/DL — HIGH (ref 70–99)
GLUCOSE BLDC GLUCOMTR-MCNC: 196 MG/DL — HIGH (ref 70–99)
GLUCOSE SERPL-MCNC: 130 MG/DL — HIGH (ref 70–99)
HCT VFR BLD CALC: 28.9 % — LOW (ref 42–52)
HGB BLD-MCNC: 8.5 G/DL — LOW (ref 14–18)
IMM GRANULOCYTES NFR BLD AUTO: 1.3 % — HIGH (ref 0.1–0.3)
LYMPHOCYTES # BLD AUTO: 1.36 K/UL — SIGNIFICANT CHANGE UP (ref 1.2–3.4)
LYMPHOCYTES # BLD AUTO: 14.8 % — LOW (ref 20.5–51.1)
MAGNESIUM SERPL-MCNC: 2 MG/DL — SIGNIFICANT CHANGE UP (ref 1.8–2.4)
MCHC RBC-ENTMCNC: 28.9 PG — SIGNIFICANT CHANGE UP (ref 27–31)
MCHC RBC-ENTMCNC: 29.4 G/DL — LOW (ref 32–37)
MCV RBC AUTO: 98.3 FL — HIGH (ref 80–94)
MONOCYTES # BLD AUTO: 0.76 K/UL — HIGH (ref 0.1–0.6)
MONOCYTES NFR BLD AUTO: 8.3 % — SIGNIFICANT CHANGE UP (ref 1.7–9.3)
NEUTROPHILS # BLD AUTO: 6.78 K/UL — HIGH (ref 1.4–6.5)
NEUTROPHILS NFR BLD AUTO: 74.1 % — SIGNIFICANT CHANGE UP (ref 42.2–75.2)
NRBC # BLD: 0 /100 WBCS — SIGNIFICANT CHANGE UP (ref 0–0)
PLATELET # BLD AUTO: 380 K/UL — SIGNIFICANT CHANGE UP (ref 130–400)
PMV BLD: 10.4 FL — SIGNIFICANT CHANGE UP (ref 7.4–10.4)
POTASSIUM SERPL-MCNC: 5 MMOL/L — SIGNIFICANT CHANGE UP (ref 3.5–5)
POTASSIUM SERPL-SCNC: 5 MMOL/L — SIGNIFICANT CHANGE UP (ref 3.5–5)
RBC # BLD: 2.94 M/UL — LOW (ref 4.7–6.1)
RBC # FLD: 13.7 % — SIGNIFICANT CHANGE UP (ref 11.5–14.5)
SODIUM SERPL-SCNC: 139 MMOL/L — SIGNIFICANT CHANGE UP (ref 135–146)
WBC # BLD: 9.16 K/UL — SIGNIFICANT CHANGE UP (ref 4.8–10.8)
WBC # FLD AUTO: 9.16 K/UL — SIGNIFICANT CHANGE UP (ref 4.8–10.8)

## 2024-07-28 PROCEDURE — 99232 SBSQ HOSP IP/OBS MODERATE 35: CPT

## 2024-07-28 PROCEDURE — 99239 HOSP IP/OBS DSCHRG MGMT >30: CPT

## 2024-07-28 RX ADMIN — PANTOPRAZOLE SODIUM 40 MILLIGRAM(S): 20 TABLET, DELAYED RELEASE ORAL at 05:19

## 2024-07-28 RX ADMIN — Medication 2: at 11:32

## 2024-07-28 RX ADMIN — Medication 650 MILLIGRAM(S): at 05:20

## 2024-07-28 RX ADMIN — NIFEDIPINE 30 MILLIGRAM(S): 20 CAPSULE, LIQUID FILLED ORAL at 05:20

## 2024-07-28 RX ADMIN — CHLORHEXIDINE GLUCONATE 1 APPLICATION(S): 500 CLOTH TOPICAL at 11:32

## 2024-07-28 NOTE — PROGRESS NOTE ADULT - SUBJECTIVE AND OBJECTIVE BOX
Nephrology progress note    THIS IS AN INCOMPLETE NOTE . FULL NOTE TO FOLLOW SHORTLY    Patient is seen and examined, events over the last 24 h noted .    Allergies:  No Known Allergies    Hospital Medications:   MEDICATIONS  (STANDING):  atorvastatin 20 milliGRAM(s) Oral at bedtime  chlorhexidine 2% Cloths 1 Application(s) Topical daily  dextrose 5%. 1000 milliLiter(s) (100 mL/Hr) IV Continuous <Continuous>  dextrose 5%. 1000 milliLiter(s) (50 mL/Hr) IV Continuous <Continuous>  dextrose 50% Injectable 12.5 Gram(s) IV Push once  dextrose 50% Injectable 25 Gram(s) IV Push once  dextrose 50% Injectable 25 Gram(s) IV Push once  glucagon  Injectable 1 milliGRAM(s) IntraMuscular once  insulin lispro (ADMELOG) corrective regimen sliding scale   SubCutaneous three times a day before meals  insulin lispro (ADMELOG) corrective regimen sliding scale   SubCutaneous at bedtime  NIFEdipine XL 30 milliGRAM(s) Oral daily  pantoprazole    Tablet 40 milliGRAM(s) Oral before breakfast  sodium bicarbonate 650 milliGRAM(s) Oral two times a day        VITALS:  T(F): 98 (07-28-24 @ 08:22), Max: 98 (07-28-24 @ 08:22)  HR: 83 (07-28-24 @ 08:22)  BP: 106/56 (07-28-24 @ 08:22)  RR: 18 (07-28-24 @ 08:22)  SpO2: 93% (07-28-24 @ 08:22)  Wt(kg): --    07-26 @ 07:01  -  07-27 @ 07:00  --------------------------------------------------------  IN: 0 mL / OUT: 700 mL / NET: -700 mL    07-27 @ 07:01  -  07-28 @ 07:00  --------------------------------------------------------  IN: 100 mL / OUT: 1100 mL / NET: -1000 mL          PHYSICAL EXAM:  Constitutional: NAD  HEENT: anicteric sclera, oropharynx clear, MMM  Neck: No JVD  Respiratory: CTAB, no wheezes, rales or rhonchi  Cardiovascular: S1, S2, RRR  Gastrointestinal: BS+, soft, NT/ND  Extremities: No cyanosis or clubbing. No peripheral edema  :  No angelo.   Skin: No rashes      LABS:  07-28    139  |  105  |  56<H>  ----------------------------<  130<H>  5.0   |  24  |  4.0<H>    Ca    7.6<L>      28 Jul 2024 07:09  Mg     2.0     07-28    TPro  6.9  /  Alb  2.2<L>  /  TBili  0.3  /  DBili      /  AST  15  /  ALT  5   /  AlkPhos  138<H>  07-26                          8.5    9.16  )-----------( 380      ( 28 Jul 2024 07:09 )             28.9       Urine Studies:  Urinalysis Basic - ( 28 Jul 2024 07:09 )    Color:  / Appearance:  / SG:  / pH:   Gluc: 130 mg/dL / Ketone:   / Bili:  / Urobili:    Blood:  / Protein:  / Nitrite:    Leuk Esterase:  / RBC:  / WBC    Sq Epi:  / Non Sq Epi:  / Bacteria:         RADIOLOGY & ADDITIONAL STUDIES:   Nephrology progress note    Patient is seen and examined, events over the last 24 h noted .  Renal function stable, currently nonoliguric.    Allergies:  No Known Allergies    Hospital Medications:   MEDICATIONS  (STANDING):  atorvastatin 20 milliGRAM(s) Oral at bedtime  chlorhexidine 2% Cloths 1 Application(s) Topical daily  dextrose 5%. 1000 milliLiter(s) (100 mL/Hr) IV Continuous <Continuous>  dextrose 5%. 1000 milliLiter(s) (50 mL/Hr) IV Continuous <Continuous>  dextrose 50% Injectable 12.5 Gram(s) IV Push once  dextrose 50% Injectable 25 Gram(s) IV Push once  dextrose 50% Injectable 25 Gram(s) IV Push once  glucagon  Injectable 1 milliGRAM(s) IntraMuscular once  insulin lispro (ADMELOG) corrective regimen sliding scale   SubCutaneous three times a day before meals  insulin lispro (ADMELOG) corrective regimen sliding scale   SubCutaneous at bedtime  NIFEdipine XL 30 milliGRAM(s) Oral daily  pantoprazole    Tablet 40 milliGRAM(s) Oral before breakfast  sodium bicarbonate 650 milliGRAM(s) Oral two times a day        VITALS:  T(F): 98 (07-28-24 @ 08:22), Max: 98 (07-28-24 @ 08:22)  HR: 83 (07-28-24 @ 08:22)  BP: 106/56 (07-28-24 @ 08:22)  RR: 18 (07-28-24 @ 08:22)  SpO2: 93% (07-28-24 @ 08:22)  Wt(kg): --    07-26 @ 07:01  -  07-27 @ 07:00  --------------------------------------------------------  IN: 0 mL / OUT: 700 mL / NET: -700 mL    07-27 @ 07:01  -  07-28 @ 07:00  --------------------------------------------------------  IN: 100 mL / OUT: 1100 mL / NET: -1000 mL          PHYSICAL EXAM:  Constitutional: NAD  HEENT: anicteric sclera, oropharynx clear, MMM  Neck: No JVD  Respiratory: CTAB, no wheezes, rales or rhonchi  Cardiovascular: S1, S2, RRR  Gastrointestinal: BS+, soft, NT/ND  Extremities: No cyanosis or clubbing. No peripheral edema  :  No angelo.   Skin: No rashes      LABS:  07-28    139  |  105  |  56<H>  ----------------------------<  130<H>  5.0   |  24  |  4.0<H>    Ca    7.6<L>      28 Jul 2024 07:09  Mg     2.0     07-28    TPro  6.9  /  Alb  2.2<L>  /  TBili  0.3  /  DBili      /  AST  15  /  ALT  5   /  AlkPhos  138<H>  07-26                          8.5    9.16  )-----------( 380      ( 28 Jul 2024 07:09 )             28.9       Urine Studies:  Urinalysis Basic - ( 28 Jul 2024 07:09 )    Color:  / Appearance:  / SG:  / pH:   Gluc: 130 mg/dL / Ketone:   / Bili:  / Urobili:    Blood:  / Protein:  / Nitrite:    Leuk Esterase:  / RBC:  / WBC    Sq Epi:  / Non Sq Epi:  / Bacteria:         RADIOLOGY & ADDITIONAL STUDIES:

## 2024-07-28 NOTE — PROGRESS NOTE ADULT - ASSESSMENT
70 y/o man with PMH of HTN, hyperlipidemia, DM II, CKD 5 (baseline Cr 4.4) Urinary incontinence with chronic suprapubic catheter, Nephrolithiasis, Spinal abscess with quadriplegia and chronic LE wounds receiving dressing changes by visiting nurse now presented to the ED for crampy abdominal pain along with nausea and vomiting.     # JOÃO over CKD 5 due to obstructive uropathy - stable  # Complicated UTI / pyelonephritis  # Bacteroids bacteremia  s/p urology and IR procedure  s/p antibiotics  leucocytosis resolved  patient clinically doing well and planned for discharge  obtain cultures from right nephrostomy tube; d/w nursing  patient to follow up with urologist at [facility] on 8/1  D/w patient and also with brother and updated follow up plan    #  HTN- stable  # Hyperlipidemia on statin  #  DM A1C 6.9  # Chronic LE wounds    Discharge plan to home with home health  D.w case management

## 2024-07-28 NOTE — PROGRESS NOTE ADULT - PROVIDER SPECIALTY LIST ADULT
Hospitalist
Internal Medicine
Intervent Radiology
Nephrology
Urology
Urology
Hospitalist
Infectious Disease
Internal Medicine
Nephrology
Nephrology
Hospitalist
Internal Medicine
Intervent Radiology
Nephrology
Urology
Urology
Hospitalist
Hospitalist
Infectious Disease
Infectious Disease
Internal Medicine
Nephrology
Urology
Infectious Disease

## 2024-07-28 NOTE — PROGRESS NOTE ADULT - ASSESSMENT
70 yo male w/ pmhx of HTN, HLD, DMII, CKD stage 5 baseline Cr ~ 4.4, urinary incontinence with chronic supra pubic catheter, Nephrolithiasis, spinal abscess leading to quadriplegia presenting w. NBNB vomiting and nausea.    # Obstructing R mid ureter calculus with severe hydroureteronephrosis s/p Percutaneous right nephrostomy with placement of right nephroureteral catheter (7/26)  # B/L nephrolithiasis   # Post-renal JOÃO on advanced CKD  # HAGMA   # Chronic suprapubic catheter   Renal function stable, nonoliguric  - s/p cystoscopic removal of bladder and ureteral stones on 7/23 / appreciate  f/u  - cont strict I and O   - cont po bicarb  - last phos at goal   - appreciate ID f/u / cont meropenem  - BP controlled  - no need to start RRT at this time    will follow

## 2024-07-28 NOTE — PROGRESS NOTE ADULT - REASON FOR ADMISSION
UTI w/ obstructing stone

## 2024-07-28 NOTE — PROGRESS NOTE ADULT - SUBJECTIVE AND OBJECTIVE BOX
HPI  Patient is a 69y old Male who presents with a chief complaint of UTI w/ obstructing stone (28 Jul 2024 10:43)    Currently admitted to medicine with the primary diagnosis of Hydronephrosis with obstructing calculus       Today is hospital day 19d.     INTERVAL HPI / OVERNIGHT EVENTS:  Patient was seen and examined at bedside    Patient Feels better  No new complaints  Denies any complains of chest pain or shortness of breath  Denies any abdominal pain/nausea/vomiting        PAST MEDICAL & SURGICAL HISTORY  DM (diabetes mellitus)    HTN (hypertension)    H/O paraplegia    Spinal abscess    Renal stones    Spinal abscess  S/P Surgery    Encounter for care or replacement of suprapubic tube      ALLERGIES  No Known Allergies    MEDICATIONS  STANDING MEDICATIONS  atorvastatin 20 milliGRAM(s) Oral at bedtime  chlorhexidine 2% Cloths 1 Application(s) Topical daily  dextrose 5%. 1000 milliLiter(s) IV Continuous <Continuous>  dextrose 5%. 1000 milliLiter(s) IV Continuous <Continuous>  dextrose 50% Injectable 12.5 Gram(s) IV Push once  dextrose 50% Injectable 25 Gram(s) IV Push once  dextrose 50% Injectable 25 Gram(s) IV Push once  glucagon  Injectable 1 milliGRAM(s) IntraMuscular once  insulin lispro (ADMELOG) corrective regimen sliding scale   SubCutaneous three times a day before meals  insulin lispro (ADMELOG) corrective regimen sliding scale   SubCutaneous at bedtime  NIFEdipine XL 30 milliGRAM(s) Oral daily  pantoprazole    Tablet 40 milliGRAM(s) Oral before breakfast  sodium bicarbonate 650 milliGRAM(s) Oral two times a day    PRN MEDICATIONS  aluminum hydroxide/magnesium hydroxide/simethicone Suspension 30 milliLiter(s) Oral every 4 hours PRN  dextrose Oral Gel 15 Gram(s) Oral once PRN  HYDROmorphone  Injectable 0.5 milliGRAM(s) IV Push every 10 minutes PRN  HYDROmorphone  Injectable 1 milliGRAM(s) IV Push every 10 minutes PRN  melatonin 3 milliGRAM(s) Oral at bedtime PRN  ondansetron Injectable 4 milliGRAM(s) IV Push once PRN  ondansetron Injectable 4 milliGRAM(s) IV Push every 8 hours PRN    VITALS:  T(F): 98  HR: 83  BP: 106/56  RR: 18  SpO2: 93%    PHYSICAL EXAM  GEN: no distress, comfortable  PULM: BS heard b/l equal, No wheezing  CVS: S1S2 present, no rubs or gallops  ABD: Soft, non-distended, no guarding; non-tender  EXT: No lower extremity edema  NEURO: A&Ox3    LABS                        8.5    9.16  )-----------( 380      ( 28 Jul 2024 07:09 )             28.9 07-28    139  |  105  |  56<H>  ----------------------------<  130<H>  5.0   |  24  |  4.0<H>    Ca    7.6<L>      28 Jul 2024 07:09  Mg     2.0     07-28    TPro  6.9  /  Alb  2.2<L>  /  TBili  0.3  /  DBili  x   /  AST  15  /  ALT  5   /  AlkPhos  138<H>  07-26      Urinalysis Basic - ( 28 Jul 2024 07:09 )    Color: x / Appearance: x / SG: x / pH: x  Gluc: 130 mg/dL / Ketone: x  / Bili: x / Urobili: x   Blood: x / Protein: x / Nitrite: x   Leuk Esterase: x / RBC: x / WBC x   Sq Epi: x / Non Sq Epi: x / Bacteria: x                RADIOLOGY

## 2024-07-29 LAB
CULTURE RESULTS: SIGNIFICANT CHANGE UP
CULTURE RESULTS: SIGNIFICANT CHANGE UP
SPECIMEN SOURCE: SIGNIFICANT CHANGE UP
SPECIMEN SOURCE: SIGNIFICANT CHANGE UP

## 2024-08-01 ENCOUNTER — APPOINTMENT (OUTPATIENT)
Dept: UROLOGY | Facility: HOSPITAL | Age: 69
End: 2024-08-01

## 2024-08-01 ENCOUNTER — NON-APPOINTMENT (OUTPATIENT)
Age: 69
End: 2024-08-01

## 2024-08-01 DIAGNOSIS — N20.1 CALCULUS OF URETER: ICD-10-CM

## 2024-08-01 DIAGNOSIS — N20.0 CALCULUS OF KIDNEY: ICD-10-CM

## 2024-08-01 DIAGNOSIS — R33.9 RETENTION OF URINE, UNSPECIFIED: ICD-10-CM

## 2024-08-05 ENCOUNTER — RESULT REVIEW (OUTPATIENT)
Age: 69
End: 2024-08-05

## 2024-08-05 LAB
CELL MATERIAL STONE EST-MCNT: SIGNIFICANT CHANGE UP
CELL MATERIAL STONE EST-MCNT: SIGNIFICANT CHANGE UP
LABORATORY COMMENT REPORT: SIGNIFICANT CHANGE UP
LABORATORY COMMENT REPORT: SIGNIFICANT CHANGE UP
NIDUS STONE QN: SIGNIFICANT CHANGE UP
NIDUS STONE QN: SIGNIFICANT CHANGE UP

## 2024-08-07 ENCOUNTER — TRANSCRIPTION ENCOUNTER (OUTPATIENT)
Age: 69
End: 2024-08-07

## 2024-08-07 RX ORDER — APIXABAN 2.5 MG/1
0.5 TABLET, FILM COATED ORAL
Qty: 180 | Refills: 0 | DISCHARGE
Start: 2024-08-07 | End: 2024-11-04

## 2024-08-08 PROBLEM — N18.5 CHRONIC KIDNEY DISEASE, STAGE 5: Chronic | Status: ACTIVE | Noted: 2024-08-01

## 2024-08-08 PROBLEM — Z43.5 ENCOUNTER FOR ATTENTION TO CYSTOSTOMY: Chronic | Status: ACTIVE | Noted: 2024-08-01

## 2024-08-08 PROBLEM — N31.9 NEUROMUSCULAR DYSFUNCTION OF BLADDER, UNSPECIFIED: Chronic | Status: ACTIVE | Noted: 2024-08-01

## 2024-08-13 ENCOUNTER — TRANSCRIPTION ENCOUNTER (OUTPATIENT)
Age: 69
End: 2024-08-13

## 2024-08-15 ENCOUNTER — APPOINTMENT (OUTPATIENT)
Dept: UROLOGY | Facility: CLINIC | Age: 69
End: 2024-08-15

## 2024-08-21 ENCOUNTER — APPOINTMENT (OUTPATIENT)
Dept: UROLOGY | Facility: CLINIC | Age: 69
End: 2024-08-21
Payer: MEDICARE

## 2024-08-21 VITALS
BODY MASS INDEX: 19.25 KG/M2 | HEIGHT: 74 IN | HEART RATE: 84 BPM | TEMPERATURE: 97.5 F | DIASTOLIC BLOOD PRESSURE: 84 MMHG | OXYGEN SATURATION: 98 % | WEIGHT: 150 LBS | RESPIRATION RATE: 18 BRPM | SYSTOLIC BLOOD PRESSURE: 147 MMHG

## 2024-08-21 DIAGNOSIS — N20.1 CALCULUS OF URETER: ICD-10-CM

## 2024-08-21 DIAGNOSIS — Z00.00 ENCOUNTER FOR GENERAL ADULT MEDICAL EXAMINATION W/OUT ABNORMAL FINDINGS: ICD-10-CM

## 2024-08-21 DIAGNOSIS — Z87.448 PERSONAL HISTORY OF OTHER DISEASES OF URINARY SYSTEM: ICD-10-CM

## 2024-08-21 LAB
CULTURE RESULTS: SIGNIFICANT CHANGE UP
SPECIMEN SOURCE: SIGNIFICANT CHANGE UP

## 2024-08-21 PROCEDURE — 99214 OFFICE O/P EST MOD 30 MIN: CPT | Mod: 24,25

## 2024-08-21 NOTE — LETTER HEADER
[FreeTextEntry3] : Pietro Akins MD [facility], [address] [place] [address]

## 2024-08-21 NOTE — PHYSICAL EXAM
[Edema] : no peripheral edema [] : no respiratory distress [Respiration, Rhythm And Depth] : normal respiratory rhythm and effort [Exaggerated Use Of Accessory Muscles For Inspiration] : no accessory muscle use [Abdomen Soft] : soft [Abdomen Tenderness] : non-tender [FreeTextEntry1] : Usual state of health fairly cachectic aware alert and participating [de-identified] : SP tube changed by the PA please see that note [de-identified] : Moves around in a wheelchair

## 2024-08-21 NOTE — ASSESSMENT
[FreeTextEntry1] : As far as the bladder stones that is probably due to mucous or infection and when the catheter gets changed as needed will be irrigated.  The catheter was changed today by JODIE Devi  As far as his kidneys the right side appears to have been due to a stone that blocked his ureter given his limited sensation he could not tell that he was blocked he ended up with infection that ended up making a joint mucous stone.  As far as the left side he has mucus as well as stones I cleaned out what I could without using the camera guided vacuum which would be very Difficult to do as he has limited motion of his lower extremities.  What I recommend now is 1 get nephrology involved to help prevent stones he has renal insufficiency so we will get a need a lot of help 2 get a metabolic workup in about 2 months to try and see if there is anything specific we could focus on 3 renal bladder ultrasound to make sure there is no delayed silent obstruction and to see if there is any new stone formation For dietary issues were discussed to the extent possible he will try and we gave him some paperwork.  Diet modification for stone includes:  Increasing fluid intake to produce 2 to 2.5 liters of urine per day (approx 3 liters intake), and should be primarily water.   Calcium intake should be approximately 1000 mg per day.   Oxalate intake should be reduced- most common sources in diet which have very high levels include peanuts/nuts, tea, coffee, chocolate, spinach, beets, rhubarb, swiss chard. I've also given/directed to a list with other high oxalate containing foods.   Animal flesh protein should be controlled- approx 4 to 6 ounces per day, with some vegetarian days included in the week. Studies have shown that vegetarians have half the risk of stones of people who eat only 4 ounces per day of meat or fish of any kind (beef, chicken, fish, shellfish, pork, etc), indicating that a vegetarian lifestyle can decrease future stone risks.  Finally, salt intake should be reduced as high levels in the diet will increase urinary calcium. <2400 mg per day on a low sodium diet is strongly recommended.  Citrate is a benefit; kim and limes with most citrate and least sugar- recommend 'a lemon or lime a day'; easiest with concentrate, mixed into water or other beverages.  www.[employer]ith[employer]ink.com ---> in the lower left column there is a link for "diet resources"

## 2024-08-21 NOTE — ED ADULT NURSE NOTE - NSFALLRISKFACTORS_ED_ALL_ED
"Chief Complaint   Patient presents with    Pharyngitis     Strep exposure last week, has a rash on face, fatigue, fever       Initial BP 90/60   Pulse 92   Temp 98.7  F (37.1  C) (Tympanic)   Resp 20   Ht 1.232 m (4' 0.5\")   Wt 22.7 kg (50 lb 1.6 oz)   SpO2 98%   BMI 14.97 kg/m   Estimated body mass index is 14.97 kg/m  as calculated from the following:    Height as of this encounter: 1.232 m (4' 0.5\").    Weight as of this encounter: 22.7 kg (50 lb 1.6 oz).  Medication Review: complete    The next two questions are to help us understand your food security.  If you are feeling you need any assistance in this area, we have resources available to support you today.          8/6/2024   SDOH- Food Insecurity   Within the past 12 months, did you worry that your food would run out before you got money to buy more? N   Within the past 12 months, did the food you bought just not last and you didn t have money to get more? N            Norma J. Gosselin, CHARLES      "
No indicators present

## 2024-08-21 NOTE — LETTER BODY
[Dear  ___] : Dear  [unfilled], [Courtesy Letter:] : I had the pleasure of seeing your patient, [unfilled], in my office today. [Please see my note below.] : Please see my note below. [Sincerely,] : Sincerely, [FreeTextEntry2] : Kwaku Garibay MD [id number] [place] [address]

## 2024-08-21 NOTE — HISTORY OF PRESENT ILLNESS
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  He is free from all tubes, the question is how to we prevent this and to make sure he does not get complications from the procedures.  He came in with his brother today who takes care of him in an exemplary fashion he tells me that he has been at his baseline.  He did have recent blood work with Dr. Ching there were some abnormalities he will get us copies

## 2024-09-18 ENCOUNTER — APPOINTMENT (OUTPATIENT)
Dept: UROLOGY | Facility: CLINIC | Age: 69
End: 2024-09-18
Payer: MEDICARE

## 2024-09-18 PROCEDURE — 51705 CHANGE OF BLADDER TUBE: CPT

## 2024-09-26 ENCOUNTER — NON-APPOINTMENT (OUTPATIENT)
Age: 69
End: 2024-09-26

## 2024-09-27 ENCOUNTER — APPOINTMENT (OUTPATIENT)
Dept: PULMONOLOGY | Facility: CLINIC | Age: 69
End: 2024-09-27
Payer: MEDICARE

## 2024-09-27 VITALS
RESPIRATION RATE: 14 BRPM | BODY MASS INDEX: 25.67 KG/M2 | DIASTOLIC BLOOD PRESSURE: 62 MMHG | SYSTOLIC BLOOD PRESSURE: 122 MMHG | WEIGHT: 200 LBS | HEART RATE: 78 BPM | OXYGEN SATURATION: 99 % | HEIGHT: 74 IN

## 2024-09-27 DIAGNOSIS — R93.89 ABNORMAL FINDINGS ON DIAGNOSTIC IMAGING OF OTHER SPECIFIED BODY STRUCTURES: ICD-10-CM

## 2024-09-27 DIAGNOSIS — J90 PLEURAL EFFUSION, NOT ELSEWHERE CLASSIFIED: ICD-10-CM

## 2024-09-27 PROCEDURE — G2211 COMPLEX E/M VISIT ADD ON: CPT

## 2024-09-27 PROCEDURE — 71045 X-RAY EXAM CHEST 1 VIEW: CPT

## 2024-09-27 PROCEDURE — 99214 OFFICE O/P EST MOD 30 MIN: CPT

## 2024-09-27 NOTE — HISTORY OF PRESENT ILLNESS
[Never] : never [TextBox_4] : Mr. GARCIA is a 69 year man with a medical history significant for advanced CKD, paraplegia, and neurogenic bladder presenting today to the clinic for follow-up of a pleural effusion from the hospital.   Patient summary: Patient is known to me from his hospitalization last month (August 2024), he underwent nephrostomy placement and was found to have new right-sided pleural effusion on chest imaging.  Patient underwent thoracentesis prior to discharge, found to be exudative, and patient was discharged prior to finalization of pathology.  Cultures were subsequently found to be negative, and cytology found no malignant cells.  CT performed just prior to discharge demonstrated masslike consolidation with not expansion of right lower lung.   Interval history: No breathing issues since d/c Feels overall well

## 2024-09-27 NOTE — PROCEDURE
[FreeTextEntry1] : CXR PA+Lateral performed in-office Left costophrenic angle sharp without pleural effusion Blunting of right costophrenic angle consistent with trace pleural effusion mediastinum is within normal parameters Large airways are patent and midline, without stenosis or obstruction no hilar lymphadenopathy noted Left diaphragm appropriate Focal opacity at the right mid lung base, possibly consistent with previously noted opacity on CT

## 2024-09-27 NOTE — REASON FOR VISIT
[Initial] : an initial visit [Abnormal CXR/ Chest CT] : an abnormal CXR/ chest CT [TextBox_44] : pleural effusion

## 2024-09-27 NOTE — RESULTS/DATA
[TextEntry] : CT chest performed 8/12/2024 images and radiologist read reviewed, by my read demonstrating residual loculated hydropneumothorax on the right side.  There is also evidence of peribronchovascular consolidation versus mass in the right lower lobe.  Left lung with normal pulmonary parenchyma throughout.

## 2024-10-16 ENCOUNTER — APPOINTMENT (OUTPATIENT)
Dept: UROLOGY | Facility: CLINIC | Age: 69
End: 2024-10-16
Payer: MEDICARE

## 2024-10-16 PROCEDURE — 51705 CHANGE OF BLADDER TUBE: CPT

## 2024-10-20 NOTE — H&P PST ADULT - NSICDXFAMILYHX_GEN_ALL_CORE_FT
FAMILY HISTORY:  FH: breast cancer  FH: heart disease  FH: HTN (hypertension)  FH: melanoma    
see MD note

## 2024-10-23 ENCOUNTER — APPOINTMENT (OUTPATIENT)
Dept: NEPHROLOGY | Facility: CLINIC | Age: 69
End: 2024-10-23

## 2024-10-30 ENCOUNTER — APPOINTMENT (OUTPATIENT)
Dept: PULMONOLOGY | Facility: CLINIC | Age: 69
End: 2024-10-30

## 2024-11-04 ENCOUNTER — APPOINTMENT (OUTPATIENT)
Dept: NEPHROLOGY | Facility: CLINIC | Age: 69
End: 2024-11-04

## 2024-11-11 NOTE — ED PROCEDURE NOTE - PROCEDURE DATE TIME, MLM
Mohs Case Number: [id number] Date Of Previous Biopsy (Optional): 08.14.24 Previous Accession (Optional): [id number] Biopsy Photograph Reviewed: Yes Referring Physician (Optional): Huang Castillo NP 09-Jul-2024 12:28 Consent Type: Consent 1 (Standard) Eye Shield Used: No Initial Size Of Lesion: 1 Number Of Stages: 2 Primary Defect Length In Cm (Final Defect Size - Required For Flaps/Grafts): 1.6 Primary Defect Depth In Cm (Optional But Required For Some Insurers): 0 Repair Type: Flap Which Instrument Did You Use For Dermabrasion?: Wire Brush Which Eyelid Repair Cpt Are You Using?: 72928 Oculoplastic Surgeon Procedure Text (A): After obtaining clear surgical margins the patient was sent to oculoplastics for surgical repair.  The patient understands they will receive post-surgical care and follow-up from the referring physician's office. Otolaryngologist Procedure Text (A): After obtaining clear surgical margins the patient was sent to otolaryngology for surgical repair.  The patient understands they will receive post-surgical care and follow-up from the referring physician's office. Plastic Surgeon Procedure Text (A): After obtaining clear surgical margins the patient was sent to plastics for surgical repair.  The patient understands they will receive post-surgical care and follow-up from the referring physician's office. Mid-Level Procedure Text (A): After obtaining clear surgical margins the patient was sent to a mid-level provider for surgical repair.  The patient understands they will receive post-surgical care and follow-up from the mid-level provider. Provider Procedure Text (A): After obtaining clear surgical margins the defect was repaired by another provider. Asc Procedure Text (A): After obtaining clear surgical margins the patient was sent to an ASC for surgical repair.  The patient understands they will receive post-surgical care and follow-up from the ASC physician. Suturegard Retention Suture: 2-0 Nylon Retention Suture Bite Size: 3 mm Length To Time In Minutes Device Was In Place: 10 Undermining Type: Entire Wound Debridement Text: The wound edges were debrided prior to proceeding with the closure to facilitate wound healing. Helical Rim Text: The closure involved the helical rim. Vermilion Border Text: The closure involved the vermilion border. Nostril Rim Text: The closure involved the nostril rim. Retention Suture Text: Retention sutures were placed to support the closure and prevent dehiscence. Flap Type: Island Pedicle Flap Secondary Defect Length In Cm (Required For Flaps): 3 Location Indication Override (Is Already Calculated Based On Selected Body Location): Area H Area H Indication Text: Tumors in this location are included in Area H (eyelids, eyebrows, nose, lips, chin, ear, pre-auricular, post-auricular, temple, genitalia, hands, feet, ankles and areola).  Tissue conservation is critical in these anatomic locations. Area M Indication Text: Tumors in this location are included in Area M (cheek, forehead, scalp, neck, jawline and pretibial skin).  Mohs surgery is indicated for tumors in these anatomic locations. Area L Indication Text: Tumors in this location are included in Area L (trunk and extremities).  Mohs surgery is indicated for larger tumors, or tumors with aggressive histologic features, in these anatomic locations. Tumor Debulked?: curette Special Stains Stage 1 - Results: Base On Clearance Noted Above Stage 2: Additional Anesthesia Type: 1% lidocaine with epinephrine and a 1:10 solution of 8.4% sodium bicarbonate Stage 3: Additional Anesthesia Type: 1% lidocaine with epinephrine Staging Info: By selecting yes to the question above you will include information on AJCC 8 tumor staging in your Mohs note. Information on tumor staging will be automatically added for SCCs on the head and neck. AJCC 8 includes tumor size, tumor depth, perineural involvement and bone invasion. Tumor Depth: Less than 6mm from granular layer and no invasion beyond the subcutaneous fat Was The Patient On Physician Recommended Anticoagulation Therapy?: Please Select the Appropriate Response Medical Necessity Statement: Based on my medical judgement, Mohs surgery is the most appropriate treatment for this cancer compared to other treatments. Alternatives Discussed Intro (Do Not Add Period): I discussed alternative treatments to Mohs surgery and specifically discussed the risks and benefits of Consent 1/Introductory Paragraph: The rationale for Mohs was explained to the patient and consent was obtained. The risks, benefits and alternatives to therapy were discussed in detail. Specifically, the risks of infection, scarring, bleeding, prolonged wound healing, incomplete removal, allergy to anesthesia, nerve injury and recurrence were addressed. Prior to the procedure, the treatment site was clearly identified and confirmed by the patient. All components of Universal Protocol/PAUSE Rule completed. Consent 2/Introductory Paragraph: Mohs surgery was explained to the patient and consent was obtained. The risks, benefits and alternatives to therapy were discussed in detail. Specifically, the risks of infection, scarring, bleeding, prolonged wound healing, incomplete removal, allergy to anesthesia, nerve injury and recurrence were addressed. Prior to the procedure, the treatment site was clearly identified and confirmed by the patient. All components of Universal Protocol/PAUSE Rule completed. Consent 3/Introductory Paragraph: I gave the patient a chance to ask questions they had about the procedure.  Following this I explained the Mohs procedure and consent was obtained. The risks, benefits and alternatives to therapy were discussed in detail. Specifically, the risks of infection, scarring, bleeding, prolonged wound healing, incomplete removal, allergy to anesthesia, nerve injury and recurrence were addressed. Prior to the procedure, the treatment site was clearly identified and confirmed by the patient. All components of Universal Protocol/PAUSE Rule completed. Consent (Temporal Branch)/Introductory Paragraph: The rationale for Mohs was explained to the patient and consent was obtained. The risks, benefits and alternatives to therapy were discussed in detail. Specifically, the risks of damage to the temporal branch of the facial nerve, infection, scarring, bleeding, prolonged wound healing, incomplete removal, allergy to anesthesia, and recurrence were addressed. Prior to the procedure, the treatment site was clearly identified and confirmed by the patient. All components of Universal Protocol/PAUSE Rule completed. Consent (Marginal Mandibular)/Introductory Paragraph: The rationale for Mohs was explained to the patient and consent was obtained. The risks, benefits and alternatives to therapy were discussed in detail. Specifically, the risks of damage to the marginal mandibular branch of the facial nerve, infection, scarring, bleeding, prolonged wound healing, incomplete removal, allergy to anesthesia, and recurrence were addressed. Prior to the procedure, the treatment site was clearly identified and confirmed by the patient. All components of Universal Protocol/PAUSE Rule completed. Consent (Spinal Accessory)/Introductory Paragraph: The rationale for Mohs was explained to the patient and consent was obtained. The risks, benefits and alternatives to therapy were discussed in detail. Specifically, the risks of damage to the spinal accessory nerve, infection, scarring, bleeding, prolonged wound healing, incomplete removal, allergy to anesthesia, and recurrence were addressed. Prior to the procedure, the treatment site was clearly identified and confirmed by the patient. All components of Universal Protocol/PAUSE Rule completed. Consent (Near Eyelid Margin)/Introductory Paragraph: The rationale for Mohs was explained to the patient and consent was obtained. The risks, benefits and alternatives to therapy were discussed in detail. Specifically, the risks of ectropion or eyelid deformity, infection, scarring, bleeding, prolonged wound healing, incomplete removal, allergy to anesthesia, nerve injury and recurrence were addressed. Prior to the procedure, the treatment site was clearly identified and confirmed by the patient. All components of Universal Protocol/PAUSE Rule completed. Consent (Ear)/Introductory Paragraph: The rationale for Mohs was explained to the patient and consent was obtained. The risks, benefits and alternatives to therapy were discussed in detail. Specifically, the risks of ear deformity, infection, scarring, bleeding, prolonged wound healing, incomplete removal, allergy to anesthesia, nerve injury and recurrence were addressed. Prior to the procedure, the treatment site was clearly identified and confirmed by the patient. All components of Universal Protocol/PAUSE Rule completed. Consent (Nose)/Introductory Paragraph: The rationale for Mohs was explained to the patient and consent was obtained. The risks, benefits and alternatives to therapy were discussed in detail. Specifically, the risks of nasal deformity, changes in the flow of air through the nose, infection, scarring, bleeding, prolonged wound healing, incomplete removal, allergy to anesthesia, nerve injury and recurrence were addressed. Prior to the procedure, the treatment site was clearly identified and confirmed by the patient. All components of Universal Protocol/PAUSE Rule completed. Consent (Lip)/Introductory Paragraph: The rationale for Mohs was explained to the patient and consent was obtained. The risks, benefits and alternatives to therapy were discussed in detail. Specifically, the risks of lip deformity, changes in the oral aperture, infection, scarring, bleeding, prolonged wound healing, incomplete removal, allergy to anesthesia, nerve injury and recurrence were addressed. Prior to the procedure, the treatment site was clearly identified and confirmed by the patient. All components of Universal Protocol/PAUSE Rule completed. Consent (Scalp)/Introductory Paragraph: The rationale for Mohs was explained to the patient and consent was obtained. The risks, benefits and alternatives to therapy were discussed in detail. Specifically, the risks of changes in hair growth pattern secondary to repair, infection, scarring, bleeding, prolonged wound healing, incomplete removal, allergy to anesthesia, nerve injury and recurrence were addressed. Prior to the procedure, the treatment site was clearly identified and confirmed by the patient. All components of Universal Protocol/PAUSE Rule completed. Detail Level: Detailed Postop Diagnosis: same Anesthesia Volume In Cc: 6 Hemostasis: Electrocautery Estimated Blood Loss (Cc): minimal Repair Anesthesia Method: local infiltration Anesthesia Volume In Cc: 4 Brow Lift Text: A midfrontal incision was made medially to the defect to allow access to the tissues just superior to the left eyebrow. Following careful dissection inferiorly in a supraperiosteal plane to the level of the left eyebrow, several 3-0 monocryl sutures were used to resuspend the eyebrow orbicularis oculi muscular unit to the superior frontal bone periosteum. This resulted in an appropriate reapproximation of static eyebrow symmetry and correction of the left brow ptosis. Deep Sutures: 5-0 Vicryl Epidermal Sutures: 5-0 Fast Absorbing Gut Epidermal Closure: running Suturegard Intro: Intraoperative tissue expansion was performed, utilizing the SUTUREGARD device, in order to reduce wound tension. Suturegard Body: The suture ends were repeatedly re-tightened and re-clamped to achieve the desired tissue expansion. Hemigard Intro: Due to skin fragility and wound tension, it was decided to use HEMIGARD adhesive retention suture devices to permit a linear closure. The skin was cleaned and dried for a 6cm distance away from the wound. Excessive hair, if present, was removed to allow for adhesion. Hemigard Postcare Instructions: The HEMIGARD strips are to remain completely dry for at least 5-7 days. Donor Site Anesthesia Type: same as repair anesthesia Epidermal Closure Graft Donor Site (Optional): simple interrupted Graft Donor Site Bandage (Optional-Leave Blank If You Don't Want In Note): Steri-strips and a pressure bandage were applied to the donor site. Closure 2 Information: This tab is for additional flaps and grafts, including complex repair and grafts and complex repair and flaps. You can also specify a different location for the additional defect, if the location is the same you do not need to select a new one. We will insert the automated text for the repair you select below just as we do for solitary flaps and grafts. Please note that at this time if you select a location with a different insurance zone you will need to override the ICD10 and CPT if appropriate. Closure 3 Information: This tab is for additional flaps and grafts above and beyond our usual structured repairs.  Please note if you enter information here it will not currently bill and you will need to add the billing information manually. Wound Care: Aquaphor Dressing: dry sterile dressing Wound Care (No Sutures): Petrolatum Unna Boot Text: An Unna boot was placed to help immobilize the limb and facilitate more rapid healing. Home Suture Removal Text: Patient was provided instructions on removing sutures and will remove their sutures at home.  If they have any questions or difficulties they will call the office. Post-Care Instructions: I reviewed with the patient in detail post-care instructions. Patient is not to engage in any heavy lifting, exercise, or swimming for the next 14 days. Should the patient develop any fevers, chills, bleeding, severe pain patient will contact the office immediately. Pain Refusal Text: I offered to prescribe pain medication but the patient refused to take this medication. Mauc Instructions: By selecting yes to the question below the MAUC number will be added into the note.  This will be calculated automatically based on the diagnosis chosen, the size entered, the body zone selected (H,M,L) and the specific indications you chose. You will also have the option to override the Mohs AUC if you disagree with the automatically calculated number and this option is found in the Case Summary tab. Where Do You Want The Question To Include Opioid Counseling Located?: Case Summary Tab Eye Protection Verbiage: Before proceeding with the stage, a plastic scleral shield was inserted. The globe was anesthetized with a few drops of 1% lidocaine with 1:100,000 epinephrine. Then, an appropriate sized scleral shield was chosen and coated with lacrilube ointment. The shield was gently inserted and left in place for the duration of each stage. After the stage was completed, the shield was gently removed. Mohs Method Verbiage: An incision at a 45 degree angle following the standard Mohs approach was done and the specimen was harvested as a microscopic controlled layer. Surgeon/Pathologist Verbiage (Will Incorporate Name Of Surgeon From Intro If Not Blank): operated in two distinct and integrated capacities as the surgeon and pathologist. Mohs Histo Method Verbiage: Each section was then chromacoded and processed in the Mohs lab using the Mohs protocol and submitted for frozen section. Subsequent Stages Histo Method Verbiage: Using a similar technique to that described above, a thin layer of tissue was removed from all areas where tumor was visible on the previous stage.  The tissue was again oriented, mapped, dyed, and processed as above. Mohs Rapid Report Verbiage: The area of clinically evident tumor was marked with skin marking ink and appropriately hatched.  The initial incision was made following the Mohs approach through the skin.  The specimen was taken to the lab, divided into the necessary number of pieces, chromacoded and processed according to the Mohs protocol.  This was repeated in successive stages until a tumor free defect was achieved. Complex Repair Preamble Text (Leave Blank If You Do Not Want): Extensive wide undermining was performed. Intermediate Repair Preamble Text (Leave Blank If You Do Not Want): Undermining was performed with blunt dissection. Graft Cartilage Fenestration Text: The cartilage was fenestrated with a 2mm punch biopsy to help facilitate graft survival and healing. Non-Graft Cartilage Fenestration Text: The cartilage was fenestrated with a 2mm punch biopsy to help facilitate healing. Secondary Intention Text (Leave Blank If You Do Not Want): The defect will heal with secondary intention. No Repair - Repaired With Adjacent Surgical Defect Text (Leave Blank If You Do Not Want): After obtaining clear surgical margins the defect was repaired concurrently with another surgical defect which was in close approximation. Adjacent Tissue Transfer Text: The defect edges were debeveled with a #15 scalpel blade.  Given the location of the defect and the proximity to free margins an adjacent tissue transfer was deemed most appropriate.  Using a sterile surgical marker, an appropriate flap was drawn incorporating the defect and placing the expected incisions within the relaxed skin tension lines where possible.    The area thus outlined was incised deep to adipose tissue with a #15 scalpel blade.  The skin margins were undermined to an appropriate distance in all directions utilizing iris scissors. Advancement Flap (Single) Text: The defect edges were debeveled with a #15 scalpel blade.  Given the location of the defect and the proximity to free margins a single advancement flap was deemed most appropriate.  Using a sterile surgical marker, an appropriate advancement flap was drawn incorporating the defect and placing the expected incisions within the relaxed skin tension lines where possible.    The area thus outlined was incised deep to adipose tissue with a #15 scalpel blade.  The skin margins were undermined to an appropriate distance in all directions utilizing iris scissors. Advancement Flap (Double) Text: The defect edges were debeveled with a #15 scalpel blade.  Given the location of the defect and the proximity to free margins a double advancement flap was deemed most appropriate.  Using a sterile surgical marker, the appropriate advancement flaps were drawn incorporating the defect and placing the expected incisions within the relaxed skin tension lines where possible.    The area thus outlined was incised deep to adipose tissue with a #15 scalpel blade.  The skin margins were undermined to an appropriate distance in all directions utilizing iris scissors. Advancement-Rotation Flap Text: The defect edges were debeveled with a #15 scalpel blade.  Given the location of the defect, shape of the defect and the proximity to free margins an advancement-rotation flap was deemed most appropriate.  Using a sterile surgical marker, an appropriate flap was drawn incorporating the defect and placing the expected incisions within the relaxed skin tension lines where possible. The area thus outlined was incised deep to adipose tissue with a #15 scalpel blade.  The skin margins were undermined to an appropriate distance in all directions utilizing iris scissors. Alar Island Pedicle Flap Text: The defect edges were debeveled with a #15 scalpel blade.  Given the location of the defect, shape of the defect and the proximity to the alar rim an island pedicle advancement flap was deemed most appropriate.  Using a sterile surgical marker, an appropriate advancement flap was drawn incorporating the defect, outlining the appropriate donor tissue and placing the expected incisions within the nasal ala running parallel to the alar rim. The area thus outlined was incised with a #15 scalpel blade.  The skin margins were undermined minimally to an appropriate distance in all directions around the primary defect and laterally outward around the island pedicle utilizing iris scissors.  There was minimal undermining beneath the pedicle flap. A-T Advancement Flap Text: The defect edges were debeveled with a #15 scalpel blade.  Given the location of the defect, shape of the defect and the proximity to free margins an A-T advancement flap was deemed most appropriate.  Using a sterile surgical marker, an appropriate advancement flap was drawn incorporating the defect and placing the expected incisions within the relaxed skin tension lines where possible.    The area thus outlined was incised deep to adipose tissue with a #15 scalpel blade.  The skin margins were undermined to an appropriate distance in all directions utilizing iris scissors. Banner Transposition Flap Text: The defect edges were debeveled with a #15 scalpel blade.  Given the location of the defect and the proximity to free margins a Banner transposition flap was deemed most appropriate.  Using a sterile surgical marker, an appropriate flap drawn around the defect. The area thus outlined was incised deep to adipose tissue with a #15 scalpel blade.  The skin margins were undermined to an appropriate distance in all directions utilizing iris scissors. Bilateral Helical Rim Advancement Flap Text: The defect edges were debeveled with a #15 blade scalpel.  Given the location of the defect and the proximity to free margins (helical rim) a bilateral helical rim advancement flap was deemed most appropriate.  Using a sterile surgical marker, the appropriate advancement flaps were drawn incorporating the defect and placing the expected incisions between the helical rim and antihelix where possible.  The area thus outlined was incised through and through with a #15 scalpel blade.  With a skin hook and iris scissors, the flaps were gently and sharply undermined and freed up. Bilateral Rotation Flap Text: The defect edges were debeveled with a #15 scalpel blade. Given the location of the defect, shape of the defect and the proximity to free margins a bilateral rotation flap was deemed most appropriate. Using a sterile surgical marker, an appropriate rotation flap was drawn incorporating the defect and placing the expected incisions within the relaxed skin tension lines where possible. The area thus outlined was incised deep to adipose tissue with a #15 scalpel blade. The skin margins were undermined to an appropriate distance in all directions utilizing iris scissors. Following this, the designed flap was carried over into the primary defect and sutured into place. Bilobed Flap Text: The defect edges were debeveled with a #15 scalpel blade.  Given the location of the defect and the proximity to free margins a bilobe flap was deemed most appropriate.  Using a sterile surgical marker, an appropriate bilobe flap drawn around the defect.    The area thus outlined was incised deep to adipose tissue with a #15 scalpel blade.  The skin margins were undermined to an appropriate distance in all directions utilizing iris scissors. Bilobed Transposition Flap Text: The defect edges were debeveled with a #15 scalpel blade.  Given the location of the defect and the proximity to free margins a bilobed transposition flap was deemed most appropriate.  Using a sterile surgical marker, an appropriate bilobe flap drawn around the defect.    The area thus outlined was incised deep to adipose tissue with a #15 scalpel blade.  The skin margins were undermined to an appropriate distance in all directions utilizing iris scissors. Bi-Rhombic Flap Text: The defect edges were debeveled with a #15 scalpel blade.  Given the location of the defect and the proximity to free margins a bi-rhombic flap was deemed most appropriate.  Using a sterile surgical marker, an appropriate rhombic flap was drawn incorporating the defect. The area thus outlined was incised deep to adipose tissue with a #15 scalpel blade.  The skin margins were undermined to an appropriate distance in all directions utilizing iris scissors. Burow's Advancement Flap Text: The defect edges were debeveled with a #15 scalpel blade.  Given the location of the defect and the proximity to free margins a Burow's advancement flap was deemed most appropriate.  Using a sterile surgical marker, the appropriate advancement flap was drawn incorporating the defect and placing the expected incisions within the relaxed skin tension lines where possible.    The area thus outlined was incised deep to adipose tissue with a #15 scalpel blade.  The skin margins were undermined to an appropriate distance in all directions utilizing iris scissors. Chonodrocutaneous Helical Advancement Flap Text: The defect edges were debeveled with a #15 scalpel blade.  Given the location of the defect and the proximity to free margins a chondrocutaneous helical advancement flap was deemed most appropriate.  Using a sterile surgical marker, the appropriate advancement flap was drawn incorporating the defect and placing the expected incisions within the relaxed skin tension lines where possible.    The area thus outlined was incised deep to adipose tissue with a #15 scalpel blade.  The skin margins were undermined to an appropriate distance in all directions utilizing iris scissors. Crescentic Advancement Flap Text: The defect edges were debeveled with a #15 scalpel blade.  Given the location of the defect and the proximity to free margins a crescentic advancement flap was deemed most appropriate.  Using a sterile surgical marker, the appropriate advancement flap was drawn incorporating the defect and placing the expected incisions within the relaxed skin tension lines where possible.    The area thus outlined was incised deep to adipose tissue with a #15 scalpel blade.  The skin margins were undermined to an appropriate distance in all directions utilizing iris scissors. Dorsal Nasal Flap Text: The defect edges were debeveled with a #15 scalpel blade.  Given the location of the defect and the proximity to free margins a dorsal nasal flap was deemed most appropriate.  Using a sterile surgical marker, an appropriate dorsal nasal flap was drawn around the defect.    The area thus outlined was incised deep to adipose tissue with a #15 scalpel blade.  The skin margins were undermined to an appropriate distance in all directions utilizing iris scissors. Double Island Pedicle Flap Text: The defect edges were debeveled with a #15 scalpel blade.  Given the location of the defect, shape of the defect and the proximity to free margins a double island pedicle advancement flap was deemed most appropriate.  Using a sterile surgical marker, an appropriate advancement flap was drawn incorporating the defect, outlining the appropriate donor tissue and placing the expected incisions within the relaxed skin tension lines where possible.    The area thus outlined was incised deep to adipose tissue with a #15 scalpel blade.  The skin margins were undermined to an appropriate distance in all directions around the primary defect and laterally outward around the island pedicle utilizing iris scissors.  There was minimal undermining beneath the pedicle flap. Double O-Z Flap Text: The defect edges were debeveled with a #15 scalpel blade.  Given the location of the defect, shape of the defect and the proximity to free margins a Double O-Z flap was deemed most appropriate.  Using a sterile surgical marker, an appropriate transposition flap was drawn incorporating the defect and placing the expected incisions within the relaxed skin tension lines where possible. The area thus outlined was incised deep to adipose tissue with a #15 scalpel blade.  The skin margins were undermined to an appropriate distance in all directions utilizing iris scissors. Double O-Z Plasty Text: The defect edges were debeveled with a #15 scalpel blade.  Given the location of the defect, shape of the defect and the proximity to free margins a Double O-Z plasty (double transposition flap) was deemed most appropriate.  Using a sterile surgical marker, the appropriate transposition flaps were drawn incorporating the defect and placing the expected incisions within the relaxed skin tension lines where possible. The area thus outlined was incised deep to adipose tissue with a #15 scalpel blade.  The skin margins were undermined to an appropriate distance in all directions utilizing iris scissors.  Hemostasis was achieved with electrocautery.  The flaps were then transposed into place, one clockwise and the other counterclockwise, and anchored with interrupted buried subcutaneous sutures. Double Z Plasty Text: The lesion was extirpated to the level of the fat with a #15 scalpel blade. Given the location of the defect, shape of the defect and the proximity to free margins a double Z-plasty was deemed most appropriate for repair. Using a sterile surgical marker, the appropriate transposition arms of the double Z-plasty were drawn incorporating the defect and placing the expected incisions within the relaxed skin tension lines where possible. The area thus outlined was incised deep to adipose tissue with a #15 scalpel blade. The skin margins were undermined to an appropriate distance in all directions utilizing iris scissors. The opposing transposition arms were then transposed and carried over into place in opposite direction and anchored with interrupted buried subcutaneous sutures. Ear Star Wedge Flap Text: The defect edges were debeveled with a #15 blade scalpel.  Given the location of the defect and the proximity to free margins (helical rim) an ear star wedge flap was deemed most appropriate.  Using a sterile surgical marker, the appropriate flap was drawn incorporating the defect and placing the expected incisions between the helical rim and antihelix where possible.  The area thus outlined was incised through and through with a #15 scalpel blade. Flip-Flop Flap Text: The defect edges were debeveled with a #15 blade scalpel.  Given the location of the defect and the proximity to free margins a flip-flop flap was deemed most appropriate. Using a sterile surgical marker, the appropriate flap was drawn incorporating the defect and placing the expected incisions between the helical rim and antihelix where possible.  The area thus outlined was incised through and through with a #15 scalpel blade. Following this, the designed flap was carried over into the primary defect and sutured into place. Hatchet Flap Text: The defect edges were debeveled with a #15 scalpel blade.  Given the location of the defect, shape of the defect and the proximity to free margins a hatchet flap was deemed most appropriate.  Using a sterile surgical marker, an appropriate hatchet flap was drawn incorporating the defect and placing the expected incisions within the relaxed skin tension lines where possible.    The area thus outlined was incised deep to adipose tissue with a #15 scalpel blade.  The skin margins were undermined to an appropriate distance in all directions utilizing iris scissors. Helical Rim Advancement Flap Text: The defect edges were debeveled with a #15 blade scalpel.  Given the location of the defect and the proximity to free margins (helical rim) a double helical rim advancement flap was deemed most appropriate.  Using a sterile surgical marker, the appropriate advancement flaps were drawn incorporating the defect and placing the expected incisions between the helical rim and antihelix where possible.  The area thus outlined was incised through and through with a #15 scalpel blade.  With a skin hook and iris scissors, the flaps were gently and sharply undermined and freed up. H Plasty Text: Given the location of the defect, shape of the defect and the proximity to free margins a H-plasty was deemed most appropriate for repair.  Using a sterile surgical marker, the appropriate advancement arms of the H-plasty were drawn incorporating the defect and placing the expected incisions within the relaxed skin tension lines where possible. The area thus outlined was incised deep to adipose tissue with a #15 scalpel blade. The skin margins were undermined to an appropriate distance in all directions utilizing iris scissors.  The opposing advancement arms were then advanced into place in opposite direction and anchored with interrupted buried subcutaneous sutures. Island Pedicle Flap Text: The defect edges were debeveled with a #15 scalpel blade.  Given the location of the defect, shape of the defect and the proximity to free margins an island pedicle advancement flap was deemed most appropriate.  Using a sterile surgical marker, an appropriate advancement flap was drawn incorporating the defect, outlining the appropriate donor tissue and placing the expected incisions within the relaxed skin tension lines where possible.    The area thus outlined was incised deep to adipose tissue with a #15 scalpel blade.  The skin margins were undermined to an appropriate distance in all directions around the primary defect and laterally outward around the island pedicle utilizing iris scissors.  There was minimal undermining beneath the pedicle flap. Island Pedicle Flap With Canthal Suspension Text: The defect edges were debeveled with a #15 scalpel blade.  Given the location of the defect, shape of the defect and the proximity to free margins an island pedicle advancement flap was deemed most appropriate.  Using a sterile surgical marker, an appropriate advancement flap was drawn incorporating the defect, outlining the appropriate donor tissue and placing the expected incisions within the relaxed skin tension lines where possible. The area thus outlined was incised deep to adipose tissue with a #15 scalpel blade.  The skin margins were undermined to an appropriate distance in all directions around the primary defect and laterally outward around the island pedicle utilizing iris scissors.  There was minimal undermining beneath the pedicle flap. A suspension suture was placed in the canthal tendon to prevent tension and prevent ectropion. Island Pedicle Flap-Requiring Vessel Identification Text: The defect edges were debeveled with a #15 scalpel blade.  Given the location of the defect, shape of the defect and the proximity to free margins an island pedicle advancement flap was deemed most appropriate.  Using a sterile surgical marker, an appropriate advancement flap was drawn, based on the axial vessel mentioned above, incorporating the defect, outlining the appropriate donor tissue and placing the expected incisions within the relaxed skin tension lines where possible.    The area thus outlined was incised deep to adipose tissue with a #15 scalpel blade.  The skin margins were undermined to an appropriate distance in all directions around the primary defect and laterally outward around the island pedicle utilizing iris scissors.  There was minimal undermining beneath the pedicle flap. Keystone Flap Text: The defect edges were debeveled with a #15 scalpel blade.  Given the location of the defect, shape of the defect a keystone flap was deemed most appropriate.  Using a sterile surgical marker, an appropriate keystone flap was drawn incorporating the defect, outlining the appropriate donor tissue and placing the expected incisions within the relaxed skin tension lines where possible. The area thus outlined was incised deep to adipose tissue with a #15 scalpel blade.  The skin margins were undermined to an appropriate distance in all directions around the primary defect and laterally outward around the flap utilizing iris scissors. Melolabial Transposition Flap Text: The defect edges were debeveled with a #15 scalpel blade.  Given the location of the defect and the proximity to free margins a melolabial flap was deemed most appropriate.  Using a sterile surgical marker, an appropriate melolabial transposition flap was drawn incorporating the defect.    The area thus outlined was incised deep to adipose tissue with a #15 scalpel blade.  The skin margins were undermined to an appropriate distance in all directions utilizing iris scissors. Mercedes Flap Text: The defect edges were debeveled with a #15 scalpel blade.  Given the location of the defect, shape of the defect and the proximity to free margins a Mercedes flap was deemed most appropriate.  Using a sterile surgical marker, an appropriate advancement flap was drawn incorporating the defect and placing the expected incisions within the relaxed skin tension lines where possible. The area thus outlined was incised deep to adipose tissue with a #15 scalpel blade.  The skin margins were undermined to an appropriate distance in all directions utilizing iris scissors. Modified Advancement Flap Text: The defect edges were debeveled with a #15 scalpel blade.  Given the location of the defect, shape of the defect and the proximity to free margins a modified advancement flap was deemed most appropriate.  Using a sterile surgical marker, an appropriate advancement flap was drawn incorporating the defect and placing the expected incisions within the relaxed skin tension lines where possible.    The area thus outlined was incised deep to adipose tissue with a #15 scalpel blade.  The skin margins were undermined to an appropriate distance in all directions utilizing iris scissors. Mucosal Advancement Flap Text: Given the location of the defect, shape of the defect and the proximity to free margins a mucosal advancement flap was deemed most appropriate. Incisions were made with a 15 blade scalpel in the appropriate fashion along the cutaneous vermilion border and the mucosal lip. The remaining actinically damaged mucosal tissue was excised.  The mucosal advancement flap was then elevated to the gingival sulcus with care taken to preserve the neurovascular structures and advanced into the primary defect. Care was taken to ensure that precise realignment of the vermilion border was achieved. Muscle Hinge Flap Text: The defect edges were debeveled with a #15 scalpel blade.  Given the size, depth and location of the defect and the proximity to free margins a muscle hinge flap was deemed most appropriate.  Using a sterile surgical marker, an appropriate hinge flap was drawn incorporating the defect. The area thus outlined was incised with a #15 scalpel blade.  The skin margins were undermined to an appropriate distance in all directions utilizing iris scissors. Mustarde Flap Text: The defect edges were debeveled with a #15 scalpel blade.  Given the size, depth and location of the defect and the proximity to free margins a Mustarde flap was deemed most appropriate.  Using a sterile surgical marker, an appropriate flap was drawn incorporating the defect. The area thus outlined was incised with a #15 scalpel blade.  The skin margins were undermined to an appropriate distance in all directions utilizing iris scissors. Nasal Turnover Hinge Flap Text: The defect edges were debeveled with a #15 scalpel blade.  Given the size, depth, location of the defect and the defect being full thickness a nasal turnover hinge flap was deemed most appropriate.  Using a sterile surgical marker, an appropriate hinge flap was drawn incorporating the defect. The area thus outlined was incised with a #15 scalpel blade. The flap was designed to recreate the nasal mucosal lining and the alar rim. The skin margins were undermined to an appropriate distance in all directions utilizing iris scissors. Nasalis-Muscle-Based Myocutaneous Island Pedicle Flap Text: Using a #15 blade, an incision was made around the donor flap to the level of the nasalis muscle. Wide lateral undermining was then performed in both the subcutaneous plane above the nasalis muscle, and in a submuscular plane just above periosteum. This allowed the formation of a free nasalis muscle axial pedicle (based on the angular artery) which was still attached to the actual cutaneous flap, increasing its mobility and vascular viability. Hemostasis was obtained with pinpoint electrocoagulation. The flap was mobilized into position and the pivotal anchor points positioned and stabilized with buried interrupted sutures. Subcutaneous and dermal tissues were closed in a multilayered fashion with sutures. Tissue redundancies were excised, and the epidermal edges were apposed without significant tension and sutured with sutures. Nasalis Myocutaneous Flap Text: Using a #15 blade, an incision was made around the donor flap to the level of the nasalis muscle. Wide lateral undermining was then performed in both the subcutaneous plane above the nasalis muscle, and in a submuscular plane just above periosteum. This allowed the formation of a free nasalis muscle axial pedicle which was still attached to the actual cutaneous flap, increasing its mobility and vascular viability. Hemostasis was obtained with pinpoint electrocoagulation. The flap was mobilized into position and the pivotal anchor points positioned and stabilized with buried interrupted sutures. Subcutaneous and dermal tissues were closed in a multilayered fashion with sutures. Tissue redundancies were excised, and the epidermal edges were apposed without significant tension and sutured with sutures. Nasolabial Transposition Flap Text: The defect edges were debeveled with a #15 scalpel blade.  Given the size, depth and location of the defect and the proximity to free margins a nasolabial transposition flap was deemed most appropriate. Using a sterile surgical marker, an appropriate flap was drawn incorporating the defect. The area thus outlined was incised with a #15 scalpel blade. The skin margins were undermined to an appropriate distance in all directions utilizing iris scissors. Following this, the designed flap was carried into the primary defect and sutured into place. Orbicularis Oris Muscle Flap Text: The defect edges were debeveled with a #15 scalpel blade.  Given that the defect affected the competency of the oral sphincter an orbicularis oris muscle flap was deemed most appropriate to restore this competency and normal muscle function.  Using a sterile surgical marker, an appropriate flap was drawn incorporating the defect. The area thus outlined was incised with a #15 scalpel blade. O-T Advancement Flap Text: The defect edges were debeveled with a #15 scalpel blade.  Given the location of the defect, shape of the defect and the proximity to free margins an O-T advancement flap was deemed most appropriate.  Using a sterile surgical marker, an appropriate advancement flap was drawn incorporating the defect and placing the expected incisions within the relaxed skin tension lines where possible.    The area thus outlined was incised deep to adipose tissue with a #15 scalpel blade.  The skin margins were undermined to an appropriate distance in all directions utilizing iris scissors. O-T Plasty Text: The defect edges were debeveled with a #15 scalpel blade.  Given the location of the defect, shape of the defect and the proximity to free margins an O-T plasty was deemed most appropriate.  Using a sterile surgical marker, an appropriate O-T plasty was drawn incorporating the defect and placing the expected incisions within the relaxed skin tension lines where possible.    The area thus outlined was incised deep to adipose tissue with a #15 scalpel blade.  The skin margins were undermined to an appropriate distance in all directions utilizing iris scissors. O-L Flap Text: The defect edges were debeveled with a #15 scalpel blade.  Given the location of the defect, shape of the defect and the proximity to free margins an O-L flap was deemed most appropriate.  Using a sterile surgical marker, an appropriate advancement flap was drawn incorporating the defect and placing the expected incisions within the relaxed skin tension lines where possible.    The area thus outlined was incised deep to adipose tissue with a #15 scalpel blade.  The skin margins were undermined to an appropriate distance in all directions utilizing iris scissors. O-Z Flap Text: The defect edges were debeveled with a #15 scalpel blade.  Given the location of the defect, shape of the defect and the proximity to free margins an O-Z flap was deemed most appropriate.  Using a sterile surgical marker, an appropriate transposition flap was drawn incorporating the defect and placing the expected incisions within the relaxed skin tension lines where possible. The area thus outlined was incised deep to adipose tissue with a #15 scalpel blade.  The skin margins were undermined to an appropriate distance in all directions utilizing iris scissors. O-Z Plasty Text: The defect edges were debeveled with a #15 scalpel blade.  Given the location of the defect, shape of the defect and the proximity to free margins an O-Z plasty (double transposition flap) was deemed most appropriate.  Using a sterile surgical marker, the appropriate transposition flaps were drawn incorporating the defect and placing the expected incisions within the relaxed skin tension lines where possible.    The area thus outlined was incised deep to adipose tissue with a #15 scalpel blade.  The skin margins were undermined to an appropriate distance in all directions utilizing iris scissors.  Hemostasis was achieved with electrocautery.  The flaps were then transposed into place, one clockwise and the other counterclockwise, and anchored with interrupted buried subcutaneous sutures. Peng Advancement Flap Text: The defect edges were debeveled with a #15 scalpel blade.  Given the location of the defect, shape of the defect and the proximity to free margins a Peng advancement flap was deemed most appropriate.  Using a sterile surgical marker, an appropriate advancement flap was drawn incorporating the defect and placing the expected incisions within the relaxed skin tension lines where possible. The area thus outlined was incised deep to adipose tissue with a #15 scalpel blade.  The skin margins were undermined to an appropriate distance in all directions utilizing iris scissors. Rectangular Flap Text: The defect edges were debeveled with a #15 scalpel blade. Given the location of the defect and the proximity to free margins a rectangular flap was deemed most appropriate. Using a sterile surgical marker, an appropriate rectangular flap was drawn incorporating the defect. The area thus outlined was incised deep to adipose tissue with a #15 scalpel blade. The skin margins were undermined to an appropriate distance in all directions utilizing iris scissors. Following this, the designed flap was carried over into the primary defect and sutured into place. Rhombic Flap Text: The defect edges were debeveled with a #15 scalpel blade.  Given the location of the defect and the proximity to free margins a rhombic flap was deemed most appropriate.  Using a sterile surgical marker, an appropriate rhombic flap was drawn incorporating the defect.    The area thus outlined was incised deep to adipose tissue with a #15 scalpel blade.  The skin margins were undermined to an appropriate distance in all directions utilizing iris scissors. Rhomboid Transposition Flap Text: The defect edges were debeveled with a #15 scalpel blade.  Given the location of the defect and the proximity to free margins a rhomboid transposition flap was deemed most appropriate.  Using a sterile surgical marker, an appropriate rhomboid flap was drawn incorporating the defect.    The area thus outlined was incised deep to adipose tissue with a #15 scalpel blade.  The skin margins were undermined to an appropriate distance in all directions utilizing iris scissors. Rotation Flap Text: The defect edges were debeveled with a #15 scalpel blade.  Given the location of the defect, shape of the defect and the proximity to free margins a rotation flap was deemed most appropriate.  Using a sterile surgical marker, an appropriate rotation flap was drawn incorporating the defect and placing the expected incisions within the relaxed skin tension lines where possible.    The area thus outlined was incised deep to adipose tissue with a #15 scalpel blade.  The skin margins were undermined to an appropriate distance in all directions utilizing iris scissors. Spiral Flap Text: The defect edges were debeveled with a #15 scalpel blade.  Given the location of the defect, shape of the defect and the proximity to free margins a spiral flap was deemed most appropriate.  Using a sterile surgical marker, an appropriate rotation flap was drawn incorporating the defect and placing the expected incisions within the relaxed skin tension lines where possible. The area thus outlined was incised deep to adipose tissue with a #15 scalpel blade.  The skin margins were undermined to an appropriate distance in all directions utilizing iris scissors. Staged Advancement Flap Text: The defect edges were debeveled with a #15 scalpel blade.  Given the location of the defect, shape of the defect and the proximity to free margins a staged advancement flap was deemed most appropriate.  Using a sterile surgical marker, an appropriate advancement flap was drawn incorporating the defect and placing the expected incisions within the relaxed skin tension lines where possible. The area thus outlined was incised deep to adipose tissue with a #15 scalpel blade.  The skin margins were undermined to an appropriate distance in all directions utilizing iris scissors. Star Wedge Flap Text: The defect edges were debeveled with a #15 scalpel blade.  Given the location of the defect, shape of the defect and the proximity to free margins a star wedge flap was deemed most appropriate.  Using a sterile surgical marker, an appropriate rotation flap was drawn incorporating the defect and placing the expected incisions within the relaxed skin tension lines where possible. The area thus outlined was incised deep to adipose tissue with a #15 scalpel blade.  The skin margins were undermined to an appropriate distance in all directions utilizing iris scissors. Transposition Flap Text: The defect edges were debeveled with a #15 scalpel blade.  Given the location of the defect and the proximity to free margins a transposition flap was deemed most appropriate.  Using a sterile surgical marker, an appropriate transposition flap was drawn incorporating the defect.    The area thus outlined was incised deep to adipose tissue with a #15 scalpel blade.  The skin margins were undermined to an appropriate distance in all directions utilizing iris scissors. Trilobed Flap Text: The defect edges were debeveled with a #15 scalpel blade.  Given the location of the defect and the proximity to free margins a trilobed flap was deemed most appropriate.  Using a sterile surgical marker, an appropriate trilobed flap drawn around the defect.    The area thus outlined was incised deep to adipose tissue with a #15 scalpel blade.  The skin margins were undermined to an appropriate distance in all directions utilizing iris scissors. V-Y Flap Text: The defect edges were debeveled with a #15 scalpel blade.  Given the location of the defect, shape of the defect and the proximity to free margins a V-Y flap was deemed most appropriate.  Using a sterile surgical marker, an appropriate advancement flap was drawn incorporating the defect and placing the expected incisions within the relaxed skin tension lines where possible.    The area thus outlined was incised deep to adipose tissue with a #15 scalpel blade.  The skin margins were undermined to an appropriate distance in all directions utilizing iris scissors. V-Y Plasty Text: The defect edges were debeveled with a #15 scalpel blade.  Given the location of the defect, shape of the defect and the proximity to free margins an V-Y advancement flap was deemed most appropriate.  Using a sterile surgical marker, an appropriate advancement flap was drawn incorporating the defect and placing the expected incisions within the relaxed skin tension lines where possible.    The area thus outlined was incised deep to adipose tissue with a #15 scalpel blade.  The skin margins were undermined to an appropriate distance in all directions utilizing iris scissors. W Plasty Text: The lesion was extirpated to the level of the fat with a #15 scalpel blade.  Given the location of the defect, shape of the defect and the proximity to free margins a W-plasty was deemed most appropriate for repair.  Using a sterile surgical marker, the appropriate transposition arms of the W-plasty were drawn incorporating the defect and placing the expected incisions within the relaxed skin tension lines where possible.    The area thus outlined was incised deep to adipose tissue with a #15 scalpel blade.  The skin margins were undermined to an appropriate distance in all directions utilizing iris scissors.  The opposing transposition arms were then transposed into place in opposite direction and anchored with interrupted buried subcutaneous sutures. Z Plasty Text: The lesion was extirpated to the level of the fat with a #15 scalpel blade.  Given the location of the defect, shape of the defect and the proximity to free margins a Z-plasty was deemed most appropriate for repair.  Using a sterile surgical marker, the appropriate transposition arms of the Z-plasty were drawn incorporating the defect and placing the expected incisions within the relaxed skin tension lines where possible.    The area thus outlined was incised deep to adipose tissue with a #15 scalpel blade.  The skin margins were undermined to an appropriate distance in all directions utilizing iris scissors.  The opposing transposition arms were then transposed into place in opposite direction and anchored with interrupted buried subcutaneous sutures. Zygomaticofacial Flap Text: Given the location of the defect, shape of the defect and the proximity to free margins a zygomaticofacial flap was deemed most appropriate for repair.  Using a sterile surgical marker, the appropriate flap was drawn incorporating the defect and placing the expected incisions within the relaxed skin tension lines where possible. The area thus outlined was incised deep to adipose tissue with a #15 scalpel blade with preservation of a vascular pedicle.  The skin margins were undermined to an appropriate distance in all directions utilizing iris scissors.  The flap was then placed into the defect and anchored with interrupted buried subcutaneous sutures. Abbe Flap (Lower To Upper Lip) Text: The defect of the upper lip was assessed and measured.  Given the location and size of the defect, an Abbe flap was deemed most appropriate.  Using a sterile surgical marker, an appropriate Abbe flap was measured and drawn on the lower lip. Local anesthesia was then infiltrated. A scalpel was then used to incise the upper lip through and through the skin, vermilion, muscle and mucosa, leaving the flap pedicled on the opposite side.  The flap was then rotated and transferred to the lower lip defect.  The flap was then sutured into place with a three layer technique, closing the orbicularis oris muscle layer with subcutaneous buried sutures, followed by a mucosal layer and an epidermal layer. Abbe Flap (Upper To Lower Lip) Text: The defect of the lower lip was assessed and measured.  Given the location and size of the defect, an Abbe flap was deemed most appropriate.  Using a sterile surgical marker, an appropriate Abbe flap was measured and drawn on the upper lip. Local anesthesia was then infiltrated.  A scalpel was then used to incise the upper lip through and through the skin, vermilion, muscle and mucosa, leaving the flap pedicled on the opposite side.  The flap was then rotated and transferred to the lower lip defect.  The flap was then sutured into place with a three layer technique, closing the orbicularis oris muscle layer with subcutaneous buried sutures, followed by a mucosal layer and an epidermal layer. Cheek Interpolation Flap Text: A decision was made to reconstruct the defect utilizing an interpolation axial flap and a staged reconstruction.  A telfa template was made of the defect.  This telfa template was then used to outline the Cheek Interpolation flap.  The donor area for the pedicle flap was then injected with anesthesia.  The flap was excised through the skin and subcutaneous tissue down to the layer of the underlying musculature.  The interpolation flap was carefully excised within this deep plane to maintain its blood supply.  The edges of the donor site were undermined.   The donor site was closed in a primary fashion.  The pedicle was then rotated into position and sutured.  Once the tube was sutured into place, adequate blood supply was confirmed with blanching and refill.  The pedicle was then wrapped with xeroform gauze and dressed appropriately with a telfa and gauze bandage to ensure continued blood supply and protect the attached pedicle. Cheek-To-Nose Interpolation Flap Text: A decision was made to reconstruct the defect utilizing an interpolation axial flap and a staged reconstruction.  A telfa template was made of the defect.  This telfa template was then used to outline the Cheek-To-Nose Interpolation flap.  The donor area for the pedicle flap was then injected with anesthesia.  The flap was excised through the skin and subcutaneous tissue down to the layer of the underlying musculature.  The interpolation flap was carefully excised within this deep plane to maintain its blood supply.  The edges of the donor site were undermined.   The donor site was closed in a primary fashion.  The pedicle was then rotated into position and sutured.  Once the tube was sutured into place, adequate blood supply was confirmed with blanching and refill.  The pedicle was then wrapped with xeroform gauze and dressed appropriately with a telfa and gauze bandage to ensure continued blood supply and protect the attached pedicle. Estlander Flap (Lower To Upper Lip) Text: The defect of the lower lip was assessed and measured.  Given the location and size of the defect, an Estlander flap was deemed most appropriate.  Using a sterile surgical marker, an appropriate Estlander flap was measured and drawn on the upper lip. Local anesthesia was then infiltrated. A scalpel was then used to incise the lateral aspect of the flap, through skin, muscle and mucosa, leaving the flap pedicled medially.  The flap was then rotated and positioned to fill the lower lip defect.  The flap was then sutured into place with a three layer technique, closing the orbicularis oris muscle layer with subcutaneous buried sutures, followed by a mucosal layer and an epidermal layer. Interpolation Flap Text: A decision was made to reconstruct the defect utilizing an interpolation axial flap and a staged reconstruction.  A telfa template was made of the defect.  This telfa template was then used to outline the interpolation flap.  The donor area for the pedicle flap was then injected with anesthesia.  The flap was excised through the skin and subcutaneous tissue down to the layer of the underlying musculature.  The interpolation flap was carefully excised within this deep plane to maintain its blood supply.  The edges of the donor site were undermined.   The donor site was closed in a primary fashion.  The pedicle was then rotated into position and sutured.  Once the tube was sutured into place, adequate blood supply was confirmed with blanching and refill.  The pedicle was then wrapped with xeroform gauze and dressed appropriately with a telfa and gauze bandage to ensure continued blood supply and protect the attached pedicle. Melolabial Interpolation Flap Text: A decision was made to reconstruct the defect utilizing an interpolation axial flap and a staged reconstruction.  A telfa template was made of the defect.  This telfa template was then used to outline the melolabial interpolation flap.  The donor area for the pedicle flap was then injected with anesthesia.  The flap was excised through the skin and subcutaneous tissue down to the layer of the underlying musculature.  The pedicle flap was carefully excised within this deep plane to maintain its blood supply.  The edges of the donor site were undermined.   The donor site was closed in a primary fashion.  The pedicle was then rotated into position and sutured.  Once the tube was sutured into place, adequate blood supply was confirmed with blanching and refill.  The pedicle was then wrapped with xeroform gauze and dressed appropriately with a telfa and gauze bandage to ensure continued blood supply and protect the attached pedicle. Mastoid Interpolation Flap Text: A decision was made to reconstruct the defect utilizing an interpolation axial flap and a staged reconstruction.  A telfa template was made of the defect.  This telfa template was then used to outline the mastoid interpolation flap.  The donor area for the pedicle flap was then injected with anesthesia.  The flap was excised through the skin and subcutaneous tissue down to the layer of the underlying musculature.  The pedicle flap was carefully excised within this deep plane to maintain its blood supply.  The edges of the donor site were undermined.   The donor site was closed in a primary fashion.  The pedicle was then rotated into position and sutured.  Once the tube was sutured into place, adequate blood supply was confirmed with blanching and refill.  The pedicle was then wrapped with xeroform gauze and dressed appropriately with a telfa and gauze bandage to ensure continued blood supply and protect the attached pedicle. Paramedian Forehead Flap Text: A decision was made to reconstruct the defect utilizing an interpolation axial flap and a staged reconstruction.  A telfa template was made of the defect.  This telfa template was then used to outline the paramedian forehead pedicle flap.  The donor area for the pedicle flap was then injected with anesthesia.  The flap was excised through the skin and subcutaneous tissue down to the layer of the underlying musculature.  The pedicle flap was carefully excised within this deep plane to maintain its blood supply.  The edges of the donor site were undermined.   The donor site was closed in a primary fashion.  The pedicle was then rotated into position and sutured.  Once the tube was sutured into place, adequate blood supply was confirmed with blanching and refill.  The pedicle was then wrapped with xeroform gauze and dressed appropriately with a telfa and gauze bandage to ensure continued blood supply and protect the attached pedicle. Posterior Auricular Interpolation Flap Text: A decision was made to reconstruct the defect utilizing an interpolation axial flap and a staged reconstruction.  A telfa template was made of the defect.  This telfa template was then used to outline the posterior auricular interpolation flap.  The donor area for the pedicle flap was then injected with anesthesia.  The flap was excised through the skin and subcutaneous tissue down to the layer of the underlying musculature.  The pedicle flap was carefully excised within this deep plane to maintain its blood supply.  The edges of the donor site were undermined.   The donor site was closed in a primary fashion.  The pedicle was then rotated into position and sutured.  Once the tube was sutured into place, adequate blood supply was confirmed with blanching and refill.  The pedicle was then wrapped with xeroform gauze and dressed appropriately with a telfa and gauze bandage to ensure continued blood supply and protect the attached pedicle. Cheiloplasty (Complex) Text: A decision was made to reconstruct the defect with a  cheiloplasty.  The defect was undermined extensively.  Additional orbicularis oris muscle was excised with a 15 blade scalpel.  The defect was converted into a full thickness wedge to facilite a better cosmetic result.  Small vessels were then tied off with 5-0 monocyrl. The orbicularis oris, superficial fascia, adipose and dermis were then reapproximated.  After the deeper layers were approximated the epidermis was reapproximated with particular care given to realign the vermilion border. Cheiloplasty (Less Than 50%) Text: A decision was made to reconstruct the defect with a  cheiloplasty.  The defect was undermined extensively.  Additional orbicularis oris muscle was excised with a 15 blade scalpel.  The defect was converted into a full thickness wedge, of less than 50% of the vertical height of the lip, to facilite a better cosmetic result.  Small vessels were then tied off with 5-0 monocyrl. The orbicularis oris, superficial fascia, adipose and dermis were then reapproximated.  After the deeper layers were approximated the epidermis was reapproximated with particular care given to realign the vermilion border. Ear Wedge Repair Text: A wedge excision was completed by carrying down an excision through the full thickness of the ear and cartilage with an inward facing Burow's triangle. The wound was then closed in a layered fashion. Full Thickness Lip Wedge Repair (Flap) Text: Given the location of the defect and the proximity to free margins a full thickness wedge repair was deemed most appropriate.  Using a sterile surgical marker, the appropriate repair was drawn incorporating the defect and placing the expected incisions perpendicular to the vermilion border.  The vermilion border was also meticulously outlined to ensure appropriate reapproximation during the repair.  The area thus outlined was incised through and through with a #15 scalpel blade.  The muscularis and dermis were reaproximated with deep sutures following hemostasis. Care was taken to realign the vermilion border before proceeding with the superficial closure.  Once the vermilion was realigned the superfical and mucosal closure was finished. Burow's Graft Text: The defect edges were debeveled with a #15 scalpel blade.  Given the location of the defect, shape of the defect, the proximity to free margins and the presence of a standing cone deformity a Burow's skin graft was deemed most appropriate. The standing cone was removed and this tissue was then trimmed to the shape of the primary defect. The adipose tissue was also removed until only dermis and epidermis were left.  The skin margins of the secondary defect were undermined to an appropriate distance in all directions utilizing iris scissors.  The secondary defect was closed with interrupted buried subcutaneous sutures.  The skin edges were then re-apposed with running  sutures.  The skin graft was then placed in the primary defect and oriented appropriately. Cartilage Graft Text: The defect edges were debeveled with a #15 scalpel blade.  Given the location of the defect, shape of the defect, the fact the defect involved a full thickness cartilage defect a cartilage graft was deemed most appropriate.  An appropriate donor site was identified, cleansed, and anesthetized. The cartilage graft was then harvested and transferred to the recipient site, oriented appropriately and then sutured into place.  The secondary defect was then repaired using a primary closure. Composite Graft Text: The defect edges were debeveled with a #15 scalpel blade.  Given the location of the defect, shape of the defect, the proximity to free margins and the fact the defect was full thickness a composite graft was deemed most appropriate.  The defect was outline and then transferred to the donor site.  A full thickness graft was then excised from the donor site. The graft was then placed in the primary defect, oriented appropriately and then sutured into place.  The secondary defect was then repaired using a primary closure. Epidermal Autograft Text: The defect edges were debeveled with a #15 scalpel blade.  Given the location of the defect, shape of the defect and the proximity to free margins an epidermal autograft was deemed most appropriate.  Using a sterile surgical marker, the primary defect shape was transferred to the donor site. The epidermal graft was then harvested.  The skin graft was then placed in the primary defect and oriented appropriately. Dermal Autograft Text: The defect edges were debeveled with a #15 scalpel blade.  Given the location of the defect, shape of the defect and the proximity to free margins a dermal autograft was deemed most appropriate.  Using a sterile surgical marker, the primary defect shape was transferred to the donor site. The area thus outlined was incised deep to adipose tissue with a #15 scalpel blade.  The harvested graft was then trimmed of adipose and epidermal tissue until only dermis was left.  The skin graft was then placed in the primary defect and oriented appropriately. Ftsg Text: The defect edges were debeveled with a #15 scalpel blade.  Given the location of the defect, shape of the defect and the proximity to free margins a full thickness skin graft was deemed most appropriate.  Using a sterile surgical marker, the primary defect shape was transferred to the donor site. The area thus outlined was incised deep to adipose tissue with a #15 scalpel blade.  The harvested graft was then trimmed of adipose tissue until only dermis and epidermis was left.  The skin margins of the secondary defect were undermined to an appropriate distance in all directions utilizing iris scissors.  The secondary defect was closed with interrupted buried subcutaneous sutures.  The skin edges were then re-apposed with running  sutures.  The skin graft was then placed in the primary defect and oriented appropriately. Pinch Graft Text: The defect edges were debeveled with a #15 scalpel blade. Given the location of the defect, shape of the defect and the proximity to free margins a pinch graft was deemed most appropriate. Using a sterile surgical marker, the primary defect shape was transferred to the donor site. The area thus outlined was incised deep to adipose tissue with a #15 scalpel blade.  The harvested graft was then trimmed of adipose tissue until only dermis and epidermis was left. The skin graft was then placed in the primary defect and oriented appropriately. Skin Substitute Text: The defect edges were debeveled with a #15 scalpel blade.  Given the location of the defect, shape of the defect and the proximity to free margins a skin substitute graft was deemed most appropriate.  The graft material was trimmed to fit the size of the defect. The graft was then placed in the primary defect and oriented appropriately. Split-Thickness Skin Graft Text: The defect edges were debeveled with a #15 scalpel blade.  Given the location of the defect, shape of the defect and the proximity to free margins a split thickness skin graft was deemed most appropriate.  Using a sterile surgical marker, the primary defect shape was transferred to the donor site. The split thickness graft was then harvested.  The skin graft was then placed in the primary defect and oriented appropriately. Tissue Cultured Epidermal Autograft Text: The defect edges were debeveled with a #15 scalpel blade.  Given the location of the defect, shape of the defect and the proximity to free margins a tissue cultured epidermal autograft was deemed most appropriate.  The graft was then trimmed to fit the size of the defect.  The graft was then placed in the primary defect and oriented appropriately. Xenograft Text: The defect edges were debeveled with a #15 scalpel blade.  Given the location of the defect, shape of the defect and the proximity to free margins a xenograft was deemed most appropriate.  The graft was then trimmed to fit the size of the defect.  The graft was then placed in the primary defect and oriented appropriately. Complex Repair And Flap Additional Text (Will Appearing After The Standard Complex Repair Text): The complex repair was not sufficient to completely close the primary defect. The remaining additional defect was repaired with the flap mentioned below. Complex Repair And Graft Additional Text (Will Appearing After The Standard Complex Repair Text): The complex repair was not sufficient to completely close the primary defect. The remaining additional defect was repaired with the graft mentioned below. Eyelid Full Thickness Repair - [id number]: The eyelid defect was full thickness which required a wedge repair of the eyelid. Special care was taken to ensure that the eyelid margin was realligned when placing sutures. Eyelid Partial Thickness Repair - [id number]: The eyelid defect was partial thickness which required a wedge repair of the eyelid. Special care was taken to ensure that the eyelid margin was realligned when placing sutures. Intermediate Repair And Flap Additional Text (Will Appearing After The Standard Complex Repair Text): The intermediate repair was not sufficient to completely close the primary defect. The remaining additional defect was repaired with the flap mentioned below. Intermediate Repair And Graft Additional Text (Will Appearing After The Standard Complex Repair Text): The intermediate repair was not sufficient to completely close the primary defect. The remaining additional defect was repaired with the graft mentioned below. Localized Dermabrasion With 15 Blade Text: The patient was draped in routine manner.  Localized dermabrasion using a 15 blade was performed in routine manner to papillary dermis. This spot dermabrasion is being performed to complete skin cancer reconstruction. It also will eliminate the other sun damaged precancerous cells that are known to be part of the regional effect of a lifetime's worth of sun exposure. This localized dermabrasion is therapeutic and should not be considered cosmetic in any regard. Localized Dermabrasion With Sand Papertext: The patient was draped in routine manner.  Localized dermabrasion using sterile sand paper was performed in routine manner to papillary dermis. This spot dermabrasion is being performed to complete skin cancer reconstruction. It also will eliminate the other sun damaged precancerous cells that are known to be part of the regional effect of a lifetime's worth of sun exposure. This localized dermabrasion is therapeutic and should not be considered cosmetic in any regard. Localized Dermabrasion With Wire Brush Text: The patient was draped in routine manner.  Localized dermabrasion using 3 x 17 mm wire brush was performed in routine manner to papillary dermis. This spot dermabrasion is being performed to complete skin cancer reconstruction. It also will eliminate the other sun damaged precancerous cells that are known to be part of the regional effect of a lifetime's worth of sun exposure. This localized dermabrasion is therapeutic and should not be considered cosmetic in any regard. Purse String (Simple) Text: Given the location of the defect and the characteristics of the surrounding skin a purse string closure was deemed most appropriate.  Undermining was performed circumferentially around the surgical defect.  A purse string suture was then placed and tightened. Purse String (Intermediate) Text: Given the location of the defect and the characteristics of the surrounding skin a purse string intermediate closure was deemed most appropriate.  Undermining was performed circumferentially around the surgical defect.  A purse string suture was then placed and tightened. Partial Purse String (Simple) Text: Given the location of the defect and the characteristics of the surrounding skin a simple purse string closure was deemed most appropriate.  Undermining was performed circumferentially around the surgical defect.  A purse string suture was then placed and tightened. Wound tension only allowed a partial closure of the circular defect. Partial Purse String (Intermediate) Text: Given the location of the defect and the characteristics of the surrounding skin an intermediate purse string closure was deemed most appropriate.  Undermining was performed circumferentially around the surgical defect.  A purse string suture was then placed and tightened. Wound tension only allowed a partial closure of the circular defect. Tarsorrhaphy Text: A tarsorrhaphy was performed using Frost sutures. Manual Repair Warning Statement: We plan on removing the manually selected variable below in favor of our much easier automatic structured text blocks found in the previous tab. We decided to do this to help make the flow better and give you the full power of structured data. Manual selection is never going to be ideal in our platform and I would encourage you to avoid using manual selection from this point on, especially since I will be sunsetting this feature. It is important that you do one of two things with the customized text below. First, you can save all of the text in a word file so you can have it for future reference. Second, transfer the text to the appropriate area in the Library tab. Lastly, if there is a flap or graft type which we do not have you need to let us know right away so I can add it in before the variable is hidden. No need to panic, we plan to give you roughly 6 months to make the change. Same Histology In Subsequent Stages Text: The pattern and morphology of the tumor is as described in the first stage. No Residual Tumor Seen Histology Text: There were no malignant cells seen in the sections examined. Inflammation Suggestive Of Cancer Camouflage Histology Text: There was a dense lymphocytic infiltrate which prevented adequate histologic evaluation of adjacent structures. Area Suspicious For Tumor Histology Text: Area suspicious for tumor. Follicular Atypia Histology Text: Follicular atypia noted. Incidental Superficial Basal Cell Carcinoma Histology Text: Incidentally noted area atypical keratinocytes present throughout the entire thickness of the epidermis. Incidental Squamous Cell Carcinoma In Situ Histology Text: Atypical keratinocytes noted in the basal layer of the epidermis. Bcc Histology Text: There were numerous aggregates of basaloid cells. Bcc Adenoid Histology Text: There were numerous aggregates of basaloid cells demonstrating an adenoid pattern. Bcc Infiltrative Histology Text: There were numerous aggregates of basaloid cells demonstrating an infiltrative pattern. Bcc Infundibulocystic Histology Text: There were numerous aggregates of basaloid cells demonstrating an infundibulocystic pattern. Bcc Keratotic Histology Text: There were numerous aggregates of basaloid cells with squamous differentiation. Bcc Micronodular Histology Text: There were numerous aggregates of basaloid cells demonstrating a micronodular pattern. Bcc  Morpheaform/Sclerosing Histology Text: There were numerous aggregates of thin strands of basaloid cells in a fibrotic stroma. Bcc  Nodular Histology Text: There were numerous aggregates of basaloid cells demonstrating a nodular pattern. Bcc  Nodulocystic Histology Text: There were numerous aggregates of basaloid cells demonstrating a nodular pattern with cystic spaces. Bcc Pigmented Histology Text: There were numerous aggregates of basaloid cells, some with prominent melanin. Bcc Superficial Histology Text: There were numerous small buds of basaloid cells extending from the epidermis. Bcc Superficial Pigmented Histology Text: There were numerous small buds of pigmented basaloid cells extending from the epidermis. Mixed Superficial And Nodular Bcc Histology Text: There were numerous aggregates of basaloid cells demonstrating a superificial and nodular pattern. Mixed Nodular And Infiltrative Bcc Histology Text: There were numerous aggregates of basaloid cells demonstrating a nodular and infiltrative pattern. Mixed Nodular And Micronodular Bcc Histology Text: There were numerous aggregates of basaloid cells demonstrating a nodular and micronodular pattern. Metatypical Bcc Histology Text: There were numerous aggregates of basaloid cells, some demonstrating squamous differentiation. Fibroepithelioma Of Pinkus Histology Text: Net-like pattern of thin anastamosing basaloid cells in loose stroma. Scc Histology Text: Atypical keratinocytes with abundant pink cytoplasm extending into the dermis. Scc Well Differentiated Histology Text: Well differentiated atypical keratinocytes extending into the dermis. Scc Moderately Differentiated Histology Text: Moderately differentiated atypical keratinocytes extending into the dermis. Scc Poorly Differentiated Histology Text: Poorly differentiated atypical keratinocytes extending into the dermis. Scc Acantholytic Histology Text: Atypical keratinocytes extending into the dermis, demonstrating an acantholytic pattern. Scc Pigmented Histology Text: Atypical keratinocytes extending into the dermis, some of which are pigmented. Scc Desmoplastic Subtype Histology Text: Atypical keratinocytes extending into the dermis. Scc Spindle Histology Text: Atypical spindled cells extending into the dermis. Scc In Situ Histology Text: Atypical keratinocytes present throughout the entire thickness of the epidermis. Scc In Situ With Follicular Extension Histology Text: Atypical keratinocytes are present throughout the entire thickness of the epidermis with follicular extension. Afx Histology Text: Dermal cellular proliferation of pleomorphic, bizarre, spindled, epitheliod cells with many atypical mitoses. Basosquamous Cell Carcinoma Histology Text: Aggregates of atypical basaloid cells in the dermis with areas of squamous differentiation. Dfsp Histology Text: Cellular proliferation of spindled fibroblasts demonstrating a storiform pattern in some areas. Desmoplastic Trichoepithelioma Histology Text: Basaloid tumor islands in a reticulated pattern with peripheral palasading of nuclei in a fibrotic stroma. Trichoepithelioma Histology Text: Basaloid tumor islands in a reticulated pattern with peripheral palasading of nuclei and loose stroma with many fibroblasts. Mart-1 - Positive Histology Text: MART-1 staining demonstrates areas of higher density and clustering of melanocytes with Pagetoid spread upwards within the epidermis. The surgical margins are positive for tumor cells. Mart-1 - Negative Histology Text: MART-1 staining demonstrates a normal density and pattern of melanocytes along the dermal-epidermal junction. The surgical margins are negative for tumor cells. Information: Selecting Yes will display possible errors in your note based on the variables you have selected. This validation is only offered as a suggestion for you. PLEASE NOTE THAT THE VALIDATION TEXT WILL BE REMOVED WHEN YOU FINALIZE YOUR NOTE. IF YOU WANT TO FAX A PRELIMINARY NOTE YOU WILL NEED TO TOGGLE THIS TO 'NO' IF YOU DO NOT WANT IT IN YOUR FAXED NOTE. Bill 59 Modifier?: No - Continue to Bill 79 Modifier

## 2024-11-20 ENCOUNTER — APPOINTMENT (OUTPATIENT)
Dept: UROLOGY | Facility: CLINIC | Age: 69
End: 2024-11-20
Payer: MEDICARE

## 2024-11-20 PROCEDURE — 51705 CHANGE OF BLADDER TUBE: CPT

## 2024-12-18 ENCOUNTER — APPOINTMENT (OUTPATIENT)
Dept: UROLOGY | Facility: CLINIC | Age: 69
End: 2024-12-18
Payer: MEDICARE

## 2024-12-18 DIAGNOSIS — N20.0 CALCULUS OF KIDNEY: ICD-10-CM

## 2024-12-18 PROCEDURE — 76770 US EXAM ABDO BACK WALL COMP: CPT

## 2024-12-18 PROCEDURE — 51705 CHANGE OF BLADDER TUBE: CPT

## 2024-12-18 PROCEDURE — 99213 OFFICE O/P EST LOW 20 MIN: CPT

## 2024-12-18 PROCEDURE — 76705 ECHO EXAM OF ABDOMEN: CPT

## 2025-01-15 ENCOUNTER — APPOINTMENT (OUTPATIENT)
Dept: UROLOGY | Facility: CLINIC | Age: 70
End: 2025-01-15
Payer: MEDICARE

## 2025-01-15 PROCEDURE — 51705 CHANGE OF BLADDER TUBE: CPT

## 2025-01-22 ENCOUNTER — RESULT REVIEW (OUTPATIENT)
Age: 70
End: 2025-01-22

## 2025-01-22 ENCOUNTER — OUTPATIENT (OUTPATIENT)
Dept: OUTPATIENT SERVICES | Facility: HOSPITAL | Age: 70
LOS: 1 days | End: 2025-01-22
Payer: MEDICARE

## 2025-01-22 DIAGNOSIS — M46.20 OSTEOMYELITIS OF VERTEBRA, SITE UNSPECIFIED: Chronic | ICD-10-CM

## 2025-01-22 DIAGNOSIS — Z43.5 ENCOUNTER FOR ATTENTION TO CYSTOSTOMY: Chronic | ICD-10-CM

## 2025-01-22 DIAGNOSIS — N20.0 CALCULUS OF KIDNEY: ICD-10-CM

## 2025-01-22 PROCEDURE — 74176 CT ABD & PELVIS W/O CONTRAST: CPT | Mod: 26

## 2025-01-22 PROCEDURE — 74176 CT ABD & PELVIS W/O CONTRAST: CPT

## 2025-01-23 DIAGNOSIS — N20.0 CALCULUS OF KIDNEY: ICD-10-CM

## 2025-01-29 ENCOUNTER — NON-APPOINTMENT (OUTPATIENT)
Age: 70
End: 2025-01-29

## 2025-02-19 ENCOUNTER — APPOINTMENT (OUTPATIENT)
Dept: UROLOGY | Facility: CLINIC | Age: 70
End: 2025-02-19
Payer: MEDICARE

## 2025-02-19 PROCEDURE — 51705 CHANGE OF BLADDER TUBE: CPT

## 2025-02-19 PROCEDURE — 99213 OFFICE O/P EST LOW 20 MIN: CPT | Mod: 25

## 2025-02-25 ENCOUNTER — NON-APPOINTMENT (OUTPATIENT)
Age: 70
End: 2025-02-25

## 2025-03-12 ENCOUNTER — NON-APPOINTMENT (OUTPATIENT)
Age: 70
End: 2025-03-12

## 2025-03-19 ENCOUNTER — APPOINTMENT (OUTPATIENT)
Dept: UROLOGY | Facility: CLINIC | Age: 70
End: 2025-03-19
Payer: MEDICARE

## 2025-03-19 PROCEDURE — 51705 CHANGE OF BLADDER TUBE: CPT

## 2025-04-16 ENCOUNTER — APPOINTMENT (OUTPATIENT)
Dept: UROLOGY | Facility: CLINIC | Age: 70
End: 2025-04-16

## 2025-04-18 ENCOUNTER — APPOINTMENT (OUTPATIENT)
Dept: NEPHROLOGY | Facility: CLINIC | Age: 70
End: 2025-04-18
Payer: MEDICARE

## 2025-04-18 VITALS
DIASTOLIC BLOOD PRESSURE: 85 MMHG | OXYGEN SATURATION: 99 % | WEIGHT: 175 LBS | BODY MASS INDEX: 23.7 KG/M2 | HEIGHT: 72 IN | SYSTOLIC BLOOD PRESSURE: 160 MMHG | HEART RATE: 71 BPM

## 2025-04-18 DIAGNOSIS — N18.4 CHRONIC KIDNEY DISEASE, STAGE 4 (SEVERE): ICD-10-CM

## 2025-04-18 DIAGNOSIS — I48.91 UNSPECIFIED ATRIAL FIBRILLATION: ICD-10-CM

## 2025-04-18 DIAGNOSIS — I10 ESSENTIAL (PRIMARY) HYPERTENSION: ICD-10-CM

## 2025-04-18 DIAGNOSIS — D63.1 CHRONIC KIDNEY DISEASE, STAGE 4 (SEVERE): ICD-10-CM

## 2025-04-18 PROCEDURE — 99214 OFFICE O/P EST MOD 30 MIN: CPT

## 2025-04-18 RX ORDER — SODIUM BICARBONATE 650 MG/1
650 TABLET ORAL TWICE DAILY
Qty: 180 | Refills: 1 | Status: ACTIVE | COMMUNITY
Start: 2025-04-18 | End: 1900-01-01

## 2025-04-18 RX ORDER — APIXABAN 2.5 MG/1
2.5 TABLET, FILM COATED ORAL TWICE DAILY
Qty: 180 | Refills: 1 | Status: ACTIVE | COMMUNITY
Start: 2025-04-18 | End: 1900-01-01

## 2025-04-18 RX ORDER — NIFEDIPINE 60 MG/1
60 TABLET, EXTENDED RELEASE ORAL DAILY
Qty: 90 | Refills: 1 | Status: ACTIVE | COMMUNITY
Start: 2025-04-18 | End: 1900-01-01

## 2025-04-18 RX ORDER — FERROUS SULFATE 324(65)MG
324 TABLET, DELAYED RELEASE (ENTERIC COATED) ORAL
Qty: 90 | Refills: 1 | Status: ACTIVE | COMMUNITY
Start: 2025-04-18 | End: 1900-01-01

## 2025-04-21 ENCOUNTER — LABORATORY RESULT (OUTPATIENT)
Age: 70
End: 2025-04-21

## 2025-04-21 ENCOUNTER — APPOINTMENT (OUTPATIENT)
Dept: UROLOGY | Facility: CLINIC | Age: 70
End: 2025-04-21
Payer: MEDICARE

## 2025-04-21 VITALS
WEIGHT: 175 LBS | SYSTOLIC BLOOD PRESSURE: 190 MMHG | HEIGHT: 72 IN | HEART RATE: 73 BPM | RESPIRATION RATE: 17 BRPM | DIASTOLIC BLOOD PRESSURE: 93 MMHG | BODY MASS INDEX: 23.7 KG/M2 | OXYGEN SATURATION: 100 %

## 2025-04-21 DIAGNOSIS — N20.1 CALCULUS OF URETER: ICD-10-CM

## 2025-04-21 DIAGNOSIS — N20.0 CALCULUS OF KIDNEY: ICD-10-CM

## 2025-04-21 DIAGNOSIS — R33.9 RETENTION OF URINE, UNSPECIFIED: ICD-10-CM

## 2025-04-21 DIAGNOSIS — E66.3 OVERWEIGHT: ICD-10-CM

## 2025-04-21 PROCEDURE — 99401 PREV MED CNSL INDIV APPRX 15: CPT

## 2025-04-21 PROCEDURE — 99215 OFFICE O/P EST HI 40 MIN: CPT | Mod: 25

## 2025-04-22 PROBLEM — E66.3 OVERWEIGHT: Status: ACTIVE | Noted: 2025-04-22

## 2025-04-25 LAB — URINE CULTURE <10: ABNORMAL

## 2025-04-29 ENCOUNTER — OUTPATIENT (OUTPATIENT)
Dept: OUTPATIENT SERVICES | Facility: HOSPITAL | Age: 70
LOS: 1 days | End: 2025-04-29
Payer: MEDICARE

## 2025-04-29 VITALS
RESPIRATION RATE: 18 BRPM | TEMPERATURE: 97 F | OXYGEN SATURATION: 100 % | WEIGHT: 175.05 LBS | DIASTOLIC BLOOD PRESSURE: 88 MMHG | HEIGHT: 73.5 IN | SYSTOLIC BLOOD PRESSURE: 144 MMHG | HEART RATE: 75 BPM

## 2025-04-29 DIAGNOSIS — Z01.818 ENCOUNTER FOR OTHER PREPROCEDURAL EXAMINATION: ICD-10-CM

## 2025-04-29 DIAGNOSIS — Z98.890 OTHER SPECIFIED POSTPROCEDURAL STATES: Chronic | ICD-10-CM

## 2025-04-29 DIAGNOSIS — Z43.5 ENCOUNTER FOR ATTENTION TO CYSTOSTOMY: Chronic | ICD-10-CM

## 2025-04-29 DIAGNOSIS — M46.20 OSTEOMYELITIS OF VERTEBRA, SITE UNSPECIFIED: Chronic | ICD-10-CM

## 2025-04-29 DIAGNOSIS — N20.2 CALCULUS OF KIDNEY WITH CALCULUS OF URETER: ICD-10-CM

## 2025-04-29 LAB
A1C WITH ESTIMATED AVERAGE GLUCOSE RESULT: 6 % — HIGH (ref 4–5.6)
ALBUMIN SERPL ELPH-MCNC: 3.2 G/DL — LOW (ref 3.5–5.2)
ALP SERPL-CCNC: 161 U/L — HIGH (ref 30–115)
ALT FLD-CCNC: 19 U/L — SIGNIFICANT CHANGE UP (ref 0–41)
ANION GAP SERPL CALC-SCNC: 28 MMOL/L — HIGH (ref 7–14)
AST SERPL-CCNC: 33 U/L — SIGNIFICANT CHANGE UP (ref 0–41)
BASOPHILS # BLD AUTO: 0.02 K/UL — SIGNIFICANT CHANGE UP (ref 0–0.2)
BASOPHILS NFR BLD AUTO: 0.2 % — SIGNIFICANT CHANGE UP (ref 0–1)
BILIRUB SERPL-MCNC: 0.3 MG/DL — SIGNIFICANT CHANGE UP (ref 0.2–1.2)
BUN SERPL-MCNC: 138 MG/DL — CRITICAL HIGH (ref 10–20)
CALCIUM SERPL-MCNC: 7.9 MG/DL — LOW (ref 8.4–10.5)
CHLORIDE SERPL-SCNC: 99 MMOL/L — SIGNIFICANT CHANGE UP (ref 98–110)
CO2 SERPL-SCNC: 7 MMOL/L — CRITICAL LOW (ref 17–32)
CREAT SERPL-MCNC: 9 MG/DL — CRITICAL HIGH (ref 0.7–1.5)
EGFR: 6 ML/MIN/1.73M2 — LOW
EGFR: 6 ML/MIN/1.73M2 — LOW
EOSINOPHIL # BLD AUTO: 0 K/UL — SIGNIFICANT CHANGE UP (ref 0–0.7)
EOSINOPHIL NFR BLD AUTO: 0 % — SIGNIFICANT CHANGE UP (ref 0–8)
ESTIMATED AVERAGE GLUCOSE: 126 MG/DL — HIGH (ref 68–114)
GLUCOSE SERPL-MCNC: 103 MG/DL — HIGH (ref 70–99)
HCT VFR BLD CALC: 31.3 % — LOW (ref 42–52)
HGB BLD-MCNC: 10.4 G/DL — LOW (ref 14–18)
IMM GRANULOCYTES NFR BLD AUTO: 0.3 % — SIGNIFICANT CHANGE UP (ref 0.1–0.3)
LYMPHOCYTES # BLD AUTO: 0.72 K/UL — LOW (ref 1.2–3.4)
LYMPHOCYTES # BLD AUTO: 7.4 % — LOW (ref 20.5–51.1)
MCHC RBC-ENTMCNC: 30 PG — SIGNIFICANT CHANGE UP (ref 27–31)
MCHC RBC-ENTMCNC: 33.2 G/DL — SIGNIFICANT CHANGE UP (ref 32–37)
MCV RBC AUTO: 90.2 FL — SIGNIFICANT CHANGE UP (ref 80–94)
MONOCYTES # BLD AUTO: 0.89 K/UL — HIGH (ref 0.1–0.6)
MONOCYTES NFR BLD AUTO: 9.2 % — SIGNIFICANT CHANGE UP (ref 1.7–9.3)
NEUTROPHILS # BLD AUTO: 8.02 K/UL — HIGH (ref 1.4–6.5)
NEUTROPHILS NFR BLD AUTO: 82.9 % — HIGH (ref 42.2–75.2)
NRBC BLD AUTO-RTO: 0 /100 WBCS — SIGNIFICANT CHANGE UP (ref 0–0)
PLATELET # BLD AUTO: 183 K/UL — SIGNIFICANT CHANGE UP (ref 130–400)
PMV BLD: 10.6 FL — HIGH (ref 7.4–10.4)
POTASSIUM SERPL-MCNC: 5.1 MMOL/L — HIGH (ref 3.5–5)
POTASSIUM SERPL-SCNC: 5.1 MMOL/L — HIGH (ref 3.5–5)
PROT SERPL-MCNC: 7.8 G/DL — SIGNIFICANT CHANGE UP (ref 6–8)
RBC # BLD: 3.47 M/UL — LOW (ref 4.7–6.1)
RBC # FLD: 14.1 % — SIGNIFICANT CHANGE UP (ref 11.5–14.5)
SODIUM SERPL-SCNC: 134 MMOL/L — LOW (ref 135–146)
WBC # BLD: 9.68 K/UL — SIGNIFICANT CHANGE UP (ref 4.8–10.8)
WBC # FLD AUTO: 9.68 K/UL — SIGNIFICANT CHANGE UP (ref 4.8–10.8)

## 2025-04-29 PROCEDURE — 93010 ELECTROCARDIOGRAM REPORT: CPT

## 2025-04-29 PROCEDURE — 80053 COMPREHEN METABOLIC PANEL: CPT

## 2025-04-29 PROCEDURE — 99214 OFFICE O/P EST MOD 30 MIN: CPT | Mod: 25

## 2025-04-29 PROCEDURE — 85025 COMPLETE CBC W/AUTO DIFF WBC: CPT

## 2025-04-29 PROCEDURE — 36415 COLL VENOUS BLD VENIPUNCTURE: CPT

## 2025-04-29 PROCEDURE — 93005 ELECTROCARDIOGRAM TRACING: CPT

## 2025-04-29 PROCEDURE — 83036 HEMOGLOBIN GLYCOSYLATED A1C: CPT

## 2025-04-29 NOTE — H&P PST ADULT - NSICDXPASTMEDICALHX_GEN_ALL_CORE_FT
PAST MEDICAL HISTORY:  DM (diabetes mellitus)     Encounter for care or replacement of suprapubic tube     H/O paraplegia     HTN (hypertension)     Neurogenic dysfunction of the urinary bladder     Renal stones     Spinal abscess     Stage 5 chronic kidney disease not on chronic dialysis

## 2025-04-29 NOTE — H&P PST ADULT - REASON FOR ADMISSION
pt for 2 procedures     Patient is a 70  year old  male presenting to PAST in preparation for  CYSTOSCOPY, BILATERAL URETERAL STENT PLACEMENT on  5/6/25 and CYSTOSCOPY, BILATERAL RETROGRADE PYELOGRAM, BILATERAL URETEROSCOPY LASER LITHOTRIPSY, BILATERAL URETERAL STENT REPLACEMENT, CVAC on 5/22/25  under general anesthesia by Dr. valencia

## 2025-04-29 NOTE — H&P PST ADULT - HISTORY OF PRESENT ILLNESS
pt with hx stones x yrs  now for planned procedure     PATIENT/GUARDIAN CURRENTLY DENIES CHEST PAIN  SHORTNESS OF BREATH  PALPITATIONS,  DYSURIA, OR UPPER RESPIRATORY INFECTION IN PAST 2 WEEKS  denies travel outside the [place] in the past 30 days    Anesthesia Alert  NO--Difficult Airway  NO--History of neck surgery or radiation  NO--Limited ROM of neck  NO--History of Malignant hyperthermia  NO--No personal or family history of Pseudocholinesterase deficiency.  NO--Prior Anesthesia Complication  NO--Latex Allergy  NO--Loose teeth  NO--History of Rheumatoid Arthritis  NO--Bleeding risk  NO--MARGE  NO--Other_____  pt with supra pubic cath    PT/GUARDIAN abrasions to knees/shins     AS PER THE PT/GUARDIAN, THIS IS HIS/HER COMPLETE MEDICAL AND SURGICAL HX, INCLUDING MEDICATIONS PRESCRIBED AND OVER THE COUNTER    Patient/guardian understands the instructions and was given the opportunity to ask questions and have them answered.    pt/guardian denies any suicidal ideation or thoughts, pt states not a threat to self or others      Duke Activity Status Index (DASI)  0 points  The higher the score (maximum 58.2), the higher the functional status.  2.74 METs    Revised Cardiac Risk Index for Pre-Operative Risk    0 points  RCRI Score  3.9 %  Risk of major cardiac event

## 2025-04-29 NOTE — H&P PST ADULT - NSICDXPASTSURGICALHX_GEN_ALL_CORE_FT
PAST SURGICAL HISTORY:  Encounter for care or replacement of suprapubic tube     S/P ORIF (open reduction internal fixation) fracture     Spinal abscess S/P Surgery

## 2025-04-30 ENCOUNTER — INPATIENT (INPATIENT)
Facility: HOSPITAL | Age: 70
LOS: 6 days | Discharge: HOME HEALTH | DRG: 872 | End: 2025-05-07
Attending: STUDENT IN AN ORGANIZED HEALTH CARE EDUCATION/TRAINING PROGRAM | Admitting: FAMILY MEDICINE
Payer: MEDICARE

## 2025-04-30 VITALS
HEIGHT: 73.5 IN | SYSTOLIC BLOOD PRESSURE: 85 MMHG | HEART RATE: 99 BPM | OXYGEN SATURATION: 99 % | DIASTOLIC BLOOD PRESSURE: 58 MMHG | TEMPERATURE: 95 F | WEIGHT: 175.05 LBS | RESPIRATION RATE: 32 BRPM

## 2025-04-30 DIAGNOSIS — Z43.5 ENCOUNTER FOR ATTENTION TO CYSTOSTOMY: Chronic | ICD-10-CM

## 2025-04-30 DIAGNOSIS — E87.20 ACIDOSIS, UNSPECIFIED: ICD-10-CM

## 2025-04-30 DIAGNOSIS — Z01.818 ENCOUNTER FOR OTHER PREPROCEDURAL EXAMINATION: ICD-10-CM

## 2025-04-30 DIAGNOSIS — M46.20 OSTEOMYELITIS OF VERTEBRA, SITE UNSPECIFIED: Chronic | ICD-10-CM

## 2025-04-30 DIAGNOSIS — A41.9 SEPSIS, UNSPECIFIED ORGANISM: ICD-10-CM

## 2025-04-30 DIAGNOSIS — Z98.890 OTHER SPECIFIED POSTPROCEDURAL STATES: Chronic | ICD-10-CM

## 2025-04-30 LAB
ALBUMIN SERPL ELPH-MCNC: 3 G/DL — LOW (ref 3.5–5.2)
ALP SERPL-CCNC: 520 U/L — HIGH (ref 30–115)
ALT FLD-CCNC: 158 U/L — HIGH (ref 0–41)
ANION GAP SERPL CALC-SCNC: 27 MMOL/L — HIGH (ref 7–14)
APPEARANCE UR: ABNORMAL
APTT BLD: 38 SEC — SIGNIFICANT CHANGE UP (ref 27–39.2)
AST SERPL-CCNC: 449 U/L — HIGH (ref 0–41)
BACTERIA # UR AUTO: ABNORMAL /HPF
BASE EXCESS BLDV CALC-SCNC: -19 MMOL/L — LOW (ref -2–3)
BASOPHILS # BLD AUTO: 0.07 K/UL — SIGNIFICANT CHANGE UP (ref 0–0.2)
BASOPHILS NFR BLD AUTO: 0.2 % — SIGNIFICANT CHANGE UP (ref 0–1)
BILIRUB SERPL-MCNC: 0.8 MG/DL — SIGNIFICANT CHANGE UP (ref 0.2–1.2)
BILIRUB UR-MCNC: ABNORMAL
BUN SERPL-MCNC: 154 MG/DL — CRITICAL HIGH (ref 10–20)
BURR CELLS BLD QL SMEAR: PRESENT — SIGNIFICANT CHANGE UP
CA-I SERPL-SCNC: 0.96 MMOL/L — LOW (ref 1.15–1.33)
CALCIUM SERPL-MCNC: 7.5 MG/DL — LOW (ref 8.4–10.5)
CHLORIDE SERPL-SCNC: 99 MMOL/L — SIGNIFICANT CHANGE UP (ref 98–110)
CO2 SERPL-SCNC: 8 MMOL/L — CRITICAL LOW (ref 17–32)
COLOR SPEC: ABNORMAL
CREAT SERPL-MCNC: 10.6 MG/DL — CRITICAL HIGH (ref 0.7–1.5)
DACRYOCYTES BLD QL SMEAR: SLIGHT — SIGNIFICANT CHANGE UP
DIFF PNL FLD: ABNORMAL
EGFR: 5 ML/MIN/1.73M2 — LOW
EGFR: 5 ML/MIN/1.73M2 — LOW
EOSINOPHIL # BLD AUTO: 0.12 K/UL — SIGNIFICANT CHANGE UP (ref 0–0.7)
EOSINOPHIL NFR BLD AUTO: 0.4 % — SIGNIFICANT CHANGE UP (ref 0–8)
EPI CELLS # UR: SIGNIFICANT CHANGE UP
GAS PNL BLDV: 132 MMOL/L — LOW (ref 136–145)
GAS PNL BLDV: SIGNIFICANT CHANGE UP
GLUCOSE SERPL-MCNC: 130 MG/DL — HIGH (ref 70–99)
GLUCOSE UR QL: NEGATIVE MG/DL — SIGNIFICANT CHANGE UP
HCO3 BLDV-SCNC: 10 MMOL/L — CRITICAL LOW (ref 22–29)
HCT VFR BLD CALC: 29.9 % — LOW (ref 42–52)
HCT VFR BLDA CALC: 32 % — LOW (ref 39–51)
HGB BLD CALC-MCNC: 10.5 G/DL — LOW (ref 12.6–17.4)
HGB BLD-MCNC: 10.2 G/DL — LOW (ref 14–18)
HYPOCHROMIA BLD QL: SLIGHT — SIGNIFICANT CHANGE UP
IMM GRANULOCYTES NFR BLD AUTO: 5.3 % — HIGH (ref 0.1–0.3)
INR BLD: 1.5 RATIO — HIGH (ref 0.65–1.3)
KETONES UR-MCNC: NEGATIVE MG/DL — SIGNIFICANT CHANGE UP
LACTATE BLDV-MCNC: 5 MMOL/L — CRITICAL HIGH (ref 0.5–2)
LACTATE SERPL-SCNC: 3.8 MMOL/L — HIGH (ref 0.7–2)
LEUKOCYTE ESTERASE UR-ACNC: ABNORMAL
LYMPHOCYTES # BLD AUTO: 0.48 K/UL — LOW (ref 1.2–3.4)
LYMPHOCYTES # BLD AUTO: 1.6 % — LOW (ref 20.5–51.1)
MCHC RBC-ENTMCNC: 30 PG — SIGNIFICANT CHANGE UP (ref 27–31)
MCHC RBC-ENTMCNC: 34.1 G/DL — SIGNIFICANT CHANGE UP (ref 32–37)
MCV RBC AUTO: 87.9 FL — SIGNIFICANT CHANGE UP (ref 80–94)
MONOCYTES # BLD AUTO: 0.63 K/UL — HIGH (ref 0.1–0.6)
MONOCYTES NFR BLD AUTO: 2.1 % — SIGNIFICANT CHANGE UP (ref 1.7–9.3)
NEUTROPHILS # BLD AUTO: 26.47 K/UL — HIGH (ref 1.4–6.5)
NEUTROPHILS NFR BLD AUTO: 90.4 % — HIGH (ref 42.2–75.2)
NEUTS BAND # BLD: 5 % — SIGNIFICANT CHANGE UP (ref 0–6)
NEUTS BAND NFR BLD: 5 % — SIGNIFICANT CHANGE UP (ref 0–6)
NITRITE UR-MCNC: POSITIVE
NRBC # BLD: 0 /100 WBCS — SIGNIFICANT CHANGE UP (ref 0–0)
NRBC BLD AUTO-RTO: 0 /100 WBCS — SIGNIFICANT CHANGE UP (ref 0–0)
NRBC BLD-RTO: 0 /100 WBCS — SIGNIFICANT CHANGE UP (ref 0–0)
OVALOCYTES BLD QL SMEAR: SLIGHT — SIGNIFICANT CHANGE UP
PCO2 BLDV: 33 MMHG — LOW (ref 42–55)
PH BLDV: 7.08 — CRITICAL LOW (ref 7.32–7.43)
PH UR: 8 — SIGNIFICANT CHANGE UP (ref 5–8)
PLAT MORPH BLD: NORMAL — SIGNIFICANT CHANGE UP
PLATELET # BLD AUTO: 177 K/UL — SIGNIFICANT CHANGE UP (ref 130–400)
PLATELET COUNT - ESTIMATE: NORMAL — SIGNIFICANT CHANGE UP
PMV BLD: 10.7 FL — HIGH (ref 7.4–10.4)
PO2 BLDV: 36 MMHG — SIGNIFICANT CHANGE UP (ref 25–45)
POTASSIUM BLDV-SCNC: 4.2 MMOL/L — SIGNIFICANT CHANGE UP (ref 3.5–5.1)
POTASSIUM SERPL-MCNC: 4.1 MMOL/L — SIGNIFICANT CHANGE UP (ref 3.5–5)
POTASSIUM SERPL-SCNC: 4.1 MMOL/L — SIGNIFICANT CHANGE UP (ref 3.5–5)
PROT SERPL-MCNC: 7.5 G/DL — SIGNIFICANT CHANGE UP (ref 6–8)
PROT UR-MCNC: 300 MG/DL
PROTHROM AB SERPL-ACNC: 17.8 SEC — HIGH (ref 9.95–12.87)
RBC # BLD: 3.4 M/UL — LOW (ref 4.7–6.1)
RBC # FLD: 14.1 % — SIGNIFICANT CHANGE UP (ref 11.5–14.5)
RBC BLD AUTO: NORMAL — SIGNIFICANT CHANGE UP
RBC CASTS # UR COMP ASSIST: ABNORMAL /HPF
SAO2 % BLDV: 54.5 % — LOW (ref 67–88)
SODIUM SERPL-SCNC: 134 MMOL/L — LOW (ref 135–146)
SP GR SPEC: 1.02 — SIGNIFICANT CHANGE UP (ref 1–1.03)
UROBILINOGEN FLD QL: 0.2 MG/DL — SIGNIFICANT CHANGE UP (ref 0.2–1)
WBC # BLD: 29.31 K/UL — HIGH (ref 4.8–10.8)
WBC # FLD AUTO: 29.31 K/UL — HIGH (ref 4.8–10.8)
WBC UR QL: ABNORMAL /HPF (ref 0–5)

## 2025-04-30 PROCEDURE — 87086 URINE CULTURE/COLONY COUNT: CPT

## 2025-04-30 PROCEDURE — 85610 PROTHROMBIN TIME: CPT

## 2025-04-30 PROCEDURE — 87640 STAPH A DNA AMP PROBE: CPT

## 2025-04-30 PROCEDURE — 83735 ASSAY OF MAGNESIUM: CPT

## 2025-04-30 PROCEDURE — 87186 SC STD MICRODIL/AGAR DIL: CPT

## 2025-04-30 PROCEDURE — 82728 ASSAY OF FERRITIN: CPT

## 2025-04-30 PROCEDURE — 82962 GLUCOSE BLOOD TEST: CPT

## 2025-04-30 PROCEDURE — 86901 BLOOD TYPING SEROLOGIC RH(D): CPT

## 2025-04-30 PROCEDURE — 84484 ASSAY OF TROPONIN QUANT: CPT

## 2025-04-30 PROCEDURE — C1747: CPT

## 2025-04-30 PROCEDURE — 80053 COMPREHEN METABOLIC PANEL: CPT

## 2025-04-30 PROCEDURE — 86850 RBC ANTIBODY SCREEN: CPT

## 2025-04-30 PROCEDURE — 85730 THROMBOPLASTIN TIME PARTIAL: CPT

## 2025-04-30 PROCEDURE — 72170 X-RAY EXAM OF PELVIS: CPT

## 2025-04-30 PROCEDURE — C1758: CPT

## 2025-04-30 PROCEDURE — 87641 MR-STAPH DNA AMP PROBE: CPT

## 2025-04-30 PROCEDURE — 74176 CT ABD & PELVIS W/O CONTRAST: CPT | Mod: 26

## 2025-04-30 PROCEDURE — 80048 BASIC METABOLIC PNL TOTAL CA: CPT

## 2025-04-30 PROCEDURE — 87077 CULTURE AEROBIC IDENTIFY: CPT

## 2025-04-30 PROCEDURE — 93010 ELECTROCARDIOGRAM REPORT: CPT

## 2025-04-30 PROCEDURE — C2617: CPT

## 2025-04-30 PROCEDURE — 84466 ASSAY OF TRANSFERRIN: CPT

## 2025-04-30 PROCEDURE — 99291 CRITICAL CARE FIRST HOUR: CPT

## 2025-04-30 PROCEDURE — 85027 COMPLETE CBC AUTOMATED: CPT

## 2025-04-30 PROCEDURE — 71045 X-RAY EXAM CHEST 1 VIEW: CPT | Mod: 26

## 2025-04-30 PROCEDURE — 93306 TTE W/DOPPLER COMPLETE: CPT

## 2025-04-30 PROCEDURE — 83605 ASSAY OF LACTIC ACID: CPT

## 2025-04-30 PROCEDURE — 86900 BLOOD TYPING SEROLOGIC ABO: CPT

## 2025-04-30 PROCEDURE — 93005 ELECTROCARDIOGRAM TRACING: CPT

## 2025-04-30 PROCEDURE — 36415 COLL VENOUS BLD VENIPUNCTURE: CPT

## 2025-04-30 PROCEDURE — 85025 COMPLETE CBC W/AUTO DIFF WBC: CPT

## 2025-04-30 PROCEDURE — C1769: CPT

## 2025-04-30 PROCEDURE — 84100 ASSAY OF PHOSPHORUS: CPT

## 2025-04-30 PROCEDURE — 83540 ASSAY OF IRON: CPT

## 2025-04-30 RX ORDER — SODIUM BICARBONATE 1 MEQ/ML
0.28 SYRINGE (ML) INTRAVENOUS
Qty: 150 | Refills: 0 | Status: DISCONTINUED | OUTPATIENT
Start: 2025-04-30 | End: 2025-05-01

## 2025-04-30 RX ORDER — FOLIC ACID 1 MG/1
1 TABLET ORAL DAILY
Refills: 0 | Status: DISCONTINUED | OUTPATIENT
Start: 2025-04-30 | End: 2025-05-01

## 2025-04-30 RX ORDER — HYDROCORTISONE 20 MG
50 TABLET ORAL EVERY 6 HOURS
Refills: 0 | Status: DISCONTINUED | OUTPATIENT
Start: 2025-04-30 | End: 2025-05-01

## 2025-04-30 RX ORDER — CEFEPIME 2 G/20ML
2000 INJECTION, POWDER, FOR SOLUTION INTRAVENOUS ONCE
Refills: 0 | Status: COMPLETED | OUTPATIENT
Start: 2025-04-30 | End: 2025-04-30

## 2025-04-30 RX ORDER — MIDODRINE HYDROCHLORIDE 5 MG/1
10 TABLET ORAL EVERY 8 HOURS
Refills: 0 | Status: DISCONTINUED | OUTPATIENT
Start: 2025-04-30 | End: 2025-05-01

## 2025-04-30 RX ORDER — INFLUENZA A VIRUS A/IDAHO/07/2018 (H1N1) ANTIGEN (MDCK CELL DERIVED, PROPIOLACTONE INACTIVATED, INFLUENZA A VIRUS A/INDIANA/08/2018 (H3N2) ANTIGEN (MDCK CELL DERIVED, PROPIOLACTONE INACTIVATED), INFLUENZA B VIRUS B/SINGAPORE/INFTT-16-0610/2016 ANTIGEN (MDCK CELL DERIVED, PROPIOLACTONE INACTIVATED), INFLUENZA B VIRUS B/IOWA/06/2017 ANTIGEN (MDCK CELL DERIVED, PROPIOLACTONE INACTIVATED) 15; 15; 15; 15 UG/.5ML; UG/.5ML; UG/.5ML; UG/.5ML
0.5 INJECTION, SUSPENSION INTRAMUSCULAR ONCE
Refills: 0 | Status: DISCONTINUED | OUTPATIENT
Start: 2025-04-30 | End: 2025-05-07

## 2025-04-30 RX ORDER — HYDROCORTISONE 20 MG
50 TABLET ORAL EVERY 6 HOURS
Refills: 0 | Status: DISCONTINUED | OUTPATIENT
Start: 2025-04-30 | End: 2025-04-30

## 2025-04-30 RX ORDER — CEFEPIME 2 G/20ML
500 INJECTION, POWDER, FOR SOLUTION INTRAVENOUS EVERY 24 HOURS
Refills: 0 | Status: DISCONTINUED | OUTPATIENT
Start: 2025-05-01 | End: 2025-05-01

## 2025-04-30 RX ORDER — SODIUM CHLORIDE 9 G/1000ML
2500 INJECTION, SOLUTION INTRAVENOUS ONCE
Refills: 0 | Status: COMPLETED | OUTPATIENT
Start: 2025-04-30 | End: 2025-04-30

## 2025-04-30 RX ORDER — VANCOMYCIN HCL IN 5 % DEXTROSE 1.5G/250ML
1000 PLASTIC BAG, INJECTION (ML) INTRAVENOUS ONCE
Refills: 0 | Status: COMPLETED | OUTPATIENT
Start: 2025-04-30 | End: 2025-04-30

## 2025-04-30 RX ADMIN — CEFEPIME 2000 MILLIGRAM(S): 2 INJECTION, POWDER, FOR SOLUTION INTRAVENOUS at 20:51

## 2025-04-30 RX ADMIN — SODIUM CHLORIDE 2500 MILLILITER(S): 9 INJECTION, SOLUTION INTRAVENOUS at 18:12

## 2025-04-30 RX ADMIN — CEFEPIME 100 MILLIGRAM(S): 2 INJECTION, POWDER, FOR SOLUTION INTRAVENOUS at 18:12

## 2025-04-30 RX ADMIN — Medication 1000 MILLIGRAM(S): at 20:51

## 2025-04-30 RX ADMIN — Medication 250 MILLIGRAM(S): at 18:12

## 2025-04-30 RX ADMIN — Medication 150 MEQ/KG/HR: at 23:14

## 2025-04-30 RX ADMIN — Medication 150 MEQ/KG/HR: at 20:13

## 2025-04-30 RX ADMIN — Medication 100 MILLILITER(S): at 23:14

## 2025-04-30 RX ADMIN — SODIUM CHLORIDE 2500 MILLILITER(S): 9 INJECTION, SOLUTION INTRAVENOUS at 20:20

## 2025-04-30 NOTE — H&P ADULT - NSHPADDITIONALINFOADULT_GEN_ALL_CORE
Thank you for the opportunity to participate in the care of this patient. The pt and the pt's brother are aware and in agreement of the plan of care. All questions answered and concerns addressed.

## 2025-04-30 NOTE — H&P ADULT - NSHPLABSRESULTS_GEN_ALL_CORE
04-30    134[L]  |  99  |  154[HH]  ----------------------------<  130[H]  4.1   |  8[LL]  |  10.6[HH]    Ca    7.5[L]      30 Apr 2025 17:54    TPro  7.5  /  Alb  3.0[L]  /  TBili  0.8  /  DBili  x   /  AST  449[H]  /  ALT  158[H]  /  AlkPhos  520[H]  04-30                          10.2   29.31 )-----------( 177      ( 30 Apr 2025 17:54 )             29.9

## 2025-04-30 NOTE — H&P ADULT - HISTORY OF PRESENT ILLNESS
Pt is a 71 yo male with a PMH of HTN, CKD stage 5, HTN, Paraplegia, Nephrolithiasis, Chronic metabolic acidosis, and Neurogenic bladder who presents with complaint of blood within the suprapubic catheter s/p initiation of Eliquis prompting critical care assessment. Upon evaluation, the pt is awake and alert and answering most questions appropriately. The pt remains hemodynamically on room air with no acute complaints. The pt's bother is at bedside who confirms that the pt is compliant with medication regimen and physician office visits. No other complaints or concerns noted.

## 2025-04-30 NOTE — ED PROVIDER NOTE - OBJECTIVE STATEMENT
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

## 2025-04-30 NOTE — ED PROVIDER NOTE - IN ACCORDANCE WITH [place] STATE LAW, WE OFFER EVERY PATIENT A HEPATITIS C TEST. WOULD YOU LIKE TO BE TESTED TODAY?
Bactrim Counseling:  I discussed with the patient the risks of sulfa antibiotics including but not limited to GI upset, allergic reaction, drug rash, diarrhea, dizziness, photosensitivity, and yeast infections.  Rarely, more serious reactions can occur including but not limited to aplastic anemia, agranulocytosis, methemoglobinemia, blood dyscrasias, liver or kidney failure, lung infiltrates or desquamative/blistering drug rashes. Doxycycline Pregnancy And Lactation Text: This medication is Pregnancy Category D and not consider safe during pregnancy. It is also excreted in breast milk but is considered safe for shorter treatment courses. Opt out Winlevi Pregnancy And Lactation Text: This medication is considered safe during pregnancy and breastfeeding. Topical Retinoid counseling:  Patient advised to apply a pea-sized amount only at bedtime and wait 30 minutes after washing their face before applying.  If too drying, patient may add a non-comedogenic moisturizer. The patient verbalized understanding of the proper use and possible adverse effects of retinoids.  All of the patient's questions and concerns were addressed. Minocycline Counseling: Patient advised regarding possible photosensitivity and discoloration of the teeth, skin, lips, tongue and gums.  Patient instructed to avoid sunlight, if possible.  When exposed to sunlight, patients should wear protective clothing, sunglasses, and sunscreen.  The patient was instructed to call the office immediately if the following severe adverse effects occur:  hearing changes, easy bruising/bleeding, severe headache, or vision changes.  The patient verbalized understanding of the proper use and possible adverse effects of minocycline.  All of the patient's questions and concerns were addressed. Tetracycline Pregnancy And Lactation Text: This medication is Pregnancy Category D and not consider safe during pregnancy. It is also excreted in breast milk. Detail Level: Detailed Birth Control Pills Counseling: Birth Control Pill Counseling: I discussed with the patient the potential side effects of OCPs including but not limited to increased risk of stroke, heart attack, thrombophlebitis, deep venous thrombosis, hepatic adenomas, breast changes, GI upset, headaches, and depression.  The patient verbalized understanding of the proper use and possible adverse effects of OCPs. All of the patient's questions and concerns were addressed. Erythromycin Pregnancy And Lactation Text: This medication is Pregnancy Category B and is considered safe during pregnancy. It is also excreted in breast milk. Sarecycline Counseling: Patient advised regarding possible photosensitivity and discoloration of the teeth, skin, lips, tongue and gums.  Patient instructed to avoid sunlight, if possible.  When exposed to sunlight, patients should wear protective clothing, sunglasses, and sunscreen.  The patient was instructed to call the office immediately if the following severe adverse effects occur:  hearing changes, easy bruising/bleeding, severe headache, or vision changes.  The patient verbalized understanding of the proper use and possible adverse effects of sarecycline.  All of the patient's questions and concerns were addressed. Aklief Pregnancy And Lactation Text: It is unknown if this medication is safe to use during pregnancy.  It is unknown if this medication is excreted in breast milk.  Breastfeeding women should use the topical cream on the smallest area of the skin for the shortest time needed while breastfeeding.  Do not apply to nipple and areola. Use Enhanced Medication Counseling?: No Tazorac Counseling:  Patient advised that medication is irritating and drying.  Patient may need to apply sparingly and wash off after an hour before eventually leaving it on overnight.  The patient verbalized understanding of the proper use and possible adverse effects of tazorac.  All of the patient's questions and concerns were addressed. Azithromycin Counseling:  I discussed with the patient the risks of azithromycin including but not limited to GI upset, allergic reaction, drug rash, diarrhea, and yeast infections. Dapsone Pregnancy And Lactation Text: This medication is Pregnancy Category C and is not considered safe during pregnancy or breast feeding. High Dose Vitamin A Counseling: Side effects reviewed, pt to contact office should one occur. Topical Sulfur Applications Pregnancy And Lactation Text: This medication is Pregnancy Category C and has an unknown safety profile during pregnancy. It is unknown if this topical medication is excreted in breast milk. Birth Control Pills Pregnancy And Lactation Text: This medication should be avoided if pregnant and for the first 30 days post-partum. Isotretinoin Counseling: Patient should get monthly blood tests, not donate blood, not drive at night if vision affected, not share medication, and not undergo elective surgery for 6 months after tx completed. Side effects reviewed, pt to contact office should one occur. Topical Clindamycin Pregnancy And Lactation Text: This medication is Pregnancy Category B and is considered safe during pregnancy. It is unknown if it is excreted in breast milk. Benzoyl Peroxide Counseling: Patient counseled that medicine may cause skin irritation and bleach clothing.  In the event of skin irritation, the patient was advised to reduce the amount of the drug applied or use it less frequently.   The patient verbalized understanding of the proper use and possible adverse effects of benzoyl peroxide.  All of the patient's questions and concerns were addressed. Spironolactone Pregnancy And Lactation Text: This medication can cause feminization of the male fetus and should be avoided during pregnancy. The active metabolite is also found in breast milk. Erythromycin Counseling:  I discussed with the patient the risks of erythromycin including but not limited to GI upset, allergic reaction, drug rash, diarrhea, increase in liver enzymes, and yeast infections. High Dose Vitamin A Pregnancy And Lactation Text: High dose vitamin A therapy is contraindicated during pregnancy and breast feeding. Winlevi Counseling:  I discussed with the patient the risks of topical clascoterone including but not limited to erythema, scaling, itching, and stinging. Patient voiced their understanding. Bactrim Pregnancy And Lactation Text: This medication is Pregnancy Category D and is known to cause fetal risk.  It is also excreted in breast milk. Azelaic Acid Counseling: Patient counseled that medicine may cause skin irritation and to avoid applying near the eyes.  In the event of skin irritation, the patient was advised to reduce the amount of the drug applied or use it less frequently.   The patient verbalized understanding of the proper use and possible adverse effects of azelaic acid.  All of the patient's questions and concerns were addressed. Tazorac Pregnancy And Lactation Text: This medication is not safe during pregnancy. It is unknown if this medication is excreted in breast milk. Azithromycin Pregnancy And Lactation Text: This medication is considered safe during pregnancy and is also secreted in breast milk. Doxycycline Counseling:  Patient counseled regarding possible photosensitivity and increased risk for sunburn.  Patient instructed to avoid sunlight, if possible.  When exposed to sunlight, patients should wear protective clothing, sunglasses, and sunscreen.  The patient was instructed to call the office immediately if the following severe adverse effects occur:  hearing changes, easy bruising/bleeding, severe headache, or vision changes.  The patient verbalized understanding of the proper use and possible adverse effects of doxycycline.  All of the patient's questions and concerns were addressed. Topical Retinoid Pregnancy And Lactation Text: This medication is Pregnancy Category C. It is unknown if this medication is excreted in breast milk. Aklief counseling:  Patient advised to apply a pea-sized amount only at bedtime and wait 30 minutes after washing their face before applying.  If too drying, patient may add a non-comedogenic moisturizer.  The most commonly reported side effects including irritation, redness, scaling, dryness, stinging, burning, itching, and increased risk of sunburn.  The patient verbalized understanding of the proper use and possible adverse effects of retinoids.  All of the patient's questions and concerns were addressed. Dapsone Counseling: I discussed with the patient the risks of dapsone including but not limited to hemolytic anemia, agranulocytosis, rashes, methemoglobinemia, kidney failure, peripheral neuropathy, headaches, GI upset, and liver toxicity.  Patients who start dapsone require monitoring including baseline LFTs and weekly CBCs for the first month, then every month thereafter.  The patient verbalized understanding of the proper use and possible adverse effects of dapsone.  All of the patient's questions and concerns were addressed. Azelaic Acid Pregnancy And Lactation Text: This medication is considered safe during pregnancy and breast feeding. Topical Clindamycin Counseling: Patient counseled that this medication may cause skin irritation or allergic reactions.  In the event of skin irritation, the patient was advised to reduce the amount of the drug applied or use it less frequently.   The patient verbalized understanding of the proper use and possible adverse effects of clindamycin.  All of the patient's questions and concerns were addressed. Benzoyl Peroxide Pregnancy And Lactation Text: This medication is Pregnancy Category C. It is unknown if benzoyl peroxide is excreted in breast milk. Tetracycline Counseling: Patient counseled regarding possible photosensitivity and increased risk for sunburn.  Patient instructed to avoid sunlight, if possible.  When exposed to sunlight, patients should wear protective clothing, sunglasses, and sunscreen.  The patient was instructed to call the office immediately if the following severe adverse effects occur:  hearing changes, easy bruising/bleeding, severe headache, or vision changes.  The patient verbalized understanding of the proper use and possible adverse effects of tetracycline.  All of the patient's questions and concerns were addressed. Patient understands to avoid pregnancy while on therapy due to potential birth defects. Topical Sulfur Applications Counseling: Topical Sulfur Counseling: Patient counseled that this medication may cause skin irritation or allergic reactions.  In the event of skin irritation, the patient was advised to reduce the amount of the drug applied or use it less frequently.   The patient verbalized understanding of the proper use and possible adverse effects of topical sulfur application.  All of the patient's questions and concerns were addressed. Spironolactone Counseling: Patient advised regarding risks of diarrhea, abdominal pain, hyperkalemia, birth defects (for female patients), liver toxicity and renal toxicity. The patient may need blood work to monitor liver and kidney function and potassium levels while on therapy. The patient verbalized understanding of the proper use and possible adverse effects of spironolactone.  All of the patient's questions and concerns were addressed. Isotretinoin Pregnancy And Lactation Text: This medication is Pregnancy Category X and is considered extremely dangerous during pregnancy. It is unknown if it is excreted in breast milk.

## 2025-04-30 NOTE — H&P ADULT - ASSESSMENT
1. Acute on chronic severe Metabolic acidosis  2 Hematuria  3 Elevated Lactate level  4 Elevated BUN in the setting of blood loss  5 Elevated INR

## 2025-04-30 NOTE — ED PROVIDER NOTE - PHYSICAL EXAMINATION
Physical Exam    Vital Signs: I have reviewed the initial vital signs.  Constitutional: ill appearing, malnourished.   Eyes: Conjunctiva pink, Sclera clear  Cardiovascular: S1 and S2, regular rate, regular rhythm, well-perfused extremities, radial pulses equal and 2+, pedal pulses 2+ and equal  Respiratory: unlabored respiratory effort, clear to auscultation bilaterally no wheezing, rales and rhonchi  Gastrointestinal: soft, non-tender abdomen, no pulsatile mass, normal bowl sounds  Musculoskeletal: supple neck, no lower extremity edema, no midline tenderness  Integumentary: warm, dry, no rash  Neurologic: awake, alert,  nvi

## 2025-04-30 NOTE — ED ADULT TRIAGE NOTE - CHIEF COMPLAINT QUOTE
pt biba for ams, as per pts brother, he returned home at 445 pm and found pt altered  pt has angelo that was placed on 4/21, pt tachypneic and hypotensive  as per ems, pt hypoxic on scene, placed on NRB

## 2025-04-30 NOTE — H&P ADULT - PROBLEM SELECTOR PLAN 1
- Admit to SDU  - IVF hydration with NS at 75 cc/hr  - Continue Bicarb drip as per Nephrology recommendations  - Hold Eliquis regimen  - Will administer 1x dose of 5mg of Vit K due to elevated INR in the setting of bleeding  - Will consider administering FFP if bleeding continues and/or if pt becomes hemodynamically stable  - Monitor I/Os  - Continue abx regimen   - BCX x2, Ucx  - Obtain procalcitonin level  - Obtain TSH, Lipid panel, A1c, and Cortisol level  - Monitor electrolytes and replete as needed to maintain K of 4, Mg of 2, and Phos of 3  - Trend lactate until normalized  - Follow up repeat labs in the AM  - Nephrology consult, follow up recommendations  - Urology consult, follow up recommendations - Admit to SDU  - IVF hydration with NS at 75 cc/hr  - Continue Bicarb drip as per Nephrology recommendations  - Hold Eliquis regimen  - Will administer 1x dose of 5mg of Vit K due to elevated INR in the setting of bleeding  - Will consider administering FFP if bleeding continues and/or if pt becomes hemodynamically stable  - Monitor I/Os  - Continue abx regimen   - BCX x2, Ucx  - Obtain procalcitonin level  - Obtain TSH, Lipid panel, A1c, and Cortisol level  - Monitor electrolytes and replete as needed to maintain K of 4, Mg of 2, and Phos of 3  - Trend lactate until normalized  - Follow up repeat labs in the AM  - Nephrology consult, follow up recommendations  - Urology consult for evaluation of hematuria, follow up recommendations

## 2025-04-30 NOTE — ED ADULT NURSE NOTE - BEFAST FACE
No You were seen in the emergency department today. You were found to have elevated blood pressure. It is very important that you follow up with your primary care doctor over the next 2-3 days for further management of your symptoms and to review your bloodwork to ensure no additional tests, imaging or bloodwork, need to be performed. We recommend that you return if you develop chest pain, abdominal pain, shortness of breath, lightheadedness, vomiting, leg swelling, fever, headache, slurred speech, weakness or blurry vision.

## 2025-04-30 NOTE — ED PROVIDER NOTE - CLINICAL SUMMARY MEDICAL DECISION MAKING FREE TEXT BOX
Received sign out from Dr. Ayala -- patient with septic shock 2/2 UTI with non obstructing intra renal calculus.    ICU evaluated patient, to go to SDU, renal rec's appreciated,  eval pending.

## 2025-04-30 NOTE — ED PROVIDER NOTE - CARE PLAN
1 Principal Discharge DX:	Septic shock  Secondary Diagnosis:	Acute renal failure  Secondary Diagnosis:	Metabolic acidosis

## 2025-04-30 NOTE — H&P ADULT - NSHPPHYSICALEXAM_GEN_ALL_CORE
T(C): 34.1 (04-30-25 @ 21:26), Max: 35.6 (04-30-25 @ 20:30)  HR: 80 (04-30-25 @ 21:26) (72 - 99)  BP: 124/62 (04-30-25 @ 21:26) (85/58 - 124/62)  RR: 32 (04-30-25 @ 21:26) (18 - 32)  SpO2: 99% (04-30-25 @ 21:26) (99% - 99%)    CONSTITUTIONAL: no apparent distress  EYES: No conjunctival or scleral injection, non-icteric  ENMT: Oral mucosa with moist membranes  RESP: No respiratory distress, no use of accessory muscles  CV: RRR, +S1S2, no MRG; no JVD  GI: Soft, NT, ND  LYMPH: No cervical LAD or tenderness  MSK:  No digital clubbing or cyanosis  SKIN: No rashes or ulcers noted  NEURO:  sensation intact in upper and lower extremities b/l to light touch   PSYCH: Appropriate insight/judgment; A+O x 3, mood and affect appropriate

## 2025-04-30 NOTE — ED PROVIDER NOTE - CRITICAL CARE ATTENDING CONTRIBUTION TO CARE
I personally saw the patient. JODIE Montero and I provided critical care for a total of 40  minutes. I provided a substantive portion of the care and the majority of the critical care time.  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 states yesterday brother had pre-op appointment for stent placement, today when he got back home noted brother being on the wheelchair stairing and not responding, no fall  borther states for past 3 days has been having blood from his suprapubic cathter   pt was found to be hypothermic.  CONSTITUTIONAL: frail appearing and ill  HEAD: Normocephalic; atraumatic  EYES: No conjunctival injection, no scleral icterus  ENT:No nasal discharge; airway clear.  NECK: Supple; non tender.  CARD: Warm and well perfused, not tachycardic  RESP: Breathing comfortably on RA, no rhonchi   Abdomen: Soft, non-tender, non-distended   suprapubic cath with bloody/pus drainage   EXT: contracture on bilateral LE no tenderness   NEURO: Alert, oriented     Code sepsis activated  labs cultures IVF and ABX CT ap and reevaluate

## 2025-04-30 NOTE — PATIENT PROFILE ADULT - FALL HARM RISK - HARM RISK INTERVENTIONS
Assistance with ambulation/Assistance OOB with selected safe patient handling equipment/Communicate Risk of Fall with Harm to all staff/Discuss with provider need for PT consult/Monitor gait and stability/Reinforce activity limits and safety measures with patient and family/Tailored Fall Risk Interventions/Visual Cue: Yellow wristband and red socks/Bed in lowest position, wheels locked, appropriate side rails in place/Call bell, personal items and telephone in reach/Instruct patient to call for assistance before getting out of bed or chair/Non-slip footwear when patient is out of bed/[place] to call system/Physically safe environment - no spills, clutter or unnecessary equipment/Purposeful Proactive Rounding/Room/bathroom lighting operational, light cord in reach

## 2025-05-01 LAB
ALBUMIN SERPL ELPH-MCNC: 2.4 G/DL — LOW (ref 3.5–5.2)
ALP SERPL-CCNC: 378 U/L — HIGH (ref 30–115)
ALT FLD-CCNC: 113 U/L — HIGH (ref 0–41)
ANION GAP SERPL CALC-SCNC: 18 MMOL/L — HIGH (ref 7–14)
ANION GAP SERPL CALC-SCNC: 23 MMOL/L — HIGH (ref 7–14)
APTT BLD: 36.7 SEC — SIGNIFICANT CHANGE UP (ref 27–39.2)
AST SERPL-CCNC: 244 U/L — HIGH (ref 0–41)
BASOPHILS # BLD AUTO: 0.05 K/UL — SIGNIFICANT CHANGE UP (ref 0–0.2)
BASOPHILS NFR BLD AUTO: 0.2 % — SIGNIFICANT CHANGE UP (ref 0–1)
BILIRUB SERPL-MCNC: 0.7 MG/DL — SIGNIFICANT CHANGE UP (ref 0.2–1.2)
BUN SERPL-MCNC: 139 MG/DL — CRITICAL HIGH (ref 10–20)
BUN SERPL-MCNC: 139 MG/DL — CRITICAL HIGH (ref 10–20)
BUN SERPL-MCNC: 141 MG/DL — CRITICAL HIGH (ref 10–20)
BUN SERPL-MCNC: 141 MG/DL — CRITICAL HIGH (ref 10–20)
CALCIUM SERPL-MCNC: 6.4 MG/DL — LOW (ref 8.4–10.5)
CALCIUM SERPL-MCNC: 6.4 MG/DL — LOW (ref 8.4–10.5)
CALCIUM SERPL-MCNC: 6.6 MG/DL — LOW (ref 8.4–10.5)
CALCIUM SERPL-MCNC: 6.6 MG/DL — LOW (ref 8.4–10.5)
CHLORIDE SERPL-SCNC: 100 MMOL/L — SIGNIFICANT CHANGE UP (ref 98–110)
CHLORIDE SERPL-SCNC: 102 MMOL/L — SIGNIFICANT CHANGE UP (ref 98–110)
CHLORIDE SERPL-SCNC: 97 MMOL/L — LOW (ref 98–110)
CHLORIDE SERPL-SCNC: 99 MMOL/L — SIGNIFICANT CHANGE UP (ref 98–110)
CO2 SERPL-SCNC: 14 MMOL/L — LOW (ref 17–32)
CO2 SERPL-SCNC: 16 MMOL/L — LOW (ref 17–32)
CO2 SERPL-SCNC: 18 MMOL/L — SIGNIFICANT CHANGE UP (ref 17–32)
CO2 SERPL-SCNC: 19 MMOL/L — SIGNIFICANT CHANGE UP (ref 17–32)
CREAT SERPL-MCNC: 8.2 MG/DL — CRITICAL HIGH (ref 0.7–1.5)
CREAT SERPL-MCNC: 8.2 MG/DL — CRITICAL HIGH (ref 0.7–1.5)
CREAT SERPL-MCNC: 9.1 MG/DL — CRITICAL HIGH (ref 0.7–1.5)
CREAT SERPL-MCNC: 9.6 MG/DL — CRITICAL HIGH (ref 0.7–1.5)
EGFR: 5 ML/MIN/1.73M2 — LOW
EGFR: 5 ML/MIN/1.73M2 — LOW
EGFR: 6 ML/MIN/1.73M2 — LOW
EOSINOPHIL # BLD AUTO: 0.16 K/UL — SIGNIFICANT CHANGE UP (ref 0–0.7)
EOSINOPHIL NFR BLD AUTO: 0.7 % — SIGNIFICANT CHANGE UP (ref 0–8)
GLUCOSE BLDC GLUCOMTR-MCNC: 194 MG/DL — HIGH (ref 70–99)
GLUCOSE SERPL-MCNC: 128 MG/DL — HIGH (ref 70–99)
GLUCOSE SERPL-MCNC: 193 MG/DL — HIGH (ref 70–99)
GLUCOSE SERPL-MCNC: 203 MG/DL — HIGH (ref 70–99)
GLUCOSE SERPL-MCNC: 207 MG/DL — HIGH (ref 70–99)
GRAM STN FLD: ABNORMAL
HCT VFR BLD CALC: 23.9 % — LOW (ref 42–52)
HGB BLD-MCNC: 8.2 G/DL — LOW (ref 14–18)
IMM GRANULOCYTES NFR BLD AUTO: 6.3 % — HIGH (ref 0.1–0.3)
INR BLD: 1.66 RATIO — HIGH (ref 0.65–1.3)
LACTATE SERPL-SCNC: 1.1 MMOL/L — SIGNIFICANT CHANGE UP (ref 0.7–2)
LACTATE SERPL-SCNC: 1.7 MMOL/L — SIGNIFICANT CHANGE UP (ref 0.7–2)
LYMPHOCYTES # BLD AUTO: 0.65 K/UL — LOW (ref 1.2–3.4)
LYMPHOCYTES # BLD AUTO: 2.7 % — LOW (ref 20.5–51.1)
MCHC RBC-ENTMCNC: 29.4 PG — SIGNIFICANT CHANGE UP (ref 27–31)
MCHC RBC-ENTMCNC: 34.3 G/DL — SIGNIFICANT CHANGE UP (ref 32–37)
MCV RBC AUTO: 85.7 FL — SIGNIFICANT CHANGE UP (ref 80–94)
METHOD TYPE: SIGNIFICANT CHANGE UP
MONOCYTES # BLD AUTO: 0.73 K/UL — HIGH (ref 0.1–0.6)
MONOCYTES NFR BLD AUTO: 3 % — SIGNIFICANT CHANGE UP (ref 1.7–9.3)
MRSA PCR RESULT.: POSITIVE
NEUTROPHILS # BLD AUTO: 21.33 K/UL — HIGH (ref 1.4–6.5)
NEUTROPHILS NFR BLD AUTO: 87.1 % — HIGH (ref 42.2–75.2)
NRBC BLD AUTO-RTO: 0 /100 WBCS — SIGNIFICANT CHANGE UP (ref 0–0)
PLATELET # BLD AUTO: 145 K/UL — SIGNIFICANT CHANGE UP (ref 130–400)
PMV BLD: 10.9 FL — HIGH (ref 7.4–10.4)
POTASSIUM SERPL-MCNC: 3.5 MMOL/L — SIGNIFICANT CHANGE UP (ref 3.5–5)
POTASSIUM SERPL-MCNC: 3.5 MMOL/L — SIGNIFICANT CHANGE UP (ref 3.5–5)
POTASSIUM SERPL-MCNC: 3.9 MMOL/L — SIGNIFICANT CHANGE UP (ref 3.5–5)
POTASSIUM SERPL-MCNC: 3.9 MMOL/L — SIGNIFICANT CHANGE UP (ref 3.5–5)
POTASSIUM SERPL-SCNC: 3.5 MMOL/L — SIGNIFICANT CHANGE UP (ref 3.5–5)
POTASSIUM SERPL-SCNC: 3.5 MMOL/L — SIGNIFICANT CHANGE UP (ref 3.5–5)
POTASSIUM SERPL-SCNC: 3.9 MMOL/L — SIGNIFICANT CHANGE UP (ref 3.5–5)
POTASSIUM SERPL-SCNC: 3.9 MMOL/L — SIGNIFICANT CHANGE UP (ref 3.5–5)
PROT SERPL-MCNC: 5.9 G/DL — LOW (ref 6–8)
PROTHROM AB SERPL-ACNC: 19.8 SEC — HIGH (ref 9.95–12.87)
RBC # BLD: 2.79 M/UL — LOW (ref 4.7–6.1)
RBC # FLD: 13.7 % — SIGNIFICANT CHANGE UP (ref 11.5–14.5)
S MARCESCENS DNA BLD POS NAA+NON-PROBE: SIGNIFICANT CHANGE UP
SODIUM SERPL-SCNC: 134 MMOL/L — LOW (ref 135–146)
SODIUM SERPL-SCNC: 136 MMOL/L — SIGNIFICANT CHANGE UP (ref 135–146)
SPECIMEN SOURCE: SIGNIFICANT CHANGE UP
T3 SERPL-MCNC: 40 NG/DL — LOW (ref 80–200)
T4 AB SER-ACNC: 5.1 UG/DL — SIGNIFICANT CHANGE UP (ref 4.6–12)
T4 FREE SERPL-MCNC: 1.1 NG/DL — SIGNIFICANT CHANGE UP (ref 0.9–1.8)
TSH SERPL-MCNC: 5.27 UIU/ML — HIGH (ref 0.27–4.2)
WBC # BLD: 24.45 K/UL — HIGH (ref 4.8–10.8)
WBC # FLD AUTO: 24.45 K/UL — HIGH (ref 4.8–10.8)

## 2025-05-01 PROCEDURE — 74420 UROGRAPHY RTRGR +-KUB: CPT | Mod: 26

## 2025-05-01 PROCEDURE — 52332 CYSTOSCOPY AND TREATMENT: CPT | Mod: RT

## 2025-05-01 PROCEDURE — 52351 CYSTOURETERO & OR PYELOSCOPE: CPT | Mod: LT,22

## 2025-05-01 PROCEDURE — 99232 SBSQ HOSP IP/OBS MODERATE 35: CPT

## 2025-05-01 RX ORDER — SODIUM BICARBONATE 1 MEQ/ML
0.28 SYRINGE (ML) INTRAVENOUS
Qty: 150 | Refills: 0 | Status: DISCONTINUED | OUTPATIENT
Start: 2025-05-01 | End: 2025-05-02

## 2025-05-01 RX ORDER — GENTAMICIN SULFATE 40 MG/ML
250 VIAL (ML) INJECTION ONCE
Refills: 0 | Status: DISCONTINUED | OUTPATIENT
Start: 2025-05-01 | End: 2025-05-01

## 2025-05-01 RX ORDER — SODIUM BICARBONATE 1 MEQ/ML
1300 SYRINGE (ML) INTRAVENOUS EVERY 8 HOURS
Refills: 0 | Status: DISCONTINUED | OUTPATIENT
Start: 2025-05-01 | End: 2025-05-03

## 2025-05-01 RX ORDER — ONDANSETRON HCL/PF 4 MG/2 ML
4 VIAL (ML) INJECTION ONCE
Refills: 0 | Status: DISCONTINUED | OUTPATIENT
Start: 2025-05-01 | End: 2025-05-01

## 2025-05-01 RX ORDER — SODIUM CHLORIDE 9 G/1000ML
1000 INJECTION, SOLUTION INTRAVENOUS
Refills: 0 | Status: DISCONTINUED | OUTPATIENT
Start: 2025-05-01 | End: 2025-05-07

## 2025-05-01 RX ORDER — SODIUM CHLORIDE 9 G/1000ML
1000 INJECTION, SOLUTION INTRAVENOUS
Refills: 0 | Status: DISCONTINUED | OUTPATIENT
Start: 2025-05-01 | End: 2025-05-01

## 2025-05-01 RX ORDER — HYDROCORTISONE 20 MG
50 TABLET ORAL EVERY 6 HOURS
Refills: 0 | Status: COMPLETED | OUTPATIENT
Start: 2025-05-01 | End: 2025-05-04

## 2025-05-01 RX ORDER — CEFEPIME 2 G/20ML
500 INJECTION, POWDER, FOR SOLUTION INTRAVENOUS EVERY 24 HOURS
Refills: 0 | Status: DISCONTINUED | OUTPATIENT
Start: 2025-05-01 | End: 2025-05-01

## 2025-05-01 RX ORDER — MIDODRINE HYDROCHLORIDE 5 MG/1
10 TABLET ORAL EVERY 8 HOURS
Refills: 0 | Status: DISCONTINUED | OUTPATIENT
Start: 2025-05-01 | End: 2025-05-03

## 2025-05-01 RX ORDER — ONDANSETRON HCL/PF 4 MG/2 ML
4 VIAL (ML) INJECTION EVERY 6 HOURS
Refills: 0 | Status: DISCONTINUED | OUTPATIENT
Start: 2025-05-01 | End: 2025-05-07

## 2025-05-01 RX ORDER — HYDROMORPHONE/SOD CHLOR,ISO/PF 2 MG/10 ML
0.5 SYRINGE (ML) INJECTION
Refills: 0 | Status: DISCONTINUED | OUTPATIENT
Start: 2025-05-01 | End: 2025-05-01

## 2025-05-01 RX ORDER — MEROPENEM 1 G/30ML
500 INJECTION INTRAVENOUS EVERY 24 HOURS
Refills: 0 | Status: DISCONTINUED | OUTPATIENT
Start: 2025-05-02 | End: 2025-05-05

## 2025-05-01 RX ORDER — FOLIC ACID 1 MG/1
1 TABLET ORAL DAILY
Refills: 0 | Status: DISCONTINUED | OUTPATIENT
Start: 2025-05-01 | End: 2025-05-07

## 2025-05-01 RX ORDER — MEROPENEM 1 G/30ML
1000 INJECTION INTRAVENOUS ONCE
Refills: 0 | Status: COMPLETED | OUTPATIENT
Start: 2025-05-01 | End: 2025-05-01

## 2025-05-01 RX ADMIN — Medication 150 MEQ/KG/HR: at 23:02

## 2025-05-01 RX ADMIN — MEROPENEM 100 MILLIGRAM(S): 1 INJECTION INTRAVENOUS at 08:57

## 2025-05-01 RX ADMIN — Medication 5 MILLIGRAM(S): at 00:01

## 2025-05-01 RX ADMIN — MIDODRINE HYDROCHLORIDE 10 MILLIGRAM(S): 5 TABLET ORAL at 16:03

## 2025-05-01 RX ADMIN — Medication 50 MILLIGRAM(S): at 00:01

## 2025-05-01 RX ADMIN — Medication 1 APPLICATION(S): at 05:10

## 2025-05-01 RX ADMIN — Medication 50 MILLIGRAM(S): at 05:08

## 2025-05-01 RX ADMIN — Medication 150 MEQ/KG/HR: at 16:03

## 2025-05-01 RX ADMIN — SODIUM CHLORIDE 75 MILLILITER(S): 9 INJECTION, SOLUTION INTRAVENOUS at 18:18

## 2025-05-01 RX ADMIN — MIDODRINE HYDROCHLORIDE 10 MILLIGRAM(S): 5 TABLET ORAL at 05:08

## 2025-05-01 RX ADMIN — Medication 4 MILLIGRAM(S): at 21:18

## 2025-05-01 RX ADMIN — MIDODRINE HYDROCHLORIDE 10 MILLIGRAM(S): 5 TABLET ORAL at 00:01

## 2025-05-01 RX ADMIN — FOLIC ACID 1 MILLIGRAM(S): 1 TABLET ORAL at 16:02

## 2025-05-01 RX ADMIN — Medication 50 MILLIGRAM(S): at 16:03

## 2025-05-01 RX ADMIN — Medication 50 MILLIGRAM(S): at 19:27

## 2025-05-01 NOTE — PROGRESS NOTE ADULT - ASSESSMENT
71 yo male with a PMH of HTN, CKD stage 5, Paraplegia, Nephrolithiasis, Chronic metabolic acidosis, and Neurogenic bladder who presents with complaint of blood within the suprapubic catheter s/p initiation of Eliquis prompting critical care assessment. Upon evaluation, the pt is awake and alert and answering most questions appropriately. The pt remains hemodynamically stable on room air with no acute complaints. Urology consulted for hematuria.       # Acute on chronic severe Metabolic acidosis  # Hematuria  #Elevated Lactate level  # Elevated BUN in the setting of blood loss  # Elevated INR      ·  Problem: Metabolic acidosis.   ·  Plan: - Admit to SDU  - IVF hydration with NS at 75 cc/hr  - CT A/P showed Calculus within the left renal calyx measuring up to 1.8 cm. Mild to moderate left hydroureter with additional nonobstructing calculi within the distal left urete.  - Continue Bicarb drip as per Nephrology recommendations  - Hold Eliquis regimen  - Will administer 1x dose of 5mg of Vit K due to elevated INR in the setting of bleeding  - Will consider administering FFP if bleeding continues and/or if pt becomes hemodynamically stable  - Monitor I/Os  - Continue abx regimen   - BCX x2, Ucx  - Obtain procalcitonin level  - Obtain TSH, Lipid panel, A1c, and Cortisol level  - Monitor electrolytes and replete as needed to maintain K of 4, Mg of 2, and Phos of 3  - Trend lactate until normalized  - Follow up repeat labs in the AM  - Nephrology consult, follow up recommendations  - Urology consult for evaluation of hematuria, follow up recommendations.     69 yo male with a PMH of HTN, CKD stage 5, Paraplegia, Nephrolithiasis, Chronic metabolic acidosis, and Neurogenic bladder who presents with complaint of blood within the suprapubic catheter s/p initiation of Eliquis prompting critical care assessment. Upon evaluation, the pt is awake and alert and answering most questions appropriately. The pt remains hemodynamically stable on room air with no acute complaints. Urology consulted for hematuria.       # Acute on chronic severe Metabolic acidosis  # Hematuria  #Elevated Lactate level  # Elevated BUN in the setting of blood loss  # Elevated INR      ·  Problem: Metabolic acidosis.   ·  Plan: - Admit to SDU  - IVF hydration with NS at 75 cc/hr  - CT A/P showed Calculus within the left renal calyx measuring up to 1.8 cm. Mild to moderate left hydroureter with additional nonobstructing calculi within the distal left urete.  - Continue Bicarb drip as per Nephrology recommendations  - Hold Eliquis regimen  - Monitor I/Os  - Continue abx regimen   - BCX positive for GNR  - Obtain procalcitonin level  - Obtain TSH, Lipid panel, A1c, and Cortisol level  - Monitor electrolytes and replete as needed to maintain K of 4, Mg of 2, and Phos of 3  - Nephrology consult, follow up recommendations  - Urology consult for evaluation of hematuria, follow up recommendations.

## 2025-05-01 NOTE — CONSULT NOTE ADULT - ASSESSMENT
69 yo male with a PMH of HTN, CKD stage 5, HTN, Paraplegia, Nephrolithiasis, Chronic metabolic acidosis, and Neurogenic bladder who presents with complaint of blood within the suprapubic catheter s/p initiation of Eliquis prompting critical care assessment. Upon evaluation, the pt is awake and alert and answering most questions appropriately. The pt remains hemodynamically stable on room air with no acute complaints. Urology consulted for hematuria.   Also, CT A/P showed Calculus within the left renal calyx measuring up to 1.8 cm. Mild to moderate left hydroureter with additional nonobstructing calculi within the distal left urete.  WBC 29.3, BUN/Cr 154/10.6, hgb 10.2, (+) UA for UTI, Afebrile Tmax 97.9 F.    A/P:  - IV hydration  - IV abx  - NPO for possible stent this am  - Discussed with Dr. Schilling 69 yo male with a PMH of HTN, CKD stage 5, Paraplegia, Nephrolithiasis, Chronic metabolic acidosis, and Neurogenic bladder who presents with complaint of blood within the suprapubic catheter s/p initiation of Eliquis prompting critical care assessment. Upon evaluation, the pt is awake and alert and answering most questions appropriately. The pt remains hemodynamically stable on room air with no acute complaints. Urology consulted for hematuria.   Also, CT A/P showed Calculus within the left renal calyx measuring up to 1.8 cm. Mild to moderate left hydroureter with additional nonobstructing calculi within the distal left urete.  WBC 29.3, BUN/Cr 154/10.6, hgb 10.2, (+) UA for UTI, Afebrile Tmax 97.9 F.    A/P:  - IV hydration  - IV abx  - NPO for possible stent this am  - Discussed with Dr. Schilling 71 yo male with a PMH of HTN, CKD stage 5, Paraplegia, Nephrolithiasis, Chronic metabolic acidosis, and Neurogenic bladder who presents with complaint of blood within the suprapubic catheter s/p initiation of Eliquis prompting critical care assessment. Upon evaluation, the pt is awake and alert and answering most questions appropriately. The pt remains hemodynamically stable on room air with no acute complaints. Urology consulted for hematuria.   Also, CT A/P showed Calculus within the left renal calyx measuring up to 1.8 cm. Mild to moderate left hydroureter with additional nonobstructing calculi within the distal left urete.  WBC 29.3, BUN/Cr 154/10.6, hgb 10.2, (+) UA for UTI, Afebrile Tmax 97.9 F.    A/P:  - IV hydration  - IV abx  - NPO for possible stent this am  - Discussed with Dr. 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 Akins. Since Dr. Akins is retiring I took over care for this patient on 4/21/2025. I found him to have with bilateral renal and left ureteral stones on a 1/22/2025 CTAP. In the office I was concerned that he would have a positive urine culture given his SPT so I got a urine culture and planned an bilateral stent placement (scheduled for 5/6/2025) prior to a bilateral CVAC aspiration ureteroscopy as a second procedure. In the interim, he had a Cr of 9 and no positive contact was made with me to ensure I was aware. In the office I had asked his brother to look out for fever or altered mental status. His brother did recognize altered mental status and brought him to the hospital where he was evaluated and admitted to the ICU. No positive contract was made with urology to alert us of a septic stone. I was going to place the patient on antibiotics and I read a message that he had elevated Cr and I called his brother to admit him to the hospital or an urgent stent and his brother informed me that he was already here and in the ICU. I immediately called the hospital and added him on for an emergent bilateral ureteral stent placement.    Plan: OR for cystoscopy, bilateral retrograde pyelogram, bilateral ureteral stent placement.    His office sensitivities are as follows:   ***FINAL REPORT***  Final Report  []  Reported Date/Time: 4/25/2025 21:50 EDT  >100,000 CFU/ml Serratia marcescens  50,000 - 99,000 CFU/mL Pseudomonas aeruginosa  50,000 - 99,000 CFU/mL Staphylococcus aureus  <10,000 CFU/ml Proteus mirabilis      ***SUSCEPTIBILITY RESULTS***                    Sermar  Antibiotic        MDIL          MINT  Amoxicillin/      >16/8         Resistant  Clavulanate  Ampicillin        >16 [f1]      Resistant  Ampicillin/       16/8          Resistant  Sulbactam  Aztreonam         >16           Resistant  Cefazolin         >16           Resistant  Cefepime          4 [f4]        Susceptible                                  Dose                                  Dependent  Cefoxitin         16            Resistant  Ceftriaxone       32            Resistant  Ciprofloxacin     <=0.25        Susceptible  Ertapenem         <=0.5         Susceptible  Gentamicin        <=2           Susceptible  Levofloxacin      <=0.5         Susceptible  Meropenem         <=1           Susceptible  Nitrofurantoin    >64 [f6]      Resistant  Piperacillin/     >64           Resistant  Tazobactam  Tobramycin        4             Intermediate  Trimethoprim/     <=0.5/9.5     Susceptible  Sulfa                      Pseaer  Antibiotic        MDIL          MINT  Amikacin          <=16          Susceptible  Aztreonam         <=4           Susceptible  Cefepime          <=2           Susceptible  Ceftazidime       4             Susceptible  Ciprofloxacin     <=0.25        Susceptible  Imipenem          <=1           Susceptible  Levofloxacin      <=0.5         Susceptible  Meropenem         <=1           Susceptible  Piperacillin/     <=8           Susceptible  Tazobactam                    Staaur  Antibiotic        MDIL          MINT  Gentamicin        <=4 [f5]      Susceptible    Nitrofurantoin    <=32          Susceptible  Oxacillin         <=0.25 [f8]   Susceptible  Rifampin          <=1 [f9]      Susceptible  Tetracycline      <=4           Susceptible  Trimethoprim/     <=0.5/9.5     Susceptible  Sulfa  Vancomycin        1             Susceptible                    Promir  Antibiotic        MDIL          MINT  Amoxicillin/      16/8          Intermediate  Clavulanate  Ampicillin        >16 [f2]      Resistant  Ampicillin/       >16/8         Resistant  Sulbactam  Aztreonam         <=4           Susceptible  Cefazolin         8 [f3]        Susceptible  Cefepime          <=2           Susceptible  Cefoxitin         <=8           Susceptible  Ceftriaxone       <=1           Susceptible  Cefuroxime        <=4           Susceptible    Ciprofloxacin     >2            Resistant  Ertapenem         <=0.5         Susceptible  Gentamicin        8             Resistant  Levofloxacin      >4            Resistant  Meropenem         <=1           Susceptible  Nitrofurantoin    64 [f7]       Resistant  Piperacillin/     <=8           Susceptible  Tazobactam  Tobramycin        4             Intermediate  Trimethoprim/     >2/38         Resistant  Sulfa  Result Comments  f1:   Ampicillin        These ampicillin results predict results for amoxicillin  f2:   Ampicillin        These ampicillin results predict results for amoxicillin  f3:   Cefazolin        For uncomplicated UTI with K. pneumoniae, E. coli, or P. mirablis: ALF <=16 is sensitive        and ALF >=32 is resistant. This also predicts results for oral agents cefaclor, cefdinir,        cefpodoxime, cefprozil, cefuroxime axetil, cephalexin and locarbef for uncomplicated UTI.          Note that some isolates may be susceptible to these agents while testing resistant to        cefazolin.  f4:   Cefepime        The breakpoint for susceptible dose dependent may require the usage of a higher cefepime        dose (equivalent to adult dose of 2g q8h for normal renal function) for successful        treatment.  f5:   Gentamicin        Should not be used as monotherapy  f6:   Nitrofurantoin        Should not be used to treat pyelonephritis  f7:   Nitrofurantoin        Should not be used to treat pyelonephritis  f8:   Oxacillin        Oxacillin predicts susceptibility for dicloxacillin, methicillin, and nafcillin  f9:   Rifampin        Should not be used as monotherapy      Performing Locations  P1:   This test was performed at:        [facility], [address], [address], [facility]

## 2025-05-01 NOTE — BRIEF OPERATIVE NOTE - BRIEF OP NOTE DRAINS
6 Fr x 24 cm ureteral stent on the RIGHT side, 6 Fr x 26 cm ureteral stent on the LEFT side, 22 Fr suprapubic catheter

## 2025-05-01 NOTE — CHART NOTE - NSCHARTNOTEFT_GEN_A_CORE
Post-operative Check    SUBJECTIVE: No acute events in the immediate post-operative period. Pain well controlled.     OBJECTIVE:  T(C): 36.2 (05-01-25 @ 11:01), Max: 36.6 (05-01-25 @ 05:05)  HR: 60 (05-01-25 @ 11:01) (57 - 99)  BP: 132/59 (05-01-25 @ 11:01) (83/54 - 132/59)  RR: 20 (05-01-25 @ 11:01) (16 - 34)  SpO2: 98% (05-01-25 @ 11:01) (95% - 99%)      04-30-25 @ 07:01  -  05-01-25 @ 07:00  --------------------------------------------------------  IN: 2100 mL / OUT: 650 mL / NET: 1450 mL        Physical Exam:   - Constitutional: AOx3, NAD  - CV: normotensive, regular rate   - Respiratory: nonlabored  - Abdomen: soft, nontender, nondistended  - Dye drainig clear urine      ASSESSMENT:   KRISTY GARCIA is a 70y Male POD#0 from cysto, B/L stent placement    PLAN:  - Pain management  -cont current tx  - will monitor  - medical mngmt per ICU team  - cont IV antibx  - fup cultures    CCU resident to review all post op orders for accuracy

## 2025-05-01 NOTE — CONSULT NOTE ADULT - SUBJECTIVE AND OBJECTIVE BOX
NEPHROLOGY CONSULTATION NOTE    KRISTY GARCIA  70y  Male  MRN-[id number]    CC:   Patient is a 70y old  Male who presents with a chief complaint of Hydronephrosis (01 May 2025 08:03)      HPI:  Pt is a 69 yo male with a PMH of HTN, CKD stage 5, HTN, Paraplegia, Nephrolithiasis, Chronic metabolic acidosis, and Neurogenic bladder who presents with complaint of blood within the suprapubic catheter s/p initiation of Eliquis prompting critical care assessment. Upon evaluation, the pt is awake and alert and answering most questions appropriately. The pt remains hemodynamically on room air with no acute complaints. The pt's bother is at bedside who confirms that the pt is compliant with medication regimen and physician office visits. No other complaints or concerns noted.  (2025 21:15)      PAST MEDICAL & SURGICAL HISTORY:  DM (diabetes mellitus)      HTN (hypertension)      H/O paraplegia      Spinal abscess      Renal stones      Stage 5 chronic kidney disease not on chronic dialysis      Neurogenic dysfunction of the urinary bladder      Encounter for care or replacement of suprapubic tube      Spinal abscess  S/P Surgery      Encounter for care or replacement of suprapubic tube      S/P ORIF (open reduction internal fixation) fracture        Allergies:  No Known Allergies    Home Medications Reviewed  Hospital Medications:   MEDICATIONS  (STANDING):  influenza  Vaccine (HIGH DOSE) 0.5 milliLiter(s) IntraMuscular once  lactated ringers. 1000 milliLiter(s) (75 mL/Hr) IV Continuous <Continuous>  meropenem  IVPB 1000 milliGRAM(s) IV Intermittent once    MEDICATIONS  (PRN):  HYDROmorphone  Injectable 0.5 milliGRAM(s) IV Push every 10 minutes PRN Severe Pain (7 - 10)  morphine  - Injectable 2 milliGRAM(s) IV Push every 10 minutes PRN Moderate Pain (4 - 6)  ondansetron Injectable 4 milliGRAM(s) IV Push once PRN Nausea and/or Vomiting    Home Medications:  Eliquis 5 mg oral tablet: 0.5 tab(s) orally every 12 hours (2025 23:24)  folic acid 1 mg oral tablet: 1 tab(s) orally once a day (2025 09:09)      SOCIAL HISTORY:  Social History:  Lives in  with his brother (2025 21:15)      FAMILY HISTORY:  FH: HTN (hypertension)    FH: breast cancer    FH: melanoma    FH: heart disease        REVIEW OF SYSTEMS:  All other review of systems is negative unless indicated above.    VITALS:  T(F): 96.8 (25 @ 10:01), Max: 97.9 (25 @ 05:05)  HR: 59 (25 @ 10:31)  BP: 103/58 (25 @ 10:31)  RR: 18 (25 @ 10:31)  SpO2: 97% (25 @ 10:31)    Height (cm): 175.3 (:43)  Weight (kg): 66.5 (:43)  BMI (kg/m2): 21.6 (:43)  BSA (m2): 1.81 (:43)  I&O's Detail    2025 07:01  -  01 May 2025 07:00  --------------------------------------------------------  IN:    Sodium Bicarbonate: 1200 mL    Sodium Chloride 0.9% Bolus: 900 mL  Total IN: 2100 mL    OUT:    Indwelling Catheter - Suprapubic (mL): 650 mL  Total OUT: 650 mL    Total NET: 1450 mL            I&O's Summary    2025 07:01  -  01 May 2025 07:00  --------------------------------------------------------  IN: 2100 mL / OUT: 650 mL / NET: 1450 mL        PHYSICAL EXAM:  Gen: NAD  resp: b/l breath sounds  card: S1/S2  abd: soft  ext: no edema  vascular access:     LABS:  Daily Height in cm: 175.26 (01 May 2025 07:43)    Daily Weight in k.5 (01 May 2025 07:01)    Blood Gas Profile w/Lytes - Venous: Performed in Lab (25 @ 19:13)  Blood Gas Venous - Lactate: 5.0 mmol/L (25 @ 19:13)  Blood Gas Venous - Potassium: 4.2 mmol/L (25 @ 19:13)        134[L]  |  97[L]  |  141[HH]  ----------------------------<  193[H]  3.9   |  14[L]  |  9.6[HH]    Ca    6.6[L]      01 May 2025 06:05    TPro  5.9[L]  /  Alb  2.4[L]  /  TBili  0.7  /  DBili      /  AST  244[H]  /  ALT  113[H]  /  AlkPhos  378[H]      eGFR if Non African American: 48 mL/min/1.73M2 (21 @ 09:44)  eGFR if : 55 mL/min/1.73M2 (21 @ 09:44)  eGFR if Non African American: 63 mL/min/1.73M2 (21 @ 07:26)  eGFR if : 73 mL/min/1.73M2 (21 @ 07:26)    Creatinine Trend:   Creatinine: 9.6 mg/dL (25 @ 06:05)  Creatinine: 10.6 mg/dL (25 @ 17:54)  Creatinine: 9.0 mg/dL (25 @ 08:48)  Creatinine: 3.0 mg/dL (24 @ 06:21)                            8.2    24.45 )-----------( 145      ( 01 May 2025 06:05 )             23.9     Mean Cell Volume: 85.7 fL (25 @ 06:05)    Urine Studies:  Protein, Urine: 300 mg/dL (25 @ 20:03)  White Blood Cell - Urine: Too Numerous to count /HPF (25 @ 20:03)  Red Blood Cell - Urine: Too Numerous to count /HPF (25 @ 20:03)  Protein, Urine: 100 mg/dL (24 @ 13:50)  White Blood Cell - Urine: 50 /HPF (07-09-24 @ 13:50)  Red Blood Cell - Urine: 10 /HPF (24 @ 13:50)  Protein, Urine: 100 mg/dL (23 @ 12:20)  White Blood Cell - Urine: 469 /HPF (23 @ 12:20)  Red Blood Cell - Urine: 31 /HPF (23 @ 12:20)  Protein, Urine: 100 mg/dL (21 @ 00:30)  White Blood Cell - Urine: 121 /HPF (21 @ 00:30)  Red Blood Cell - Urine: 71 /HPF (21 @ 00:30)              RADIOLOGY & ADDITIONAL STUDIES:        CT Abdomen and Pelvis No Cont:   ACC: [id number] EXAM:  CT ABDOMEN AND PELVIS   ORDERED BY: GUSTAVO KING     PROCEDURE DATE:  2025          INTERPRETATION:  CLINICAL INFORMATION: Sepsis    COMPARISON: CT ABDOMEN/PELVIS 2025.    CONTRAST/COMPLICATIONS:  IV Contrast: NONE  Oral Contrast: NONE    PROCEDURE:  CT of the Abdomen and Pelvis was performed.  Sagittal and coronal reformats were performed.    FINDINGS:  LOWER CHEST: Bilateral gynecomastia    LIVER: Punctate calcified granuloma  BILE DUCTS: Normal caliber.  GALLBLADDER: Cholelithiasis.  SPLEEN: Within normal limits.  PANCREAS: Within normal limits.  ADRENALS: Within normal limits.  KIDNEYS/URETERS: Calculus within the left renal calyx measuring up to 1.8   cm. Mild to moderate left hydroureter with additional nonobstructing   calculi within the distal left ureter (series 302, image 279)    BLADDER: Suprapubic Dye catheter.  REPRODUCTIVE ORGANS: Unremarkable at CT.    BOWEL: Gastric distention. Stool impaction the rectum with desiccated   stool.  PERITONEUM/RETROPERITONEUM: Within normal limits.  VESSELS: Extensive atherosclerotic changes.  LYMPH NODES: No lymphadenopathy.  ABDOMINAL WALL: Bilateral fat-containing inguinal hernias  BONES: Osteopenia. Multilevel degenerative changes    IMPRESSION:  Nonobstructing 1.8 cm calculus within the left renal collecting system   near the UPJ. Decreased hydroureter when compared with available prior   imaging, now mild to moderate.    Additional nonobstructing calculi within the distal left ureter. Largest   fragment measures approximately 0.6 cm.    Stool impaction of the rectum    --- End of Report ---            SMILEY PATEL MD; Attending Radiologist  This document has been electronically signed. 2025  8:36PM (25 @ 18:59)                     NEPHROLOGY CONSULTATION NOTE    KRISTY GARCIA  70y  Male  MRN-[id number]    CC:   Patient is a 70y old  Male who presents with a chief complaint of Hydronephrosis (01 May 2025 08:03)      HPI:  Pt is a 69 yo male with a PMH of HTN, CKD stage 5, HTN, Paraplegia, Nephrolithiasis, Chronic metabolic acidosis, and Neurogenic bladder who presents with complaint of blood within the suprapubic catheter s/p initiation of Eliquis prompting critical care assessment. Upon evaluation, the pt is awake and alert and answering most questions appropriately. The pt remains hemodynamically on room air with no acute complaints. The pt's bother is at bedside who confirms that the pt is compliant with medication regimen and physician office visits. No other complaints or concerns noted.  (2025 21:15)      PAST MEDICAL & SURGICAL HISTORY:  DM (diabetes mellitus)      HTN (hypertension)      H/O paraplegia      Spinal abscess      Renal stones      Stage 5 chronic kidney disease not on chronic dialysis      Neurogenic dysfunction of the urinary bladder      Encounter for care or replacement of suprapubic tube      Spinal abscess  S/P Surgery      Encounter for care or replacement of suprapubic tube      S/P ORIF (open reduction internal fixation) fracture        Allergies:  No Known Allergies    Home Medications Reviewed  Hospital Medications:   MEDICATIONS  (STANDING):  influenza  Vaccine (HIGH DOSE) 0.5 milliLiter(s) IntraMuscular once  lactated ringers. 1000 milliLiter(s) (75 mL/Hr) IV Continuous <Continuous>  meropenem  IVPB 1000 milliGRAM(s) IV Intermittent once    MEDICATIONS  (PRN):  HYDROmorphone  Injectable 0.5 milliGRAM(s) IV Push every 10 minutes PRN Severe Pain (7 - 10)  morphine  - Injectable 2 milliGRAM(s) IV Push every 10 minutes PRN Moderate Pain (4 - 6)  ondansetron Injectable 4 milliGRAM(s) IV Push once PRN Nausea and/or Vomiting    Home Medications:  Eliquis 5 mg oral tablet: 0.5 tab(s) orally every 12 hours (2025 23:24)  folic acid 1 mg oral tablet: 1 tab(s) orally once a day (2025 09:09)      SOCIAL HISTORY:  Social History:  Lives in  with his brother (2025 21:15)      FAMILY HISTORY:  FH: HTN (hypertension)    FH: breast cancer    FH: melanoma    FH: heart disease        REVIEW OF SYSTEMS:  All other review of systems is negative unless indicated above.    VITALS:  T(F): 96.8 (25 @ 10:01), Max: 97.9 (25 @ 05:05)  HR: 59 (25 @ 10:31)  BP: 103/58 (25 @ 10:31)  RR: 18 (25 @ 10:31)  SpO2: 97% (25 @ 10:31)    Height (cm): 175.3 (:43)  Weight (kg): 66.5 (:43)  BMI (kg/m2): 21.6 (:43)  BSA (m2): 1.81 (:43)  I&O's Detail    2025 07:01  -  01 May 2025 07:00  --------------------------------------------------------  IN:    Sodium Bicarbonate: 1200 mL    Sodium Chloride 0.9% Bolus: 900 mL  Total IN: 2100 mL    OUT:    Indwelling Catheter - Suprapubic (mL): 650 mL  Total OUT: 650 mL    Total NET: 1450 mL            I&O's Summary    2025 07:01  -  01 May 2025 07:00  --------------------------------------------------------  IN: 2100 mL / OUT: 650 mL / NET: 1450 mL        PHYSICAL EXAM:  Gen: NAD  resp: b/l breath sounds  abd: soft  SPC  Ext: no edema    LABS:  Daily Height in cm: 175.26 (01 May 2025 07:43)    Daily Weight in k.5 (01 May 2025 07:01)    Blood Gas Profile w/Lytes - Venous: Performed in Lab (25 @ 19:13)  Blood Gas Venous - Lactate: 5.0 mmol/L (25 @ 19:13)  Blood Gas Venous - Potassium: 4.2 mmol/L (25 @ 19:13)        134[L]  |  97[L]  |  141[HH]  ----------------------------<  193[H]  3.9   |  14[L]  |  9.6[HH]    Ca    6.6[L]      01 May 2025 06:05    TPro  5.9[L]  /  Alb  2.4[L]  /  TBili  0.7  /  DBili      /  AST  244[H]  /  ALT  113[H]  /  AlkPhos  378[H]      Creatinine Trend:   Creatinine: 9.6 mg/dL (25 @ 06:05)  Creatinine: 10.6 mg/dL (25 @ 17:54)  Creatinine: 9.0 mg/dL (25 @ 08:48)  Creatinine: 3.0 mg/dL (24 @ 06:21)                            8.2    24.45 )-----------( 145      ( 01 May 2025 06:05 )             23.9     Mean Cell Volume: 85.7 fL (25 @ 06:05)    Urine Studies:  Protein, Urine: 300 mg/dL (25 @ 20:03)  White Blood Cell - Urine: Too Numerous to count /HPF (25 @ 20:03)  Red Blood Cell - Urine: Too Numerous to count /HPF (25 @ 20:03)        RADIOLOGY & ADDITIONAL STUDIES:        CT Abdomen and Pelvis No Cont:   ACC: [id number] EXAM:  CT ABDOMEN AND PELVIS   ORDERED BY: GUSTAVO KING     PROCEDURE DATE:  2025          INTERPRETATION:  CLINICAL INFORMATION: Sepsis    COMPARISON: CT ABDOMEN/PELVIS 2025.    CONTRAST/COMPLICATIONS:  IV Contrast: NONE  Oral Contrast: NONE    PROCEDURE:  CT of the Abdomen and Pelvis was performed.  Sagittal and coronal reformats were performed.    FINDINGS:  LOWER CHEST: Bilateral gynecomastia    LIVER: Punctate calcified granuloma  BILE DUCTS: Normal caliber.  GALLBLADDER: Cholelithiasis.  SPLEEN: Within normal limits.  PANCREAS: Within normal limits.  ADRENALS: Within normal limits.  KIDNEYS/URETERS: Calculus within the left renal calyx measuring up to 1.8   cm. Mild to moderate left hydroureter with additional non obstructing   calculi within the distal left ureter (series 302, image 279)    BLADDER: Suprapubic Dye catheter.  REPRODUCTIVE ORGANS: Unremarkable at CT.    BOWEL: Gastric distention. Stool impaction the rectum with desiccated   stool.  PERITONEUM/RETROPERITONEUM: Within normal limits.  VESSELS: Extensive atherosclerotic changes.  LYMPH NODES: No lymphadenopathy.  ABDOMINAL WALL: Bilateral fat-containing inguinal hernias  BONES: Osteopenia. Multilevel degenerative changes    IMPRESSION:  Nonobstructing 1.8 cm calculus within the left renal collecting system   near the UPJ. Decreased hydroureter when compared with available prior   imaging, now mild to moderate.    Additional nonobstructing calculi within the distal left ureter. Largest   fragment measures approximately 0.6 cm.    Stool impaction of the rectum    --- End of Report ---            SMILEY PATEL MD; Attending Radiologist  This document has been electronically signed. 2025  8:36PM (25 @ 18:59)

## 2025-05-01 NOTE — CONSULT NOTE ADULT - SUBJECTIVE AND OBJECTIVE BOX
[facility] CONTACTED FOR TRANSPORT - GARY TO TAKE PT TO [place]    INFECTIOUS DISEASE CONSULT NOTE    Patient is a 70y old  Male who presents with a chief complaint of Hydronephrosis (01 May 2025 08:03)    HPI:  Pt is a 71 yo male with a PMH of HTN, CKD stage 5, HTN, Paraplegia, Nephrolithiasis, Chronic metabolic acidosis, and Neurogenic bladder who presents with complaint of blood within the suprapubic catheter s/p initiation of Eliquis prompting critical care assessment. Upon evaluation, the pt is awake and alert and answering most questions appropriately. The pt remains hemodynamically on room air with no acute complaints. The pt's bother is at bedside who confirms that the pt is compliant with medication regimen and physician office visits. No other complaints or concerns noted.  (30 Apr 2025 21:15)    Prior hospital charts reviewed [Yes]  Primary team notes reviewed [Yes]  Other consultant notes reviewed [Yes]    REVIEW OF SYSTEMS:  CONSTITUTIONAL: No fever or chills  HEAD: No lesion on scalp  EYES: No visual disturbance  ENT: No sore throat  RESPIRATORY: No cough, no shortness of breath  CARDIOVASCULAR: No chest pain or palpitations  GASTROINTESTINAL: No abdominal or epigastric pain  GENITOURINARY: + SPC  NEUROLOGICAL: No headache/dizziness  MUSCULOSKELETAL: No joint pain, erythema, or swelling; no back pain  SKIN: No itching, rashes  All other ROS negative except noted above      PAST MEDICAL & SURGICAL HISTORY:  DM (diabetes mellitus)      HTN (hypertension)      H/O paraplegia      Spinal abscess      Renal stones      Stage 5 chronic kidney disease not on chronic dialysis      Neurogenic dysfunction of the urinary bladder      Encounter for care or replacement of suprapubic tube      Spinal abscess  S/P Surgery      Encounter for care or replacement of suprapubic tube      S/P ORIF (open reduction internal fixation) fracture          SOCIAL HISTORY:  - No recent travel  - Denies tobacco use  - Denies alcohol use  - Denies illicit drug use    FAMILY HISTORY:  FH: HTN (hypertension)    FH: breast cancer    FH: melanoma    FH: heart disease        Allergies:  No Known Allergies      ANTIMICROBIALS:      ANTIMICROBIALS (past 90 days):  MEDICATIONS  (STANDING):    cefepime   IVPB   100 mL/Hr IV Intermittent (04-30-25 @ 18:12)    meropenem  IVPB   100 mL/Hr IV Intermittent (05-01-25 @ 08:57)    vancomycin  IVPB.   250 mL/Hr IV Intermittent (04-30-25 @ 18:12)        OTHER MEDS:   MEDICATIONS  (STANDING):  hydrocortisone sodium succinate Injectable 50 every 6 hours  influenza  Vaccine (HIGH DOSE) 0.5 once  midodrine. 10 every 8 hours      VITALS:  Vital Signs Last 24 Hrs  T(F): 96 (05-01-25 @ 15:14), Max: 97.9 (05-01-25 @ 05:05)    Vital Signs Last 24 Hrs  HR: 60 (05-01-25 @ 11:01) (57 - 99)  BP: 110/59 (05-01-25 @ 15:14) (83/54 - 132/59)  RR: 31 (05-01-25 @ 15:14)  SpO2: 98% (05-01-25 @ 15:14) (95% - 99%)  Wt(kg): --    EXAM:  GENERAL: NAD, lying in bed  HEAD: No head lesions  EYES: Conjunctiva pink and cornea white  EAR, NOSE, MOUTH, THROAT: Normal external ears and nose, no discharges; moist mucous membranes  NECK: Supple, nontender to palpation; no JVD  RESPIRATORY: Clear to auscultation bilaterally  CARDIOVASCULAR: S1 S2  GASTROINTESTINAL: Soft, nontender, nondistended; normoactive bowel sounds  GENITOURINARY: + SPC, no CVA tenderness  EXTREMITIES: No clubbing, cyanosis, or petal edema  NERVOUS SYSTEM: Awake and alert, not fully oriented  MUSCULOSKELETAL: No joint erythema, swelling or pain  SKIN: Area of abrasions on his knees, no sign of infection, no superficial thrombophlebitis  PSYCH: Normal affect      Labs:                        8.2    24.45 )-----------( 145      ( 01 May 2025 06:05 )             23.9     05-01    134[L]  |  97[L]  |  141[HH]  ----------------------------<  193[H]  3.9   |  14[L]  |  9.6[HH]    Ca    6.6[L]      01 May 2025 06:05    TPro  5.9[L]  /  Alb  2.4[L]  /  TBili  0.7  /  DBili  x   /  AST  244[H]  /  ALT  113[H]  /  AlkPhos  378[H]  05-01      WBC Trend:  WBC Count: 24.45 (05-01-25 @ 06:05)  WBC Count: 29.31 (04-30-25 @ 17:54)  WBC Count: 9.68 (04-29-25 @ 08:48)      Auto Neutrophil #: 6.81 K/uL (08-13-24 @ 06:21)  Auto Neutrophil #: 7.22 K/uL (08-12-24 @ 07:24)  Auto Neutrophil #: 6.57 K/uL (08-11-24 @ 08:36)  Auto Neutrophil #: 7.03 K/uL (08-09-24 @ 11:44)  Auto Neutrophil #: 7.64 K/uL (08-08-24 @ 07:09)      Creatine Trend:  Creatinine: 9.6 (05-01)  Creatinine: 10.6 (04-30)  Creatinine: 9.0 (04-29)      Liver Biochemical Testing Trend:  Alanine Aminotransferase (ALT/SGPT): 113 *H* (05-01)  Alanine Aminotransferase (ALT/SGPT): 158 *H* (04-30)  Alanine Aminotransferase (ALT/SGPT): 19 (04-29)  Alanine Aminotransferase (ALT/SGPT): 15 (08-12)  Alanine Aminotransferase (ALT/SGPT): 14 (08-11)  Aspartate Aminotransferase (AST/SGOT): 244 (05-01-25 @ 06:05)  Aspartate Aminotransferase (AST/SGOT): 449 (04-30-25 @ 17:54)  Aspartate Aminotransferase (AST/SGOT): 33 (04-29-25 @ 08:48)  Aspartate Aminotransferase (AST/SGOT): 51 (08-12-24 @ 07:24)  Aspartate Aminotransferase (AST/SGOT): 57 (08-11-24 @ 08:36)  Bilirubin Total: 0.7 (05-01)  Bilirubin Total: 0.8 (04-30)  Bilirubin Total: 0.3 (04-29)  Bilirubin Total: 0.4 (08-12)  Bilirubin Total: 0.5 (08-11)      Trend LDH  08-11-24 @ 15:52  240  08-11-24 @ 08:36  238  08-09-24 @ 11:44  181          Urinalysis Basic - ( 01 May 2025 06:05 )    Color: x / Appearance: x / SG: x / pH: x  Gluc: 193 mg/dL / Ketone: x  / Bili: x / Urobili: x   Blood: x / Protein: x / Nitrite: x   Leuk Esterase: x / RBC: x / WBC x   Sq Epi: x / Non Sq Epi: x / Bacteria: x          MICROBIOLOGY:    MRSA PCR Result.: Positive (05-01-25 @ 09:00)      Urinalysis with Rflx Culture (collected 30 Apr 2025 20:03)    Culture - Blood (collected 30 Apr 2025 17:54)  Source: Blood Blood-Peripheral  Gram Stain (01 May 2025 13:23):    Growth in anaerobic bottle: Gram Negative Rods  Preliminary Report (01 May 2025 13:23):    Growth in anaerobic bottle: Gram Negative Rods    Direct identification is available within approximately 3-5    hours either by Blood Panel Multiplexed PCR or Direct    MALDI-TOF. Details: [email or web address]      Lactate, Blood: 1.7 (05-01 @ 11:24)  Blood Gas Venous - Lactate: 5.0 (04-30 @ 19:13)  Lactate, Blood: 3.8 (04-30 @ 17:54)    A1C with Estimated Average Glucose Result: 6.0 % (04-29-25 @ 08:48)        RADIOLOGY:  imaging below personally reviewed    < from: Xray Chest 1 View-PORTABLE IMMEDIATE (04.30.25 @ 18:20) >  IMPRESSION:    No radiographic evidence of acute cardiopulmonary disease.      < end of copied text >    < from: CT Abdomen and Pelvis No Cont (04.30.25 @ 18:59) >  IMPRESSION:  Nonobstructing 1.8 cm calculus within the left renal collecting system   near the UPJ. Decreased hydroureter when compared with available prior   imaging, now mild to moderate.    Additional nonobstructing calculi within the distal left ureter. Largest   fragment measures approximately 0.6 cm.    Stool impaction of the rectum      < end of copied text >

## 2025-05-01 NOTE — BRIEF OPERATIVE NOTE - NSICDXBRIEFPREOP_GEN_ALL_CORE_FT
PRE-OP DIAGNOSIS:  Right ureteral stone 01-May-2025 21:08:49  Blake Schilling  Bilateral renal stones 01-May-2025 21:09:00  Blake Schilling

## 2025-05-01 NOTE — PACU DISCHARGE NOTE - NS MD DISCHARGE NOTE DISCHARGE
ICU I have discussed the discharge plan with the patient. The patient agrees with the plan, as discussed.  The patient understands Emergency Department diagnosis is a preliminary diagnosis often based on limited information and that the patient must adhere to the follow-up plan as discussed.  The patient understands that if the symptoms worsen or if prescribed medications do not have the desired/planned effect that the patient may return to the Emergency Department at any time for further evaluation and treatment.            SKIN AVULSION - AfterCare(R) Instructions(ER/ED)     Skin Avulsion    WHAT YOU NEED TO KNOW:    Skin avulsion is a wound that happens when skin is torn from your body during an accident or other injury. The torn skin may be lost or too damaged to be repaired, and it must be removed. A wound of this type cannot be stitched closed because there is tissue missing. Avulsion wounds are usually bigger and have more scars because of the missing tissue.    DISCHARGE INSTRUCTIONS:    Medicines:     Antibiotic ointment: Your healthcare provider may tell you to gently rub a topical antibiotic ointment on your wound. This will help prevent an infection and help your wound heal faster.       Pain medicine: You may be given medicine to take away or decrease pain. Do not wait until the pain is severe before you take your medicine.      NSAIDs, such as ibuprofen, help decrease swelling, pain, and fever. This medicine is available with or without a doctor's order. NSAIDs can cause stomach bleeding or kidney problems in certain people. If you take blood thinner medicine, always ask if NSAIDs are safe for you. Always read the medicine label and follow directions. Do not give these medicines to children under 6 months of age without direction from your child's healthcare provider.      Take your medicine as directed. Contact your healthcare provider if you think your medicine is not helping or if you have side effects. Tell him of her if you are allergic to any medicine. Keep a list of the medicines, vitamins, and herbs you take. Include the amounts, and when and why you take them. Bring the list or the pill bottles to follow-up visits. Carry your medicine list with you in case of an emergency.    Care for your wound: Avulsion wounds may take longer to heal because they cannot be closed with tape or stitches. Keep your wound clean and protected to prevent infection and speed healing.     Clean your wound: Wash your hands with soap and water before and after you care for your wound. You may be able to use a soft cloth to gently clean the wound after the first 24 to 48 hours. After that, gently clean the wound once or twice a day with cool water. Do not soak your wound. Use soap to clean around the wound, but try not to get any on the wound itself. Do not use alcohol or hydrogen peroxide to clean your wound unless you are directed to. Gently pat the area dry and reapply the bandage as directed.       Elevate your wound: Prop your injured area on pillows to raise it above the level of your heart. This will help reduce pain and swelling. Do this for 30 minutes at a time, as often as you can.       Bandage your wound: Bandages keep your wound clean, dry, and protected from infection. They may also prevent swelling. Use a bandage that does not stick to your wound, and has a spongy layer to absorb fluids. Leave your bandage on as long as directed. Ask your healthcare provider when and how to change your bandage. Do not wrap the bandage too tightly. This could cut off blood flow and cause more injury.       Use cool compresses: Wet a washcloth or towel with cool water and hold it on your wound as directed. Ask how often to apply the compress and for how long each time.      Reduce scarring: Avoid direct sunlight on your wound. Sunlight may burn or change the color of the new skin over your wound. Use sunscreen (SPF 30 or higher) on the new skin for at least 1 year after it heals.    Support for leg and arm wounds: You may need to use crutches if the wound is on your leg. You may need to use a sling if the wound is on your arm. Crutches and slings help protect the injured area, prevent further injury, and heal the area in the right position.     Follow up with your healthcare provider within 2 days or as directed: If you have stitches, ask when to return to have them removed. Write down your questions so you remember to ask them during your visits.     Contact your healthcare provider if:     You have new pain, or it gets worse.       You have trouble moving the injured body area.       Your wound splits open or does not seem to be healing.    Return to the emergency department if:     You have a fever.       You have painful swelling, redness, or warmth around your wound.      Your wound is red and there are red streaks on your skin starting at your wound and moving upward.       Your wound is draining pus.       You have heavy bleeding or bleeding that does not stop after 10 minutes of holding firm, direct pressure over the wound.      You feel like there is an object stuck in your wound.

## 2025-05-01 NOTE — BRIEF OPERATIVE NOTE - OPERATION/FINDINGS
infected appearing urine bilaterally, bilateral ureteral stents inserted, left ureteral narrowing navigated around

## 2025-05-01 NOTE — PROGRESS NOTE ADULT - ATTENDING COMMENTS
69 yo m with history of Hypertension, Type 2 diabetes, CKD stage 5 (baseline Creatinine is 4.4), Paraplegia secondary to a spinal abscess, Neurogenic bladder and s/p suprapubic tube, and hx of kidney stones, was being scheduled for outpt stent when he developed AMS and was brought to the ED and found septic secondary to obstructing stone    sepsis / serratia bacteremia / hydronephrosis secondary to obstructing renal calculi     - continue meropenem as per ID   - s/p cystoscopy and stent placement   - lactate now normal

## 2025-05-01 NOTE — PROGRESS NOTE ADULT - SUBJECTIVE AND OBJECTIVE BOX
KRISTY GARCIA 70y Male  MRN#: [id number]   Hospital Day: 1d    SUBJECTIVE  Patient is a 70y old Male who presents with a chief complaint of Currently admitted to medicine with the primary diagnosis of Septic shock      INTERVAL HPI AND OVERNIGHT EVENTS:  Patient was examined and seen at bedside. This morning he is resting comfortably in bed and reports no issues or overnight events.    OBJECTIVE  PAST MEDICAL & SURGICAL HISTORY  DM (diabetes mellitus)    HTN (hypertension)    H/O paraplegia    Spinal abscess    Renal stones    Stage 5 chronic kidney disease not on chronic dialysis    Neurogenic dysfunction of the urinary bladder    Encounter for care or replacement of suprapubic tube    Spinal abscess  S/P Surgery    Encounter for care or replacement of suprapubic tube    S/P ORIF (open reduction internal fixation) fracture      ALLERGIES:  No Known Allergies    MEDICATIONS:  STANDING MEDICATIONS  influenza  Vaccine (HIGH DOSE) 0.5 milliLiter(s) IntraMuscular once    PRN MEDICATIONS      VITAL SIGNS: Last 24 Hours  T(C): 36.6 (01 May 2025 07:43), Max: 36.6 (01 May 2025 05:05)  T(F): 97.9 (01 May 2025 07:01), Max: 97.9 (01 May 2025 05:05)  HR: 79 (01 May 2025 07:43) (72 - 99)  BP: 124/62 (01 May 2025 07:43) (83/54 - 124/62)  BP(mean): 87 (01 May 2025 07:43) (63 - 87)  RR: 30 (01 May 2025 07:43) (18 - 34)  SpO2: 98% (01 May 2025 07:43) (95% - 99%)    LABS:                        8.2    24.45 )-----------( 145      ( 01 May 2025 06:05 )             23.9     05-01    134[L]  |  97[L]  |  141[HH]  ----------------------------<  193[H]  3.9   |  14[L]  |  9.6[HH]    Ca    6.6[L]      01 May 2025 06:05    TPro  5.9[L]  /  Alb  2.4[L]  /  TBili  0.7  /  DBili  x   /  AST  244[H]  /  ALT  113[H]  /  AlkPhos  378[H]  05-01    PT/INR - ( 01 May 2025 06:05 )   PT: 19.80 sec;   INR: 1.66 ratio         PTT - ( 01 May 2025 06:05 )  PTT:36.7 sec  Urinalysis Basic - ( 01 May 2025 06:05 )    Color: x / Appearance: x / SG: x / pH: x  Gluc: 193 mg/dL / Ketone: x  / Bili: x / Urobili: x   Blood: x / Protein: x / Nitrite: x   Leuk Esterase: x / RBC: x / WBC x   Sq Epi: x / Non Sq Epi: x / Bacteria: x        Lactate, Blood: 3.8 mmol/L *H* (04-30-25 @ 17:54)      Urinalysis with Rflx Culture (collected 30 Apr 2025 20:03)          RADIOLOGY:      PHYSICAL EXAM:  CONSTITUTIONAL: No acute distress, AAOx3  HEAD: Atraumatic, normocephalic  EYES: EOM intact, PERRLA, conjunctiva and sclera clear  ENT: moist mucous membranes  PULMONARY: Clear to auscultation bilaterally  CARDIOVASCULAR: Regular rate and rhythm  GASTROINTESTINAL: Soft, non-tender, non-distended; bowel sounds present  MUSCULOSKELETAL: no edema  NEUROLOGY: non-focal  SKIN: warm and dry

## 2025-05-01 NOTE — BRIEF OPERATIVE NOTE - NSICDXBRIEFPOSTOP_GEN_ALL_CORE_FT
POST-OP DIAGNOSIS:  Right ureteral stone 01-May-2025 21:09:09  Blake Schilling  Bilateral renal stones 01-May-2025 21:09:15  Blake Schilling  Ureteral stricture, left 01-May-2025 21:09:26  Blake Schilling

## 2025-05-01 NOTE — CONSULT NOTE ADULT - ASSESSMENT
71 yo male with a PMH of HTN, CKD stage 5, HTN, Paraplegia, Nephrolithiasis, Chronic metabolic acidosis, and Neurogenic bladder who presents with complaint of blood within the suprapubic catheter s/p initiation of Eliquis prompting critical care     ID is consulted for septic stone  Afebrile  WBC Count: 24.45 (05-01-25 @ 06:05)  WBC Count: 29.31 (04-30-25 @ 17:54)  WBC Count: 9.68 (04-29-25 @ 08:48)  On room air  BCx 4/30 GNR  UA with pyuria, UCx pending  Outpatient UCx 4/21   P. mirabilis (S to cefepime, ceftriaxone, R to FQs and Bactrim)  P. aeruginosa (S to cefepime, FQs and meropenem)  S. marcescens (S to cefepime, FQs, Bactrim, R to ceftriaxone)  MSSA    CT A/P  Nonobstructing 1.8 cm calculus within the left renal collecting system   near the UPJ. Decreased hydroureter when compared with available prior   imaging, now mild to moderate.  Additional nonobstructing calculi within the distal left ureter. Largest   fragment measures approximately 0.6 cm.  Stool impaction of the rectum    S/p 5/1 cystoscopy with bilateral stent placement, UCx pending    Antibiotics:  Vancomycin 4/30  Cefepime 4/30  Meropenem 5/1 ->      IMPRESSION:  GNR bacteremia, potentially life-threatening  Leukocytosis  Obstructive uropathy  JOÃO on CKD  Constipation  Immunosuppression / Immunosenescence secondary to multiple comorbidities which could result in poor clinical outcome    RECOMMENDATIONS:  - IV meropenem 500mg q12hrs  - Follow up BCx and UCx  - Urology follow up  - Bowel regimen  - Offloading and frequent position changes, aspiration precaution  - Trend WBC, fever curve, transaminases, creatinine daily  - Please inform ID of any patient clinical change or any new pertinent laboratory or radiographic data      Jenn Leal D.O.  Attending Physician  Division of Infectious Diseases  [facility] - [facility]  Please contact me via [employer] Teams

## 2025-05-01 NOTE — CONSULT NOTE ADULT - SUBJECTIVE AND OBJECTIVE BOX
69 yo male with a PMH of HTN, CKD stage 5, HTN, Paraplegia, Nephrolithiasis, Chronic metabolic acidosis, and Neurogenic bladder who presents with complaint of blood within the suprapubic catheter s/p initiation of Eliquis prompting critical care assessment. Upon evaluation, the pt is awake and alert and answering most questions appropriately. The pt remains hemodynamically stable on room air with no acute complaints. Urology consulted for hematuria.   Also, CT A/P showed Calculus within the left renal calyx measuring up to 1.8 cm. Mild to moderate left hydroureter with additional nonobstructing calculi within the distal left urete.  WBC 29.3, BUN/Cr 154/10.6, hgb 10.2, (+) UA for UTI, Afebrile Tmax 97.9 F      PAST MEDICAL & SURGICAL HISTORY:  DM (diabetes mellitus)  HTN (hypertension)  H/O paraplegia  Spinal abscess  Renal stones  Stage 5 chronic kidney disease not on chronic dialysis  Neurogenic dysfunction of the urinary bladder  Encounter for care or replacement of suprapubic tube  Spinal abscess  S/P Surgery  Encounter for care or replacement of suprapubic tube  S/P ORIF (open reduction internal fixation) fracture      Vital Signs Last 24 Hrs  T(C): 36.6 (01 May 2025 05:05), Max: 36.6 (01 May 2025 05:05)  T(F): 97.9 (01 May 2025 05:05), Max: 97.9 (01 May 2025 05:05)  HR: 81 (01 May 2025 05:05) (72 - 99)  BP: 85/58 (01 May 2025 05:05) (83/54 - 124/62)  BP(mean): 67 (01 May 2025 05:05) (63 - 85)  RR: 22 (01 May 2025 05:05) (18 - 34)  SpO2: 98% (01 May 2025 05:05) (95% - 99%)    Parameters below as of 01 May 2025 05:05  Patient On (Oxygen Delivery Method): room air      PHYSICAL EXAM:      Constitutional: NAD    Eyes: PERRLA, no conjuctivitis    Neck: no lymphadenopathy    Respiratory: +air entry, no rales, no rhonchi, no wheezes    Cardiovascular: +S1 and S2, regular rate and rhythm    Gastrointestinal: +BS, soft, non-tender, not distended, suprapubic catheter in place with kiarra color urine     Extremities:  no edema, no calf tenderness    Vascular: +dorsal pedis and radial pulses, no extremity cyanosis    Neurological: sensation intact, ROM equal B/L, CN II-XII intact    Skin: no rashes, normal turgor                            10.2   29.31 )-----------( 177      ( 2025 17:54 )             29.9         134[L]  |  99  |  154[HH]  ----------------------------<  130[H]  4.1   |  8[LL]  |  10.6[HH]    Ca    7.5[L]      2025 17:54    TPro  7.5  /  Alb  3.0[L]  /  TBili  0.8  /  DBili  x   /  AST  449[H]  /  ALT  158[H]  /  AlkPhos  520[H]            Urinalysis Basic - ( 2025 20:03 )    Color: Red / Appearance: Turbid / S.019 / pH: x  Gluc: x / Ketone: Negative mg/dL  / Bili: Small / Urobili: 0.2 mg/dL   Blood: x / Protein: 300 mg/dL / Nitrite: Positive   Leuk Esterase: Large / RBC: Too Numerous to count /HPF / WBC Too Numerous to count /HPF   Sq Epi: x / Non Sq Epi: x / Bacteria: Moderate /HPF      PT/INR - ( 2025 17:54 )   PT: 17.80 sec;   INR: 1.50 ratio    PTT - ( 2025 17:54 )  PTT:38.0 sec  CAPILLARY BLOOD GLUCOSE  Urinalysis with Rflx Culture (collected 25 @ 20:03)        < from: CT Abdomen and Pelvis No Cont (25 @ 18:59) >  LIVER: Punctate calcified granuloma  BILE DUCTS: Normal caliber.  GALLBLADDER: Cholelithiasis.  SPLEEN: Within normal limits.  PANCREAS: Within normal limits.  ADRENALS: Within normal limits.  KIDNEYS/URETERS: Calculus within the left renal calyx measuring up to 1.8   cm. Mild to moderate left hydroureter with additional nonobstructing   calculi within the distal left ureter (series 302, image 279)    BLADDER: Suprapubic Dye catheter.  REPRODUCTIVE ORGANS: Unremarkable at CT.    BOWEL: Gastric distention. Stool impaction the rectum with desiccated   stool.  PERITONEUM/RETROPERITONEUM: Within normal limits.  VESSELS: Extensive atherosclerotic changes.  LYMPH NODES: No lymphadenopathy.  ABDOMINAL WALL: Bilateral fat-containing inguinal hernias  BONES: Osteopenia. Multilevel degenerative changes    IMPRESSION:  Nonobstructing 1.8 cm calculus within the left renal collecting system   near the UPJ. Decreased hydroureter when compared with available prior   imaging, now mild to moderate.    Additional nonobstructing calculi within the distal left ureter. Largest   fragment measures approximately 0.6 cm.    Stool impaction of the rectum      < end of copied text >                69 yo male with a PMH of HTN, CKD stage 5, Paraplegia, Nephrolithiasis, Chronic metabolic acidosis, and Neurogenic bladder who presents with complaint of blood within the suprapubic catheter s/p initiation of Eliquis prompting critical care assessment. Upon evaluation, the pt is awake and alert and answering most questions appropriately. The pt remains hemodynamically stable on room air with no acute complaints. Urology consulted for hematuria.   Also, CT A/P showed Calculus within the left renal calyx measuring up to 1.8 cm. Mild to moderate left hydroureter with additional nonobstructing calculi within the distal left urete.  WBC 29.3, BUN/Cr 154/10.6, hgb 10.2, (+) UA for UTI, Afebrile Tmax 97.9 F      PAST MEDICAL & SURGICAL HISTORY:  DM (diabetes mellitus)  HTN (hypertension)  H/O paraplegia  Spinal abscess  Renal stones  Stage 5 chronic kidney disease not on chronic dialysis  Neurogenic dysfunction of the urinary bladder  Encounter for care or replacement of suprapubic tube  Spinal abscess  S/P Surgery  Encounter for care or replacement of suprapubic tube  S/P ORIF (open reduction internal fixation) fracture      Vital Signs Last 24 Hrs  T(C): 36.6 (01 May 2025 05:05), Max: 36.6 (01 May 2025 05:05)  T(F): 97.9 (01 May 2025 05:05), Max: 97.9 (01 May 2025 05:05)  HR: 81 (01 May 2025 05:05) (72 - 99)  BP: 85/58 (01 May 2025 05:05) (83/54 - 124/62)  BP(mean): 67 (01 May 2025 05:05) (63 - 85)  RR: 22 (01 May 2025 05:05) (18 - 34)  SpO2: 98% (01 May 2025 05:05) (95% - 99%)    Parameters below as of 01 May 2025 05:05  Patient On (Oxygen Delivery Method): room air      PHYSICAL EXAM:      Constitutional: NAD    Eyes: PERRLA, no conjuctivitis    Neck: no lymphadenopathy    Respiratory: +air entry, no rales, no rhonchi, no wheezes    Cardiovascular: +S1 and S2, regular rate and rhythm    Gastrointestinal: +BS, soft, non-tender, not distended, suprapubic catheter in place with kiarra color urine     Extremities:  no edema, no calf tenderness    Vascular: +dorsal pedis and radial pulses, no extremity cyanosis    Neurological: sensation intact, ROM equal B/L, CN II-XII intact    Skin: no rashes, normal turgor                            10.2   29.31 )-----------( 177      ( 2025 17:54 )             29.9     04    134[L]  |  99  |  154[HH]  ----------------------------<  130[H]  4.1   |  8[LL]  |  10.6[HH]    Ca    7.5[L]      2025 17:54    TPro  7.5  /  Alb  3.0[L]  /  TBili  0.8  /  DBili  x   /  AST  449[H]  /  ALT  158[H]  /  AlkPhos  520[H]            Urinalysis Basic - ( 2025 20:03 )    Color: Red / Appearance: Turbid / S.019 / pH: x  Gluc: x / Ketone: Negative mg/dL  / Bili: Small / Urobili: 0.2 mg/dL   Blood: x / Protein: 300 mg/dL / Nitrite: Positive   Leuk Esterase: Large / RBC: Too Numerous to count /HPF / WBC Too Numerous to count /HPF   Sq Epi: x / Non Sq Epi: x / Bacteria: Moderate /HPF      PT/INR - ( 2025 17:54 )   PT: 17.80 sec;   INR: 1.50 ratio    PTT - ( 2025 17:54 )  PTT:38.0 sec  CAPILLARY BLOOD GLUCOSE  Urinalysis with Rflx Culture (collected 25 @ 20:03)        < from: CT Abdomen and Pelvis No Cont (25 @ 18:59) >  LIVER: Punctate calcified granuloma  BILE DUCTS: Normal caliber.  GALLBLADDER: Cholelithiasis.  SPLEEN: Within normal limits.  PANCREAS: Within normal limits.  ADRENALS: Within normal limits.  KIDNEYS/URETERS: Calculus within the left renal calyx measuring up to 1.8   cm. Mild to moderate left hydroureter with additional nonobstructing   calculi within the distal left ureter (series 302, image 279)    BLADDER: Suprapubic Dye catheter.  REPRODUCTIVE ORGANS: Unremarkable at CT.    BOWEL: Gastric distention. Stool impaction the rectum with desiccated   stool.  PERITONEUM/RETROPERITONEUM: Within normal limits.  VESSELS: Extensive atherosclerotic changes.  LYMPH NODES: No lymphadenopathy.  ABDOMINAL WALL: Bilateral fat-containing inguinal hernias  BONES: Osteopenia. Multilevel degenerative changes    IMPRESSION:  Nonobstructing 1.8 cm calculus within the left renal collecting system   near the UPJ. Decreased hydroureter when compared with available prior   imaging, now mild to moderate.    Additional nonobstructing calculi within the distal left ureter. Largest   fragment measures approximately 0.6 cm.    Stool impaction of the rectum      < end of copied text >

## 2025-05-01 NOTE — CONSULT NOTE ADULT - ASSESSMENT
JOÃO on advanced CKD / ?d/t hypotension     - cont iv hydration / prefer bicarb drip after giving iv calcium 1g   - document urine output JOÃO on advanced CKD / ?d/t hypotension / left hydronephrosis   b/l renal calculi  - cont iv hydration / prefer bicarb drip after giving iv calcium 1g   - document urine output  -  f/u, s/p cystoscopy with b/l ureteral stent placement  - f/u cultures / on meropenem  - obtain TTE/ r/o pericardial effusion  - start po sodium bicarb 1300mg q8h   - check phos level  - monitor chemistry qshift JOÃO on advanced CKD / ?d/t hypotension / left hydronephrosis   b/l renal calculi  - cont iv hydration / prefer bicarb drip after giving iv calcium 1g / monitor calcium level closely and maintain calcium corrected > 7.5/iCa > 1  - document urine output  -  f/u, s/p cystoscopy with b/l ureteral stent placement  - f/u cultures / on meropenem  - obtain TTE/ r/o pericardial effusion  - start po sodium bicarb 1300mg q8h   - check phos level  - monitor chemistry qshift  - monitor h/h / r/o gi bleed / check iron stores

## 2025-05-02 ENCOUNTER — RESULT REVIEW (OUTPATIENT)
Age: 70
End: 2025-05-02

## 2025-05-02 LAB
ANION GAP SERPL CALC-SCNC: 17 MMOL/L — HIGH (ref 7–14)
ANION GAP SERPL CALC-SCNC: 18 MMOL/L — HIGH (ref 7–14)
BUN SERPL-MCNC: 136 MG/DL — CRITICAL HIGH (ref 10–20)
BUN SERPL-MCNC: 136 MG/DL — CRITICAL HIGH (ref 10–20)
CALCIUM SERPL-MCNC: 6.4 MG/DL — LOW (ref 8.4–10.5)
CALCIUM SERPL-MCNC: 6.5 MG/DL — LOW (ref 8.4–10.5)
CHLORIDE SERPL-SCNC: 94 MMOL/L — LOW (ref 98–110)
CHLORIDE SERPL-SCNC: 95 MMOL/L — LOW (ref 98–110)
CO2 SERPL-SCNC: 25 MMOL/L — SIGNIFICANT CHANGE UP (ref 17–32)
CO2 SERPL-SCNC: 27 MMOL/L — SIGNIFICANT CHANGE UP (ref 17–32)
CREAT SERPL-MCNC: 7.7 MG/DL — CRITICAL HIGH (ref 0.7–1.5)
CREAT SERPL-MCNC: 7.7 MG/DL — CRITICAL HIGH (ref 0.7–1.5)
CULTURE RESULTS: SIGNIFICANT CHANGE UP
EGFR: 7 ML/MIN/1.73M2 — LOW
GLUCOSE SERPL-MCNC: 170 MG/DL — HIGH (ref 70–99)
GLUCOSE SERPL-MCNC: 201 MG/DL — HIGH (ref 70–99)
GRAM STN FLD: ABNORMAL
HCT VFR BLD CALC: 22.2 % — LOW (ref 42–52)
HGB BLD-MCNC: 7.8 G/DL — LOW (ref 14–18)
MAGNESIUM SERPL-MCNC: 1.4 MG/DL — LOW (ref 1.8–2.4)
MCHC RBC-ENTMCNC: 29.7 PG — SIGNIFICANT CHANGE UP (ref 27–31)
MCHC RBC-ENTMCNC: 35.1 G/DL — SIGNIFICANT CHANGE UP (ref 32–37)
MCV RBC AUTO: 84.4 FL — SIGNIFICANT CHANGE UP (ref 80–94)
NRBC BLD AUTO-RTO: 0 /100 WBCS — SIGNIFICANT CHANGE UP (ref 0–0)
PHOSPHATE SERPL-MCNC: 6.7 MG/DL — HIGH (ref 2.1–4.9)
PLATELET # BLD AUTO: 120 K/UL — LOW (ref 130–400)
PMV BLD: 11.2 FL — HIGH (ref 7.4–10.4)
POTASSIUM SERPL-MCNC: 3.1 MMOL/L — LOW (ref 3.5–5)
POTASSIUM SERPL-MCNC: 3.6 MMOL/L — SIGNIFICANT CHANGE UP (ref 3.5–5)
POTASSIUM SERPL-SCNC: 3.1 MMOL/L — LOW (ref 3.5–5)
POTASSIUM SERPL-SCNC: 3.6 MMOL/L — SIGNIFICANT CHANGE UP (ref 3.5–5)
RBC # BLD: 2.63 M/UL — LOW (ref 4.7–6.1)
RBC # FLD: 13.3 % — SIGNIFICANT CHANGE UP (ref 11.5–14.5)
SODIUM SERPL-SCNC: 138 MMOL/L — SIGNIFICANT CHANGE UP (ref 135–146)
SODIUM SERPL-SCNC: 138 MMOL/L — SIGNIFICANT CHANGE UP (ref 135–146)
SPECIMEN SOURCE: SIGNIFICANT CHANGE UP
TROPONIN T, HIGH SENSITIVITY RESULT: 74 NG/L — CRITICAL HIGH (ref 6–21)
TROPONIN T, HIGH SENSITIVITY RESULT: 76 NG/L — CRITICAL HIGH (ref 6–21)
WBC # BLD: 21.1 K/UL — HIGH (ref 4.8–10.8)
WBC # FLD AUTO: 21.1 K/UL — HIGH (ref 4.8–10.8)

## 2025-05-02 PROCEDURE — 93306 TTE W/DOPPLER COMPLETE: CPT | Mod: 26

## 2025-05-02 PROCEDURE — 99232 SBSQ HOSP IP/OBS MODERATE 35: CPT

## 2025-05-02 PROCEDURE — 93010 ELECTROCARDIOGRAM REPORT: CPT

## 2025-05-02 PROCEDURE — 99291 CRITICAL CARE FIRST HOUR: CPT

## 2025-05-02 RX ORDER — PROCHLORPERAZINE 25 MG
5 SUPPOSITORY, RECTAL RECTAL ONCE
Refills: 0 | Status: COMPLETED | OUTPATIENT
Start: 2025-05-02 | End: 2025-05-02

## 2025-05-02 RX ORDER — MAGNESIUM SULFATE 500 MG/ML
2 SYRINGE (ML) INJECTION ONCE
Refills: 0 | Status: COMPLETED | OUTPATIENT
Start: 2025-05-02 | End: 2025-05-02

## 2025-05-02 RX ORDER — MUPIROCIN CALCIUM 20 MG/G
1 CREAM TOPICAL
Refills: 0 | Status: DISCONTINUED | OUTPATIENT
Start: 2025-05-02 | End: 2025-05-07

## 2025-05-02 RX ADMIN — Medication 50 MILLIEQUIVALENT(S): at 08:01

## 2025-05-02 RX ADMIN — Medication 50 MILLIEQUIVALENT(S): at 10:08

## 2025-05-02 RX ADMIN — Medication 50 MILLIGRAM(S): at 23:09

## 2025-05-02 RX ADMIN — Medication 25 GRAM(S): at 08:00

## 2025-05-02 RX ADMIN — FOLIC ACID 1 MILLIGRAM(S): 1 TABLET ORAL at 12:15

## 2025-05-02 RX ADMIN — Medication 1 APPLICATION(S): at 05:06

## 2025-05-02 RX ADMIN — Medication 4 MILLIGRAM(S): at 19:26

## 2025-05-02 RX ADMIN — SODIUM CHLORIDE 75 MILLILITER(S): 9 INJECTION, SOLUTION INTRAVENOUS at 18:37

## 2025-05-02 RX ADMIN — Medication 5 MILLIGRAM(S): at 20:50

## 2025-05-02 RX ADMIN — MUPIROCIN CALCIUM 1 APPLICATION(S): 20 CREAM TOPICAL at 18:03

## 2025-05-02 RX ADMIN — Medication 50 MILLIGRAM(S): at 12:15

## 2025-05-02 RX ADMIN — Medication 50 MILLIGRAM(S): at 01:00

## 2025-05-02 RX ADMIN — Medication 50 MILLIGRAM(S): at 07:35

## 2025-05-02 RX ADMIN — Medication 50 MILLIGRAM(S): at 18:04

## 2025-05-02 RX ADMIN — Medication 4 MILLIGRAM(S): at 03:26

## 2025-05-02 RX ADMIN — Medication 1300 MILLIGRAM(S): at 14:50

## 2025-05-02 RX ADMIN — MEROPENEM 100 MILLIGRAM(S): 1 INJECTION INTRAVENOUS at 05:03

## 2025-05-02 RX ADMIN — Medication 4 MILLIGRAM(S): at 09:32

## 2025-05-02 NOTE — PROGRESS NOTE ADULT - SUBJECTIVE AND OBJECTIVE BOX
Patient seen and evaluated this am, feeling nauseous today but improved       T(F): 96.8 (05-02-25 @ 07:05), Max: 96.8 (05-02-25 @ 07:05)  HR: 52 (05-02-25 @ 14:07)  BP: 136/70 (05-02-25 @ 14:07)  RR: 16 (05-02-25 @ 14:07)  SpO2: 95% (05-02-25 @ 14:07) (94% - 98%)    PHYSICAL EXAM:  GENERAL: NAD  HEAD:  Atraumatic, Normocephalic  EYES: EOMI, PERRLA, conjunctiva and sclera clear  NERVOUS SYSTEM:  Alert & Oriented X3, no focal deficits   CHEST/LUNG: Clear to percussion bilaterally; No rales, rhonchi, wheezing, or rubs  HEART: Regular rate and rhythm; No murmurs, rubs, or gallops  ABDOMEN: Soft, Nontender, Nondistended; Bowel sounds present  EXTREMITIES:  2+ Peripheral Pulses, No clubbing, cyanosis, or edema    LABS  05-02    138  |  95[L]  |  136[HH]  ----------------------------<  201[H]  3.1[L]   |  25  |  7.7[HH]    Ca    6.4[L]      02 May 2025 05:50  Phos  6.7     05-02  Mg     1.4     05-02    TPro  5.9[L]  /  Alb  2.4[L]  /  TBili  0.7  /  DBili  x   /  AST  244[H]  /  ALT  113[H]  /  AlkPhos  378[H]  05-01                          7.8    21.10 )-----------( 120      ( 02 May 2025 05:50 )             22.2     PT/INR - ( 01 May 2025 06:05 )   PT: 19.80 sec;   INR: 1.66 ratio         PTT - ( 01 May 2025 06:05 )  PTT:36.7 sec      Culture Results:   >=3 organisms. Probable collection contamination. (04-30-25)  Culture Results:   Growth in anaerobic bottle: Serratia marcescens  Direct identification is available within approximately 3-5  hours either by Blood Panel Multiplexed PCR or Direct  MALDI-TOF. Details: [email or web address] (04-30-25)  Culture Results:   Growth in anaerobic bottle: Gram Negative Rods (04-30-25)    RADIOLOGY  < from: CT Abdomen and Pelvis No Cont (04.30.25 @ 18:59) >  IMPRESSION:  Nonobstructing 1.8 cm calculus within the left renal collecting system   near the UPJ. Decreased hydroureter when compared with available prior   imaging, now mild to moderate.    Additional nonobstructing calculi within the distal left ureter. Largest   fragment measures approximately 0.6 cm.    Stool impaction of the rectum    < end of copied text >    MEDICATIONS  (STANDING):  chlorhexidine 2% Cloths 1 Application(s) Topical <User Schedule>  folic acid 1 milliGRAM(s) Oral daily  hydrocortisone sodium succinate Injectable 50 milliGRAM(s) IV Push every 6 hours  influenza  Vaccine (HIGH DOSE) 0.5 milliLiter(s) IntraMuscular once  lactated ringers. 1000 milliLiter(s) (75 mL/Hr) IV Continuous <Continuous>  meropenem  IVPB 500 milliGRAM(s) IV Intermittent every 24 hours  midodrine. 10 milliGRAM(s) Oral every 8 hours  mupirocin 2% Nasal 1 Application(s) Both Nostrils two times a day  sodium bicarbonate 1300 milliGRAM(s) Oral every 8 hours    MEDICATIONS  (PRN):  ondansetron Injectable 4 milliGRAM(s) IV Push every 6 hours PRN Nausea and/or Vomiting

## 2025-05-02 NOTE — PROGRESS NOTE ADULT - ASSESSMENT
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

## 2025-05-02 NOTE — DIETITIAN INITIAL EVALUATION ADULT - PERTINENT MEDS FT
MEDICATIONS  (STANDING):  chlorhexidine 2% Cloths 1 Application(s) Topical <User Schedule>  folic acid 1 milliGRAM(s) Oral daily  hydrocortisone sodium succinate Injectable 50 milliGRAM(s) IV Push every 6 hours  influenza  Vaccine (HIGH DOSE) 0.5 milliLiter(s) IntraMuscular once  lactated ringers. 1000 milliLiter(s) (75 mL/Hr) IV Continuous <Continuous>  meropenem  IVPB 500 milliGRAM(s) IV Intermittent every 24 hours  midodrine. 10 milliGRAM(s) Oral every 8 hours  mupirocin 2% Nasal 1 Application(s) Both Nostrils two times a day  sodium bicarbonate 1300 milliGRAM(s) Oral every 8 hours    MEDICATIONS  (PRN):  ondansetron Injectable 4 milliGRAM(s) IV Push every 6 hours PRN Nausea and/or Vomiting

## 2025-05-02 NOTE — DIETITIAN INITIAL EVALUATION ADULT - PERTINENT LABORATORY DATA
05-02    138  |  94[L]  |  136[HH]  ----------------------------<  170[H]  3.6   |  27  |  7.7[HH]    Ca    6.5[L]      02 May 2025 15:46  Phos  6.7     05-02  Mg     1.4     05-02    TPro  5.9[L]  /  Alb  2.4[L]  /  TBili  0.7  /  DBili  x   /  AST  244[H]  /  ALT  113[H]  /  AlkPhos  378[H]  05-01  A1C with Estimated Average Glucose Result: 6.0 % (04-29-25 @ 08:48)  A1C with Estimated Average Glucose Result: 6.9 % (07-10-24 @ 06:36)   05-02    138  |  94[L]  |  136[HH]  ----------------------------<  170[H]  3.6   |  27  |  7.7[HH]    Ca    6.5[L]      02 May 2025 15:46  Phos  6.7     05-02  Mg     1.4     05-02    TPro  5.9[L]  /  Alb  2.4[L]  /  TBili  0.7  /  DBili  x   /  AST  244[H]  /  ALT  113[H]  /  AlkPhos  378[H]  05-01  A1C with Estimated Average Glucose Result: 6.0 % (04-29-25 @ 08:48)  A1C with Estimated Average Glucose Result: 6.9 % (07-10-24 @ 06:36)                          7.8    21.10 )-----------( 120      ( 02 May 2025 05:50 )             22.2

## 2025-05-02 NOTE — DIETITIAN INITIAL EVALUATION ADULT - OTHER INFO
pt is 70 year old male with hx of HTN, CKD V, HTN, paraplegia, nephrolithiasis, chronic metabolic acidosis, neurogenic bladder p/w blood within suprapubic cath pt admitted with acute vs chronic severe metabolic acidosis, hematuria. 5/1 s/p B/L ureteral JJ stent placement.

## 2025-05-02 NOTE — CONSULT NOTE ADULT - ASSESSMENT
IMPRESSION:  metabolic acidosis   JOÃO on CKD   obstructive uropathy  b/l kidney stone s/p stent   bacteremia       PLAN:    CNS: no sedation     HEENT: oral care     PULMONARY: keep pox >92%   incentive [place]    CARDIOVASCULAR: d/c bicarb drip   Lr 70cc/ hr   follow CE   echo     GI: GI prophylaxis.  Feeding     RENAL: follow lytes   replace MG   follow bun, cr   monitor is and os   d/c bicarb drip   follow renal consult     INFECTIOUS DISEASE: follow cx    on MErop     HEMATOLOGICAL:  DVT prophylaxis. follow h/h     ENDOCRINE:  Follow up FS.  Insulin protocol if needed.    SDU         CRITICAL CARE TIME SPENT: ***

## 2025-05-02 NOTE — DIETITIAN INITIAL EVALUATION ADULT - PROBLEM SELECTOR PLAN 1
- Admit to SDU  - IVF hydration with NS at 75 cc/hr  - Continue Bicarb drip as per Nephrology recommendations  - Hold Eliquis regimen  - Will administer 1x dose of 5mg of Vit K due to elevated INR in the setting of bleeding  - Will consider administering FFP if bleeding continues and/or if pt becomes hemodynamically stable  - Monitor I/Os  - Continue abx regimen   - BCX x2, Ucx  - Obtain procalcitonin level  - Obtain TSH, Lipid panel, A1c, and Cortisol level  - Monitor electrolytes and replete as needed to maintain K of 4, Mg of 2, and Phos of 3  - Trend lactate until normalized  - Follow up repeat labs in the AM  - Nephrology consult, follow up recommendations  - Urology consult for evaluation of hematuria, follow up recommendations

## 2025-05-02 NOTE — PROGRESS NOTE ADULT - ASSESSMENT
71yo male admitted to CCU for Spetic Stone/Urosepsis.  POD 1 s/p B/l stent placement        Plan:  1. Septic stone  - s/p b/l stents  - will need further management when infection cleared as OP    2. Urosepsis/Bacteremia  - cont IV antibx per ID ([place])  - trend WBC and fever curve  - fup final Ucx  - ID following  - all other care per CCU team    3. JOÃO  - improving  - cont IVF  - trend BUN/Cr    All above D/W Dr Morocho

## 2025-05-02 NOTE — PROGRESS NOTE ADULT - SUBJECTIVE AND OBJECTIVE BOX
Nephrology Progress Note    KRISTY GARCIA  MRN-[id number]  70y  Male    S:  Patient is seen and examined, events over the last 24h noted.    O:  Allergies:  No Known Allergies    Hospital Medications:   MEDICATIONS  (STANDING):  chlorhexidine 2% Cloths 1 Application(s) Topical <User Schedule>  folic acid 1 milliGRAM(s) Oral daily  hydrocortisone sodium succinate Injectable 50 milliGRAM(s) IV Push every 6 hours  influenza  Vaccine (HIGH DOSE) 0.5 milliLiter(s) IntraMuscular once  lactated ringers. 1000 milliLiter(s) (75 mL/Hr) IV Continuous <Continuous>  meropenem  IVPB 500 milliGRAM(s) IV Intermittent every 24 hours  midodrine. 10 milliGRAM(s) Oral every 8 hours  mupirocin 2% Nasal 1 Application(s) Both Nostrils two times a day  sodium bicarbonate 1300 milliGRAM(s) Oral every 8 hours    MEDICATIONS  (PRN):  ondansetron Injectable 4 milliGRAM(s) IV Push every 6 hours PRN Nausea and/or Vomiting    Home Medications:  Eliquis 5 mg oral tablet: 0.5 tab(s) orally every 12 hours (2025 23:24)  folic acid 1 mg oral tablet: 1 tab(s) orally once a day (2025 09:09)      VITALS:  Daily     Daily Weight in k (02 May 2025 07:05)  T(F): 96.8 (25 @ 07:05), Max: 97.1 (25 @ 11:01)  HR: 59 (25 @ 07:00)  BP: 134/70 (25 @ 07:00)  RR: 22 (25 @ 07:05)  SpO2: 98% (25 @ 07:05)  Wt(kg): --  I&O's Detail    01 May 2025 07:01  -  02 May 2025 07:00  --------------------------------------------------------  IN:    Lactated Ringers: 1125 mL    Sodium Bicarbonate: 2250 mL  Total IN: 3375 mL    OUT:    Indwelling Catheter - Suprapubic (mL): 2100 mL  Total OUT: 2100 mL    Total NET: 1275 mL      02 May 2025 07:  -  02 May 2025 10:21  --------------------------------------------------------  IN:    IV PiggyBack: 150 mL    Lactated Ringers: 75 mL    Sodium Bicarbonate: 150 mL  Total IN: 375 mL    OUT:  Total OUT: 0 mL    Total NET: 375 mL        I&O's Summary    01 May 2025 07:  -  02 May 2025 07:00  --------------------------------------------------------  IN: 3375 mL / OUT: 2100 mL / NET: 1275 mL    02 May 2025 07:  -  02 May 2025 10:21  --------------------------------------------------------  IN: 375 mL / OUT: 0 mL / NET: 375 mL          PHYSICAL EXAM:  Gen: NAD  Chest: b/l breath sounds  Abd: soft  Extremities: no edema  Vascular access:       LABS:    Blood Gas Profile w/Lytes - Venous: Performed in Lab (25 @ 19:13)  Blood Gas Venous - Sodium: 132 mmol/L (25 @ 19:13)  Blood Gas Venous - Potassium: 4.2 mmol/L (25 @ 19:13)        138  |  95[L]  |  136[HH]  ----------------------------<  201[H]  3.1[L]   |  25  |  7.7[HH]    Ca    6.4[L]      02 May 2025 05:50  Phos  6.7     05-  Mg     1.4         TPro  5.9[L]  /  Alb  2.4[L]  /  TBili  0.7  /  DBili      /  AST  244[H]  /  ALT  113[H]  /  AlkPhos  378[H]      eGFR: 7 mL/min/1.73m2 (25 @ 05:50)  eGFR: 6 mL/min/1.73m2 (25 @ 22:00)    Phosphorus: 6.7 mg/dL (25 @ 05:50)  Phosphorus: 4.5 mg/dL (24 @ 07:09)    Intact PTH: 43 pg/mL (07-10-24 @ 21:08)  Vitamin D, 25-Hydroxy: 13 ng/mL (23 @ 09:17)                          7.8    21.10 )-----------( 120      ( 02 May 2025 05:50 )             22.2     Mean Cell Volume: 84.4 fL (25 @ 05:50)    Iron Total: 35 ug/dL (24 @ 08:12)  % Saturation, Iron: 30 % (24 @ 08:12)      % Saturation, Iron: 30 % (24 @ 08:12)  % Saturation, Iron: 21 % (23 @ 09:17)    Urine Studies:  Protein, Urine: 300 mg/dL (25 @ 20:03)  White Blood Cell - Urine: Too Numerous to count /HPF (25 @ 20:03)  Red Blood Cell - Urine: Too Numerous to count /HPF (25 @ 20:03)  Protein, Urine: 100 mg/dL (24 @ 13:50)  White Blood Cell - Urine: 50 /HPF (24 @ 13:50)  Red Blood Cell - Urine: 10 /HPF (24 @ 13:50)  Protein, Urine: 100 mg/dL (23 @ 12:20)  White Blood Cell - Urine: 469 /HPF (23 @ 12:20)  Red Blood Cell - Urine: 31 /HPF (23 @ 12:20)  Protein, Urine: 100 mg/dL (21 @ 00:30)  White Blood Cell - Urine: 121 /HPF (21 @ 00:30)  Red Blood Cell - Urine: 71 /HPF (21 @ 00:30)      Urea Nitrogen,  Random Urine: 514 mg/dL (23 @ 12:20)    Culture Results:   >100,000 CFU/ml Gram Negative Rods ( @ 20:03)  Culture Results:   Growth in anaerobic bottle: Gram Negative Rods  Direct identification is available within approximately 3-5  hours either by Blood Panel Multiplexed PCR or Direct  MALDI-TOF. Details: [email or web address] ( @ 17:54)    Creatinine trend:  Creatinine: 7.7 mg/dL (25 @ 05:50)  Creatinine: 8.2 mg/dL (25 @ 22:00)  Creatinine: 8.2 mg/dL (25 @ 20:07)  Creatinine: 9.1 mg/dL (25 @ 15:55)  Creatinine: 9.6 mg/dL (25 @ 06:05)   Nephrology Progress Note    KRISTY GARCIA  MRN-[id number]  70y  Male    S:  Patient is seen and examined, events over the last 24h noted.    O:  Allergies:  No Known Allergies    Hospital Medications:   MEDICATIONS  (STANDING):  chlorhexidine 2% Cloths 1 Application(s) Topical <User Schedule>  folic acid 1 milliGRAM(s) Oral daily  hydrocortisone sodium succinate Injectable 50 milliGRAM(s) IV Push every 6 hours  influenza  Vaccine (HIGH DOSE) 0.5 milliLiter(s) IntraMuscular once  lactated ringers. 1000 milliLiter(s) (75 mL/Hr) IV Continuous <Continuous>  meropenem  IVPB 500 milliGRAM(s) IV Intermittent every 24 hours  midodrine. 10 milliGRAM(s) Oral every 8 hours  mupirocin 2% Nasal 1 Application(s) Both Nostrils two times a day  sodium bicarbonate 1300 milliGRAM(s) Oral every 8 hours    MEDICATIONS  (PRN):  ondansetron Injectable 4 milliGRAM(s) IV Push every 6 hours PRN Nausea and/or Vomiting    Home Medications:  Eliquis 5 mg oral tablet: 0.5 tab(s) orally every 12 hours (2025 23:24)  folic acid 1 mg oral tablet: 1 tab(s) orally once a day (2025 09:09)      VITALS:  Daily     Daily Weight in k (02 May 2025 07:05)  T(F): 96.8 (25 @ 07:05), Max: 97.1 (25 @ 11:01)  HR: 59 (25 @ 07:00)  BP: 134/70 (25 @ 07:00)  RR: 22 (25 @ 07:05)  SpO2: 98% (25 @ 07:05)  Wt(kg): --  I&O's Detail    01 May 2025 07:01  -  02 May 2025 07:00  --------------------------------------------------------  IN:    Lactated Ringers: 1125 mL    Sodium Bicarbonate: 2250 mL  Total IN: 3375 mL    OUT:    Indwelling Catheter - Suprapubic (mL): 2100 mL  Total OUT: 2100 mL    Total NET: 1275 mL      02 May 2025 07:  -  02 May 2025 10:21  --------------------------------------------------------  IN:    IV PiggyBack: 150 mL    Lactated Ringers: 75 mL    Sodium Bicarbonate: 150 mL  Total IN: 375 mL    OUT:  Total OUT: 0 mL    Total NET: 375 mL        I&O's Summary    01 May 2025 07:  -  02 May 2025 07:00  --------------------------------------------------------  IN: 3375 mL / OUT: 2100 mL / NET: 1275 mL    02 May 2025 07:  -  02 May 2025 10:21  --------------------------------------------------------  IN: 375 mL / OUT: 0 mL / NET: 375 mL          PHYSICAL EXAM:  Gen: ill appearing in NAD  Chest: b/l breath sounds  Abd: soft, SPT draining clear urine  Extremities: no edema      LABS:        138  |  95[L]  |  136[HH]  ----------------------------<  201[H]  3.1[L]   |  25  |  7.7[HH]    Ca    6.4[L]      02 May 2025 05:50  Phos  6.7       Mg     1.4         TPro  5.9[L]  /  Alb  2.4[L]  /  TBili  0.7  /  DBili      /  AST  244[H]  /  ALT  113[H]  /  AlkPhos  378[H]        Phosphorus: 6.7 mg/dL (25 @ 05:50)                          7.8    21.10 )-----------( 120      ( 02 May 2025 05:50 )             22.2     Mean Cell Volume: 84.4 fL (25 @ 05:50)    Urine Studies:  Protein, Urine: 300 mg/dL (25 @ 20:03)  White Blood Cell - Urine: Too Numerous to count /HPF (25 @ 20:03)  Red Blood Cell - Urine: Too Numerous to count /HPF (25 @ 20:03)    Culture Results:   >100,000 CFU/ml Gram Negative Rods ( @ 20:03)  Culture Results:   Growth in anaerobic bottle: Gram Negative Rods  Direct identification is available within approximately 3-5  hours either by Blood Panel Multiplexed PCR or Direct  MALDI-TOF. Details: [email or web address] ( @ 17:54)    Creatinine trend:  Creatinine: 7.7 mg/dL (25 @ 05:50)  Creatinine: 8.2 mg/dL (25 @ 22:00)  Creatinine: 8.2 mg/dL (25 @ 20:07)  Creatinine: 9.1 mg/dL (25 @ 15:55)  Creatinine: 9.6 mg/dL (25 @ 06:05)

## 2025-05-02 NOTE — PROGRESS NOTE ADULT - ASSESSMENT
JOÃO on advanced CKD / ?d/t hypotension / left hydronephrosis   Bacteremia  b/l renal calculi  - non oliguric  - creat trending down  - cont iv hydration with LR  - monitor calcium level closely and maintain calcium corrected > 8/iCa > 1  - cont document urine output  -  f/u, s/p cystoscopy with b/l ureteral stent placement  - f/u cultures / on meropenem  - obtain TTE/ r/o pericardial effusion  - cont po sodium bicarb 1300mg q8h   - replete K to 4  - phos high; start calcium actetate 1 tab tid with meals  - monitor chemistry qshift until electrolytes are more stable  - monitor h/h / r/o gi bleed / check iron stores

## 2025-05-02 NOTE — PROGRESS NOTE ADULT - SUBJECTIVE AND OBJECTIVE BOX
STATUS POST:  cysto, b/l stent placement, SPT change    POST OPERATIVE DAY #: 1      Subjective: 71 yo male admitted to CCU for Urosepsis and septic stone. Pt is POD 1 s/p above. He still feels unwell overall. Admits to some nausea. Juaquin any abdominal or flank pain.             Vital Signs Last 24 Hrs  T(C): 36 (02 May 2025 07:05), Max: 36.2 (01 May 2025 11:01)  T(F): 96.8 (02 May 2025 07:05), Max: 97.1 (01 May 2025 11:01)  HR: 59 (02 May 2025 07:00) (56 - 71)  BP: 134/70 (02 May 2025 07:00) (100/57 - 134/70)  BP(mean): 93 (02 May 2025 07:00) (81 - 93)  RR: 22 (02 May 2025 07:05) (14 - 31)  SpO2: 98% (02 May 2025 07:05) (94% - 98%)    Parameters below as of 02 May 2025 07:00  Patient On (Oxygen Delivery Method): room air        General Appearance: Appears well, NAD  Neck: Supple  Chest: Equal expansion bilaterally, equal breath sounds  CV: Pulse regular presently  Abdomen: Soft, nontense, appropriate incisional tenderness, dressings clean and dry and intact  SPT in place, no surrounding erythema  Extremities: Grossly symmetric, no calf tend b/l    SPT: pyuric urine      I&O's Summary    01 May 2025 07:01  -  02 May 2025 07:00  --------------------------------------------------------  IN: 3375 mL / OUT: 2100 mL / NET: 1275 mL    02 May 2025 07:01  -  02 May 2025 09:55  --------------------------------------------------------  IN: 375 mL / OUT: 0 mL / NET: 375 mL      I&O's Detail    01 May 2025 07:01  -  02 May 2025 07:00  --------------------------------------------------------  IN:    Lactated Ringers: 1125 mL    Sodium Bicarbonate: 2250 mL  Total IN: 3375 mL    OUT:    Indwelling Catheter - Suprapubic (mL): 2100 mL  Total OUT: 2100 mL    Total NET: 1275 mL      02 May 2025 07:01  -  02 May 2025 09:55  --------------------------------------------------------  IN:    IV PiggyBack: 150 mL    Lactated Ringers: 75 mL    Sodium Bicarbonate: 150 mL  Total IN: 375 mL    OUT:  Total OUT: 0 mL    Total NET: 375 mL          MEDICATIONS  (STANDING):  chlorhexidine 2% Cloths 1 Application(s) Topical <User Schedule>  folic acid 1 milliGRAM(s) Oral daily  hydrocortisone sodium succinate Injectable 50 milliGRAM(s) IV Push every 6 hours  influenza  Vaccine (HIGH DOSE) 0.5 milliLiter(s) IntraMuscular once  lactated ringers. 1000 milliLiter(s) (75 mL/Hr) IV Continuous <Continuous>  meropenem  IVPB 500 milliGRAM(s) IV Intermittent every 24 hours  midodrine. 10 milliGRAM(s) Oral every 8 hours  mupirocin 2% Nasal 1 Application(s) Both Nostrils two times a day  potassium chloride  20 mEq/100 mL IVPB 20 milliEquivalent(s) IV Intermittent every 2 hours  sodium bicarbonate 1300 milliGRAM(s) Oral every 8 hours    MEDICATIONS  (PRN):  ondansetron Injectable 4 milliGRAM(s) IV Push every 6 hours PRN Nausea and/or Vomiting      LABS:                        7.8    21.10 )-----------( 120      ( 02 May 2025 05:50 )             22.2     05-02    138  |  95[L]  |  136[HH]  ----------------------------<  201[H]  3.1[L]   |  25  |  7.7[HH]    Ca    6.4[L]      02 May 2025 05:50  Phos  6.7     05-02  Mg     1.4     05-02    TPro  5.9[L]  /  Alb  2.4[L]  /  TBili  0.7  /  DBili  x   /  AST  244[H]  /  ALT  113[H]  /  AlkPhos  378[H]  05-01    PT/INR - ( 01 May 2025 06:05 )   PT: 19.80 sec;   INR: 1.66 ratio         PTT - ( 01 May 2025 06:05 )  PTT:36.7 sec  Urinalysis Basic - ( 02 May 2025 05:50 )    Color: x / Appearance: x / SG: x / pH: x  Gluc: 201 mg/dL / Ketone: x  / Bili: x / Urobili: x   Blood: x / Protein: x / Nitrite: x   Leuk Esterase: x / RBC: x / WBC x   Sq Epi: x / Non Sq Epi: x / Bacteria: x    Cultures:    Culture - Blood (collected 04-30-25 @ 17:54)  Source: Blood Blood-Peripheral  Gram Stain (05-02-25 @ 09:05):    Growth in anaerobic bottle: Gram Negative Rods  Preliminary Report (05-02-25 @ 09:06):    Growth in anaerobic bottle: Gram Negative Rods    Culture - Blood (collected 04-30-25 @ 17:54)  Source: Blood Blood-Peripheral  Gram Stain (05-01-25 @ 13:23):    Growth in anaerobic bottle: Gram Negative Rods  Preliminary Report (05-01-25 @ 13:23):    Growth in anaerobic bottle: Gram Negative Rods    Direct identification is available within approximately 3-5    hours either by Blood Panel Multiplexed PCR or Direct    MALDI-TOF. Details: [email or web address]  Organism: Blood Culture PCR (05-01-25 @ 17:19)  Organism: Blood Culture PCR (05-01-25 @ 17:19)      Method Type: PCR      -  Serratia marcescens: Detec      Culture - Urine (collected 04-30-25 @ 20:03)  Source: Clean Catch None  Preliminary Report (05-02-25 @ 08:29):    >100,000 CFU/ml Gram Negative Rods        RADIOLOGY & ADDITIONAL STUDIES: none new

## 2025-05-02 NOTE — PROGRESS NOTE ADULT - ASSESSMENT
71 yo male with a PMH of HTN, CKD stage 5, HTN, Paraplegia, Nephrolithiasis, Chronic metabolic acidosis, and Neurogenic bladder who presents with complaint of blood within the suprapubic catheter s/p initiation of Eliquis prompting critical care     ID is consulted for septic stone  Afebrile  WBC Count: 24.45 (05-01-25 @ 06:05)  WBC Count: 29.31 (04-30-25 @ 17:54)  WBC Count: 9.68 (04-29-25 @ 08:48)  On room air  BCx 4/30 Serratia marcescens  UA with pyuria, UCx GNR  Outpatient UCx 4/21   P. mirabilis (S to cefepime, ceftriaxone, R to FQs and Bactrim)  P. aeruginosa (S to cefepime, FQs and meropenem)  S. marcescens (S to cefepime, FQs, Bactrim, R to ceftriaxone)  MSSA    CT A/P  Nonobstructing 1.8 cm calculus within the left renal collecting system   near the UPJ. Decreased hydroureter when compared with available prior   imaging, now mild to moderate.  Additional nonobstructing calculi within the distal left ureter. Largest   fragment measures approximately 0.6 cm.  Stool impaction of the rectum    S/p 5/1 cystoscopy with bilateral stent placement, UCx pending    Antibiotics:  Vancomycin 4/30  Cefepime 4/30  Meropenem 5/1 ->      IMPRESSION:  Serratia bacteremia, potentially life-threatening  Leukocytosis  Obstructive uropathy  JOÃO on CKD  Constipation  Immunosuppression / Immunosenescence secondary to multiple comorbidities which could result in poor clinical outcome    RECOMMENDATIONS:  - IV meropenem 500mg q24hrs  - Follow up BCx and UCx  - Urology follow up  - Bowel regimen  - Offloading and frequent position changes, aspiration precaution  - Trend WBC, fever curve, transaminases, creatinine daily  - Please inform ID of any patient clinical change or any new pertinent laboratory or radiographic data      * THIS IS AN INCOMPLETE NOTE. FINAL RECOMMENDATION IS PENDING *   69 yo male with a PMH of HTN, CKD stage 5, HTN, Paraplegia, Nephrolithiasis, Chronic metabolic acidosis, and Neurogenic bladder who presents with complaint of blood within the suprapubic catheter s/p initiation of Eliquis prompting critical care     ID is consulted for septic stone  Afebrile  WBC Count: 24.45 (05-01-25 @ 06:05)  WBC Count: 29.31 (04-30-25 @ 17:54)  WBC Count: 9.68 (04-29-25 @ 08:48)  On room air  BCx 4/30 Serratia marcescens  UA with pyuria, UCx GNR  Outpatient UCx 4/21   P. mirabilis (S to cefepime, ceftriaxone, R to FQs and Bactrim)  P. aeruginosa (S to cefepime, FQs and meropenem)  S. marcescens (S to cefepime, FQs, Bactrim, R to ceftriaxone)  MSSA    CT A/P  Nonobstructing 1.8 cm calculus within the left renal collecting system   near the UPJ. Decreased hydroureter when compared with available prior   imaging, now mild to moderate.  Additional nonobstructing calculi within the distal left ureter. Largest   fragment measures approximately 0.6 cm.  Stool impaction of the rectum    S/p 5/1 cystoscopy with bilateral stent placement, UCx pending    Antibiotics:  Vancomycin 4/30  Cefepime 4/30  Meropenem 5/1 ->      IMPRESSION:  Serratia bacteremia, potentially life-threatening  Leukocytosis  Obstructive uropathy  JOÃO on CKD  Constipation  Immunosuppression / Immunosenescence secondary to multiple comorbidities which could result in poor clinical outcome    RECOMMENDATIONS:  - IV meropenem 500mg q24hrs  - Follow up BCx and UCx  - Urology follow up  - Bowel regimen  - Offloading and frequent position changes, aspiration precaution  - Trend WBC, fever curve, transaminases, creatinine daily  - Please inform ID of any patient clinical change or any new pertinent laboratory or radiographic data      Jenn Leal D.O.  Attending Physician  Division of Infectious Diseases  [facility]  Please contact me via [employer] Teams

## 2025-05-02 NOTE — DIETITIAN INITIAL EVALUATION ADULT - ORAL INTAKE PTA/DIET HISTORY
as per pt consumes a regular diet PTA with hx of poor po intake. NKFA or intolerances. as per EMR review pt weighed 96.6 kgs in August of 2024 vs 81.6 kgs in July of 2024 vs 66.5 kgs this admission, indicating an unintentional weight loss of 66# in <1 year which pt confirms    presently on a DASH/TLC diet with <25% of meals consumed

## 2025-05-02 NOTE — PROGRESS NOTE ADULT - SUBJECTIVE AND OBJECTIVE BOX
INFECTIOUS DISEASE FOLLOW UP NOTE:    Interval History/ROS: Patient is a 70y old  Male who presents with a chief complaint of Abnormal lab (01 May 2025 16:11)      Overnight events:    REVIEW OF SYSTEMS:        Prior hospital charts reviewed [Yes]  Primary team notes reviewed [Yes]  Other consultant notes reviewed [Yes]    Allergies:  No Known Allergies      ANTIMICROBIALS:   meropenem  IVPB 500 every 24 hours      OTHER MEDS: MEDICATIONS  (STANDING):  hydrocortisone sodium succinate Injectable 50 every 6 hours  influenza  Vaccine (HIGH DOSE) 0.5 once  midodrine. 10 every 8 hours  ondansetron Injectable 4 every 6 hours PRN      Vital Signs Last 24 Hrs  T(F): 96.8 (05-02-25 @ 07:05), Max: 97.9 (05-01-25 @ 05:05)    Vital Signs Last 24 Hrs  HR: 59 (05-02-25 @ 07:00) (56 - 71)  BP: 134/70 (05-02-25 @ 07:00) (100/57 - 134/70)  RR: 22 (05-02-25 @ 07:05)  SpO2: 98% (05-02-25 @ 07:05) (94% - 98%)  Wt(kg): --    EXAM:      Labs:                        7.8    21.10 )-----------( 120      ( 02 May 2025 05:50 )             22.2     05-02    138  |  95[L]  |  136[HH]  ----------------------------<  201[H]  3.1[L]   |  25  |  7.7[HH]    Ca    6.4[L]      02 May 2025 05:50  Phos  6.7     05-02  Mg     1.4     05-02    TPro  5.9[L]  /  Alb  2.4[L]  /  TBili  0.7  /  DBili  x   /  AST  244[H]  /  ALT  113[H]  /  AlkPhos  378[H]  05-01      WBC Trend:  WBC Count: 21.10 (05-02-25 @ 05:50)  WBC Count: 24.45 (05-01-25 @ 06:05)  WBC Count: 29.31 (04-30-25 @ 17:54)  WBC Count: 9.68 (04-29-25 @ 08:48)      Creatine Trend:  Creatinine: 7.7 (05-02)  Creatinine: 8.2 (05-01)  Creatinine: 8.2 (05-01)  Creatinine: 9.1 (05-01)      Liver Biochemical Testing Trend:  Alanine Aminotransferase (ALT/SGPT): 113 *H* (05-01)  Alanine Aminotransferase (ALT/SGPT): 158 *H* (04-30)  Alanine Aminotransferase (ALT/SGPT): 19 (04-29)  Alanine Aminotransferase (ALT/SGPT): 15 (08-12)  Alanine Aminotransferase (ALT/SGPT): 14 (08-11)  Aspartate Aminotransferase (AST/SGOT): 244 (05-01-25 @ 06:05)  Aspartate Aminotransferase (AST/SGOT): 449 (04-30-25 @ 17:54)  Aspartate Aminotransferase (AST/SGOT): 33 (04-29-25 @ 08:48)  Aspartate Aminotransferase (AST/SGOT): 51 (08-12-24 @ 07:24)  Aspartate Aminotransferase (AST/SGOT): 57 (08-11-24 @ 08:36)  Bilirubin Total: 0.7 (05-01)  Bilirubin Total: 0.8 (04-30)  Bilirubin Total: 0.3 (04-29)  Bilirubin Total: 0.4 (08-12)  Bilirubin Total: 0.5 (08-11)      Trend LDH  08-11-24 @ 15:52  240  08-11-24 @ 08:36  238  08-09-24 @ 11:44  181      Urinalysis Basic - ( 02 May 2025 05:50 )    Color: x / Appearance: x / SG: x / pH: x  Gluc: 201 mg/dL / Ketone: x  / Bili: x / Urobili: x   Blood: x / Protein: x / Nitrite: x   Leuk Esterase: x / RBC: x / WBC x   Sq Epi: x / Non Sq Epi: x / Bacteria: x        MICROBIOLOGY:    MRSA PCR Result.: Positive (05-01-25 @ 09:00)      Urinalysis with Rflx Culture (collected 30 Apr 2025 20:03)    Culture - Urine (collected 30 Apr 2025 20:03)  Source: Clean Catch None  Preliminary Report:    >100,000 CFU/ml Gram Negative Rods    Culture - Blood (collected 30 Apr 2025 17:54)  Source: Blood Blood-Peripheral  Preliminary Report:    No growth at 24 hours    Culture - Blood (collected 30 Apr 2025 17:54)  Source: Blood Blood-Peripheral  Preliminary Report:    Growth in anaerobic bottle: Gram Negative Rods    Direct identification is available within approximately 3-5    hours either by Blood Panel Multiplexed PCR or Direct    MALDI-TOF. Details: [email or web address]  Organism: Blood Culture PCR  Organism: Blood Culture PCR    Sensitivities:      Method Type: PCR      -  Serratia marcescens: Detec    Culture - Fungal, Body Fluid (collected 11 Aug 2024 16:08)  Source: Pleural Fl Pleural Fluid  Final Report:    No fungus isolated at 4 weeks.    Culture - Body Fluid with Gram Stain (collected 11 Aug 2024 16:08)  Source: Pleural Fl Pleural Fluid  Final Report:    No growth at 5 days    Culture - Tissue with Gram Stain (collected 23 Jul 2024 22:38)  Source: .Tissue RIGHT RENAL AND URETERAL MUCOUS STONES  Final Report:    No growth at 5 days    Culture - Fungal, Tissue (collected 23 Jul 2024 21:36)  Source: .Tissue None  Final Report:    No fungus isolated at 4 weeks.    Culture - Acid Fast - Tissue w/Smear (collected 23 Jul 2024 21:36)  Source: Tissue None  Final Report:    No acid-fast bacilli isolated after 6 weeks.    Culture - Tissue with Gram Stain (collected 23 Jul 2024 21:36)  Source: .Tissue RIGHT RENAL MUCOUS STONE FOR C&amp;S AND FUNGUS  Final Report:    No growth at 5 days      Lactate, Blood: 1.1 (05-01 @ 15:55)  Lactate, Blood: 1.7 (05-01 @ 11:24)  Blood Gas Venous - Lactate: 5.0 (04-30 @ 19:13)  Lactate, Blood: 3.8 (04-30 @ 17:54)      RADIOLOGY:  imaging below personally reviewed    < from: CT Abdomen and Pelvis No Cont (04.30.25 @ 18:59) >  IMPRESSION:  Nonobstructing 1.8 cm calculus within the left renal collecting system   near the UPJ. Decreased hydroureter when compared with available prior   imaging, now mild to moderate.    Additional nonobstructing calculi within the distal left ureter. Largest   fragment measures approximately 0.6 cm.    Stool impaction of the rectum      < end of copied text >    < from: Xray Chest 1 View-PORTABLE IMMEDIATE (04.30.25 @ 18:20) >  IMPRESSION:    No radiographic evidence of acute cardiopulmonary disease.    < end of copied text >   INFECTIOUS DISEASE FOLLOW UP NOTE:    Interval History/ROS: Patient is a 70y old  Male who presents with a chief complaint of Abnormal lab (01 May 2025 16:11)    Overnight events: No overnight event.     REVIEW OF SYSTEMS:  CONSTITUTIONAL: No fever or chills  HEAD: No lesion on scalp  EYES: No visual disturbance  ENT: No sore throat  RESPIRATORY: No cough, no shortness of breath  CARDIOVASCULAR: No chest pain or palpitations  GASTROINTESTINAL: No abdominal or epigastric pain  GENITOURINARY: + SPC  NEUROLOGICAL: No headache/dizziness  MUSCULOSKELETAL: No joint pain, erythema, or swelling; no back pain  SKIN: No itching, rashes  All other ROS negative except noted above    Prior hospital charts reviewed [Yes]  Primary team notes reviewed [Yes]  Other consultant notes reviewed [Yes]    Allergies:  No Known Allergies      ANTIMICROBIALS:   meropenem  IVPB 500 every 24 hours      OTHER MEDS: MEDICATIONS  (STANDING):  hydrocortisone sodium succinate Injectable 50 every 6 hours  influenza  Vaccine (HIGH DOSE) 0.5 once  midodrine. 10 every 8 hours  ondansetron Injectable 4 every 6 hours PRN      Vital Signs Last 24 Hrs  T(F): 96.8 (05-02-25 @ 07:05), Max: 97.9 (05-01-25 @ 05:05)    Vital Signs Last 24 Hrs  HR: 59 (05-02-25 @ 07:00) (56 - 71)  BP: 134/70 (05-02-25 @ 07:00) (100/57 - 134/70)  RR: 22 (05-02-25 @ 07:05)  SpO2: 98% (05-02-25 @ 07:05) (94% - 98%)  Wt(kg): --    EXAM:  GENERAL: NAD, lying in bed  HEAD: No head lesions  EYES: Conjunctiva pink and cornea white  EAR, NOSE, MOUTH, THROAT: Normal external ears and nose, no discharges; moist mucous membranes  NECK: Supple, nontender to palpation; no JVD  RESPIRATORY: Clear to auscultation bilaterally  CARDIOVASCULAR: S1 S2  GASTROINTESTINAL: Soft, nontender, nondistended; normoactive bowel sounds  GENITOURINARY: + SPC, no CVA tenderness  EXTREMITIES: No clubbing, cyanosis, or petal edema  NERVOUS SYSTEM: Awake and alert, not fully oriented  MUSCULOSKELETAL: No joint erythema, swelling or pain  SKIN: Area of abrasions on his knees, no sign of infection, no superficial thrombophlebitis  PSYCH: Normal affect    Labs:                        7.8    21.10 )-----------( 120      ( 02 May 2025 05:50 )             22.2     05-02    138  |  95[L]  |  136[HH]  ----------------------------<  201[H]  3.1[L]   |  25  |  7.7[HH]    Ca    6.4[L]      02 May 2025 05:50  Phos  6.7     05-02  Mg     1.4     05-02    TPro  5.9[L]  /  Alb  2.4[L]  /  TBili  0.7  /  DBili  x   /  AST  244[H]  /  ALT  113[H]  /  AlkPhos  378[H]  05-01      WBC Trend:  WBC Count: 21.10 (05-02-25 @ 05:50)  WBC Count: 24.45 (05-01-25 @ 06:05)  WBC Count: 29.31 (04-30-25 @ 17:54)  WBC Count: 9.68 (04-29-25 @ 08:48)      Creatine Trend:  Creatinine: 7.7 (05-02)  Creatinine: 8.2 (05-01)  Creatinine: 8.2 (05-01)  Creatinine: 9.1 (05-01)      Liver Biochemical Testing Trend:  Alanine Aminotransferase (ALT/SGPT): 113 *H* (05-01)  Alanine Aminotransferase (ALT/SGPT): 158 *H* (04-30)  Alanine Aminotransferase (ALT/SGPT): 19 (04-29)  Alanine Aminotransferase (ALT/SGPT): 15 (08-12)  Alanine Aminotransferase (ALT/SGPT): 14 (08-11)  Aspartate Aminotransferase (AST/SGOT): 244 (05-01-25 @ 06:05)  Aspartate Aminotransferase (AST/SGOT): 449 (04-30-25 @ 17:54)  Aspartate Aminotransferase (AST/SGOT): 33 (04-29-25 @ 08:48)  Aspartate Aminotransferase (AST/SGOT): 51 (08-12-24 @ 07:24)  Aspartate Aminotransferase (AST/SGOT): 57 (08-11-24 @ 08:36)  Bilirubin Total: 0.7 (05-01)  Bilirubin Total: 0.8 (04-30)  Bilirubin Total: 0.3 (04-29)  Bilirubin Total: 0.4 (08-12)  Bilirubin Total: 0.5 (08-11)      Trend LDH  08-11-24 @ 15:52  240  08-11-24 @ 08:36  238  08-09-24 @ 11:44  181      Urinalysis Basic - ( 02 May 2025 05:50 )    Color: x / Appearance: x / SG: x / pH: x  Gluc: 201 mg/dL / Ketone: x  / Bili: x / Urobili: x   Blood: x / Protein: x / Nitrite: x   Leuk Esterase: x / RBC: x / WBC x   Sq Epi: x / Non Sq Epi: x / Bacteria: x        MICROBIOLOGY:    MRSA PCR Result.: Positive (05-01-25 @ 09:00)      Urinalysis with Rflx Culture (collected 30 Apr 2025 20:03)    Culture - Urine (collected 30 Apr 2025 20:03)  Source: Clean Catch None  Preliminary Report:    >100,000 CFU/ml Gram Negative Rods    Culture - Blood (collected 30 Apr 2025 17:54)  Source: Blood Blood-Peripheral  Preliminary Report:    No growth at 24 hours    Culture - Blood (collected 30 Apr 2025 17:54)  Source: Blood Blood-Peripheral  Preliminary Report:    Growth in anaerobic bottle: Gram Negative Rods    Direct identification is available within approximately 3-5    hours either by Blood Panel Multiplexed PCR or Direct    MALDI-TOF. Details: [email or web address]  Organism: Blood Culture PCR  Organism: Blood Culture PCR    Sensitivities:      Method Type: PCR      -  Serratia marcescens: Detec    Culture - Fungal, Body Fluid (collected 11 Aug 2024 16:08)  Source: Pleural Fl Pleural Fluid  Final Report:    No fungus isolated at 4 weeks.    Culture - Body Fluid with Gram Stain (collected 11 Aug 2024 16:08)  Source: Pleural Fl Pleural Fluid  Final Report:    No growth at 5 days    Culture - Tissue with Gram Stain (collected 23 Jul 2024 22:38)  Source: .Tissue RIGHT RENAL AND URETERAL MUCOUS STONES  Final Report:    No growth at 5 days    Culture - Fungal, Tissue (collected 23 Jul 2024 21:36)  Source: .Tissue None  Final Report:    No fungus isolated at 4 weeks.    Culture - Acid Fast - Tissue w/Smear (collected 23 Jul 2024 21:36)  Source: Tissue None  Final Report:    No acid-fast bacilli isolated after 6 weeks.    Culture - Tissue with Gram Stain (collected 23 Jul 2024 21:36)  Source: .Tissue RIGHT RENAL MUCOUS STONE FOR C&amp;S AND FUNGUS  Final Report:    No growth at 5 days      Lactate, Blood: 1.1 (05-01 @ 15:55)  Lactate, Blood: 1.7 (05-01 @ 11:24)  Blood Gas Venous - Lactate: 5.0 (04-30 @ 19:13)  Lactate, Blood: 3.8 (04-30 @ 17:54)      RADIOLOGY:  imaging below personally reviewed    < from: CT Abdomen and Pelvis No Cont (04.30.25 @ 18:59) >  IMPRESSION:  Nonobstructing 1.8 cm calculus within the left renal collecting system   near the UPJ. Decreased hydroureter when compared with available prior   imaging, now mild to moderate.    Additional nonobstructing calculi within the distal left ureter. Largest   fragment measures approximately 0.6 cm.    Stool impaction of the rectum      < end of copied text >    < from: Xray Chest 1 View-PORTABLE IMMEDIATE (04.30.25 @ 18:20) >  IMPRESSION:    No radiographic evidence of acute cardiopulmonary disease.    < end of copied text >

## 2025-05-02 NOTE — CONSULT NOTE ADULT - CRITICAL CARE ATTENDING COMMENT
The patient's injury acutely impairs one or more vital organ systems, and there is a high probability of imminent or life threatening deterioration in the patient’s condition. The care of this patient requires complex medical decision making in the field of Infectious Diseases and extensive interpretation of laboratory, microbiological, and radiographic data
This patient is critically ill due to the following:  * Multiple organ failure requiring complex decision-making, and there is a high probability of imminent or life-threatening deterioration in the patient’s condition  * The patient required frequent reassessments and monitoring to ensure response to interventions and therapies.    Critical care time includes time spent evaluating and treating the patient's acute illness as well as time spent reviewing labs, radiology,  and discussing the case with a multidisciplinary team in an effort to prevent further life threatening deterioration or end organ damage. This time is independent of any procedures performed.

## 2025-05-02 NOTE — PROGRESS NOTE ADULT - ASSESSMENT
metabolic acidosis   JÃOO on CKD   obstructive uropathy  b/l kidney stone s/p stent   bacteremia       PLAN:    CNS: no sedation     HEENT: oral care     PULMONARY: keep pox >92%   incentive [place]    CARDIOVASCULAR: d/c bicarb drip   Lr 70cc/ hr   follow CE   echo   f/u trops    GI: GI prophylaxis.  Feeding     RENAL: follow lytes   replace MG   follow bun, cr   monitor is and os   d/c bicarb drip   follow renal consult     INFECTIOUS DISEASE: cult positive for GNR  on Merop   MRSA positive  s/p bicarb drip    HEMATOLOGICAL:  DVT prophylaxis. follow h/h     ENDOCRINE:  Follow up FS.  Insulin protocol if needed.    SDU

## 2025-05-02 NOTE — PROGRESS NOTE ADULT - SUBJECTIVE AND OBJECTIVE BOX
KRISTY GARCIA 70y Male  MRN#: [id number]   Hospital Day: 2d    SUBJECTIVE  Patient is a 70y old Male who presents with a chief complaint of abnormal lab (02 May 2025 08:59)  Currently admitted to medicine with the primary diagnosis of Septic shock      INTERVAL HPI AND OVERNIGHT EVENTS:  Patient was examined and seen at bedside. This morning he is resting comfortably in bed and reports no issues or overnight events.    OBJECTIVE  PAST MEDICAL & SURGICAL HISTORY  DM (diabetes mellitus)    HTN (hypertension)    H/O paraplegia    Spinal abscess    Renal stones    Stage 5 chronic kidney disease not on chronic dialysis    Neurogenic dysfunction of the urinary bladder    Encounter for care or replacement of suprapubic tube    Spinal abscess  S/P Surgery    Encounter for care or replacement of suprapubic tube    S/P ORIF (open reduction internal fixation) fracture      ALLERGIES:  No Known Allergies    MEDICATIONS:  STANDING MEDICATIONS  chlorhexidine 2% Cloths 1 Application(s) Topical <User Schedule>  folic acid 1 milliGRAM(s) Oral daily  hydrocortisone sodium succinate Injectable 50 milliGRAM(s) IV Push every 6 hours  influenza  Vaccine (HIGH DOSE) 0.5 milliLiter(s) IntraMuscular once  lactated ringers. 1000 milliLiter(s) IV Continuous <Continuous>  meropenem  IVPB 500 milliGRAM(s) IV Intermittent every 24 hours  midodrine. 10 milliGRAM(s) Oral every 8 hours  mupirocin 2% Nasal 1 Application(s) Both Nostrils two times a day  sodium bicarbonate 1300 milliGRAM(s) Oral every 8 hours    PRN MEDICATIONS  ondansetron Injectable 4 milliGRAM(s) IV Push every 6 hours PRN      VITAL SIGNS: Last 24 Hours  T(C): 36 (02 May 2025 07:05), Max: 36 (02 May 2025 07:05)  T(F): 96.8 (02 May 2025 07:05), Max: 96.8 (02 May 2025 07:05)  HR: 59 (02 May 2025 07:00) (56 - 59)  BP: 134/70 (02 May 2025 07:00) (110/59 - 134/70)  BP(mean): 93 (02 May 2025 07:00) (81 - 93)  RR: 22 (02 May 2025 07:05) (14 - 31)  SpO2: 98% (02 May 2025 07:05) (94% - 98%)    LABS:                        7.8    21.10 )-----------( 120      ( 02 May 2025 05:50 )             22.2     05-02    138  |  95[L]  |  136[HH]  ----------------------------<  201[H]  3.1[L]   |  25  |  7.7[HH]    Ca    6.4[L]      02 May 2025 05:50  Phos  6.7     05-02  Mg     1.4     05-02    TPro  5.9[L]  /  Alb  2.4[L]  /  TBili  0.7  /  DBili  x   /  AST  244[H]  /  ALT  113[H]  /  AlkPhos  378[H]  05-01    PT/INR - ( 01 May 2025 06:05 )   PT: 19.80 sec;   INR: 1.66 ratio         PTT - ( 01 May 2025 06:05 )  PTT:36.7 sec  Urinalysis Basic - ( 02 May 2025 05:50 )    Color: x / Appearance: x / SG: x / pH: x  Gluc: 201 mg/dL / Ketone: x  / Bili: x / Urobili: x   Blood: x / Protein: x / Nitrite: x   Leuk Esterase: x / RBC: x / WBC x   Sq Epi: x / Non Sq Epi: x / Bacteria: x        Lactate, Blood: 1.1 mmol/L (05-01-25 @ 15:55)  Lactate, Blood: 1.7 mmol/L (05-01-25 @ 11:24)      Urinalysis with Rflx Culture (collected 30 Apr 2025 20:03)    Culture - Urine (collected 30 Apr 2025 20:03)  Source: Clean Catch None  Preliminary Report (02 May 2025 08:29):    >100,000 CFU/ml Gram Negative Rods    Culture - Blood (collected 30 Apr 2025 17:54)  Source: Blood Blood-Peripheral  Gram Stain (02 May 2025 09:05):    Growth in anaerobic bottle: Gram Negative Rods  Preliminary Report (02 May 2025 09:06):    Growth in anaerobic bottle: Gram Negative Rods    Culture - Blood (collected 30 Apr 2025 17:54)  Source: Blood Blood-Peripheral  Gram Stain (01 May 2025 13:23):    Growth in anaerobic bottle: Gram Negative Rods  Preliminary Report (01 May 2025 13:23):    Growth in anaerobic bottle: Gram Negative Rods    Direct identification is available within approximately 3-5    hours either by Blood Panel Multiplexed PCR or Direct    MALDI-TOF. Details: [email or web address]  Organism: Blood Culture PCR (01 May 2025 17:19)  Organism: Blood Culture PCR (01 May 2025 17:19)          RADIOLOGY:      PHYSICAL EXAM:  CONSTITUTIONAL: No acute distress, AAOx3  HEAD: Atraumatic, normocephalic  EYES: EOM intact, PERRLA, conjunctiva and sclera clear  ENT: moist mucous membranes  PULMONARY: Clear to auscultation bilaterally  CARDIOVASCULAR: Regular rate and rhythm  GASTROINTESTINAL: Soft, non-tender, non-distended; bowel sounds present  MUSCULOSKELETAL: no edema  NEUROLOGY: non-focal  SKIN: warm and dry

## 2025-05-02 NOTE — CONSULT NOTE ADULT - SUBJECTIVE AND OBJECTIVE BOX
Patient is a 70y old  Male who presents with a chief complaint of Abnormal lab (01 May 2025 16:11)      HPI:  Pt is a 69 yo male with a PMH of HTN, CKD stage 5, HTN, Paraplegia, Nephrolithiasis, Chronic metabolic acidosis, and Neurogenic bladder who presents with complaint of blood within the suprapubic catheter s/p initiation of Eliquis prompting critical care assessment. Upon evaluation, the pt is awake and alert and answering most questions appropriately. The pt remains hemodynamically on room air with no acute complaints. The pt's bother is at bedside who confirms that the pt is compliant with medication regimen and physician office visits. No other complaints or concerns noted.  (30 Apr 2025 21:15)  patient was taken to OR s/p double J stent b/l   on bicarb drip     PAST MEDICAL & SURGICAL HISTORY:  DM (diabetes mellitus)      HTN (hypertension)      H/O paraplegia      Spinal abscess      Renal stones      Stage 5 chronic kidney disease not on chronic dialysis      Neurogenic dysfunction of the urinary bladder      Encounter for care or replacement of suprapubic tube      Spinal abscess  S/P Surgery      Encounter for care or replacement of suprapubic tube      S/P ORIF (open reduction internal fixation) fracture        Allergies    No Known Allergies    Intolerances      Family history : no cardiovscular family history   Home Medications:  Eliquis 5 mg oral tablet: 0.5 tab(s) orally every 12 hours (30 Apr 2025 23:24)  folic acid 1 mg oral tablet: 1 tab(s) orally once a day (29 Apr 2025 09:09)    Occupation:  Alochol: Denied  Smoking: Denied  Drug Use: Denied  Marital Status:         ROS: as in HPI; All other systems reviewed are negative    ICU Vital Signs Last 24 Hrs  T(C): 36 (02 May 2025 07:05), Max: 36.2 (01 May 2025 11:01)  T(F): 96.8 (02 May 2025 07:05), Max: 97.1 (01 May 2025 11:01)  HR: 59 (02 May 2025 07:00) (56 - 71)  BP: 134/70 (02 May 2025 07:00) (100/57 - 134/70)  BP(mean): 93 (02 May 2025 07:00) (81 - 93)  ABP: --  ABP(mean): --  RR: 22 (02 May 2025 07:05) (14 - 31)  SpO2: 98% (02 May 2025 07:05) (94% - 98%)    O2 Parameters below as of 02 May 2025 07:00  Patient On (Oxygen Delivery Method): room air              Physical Examination:    General: No acute distress.  Alert, oriented, interactive, nonfocal    HEENT: Pupils equal, reactive to light.  Symmetric.    PULM: Clear to auscultation bilaterally, no significant sputum production    CVS: Regular rate and rhythm, no murmurs, rubs, or gallops    ABD: Soft, nondistended, nontender, normoactive bowel sounds, no masses    EXT: No edema, nontender, no clubbing     SKIN: Warm and well perfused, no rashes noted.    Neurology : no motor or sensory deficit                I&O's Detail    01 May 2025 07:01  -  02 May 2025 07:00  --------------------------------------------------------  IN:    Lactated Ringers: 1125 mL    Sodium Bicarbonate: 2250 mL  Total IN: 3375 mL    OUT:    Indwelling Catheter - Suprapubic (mL): 2100 mL  Total OUT: 2100 mL    Total NET: 1275 mL            LABS:                        7.8    21.10 )-----------( 120      ( 02 May 2025 05:50 )             22.2     02 May 2025 05:50    138    |  95     |  136    ----------------------------<  201    3.1     |  25     |  7.7      Ca    6.4        02 May 2025 05:50  Phos  6.7       02 May 2025 05:50  Mg     1.4       02 May 2025 05:50            CAPILLARY BLOOD GLUCOSE      POCT Blood Glucose.: 194 mg/dL (01 May 2025 20:22)    PT/INR - ( 01 May 2025 06:05 )   PT: 19.80 sec;   INR: 1.66 ratio         PTT - ( 01 May 2025 06:05 )  PTT:36.7 sec  Urinalysis Basic - ( 02 May 2025 05:50 )    Color: x / Appearance: x / SG: x / pH: x  Gluc: 201 mg/dL / Ketone: x  / Bili: x / Urobili: x   Blood: x / Protein: x / Nitrite: x   Leuk Esterase: x / RBC: x / WBC x   Sq Epi: x / Non Sq Epi: x / Bacteria: x      Culture    Urinalysis with Rflx Culture (collected 30 Apr 2025 20:03)    Culture - Blood (collected 30 Apr 2025 17:54)  Source: Blood Blood-Peripheral  Preliminary Report (01 May 2025 23:01):    No growth at 24 hours    Culture - Blood (collected 30 Apr 2025 17:54)  Source: Blood Blood-Peripheral  Gram Stain (01 May 2025 13:23):    Growth in anaerobic bottle: Gram Negative Rods  Preliminary Report (01 May 2025 13:23):    Growth in anaerobic bottle: Gram Negative Rods    Direct identification is available within approximately 3-5    hours either by Blood Panel Multiplexed PCR or Direct    MALDI-TOF. Details: [email or web address]  Organism: Blood Culture PCR (01 May 2025 17:19)  Organism: Blood Culture PCR (01 May 2025 17:19)      Lactate, Blood: 1.1 mmol/L (05-01-25 @ 15:55)  Lactate, Blood: 1.7 mmol/L (05-01-25 @ 11:24)  Lactate, Blood: 3.8 mmol/L (04-30-25 @ 17:54)      MEDICATIONS  (STANDING):  chlorhexidine 2% Cloths 1 Application(s) Topical <User Schedule>  folic acid 1 milliGRAM(s) Oral daily  hydrocortisone sodium succinate Injectable 50 milliGRAM(s) IV Push every 6 hours  influenza  Vaccine (HIGH DOSE) 0.5 milliLiter(s) IntraMuscular once  lactated ringers. 1000 milliLiter(s) (75 mL/Hr) IV Continuous <Continuous>  magnesium sulfate  IVPB 2 Gram(s) IV Intermittent once  meropenem  IVPB 500 milliGRAM(s) IV Intermittent every 24 hours  midodrine. 10 milliGRAM(s) Oral every 8 hours  potassium chloride  20 mEq/100 mL IVPB 20 milliEquivalent(s) IV Intermittent every 2 hours  sodium bicarbonate 1300 milliGRAM(s) Oral every 8 hours  sodium bicarbonate  Infusion 0.283 mEq/kG/Hr (150 mL/Hr) IV Continuous <Continuous>    MEDICATIONS  (PRN):  ondansetron Injectable 4 milliGRAM(s) IV Push every 6 hours PRN Nausea and/or Vomiting        RADIOLOGY: ***     CXR: no infiltrate   TLC:  OG:  ET tube:        CAM ICU:  ECHO:

## 2025-05-03 LAB
-  AMPICILLIN/SULBACTAM: SIGNIFICANT CHANGE UP
-  AMPICILLIN: SIGNIFICANT CHANGE UP
-  AZTREONAM: SIGNIFICANT CHANGE UP
-  CEFAZOLIN: SIGNIFICANT CHANGE UP
-  CEFEPIME: SIGNIFICANT CHANGE UP
-  CEFOXITIN: SIGNIFICANT CHANGE UP
-  CEFTAZIDIME: SIGNIFICANT CHANGE UP
-  CEFTAZIDIME: SIGNIFICANT CHANGE UP
-  CEFTRIAXONE: SIGNIFICANT CHANGE UP
-  CIPROFLOXACIN: SIGNIFICANT CHANGE UP
-  ERTAPENEM: SIGNIFICANT CHANGE UP
-  GENTAMICIN: SIGNIFICANT CHANGE UP
-  GENTAMICIN: SIGNIFICANT CHANGE UP
-  IMIPENEM: SIGNIFICANT CHANGE UP
-  IMIPENEM: SIGNIFICANT CHANGE UP
-  LEVOFLOXACIN: SIGNIFICANT CHANGE UP
-  MEROPENEM: SIGNIFICANT CHANGE UP
-  OXACILLIN: SIGNIFICANT CHANGE UP
-  PENICILLIN: SIGNIFICANT CHANGE UP
-  PIPERACILLIN/TAZOBACTAM: SIGNIFICANT CHANGE UP
-  RIFAMPIN: SIGNIFICANT CHANGE UP
-  TETRACYCLINE: SIGNIFICANT CHANGE UP
-  TOBRAMYCIN: SIGNIFICANT CHANGE UP
-  TRIMETHOPRIM/SULFAMETHOXAZOLE: SIGNIFICANT CHANGE UP
-  TRIMETHOPRIM/SULFAMETHOXAZOLE: SIGNIFICANT CHANGE UP
-  VANCOMYCIN: SIGNIFICANT CHANGE UP
ANION GAP SERPL CALC-SCNC: 19 MMOL/L — HIGH (ref 7–14)
BUN SERPL-MCNC: 135 MG/DL — CRITICAL HIGH (ref 10–20)
CALCIUM SERPL-MCNC: 6.8 MG/DL — LOW (ref 8.4–10.5)
CHLORIDE SERPL-SCNC: 97 MMOL/L — LOW (ref 98–110)
CO2 SERPL-SCNC: 25 MMOL/L — SIGNIFICANT CHANGE UP (ref 17–32)
CREAT SERPL-MCNC: 7.3 MG/DL — CRITICAL HIGH (ref 0.7–1.5)
CULTURE RESULTS: ABNORMAL
EGFR: 7 ML/MIN/1.73M2 — LOW
EGFR: 7 ML/MIN/1.73M2 — LOW
GLUCOSE SERPL-MCNC: 144 MG/DL — HIGH (ref 70–99)
HCT VFR BLD CALC: 24.4 % — LOW (ref 42–52)
HGB BLD-MCNC: 8.4 G/DL — LOW (ref 14–18)
MAGNESIUM SERPL-MCNC: 1.8 MG/DL — SIGNIFICANT CHANGE UP (ref 1.8–2.4)
MCHC RBC-ENTMCNC: 29.7 PG — SIGNIFICANT CHANGE UP (ref 27–31)
MCHC RBC-ENTMCNC: 34.4 G/DL — SIGNIFICANT CHANGE UP (ref 32–37)
MCV RBC AUTO: 86.2 FL — SIGNIFICANT CHANGE UP (ref 80–94)
METHOD TYPE: SIGNIFICANT CHANGE UP
NRBC BLD AUTO-RTO: 0 /100 WBCS — SIGNIFICANT CHANGE UP (ref 0–0)
ORGANISM # SPEC MICROSCOPIC CNT: ABNORMAL
ORGANISM # SPEC MICROSCOPIC CNT: ABNORMAL
ORGANISM # SPEC MICROSCOPIC CNT: SIGNIFICANT CHANGE UP
PLATELET # BLD AUTO: 109 K/UL — LOW (ref 130–400)
PMV BLD: 11.8 FL — HIGH (ref 7.4–10.4)
POTASSIUM SERPL-MCNC: 3.6 MMOL/L — SIGNIFICANT CHANGE UP (ref 3.5–5)
POTASSIUM SERPL-SCNC: 3.6 MMOL/L — SIGNIFICANT CHANGE UP (ref 3.5–5)
RBC # BLD: 2.83 M/UL — LOW (ref 4.7–6.1)
RBC # FLD: 13.8 % — SIGNIFICANT CHANGE UP (ref 11.5–14.5)
SODIUM SERPL-SCNC: 141 MMOL/L — SIGNIFICANT CHANGE UP (ref 135–146)
SPECIMEN SOURCE: SIGNIFICANT CHANGE UP
TROPONIN T, HIGH SENSITIVITY RESULT: 73 NG/L — CRITICAL HIGH (ref 6–21)
WBC # BLD: 20.44 K/UL — HIGH (ref 4.8–10.8)
WBC # FLD AUTO: 20.44 K/UL — HIGH (ref 4.8–10.8)

## 2025-05-03 PROCEDURE — 99232 SBSQ HOSP IP/OBS MODERATE 35: CPT

## 2025-05-03 PROCEDURE — 99233 SBSQ HOSP IP/OBS HIGH 50: CPT

## 2025-05-03 RX ORDER — MELATONIN 5 MG
5 TABLET ORAL AT BEDTIME
Refills: 0 | Status: DISCONTINUED | OUTPATIENT
Start: 2025-05-03 | End: 2025-05-07

## 2025-05-03 RX ORDER — SODIUM BICARBONATE 1 MEQ/ML
1300 SYRINGE (ML) INTRAVENOUS
Refills: 0 | Status: DISCONTINUED | OUTPATIENT
Start: 2025-05-03 | End: 2025-05-05

## 2025-05-03 RX ADMIN — Medication 1 APPLICATION(S): at 05:07

## 2025-05-03 RX ADMIN — FOLIC ACID 1 MILLIGRAM(S): 1 TABLET ORAL at 12:18

## 2025-05-03 RX ADMIN — Medication 50 MILLIGRAM(S): at 05:05

## 2025-05-03 RX ADMIN — Medication 5 MILLIGRAM(S): at 23:06

## 2025-05-03 RX ADMIN — MUPIROCIN CALCIUM 1 APPLICATION(S): 20 CREAM TOPICAL at 18:08

## 2025-05-03 RX ADMIN — Medication 50 MILLIGRAM(S): at 23:05

## 2025-05-03 RX ADMIN — Medication 40 MILLIEQUIVALENT(S): at 18:53

## 2025-05-03 RX ADMIN — SODIUM CHLORIDE 75 MILLILITER(S): 9 INJECTION, SOLUTION INTRAVENOUS at 21:15

## 2025-05-03 RX ADMIN — Medication 1300 MILLIGRAM(S): at 15:18

## 2025-05-03 RX ADMIN — MUPIROCIN CALCIUM 1 APPLICATION(S): 20 CREAM TOPICAL at 05:07

## 2025-05-03 RX ADMIN — MEROPENEM 100 MILLIGRAM(S): 1 INJECTION INTRAVENOUS at 05:07

## 2025-05-03 RX ADMIN — Medication 50 MILLIGRAM(S): at 18:08

## 2025-05-03 RX ADMIN — Medication 50 MILLIGRAM(S): at 12:18

## 2025-05-03 NOTE — PROGRESS NOTE ADULT - ASSESSMENT
IMPRESSION:  metabolic acidosis   JOÃO on CKD   obstructive uropathy  b/l kidney stone s/p stent   bacteremia       PLAN:    CNS: no sedation     HEENT: oral care     PULMONARY: keep pox >92%   incentive [place]    CARDIOVASCULAR: d/c bicarb drip   Lr 70cc/ hr, match UOP  follow CE   echo     GI: GI prophylaxis.  Feeding.  Bowel regimen.    RENAL: follow lytes   replace MG   follow bun, cr   monitor is and os   d/c bicarb drip   follow renal consult     INFECTIOUS DISEASE: follow cx    on MErop     HEMATOLOGICAL:  DVT prophylaxis. follow h/h     ENDOCRINE:  Follow up FS.  Insulin protocol if needed.    SDU, likely GMF within next day or so

## 2025-05-03 NOTE — PROGRESS NOTE ADULT - ASSESSMENT
Impression:  69 y/o Male s/p Cystourethroscopy, placement of Bilateral JJ stents --> POD# 2  O.R. Urine culture growing > 100,000 Serratia & > 100,000 Staph Aureus and Pre-O.R. Urine C&S growing > 100,000 Pseudomonas         Plan:  s/p Cystourethroscopy, placement of Bilateral JJ stents --> POD# 2  O.R. Urine culture growing > 100,000 Serratia & > 100,000 Staph Aureus and Pre-O.R. Urine C&S growing > 100,000 Pseudomonas     - Ivabx --> Patient currently on Meropenem IVPB 500 mg every 24 hours  - Follow Urine culture sensitivities.    - ID following.  - Continue to drain and monitor output of suprapubic catheter.   - Remaining management per CCU/Medical team.   - Discussed with Dr. Smith (covering for Dr. Schilling today).

## 2025-05-03 NOTE — PROGRESS NOTE ADULT - SUBJECTIVE AND OBJECTIVE BOX
Patient is a 70y old  Male who presents with a chief complaint of UTI (02 May 2025 23:55)        Over Night Events:    Vomiting overnight  B/l uretal stents placed, making adequate UOP, 2100 out but still appropriately net positive  Likely postobstructive diuresis    ROS:     All ROS are negative except HPI           PHYSICAL EXAM    ICU Vital Signs Last 24 Hrs  T(C): 35.7 (03 May 2025 04:07), Max: 36 (02 May 2025 07:05)  T(F): 96.3 (03 May 2025 04:07), Max: 96.8 (02 May 2025 07:05)  HR: 53 (03 May 2025 04:07) (52 - 62)  BP: 137/84 (03 May 2025 04:07) (134/70 - 162/77)  BP(mean): 107 (03 May 2025 04:07) (93 - 110)  ABP: --  ABP(mean): --  RR: 16 (03 May 2025 04:07) (14 - 23)  SpO2: 94% (03 May 2025 04:07) (89% - 98%)    O2 Parameters below as of 02 May 2025 07:00  Patient On (Oxygen Delivery Method): room air            Constitutional: no acute distress, well nourished well developed  Neuro: moving all 4 limbs spontaneously, no facial droop or dysarthria  HEENT: NCAT, anicteric  Neck: no visible lymphadenopathy or goiter  Pulm: no respiratory distress. clear to auscultation bilaterally  Cardiac: extremities appear pink and well-perfused.  regular rhythm and rate, no murmur detected  Abdomen: non-distended  Extremities: no peripheral edema      05-01-25 @ 07:01  -  05-02-25 @ 07:00  --------------------------------------------------------  IN:    Lactated Ringers: 1125 mL    Sodium Bicarbonate: 2250 mL  Total IN: 3375 mL    OUT:    Indwelling Catheter - Suprapubic (mL): 2100 mL  Total OUT: 2100 mL    Total NET: 1275 mL      05-02-25 @ 07:01  -  05-03-25 @ 06:06  --------------------------------------------------------  IN:    IV PiggyBack: 250 mL    Lactated Ringers: 1350 mL    Sodium Bicarbonate: 150 mL  Total IN: 1750 mL    OUT:    Indwelling Catheter - Suprapubic (mL): 1850 mL  Total OUT: 1850 mL    Total NET: -100 mL          LABS:                            7.8    21.10 )-----------( 120      ( 02 May 2025 05:50 )             22.2                                               05-02    138  |  94[L]  |  136[HH]  ----------------------------<  170[H]  3.6   |  27  |  7.7[HH]    Ca    6.5[L]      02 May 2025 15:46  Phos  6.7     05-02  Mg     1.4     05-02                                               Urinalysis Basic - ( 02 May 2025 15:46 )    Color: x / Appearance: x / SG: x / pH: x  Gluc: 170 mg/dL / Ketone: x  / Bili: x / Urobili: x   Blood: x / Protein: x / Nitrite: x   Leuk Esterase: x / RBC: x / WBC x   Sq Epi: x / Non Sq Epi: x / Bacteria: x                                                                                           Culture - Urine (collected 01 May 2025 09:34)  Source: OR Collect right kidney urine for culture  Preliminary Report (02 May 2025 20:33):    >100,000 CFU/ml Serratia marcescens    >100,000 CFU/ml Staphylococcus aureus    Culture - Urine (collected 01 May 2025 09:30)  Source: OR Collect left kidney urine for culture  Preliminary Report (02 May 2025 20:26):    50,000 - 99,000 CFU/mL Serratia marcescens    50,000 - 99,000 CFU/mL Staphylococcus aureus    >100,000 CFU/ml Pseudomonas aeruginosa    Urinalysis with Rflx Culture (collected 30 Apr 2025 20:03)    Culture - Urine (collected 30 Apr 2025 20:03)  Source: Clean Catch None  Final Report (02 May 2025 11:08):    >=3 organisms. Probable collection contamination.    Culture - Blood (collected 30 Apr 2025 17:54)  Source: Blood Blood-Peripheral  Gram Stain (02 May 2025 09:05):    Growth in anaerobic bottle: Gram Negative Rods  Preliminary Report (02 May 2025 09:06):    Growth in anaerobic bottle: Gram Negative Rods    Culture - Blood (collected 30 Apr 2025 17:54)  Source: Blood Blood-Peripheral  Gram Stain (01 May 2025 13:23):    Growth in anaerobic bottle: Gram Negative Rods  Preliminary Report (02 May 2025 13:40):    Growth in anaerobic bottle: Serratia marcescens    Direct identification is available within approximately 3-5    hours either by Blood Panel Multiplexed PCR or Direct    MALDI-TOF. Details: [email or web address]  Organism: Blood Culture PCR (01 May 2025 17:19)  Organism: Blood Culture PCR (01 May 2025 17:19)                                                                                           MEDICATIONS  (STANDING):  chlorhexidine 2% Cloths 1 Application(s) Topical <User Schedule>  folic acid 1 milliGRAM(s) Oral daily  hydrocortisone sodium succinate Injectable 50 milliGRAM(s) IV Push every 6 hours  influenza  Vaccine (HIGH DOSE) 0.5 milliLiter(s) IntraMuscular once  lactated ringers. 1000 milliLiter(s) (75 mL/Hr) IV Continuous <Continuous>  meropenem  IVPB 500 milliGRAM(s) IV Intermittent every 24 hours  midodrine. 10 milliGRAM(s) Oral every 8 hours  mupirocin 2% Nasal 1 Application(s) Both Nostrils two times a day  sodium bicarbonate 1300 milliGRAM(s) Oral every 8 hours    MEDICATIONS  (PRN):  ondansetron Injectable 4 milliGRAM(s) IV Push every 6 hours PRN Nausea and/or Vomiting      New X-rays reviewed:       ECHO reviewed    CXR interpreted by me:

## 2025-05-03 NOTE — PROGRESS NOTE ADULT - SUBJECTIVE AND OBJECTIVE BOX
Pt seen, no questions. Pt feels well now    T(F): , Max: 96.4 (05-02-25 @ 20:17)  HR: 59 (05-03-25 @ 15:03) (53 - 62)  BP: 168/81 (05-03-25 @ 15:03)  RR: 21 (05-03-25 @ 15:03)  SpO2: 95% (05-03-25 @ 15:03)  IN: 1750 mL / OUT: 1850 mL / NET: -100 mL    IN: 0 mL / OUT: 650 mL / NET: -650 mL      General: No apparent distress  Cardiovascular: S1, S2  Gastrointestinal: Soft, Non-tender, Non-distended  Respiratory: Good air entry bilaterally  Musculoskeletal: Moves all extremities  Lymphatic: No edema  Neurologic: No gross motor deficit  Dermatologic: Skin dry                          8.4    20.44 )-----------( 109      ( 03 May 2025 05:59 )             24.4     05-03    141  |  97[L]  |  135[HH]  ----------------------------<  144[H]  3.6   |  25  |  7.3[HH]    Ca    6.8[L]      03 May 2025 05:59  Phos  6.7     05-02  Mg     1.8     05-03        Culture - Urine (collected 01 May 2025 09:34)  Source: OR Collect right kidney urine for culture  Preliminary Report (02 May 2025 20:33):    >100,000 CFU/ml Serratia marcescens    >100,000 CFU/ml Staphylococcus aureus    Culture - Urine (collected 01 May 2025 09:30)  Source: OR Collect left kidney urine for culture  Preliminary Report (02 May 2025 20:26):    50,000 - 99,000 CFU/mL Serratia marcescens    50,000 - 99,000 CFU/mL Staphylococcus aureus    >100,000 CFU/ml Pseudomonas aeruginosa    Urinalysis with Rflx Culture (collected 30 Apr 2025 20:03)    Culture - Urine (collected 30 Apr 2025 20:03)  Source: Clean Catch None  Final Report (02 May 2025 11:08):    >=3 organisms. Probable collection contamination.    Culture - Blood (collected 30 Apr 2025 17:54)  Source: Blood Blood-Peripheral  Gram Stain (02 May 2025 09:05):    Growth in anaerobic bottle: Gram Negative Rods  Preliminary Report (03 May 2025 13:20):    Growth in anaerobic bottle: Proteus mirabilis    Culture - Blood (collected 30 Apr 2025 17:54)  Source: Blood Blood-Peripheral  Gram Stain (01 May 2025 13:23):    Growth in anaerobic bottle: Gram Negative Rods  Final Report (03 May 2025 11:55):    Growth in anaerobic bottle: Serratia marcescens    Direct identification is available within approximately 3-5    hours either by Blood Panel Multiplexed PCR or Direct    MALDI-TOF. Details: [email or web address]  Organism: Blood Culture PCR  Serratia marcescens (03 May 2025 11:55)  Organism: Serratia marcescens (03 May 2025 11:55)  Organism: Blood Culture PCR (03 May 2025 11:55)

## 2025-05-03 NOTE — PROGRESS NOTE ADULT - ASSESSMENT
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

## 2025-05-03 NOTE — PROGRESS NOTE ADULT - SUBJECTIVE AND OBJECTIVE BOX
Nephrology progress note    Patient is seen and examined, events over the last 24 h noted .    Allergies:  No Known Allergies    Hospital Medications:   MEDICATIONS  (STANDING):  chlorhexidine 2% Cloths 1 Application(s) Topical <User Schedule>  folic acid 1 milliGRAM(s) Oral daily  hydrocortisone sodium succinate Injectable 50 milliGRAM(s) IV Push every 6 hours  influenza  Vaccine (HIGH DOSE) 0.5 milliLiter(s) IntraMuscular once  lactated ringers. 1000 milliLiter(s) (75 mL/Hr) IV Continuous <Continuous>  meropenem  IVPB 500 milliGRAM(s) IV Intermittent every 24 hours  midodrine. 10 milliGRAM(s) Oral every 8 hours  mupirocin 2% Nasal 1 Application(s) Both Nostrils two times a day  sodium bicarbonate 1300 milliGRAM(s) Oral every 8 hours        VITALS:  T(F): 96.3 (05-03-25 @ 04:07), Max: 96.4 (05-02-25 @ 20:17)  HR: 53 (05-03-25 @ 04:07)  BP: 137/84 (05-03-25 @ 04:07)  RR: 16 (05-03-25 @ 04:07)  SpO2: 94% (05-03-25 @ 04:07)  Wt(kg): --    05-01 @ 07:01  -  05-02 @ 07:00  --------------------------------------------------------  IN: 3375 mL / OUT: 2100 mL / NET: 1275 mL    05-02 @ 07:01  -  05-03 @ 07:00  --------------------------------------------------------  IN: 1750 mL / OUT: 1850 mL / NET: -100 mL          PHYSICAL EXAM:  Constitutional: NAD  HEENT: anicteric sclera, oropharynx clear, MMM  Neck: No JVD  Respiratory: CTAB, no wheezes, rales or rhonchi  Cardiovascular: S1, S2, RRR  Gastrointestinal: BS+, soft, NT/ND  Extremities: No cyanosis or clubbing. No peripheral edema  Neurological: A/O x   : No CVA tenderness. No angelo.   Skin: No rashes  Vascular Access:    LABS:  05-02    138  |  94[L]  |  136[HH]  ----------------------------<  170[H]  3.6   |  27  |  7.7[HH]    Ca    6.5[L]      02 May 2025 15:46  Phos  6.7     05-02  Mg     1.4     05-02                            8.4    20.44 )-----------( 109      ( 03 May 2025 05:59 )             24.4       Urine Studies:  Urinalysis Basic - ( 02 May 2025 15:46 )    Color:  / Appearance:  / SG:  / pH:   Gluc: 170 mg/dL / Ketone:   / Bili:  / Urobili:    Blood:  / Protein:  / Nitrite:    Leuk Esterase:  / RBC:  / WBC    Sq Epi:  / Non Sq Epi:  / Bacteria:         RADIOLOGY & ADDITIONAL STUDIES:   Nephrology progress note    Patient is seen and examined, events over the last 24 h noted .  On  LR 75 cc/hr  Denies complaints  Allergies:  No Known Allergies    Hospital Medications:   MEDICATIONS  (STANDING):  chlorhexidine 2% Cloths 1 Application(s) Topical <User Schedule>  folic acid 1 milliGRAM(s) Oral daily  hydrocortisone sodium succinate Injectable 50 milliGRAM(s) IV Push every 6 hours  influenza  Vaccine (HIGH DOSE) 0.5 milliLiter(s) IntraMuscular once  lactated ringers. 1000 milliLiter(s) (75 mL/Hr) IV Continuous <Continuous>  meropenem  IVPB 500 milliGRAM(s) IV Intermittent every 24 hours  midodrine. 10 milliGRAM(s) Oral every 8 hours  mupirocin 2% Nasal 1 Application(s) Both Nostrils two times a day  sodium bicarbonate 1300 milliGRAM(s) Oral every 8 hours        VITALS:  T(F): 96.3 (05-03-25 @ 04:07), Max: 96.4 (05-02-25 @ 20:17)  HR: 53 (05-03-25 @ 04:07)  BP: 137/84 (05-03-25 @ 04:07)  RR: 16 (05-03-25 @ 04:07)  SpO2: 94% (05-03-25 @ 04:07)  Wt(kg): --    05-01 @ 07:01  -  05-02 @ 07:00  --------------------------------------------------------  IN: 3375 mL / OUT: 2100 mL / NET: 1275 mL    05-02 @ 07:01  -  05-03 @ 07:00  --------------------------------------------------------  IN: 1750 mL / OUT: 1850 mL / NET: -100 mL          PHYSICAL EXAM:  Constitutional: NAD  HEENT: anicteric sclera  Neck: No JVD  Respiratory: CTAB, no wheezes, rales or rhonchi  Cardiovascular: S1, S2, RRR  Gastrointestinal: BS+, soft, NT/ND  Extremities: No cyanosis or clubbing. No peripheral edema  Neurological: A/O x 2-3  : has angelo.   Vascular Access:    LABS:  05-02    138  |  94[L]  |  136[HH]  ----------------------------<  170[H]  3.6   |  27  |  7.7[HH]  Creatinine Trend: 7.3<--, 7.7<--, 7.7<--, 8.2<--, 8.2<--, 9.1<--  Ca    6.5[L]      02 May 2025 15:46  Phos  6.7     05-02  Mg     1.4     05-02                            8.4    20.44 )-----------( 109      ( 03 May 2025 05:59 )             24.4       Urine Studies:  Urinalysis Basic - ( 02 May 2025 15:46 )    Color:  / Appearance:  / SG:  / pH:   Gluc: 170 mg/dL / Ketone:   / Bili:  / Urobili:    Blood:  / Protein:  / Nitrite:    Leuk Esterase:  / RBC:  / WBC    Sq Epi:  / Non Sq Epi:  / Bacteria:         RADIOLOGY & ADDITIONAL STUDIES:  < from: CT Abdomen and Pelvis No Cont (04.30.25 @ 18:59) >  KIDNEYS/URETERS: Calculus within the left renal calyx measuring up to 1.8   cm. Mild to moderate left hydroureter with additional nonobstructing   calculi within the distal left ureter (series 302, image 279)    BLADDER: Suprapubic Angelo catheter.  REPRODUCTIVE ORGANS: Unremarkable at CT.    BOWEL: Gastric distention. Stool impaction the rectum with desiccated   stool.  PERITONEUM/RETROPERITONEUM: Within normal limits.  VESSELS: Extensive atherosclerotic changes.  LYMPH NODES: No lymphadenopathy.  ABDOMINAL WALL: Bilateral fat-containing inguinal hernias  BONES: Osteopenia. Multilevel degenerative changes    IMPRESSION:  Nonobstructing 1.8 cm calculus within the left renal collecting system   near the UPJ. Decreased hydroureter when compared with available prior   imaging, now mild to moderate.    Additional nonobstructing calculi within the distal left ureter. Largest   fragment measures approximately 0.6 cm.    < end of copied text >

## 2025-05-03 NOTE — PROGRESS NOTE ADULT - SUBJECTIVE AND OBJECTIVE BOX
.  s/p Cystourethroscopy, placement of bilateral ureteral stents --> POD #2      Patient seen & examined in CCU.   Patient reports feels well, no c/o pain.  Suprapubic catheter draining yellow urine.     Suprapubic (mL): 1850 mL last 24 hrs.        I&O's Detail    02 May 2025 07:01  -  03 May 2025 07:00  --------------------------------------------------------  IN:    IV PiggyBack: 250 mL    Lactated Ringers: 1350 mL    Sodium Bicarbonate: 150 mL  Total IN: 1750 mL    OUT:    Indwelling Catheter - Suprapubic (mL): 1850 mL  Total OUT: 1850 mL    Total NET: -100 mL          MEDICATIONS  (STANDING):  chlorhexidine 2% Cloths 1 Application(s) Topical <User Schedule>  folic acid 1 milliGRAM(s) Oral daily  hydrocortisone sodium succinate Injectable 50 milliGRAM(s) IV Push every 6 hours  influenza  Vaccine (HIGH DOSE) 0.5 milliLiter(s) IntraMuscular once  lactated ringers. 1000 milliLiter(s) (75 mL/Hr) IV Continuous <Continuous>  meropenem  IVPB 500 milliGRAM(s) IV Intermittent every 24 hours  midodrine. 10 milliGRAM(s) Oral every 8 hours  mupirocin 2% Nasal 1 Application(s) Both Nostrils two times a day  sodium bicarbonate 1300 milliGRAM(s) Oral every 8 hours    MEDICATIONS  (PRN):  ondansetron Injectable 4 milliGRAM(s) IV Push every 6 hours PRN Nausea and/or Vomiting        Vital Signs Last 24 Hrs  T(C): 35.1 (03 May 2025 07:10), Max: 35.8 (02 May 2025 20:17)  T(F): 95.1 (03 May 2025 07:10), Max: 96.4 (02 May 2025 20:17)  HR: 53 (03 May 2025 07:28) (52 - 62)  BP: 143/69 (03 May 2025 07:28) (136/70 - 162/77)  BP(mean): 99 (03 May 2025 07:28) (96 - 110)  RR: 16 (03 May 2025 07:28) (14 - 23)  SpO2: 91% (03 May 2025 07:28) (89% - 97%)          Physical Exam:  General:  WD, WN, male conversant in NAD.   Abdomen:  (+) Bowel sounds, soft, no distention. Non-tender,  no rebound/guarding or peritoneal signs.    Genitourinary:  Suprapubic catheter in place, draining yellow urine.  No hematuria.  No SP tenderness.  No CVAT.           LABS:                        8.4    20.44 )-----------( 109      ( 03 May 2025 05:59 )             24.4       05-03    141  |  97[L]  |  135[HH]  ----------------------------<  144[H]  3.6   |  25  |  7.3[HH]    Ca    6.8[L]      03 May 2025 05:59  Phos  6.7     05-02  Mg     1.8     05-03            Urinalysis Basic - ( 03 May 2025 05:59 )    Color: x / Appearance: x / SG: x / pH: x  Gluc: 144 mg/dL / Ketone: x  / Bili: x / Urobili: x   Blood: x / Protein: x / Nitrite: x   Leuk Esterase: x / RBC: x / WBC x   Sq Epi: x / Non Sq Epi: x / Bacteria: x          Culture - Urine (collected 01 May 2025 09:34)  Source: OR Collect right kidney urine for culture  Preliminary Report (02 May 2025 20:33):    >100,000 CFU/ml Serratia marcescens    >100,000 CFU/ml Staphylococcus aureus    Culture - Urine (collected 01 May 2025 09:30)  Source: OR Collect left kidney urine for culture  Preliminary Report (02 May 2025 20:26):    50,000 - 99,000 CFU/mL Serratia marcescens    50,000 - 99,000 CFU/mL Staphylococcus aureus    >100,000 CFU/ml Pseudomonas aeruginosa    Urinalysis with Rflx Culture (collected 30 Apr 2025 20:03)    Culture - Urine (collected 30 Apr 2025 20:03)  Source: Clean Catch None  Final Report (02 May 2025 11:08):    >=3 organisms. Probable collection contamination.    Culture - Blood (collected 30 Apr 2025 17:54)  Source: Blood Blood-Peripheral  Gram Stain (02 May 2025 09:05):    Growth in anaerobic bottle: Gram Negative Rods  Preliminary Report (02 May 2025 09:06):    Growth in anaerobic bottle: Gram Negative Rods    Culture - Blood (collected 30 Apr 2025 17:54)  Source: Blood Blood-Peripheral  Gram Stain (01 May 2025 13:23):    Growth in anaerobic bottle: Gram Negative Rods  Preliminary Report (02 May 2025 13:40):    Growth in anaerobic bottle: Serratia marcescens    Direct identification is available within approximately 3-5    hours either by Blood Panel Multiplexed PCR or Direct    MALDI-TOF. Details: [email or web address]  Organism: Blood Culture PCR (01 May 2025 17:19)  Organism: Blood Culture PCR (01 May 2025 17:19)      .

## 2025-05-03 NOTE — PROGRESS NOTE ADULT - ASSESSMENT
JOÃO on advanced CKD / ?d/t hypotension / left hydronephrosis   Bacteremia  b/l renal calculi  - non oliguric 1.8 L yestrday  - creat trending down 7.7 for 2 days, pending today  - cont iv hydration with LR  - monitor calcium level closely and maintain calcium corrected > 8/iCa > 1  - cont document urine output  -  f/u, s/p cystoscopy with b/l ureteral stent placement  - f/u cultures / on meropenem  - obtain TTE/ r/o pericardial effusion  - D/C sodium bicarb 1300mg q8h  - bicarb 27  - replete K to 4  - phos high; start calcium actetate 1 tab tid with meals  - monitor chemistry qshift until electrolytes are more stable  - monitor h/h / r/o gi bleed / check iron stores JOÃO on advanced CKD / d/t hypotension / left hydronephrosis   - baseline creat 3.0 in Aug 2024, admitted with creat 10->7.3  Bacteremia  b/l renal calculi  - non oliguric 1.8 L yesterday  - creat trending down 7.7 ->7.3  - cont iv hydration with LR  WBC remains elevated -  s/p cystoscopy with b/l ureteral stent placement  - monitor calcium level closely and maintain calcium corrected > 8/iCa > 1  - cont document urine output  -  f/u  - f/u cultures / on meropenem, Cefepime  - obtain TTE/ r/o pericardial effusion  - DECREASE  sodium bicarb 1300mg q12 hrs  - bicarb 25  - replete K to 4  - phos high; start calcium actetate 1 tab tid with meals  - monitor h/h / r/o gi bleed / check iron stores

## 2025-05-04 LAB
-  AMOXICILLIN/CLAVULANIC ACID: SIGNIFICANT CHANGE UP
-  AMPICILLIN/SULBACTAM: SIGNIFICANT CHANGE UP
-  AMPICILLIN: SIGNIFICANT CHANGE UP
-  AZTREONAM: SIGNIFICANT CHANGE UP
-  CEFAZOLIN: SIGNIFICANT CHANGE UP
-  CEFEPIME: SIGNIFICANT CHANGE UP
-  CEFOXITIN: SIGNIFICANT CHANGE UP
-  CEFTRIAXONE: SIGNIFICANT CHANGE UP
-  CEFUROXIME: SIGNIFICANT CHANGE UP
-  CEFUROXIME: SIGNIFICANT CHANGE UP
-  CIPROFLOXACIN: SIGNIFICANT CHANGE UP
-  ERTAPENEM: SIGNIFICANT CHANGE UP
-  GENTAMICIN: SIGNIFICANT CHANGE UP
-  LEVOFLOXACIN: SIGNIFICANT CHANGE UP
-  MEROPENEM: SIGNIFICANT CHANGE UP
-  OXACILLIN: SIGNIFICANT CHANGE UP
-  PENICILLIN: SIGNIFICANT CHANGE UP
-  PIPERACILLIN/TAZOBACTAM: SIGNIFICANT CHANGE UP
-  RIFAMPIN: SIGNIFICANT CHANGE UP
-  TETRACYCLINE: SIGNIFICANT CHANGE UP
-  TOBRAMYCIN: SIGNIFICANT CHANGE UP
-  TRIMETHOPRIM/SULFAMETHOXAZOLE: SIGNIFICANT CHANGE UP
-  VANCOMYCIN: SIGNIFICANT CHANGE UP
ALBUMIN SERPL ELPH-MCNC: 2.2 G/DL — LOW (ref 3.5–5.2)
ALP SERPL-CCNC: 251 U/L — HIGH (ref 30–115)
ALT FLD-CCNC: 43 U/L — HIGH (ref 0–41)
ANION GAP SERPL CALC-SCNC: 16 MMOL/L — HIGH (ref 7–14)
AST SERPL-CCNC: 33 U/L — SIGNIFICANT CHANGE UP (ref 0–41)
BASOPHILS # BLD AUTO: 0.01 K/UL — SIGNIFICANT CHANGE UP (ref 0–0.2)
BASOPHILS NFR BLD AUTO: 0.1 % — SIGNIFICANT CHANGE UP (ref 0–1)
BILIRUB SERPL-MCNC: 0.3 MG/DL — SIGNIFICANT CHANGE UP (ref 0.2–1.2)
BUN SERPL-MCNC: 129 MG/DL — CRITICAL HIGH (ref 10–20)
CALCIUM SERPL-MCNC: 7 MG/DL — LOW (ref 8.4–10.5)
CHLORIDE SERPL-SCNC: 99 MMOL/L — SIGNIFICANT CHANGE UP (ref 98–110)
CO2 SERPL-SCNC: 25 MMOL/L — SIGNIFICANT CHANGE UP (ref 17–32)
CREAT SERPL-MCNC: 6.7 MG/DL — CRITICAL HIGH (ref 0.7–1.5)
CULTURE RESULTS: ABNORMAL
EGFR: 8 ML/MIN/1.73M2 — LOW
EGFR: 8 ML/MIN/1.73M2 — LOW
EOSINOPHIL # BLD AUTO: 0 K/UL — SIGNIFICANT CHANGE UP (ref 0–0.7)
EOSINOPHIL NFR BLD AUTO: 0 % — SIGNIFICANT CHANGE UP (ref 0–8)
FERRITIN SERPL-MCNC: 438 NG/ML — HIGH (ref 30–400)
GLUCOSE SERPL-MCNC: 150 MG/DL — HIGH (ref 70–99)
HCT VFR BLD CALC: 25.7 % — LOW (ref 42–52)
HGB BLD-MCNC: 8.7 G/DL — LOW (ref 14–18)
IMM GRANULOCYTES NFR BLD AUTO: 0.6 % — HIGH (ref 0.1–0.3)
IRON SATN MFR SERPL: 95 UG/DL — SIGNIFICANT CHANGE UP (ref 35–150)
LYMPHOCYTES # BLD AUTO: 1.03 K/UL — LOW (ref 1.2–3.4)
LYMPHOCYTES # BLD AUTO: 6.6 % — LOW (ref 20.5–51.1)
MAGNESIUM SERPL-MCNC: 1.7 MG/DL — LOW (ref 1.8–2.4)
MCHC RBC-ENTMCNC: 30.3 PG — SIGNIFICANT CHANGE UP (ref 27–31)
MCHC RBC-ENTMCNC: 33.9 G/DL — SIGNIFICANT CHANGE UP (ref 32–37)
MCV RBC AUTO: 89.5 FL — SIGNIFICANT CHANGE UP (ref 80–94)
METHOD TYPE: SIGNIFICANT CHANGE UP
MONOCYTES # BLD AUTO: 0.27 K/UL — SIGNIFICANT CHANGE UP (ref 0.1–0.6)
MONOCYTES NFR BLD AUTO: 1.7 % — SIGNIFICANT CHANGE UP (ref 1.7–9.3)
NEUTROPHILS # BLD AUTO: 14.09 K/UL — HIGH (ref 1.4–6.5)
NEUTROPHILS NFR BLD AUTO: 91 % — HIGH (ref 42.2–75.2)
NRBC BLD AUTO-RTO: 0 /100 WBCS — SIGNIFICANT CHANGE UP (ref 0–0)
ORGANISM # SPEC MICROSCOPIC CNT: ABNORMAL
ORGANISM # SPEC MICROSCOPIC CNT: SIGNIFICANT CHANGE UP
PLATELET # BLD AUTO: 102 K/UL — LOW (ref 130–400)
PMV BLD: 11.8 FL — HIGH (ref 7.4–10.4)
POTASSIUM SERPL-MCNC: 3.7 MMOL/L — SIGNIFICANT CHANGE UP (ref 3.5–5)
POTASSIUM SERPL-SCNC: 3.7 MMOL/L — SIGNIFICANT CHANGE UP (ref 3.5–5)
PROT SERPL-MCNC: 5.4 G/DL — LOW (ref 6–8)
RBC # BLD: 2.87 M/UL — LOW (ref 4.7–6.1)
RBC # FLD: 13.6 % — SIGNIFICANT CHANGE UP (ref 11.5–14.5)
SODIUM SERPL-SCNC: 140 MMOL/L — SIGNIFICANT CHANGE UP (ref 135–146)
SPECIMEN SOURCE: SIGNIFICANT CHANGE UP
TRANSFERRIN SERPL-MCNC: 96 MG/DL — LOW (ref 200–360)
WBC # BLD: 15.49 K/UL — HIGH (ref 4.8–10.8)
WBC # FLD AUTO: 15.49 K/UL — HIGH (ref 4.8–10.8)

## 2025-05-04 PROCEDURE — 99232 SBSQ HOSP IP/OBS MODERATE 35: CPT

## 2025-05-04 PROCEDURE — 99231 SBSQ HOSP IP/OBS SF/LOW 25: CPT

## 2025-05-04 RX ORDER — MAGNESIUM OXIDE 400 MG
400 TABLET ORAL
Refills: 0 | Status: COMPLETED | OUTPATIENT
Start: 2025-05-04 | End: 2025-05-06

## 2025-05-04 RX ADMIN — Medication 50 MILLIGRAM(S): at 11:56

## 2025-05-04 RX ADMIN — Medication 667 MILLIGRAM(S): at 07:47

## 2025-05-04 RX ADMIN — Medication 400 MILLIGRAM(S): at 17:48

## 2025-05-04 RX ADMIN — Medication 50 MILLIGRAM(S): at 05:28

## 2025-05-04 RX ADMIN — Medication 5 MILLIGRAM(S): at 06:40

## 2025-05-04 RX ADMIN — Medication 5 MILLIGRAM(S): at 21:12

## 2025-05-04 RX ADMIN — Medication 667 MILLIGRAM(S): at 11:56

## 2025-05-04 RX ADMIN — Medication 1 APPLICATION(S): at 05:29

## 2025-05-04 RX ADMIN — Medication 667 MILLIGRAM(S): at 17:48

## 2025-05-04 RX ADMIN — MUPIROCIN CALCIUM 1 APPLICATION(S): 20 CREAM TOPICAL at 17:48

## 2025-05-04 RX ADMIN — Medication 1300 MILLIGRAM(S): at 05:28

## 2025-05-04 RX ADMIN — Medication 1300 MILLIGRAM(S): at 17:48

## 2025-05-04 RX ADMIN — MUPIROCIN CALCIUM 1 APPLICATION(S): 20 CREAM TOPICAL at 05:27

## 2025-05-04 RX ADMIN — MEROPENEM 100 MILLIGRAM(S): 1 INJECTION INTRAVENOUS at 05:28

## 2025-05-04 RX ADMIN — Medication 10 MILLIGRAM(S): at 08:08

## 2025-05-04 RX ADMIN — SODIUM CHLORIDE 75 MILLILITER(S): 9 INJECTION, SOLUTION INTRAVENOUS at 21:09

## 2025-05-04 RX ADMIN — Medication 400 MILLIGRAM(S): at 11:56

## 2025-05-04 RX ADMIN — Medication 5 MILLIGRAM(S): at 21:09

## 2025-05-04 RX ADMIN — FOLIC ACID 1 MILLIGRAM(S): 1 TABLET ORAL at 11:56

## 2025-05-04 NOTE — PROGRESS NOTE ADULT - SUBJECTIVE AND OBJECTIVE BOX
Patient seen and evaluated this am, awake in bed, c/o mild back pain      T(F): 97.4 (05-04-25 @ 11:02), Max: 97.4 (05-04-25 @ 11:02)  HR: 68 (05-04-25 @ 11:02)  BP: 128/62 (05-04-25 @ 11:02)  RR: 18 (05-04-25 @ 11:02)  SpO2: 95% (05-04-25 @ 11:02) (92% - 97%)    PHYSICAL EXAM:  GENERAL: NAD  HEAD:  Atraumatic, Normocephalic  EYES: EOMI, PERRLA, conjunctiva and sclera clear  NERVOUS SYSTEM:  Awake and alert  CHEST/LUNG: Clear to percussion bilaterally; No rales, rhonchi, wheezing, or rubs  HEART: Regular rate and rhythm; No murmurs, rubs, or gallops  ABDOMEN: Soft, Nontender, Nondistended; Bowel sounds present  EXTREMITIES:  2+ Peripheral Pulses, No clubbing, cyanosis, or edema    LABS  05-04    140  |  99  |  129[HH]  ----------------------------<  150[H]  3.7   |  25  |  6.7[HH]    Ca    7.0[L]      04 May 2025 06:10  Mg     1.7     05-04    TPro  5.4[L]  /  Alb  2.2[L]  /  TBili  0.3  /  DBili  x   /  AST  33  /  ALT  43[H]  /  AlkPhos  251[H]  05-04                          8.7    15.49 )-----------( 102      ( 04 May 2025 06:10 )             25.7       Culture Results:   >100,000 CFU/ml Serratia marcescens  >100,000 CFU/ml Staphylococcus aureus  50,000 - 99,000 CFU/mL Pseudomonas aeruginosa  50,000 - 99,000 CFU/mL Proteus mirabilis (05-01-25)  Culture Results:   50,000 - 99,000 CFU/mL Serratia marcescens  50,000 - 99,000 CFU/mL Staphylococcus aureus  >100,000 CFU/ml Pseudomonas aeruginosa  10,000 - 49,000 CFU/mL Proteus mirabilis (05-01-25)  Culture Results:   >=3 organisms. Probable collection contamination. (04-30-25)  Culture Results:   Growth in anaerobic bottle: Serratia marcescens  Direct identification is available within approximately 3-5  hours either by Blood Panel Multiplexed PCR or Direct  MALDI-TOF. Details: [email or web address] (04-30-25)  Culture Results:   Growth in anaerobic bottle: Proteus mirabilis (04-30-25)    RADIOLOGY  < from: CT Abdomen and Pelvis No Cont (04.30.25 @ 18:59) >  IMPRESSION:  Nonobstructing 1.8 cm calculus within the left renal collecting system   near the UPJ. Decreased hydroureter when compared with available prior   imaging, now mild to moderate.    Additional nonobstructing calculi within the distal left ureter. Largest   fragment measures approximately 0.6 cm.    < end of copied text >    MEDICATIONS  (STANDING):  calcium acetate 667 milliGRAM(s) Oral three times a day with meals  chlorhexidine 2% Cloths 1 Application(s) Topical <User Schedule>  folic acid 1 milliGRAM(s) Oral daily  hydrocortisone sodium succinate Injectable 50 milliGRAM(s) IV Push every 6 hours  lactated ringers. 1000 milliLiter(s) (75 mL/Hr) IV Continuous <Continuous>  melatonin 5 milliGRAM(s) Oral at bedtime  meropenem  IVPB 500 milliGRAM(s) IV Intermittent every 24 hours  mupirocin 2% Nasal 1 Application(s) Both Nostrils two times a day  sodium bicarbonate 1300 milliGRAM(s) Oral two times a day    MEDICATIONS  (PRN):  ondansetron Injectable 4 milliGRAM(s) IV Push every 6 hours PRN Nausea and/or Vomiting

## 2025-05-04 NOTE — PROGRESS NOTE ADULT - ASSESSMENT
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

## 2025-05-04 NOTE — PROGRESS NOTE ADULT - SUBJECTIVE AND OBJECTIVE BOX
Patient is a 70y old  Male who presents with a chief complaint of UTI (02 May 2025 23:55)        Over Night Events:    No acute events  Continues to have excellent UOP, creatinine stabilized  Somewhat hypertensive    ROS:     All ROS are negative except HPI           PHYSICAL EXAM    ICU Vital Signs Last 24 Hrs  T(C): 35.8 (03 May 2025 19:00), Max: 35.8 (03 May 2025 19:00)  T(F): 96.5 (03 May 2025 19:00), Max: 96.5 (03 May 2025 19:00)  HR: 77 (04 May 2025 06:25) (53 - 79)  BP: 177/82 (04 May 2025 06:25) (143/69 - 177/82)  BP(mean): 118 (04 May 2025 06:25) (99 - 118)  ABP: --  ABP(mean): --  RR: 40 (04 May 2025 06:25) (15 - 46)  SpO2: 97% (04 May 2025 06:25) (91% - 97%)    O2 Parameters below as of 03 May 2025 19:57  Patient On (Oxygen Delivery Method): room air            Constitutional: no acute distress, well nourished well developed  Neuro: moving all 4 limbs spontaneously, no facial droop or dysarthria  HEENT: NCAT, anicteric  Neck: no visible lymphadenopathy or goiter  Pulm: no respiratory distress. clear to auscultation bilaterally  Cardiac: extremities appear pink and well-perfused.  regular rhythm and rate, no murmur detected  Abdomen: non-distended  Extremities: no peripheral edema      05-02-25 @ 07:01  -  05-03-25 @ 07:00  --------------------------------------------------------  IN:    IV PiggyBack: 250 mL    Lactated Ringers: 1350 mL    Sodium Bicarbonate: 150 mL  Total IN: 1750 mL    OUT:    Indwelling Catheter - Suprapubic (mL): 1850 mL  Total OUT: 1850 mL    Total NET: -100 mL      05-03-25 @ 07:01  -  05-04-25 @ 06:53  --------------------------------------------------------  IN:    IV PiggyBack: 50 mL    Lactated Ringers: 1650 mL    Oral Fluid: 430 mL  Total IN: 2130 mL    OUT:    Indwelling Catheter - Suprapubic (mL): 2000 mL  Total OUT: 2000 mL    Total NET: 130 mL          LABS:                            8.4    20.44 )-----------( 109      ( 03 May 2025 05:59 )             24.4                                               05-03    141  |  97[L]  |  135[HH]  ----------------------------<  144[H]  3.6   |  25  |  7.3[HH]    Ca    6.8[L]      03 May 2025 05:59  Mg     1.8     05-03                                               Urinalysis Basic - ( 03 May 2025 05:59 )    Color: x / Appearance: x / SG: x / pH: x  Gluc: 144 mg/dL / Ketone: x  / Bili: x / Urobili: x   Blood: x / Protein: x / Nitrite: x   Leuk Esterase: x / RBC: x / WBC x   Sq Epi: x / Non Sq Epi: x / Bacteria: x                                                                                           Culture - Urine (collected 01 May 2025 09:34)  Source: OR Collect right kidney urine for culture  Preliminary Report (03 May 2025 23:08):    >100,000 CFU/ml Serratia marcescens    >100,000 CFU/ml Staphylococcus aureus    50,000 - 99,000 CFU/mL Pseudomonas aeruginosa    50,000 - 99,000 CFU/mL Proteus mirabilis  Organism: Staphylococcus aureus  Pseudomonas aeruginosa (03 May 2025 19:46)  Organism: Pseudomonas aeruginosa (03 May 2025 19:46)  Organism: Staphylococcus aureus (03 May 2025 19:13)    Culture - Urine (collected 01 May 2025 09:30)  Source: OR Collect left kidney urine for culture  Preliminary Report (03 May 2025 19:06):    50,000 - 99,000 CFU/mL Serratia marcescens    50,000 - 99,000 CFU/mL Staphylococcus aureus    >100,000 CFU/ml Pseudomonas aeruginosa    10,000 - 49,000 CFU/mL Proteus mirabilis  Organism: Pseudomonas aeruginosa (03 May 2025 19:06)  Organism: Pseudomonas aeruginosa (03 May 2025 19:06)                                                                                           MEDICATIONS  (STANDING):  calcium acetate 667 milliGRAM(s) Oral three times a day with meals  chlorhexidine 2% Cloths 1 Application(s) Topical <User Schedule>  folic acid 1 milliGRAM(s) Oral daily  hydrocortisone sodium succinate Injectable 50 milliGRAM(s) IV Push every 6 hours  influenza  Vaccine (HIGH DOSE) 0.5 milliLiter(s) IntraMuscular once  lactated ringers. 1000 milliLiter(s) (75 mL/Hr) IV Continuous <Continuous>  melatonin 5 milliGRAM(s) Oral at bedtime  meropenem  IVPB 500 milliGRAM(s) IV Intermittent every 24 hours  mupirocin 2% Nasal 1 Application(s) Both Nostrils two times a day  sodium bicarbonate 1300 milliGRAM(s) Oral two times a day    MEDICATIONS  (PRN):  ondansetron Injectable 4 milliGRAM(s) IV Push every 6 hours PRN Nausea and/or Vomiting

## 2025-05-04 NOTE — PROGRESS NOTE ADULT - ASSESSMENT
IMPRESSION:  metabolic acidosis   JOÃO on CKD   obstructive uropathy  b/l kidney stone s/p stent   bacteremia       PLAN:    CNS: no sedation     HEENT: oral care     PULMONARY: keep pox >92%   incentive [place]    CARDIOVASCULAR: d/c bicarb drip   Lr 70cc/ hr, match UOP  Start antihypertensives  follow CE   echo     GI: GI prophylaxis.  Feeding.  Bowel regimen.    RENAL: follow lytes   replace MG   follow bun, cr   monitor is and os   d/c bicarb drip   follow renal consult     INFECTIOUS DISEASE: follow cx    on MErop     HEMATOLOGICAL:  DVT prophylaxis. follow h/h     ENDOCRINE:  Follow up FS.  Insulin protocol if needed.        GMF

## 2025-05-04 NOTE — PROGRESS NOTE ADULT - ASSESSMENT
JOÃO on advanced CKD / d/t hypotension / left hydronephrosis   - baseline creat 3.0 in Aug 2024, admitted with creat 10->7.3  Bacteremia  b/l renal calculi  - non oliguric 1.8 L yesterday  - creat trending down 7.7 ->7.3- > 6.7   - cont iv hydration with LR  WBC remains elevated -  s/p cystoscopy with b/l ureteral stent placement  - monitor calcium level closely and maintain calcium corrected > 8/iCa > 1  - cont document urine output  -  f/u  - f/u cultures / on meropenem, Cefepime  - obtain TTE/ r/o pericardial effusion  - - bicarb 25, can stop  PO bicarb   - replete K to 4  - phos high; calcium actetate 1 tab tid with meals  - monitor h/h / r/o gi bleed / check iron stores

## 2025-05-04 NOTE — PROGRESS NOTE ADULT - SUBJECTIVE AND OBJECTIVE BOX
Nephrology progress note    Patient was seen and examined, events over the last 24 h noted .    Allergies:  No Known Allergies    Hospital Medications:   MEDICATIONS  (STANDING):  calcium acetate 667 milliGRAM(s) Oral three times a day with meals  chlorhexidine 2% Cloths 1 Application(s) Topical <User Schedule>  folic acid 1 milliGRAM(s) Oral daily  influenza  Vaccine (HIGH DOSE) 0.5 milliLiter(s) IntraMuscular once  lactated ringers. 1000 milliLiter(s) (75 mL/Hr) IV Continuous <Continuous>  magnesium oxide 400 milliGRAM(s) Oral three times a day with meals  melatonin 5 milliGRAM(s) Oral at bedtime  meropenem  IVPB 500 milliGRAM(s) IV Intermittent every 24 hours  mupirocin 2% Nasal 1 Application(s) Both Nostrils two times a day  sodium bicarbonate 1300 milliGRAM(s) Oral two times a day        VITALS:  T(F): 97.4 (05-04-25 @ 14:38), Max: 97.4 (05-04-25 @ 11:02)  HR: 64 (05-04-25 @ 14:38)  BP: 168/79 (05-04-25 @ 14:38)  RR: 18 (05-04-25 @ 14:38)  SpO2: 96% (05-04-25 @ 14:38)  Wt(kg): --    05-02 @ 07:01  -  05-03 @ 07:00  --------------------------------------------------------  IN: 1750 mL / OUT: 1850 mL / NET: -100 mL    05-03 @ 07:01  -  05-04 @ 07:00  --------------------------------------------------------  IN: 2130 mL / OUT: 2000 mL / NET: 130 mL          PHYSICAL EXAM:  Constitutional: NAD  HEENT: anicteric sclera, oropharynx clear, MMM  Neck: No JVD  Respiratory: CTAB, no wheezes, rales or rhonchi  Cardiovascular: S1, S2, RRR  Gastrointestinal: BS+, soft, NT/ND  Extremities: No cyanosis or clubbing. No peripheral edema  :  No angelo.   Skin: No rashes    LABS:  05-04    140  |  99  |  129[HH]  ----------------------------<  150[H]  3.7   |  25  |  6.7[HH]    Ca    7.0[L]      04 May 2025 06:10  Mg     1.7     05-04    TPro  5.4[L]  /  Alb  2.2[L]  /  TBili  0.3  /  DBili      /  AST  33  /  ALT  43[H]  /  AlkPhos  251[H]  05-04                          8.7    15.49 )-----------( 102      ( 04 May 2025 06:10 )             25.7       Urine Studies:  Urinalysis Basic - ( 04 May 2025 06:10 )    Color:  / Appearance:  / SG:  / pH:   Gluc: 150 mg/dL / Ketone:   / Bili:  / Urobili:    Blood:  / Protein:  / Nitrite:    Leuk Esterase:  / RBC:  / WBC    Sq Epi:  / Non Sq Epi:  / Bacteria:         RADIOLOGY & ADDITIONAL STUDIES:

## 2025-05-05 LAB
ALBUMIN SERPL ELPH-MCNC: 2.3 G/DL — LOW (ref 3.5–5.2)
ALP SERPL-CCNC: 206 U/L — HIGH (ref 30–115)
ALT FLD-CCNC: 33 U/L — SIGNIFICANT CHANGE UP (ref 0–41)
ANION GAP SERPL CALC-SCNC: 13 MMOL/L — SIGNIFICANT CHANGE UP (ref 7–14)
AST SERPL-CCNC: 21 U/L — SIGNIFICANT CHANGE UP (ref 0–41)
BASOPHILS # BLD AUTO: 0.01 K/UL — SIGNIFICANT CHANGE UP (ref 0–0.2)
BASOPHILS NFR BLD AUTO: 0.1 % — SIGNIFICANT CHANGE UP (ref 0–1)
BILIRUB SERPL-MCNC: 0.3 MG/DL — SIGNIFICANT CHANGE UP (ref 0.2–1.2)
BUN SERPL-MCNC: 122 MG/DL — CRITICAL HIGH (ref 10–20)
CALCIUM SERPL-MCNC: 7.2 MG/DL — LOW (ref 8.4–10.5)
CHLORIDE SERPL-SCNC: 97 MMOL/L — LOW (ref 98–110)
CO2 SERPL-SCNC: 29 MMOL/L — SIGNIFICANT CHANGE UP (ref 17–32)
CREAT SERPL-MCNC: 6.2 MG/DL — CRITICAL HIGH (ref 0.7–1.5)
EGFR: 9 ML/MIN/1.73M2 — LOW
EGFR: 9 ML/MIN/1.73M2 — LOW
EOSINOPHIL # BLD AUTO: 0.01 K/UL — SIGNIFICANT CHANGE UP (ref 0–0.7)
EOSINOPHIL NFR BLD AUTO: 0.1 % — SIGNIFICANT CHANGE UP (ref 0–8)
GLUCOSE SERPL-MCNC: 136 MG/DL — HIGH (ref 70–99)
HCT VFR BLD CALC: 25.4 % — LOW (ref 42–52)
HGB BLD-MCNC: 8.5 G/DL — LOW (ref 14–18)
IMM GRANULOCYTES NFR BLD AUTO: 0.6 % — HIGH (ref 0.1–0.3)
LYMPHOCYTES # BLD AUTO: 1.79 K/UL — SIGNIFICANT CHANGE UP (ref 1.2–3.4)
LYMPHOCYTES # BLD AUTO: 16.5 % — LOW (ref 20.5–51.1)
MAGNESIUM SERPL-MCNC: 1.6 MG/DL — LOW (ref 1.8–2.4)
MCHC RBC-ENTMCNC: 29.7 PG — SIGNIFICANT CHANGE UP (ref 27–31)
MCHC RBC-ENTMCNC: 33.5 G/DL — SIGNIFICANT CHANGE UP (ref 32–37)
MCV RBC AUTO: 88.8 FL — SIGNIFICANT CHANGE UP (ref 80–94)
MONOCYTES # BLD AUTO: 0.58 K/UL — SIGNIFICANT CHANGE UP (ref 0.1–0.6)
MONOCYTES NFR BLD AUTO: 5.4 % — SIGNIFICANT CHANGE UP (ref 1.7–9.3)
NEUTROPHILS # BLD AUTO: 8.37 K/UL — HIGH (ref 1.4–6.5)
NEUTROPHILS NFR BLD AUTO: 77.3 % — HIGH (ref 42.2–75.2)
NRBC BLD AUTO-RTO: 0 /100 WBCS — SIGNIFICANT CHANGE UP (ref 0–0)
PLATELET # BLD AUTO: 103 K/UL — LOW (ref 130–400)
PMV BLD: 12.1 FL — HIGH (ref 7.4–10.4)
POTASSIUM SERPL-MCNC: 3.4 MMOL/L — LOW (ref 3.5–5)
POTASSIUM SERPL-SCNC: 3.4 MMOL/L — LOW (ref 3.5–5)
PROT SERPL-MCNC: 5.3 G/DL — LOW (ref 6–8)
RBC # BLD: 2.86 M/UL — LOW (ref 4.7–6.1)
RBC # FLD: 13.2 % — SIGNIFICANT CHANGE UP (ref 11.5–14.5)
SODIUM SERPL-SCNC: 139 MMOL/L — SIGNIFICANT CHANGE UP (ref 135–146)
WBC # BLD: 10.82 K/UL — HIGH (ref 4.8–10.8)
WBC # FLD AUTO: 10.82 K/UL — HIGH (ref 4.8–10.8)

## 2025-05-05 PROCEDURE — 99232 SBSQ HOSP IP/OBS MODERATE 35: CPT

## 2025-05-05 RX ORDER — CEFEPIME 2 G/20ML
2000 INJECTION, POWDER, FOR SOLUTION INTRAVENOUS EVERY 24 HOURS
Refills: 0 | Status: DISCONTINUED | OUTPATIENT
Start: 2025-05-05 | End: 2025-05-07

## 2025-05-05 RX ORDER — AMLODIPINE BESYLATE 10 MG/1
5 TABLET ORAL DAILY
Refills: 0 | Status: DISCONTINUED | OUTPATIENT
Start: 2025-05-05 | End: 2025-05-05

## 2025-05-05 RX ORDER — AMLODIPINE BESYLATE 10 MG/1
10 TABLET ORAL DAILY
Refills: 0 | Status: DISCONTINUED | OUTPATIENT
Start: 2025-05-06 | End: 2025-05-07

## 2025-05-05 RX ADMIN — Medication 5 MILLIGRAM(S): at 21:10

## 2025-05-05 RX ADMIN — Medication 667 MILLIGRAM(S): at 08:04

## 2025-05-05 RX ADMIN — Medication 1 APPLICATION(S): at 05:19

## 2025-05-05 RX ADMIN — Medication 400 MILLIGRAM(S): at 08:04

## 2025-05-05 RX ADMIN — CEFEPIME 100 MILLIGRAM(S): 2 INJECTION, POWDER, FOR SOLUTION INTRAVENOUS at 16:41

## 2025-05-05 RX ADMIN — Medication 667 MILLIGRAM(S): at 13:13

## 2025-05-05 RX ADMIN — Medication 667 MILLIGRAM(S): at 17:22

## 2025-05-05 RX ADMIN — Medication 1300 MILLIGRAM(S): at 05:19

## 2025-05-05 RX ADMIN — FOLIC ACID 1 MILLIGRAM(S): 1 TABLET ORAL at 13:14

## 2025-05-05 RX ADMIN — MEROPENEM 100 MILLIGRAM(S): 1 INJECTION INTRAVENOUS at 05:19

## 2025-05-05 RX ADMIN — Medication 5 MILLIGRAM(S): at 21:11

## 2025-05-05 RX ADMIN — Medication 400 MILLIGRAM(S): at 13:14

## 2025-05-05 RX ADMIN — AMLODIPINE BESYLATE 5 MILLIGRAM(S): 10 TABLET ORAL at 16:41

## 2025-05-05 RX ADMIN — MUPIROCIN CALCIUM 1 APPLICATION(S): 20 CREAM TOPICAL at 17:22

## 2025-05-05 RX ADMIN — Medication 400 MILLIGRAM(S): at 17:22

## 2025-05-05 RX ADMIN — MUPIROCIN CALCIUM 1 APPLICATION(S): 20 CREAM TOPICAL at 05:20

## 2025-05-05 NOTE — PROGRESS NOTE ADULT - ASSESSMENT
Mr Jacob is a 70 YOM w a PMH of nephrloithiasis, CKD 5 (No HD), neurodysfunctional bladder s/p suprapubic cath, Paraplegia, DM II and HTN was being scheduled for outpt stent when he developed AMS and was brought to the ED and found septic secondary to obstructing stone.      #Urosepsis   #Nephrolithiasis, BL   #Polymicrobial pyelonephritis  #Hx CKD 5 (No HD0  #Ho Neurodysfcunctional bladder s/p SP cath  patient seen by Uro, recs appreciated  Plan:  1. Urosepsis/Obstructing stones s/p stents  - no further acute urologic intervention necess  - pt should fup in office with Dr Schilling for further stone treatment planning and stent removal  - complete course of antibiotics per ID recs  - stay well hydrated  Patient seen by ID recs appreciated:  - IV meropenem 500mg q24hrs  Patient seen by nephroloni appreciated  - baseline creat 3.0 in Aug 2024, admitted with creat 10->7.3  - non oliguric 1.8 L yesterday  - creat trending down 7.7 ->7.3- > 6.7   - cont iv hydration with LR  WBC remains elevated -  s/p cystoscopy with b/l ureteral stent placement  - monitor calcium level closely and maintain calcium corrected > 8/iCa > 1  - f/u cultures / on meropenem, Cefepime  - obtain TTE/ r/o pericardial effusion  - bicarb 25, DC PO bicarb   - phos high; calcium actetate 1 tab tid with meals      #Ho DMII    #Ho HTN              #Misc  - DVT Prophylaxis:  - GI Prophylaxis:  - Diet:  - Activity:  - IV Fluids:  - Code Status:    Dispo: Mr Jacob is a 70 YOM w a PMH of nephrloithiasis, CKD 5 (No HD), neurodysfunctional bladder s/p suprapubic cath, Paraplegia, DM II and HTN was being scheduled for outpt stent when he developed AMS and was brought to the ED and found septic secondary to obstructing stone.    Patient anticipated for 24hrs     #Urosepsis   #Nephrolithiasis, BL   #Polymicrobial pyelonephritis  #Hx CKD 5 (No HD0  #Ho Neurodysfcunctional bladder s/p SP cath  patient seen by Uro, recs appreciated  Plan:  1. Urosepsis/Obstructing stones s/p stents  - no further acute urologic intervention necess  - pt should fup in office with Dr Schilling for further stone treatment planning and stent removal  - complete course of antibiotics per ID recs  - stay well hydrated  Patient seen by ID recs appreciated:  - s/p IV meropenem 500mg q24hrs  - IV cefepime 2g q24hrs  - Discharge on IV cefepime 2g q24hrs via midline for 14-days from OR (until 5/14) w f/u w Dr Wu   - place midline   Patient seen by nephro, recs appreciated  - baseline creat 3.0 in Aug 2024, admitted with creat 10->7.3  - non oliguric 1.8 L yesterday  - creat trending down 7.7 ->7.3- > 6.7   - cont iv hydration with LR  WBC remains elevated -  s/p cystoscopy with b/l ureteral stent placement  - monitor calcium level closely and maintain calcium corrected > 8/iCa > 1  - f/u cultures / on meropenem, Cefepime  - obtain TTE/ r/o pericardial effusion  - bicarb 25, DC PO bicarb   - phos high; calcium actetate 1 tab tid with meals      #Ho DMII  well controlled     #Ho HTN  start amlodipine 5mg once daily             #Misc  - DVT Prophylaxis:  - GI Prophylaxis:  - Diet:  - Activity:  - IV Fluids:  - Code Status:    Dispo:

## 2025-05-05 NOTE — PROGRESS NOTE ADULT - ASSESSMENT
JOÃO on advanced CKD / d/t hypotension / left hydronephrosis   - baseline creat 3.0 in Aug 2024, admitted with creat 10->7.3  Bacteremia  b/l renal calculi  - good urine output  - creat trending down   - cont iv hydration with LR  - s/p cystoscopy with b/l ureteral stent placement /  following  - calcium corrected wnl  - repeat phos level  - cont phoslo  - replete K to 4, MG to 2 as needed  - cont document urine output  - complete course of cefepime  - TTE noted- no pericardial effusion  - monitor h/h / f/u Tsat     JOÃO on advanced CKD / d/t hypotension / left hydronephrosis   - baseline creat 3.0 in Aug 2024, admitted with creat 10->7.3  Bacteremia  b/l renal calculi  - good urine output  - creat trending down   - cont iv hydration with LR  - s/p cystoscopy with b/l ureteral stent placement /  following  - calcium corrected wnl  - repeat phos level  - cont phoslo  - replete K to 4, MG to 2 as needed  - cont document urine output  - complete course of cefepime  - TTE noted- no pericardial effusion  - monitor h/h / f/u Tsat  - BP elevated/ decrease LR to 50cc/hr and start amlodipine 10mg daily

## 2025-05-05 NOTE — PROGRESS NOTE ADULT - SUBJECTIVE AND OBJECTIVE BOX
STATUS POST:  B/L stent placement    POST OPERATIVE DAY #: 4      Subjective: 69 yo male s/p above feeling better. Pt denie any pain, n/v, f/c. SPT draining well.             Vital Signs Last 24 Hrs  T(C): 36.4 (05 May 2025 04:53), Max: 36.4 (04 May 2025 20:17)  T(F): 97.5 (05 May 2025 04:53), Max: 97.6 (04 May 2025 20:17)  HR: 64 (05 May 2025 04:53) (62 - 68)  BP: 170/81 (05 May 2025 04:53) (128/62 - 185/88)  BP(mean): --  RR: 18 (05 May 2025 04:53) (18 - 18)  SpO2: 96% (05 May 2025 04:53) (95% - 97%)    Parameters below as of 05 May 2025 04:53  Patient On (Oxygen Delivery Method): room air        General Appearance: Appears well, NAD  Neck: Supple  Chest: Equal expansion bilaterally, equal breath sounds  CV: Pulse regular presently  Abdomen: Soft, nontense, appropriate incisional tenderness, dressings clean and dry and intact, SPT in place drng yellow urine  Extremities: Grossly symmetric, no calf tend b/l        I&O's Summary    04 May 2025 07:01  -  05 May 2025 07:00  --------------------------------------------------------  IN: 0 mL / OUT: 2400 mL / NET: -2400 mL      I&O's Detail    04 May 2025 07:01  -  05 May 2025 07:00  --------------------------------------------------------  IN:  Total IN: 0 mL    OUT:    Indwelling Catheter - Suprapubic (mL): 1100 mL    Voided (mL): 1300 mL  Total OUT: 2400 mL    Total NET: -2400 mL          MEDICATIONS  (STANDING):  calcium acetate 667 milliGRAM(s) Oral three times a day with meals  chlorhexidine 2% Cloths 1 Application(s) Topical <User Schedule>  folic acid 1 milliGRAM(s) Oral daily  influenza  Vaccine (HIGH DOSE) 0.5 milliLiter(s) IntraMuscular once  lactated ringers. 1000 milliLiter(s) (75 mL/Hr) IV Continuous <Continuous>  magnesium oxide 400 milliGRAM(s) Oral three times a day with meals  melatonin 5 milliGRAM(s) Oral at bedtime  meropenem  IVPB 500 milliGRAM(s) IV Intermittent every 24 hours  mupirocin 2% Nasal 1 Application(s) Both Nostrils two times a day    MEDICATIONS  (PRN):  ondansetron Injectable 4 milliGRAM(s) IV Push every 6 hours PRN Nausea and/or Vomiting      LABS:                        8.5    10.82 )-----------( 103      ( 05 May 2025 06:26 )             25.4     05-04    140  |  99  |  129[HH]  ----------------------------<  150[H]  3.7   |  25  |  6.7[HH]    Ca    7.0[L]      04 May 2025 06:10  Mg     1.7     05-04    TPro  5.4[L]  /  Alb  2.2[L]  /  TBili  0.3  /  DBili  x   /  AST  33  /  ALT  43[H]  /  AlkPhos  251[H]  05-04      Urinalysis Basic - ( 04 May 2025 06:10 )    Color: x / Appearance: x / SG: x / pH: x  Gluc: 150 mg/dL / Ketone: x  / Bili: x / Urobili: x   Blood: x / Protein: x / Nitrite: x   Leuk Esterase: x / RBC: x / WBC x   Sq Epi: x / Non Sq Epi: x / Bacteria: x        RADIOLOGY & ADDITIONAL STUDIES: none new

## 2025-05-05 NOTE — PROGRESS NOTE ADULT - ASSESSMENT
71 yo male admitted for urosepsis and obstructing stones s/p b/l stents.        Plan:  1. Urosepsis/Obstructing stones s/p stents  - no further acute urologic intervention necess  - pt should fup in office with Dr Schilling for further stone treatment planning and stent removal  - complete course of antibiotics per ID recs  - stay well hydrated      All above D/W DR Merchant

## 2025-05-05 NOTE — PROGRESS NOTE ADULT - SUBJECTIVE AND OBJECTIVE BOX
INFECTIOUS DISEASE FOLLOW UP NOTE:    Interval History/ROS: Patient is a 70y old  Male who presents with a chief complaint of urosepsis secondary to septic stone (05 May 2025 09:45)      Overnight events:    REVIEW OF SYSTEMS:        Prior hospital charts reviewed [Yes]  Primary team notes reviewed [Yes]  Other consultant notes reviewed [Yes]    Allergies:  No Known Allergies      ANTIMICROBIALS:       OTHER MEDS: MEDICATIONS  (STANDING):  influenza  Vaccine (HIGH DOSE) 0.5 once  melatonin 5 at bedtime  ondansetron Injectable 4 every 6 hours PRN      Vital Signs Last 24 Hrs  T(F): 97.5 (05-05-25 @ 04:53), Max: 97.9 (05-01-25 @ 05:05)    Vital Signs Last 24 Hrs  HR: 64 (05-05-25 @ 04:53) (62 - 68)  BP: 170/81 (05-05-25 @ 04:53) (128/62 - 185/88)  RR: 18 (05-05-25 @ 04:53)  SpO2: 96% (05-05-25 @ 04:53) (95% - 97%)  Wt(kg): --    EXAM:      Labs:                        8.5    10.82 )-----------( 103      ( 05 May 2025 06:26 )             25.4     05-05    139  |  97[L]  |  122[HH]  ----------------------------<  136[H]  3.4[L]   |  29  |  6.2[HH]    Ca    7.2[L]      05 May 2025 06:26  Mg     1.6     05-05    TPro  5.3[L]  /  Alb  2.3[L]  /  TBili  0.3  /  DBili  x   /  AST  21  /  ALT  33  /  AlkPhos  206[H]  05-05      WBC Trend:  WBC Count: 10.82 (05-05-25 @ 06:26)  WBC Count: 15.49 (05-04-25 @ 06:10)  WBC Count: 20.44 (05-03-25 @ 05:59)  WBC Count: 21.10 (05-02-25 @ 05:50)      Creatine Trend:  Creatinine: 6.2 (05-05)  Creatinine: 6.7 (05-04)  Creatinine: 7.3 (05-03)  Creatinine: 7.7 (05-02)      Liver Biochemical Testing Trend:  Alanine Aminotransferase (ALT/SGPT): 33 (05-05)  Alanine Aminotransferase (ALT/SGPT): 43 *H* (05-04)  Alanine Aminotransferase (ALT/SGPT): 113 *H* (05-01)  Alanine Aminotransferase (ALT/SGPT): 158 *H* (04-30)  Alanine Aminotransferase (ALT/SGPT): 19 (04-29)  Aspartate Aminotransferase (AST/SGOT): 21 (05-05-25 @ 06:26)  Aspartate Aminotransferase (AST/SGOT): 33 (05-04-25 @ 06:10)  Aspartate Aminotransferase (AST/SGOT): 244 (05-01-25 @ 06:05)  Aspartate Aminotransferase (AST/SGOT): 449 (04-30-25 @ 17:54)  Aspartate Aminotransferase (AST/SGOT): 33 (04-29-25 @ 08:48)  Bilirubin Total: 0.3 (05-05)  Bilirubin Total: 0.3 (05-04)  Bilirubin Total: 0.7 (05-01)  Bilirubin Total: 0.8 (04-30)  Bilirubin Total: 0.3 (04-29)      Trend LDH  08-11-24 @ 15:52  240  08-11-24 @ 08:36  238  08-09-24 @ 11:44  181      Urinalysis Basic - ( 05 May 2025 06:26 )    Color: x / Appearance: x / SG: x / pH: x  Gluc: 136 mg/dL / Ketone: x  / Bili: x / Urobili: x   Blood: x / Protein: x / Nitrite: x   Leuk Esterase: x / RBC: x / WBC x   Sq Epi: x / Non Sq Epi: x / Bacteria: x        MICROBIOLOGY:    MRSA PCR Result.: Positive (05-01-25 @ 09:00)      Culture - Urine (collected 01 May 2025 09:34)  Source: OR Collect right kidney urine for culture  Final Report:    >100,000 CFU/ml Serratia marcescens    >100,000 CFU/ml Staphylococcus aureus    50,000 - 99,000 CFU/mL Pseudomonas aeruginosa    50,000 - 99,000 CFU/mL Proteus mirabilis  Organism: Serratia marcescens  Staphylococcus aureus  Pseudomonas aeruginosa  Proteus mirabilis  Organism: Pseudomonas aeruginosa    Sensitivities:      -  Levofloxacin: S <=0.5      -  Aztreonam: S <=4      -  Cefepime: S <=2      -  Piperacillin/Tazobactam: S <=8      -  Ciprofloxacin: S <=0.25      -  Imipenem: S <=1      Method Type: ALF      -  Meropenem: S <=1      -  Ceftazidime: S 4  Organism: Proteus mirabilis    Sensitivities:      -  Levofloxacin: R >4      -  Tobramycin: I 4      -  Aztreonam: S <=4      -  Gentamicin: I 4      -  Cefepime: S <=2      -  Cefazolin: R 16 For uncomplicated UTI with K. pneumoniae, E. coli, or P. mirablis: ALF <=16 is sensitive and ALF >=32 is resistant. This also predicts results for oral agents cefaclor, cefdinir, cefpodoxime, cefprozil, cefuroxime axetil, cephalexin and locarbeffor uncomplicated UTI. Note that some isolates may be susceptible to these agents while testing resistant to cefazolin.      -  Piperacillin/Tazobactam: S <=8      -  Ciprofloxacin: R >2      -  Ceftriaxone: S <=1      -  Ampicillin: R >16 These ampicillin results predict results for amoxicillin      Method Type: ALF      -  Meropenem: S <=1      -  Ampicillin/Sulbactam: R >16/8      -  Cefoxitin: S <=8      -  Cefuroxime: S <=4      -  Amoxicillin/Clavulanic Acid: I 16/8      -  Trimethoprim/Sulfamethoxazole: R >2/38      -  Ertapenem: S <=0.5  Organism: Staphylococcus aureus    Sensitivities:      -  Oxacillin: S 1 Oxacillin predicts susceptibility for dicloxacillin, methicillin, and nafcillin      -  Gentamicin: S <=4 Should not be used as monotherapy      -  Vancomycin: S 1      -  Tetracycline: S <=4      Method Type: ALF      -  Penicillin: R >2      -  Rifampin: S <=1 Should not be used as monotherapy      -  Trimethoprim/Sulfamethoxazole: S <=0.5/9.5  Organism: Serratia marcescens    Sensitivities:      -  Levofloxacin: S <=0.5      -  Tobramycin: I 4      -  Aztreonam: S <=4      -  Gentamicin: S <=2      -  Cefepime: S <=2      -  Cefazolin: R >16      -  Piperacillin/Tazobactam: S <=8      -  Ciprofloxacin: S <=0.25      -  Ceftriaxone: S <=1      -  Ampicillin: R >16 These ampicillin results predict results for amoxicillin      Method Type: ALF      -  Meropenem: S <=1      -  Ampicillin/Sulbactam: R >16/8      -  Cefoxitin: R 16      -  Amoxicillin/Clavulanic Acid: R >16/8      -  Trimethoprim/Sulfamethoxazole: S <=0.5/9.5      -  Ertapenem: S <=0.5    Culture - Urine (collected 01 May 2025 09:30)  Source: OR Collect left kidney urine for culture  Final Report:    50,000 - 99,000 CFU/mL Serratia marcescens    50,000 - 99,000 CFU/mL Staphylococcus aureus    >100,000 CFU/ml Pseudomonas aeruginosa    10,000 - 49,000 CFU/mL Proteus mirabilis  Organism: Serratia marcescens  Staphylococcus aureus  Pseudomonas aeruginosa  Proteus mirabilis  Organism: Proteus mirabilis    Sensitivities:      -  Levofloxacin: R >4      -  Tobramycin: I 4      -  Aztreonam: S <=4      -  Gentamicin: I 4      -  Cefepime: S <=2      -  Cefazolin: R 8 For uncomplicated UTI with K. pneumoniae, E. coli, or P. mirablis: ALF <=16 is sensitive and ALF >=32 is resistant. This also predicts results for oral agents cefaclor, cefdinir, cefpodoxime, cefprozil, cefuroxime axetil, cephalexin and locarbef for uncomplicated UTI. Note that some isolates may be susceptible to these agents while testing resistant to cefazolin.      -  Piperacillin/Tazobactam: S <=8      -  Ciprofloxacin: R >2      -  Ceftriaxone: S <=1      -  Ampicillin: R >16 These ampicillin results predict results for amoxicillin      Method Type: ALF      -  Meropenem: S <=1      -  Ampicillin/Sulbactam: R >16/8      -  Cefoxitin: S <=8      -  Cefuroxime: S <=4      -  Amoxicillin/Clavulanic Acid: I 16/8      -  Trimethoprim/Sulfamethoxazole: R >2/38      -  Ertapenem: S <=0.5  Organism: Serratia marcescens    Sensitivities:      -  Levofloxacin: S <=0.5      -  Tobramycin: S <=2      -  Aztreonam: S <=4      -  Gentamicin: S <=2      -  Cefazolin: R >16      -  Cefepime: S <=2      -  Piperacillin/Tazobactam: S <=8      -  Ciprofloxacin: S <=0.25      -  Ceftriaxone: S <=1      -  Ampicillin: R >16 These ampicillin results predict results for amoxicillin      Method Type: ALF      -  Meropenem: S <=1      -  Ampicillin/Sulbactam: R 16/8      -  Cefoxitin: R <=8      -  Amoxicillin/Clavulanic Acid: R >16/8      -  Trimethoprim/Sulfamethoxazole: S <=0.5/9.5      -  Ertapenem: S <=0.5  Organism: Staphylococcus aureus    Sensitivities:      -  Oxacillin: S 1 Oxacillin predicts susceptibility for dicloxacillin, methicillin, and nafcillin      -  Gentamicin: S <=4 Should not be used as monotherapy      -  Vancomycin: S 0.5      -  Tetracycline: S <=4      Method Type: ALF      -  Penicillin: R >2      -  Rifampin: S <=1 Should not be used as monotherapy      -  Trimethoprim/Sulfamethoxazole: S <=0.5/9.5  Organism: Pseudomonas aeruginosa    Sensitivities:      -  Levofloxacin: R >4      -  Aztreonam: S <=4      -  Cefepime: S <=2      -  Piperacillin/Tazobactam: S <=8      -  Ciprofloxacin: R >2      -  Imipenem: S 2      Method Type: ALF      -  Meropenem: S <=1      -  Ceftazidime: S 4    Urinalysis with Rflx Culture (collected 30 Apr 2025 20:03)    Culture - Urine (collected 30 Apr 2025 20:03)  Source: Clean Catch None  Final Report:    >=3 organisms. Probable collection contamination.    Culture - Blood (collected 30 Apr 2025 17:54)  Source: Blood Blood-Peripheral  Final Report:    Growth in anaerobic bottle: Serratia marcescens    Direct identification is available within approximately 3-5    hours either by Blood Panel Multiplexed PCR or Direct    MALDI-TOF. Details: [email or web address]  Organism: Blood Culture PCR  Serratia marcescens  Organism: Blood Culture PCR    Sensitivities:      Method Type: PCR      -  Serratia marcescens: Detec  Organism: Serratia marcescens    Sensitivities:      -  Levofloxacin: S <=0.5      -  Tobramycin: S <=2      -  Aztreonam: S <=4      -  Gentamicin: S <=2      -  Cefepime: S <=2      -  Cefazolin: R >16      -  Piperacillin/Tazobactam: S <=8      -  Ciprofloxacin: S <=0.25      -  Ceftriaxone: S <=1      -  Ampicillin: R >16 These ampicillin results predict results for amoxicillin      Method Type: ALF      -  Meropenem: S <=1      -  Ampicillin/Sulbactam: R >16/8      -  Cefoxitin: R <=8      -  Trimethoprim/Sulfamethoxazole: S <=0.5/9.5      -  Ertapenem: S <=0.5    Culture - Blood (collected 30 Apr 2025 17:54)  Source: Blood Blood-Peripheral  Final Report:    Growth in anaerobic bottle: Proteus mirabilis  Organism: Proteus mirabilis  Organism: Proteus mirabilis    Sensitivities:      -  Levofloxacin: R >4      -  Tobramycin: S <=2      -  Aztreonam: S <=4      -  Gentamicin: I 4      -  Cefazolin: R 8      -  Cefepime: S <=2      -  Piperacillin/Tazobactam: S <=8      -  Ciprofloxacin: R >2      -  Ceftriaxone: S <=1      -  Ampicillin: R >16 These ampicillin results predict results for amoxicillin      Method Type: ALF      -  Meropenem: S <=1      -  Ampicillin/Sulbactam: R >16/8      -  Cefoxitin: S <=8      -  Trimethoprim/Sulfamethoxazole: R >2/38      -  Ertapenem: S <=0.5    Culture - Fungal, Body Fluid (collected 11 Aug 2024 16:08)  Source: Pleural Fl Pleural Fluid  Final Report:    No fungus isolated at 4 weeks.    Culture - Body Fluid with Gram Stain (collected 11 Aug 2024 16:08)  Source: Pleural Fl Pleural Fluid  Final Report:    No growth at 5 days    Culture - Tissue with Gram Stain (collected 23 Jul 2024 22:38)  Source: .Tissue RIGHT RENAL AND URETERAL MUCOUS STONES  Final Report:    No growth at 5 days    Culture - Fungal, Tissue (collected 23 Jul 2024 21:36)  Source: .Tissue None  Final Report:    No fungus isolated at 4 weeks.      Ferritin: 438 (05-04)      Troponin T, High Sensitivity Result: 73 (05-03)  Troponin T, High Sensitivity Result: 76 (05-02)  Troponin T, High Sensitivity Result: 74 (05-02)        RADIOLOGY:  imaging below personally reviewed     INFECTIOUS DISEASE FOLLOW UP NOTE:    Interval History/ROS: Patient is a 70y old  Male who presents with a chief complaint of urosepsis secondary to septic stone (05 May 2025 09:45)    Overnight events: No overnight event.    REVIEW OF SYSTEMS:  CONSTITUTIONAL: No fever or chills  HEAD: No lesion on scalp  EYES: No visual disturbance  ENT: No sore throat  RESPIRATORY: No cough, no shortness of breath  CARDIOVASCULAR: No chest pain or palpitations  GASTROINTESTINAL: No abdominal or epigastric pain  GENITOURINARY: + SPC  NEUROLOGICAL: No headache/dizziness  MUSCULOSKELETAL: No joint pain, erythema, or swelling; no back pain  SKIN: No itching, rashes  All other ROS negative except noted above    Prior hospital charts reviewed [Yes]  Primary team notes reviewed [Yes]  Other consultant notes reviewed [Yes]    Allergies:  No Known Allergies      ANTIMICROBIALS:       OTHER MEDS: MEDICATIONS  (STANDING):  influenza  Vaccine (HIGH DOSE) 0.5 once  melatonin 5 at bedtime  ondansetron Injectable 4 every 6 hours PRN      Vital Signs Last 24 Hrs  T(F): 97.5 (05-05-25 @ 04:53), Max: 97.9 (05-01-25 @ 05:05)    Vital Signs Last 24 Hrs  HR: 64 (05-05-25 @ 04:53) (62 - 68)  BP: 170/81 (05-05-25 @ 04:53) (128/62 - 185/88)  RR: 18 (05-05-25 @ 04:53)  SpO2: 96% (05-05-25 @ 04:53) (95% - 97%)  Wt(kg): --    EXAM:  GENERAL: NAD, lying in bed  HEAD: No head lesions  EYES: Conjunctiva pink and cornea white  EAR, NOSE, MOUTH, THROAT: Normal external ears and nose, no discharges; moist mucous membranes  NECK: Supple, nontender to palpation; no JVD  RESPIRATORY: Clear to auscultation bilaterally  CARDIOVASCULAR: S1 S2  GASTROINTESTINAL: Soft, nontender, nondistended; normoactive bowel sounds  GENITOURINARY: + SPC, no CVA tenderness  EXTREMITIES: No clubbing, cyanosis, or petal edema  NERVOUS SYSTEM: Awake and alert, not fully oriented  MUSCULOSKELETAL: No joint erythema, swelling or pain  SKIN: Area of abrasions on his knees, no sign of infection, no superficial thrombophlebitis  PSYCH: Normal affect    Labs:                        8.5    10.82 )-----------( 103      ( 05 May 2025 06:26 )             25.4     05-05    139  |  97[L]  |  122[HH]  ----------------------------<  136[H]  3.4[L]   |  29  |  6.2[HH]    Ca    7.2[L]      05 May 2025 06:26  Mg     1.6     05-05    TPro  5.3[L]  /  Alb  2.3[L]  /  TBili  0.3  /  DBili  x   /  AST  21  /  ALT  33  /  AlkPhos  206[H]  05-05      WBC Trend:  WBC Count: 10.82 (05-05-25 @ 06:26)  WBC Count: 15.49 (05-04-25 @ 06:10)  WBC Count: 20.44 (05-03-25 @ 05:59)  WBC Count: 21.10 (05-02-25 @ 05:50)      Creatine Trend:  Creatinine: 6.2 (05-05)  Creatinine: 6.7 (05-04)  Creatinine: 7.3 (05-03)  Creatinine: 7.7 (05-02)      Liver Biochemical Testing Trend:  Alanine Aminotransferase (ALT/SGPT): 33 (05-05)  Alanine Aminotransferase (ALT/SGPT): 43 *H* (05-04)  Alanine Aminotransferase (ALT/SGPT): 113 *H* (05-01)  Alanine Aminotransferase (ALT/SGPT): 158 *H* (04-30)  Alanine Aminotransferase (ALT/SGPT): 19 (04-29)  Aspartate Aminotransferase (AST/SGOT): 21 (05-05-25 @ 06:26)  Aspartate Aminotransferase (AST/SGOT): 33 (05-04-25 @ 06:10)  Aspartate Aminotransferase (AST/SGOT): 244 (05-01-25 @ 06:05)  Aspartate Aminotransferase (AST/SGOT): 449 (04-30-25 @ 17:54)  Aspartate Aminotransferase (AST/SGOT): 33 (04-29-25 @ 08:48)  Bilirubin Total: 0.3 (05-05)  Bilirubin Total: 0.3 (05-04)  Bilirubin Total: 0.7 (05-01)  Bilirubin Total: 0.8 (04-30)  Bilirubin Total: 0.3 (04-29)      Trend LDH  08-11-24 @ 15:52  240  08-11-24 @ 08:36  238  08-09-24 @ 11:44  181      Urinalysis Basic - ( 05 May 2025 06:26 )    Color: x / Appearance: x / SG: x / pH: x  Gluc: 136 mg/dL / Ketone: x  / Bili: x / Urobili: x   Blood: x / Protein: x / Nitrite: x   Leuk Esterase: x / RBC: x / WBC x   Sq Epi: x / Non Sq Epi: x / Bacteria: x        MICROBIOLOGY:    MRSA PCR Result.: Positive (05-01-25 @ 09:00)      Culture - Urine (collected 01 May 2025 09:34)  Source: OR Collect right kidney urine for culture  Final Report:    >100,000 CFU/ml Serratia marcescens    >100,000 CFU/ml Staphylococcus aureus    50,000 - 99,000 CFU/mL Pseudomonas aeruginosa    50,000 - 99,000 CFU/mL Proteus mirabilis  Organism: Serratia marcescens  Staphylococcus aureus  Pseudomonas aeruginosa  Proteus mirabilis  Organism: Pseudomonas aeruginosa    Sensitivities:      -  Levofloxacin: S <=0.5      -  Aztreonam: S <=4      -  Cefepime: S <=2      -  Piperacillin/Tazobactam: S <=8      -  Ciprofloxacin: S <=0.25      -  Imipenem: S <=1      Method Type: ALF      -  Meropenem: S <=1      -  Ceftazidime: S 4  Organism: Proteus mirabilis    Sensitivities:      -  Levofloxacin: R >4      -  Tobramycin: I 4      -  Aztreonam: S <=4      -  Gentamicin: I 4      -  Cefepime: S <=2      -  Cefazolin: R 16 For uncomplicated UTI with K. pneumoniae, E. coli, or P. mirablis: ALF <=16 is sensitive and ALF >=32 is resistant. This also predicts results for oral agents cefaclor, cefdinir, cefpodoxime, cefprozil, cefuroxime axetil, cephalexin and locarbeffor uncomplicated UTI. Note that some isolates may be susceptible to these agents while testing resistant to cefazolin.      -  Piperacillin/Tazobactam: S <=8      -  Ciprofloxacin: R >2      -  Ceftriaxone: S <=1      -  Ampicillin: R >16 These ampicillin results predict results for amoxicillin      Method Type: ALF      -  Meropenem: S <=1      -  Ampicillin/Sulbactam: R >16/8      -  Cefoxitin: S <=8      -  Cefuroxime: S <=4      -  Amoxicillin/Clavulanic Acid: I 16/8      -  Trimethoprim/Sulfamethoxazole: R >2/38      -  Ertapenem: S <=0.5  Organism: Staphylococcus aureus    Sensitivities:      -  Oxacillin: S 1 Oxacillin predicts susceptibility for dicloxacillin, methicillin, and nafcillin      -  Gentamicin: S <=4 Should not be used as monotherapy      -  Vancomycin: S 1      -  Tetracycline: S <=4      Method Type: ALF      -  Penicillin: R >2      -  Rifampin: S <=1 Should not be used as monotherapy      -  Trimethoprim/Sulfamethoxazole: S <=0.5/9.5  Organism: Serratia marcescens    Sensitivities:      -  Levofloxacin: S <=0.5      -  Tobramycin: I 4      -  Aztreonam: S <=4      -  Gentamicin: S <=2      -  Cefepime: S <=2      -  Cefazolin: R >16      -  Piperacillin/Tazobactam: S <=8      -  Ciprofloxacin: S <=0.25      -  Ceftriaxone: S <=1      -  Ampicillin: R >16 These ampicillin results predict results for amoxicillin      Method Type: ALF      -  Meropenem: S <=1      -  Ampicillin/Sulbactam: R >16/8      -  Cefoxitin: R 16      -  Amoxicillin/Clavulanic Acid: R >16/8      -  Trimethoprim/Sulfamethoxazole: S <=0.5/9.5      -  Ertapenem: S <=0.5    Culture - Urine (collected 01 May 2025 09:30)  Source: OR Collect left kidney urine for culture  Final Report:    50,000 - 99,000 CFU/mL Serratia marcescens    50,000 - 99,000 CFU/mL Staphylococcus aureus    >100,000 CFU/ml Pseudomonas aeruginosa    10,000 - 49,000 CFU/mL Proteus mirabilis  Organism: Serratia marcescens  Staphylococcus aureus  Pseudomonas aeruginosa  Proteus mirabilis  Organism: Proteus mirabilis    Sensitivities:      -  Levofloxacin: R >4      -  Tobramycin: I 4      -  Aztreonam: S <=4      -  Gentamicin: I 4      -  Cefepime: S <=2      -  Cefazolin: R 8 For uncomplicated UTI with K. pneumoniae, E. coli, or P. mirablis: ALF <=16 is sensitive and ALF >=32 is resistant. This also predicts results for oral agents cefaclor, cefdinir, cefpodoxime, cefprozil, cefuroxime axetil, cephalexin and locarbef for uncomplicated UTI. Note that some isolates may be susceptible to these agents while testing resistant to cefazolin.      -  Piperacillin/Tazobactam: S <=8      -  Ciprofloxacin: R >2      -  Ceftriaxone: S <=1      -  Ampicillin: R >16 These ampicillin results predict results for amoxicillin      Method Type: ALF      -  Meropenem: S <=1      -  Ampicillin/Sulbactam: R >16/8      -  Cefoxitin: S <=8      -  Cefuroxime: S <=4      -  Amoxicillin/Clavulanic Acid: I 16/8      -  Trimethoprim/Sulfamethoxazole: R >2/38      -  Ertapenem: S <=0.5  Organism: Serratia marcescens    Sensitivities:      -  Levofloxacin: S <=0.5      -  Tobramycin: S <=2      -  Aztreonam: S <=4      -  Gentamicin: S <=2      -  Cefazolin: R >16      -  Cefepime: S <=2      -  Piperacillin/Tazobactam: S <=8      -  Ciprofloxacin: S <=0.25      -  Ceftriaxone: S <=1      -  Ampicillin: R >16 These ampicillin results predict results for amoxicillin      Method Type: ALF      -  Meropenem: S <=1      -  Ampicillin/Sulbactam: R 16/8      -  Cefoxitin: R <=8      -  Amoxicillin/Clavulanic Acid: R >16/8      -  Trimethoprim/Sulfamethoxazole: S <=0.5/9.5      -  Ertapenem: S <=0.5  Organism: Staphylococcus aureus    Sensitivities:      -  Oxacillin: S 1 Oxacillin predicts susceptibility for dicloxacillin, methicillin, and nafcillin      -  Gentamicin: S <=4 Should not be used as monotherapy      -  Vancomycin: S 0.5      -  Tetracycline: S <=4      Method Type: ALF      -  Penicillin: R >2      -  Rifampin: S <=1 Should not be used as monotherapy      -  Trimethoprim/Sulfamethoxazole: S <=0.5/9.5  Organism: Pseudomonas aeruginosa    Sensitivities:      -  Levofloxacin: R >4      -  Aztreonam: S <=4      -  Cefepime: S <=2      -  Piperacillin/Tazobactam: S <=8      -  Ciprofloxacin: R >2      -  Imipenem: S 2      Method Type: ALF      -  Meropenem: S <=1      -  Ceftazidime: S 4    Urinalysis with Rflx Culture (collected 30 Apr 2025 20:03)    Culture - Urine (collected 30 Apr 2025 20:03)  Source: Clean Catch None  Final Report:    >=3 organisms. Probable collection contamination.    Culture - Blood (collected 30 Apr 2025 17:54)  Source: Blood Blood-Peripheral  Final Report:    Growth in anaerobic bottle: Serratia marcescens    Direct identification is available within approximately 3-5    hours either by Blood Panel Multiplexed PCR or Direct    MALDI-TOF. Details: [email or web address]  Organism: Blood Culture PCR  Serratia marcescens  Organism: Blood Culture PCR    Sensitivities:      Method Type: PCR      -  Serratia marcescens: Detec  Organism: Serratia marcescens    Sensitivities:      -  Levofloxacin: S <=0.5      -  Tobramycin: S <=2      -  Aztreonam: S <=4      -  Gentamicin: S <=2      -  Cefepime: S <=2      -  Cefazolin: R >16      -  Piperacillin/Tazobactam: S <=8      -  Ciprofloxacin: S <=0.25      -  Ceftriaxone: S <=1      -  Ampicillin: R >16 These ampicillin results predict results for amoxicillin      Method Type: ALF      -  Meropenem: S <=1      -  Ampicillin/Sulbactam: R >16/8      -  Cefoxitin: R <=8      -  Trimethoprim/Sulfamethoxazole: S <=0.5/9.5      -  Ertapenem: S <=0.5    Culture - Blood (collected 30 Apr 2025 17:54)  Source: Blood Blood-Peripheral  Final Report:    Growth in anaerobic bottle: Proteus mirabilis  Organism: Proteus mirabilis  Organism: Proteus mirabilis    Sensitivities:      -  Levofloxacin: R >4      -  Tobramycin: S <=2      -  Aztreonam: S <=4      -  Gentamicin: I 4      -  Cefazolin: R 8      -  Cefepime: S <=2      -  Piperacillin/Tazobactam: S <=8      -  Ciprofloxacin: R >2      -  Ceftriaxone: S <=1      -  Ampicillin: R >16 These ampicillin results predict results for amoxicillin      Method Type: ALF      -  Meropenem: S <=1      -  Ampicillin/Sulbactam: R >16/8      -  Cefoxitin: S <=8      -  Trimethoprim/Sulfamethoxazole: R >2/38      -  Ertapenem: S <=0.5    Culture - Fungal, Body Fluid (collected 11 Aug 2024 16:08)  Source: Pleural Fl Pleural Fluid  Final Report:    No fungus isolated at 4 weeks.    Culture - Body Fluid with Gram Stain (collected 11 Aug 2024 16:08)  Source: Pleural Fl Pleural Fluid  Final Report:    No growth at 5 days    Culture - Tissue with Gram Stain (collected 23 Jul 2024 22:38)  Source: .Tissue RIGHT RENAL AND URETERAL MUCOUS STONES  Final Report:    No growth at 5 days    Culture - Fungal, Tissue (collected 23 Jul 2024 21:36)  Source: .Tissue None  Final Report:    No fungus isolated at 4 weeks.      Ferritin: 438 (05-04)      Troponin T, High Sensitivity Result: 73 (05-03)  Troponin T, High Sensitivity Result: 76 (05-02)  Troponin T, High Sensitivity Result: 74 (05-02)        RADIOLOGY:  imaging below personally reviewed    < from: CT Abdomen and Pelvis No Cont (04.30.25 @ 18:59) >  IMPRESSION:  Nonobstructing 1.8 cm calculus within the left renal collecting system   near the UPJ. Decreased hydroureter when compared with available prior   imaging, now mild to moderate.    Additional nonobstructing calculi within the distal left ureter. Largest   fragment measures approximately 0.6 cm.    Stool impaction of the rectum    < end of copied text >

## 2025-05-05 NOTE — PROGRESS NOTE ADULT - NS ATTEND AMEND GEN_ALL_CORE FT
Pt was seen and examined  Agree with the above
chart reviewed and pt seen     A= s/p bilateral  ureteral stents for obstructive uropathy    P =no further intervention  at this  time

## 2025-05-05 NOTE — PROGRESS NOTE ADULT - SUBJECTIVE AND OBJECTIVE BOX
Nephrology Progress Note    KRISTY GARCIA  MRN-[id number]  70y  Male    S:  Patient is seen and examined, events over the last 24h noted.    O:  Allergies:  No Known Allergies    Hospital Medications:   MEDICATIONS  (STANDING):  calcium acetate 667 milliGRAM(s) Oral three times a day with meals  cefepime   IVPB 2000 milliGRAM(s) IV Intermittent every 24 hours  chlorhexidine 2% Cloths 1 Application(s) Topical <User Schedule>  folic acid 1 milliGRAM(s) Oral daily  influenza  Vaccine (HIGH DOSE) 0.5 milliLiter(s) IntraMuscular once  lactated ringers. 1000 milliLiter(s) (75 mL/Hr) IV Continuous <Continuous>  magnesium oxide 400 milliGRAM(s) Oral three times a day with meals  melatonin 5 milliGRAM(s) Oral at bedtime  mupirocin 2% Nasal 1 Application(s) Both Nostrils two times a day    MEDICATIONS  (PRN):  ondansetron Injectable 4 milliGRAM(s) IV Push every 6 hours PRN Nausea and/or Vomiting    Home Medications:  Eliquis 5 mg oral tablet: 0.5 tab(s) orally every 12 hours (30 Apr 2025 23:24)  folic acid 1 mg oral tablet: 1 tab(s) orally once a day (29 Apr 2025 09:09)      VITALS:  Daily     Daily   T(F): 97.5 (05-05-25 @ 20:31), Max: 97.5 (05-05-25 @ 04:53)  HR: 65 (05-05-25 @ 20:31)  BP: 192/88 (05-05-25 @ 20:31)  RR: 18 (05-05-25 @ 20:31)  SpO2: 99% (05-05-25 @ 20:31)  Wt(kg): --  I&O's Detail    04 May 2025 07:01  -  05 May 2025 07:00  --------------------------------------------------------  IN:  Total IN: 0 mL    OUT:    Indwelling Catheter - Suprapubic (mL): 1100 mL    Voided (mL): 1300 mL  Total OUT: 2400 mL    Total NET: -2400 mL      05 May 2025 07:01  -  05 May 2025 21:16  --------------------------------------------------------  IN:  Total IN: 0 mL    OUT:    Indwelling Catheter - Suprapubic (mL): 1500 mL  Total OUT: 1500 mL    Total NET: -1500 mL        I&O's Summary    04 May 2025 07:01  -  05 May 2025 07:00  --------------------------------------------------------  IN: 0 mL / OUT: 2400 mL / NET: -2400 mL    05 May 2025 07:01  -  05 May 2025 21:16  --------------------------------------------------------  IN: 0 mL / OUT: 1500 mL / NET: -1500 mL          PHYSICAL EXAM:  Gen: NAD, ill appearing  Chest: b/l breath sounds  Abd: soft, spc  Extremities: no edema      LABS:    05-05    139  |  97[L]  |  122[HH]  ----------------------------<  136[H]  3.4[L]   |  29  |  6.2[HH]    Ca    7.2[L]      05 May 2025 06:26  Mg     1.6     05-05    TPro  5.3[L]  /  Alb  2.3[L]  /  TBili  0.3  /  DBili      /  AST  21  /  ALT  33  /  AlkPhos  206[H]  05-05    Phosphorus: 6.7 mg/dL (05-02-25 @ 05:50)  Phosphorus: 4.5 mg/dL (08-08-24 @ 07:09)                            8.5    10.82 )-----------( 103      ( 05 May 2025 06:26 )             25.4     Mean Cell Volume: 88.8 fL (05-05-25 @ 06:26)    Ferritin: 438 ng/mL (05-04-25 @ 06:10)    Urine Studies:  Protein, Urine: 300 mg/dL (04-30-25 @ 20:03)  White Blood Cell - Urine: Too Numerous to count /HPF (04-30-25 @ 20:03)  Red Blood Cell - Urine: Too Numerous to count /HPF (04-30-25 @ 20:03)      Creatinine trend:  Creatinine: 6.2 mg/dL (05-05-25 @ 06:26)  Creatinine: 6.7 mg/dL (05-04-25 @ 06:10)  Creatinine: 7.3 mg/dL (05-03-25 @ 05:59)  Creatinine: 7.7 mg/dL (05-02-25 @ 15:46)  Creatinine: 7.7 mg/dL (05-02-25 @ 05:50)  Creatinine: 8.2 mg/dL (05-01-25 @ 22:00)

## 2025-05-05 NOTE — PROGRESS NOTE ADULT - SUBJECTIVE AND OBJECTIVE BOX
KRISTY GARCIA 70y Male  MRN#: [id number]   Hospital Day: 5d    SUBJECTIVE  Patient is a 70y old Male who presents with a chief complaint of UTI (02 May 2025 23:55)  Currently admitted to medicine with the primary diagnosis of Septic shock      INTERVAL HPI AND OVERNIGHT EVENTS:  Patient was examined and seen at bedside. This morning he is resting comfortably in bed and reports no issues or overnight events.    REVIEW OF SYMPTOMS:  CONSTITUTIONAL: No weakness, fevers or chills; No headaches  EYES: No visual changes, eye pain, or discharge  ENT: No vertigo; No ear pain or change in hearing; No sore throat or difficulty swallowing  NECK: No pain or stiffness  RESPIRATORY: No cough, wheezing, or hemoptysis; No shortness of breath  CARDIOVASCULAR: No chest pain or palpitations  GASTROINTESTINAL: No abdominal or epigastric pain; No nausea, vomiting, or hematemesis; No diarrhea or constipation; No melena or hematochezia  GENITOURINARY: No dysuria, frequency or hematuria  MUSCULOSKELETAL: No joint pain, no muscle pain, no weakness  NEUROLOGICAL: No numbness or weakness  SKIN: No itching or rashes    OBJECTIVE  PAST MEDICAL & SURGICAL HISTORY  DM (diabetes mellitus)    HTN (hypertension)    H/O paraplegia    Spinal abscess    Renal stones    Stage 5 chronic kidney disease not on chronic dialysis    Neurogenic dysfunction of the urinary bladder    Encounter for care or replacement of suprapubic tube    Spinal abscess  S/P Surgery    Encounter for care or replacement of suprapubic tube    S/P ORIF (open reduction internal fixation) fracture      ALLERGIES:  No Known Allergies    MEDICATIONS:  STANDING MEDICATIONS  calcium acetate 667 milliGRAM(s) Oral three times a day with meals  chlorhexidine 2% Cloths 1 Application(s) Topical <User Schedule>  folic acid 1 milliGRAM(s) Oral daily  influenza  Vaccine (HIGH DOSE) 0.5 milliLiter(s) IntraMuscular once  lactated ringers. 1000 milliLiter(s) IV Continuous <Continuous>  magnesium oxide 400 milliGRAM(s) Oral three times a day with meals  melatonin 5 milliGRAM(s) Oral at bedtime  meropenem  IVPB 500 milliGRAM(s) IV Intermittent every 24 hours  mupirocin 2% Nasal 1 Application(s) Both Nostrils two times a day    PRN MEDICATIONS  ondansetron Injectable 4 milliGRAM(s) IV Push every 6 hours PRN      VITAL SIGNS: Last 24 Hours  T(C): 36.4 (05 May 2025 04:53), Max: 36.4 (04 May 2025 20:17)  T(F): 97.5 (05 May 2025 04:53), Max: 97.6 (04 May 2025 20:17)  HR: 64 (05 May 2025 04:53) (62 - 68)  BP: 170/81 (05 May 2025 04:53) (128/62 - 185/88)  BP(mean): --  RR: 18 (05 May 2025 04:53) (18 - 18)  SpO2: 96% (05 May 2025 04:53) (95% - 97%)    LABS:                        8.5    10.82 )-----------( 103      ( 05 May 2025 06:26 )             25.4     05-04    140  |  99  |  129[HH]  ----------------------------<  150[H]  3.7   |  25  |  6.7[HH]    Ca    7.0[L]      04 May 2025 06:10  Mg     1.7     05-04    TPro  5.4[L]  /  Alb  2.2[L]  /  TBili  0.3  /  DBili  x   /  AST  33  /  ALT  43[H]  /  AlkPhos  251[H]  05-04      Urinalysis Basic - ( 04 May 2025 06:10 )    Color: x / Appearance: x / SG: x / pH: x  Gluc: 150 mg/dL / Ketone: x  / Bili: x / Urobili: x   Blood: x / Protein: x / Nitrite: x   Leuk Esterase: x / RBC: x / WBC x   Sq Epi: x / Non Sq Epi: x / Bacteria: x      Specimen Source: OR Collect right kidney urine for culture  Culture Results:   >100,000 CFU/ml Serratia marcescens   >100,000 CFU/ml Staphylococcus aureus   50,000 - 99,000 CFU/mL Pseudomonas aeruginosa   50,000 - 99,000 CFU/mL Proteus mirabilis  Organism Identification: Serratia marcescens     MRSA/MSSA PCR (05.01.25 @ 09:00)   MRSA PCR Result.: Positive          RADIOLOGY:  < from: CT Abdomen and Pelvis No Cont (04.30.25 @ 18:59) >  IMPRESSION:  Nonobstructing 1.8 cm calculus within the left renal collecting system   near the UPJ. Decreased hydroureter when compared with available prior   imaging, now mild to moderate.    Additional nonobstructing calculi within the distal left ureter. Largest   fragment measures approximately 0.6 cm.    Stool impaction of the rectum    < end of copied text >      PHYSICAL EXAM:  CONSTITUTIONAL: No acute distress, well-developed, well-groomed, AAOx3  HEAD: Atraumatic, normocephalic  EYES: EOM intact, PERRLA, conjunctiva and sclera clear  ENT: Supple, no masses, no thyromegaly, no bruits, no JVD; moist mucous membranes  PULMONARY: Clear to auscultation bilaterally; no wheezes, rales, or rhonchi  CARDIOVASCULAR: Regular rate and rhythm; no murmurs, rubs, or gallops  GASTROINTESTINAL: Soft, non-tender, non-distended; bowel sounds present  MUSCULOSKELETAL: 2+ peripheral pulses; no clubbing, no cyanosis, no edema  NEUROLOGY: non-focal  SKIN: No rashes or lesions; warm and dry

## 2025-05-05 NOTE — PROGRESS NOTE ADULT - ASSESSMENT
71 yo male with a PMH of HTN, CKD stage 5, HTN, Paraplegia, Nephrolithiasis, Chronic metabolic acidosis, and Neurogenic bladder who presents with complaint of blood within the suprapubic catheter s/p initiation of Eliquis prompting critical care     ID is consulted for septic stone  Afebrile  WBC Count: 24.45 (05-01-25 @ 06:05)  WBC Count: 29.31 (04-30-25 @ 17:54)  WBC Count: 9.68 (04-29-25 @ 08:48)  On room air  BCx 4/30 Serratia marcescens  UA with pyuria, UCx GNR  Outpatient UCx 4/21   P. mirabilis (S to cefepime, ceftriaxone, R to FQs and Bactrim)  P. aeruginosa (S to cefepime, FQs and meropenem)  S. marcescens (S to cefepime, FQs, Bactrim, R to ceftriaxone)  MSSA    CT A/P  Nonobstructing 1.8 cm calculus within the left renal collecting system   near the UPJ. Decreased hydroureter when compared with available prior   imaging, now mild to moderate.  Additional nonobstructing calculi within the distal left ureter. Largest   fragment measures approximately 0.6 cm.  Stool impaction of the rectum    S/p 5/1 cystoscopy with bilateral stent placement, UCx pending    Antibiotics:  Vancomycin 4/30  Cefepime 4/30  Meropenem 5/1 ->      IMPRESSION:  Serratia and Proteus bacteremia, potentially life-threatening  Leukocytosis  Obstructive uropathy  JOÃO on CKD  Constipation  Immunosuppression / Immunosenescence secondary to multiple comorbidities which could result in poor clinical outcome    RECOMMENDATIONS:  - D/C meropenem  - IV cefepime 2g q24hrs  - Discharge on IV cefepime 2g q24hrs via midline for 14-days from OR (until 5/14)  - Follow up BCx and UCx  - Urology follow up  - Bowel regimen  - Offloading and frequent position changes, aspiration precaution  - Trend WBC, fever curve, transaminases, creatinine daily  - Please inform ID of any patient clinical change or any new pertinent laboratory or radiographic data    - Weekly CBC, CMP  - ID follow-up with Dr. Augusto Mathias for Telehealth. We will call the patient between 10:30-1:30      [address]       [phone]       Fax [phone]      Jenn Leal D.O.  Attending Physician  Division of Infectious Diseases  [facility]  Please contact me via [employer] Teams

## 2025-05-06 ENCOUNTER — APPOINTMENT (OUTPATIENT)
Dept: UROLOGY | Facility: HOSPITAL | Age: 70
End: 2025-05-06

## 2025-05-06 LAB
ANION GAP SERPL CALC-SCNC: 11 MMOL/L — SIGNIFICANT CHANGE UP (ref 7–14)
BUN SERPL-MCNC: 126 MG/DL — CRITICAL HIGH (ref 10–20)
CALCIUM SERPL-MCNC: 7.1 MG/DL — LOW (ref 8.4–10.5)
CHLORIDE SERPL-SCNC: 98 MMOL/L — SIGNIFICANT CHANGE UP (ref 98–110)
CO2 SERPL-SCNC: 32 MMOL/L — SIGNIFICANT CHANGE UP (ref 17–32)
CREAT SERPL-MCNC: 5.5 MG/DL — CRITICAL HIGH (ref 0.7–1.5)
EGFR: 10 ML/MIN/1.73M2 — LOW
EGFR: 10 ML/MIN/1.73M2 — LOW
GLUCOSE SERPL-MCNC: 100 MG/DL — HIGH (ref 70–99)
HCT VFR BLD CALC: 29.7 % — LOW (ref 42–52)
HGB BLD-MCNC: 9.6 G/DL — LOW (ref 14–18)
MAGNESIUM SERPL-MCNC: 1.6 MG/DL — LOW (ref 1.8–2.4)
MCHC RBC-ENTMCNC: 29.3 PG — SIGNIFICANT CHANGE UP (ref 27–31)
MCHC RBC-ENTMCNC: 32.3 G/DL — SIGNIFICANT CHANGE UP (ref 32–37)
MCV RBC AUTO: 90.5 FL — SIGNIFICANT CHANGE UP (ref 80–94)
NRBC BLD AUTO-RTO: 0 /100 WBCS — SIGNIFICANT CHANGE UP (ref 0–0)
PLATELET # BLD AUTO: 116 K/UL — LOW (ref 130–400)
PMV BLD: 11.4 FL — HIGH (ref 7.4–10.4)
POTASSIUM SERPL-MCNC: 3.4 MMOL/L — LOW (ref 3.5–5)
POTASSIUM SERPL-SCNC: 3.4 MMOL/L — LOW (ref 3.5–5)
RBC # BLD: 3.28 M/UL — LOW (ref 4.7–6.1)
RBC # FLD: 13.1 % — SIGNIFICANT CHANGE UP (ref 11.5–14.5)
SODIUM SERPL-SCNC: 141 MMOL/L — SIGNIFICANT CHANGE UP (ref 135–146)
WBC # BLD: 10.46 K/UL — SIGNIFICANT CHANGE UP (ref 4.8–10.8)
WBC # FLD AUTO: 10.46 K/UL — SIGNIFICANT CHANGE UP (ref 4.8–10.8)

## 2025-05-06 PROCEDURE — 36573 INSJ PICC RS&I 5 YR+: CPT

## 2025-05-06 PROCEDURE — 99233 SBSQ HOSP IP/OBS HIGH 50: CPT

## 2025-05-06 PROCEDURE — 99222 1ST HOSP IP/OBS MODERATE 55: CPT

## 2025-05-06 RX ORDER — MAGNESIUM SULFATE 500 MG/ML
2 SYRINGE (ML) INJECTION ONCE
Refills: 0 | Status: COMPLETED | OUTPATIENT
Start: 2025-05-06 | End: 2025-05-06

## 2025-05-06 RX ADMIN — SODIUM CHLORIDE 75 MILLILITER(S): 9 INJECTION, SOLUTION INTRAVENOUS at 05:15

## 2025-05-06 RX ADMIN — CEFEPIME 100 MILLIGRAM(S): 2 INJECTION, POWDER, FOR SOLUTION INTRAVENOUS at 08:13

## 2025-05-06 RX ADMIN — Medication 400 MILLIGRAM(S): at 08:12

## 2025-05-06 RX ADMIN — Medication 667 MILLIGRAM(S): at 14:04

## 2025-05-06 RX ADMIN — Medication 667 MILLIGRAM(S): at 17:29

## 2025-05-06 RX ADMIN — Medication 25 GRAM(S): at 14:04

## 2025-05-06 RX ADMIN — SODIUM CHLORIDE 75 MILLILITER(S): 9 INJECTION, SOLUTION INTRAVENOUS at 19:32

## 2025-05-06 RX ADMIN — Medication 667 MILLIGRAM(S): at 08:12

## 2025-05-06 RX ADMIN — FOLIC ACID 1 MILLIGRAM(S): 1 TABLET ORAL at 08:12

## 2025-05-06 RX ADMIN — MUPIROCIN CALCIUM 1 APPLICATION(S): 20 CREAM TOPICAL at 17:29

## 2025-05-06 RX ADMIN — MUPIROCIN CALCIUM 1 APPLICATION(S): 20 CREAM TOPICAL at 05:15

## 2025-05-06 RX ADMIN — Medication 1 APPLICATION(S): at 05:15

## 2025-05-06 RX ADMIN — AMLODIPINE BESYLATE 10 MILLIGRAM(S): 10 TABLET ORAL at 05:15

## 2025-05-06 NOTE — CONSULT NOTE ADULT - CONSULT REASON
Hematuria, L hydro
JOÃO/CKD
Skin assessment and treatment recommendation
UTI
uti   metabolic acidosis

## 2025-05-06 NOTE — PROGRESS NOTE ADULT - SUBJECTIVE AND OBJECTIVE BOX
Nephrology Progress Note    KRISTY GARCIA  MRN-[id number]  70y  Male    S:  Patient is seen and examined, events over the last 24h noted.    O:  Allergies:  No Known Allergies    Hospital Medications:   MEDICATIONS  (STANDING):  amLODIPine   Tablet 10 milliGRAM(s) Oral daily  calcium acetate 667 milliGRAM(s) Oral three times a day with meals  cefepime   IVPB 2000 milliGRAM(s) IV Intermittent every 24 hours  chlorhexidine 2% Cloths 1 Application(s) Topical <User Schedule>  folic acid 1 milliGRAM(s) Oral daily  influenza  Vaccine (HIGH DOSE) 0.5 milliLiter(s) IntraMuscular once  lactated ringers. 1000 milliLiter(s) (75 mL/Hr) IV Continuous <Continuous>  melatonin 5 milliGRAM(s) Oral at bedtime  mupirocin 2% Nasal 1 Application(s) Both Nostrils two times a day    MEDICATIONS  (PRN):  ondansetron Injectable 4 milliGRAM(s) IV Push every 6 hours PRN Nausea and/or Vomiting    Home Medications:  Eliquis 5 mg oral tablet: 0.5 tab(s) orally every 12 hours (30 Apr 2025 23:24)  folic acid 1 mg oral tablet: 1 tab(s) orally once a day (29 Apr 2025 09:09)      VITALS:  Daily     Daily   T(F): 97.9 (05-06-25 @ 04:16), Max: 97.9 (05-06-25 @ 04:16)  HR: 66 (05-06-25 @ 04:16)  BP: 170/72 (05-06-25 @ 04:16)  RR: 18 (05-06-25 @ 04:16)  SpO2: 96% (05-06-25 @ 04:16)  Wt(kg): --  I&O's Detail    05 May 2025 07:01  -  06 May 2025 07:00  --------------------------------------------------------  IN:  Total IN: 0 mL    OUT:    Indwelling Catheter - Suprapubic (mL): 1500 mL    Voided (mL): 1600 mL  Total OUT: 3100 mL    Total NET: -3100 mL      06 May 2025 07:01  -  06 May 2025 12:04  --------------------------------------------------------  IN:  Total IN: 0 mL    OUT:    Voided (mL): 800 mL  Total OUT: 800 mL    Total NET: -800 mL        I&O's Summary    05 May 2025 07:01  -  06 May 2025 07:00  --------------------------------------------------------  IN: 0 mL / OUT: 3100 mL / NET: -3100 mL    06 May 2025 07:01  -  06 May 2025 12:04  --------------------------------------------------------  IN: 0 mL / OUT: 800 mL / NET: -800 mL          PHYSICAL EXAM:  Gen: NAD  Chest: b/l breath sounds  Abd: soft, spc  Extremities: no edema      LABS:    05-06    141  |  98  |  126[HH]  ----------------------------<  100[H]  3.4[L]   |  32  |  5.5[HH]    Ca    7.1[L]      06 May 2025 07:09  Mg     1.6     05-06    TPro  5.3[L]  /  Alb  2.3[L]  /  TBili  0.3  /  DBili      /  AST  21  /  ALT  33  /  AlkPhos  206[H]  05-05    Phosphorus: 6.7 mg/dL (05-02-25 @ 05:50)                            9.6    10.46 )-----------( 116      ( 06 May 2025 07:09 )             29.7     Mean Cell Volume: 90.5 fL (05-06-25 @ 07:09)      Creatinine trend:  Creatinine: 5.5 mg/dL (05-06-25 @ 07:09)  Creatinine: 6.2 mg/dL (05-05-25 @ 06:26)  Creatinine: 6.7 mg/dL (05-04-25 @ 06:10)  Creatinine: 7.3 mg/dL (05-03-25 @ 05:59)  Creatinine: 7.7 mg/dL (05-02-25 @ 15:46)  Creatinine: 7.7 mg/dL (05-02-25 @ 05:50)  Creatinine: 8.2 mg/dL (05-01-25 @ 22:00)

## 2025-05-06 NOTE — CONSULT NOTE ADULT - SUBJECTIVE AND OBJECTIVE BOX
Patient  is a 69 yo male with a PMH of HTN, CKD stage 5, HTN, Paraplegia, Nephrolithiasis, Chronic metabolic acidosis, and Neurogenic bladder who presents with complaint of blood within the suprapubic catheter s/p initiation of Eliquis prompting critical care assessment. Upon evaluation, the pt is awake and alert and answering most questions appropriately. The pt remains hemodynamically on room air with no acute complaints. The pt's bother is at bedside who confirms that the pt is compliant with medication regimen and physician office visits. No other complaints or concerns noted.        PAST MEDICAL & SURGICAL HISTORY:  DM (diabetes mellitus)  HTN (hypertension)  H/O paraplegia  Spinal abscess  Renal stones  Stage 5 chronic kidney disease not on chronic dialysis  Neurogenic dysfunction of the urinary bladder  Encounter for care or replacement of suprapubic tube  Spinal abscess  S/P Surgery  Encounter for care or replacement of suprapubic tube  S/P ORIF (open reduction internal fixation) fracture      REVIEW OF SYSTEMS:  All other systems negative    MEDICATIONS  (STANDING):  amLODIPine   Tablet 10 milliGRAM(s) Oral daily  calcium acetate 667 milliGRAM(s) Oral three times a day with meals  cefepime   IVPB 2000 milliGRAM(s) IV Intermittent every 24 hours  chlorhexidine 2% Cloths 1 Application(s) Topical <User Schedule>  folic acid 1 milliGRAM(s) Oral daily  influenza  Vaccine (HIGH DOSE) 0.5 milliLiter(s) IntraMuscular once  lactated ringers. 1000 milliLiter(s) (75 mL/Hr) IV Continuous <Continuous>  melatonin 5 milliGRAM(s) Oral at bedtime  mupirocin 2% Nasal 1 Application(s) Both Nostrils two times a day    MEDICATIONS  (PRN):  ondansetron Injectable 4 milliGRAM(s) IV Push every 6 hours PRN Nausea and/or Vomiting      Allergies  No Known Allergies  Intolerances    SOCIAL HISTORY:      FAMILY HISTORY:  FH: HTN (hypertension)    FH: breast cancer    FH: melanoma    FH: heart disease           PHYSICAL EXAM:  Vital Signs Last 24 Hrs  T(C): 36.6 (06 May 2025 04:16), Max: 36.6 (06 May 2025 04:16)  T(F): 97.9 (06 May 2025 04:16), Max: 97.9 (06 May 2025 04:16)  HR: 66 (06 May 2025 04:16) (54 - 70)  BP: 170/72 (06 May 2025 04:16) (134/72 - 192/88)  BP(mean): --  RR: 18 (06 May 2025 04:16) (18 - 18)  SpO2: 96% (06 May 2025 04:16) (96% - 100%)    Parameters below as of 06 May 2025 04:16  Patient On (Oxygen Delivery Method): room air         General : NAD    HEENT:  NC/AT, PERRL, EOMI, sclera clear, mucosa moist, throat clear, trachea midline, neck supple  Cardiovascular: RRR   Respiratory: equal chest rise  Gastrointestinal: Soft NT/ND (+)BS    Neurology:  Weakened strength & sensation   Psych: Calm  Vascular: B/ L LE equally  cool,    Skin:  Venous stasis       LABS/ CULTURES/ RADIOLOGY:                        9.6    10.46 )-----------( 116      ( 06 May 2025 07:09 )             29.7       141  |  98  |  126  ----------------------------<  100      [05-06-25 @ 07:09]  3.4   |  32  |  5.5        Ca     7.1     [05-06-25 @ 07:09]      Mg     1.6     [05-06-25 @ 07:09]    TPro  5.3  /  Alb  2.3  /  TBili  0.3  /  DBili  x   /  AST  21  /  ALT  33  /  AlkPhos  206  [05-05-25 @ 06:26]              Culture - Blood (collected 04-30-25 @ 17:54)  Source: Blood Blood-Peripheral  Gram Stain (05-02-25 @ 09:05):    Growth in anaerobic bottle: Gram Negative Rods  Final Report (05-04-25 @ 08:04):    Growth in anaerobic bottle: Proteus mirabilis  Organism: Proteus mirabilis (05-04-25 @ 08:04)  Organism: Proteus mirabilis (05-04-25 @ 08:04)      -  Levofloxacin: R >4      -  Tobramycin: S <=2      -  Aztreonam: S <=4      -  Gentamicin: I 4      -  Cefepime: S <=2      -  Cefazolin: R 8      -  Piperacillin/Tazobactam: S <=8      -  Ciprofloxacin: R >2      -  Ceftriaxone: S <=1      -  Ampicillin: R >16 These ampicillin results predict results for amoxicillin      Method Type: ALF      -  Meropenem: S <=1      -  Ampicillin/Sulbactam: R >16/8      -  Cefoxitin: S <=8      -  Trimethoprim/Sulfamethoxazole: R >2/38      -  Ertapenem: S <=0.5    Culture - Blood (collected 04-30-25 @ 17:54)  Source: Blood Blood-Peripheral  Gram Stain (05-01-25 @ 13:23):    Growth in anaerobic bottle: Gram Negative Rods  Final Report (05-03-25 @ 11:55):    Growth in anaerobic bottle: Serratia marcescens    Direct identification is available within approximately 3-5    hours either by Blood Panel Multiplexed PCR or Direct    MALDI-TOF. Details: [email or web address]  Organism: Blood Culture PCR  Serratia marcescens (05-03-25 @ 11:55)  Organism: Blood Culture PCR (05-03-25 @ 11:55)      Method Type: PCR      -  Serratia marcescens: Detec  Organism: Serratia marcescens (05-03-25 @ 11:55)      -  Levofloxacin: S <=0.5      -  Tobramycin: S <=2      -  Aztreonam: S <=4      -  Gentamicin: S <=2      -  Cefepime: S <=2      -  Cefazolin: R >16      -  Piperacillin/Tazobactam: S <=8      -  Ciprofloxacin: S <=0.25      -  Ceftriaxone: S <=1      -  Ampicillin: R >16 These ampicillin results predict results for amoxicillin      Method Type: ALF      -  Meropenem: S <=1      -  Ampicillin/Sulbactam: R >16/8      -  Cefoxitin: R <=8      -  Trimethoprim/Sulfamethoxazole: S <=0.5/9.5      -  Ertapenem: S <=0.5

## 2025-05-06 NOTE — PROGRESS NOTE ADULT - SUBJECTIVE AND OBJECTIVE BOX
KRISTY GARCIA 70y Male  MRN#: [id number]   Hospital Day: 5d    SUBJECTIVE  Patient is a 70y old Male who presents with a chief complaint of UTI (02 May 2025 23:55)  Currently admitted to medicine with the primary diagnosis of Septic shock      INTERVAL HPI AND OVERNIGHT EVENTS:  Patient was examined and seen at bedside. This morning he is resting comfortably in bed and reports no issues or overnight events.    REVIEW OF SYMPTOMS:  CONSTITUTIONAL: No weakness, fevers or chills; No headaches  EYES: No visual changes, eye pain, or discharge  ENT: No vertigo; No ear pain or change in hearing; No sore throat or difficulty swallowing  NECK: No pain or stiffness  RESPIRATORY: No cough, wheezing, or hemoptysis; No shortness of breath  CARDIOVASCULAR: No chest pain or palpitations  GASTROINTESTINAL: No abdominal or epigastric pain; No nausea, vomiting, or hematemesis; No diarrhea or constipation; No melena or hematochezia  GENITOURINARY: No dysuria, frequency or hematuria  MUSCULOSKELETAL: No joint pain, no muscle pain, no weakness  NEUROLOGICAL: No numbness or weakness  SKIN: No itching or rashes    OBJECTIVE  PAST MEDICAL & SURGICAL HISTORY  DM (diabetes mellitus)    HTN (hypertension)    H/O paraplegia    Spinal abscess    Renal stones    Stage 5 chronic kidney disease not on chronic dialysis    Neurogenic dysfunction of the urinary bladder    Encounter for care or replacement of suprapubic tube    Spinal abscess  S/P Surgery    Encounter for care or replacement of suprapubic tube    S/P ORIF (open reduction internal fixation) fracture      ALLERGIES:  No Known Allergies    MEDICATIONS:  STANDING MEDICATIONS  calcium acetate 667 milliGRAM(s) Oral three times a day with meals  chlorhexidine 2% Cloths 1 Application(s) Topical <User Schedule>  folic acid 1 milliGRAM(s) Oral daily  influenza  Vaccine (HIGH DOSE) 0.5 milliLiter(s) IntraMuscular once  lactated ringers. 1000 milliLiter(s) IV Continuous <Continuous>  magnesium oxide 400 milliGRAM(s) Oral three times a day with meals  melatonin 5 milliGRAM(s) Oral at bedtime  meropenem  IVPB 500 milliGRAM(s) IV Intermittent every 24 hours  mupirocin 2% Nasal 1 Application(s) Both Nostrils two times a day    PRN MEDICATIONS  ondansetron Injectable 4 milliGRAM(s) IV Push every 6 hours PRN      VITAL SIGNS: Last 24 Hours  T(C): 36.4 (05 May 2025 04:53), Max: 36.4 (04 May 2025 20:17)  T(F): 97.5 (05 May 2025 04:53), Max: 97.6 (04 May 2025 20:17)  HR: 64 (05 May 2025 04:53) (62 - 68)  BP: 170/81 (05 May 2025 04:53) (128/62 - 185/88)  BP(mean): --  RR: 18 (05 May 2025 04:53) (18 - 18)  SpO2: 96% (05 May 2025 04:53) (95% - 97%)    LABS:                        8.5    10.82 )-----------( 103      ( 05 May 2025 06:26 )             25.4     05-04    140  |  99  |  129[HH]  ----------------------------<  150[H]  3.7   |  25  |  6.7[HH]    Ca    7.0[L]      04 May 2025 06:10  Mg     1.7     05-04    TPro  5.4[L]  /  Alb  2.2[L]  /  TBili  0.3  /  DBili  x   /  AST  33  /  ALT  43[H]  /  AlkPhos  251[H]  05-04      Urinalysis Basic - ( 04 May 2025 06:10 )    Color: x / Appearance: x / SG: x / pH: x  Gluc: 150 mg/dL / Ketone: x  / Bili: x / Urobili: x   Blood: x / Protein: x / Nitrite: x   Leuk Esterase: x / RBC: x / WBC x   Sq Epi: x / Non Sq Epi: x / Bacteria: x      Specimen Source: OR Collect right kidney urine for culture  Culture Results:   >100,000 CFU/ml Serratia marcescens   >100,000 CFU/ml Staphylococcus aureus   50,000 - 99,000 CFU/mL Pseudomonas aeruginosa   50,000 - 99,000 CFU/mL Proteus mirabilis  Organism Identification: Serratia marcescens     MRSA/MSSA PCR (05.01.25 @ 09:00)   MRSA PCR Result.: Positive          RADIOLOGY:  < from: CT Abdomen and Pelvis No Cont (04.30.25 @ 18:59) >  IMPRESSION:  Nonobstructing 1.8 cm calculus within the left renal collecting system   near the UPJ. Decreased hydroureter when compared with available prior   imaging, now mild to moderate.    Additional nonobstructing calculi within the distal left ureter. Largest   fragment measures approximately 0.6 cm.    Stool impaction of the rectum    < end of copied text >      PHYSICAL EXAM:  CONSTITUTIONAL: No acute distress, well-developed, well-groomed, AAOx3  HEAD: Atraumatic, normocephalic  EYES: EOM intact, PERRLA, conjunctiva and sclera clear  ENT: Supple, no masses, no thyromegaly, no bruits, no JVD; moist mucous membranes  PULMONARY: Clear to auscultation bilaterally; no wheezes, rales, or rhonchi  CARDIOVASCULAR: Regular rate and rhythm; no murmurs, rubs, or gallops  GASTROINTESTINAL: Soft, non-tender, non-distended; bowel sounds present  MUSCULOSKELETAL: 2+ peripheral pulses; no clubbing, no cyanosis, no edema  NEUROLOGY: non-focal  SKIN: No rashes or lesions; warm and dry

## 2025-05-06 NOTE — PROGRESS NOTE ADULT - ASSESSMENT
JOÃO on advanced CKD / d/t hypotension / left hydronephrosis   - baseline creat 3.0 in Aug 2024, admitted with creat 10->7.3  Bacteremia  b/l renal calculi  - s/p cystoscopy with b/l ureteral stent placement /  following  - good urine output  - creat trending down   - cont iv hydration with LR- increase rate to 100cc/hr  - calcium corrected wnl  - repeat phos level  - cont phoslo  - replete K to 4, MG to 2 as needed  - cont to document urine output  - complete course of cefepime  - TTE noted- no pericardial effusion  - monitor h/h   - BP elevated, started amlodipine/ monitor

## 2025-05-06 NOTE — PROGRESS NOTE ADULT - NUTRITIONAL ASSESSMENT
This patient has been assessed with a concern for Malnutrition and has been determined to have a diagnosis/diagnoses of Severe protein-calorie malnutrition.    This patient is being managed with:   Diet DASH/TLC-  Sodium & Cholesterol Restricted  For patients receiving Renal Replacement - No Protein Restr No Conc K No Conc Phos Low  Sodium (RENAL)  Entered: May  3 2025  7:52AM  

## 2025-05-06 NOTE — PROGRESS NOTE ADULT - ASSESSMENT
Mr Jacob is a 70 YOM w a PMH of nephrloithiasis, CKD 5 (No HD), neurodysfunctional bladder s/p suprapubic cath, Paraplegia, DM II and HTN was being scheduled for outpt stent when he developed AMS and was brought to the ED and found septic secondary to obstructing stone.    Patient anticipated for 24hrs     #Urosepsis   #Nephrolithiasis, BL   #Polymicrobial pyelonephritis  #Hx CKD 5 (No HD0  #Ho Neurodysfcunctional bladder s/p SP cath  patient seen by Uro, recs appreciated  Plan:  1. Urosepsis/Obstructing stones s/p stents  - no further acute urologic intervention necess  - pt should fup in office with Dr Schilling for further stone treatment planning and stent removal  - complete course of antibiotics per ID recs  - stay well hydrated  Patient seen by ID recs appreciated:  - s/p IV meropenem 500mg q24hrs  - IV cefepime 2g q24hrs  - Midline today  - Discharge on IV cefepime 2g q24hrs via midline for 14-days from OR (until 5/14) w f/u w Dr Wu   - place midline   Patient seen by nephroloni appreciated  - baseline creat 3.0 in Aug 2024, admitted with creat 10->7.3  - non oliguric 1.8 L yesterday  - creat trending down 7.7 ->7.3- > 6.7   - cont iv hydration with LR  WBC remains elevated -  s/p cystoscopy with b/l ureteral stent placement  - monitor calcium level closely and maintain calcium corrected > 8/iCa > 1  - f/u cultures / on meropenem, Cefepime  - obtain TTE/ r/o pericardial effusion  - bicarb 25, DC PO bicarb   - phos high; calcium actetate 1 tab tid with meals      #Ho DMII  well controlled     #Ho HTN  start amlodipine 5mg once daily     #Misc  - DVT Prophylaxis:  - GI Prophylaxis:  - Diet:  - Activity:  - IV Fluids:  - Code Status:

## 2025-05-06 NOTE — CONSULT NOTE ADULT - ASSESSMENT
High risk for pressure injury development or progression   Skin assessed- B/L lower extremity venous stasis with multiple open ulcerations to knee and elbow   No odor,  increased warmth, tenderness, induration, fluctuance, nor crepitus    Wound ans skin care recs.    Clean B/L extremity with soap and  water, pat dry apply triad  hydrophilic dressing   Pressure  injury  preventive  measures  Skin  and incontinency care   Assess wound and inform primary provider of any changes   Case discussed with primary Rn  Wound/ ostomy specialist  to f/u as needed     Offloading: [ x] Frequent position changes [ ] Devices/Equipment  Cleansing: [ ] Saline [x ] Soap/Water [ ] Other: ______  Topicals: [x ] Barrier Cream [ ] Antimicrobial [ ] Enzymatic Wound Debridement [x] Moist  wound Healing    Dressings: [ ] Dry, sterile [ ] Allevyn  Foam [ ] Absorbant Pads [ ] Collagenase    Other Recs.   Per Primary team   Total time for bedside assessment  , review of medical records  and  discussion of plan of care with primary team greater than 35 min

## 2025-05-07 ENCOUNTER — TRANSCRIPTION ENCOUNTER (OUTPATIENT)
Age: 70
End: 2025-05-07

## 2025-05-07 VITALS
RESPIRATION RATE: 18 BRPM | HEART RATE: 67 BPM | SYSTOLIC BLOOD PRESSURE: 142 MMHG | TEMPERATURE: 98 F | OXYGEN SATURATION: 98 % | DIASTOLIC BLOOD PRESSURE: 74 MMHG

## 2025-05-07 LAB
ALBUMIN SERPL ELPH-MCNC: 2.2 G/DL — LOW (ref 3.5–5.2)
ALP SERPL-CCNC: 189 U/L — HIGH (ref 30–115)
ALT FLD-CCNC: 27 U/L — SIGNIFICANT CHANGE UP (ref 0–41)
ANION GAP SERPL CALC-SCNC: 10 MMOL/L — SIGNIFICANT CHANGE UP (ref 7–14)
AST SERPL-CCNC: 36 U/L — SIGNIFICANT CHANGE UP (ref 0–41)
BASOPHILS # BLD AUTO: 0.02 K/UL — SIGNIFICANT CHANGE UP (ref 0–0.2)
BASOPHILS NFR BLD AUTO: 0.2 % — SIGNIFICANT CHANGE UP (ref 0–1)
BILIRUB SERPL-MCNC: 0.3 MG/DL — SIGNIFICANT CHANGE UP (ref 0.2–1.2)
BUN SERPL-MCNC: 105 MG/DL — CRITICAL HIGH (ref 10–20)
CALCIUM SERPL-MCNC: 6.8 MG/DL — LOW (ref 8.4–10.5)
CHLORIDE SERPL-SCNC: 96 MMOL/L — LOW (ref 98–110)
CO2 SERPL-SCNC: 31 MMOL/L — SIGNIFICANT CHANGE UP (ref 17–32)
CREAT SERPL-MCNC: 4.8 MG/DL — CRITICAL HIGH (ref 0.7–1.5)
EGFR: 12 ML/MIN/1.73M2 — LOW
EGFR: 12 ML/MIN/1.73M2 — LOW
EOSINOPHIL # BLD AUTO: 0.4 K/UL — SIGNIFICANT CHANGE UP (ref 0–0.7)
EOSINOPHIL NFR BLD AUTO: 3.6 % — SIGNIFICANT CHANGE UP (ref 0–8)
GLUCOSE SERPL-MCNC: 91 MG/DL — SIGNIFICANT CHANGE UP (ref 70–99)
HCT VFR BLD CALC: 30 % — LOW (ref 42–52)
HGB BLD-MCNC: 9.8 G/DL — LOW (ref 14–18)
IMM GRANULOCYTES NFR BLD AUTO: 1.5 % — HIGH (ref 0.1–0.3)
LYMPHOCYTES # BLD AUTO: 28.8 % — SIGNIFICANT CHANGE UP (ref 20.5–51.1)
LYMPHOCYTES # BLD AUTO: 3.16 K/UL — SIGNIFICANT CHANGE UP (ref 1.2–3.4)
MAGNESIUM SERPL-MCNC: 1.4 MG/DL — LOW (ref 1.8–2.4)
MCHC RBC-ENTMCNC: 29.8 PG — SIGNIFICANT CHANGE UP (ref 27–31)
MCHC RBC-ENTMCNC: 32.7 G/DL — SIGNIFICANT CHANGE UP (ref 32–37)
MCV RBC AUTO: 91.2 FL — SIGNIFICANT CHANGE UP (ref 80–94)
MONOCYTES # BLD AUTO: 0.7 K/UL — HIGH (ref 0.1–0.6)
MONOCYTES NFR BLD AUTO: 6.4 % — SIGNIFICANT CHANGE UP (ref 1.7–9.3)
NEUTROPHILS # BLD AUTO: 6.53 K/UL — HIGH (ref 1.4–6.5)
NEUTROPHILS NFR BLD AUTO: 59.5 % — SIGNIFICANT CHANGE UP (ref 42.2–75.2)
NRBC BLD AUTO-RTO: 0 /100 WBCS — SIGNIFICANT CHANGE UP (ref 0–0)
PLATELET # BLD AUTO: 119 K/UL — LOW (ref 130–400)
PMV BLD: 11.6 FL — HIGH (ref 7.4–10.4)
POTASSIUM SERPL-MCNC: 3.7 MMOL/L — SIGNIFICANT CHANGE UP (ref 3.5–5)
POTASSIUM SERPL-SCNC: 3.7 MMOL/L — SIGNIFICANT CHANGE UP (ref 3.5–5)
PROT SERPL-MCNC: 5.5 G/DL — LOW (ref 6–8)
RBC # BLD: 3.29 M/UL — LOW (ref 4.7–6.1)
RBC # FLD: 12.8 % — SIGNIFICANT CHANGE UP (ref 11.5–14.5)
SODIUM SERPL-SCNC: 137 MMOL/L — SIGNIFICANT CHANGE UP (ref 135–146)
WBC # BLD: 10.97 K/UL — HIGH (ref 4.8–10.8)
WBC # FLD AUTO: 10.97 K/UL — HIGH (ref 4.8–10.8)

## 2025-05-07 PROCEDURE — 99239 HOSP IP/OBS DSCHRG MGMT >30: CPT

## 2025-05-07 RX ORDER — AMLODIPINE BESYLATE 10 MG/1
1 TABLET ORAL
Qty: 30 | Refills: 0
Start: 2025-05-07 | End: 2025-06-05

## 2025-05-07 RX ORDER — CEFEPIME 2 G/20ML
2 INJECTION, POWDER, FOR SOLUTION INTRAVENOUS
Qty: 0 | Refills: 0 | DISCHARGE
Start: 2025-05-07

## 2025-05-07 RX ORDER — MAGNESIUM GLUCONATE 27 MG(500)
1 TABLET ORAL
Qty: 30 | Refills: 0
Start: 2025-05-07 | End: 2025-06-05

## 2025-05-07 RX ADMIN — Medication 667 MILLIGRAM(S): at 07:51

## 2025-05-07 RX ADMIN — SODIUM CHLORIDE 75 MILLILITER(S): 9 INJECTION, SOLUTION INTRAVENOUS at 06:13

## 2025-05-07 RX ADMIN — MUPIROCIN CALCIUM 1 APPLICATION(S): 20 CREAM TOPICAL at 05:02

## 2025-05-07 RX ADMIN — FOLIC ACID 1 MILLIGRAM(S): 1 TABLET ORAL at 11:03

## 2025-05-07 RX ADMIN — CEFEPIME 100 MILLIGRAM(S): 2 INJECTION, POWDER, FOR SOLUTION INTRAVENOUS at 17:12

## 2025-05-07 RX ADMIN — AMLODIPINE BESYLATE 10 MILLIGRAM(S): 10 TABLET ORAL at 05:02

## 2025-05-07 RX ADMIN — MUPIROCIN CALCIUM 1 APPLICATION(S): 20 CREAM TOPICAL at 17:13

## 2025-05-07 RX ADMIN — Medication 667 MILLIGRAM(S): at 17:12

## 2025-05-07 RX ADMIN — Medication 1 APPLICATION(S): at 05:02

## 2025-05-07 RX ADMIN — Medication 667 MILLIGRAM(S): at 11:04

## 2025-05-07 NOTE — DISCHARGE NOTE PROVIDER - CARE PROVIDER_API CALL
Kwaku Garibay  Family Medicine  [address] [place] [address]  Phone: [phone]  Fax: [phone]  Follow Up Time: 1 week    Jina Ku  Nephrology  [address] [facility] [address], [address]  [place] [address]  Phone: [phone]  Fax: [phone]  Follow Up Time: 1 week    Blake Schilling  Urology  [address] [place] [address]  Phone: [phone]  Fax: [phone]  Follow Up Time: 1 week    Augusto Jane  Infectious Disease  [address] [place] [address]  Phone: [phone]  Fax: [phone]  Follow Up Time: 1 week   Kwaku Garibay  Family Medicine  [address] [place] [address]  Phone: [phone]  Fax: [phone]  Follow Up Time: 1 week    Blake Schilling  Urology  [address] [place] [address]  Phone: [phone]  Fax: [phone]  Follow Up Time: 1 week    Augusto Jane  Infectious Disease  [address] [place] [address]  Phone: [phone]  Fax: [phone]  Follow Up Time: 1 week    Martin Mcallister  Nephrology  [address] [place] [address]  Phone: [phone]  Fax: [phone]  Follow Up Time:

## 2025-05-07 NOTE — DISCHARGE NOTE NURSING/CASE MANAGEMENT/SOCIAL WORK - PATIENT PORTAL LINK FT
You can access the FollowMyHealth Patient Portal offered by [facility] by registering at the following website: [email or web address]. By joining [employer]’s FollowMyHealth portal, you will also be able to view your health information using other applications (apps) compatible with our system.

## 2025-05-07 NOTE — PROGRESS NOTE ADULT - SUBJECTIVE AND OBJECTIVE BOX
INFECTIOUS DISEASE FOLLOW UP NOTE:    Interval History/ROS: Patient is a 70y old  Male who presents with a chief complaint of urosepsis secondary to septic stone (06 May 2025 14:09)    Overnight events: No overnight event. Has a midline    REVIEW OF SYSTEMS:  CONSTITUTIONAL: No fever or chills  HEAD: No lesion on scalp  EYES: No visual disturbance  ENT: No sore throat  RESPIRATORY: No cough, no shortness of breath  CARDIOVASCULAR: No chest pain or palpitations  GASTROINTESTINAL: No abdominal or epigastric pain  GENITOURINARY: + SPC  NEUROLOGICAL: No headache/dizziness  MUSCULOSKELETAL: No joint pain, erythema, or swelling; no back pain  SKIN: No itching, rashes  All other ROS negative except noted above      Prior hospital charts reviewed [Yes]  Primary team notes reviewed [Yes]  Other consultant notes reviewed [Yes]    Allergies:  No Known Allergies      ANTIMICROBIALS:   cefepime   IVPB 2000 every 24 hours      OTHER MEDS: MEDICATIONS  (STANDING):  amLODIPine   Tablet 10 daily  influenza  Vaccine (HIGH DOSE) 0.5 once  melatonin 5 at bedtime  ondansetron Injectable 4 every 6 hours PRN      Vital Signs Last 24 Hrs  T(F): 97.5 (05-07-25 @ 13:45), Max: 98 (05-06-25 @ 13:27)    Vital Signs Last 24 Hrs  HR: 67 (05-07-25 @ 13:45) (67 - 68)  BP: 142/74 (05-07-25 @ 13:45) (142/74 - 168/79)  RR: 18 (05-07-25 @ 13:45)  SpO2: 98% (05-07-25 @ 13:45) (97% - 98%)  Wt(kg): --    EXAM:  GENERAL: NAD, lying in bed  HEAD: No head lesions  EYES: Conjunctiva pink and cornea white  EAR, NOSE, MOUTH, THROAT: Normal external ears and nose, no discharges; moist mucous membranes  NECK: Supple, nontender to palpation; no JVD  RESPIRATORY: Clear to auscultation bilaterally  CARDIOVASCULAR: S1 S2  GASTROINTESTINAL: Soft, nontender, nondistended; normoactive bowel sounds  GENITOURINARY: + SPC, no CVA tenderness  EXTREMITIES: No clubbing, cyanosis, or petal edema  NERVOUS SYSTEM: Awake and alert, not fully oriented  MUSCULOSKELETAL: No joint erythema, swelling or pain  SKIN: Area of abrasions on his knees, no sign of infection, no superficial thrombophlebitis  PSYCH: Normal affect    Labs:                        9.8    10.97 )-----------( 119      ( 07 May 2025 07:42 )             30.0     05-07    137  |  96[L]  |  105[HH]  ----------------------------<  91  3.7   |  31  |  4.8[HH]    Ca    6.8[L]      07 May 2025 07:42  Mg     1.4     05-07    TPro  5.5[L]  /  Alb  2.2[L]  /  TBili  0.3  /  DBili  x   /  AST  36  /  ALT  27  /  AlkPhos  189[H]  05-07      WBC Trend:  WBC Count: 10.97 (05-07-25 @ 07:42)  WBC Count: 10.46 (05-06-25 @ 07:09)  WBC Count: 10.82 (05-05-25 @ 06:26)  WBC Count: 15.49 (05-04-25 @ 06:10)      Creatine Trend:  Creatinine: 4.8 (05-07)  Creatinine: 5.5 (05-06)  Creatinine: 6.2 (05-05)  Creatinine: 6.7 (05-04)      Liver Biochemical Testing Trend:  Alanine Aminotransferase (ALT/SGPT): 27 (05-07)  Alanine Aminotransferase (ALT/SGPT): 33 (05-05)  Alanine Aminotransferase (ALT/SGPT): 43 *H* (05-04)  Alanine Aminotransferase (ALT/SGPT): 113 *H* (05-01)  Alanine Aminotransferase (ALT/SGPT): 158 *H* (04-30)  Aspartate Aminotransferase (AST/SGOT): 36 (05-07-25 @ 07:42)  Aspartate Aminotransferase (AST/SGOT): 21 (05-05-25 @ 06:26)  Aspartate Aminotransferase (AST/SGOT): 33 (05-04-25 @ 06:10)  Aspartate Aminotransferase (AST/SGOT): 244 (05-01-25 @ 06:05)  Aspartate Aminotransferase (AST/SGOT): 449 (04-30-25 @ 17:54)  Bilirubin Total: 0.3 (05-07)  Bilirubin Total: 0.3 (05-05)  Bilirubin Total: 0.3 (05-04)  Bilirubin Total: 0.7 (05-01)  Bilirubin Total: 0.8 (04-30)      Trend LDH  08-11-24 @ 15:52  240  08-11-24 @ 08:36  238  08-09-24 @ 11:44  181      Urinalysis Basic - ( 07 May 2025 07:42 )    Color: x / Appearance: x / SG: x / pH: x  Gluc: 91 mg/dL / Ketone: x  / Bili: x / Urobili: x   Blood: x / Protein: x / Nitrite: x   Leuk Esterase: x / RBC: x / WBC x   Sq Epi: x / Non Sq Epi: x / Bacteria: x        MICROBIOLOGY:    MRSA PCR Result.: Positive (05-01-25 @ 09:00)      Culture - Urine (collected 01 May 2025 09:34)  Source: OR Collect right kidney urine for culture  Final Report:    >100,000 CFU/ml Serratia marcescens    >100,000 CFU/ml Staphylococcus aureus    50,000 - 99,000 CFU/mL Pseudomonas aeruginosa    50,000 - 99,000 CFU/mL Proteus mirabilis  Organism: Serratia marcescens  Staphylococcus aureus  Pseudomonas aeruginosa  Proteus mirabilis  Organism: Pseudomonas aeruginosa    Sensitivities:      -  Levofloxacin: S <=0.5      -  Aztreonam: S <=4      -  Cefepime: S <=2      -  Piperacillin/Tazobactam: S <=8      -  Ciprofloxacin: S <=0.25      -  Imipenem: S <=1      Method Type: ALF      -  Meropenem: S <=1      -  Ceftazidime: S 4  Organism: Proteus mirabilis    Sensitivities:      -  Levofloxacin: R >4      -  Tobramycin: I 4      -  Aztreonam: S <=4      -  Gentamicin: I 4      -  Cefepime: S <=2      -  Cefazolin: R 16 For uncomplicated UTI with K. pneumoniae, E. coli, or P. mirablis: ALF <=16 is sensitive and ALF >=32 is resistant. This also predicts results for oral agents cefaclor, cefdinir, cefpodoxime, cefprozil, cefuroxime axetil, cephalexin and locarbeffor uncomplicated UTI. Note that some isolates may be susceptible to these agents while testing resistant to cefazolin.      -  Piperacillin/Tazobactam: S <=8      -  Ciprofloxacin: R >2      -  Ceftriaxone: S <=1      -  Ampicillin: R >16 These ampicillin results predict results for amoxicillin      Method Type: ALF      -  Meropenem: S <=1      -  Ampicillin/Sulbactam: R >16/8      -  Cefoxitin: S <=8      -  Cefuroxime: S <=4      -  Amoxicillin/Clavulanic Acid: I 16/8      -  Trimethoprim/Sulfamethoxazole: R >2/38      -  Ertapenem: S <=0.5  Organism: Staphylococcus aureus    Sensitivities:      -  Oxacillin: S 1 Oxacillin predicts susceptibility for dicloxacillin, methicillin, and nafcillin      -  Gentamicin: S <=4 Should not be used as monotherapy      -  Vancomycin: S 1      -  Tetracycline: S <=4      Method Type: ALF      -  Penicillin: R >2      -  Rifampin: S <=1 Should not be used as monotherapy      -  Trimethoprim/Sulfamethoxazole: S <=0.5/9.5  Organism: Serratia marcescens    Sensitivities:      -  Levofloxacin: S <=0.5      -  Tobramycin: I 4      -  Aztreonam: S <=4      -  Gentamicin: S <=2      -  Cefepime: S <=2      -  Cefazolin: R >16      -  Piperacillin/Tazobactam: S <=8      -  Ciprofloxacin: S <=0.25      -  Ceftriaxone: S <=1      -  Ampicillin: R >16 These ampicillin results predict results for amoxicillin      Method Type: ALF      -  Meropenem: S <=1      -  Ampicillin/Sulbactam: R >16/8      -  Cefoxitin: R 16      -  Amoxicillin/Clavulanic Acid: R >16/8      -  Trimethoprim/Sulfamethoxazole: S <=0.5/9.5      -  Ertapenem: S <=0.5    Culture - Urine (collected 01 May 2025 09:30)  Source: OR Collect left kidney urine for culture  Final Report:    50,000 - 99,000 CFU/mL Serratia marcescens    50,000 - 99,000 CFU/mL Staphylococcus aureus    >100,000 CFU/ml Pseudomonas aeruginosa    10,000 - 49,000 CFU/mL Proteus mirabilis  Organism: Serratia marcescens  Staphylococcus aureus  Pseudomonas aeruginosa  Proteus mirabilis  Organism: Proteus mirabilis    Sensitivities:      -  Levofloxacin: R >4      -  Tobramycin: I 4      -  Aztreonam: S <=4      -  Gentamicin: I 4      -  Cefepime: S <=2      -  Cefazolin: R 8 For uncomplicated UTI with K. pneumoniae, E. coli, or P. mirablis: ALF <=16 is sensitive and ALF >=32 is resistant. This also predicts results for oral agents cefaclor, cefdinir, cefpodoxime, cefprozil, cefuroxime axetil, cephalexin and locarbef for uncomplicated UTI. Note that some isolates may be susceptible to these agents while testing resistant to cefazolin.      -  Piperacillin/Tazobactam: S <=8      -  Ciprofloxacin: R >2      -  Ceftriaxone: S <=1      -  Ampicillin: R >16 These ampicillin results predict results for amoxicillin      Method Type: ALF      -  Meropenem: S <=1      -  Ampicillin/Sulbactam: R >16/8      -  Cefoxitin: S <=8      -  Cefuroxime: S <=4      -  Amoxicillin/Clavulanic Acid: I 16/8      -  Trimethoprim/Sulfamethoxazole: R >2/38      -  Ertapenem: S <=0.5  Organism: Serratia marcescens    Sensitivities:      -  Levofloxacin: S <=0.5      -  Tobramycin: S <=2      -  Aztreonam: S <=4      -  Gentamicin: S <=2      -  Cefazolin: R >16      -  Cefepime: S <=2      -  Piperacillin/Tazobactam: S <=8      -  Ciprofloxacin: S <=0.25      -  Ceftriaxone: S <=1      -  Ampicillin: R >16 These ampicillin results predict results for amoxicillin      Method Type: ALF      -  Meropenem: S <=1      -  Ampicillin/Sulbactam: R 16/8      -  Cefoxitin: R <=8      -  Amoxicillin/Clavulanic Acid: R >16/8      -  Trimethoprim/Sulfamethoxazole: S <=0.5/9.5      -  Ertapenem: S <=0.5  Organism: Staphylococcus aureus    Sensitivities:      -  Oxacillin: S 1 Oxacillin predicts susceptibility for dicloxacillin, methicillin, and nafcillin      -  Gentamicin: S <=4 Should not be used as monotherapy      -  Vancomycin: S 0.5      -  Tetracycline: S <=4      Method Type: ALF      -  Penicillin: R >2      -  Rifampin: S <=1 Should not be used as monotherapy      -  Trimethoprim/Sulfamethoxazole: S <=0.5/9.5  Organism: Pseudomonas aeruginosa    Sensitivities:      -  Levofloxacin: R >4      -  Aztreonam: S <=4      -  Cefepime: S <=2      -  Piperacillin/Tazobactam: S <=8      -  Ciprofloxacin: R >2      -  Imipenem: S 2      Method Type: ALF      -  Meropenem: S <=1      -  Ceftazidime: S 4    Urinalysis with Rflx Culture (collected 30 Apr 2025 20:03)    Culture - Urine (collected 30 Apr 2025 20:03)  Source: Clean Catch None  Final Report:    >=3 organisms. Probable collection contamination.    Culture - Blood (collected 30 Apr 2025 17:54)  Source: Blood Blood-Peripheral  Final Report:    Growth in anaerobic bottle: Serratia marcescens    Direct identification is available within approximately 3-5    hours either by Blood Panel Multiplexed PCR or Direct    MALDI-TOF. Details: [email or web address]  Organism: Blood Culture PCR  Serratia marcescens  Organism: Blood Culture PCR    Sensitivities:      Method Type: PCR      -  Serratia marcescens: Detec  Organism: Serratia marcescens    Sensitivities:      -  Levofloxacin: S <=0.5      -  Tobramycin: S <=2      -  Aztreonam: S <=4      -  Gentamicin: S <=2      -  Cefepime: S <=2      -  Cefazolin: R >16      -  Piperacillin/Tazobactam: S <=8      -  Ciprofloxacin: S <=0.25      -  Ceftriaxone: S <=1      -  Ampicillin: R >16 These ampicillin results predict results for amoxicillin      Method Type: ALF      -  Meropenem: S <=1      -  Ampicillin/Sulbactam: R >16/8      -  Cefoxitin: R <=8      -  Trimethoprim/Sulfamethoxazole: S <=0.5/9.5      -  Ertapenem: S <=0.5    Culture - Blood (collected 30 Apr 2025 17:54)  Source: Blood Blood-Peripheral  Final Report:    Growth in anaerobic bottle: Proteus mirabilis  Organism: Proteus mirabilis  Organism: Proteus mirabilis    Sensitivities:      -  Levofloxacin: R >4      -  Tobramycin: S <=2      -  Aztreonam: S <=4      -  Gentamicin: I 4      -  Cefazolin: R 8      -  Cefepime: S <=2      -  Piperacillin/Tazobactam: S <=8      -  Ciprofloxacin: R >2      -  Ceftriaxone: S <=1      -  Ampicillin: R >16 These ampicillin results predict results for amoxicillin      Method Type: ALF      -  Meropenem: S <=1      -  Ampicillin/Sulbactam: R >16/8      -  Cefoxitin: S <=8      -  Trimethoprim/Sulfamethoxazole: R >2/38      -  Ertapenem: S <=0.5    Culture - Fungal, Body Fluid (collected 11 Aug 2024 16:08)  Source: Pleural Fl Pleural Fluid  Final Report:    No fungus isolated at 4 weeks.    Culture - Body Fluid with Gram Stain (collected 11 Aug 2024 16:08)  Source: Pleural Fl Pleural Fluid  Final Report:    No growth at 5 days    Culture - Tissue with Gram Stain (collected 23 Jul 2024 22:38)  Source: .Tissue RIGHT RENAL AND URETERAL MUCOUS STONES  Final Report:    No growth at 5 days    Culture - Fungal, Tissue (collected 23 Jul 2024 21:36)  Source: .Tissue None  Final Report:    No fungus isolated at 4 weeks.    Ferritin: 438 (05-04)    Troponin T, High Sensitivity Result: 73 (05-03)  Troponin T, High Sensitivity Result: 76 (05-02)  Troponin T, High Sensitivity Result: 74 (05-02)        RADIOLOGY:  imaging below personally reviewed    < from: CT Abdomen and Pelvis No Cont (04.30.25 @ 18:59) >  IMPRESSION:  Nonobstructing 1.8 cm calculus within the left renal collecting system   near the UPJ. Decreased hydroureter when compared with available prior   imaging, now mild to moderate.    Additional nonobstructing calculi within the distal left ureter. Largest   fragment measures approximately 0.6 cm.    Stool impaction of the rectum      < end of copied text >

## 2025-05-07 NOTE — DISCHARGE NOTE NURSING/CASE MANAGEMENT/SOCIAL WORK - FINANCIAL ASSISTANCE
[facility] provides services at a reduced cost to those who are determined to be eligible through [facility]’s financial assistance program. Information regarding [facility]’s financial assistance program can be found by going to [email or web address] or by calling [phone].

## 2025-05-07 NOTE — DISCHARGE NOTE NURSING/CASE MANAGEMENT/SOCIAL WORK - NSDCPEFALRISK_GEN_ALL_CORE
For information on Fall & Injury Prevention, visit: [email or web address] OR  [email or web address] OR  https://www.cdc.gov/steadi/patient.html

## 2025-05-07 NOTE — PROGRESS NOTE ADULT - REASON FOR ADMISSION
urosepsis secondary to septic stone
UTI
UTI
abnormal lab
urosepsis secondary to septic stone
Hydronephrosis
Weakness

## 2025-05-07 NOTE — DISCHARGE NOTE PROVIDER - NSDCFUSCHEDAPPT_GEN_ALL_CORE_FT
[facility]  UROLOGY [address]  Scheduled Appointment: 05/21/2025    Blake Schilling  [facility] PreAdmits  Scheduled Appointment: 05/22/2025    Blake Schilling  [facility]  UROLOGY [address] [address]  Scheduled Appointment: 05/22/2025    Martin Mcallister  [facility]  NEPHRO [address] [place]  Scheduled Appointment: 05/30/2025

## 2025-05-07 NOTE — DISCHARGE NOTE PROVIDER - NSDCMRMEDTOKEN_GEN_ALL_CORE_FT
Eliquis 5 mg oral tablet: 0.5 tab(s) orally every 12 hours  folic acid 1 mg oral tablet: 1 tab(s) orally once a day  sodium bicarbonate 650 mg oral tablet: 1 tab(s) orally 3 times a day   amLODIPine 10 mg oral tablet: 1 tab(s) orally once a day  cefepime 2 g intravenous injection: 2 gram(s) intravenous every 24 hours  Eliquis 5 mg oral tablet: 0.5 tab(s) orally every 12 hours  folic acid 1 mg oral tablet: 1 tab(s) orally once a day  magnesium gluconate 500 mg oral tablet: 1 tab(s) orally once a day

## 2025-05-07 NOTE — DISCHARGE NOTE PROVIDER - DETAILS OF MALNUTRITION DIAGNOSIS/DIAGNOSES
This patient has been assessed with a concern for Malnutrition and was treated during this hospitalization for the following Nutrition diagnosis/diagnoses:     -  05/03/2025: Severe protein-calorie malnutrition

## 2025-05-07 NOTE — DISCHARGE NOTE PROVIDER - NSDCCPCAREPLAN_GEN_ALL_CORE_FT
PRINCIPAL DISCHARGE DIAGNOSIS  Diagnosis: Sepsis secondary to UTI  Assessment and Plan of Treatment: you had bilateral ureteral JJ stent placement on May 1st by Dr. Schilling  please finish the course of IV antibiotic via midline as directed  follow up with ID in 1 week for reassessment (Doctor will call you)  follow up with your PCP in 1 week for reassessment  follow up with Dr. Schilling in 1 week for stent removal and further management      SECONDARY DISCHARGE DIAGNOSES  Diagnosis: Acute renal failure  Assessment and Plan of Treatment: treated and improved  please ensure adequate oral hydration  avoid nephrotoxic agents  follow up with your nephrologist in 1-2 weeks fo reassessment    Diagnosis: Nephrolithiasis  Assessment and Plan of Treatment: follow up with Dr. Schilling for futher management

## 2025-05-07 NOTE — DISCHARGE NOTE PROVIDER - PROVIDER TOKENS
PROVIDER:[TOKEN:[[id number]],FOLLOWUP:[1 week]],PROVIDER:[TOKEN:[[id number]],FOLLOWUP:[1 week]],PROVIDER:[TOKEN:[[id number]],FOLLOWUP:[1 week]],PROVIDER:[TOKEN:[[id number]],FOLLOWUP:[1 week]] PROVIDER:[TOKEN:[[id number]],FOLLOWUP:[1 week]],PROVIDER:[TOKEN:[[id number]],FOLLOWUP:[1 week]],PROVIDER:[TOKEN:[[id number]],FOLLOWUP:[1 week]],PROVIDER:[TOKEN:[[id number]]]

## 2025-05-07 NOTE — PROGRESS NOTE ADULT - ASSESSMENT
71 yo male with a PMH of HTN, CKD stage 5, HTN, Paraplegia, Nephrolithiasis, Chronic metabolic acidosis, and Neurogenic bladder who presents with complaint of blood within the suprapubic catheter s/p initiation of Eliquis prompting critical care     ID is consulted for septic stone  Afebrile  WBC Count: 24.45 (05-01-25 @ 06:05)  WBC Count: 29.31 (04-30-25 @ 17:54)  WBC Count: 9.68 (04-29-25 @ 08:48)  On room air  BCx 4/30 Serratia marcescens  UA with pyuria, UCx GNR  Outpatient UCx 4/21   P. mirabilis (S to cefepime, ceftriaxone, R to FQs and Bactrim)  P. aeruginosa (S to cefepime, FQs and meropenem)  S. marcescens (S to cefepime, FQs, Bactrim, R to ceftriaxone)  MSSA    CT A/P  Nonobstructing 1.8 cm calculus within the left renal collecting system   near the UPJ. Decreased hydroureter when compared with available prior   imaging, now mild to moderate.  Additional nonobstructing calculi within the distal left ureter. Largest   fragment measures approximately 0.6 cm.  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 Augsuto Mathias for Telehealth. We will call the patient between 10:30-1:30      [address]       [phone]       Fax [phone]      Jenn Leal D.O.  Attending Physician  Division of Infectious Diseases  [facility]  Please contact me via Microsoft Teams

## 2025-05-07 NOTE — DISCHARGE NOTE PROVIDER - CARE PROVIDERS DIRECT ADDRESSES
[email or web address]

## 2025-05-07 NOTE — PROGRESS NOTE ADULT - TIME BILLING
I have personally seen and examined this patient.    I have reviewed all pertinent clinical information and reviewed all relevant imaging and diagnostic studies personally.   I discussed recommendations with the primary team.
I have personally seen and examined this patient.    I have reviewed all pertinent clinical information and reviewed all relevant imaging and diagnostic studies personally.   I discussed recommendations with the primary team.
I was physically present for the key portions of the evaluation and management [ E/M] service provided and agree with the plan which i have reviewed and edited where appropriate

## 2025-05-07 NOTE — PROGRESS NOTE ADULT - PROVIDER SPECIALTY LIST ADULT
Infectious Disease
Nephrology
Nephrology
Urology
CCU
Hospitalist
Infectious Disease
Internal Medicine
Nephrology
Pulmonology
Pulmonology
Urology
Hospitalist
Nephrology
Nephrology
Urology
CCU
Infectious Disease

## 2025-05-07 NOTE — DISCHARGE NOTE PROVIDER - NPI NUMBER (FOR SYSADMIN USE ONLY) :
[[id number]] [[id number]]

## 2025-05-07 NOTE — DISCHARGE NOTE PROVIDER - HOSPITAL COURSE
70 YOM w a PMH of nephrloithiasis, CKD 5 (No HD), neurodysfunctional bladder s/p suprapubic cath, Paraplegia, DM II and HTN was being scheduled for outpt stent when he developed AMS and was brought to the ED and found septic secondary to obstructing stone.    #Urosepsis   #Nephrolithiasis, BL   #Polymicrobial pyelonephritis  #Hx CKD 5 (No HD0  #Ho Neurodysfcunctional bladder s/p SP cath  patient seen by Uro, recs appreciated  Plan:  1. Urosepsis/Obstructing stones s/p stents  - no further acute urologic intervention necess  - pt should fup in office with Dr Schilling for further stone treatment planning and stent removal  - complete course of antibiotics per ID recs  - stay well hydrated  Patient seen by ID recs appreciated:  - s/p IV meropenem 500mg q24hrs  - IV cefepime 2g q24hrs  - Midline today  - Discharge on IV cefepime 2g q24hrs via midline for 14-days from OR (until 5/14) w f/u w Dr Wu   - midline placed on 5/6  Patient seen by nephro, recs appreciated  - baseline creat 3.0 in Aug 2024, admitted with creat 10->7.3  - non oliguric 1.8 L yesterday  - creat trending down 7.7 ->7.3- > 6.7 ->5.5->4.8  - cont iv hydration with LR  WBC remains elevated -  s/p cystoscopy with b/l ureteral stent placement  - monitor calcium level closely and maintain calcium corrected > 8/iCa > 1  - f/u cultures / on meropenem, Cefepime  - TTE with normal LV function  - bicarb 25, DC PO bicarb   - phos high; calcium actetate 1 tab tid with meals      #Ho DMII  well controlled     #Ho HTN  start amlodipine 5mg once daily     Pt is medically stable for dc on IV antibiotic via midline

## 2025-05-12 ENCOUNTER — INPATIENT (INPATIENT)
Facility: HOSPITAL | Age: 70
LOS: 2 days | Discharge: HOME | DRG: 689 | End: 2025-05-15
Attending: INTERNAL MEDICINE | Admitting: STUDENT IN AN ORGANIZED HEALTH CARE EDUCATION/TRAINING PROGRAM
Payer: MEDICARE

## 2025-05-12 VITALS
TEMPERATURE: 98 F | HEART RATE: 70 BPM | RESPIRATION RATE: 16 BRPM | OXYGEN SATURATION: 99 % | DIASTOLIC BLOOD PRESSURE: 86 MMHG | SYSTOLIC BLOOD PRESSURE: 165 MMHG | HEIGHT: 69 IN

## 2025-05-12 DIAGNOSIS — Z93.50 UNSPECIFIED CYSTOSTOMY STATUS: ICD-10-CM

## 2025-05-12 DIAGNOSIS — B96.4 PROTEUS (MIRABILIS) (MORGANII) AS THE CAUSE OF DISEASES CLASSIFIED ELSEWHERE: ICD-10-CM

## 2025-05-12 DIAGNOSIS — R41.82 ALTERED MENTAL STATUS, UNSPECIFIED: ICD-10-CM

## 2025-05-12 DIAGNOSIS — I87.8 OTHER SPECIFIED DISORDERS OF VEINS: ICD-10-CM

## 2025-05-12 DIAGNOSIS — E87.20 ACIDOSIS, UNSPECIFIED: ICD-10-CM

## 2025-05-12 DIAGNOSIS — Z98.890 OTHER SPECIFIED POSTPROCEDURAL STATES: Chronic | ICD-10-CM

## 2025-05-12 DIAGNOSIS — B96.5 PSEUDOMONAS (AERUGINOSA) (MALLEI) (PSEUDOMALLEI) AS THE CAUSE OF DISEASES CLASSIFIED ELSEWHERE: ICD-10-CM

## 2025-05-12 DIAGNOSIS — M46.20 OSTEOMYELITIS OF VERTEBRA, SITE UNSPECIFIED: Chronic | ICD-10-CM

## 2025-05-12 DIAGNOSIS — R65.21 SEVERE SEPSIS WITH SEPTIC SHOCK: ICD-10-CM

## 2025-05-12 DIAGNOSIS — E11.22 TYPE 2 DIABETES MELLITUS WITH DIABETIC CHRONIC KIDNEY DISEASE: ICD-10-CM

## 2025-05-12 DIAGNOSIS — B94.8 SEQUELAE OF OTHER SPECIFIED INFECTIOUS AND PARASITIC DISEASES: ICD-10-CM

## 2025-05-12 DIAGNOSIS — N17.9 ACUTE KIDNEY FAILURE, UNSPECIFIED: ICD-10-CM

## 2025-05-12 DIAGNOSIS — Z43.5 ENCOUNTER FOR ATTENTION TO CYSTOSTOMY: Chronic | ICD-10-CM

## 2025-05-12 DIAGNOSIS — K59.00 CONSTIPATION, UNSPECIFIED: ICD-10-CM

## 2025-05-12 DIAGNOSIS — E43 UNSPECIFIED SEVERE PROTEIN-CALORIE MALNUTRITION: ICD-10-CM

## 2025-05-12 DIAGNOSIS — D84.81 IMMUNODEFICIENCY DUE TO CONDITIONS CLASSIFIED ELSEWHERE: ICD-10-CM

## 2025-05-12 DIAGNOSIS — N18.5 CHRONIC KIDNEY DISEASE, STAGE 5: ICD-10-CM

## 2025-05-12 DIAGNOSIS — Z79.01 LONG TERM (CURRENT) USE OF ANTICOAGULANTS: ICD-10-CM

## 2025-05-12 DIAGNOSIS — I12.0 HYPERTENSIVE CHRONIC KIDNEY DISEASE WITH STAGE 5 CHRONIC KIDNEY DISEASE OR END STAGE RENAL DISEASE: ICD-10-CM

## 2025-05-12 DIAGNOSIS — N39.0 URINARY TRACT INFECTION, SITE NOT SPECIFIED: ICD-10-CM

## 2025-05-12 DIAGNOSIS — E83.42 HYPOMAGNESEMIA: ICD-10-CM

## 2025-05-12 DIAGNOSIS — B95.61 METHICILLIN SUSCEPTIBLE STAPHYLOCOCCUS AUREUS INFECTION AS THE CAUSE OF DISEASES CLASSIFIED ELSEWHERE: ICD-10-CM

## 2025-05-12 DIAGNOSIS — R31.9 HEMATURIA, UNSPECIFIED: ICD-10-CM

## 2025-05-12 DIAGNOSIS — N13.6 PYONEPHROSIS: ICD-10-CM

## 2025-05-12 DIAGNOSIS — G82.20 PARAPLEGIA, UNSPECIFIED: ICD-10-CM

## 2025-05-12 DIAGNOSIS — A41.53 SEPSIS DUE TO SERRATIA: ICD-10-CM

## 2025-05-12 DIAGNOSIS — N31.9 NEUROMUSCULAR DYSFUNCTION OF BLADDER, UNSPECIFIED: ICD-10-CM

## 2025-05-12 LAB
ALBUMIN SERPL ELPH-MCNC: 3.3 G/DL — LOW (ref 3.5–5.2)
ALP SERPL-CCNC: 192 U/L — HIGH (ref 30–115)
ALT FLD-CCNC: 25 U/L — SIGNIFICANT CHANGE UP (ref 0–41)
ANION GAP SERPL CALC-SCNC: 14 MMOL/L — SIGNIFICANT CHANGE UP (ref 7–14)
APPEARANCE UR: ABNORMAL
APTT BLD: 35.4 SEC — SIGNIFICANT CHANGE UP (ref 27–39.2)
AST SERPL-CCNC: 33 U/L — SIGNIFICANT CHANGE UP (ref 0–41)
BACTERIA # UR AUTO: ABNORMAL /HPF
BASOPHILS # BLD AUTO: 0.02 K/UL — SIGNIFICANT CHANGE UP (ref 0–0.2)
BASOPHILS NFR BLD AUTO: 0.3 % — SIGNIFICANT CHANGE UP (ref 0–1)
BILIRUB SERPL-MCNC: 0.3 MG/DL — SIGNIFICANT CHANGE UP (ref 0.2–1.2)
BILIRUB UR-MCNC: NEGATIVE — SIGNIFICANT CHANGE UP
BUN SERPL-MCNC: 78 MG/DL — CRITICAL HIGH (ref 10–20)
CALCIUM SERPL-MCNC: 7.3 MG/DL — LOW (ref 8.4–10.5)
CHLORIDE SERPL-SCNC: 99 MMOL/L — SIGNIFICANT CHANGE UP (ref 98–110)
CO2 SERPL-SCNC: 28 MMOL/L — SIGNIFICANT CHANGE UP (ref 17–32)
COLOR SPEC: ABNORMAL
CREAT SERPL-MCNC: 4.7 MG/DL — CRITICAL HIGH (ref 0.7–1.5)
DIFF PNL FLD: ABNORMAL
EGFR: 13 ML/MIN/1.73M2 — LOW
EGFR: 13 ML/MIN/1.73M2 — LOW
EOSINOPHIL # BLD AUTO: 0.23 K/UL — SIGNIFICANT CHANGE UP (ref 0–0.7)
EOSINOPHIL NFR BLD AUTO: 3.2 % — SIGNIFICANT CHANGE UP (ref 0–8)
EPI CELLS # UR: PRESENT
GLUCOSE BLDC GLUCOMTR-MCNC: 78 MG/DL — SIGNIFICANT CHANGE UP (ref 70–99)
GLUCOSE BLDC GLUCOMTR-MCNC: 81 MG/DL — SIGNIFICANT CHANGE UP (ref 70–99)
GLUCOSE SERPL-MCNC: 83 MG/DL — SIGNIFICANT CHANGE UP (ref 70–99)
GLUCOSE UR QL: NEGATIVE MG/DL — SIGNIFICANT CHANGE UP
HCT VFR BLD CALC: 30.3 % — LOW (ref 42–52)
HGB BLD-MCNC: 9.4 G/DL — LOW (ref 14–18)
IMM GRANULOCYTES NFR BLD AUTO: 0.6 % — HIGH (ref 0.1–0.3)
INR BLD: 1.03 RATIO — SIGNIFICANT CHANGE UP (ref 0.65–1.3)
KETONES UR QL: NEGATIVE MG/DL — SIGNIFICANT CHANGE UP
LACTATE SERPL-SCNC: 0.4 MMOL/L — LOW (ref 0.7–2)
LEUKOCYTE ESTERASE UR-ACNC: ABNORMAL
LYMPHOCYTES # BLD AUTO: 1.59 K/UL — SIGNIFICANT CHANGE UP (ref 1.2–3.4)
LYMPHOCYTES # BLD AUTO: 22.2 % — SIGNIFICANT CHANGE UP (ref 20.5–51.1)
MCHC RBC-ENTMCNC: 29.7 PG — SIGNIFICANT CHANGE UP (ref 27–31)
MCHC RBC-ENTMCNC: 31 G/DL — LOW (ref 32–37)
MCV RBC AUTO: 95.6 FL — HIGH (ref 80–94)
MONOCYTES # BLD AUTO: 0.53 K/UL — SIGNIFICANT CHANGE UP (ref 0.1–0.6)
MONOCYTES NFR BLD AUTO: 7.4 % — SIGNIFICANT CHANGE UP (ref 1.7–9.3)
NEUTROPHILS # BLD AUTO: 4.74 K/UL — SIGNIFICANT CHANGE UP (ref 1.4–6.5)
NEUTROPHILS NFR BLD AUTO: 66.3 % — SIGNIFICANT CHANGE UP (ref 42.2–75.2)
NITRITE UR-MCNC: NEGATIVE — SIGNIFICANT CHANGE UP
NRBC BLD AUTO-RTO: 0 /100 WBCS — SIGNIFICANT CHANGE UP (ref 0–0)
PH UR: 8 — SIGNIFICANT CHANGE UP (ref 5–8)
PLATELET # BLD AUTO: 137 K/UL — SIGNIFICANT CHANGE UP (ref 130–400)
PMV BLD: 12.4 FL — HIGH (ref 7.4–10.4)
POTASSIUM SERPL-MCNC: 3.7 MMOL/L — SIGNIFICANT CHANGE UP (ref 3.5–5)
POTASSIUM SERPL-SCNC: 3.7 MMOL/L — SIGNIFICANT CHANGE UP (ref 3.5–5)
PROT SERPL-MCNC: 7.2 G/DL — SIGNIFICANT CHANGE UP (ref 6–8)
PROT UR-MCNC: 100 MG/DL
PROTHROM AB SERPL-ACNC: 12.2 SEC — SIGNIFICANT CHANGE UP (ref 9.95–12.87)
RBC # BLD: 3.17 M/UL — LOW (ref 4.7–6.1)
RBC # FLD: 12.9 % — SIGNIFICANT CHANGE UP (ref 11.5–14.5)
RBC CASTS # UR COMP ASSIST: >50 /HPF — HIGH (ref 0–4)
SODIUM SERPL-SCNC: 141 MMOL/L — SIGNIFICANT CHANGE UP (ref 135–146)
SP GR SPEC: 1.01 — SIGNIFICANT CHANGE UP (ref 1–1.03)
SQUAMOUS # UR AUTO: 2 /HPF — SIGNIFICANT CHANGE UP (ref 0–5)
UROBILINOGEN FLD QL: 0.2 MG/DL — SIGNIFICANT CHANGE UP (ref 0.2–1)
WBC # BLD: 7.15 K/UL — SIGNIFICANT CHANGE UP (ref 4.8–10.8)
WBC # FLD AUTO: 7.15 K/UL — SIGNIFICANT CHANGE UP (ref 4.8–10.8)
WBC UR QL: >50 /HPF — HIGH (ref 0–5)

## 2025-05-12 PROCEDURE — 92610 EVALUATE SWALLOWING FUNCTION: CPT | Mod: GN

## 2025-05-12 PROCEDURE — 82746 ASSAY OF FOLIC ACID SERUM: CPT

## 2025-05-12 PROCEDURE — 36415 COLL VENOUS BLD VENIPUNCTURE: CPT

## 2025-05-12 PROCEDURE — 70450 CT HEAD/BRAIN W/O DYE: CPT | Mod: 26

## 2025-05-12 PROCEDURE — 84100 ASSAY OF PHOSPHORUS: CPT

## 2025-05-12 PROCEDURE — 80048 BASIC METABOLIC PNL TOTAL CA: CPT

## 2025-05-12 PROCEDURE — 99233 SBSQ HOSP IP/OBS HIGH 50: CPT

## 2025-05-12 PROCEDURE — 82607 VITAMIN B-12: CPT

## 2025-05-12 PROCEDURE — 74176 CT ABD & PELVIS W/O CONTRAST: CPT | Mod: 26

## 2025-05-12 PROCEDURE — 99222 1ST HOSP IP/OBS MODERATE 55: CPT

## 2025-05-12 PROCEDURE — 85025 COMPLETE CBC W/AUTO DIFF WBC: CPT

## 2025-05-12 PROCEDURE — 82962 GLUCOSE BLOOD TEST: CPT

## 2025-05-12 PROCEDURE — 99285 EMERGENCY DEPT VISIT HI MDM: CPT

## 2025-05-12 PROCEDURE — 83735 ASSAY OF MAGNESIUM: CPT

## 2025-05-12 PROCEDURE — 92526 ORAL FUNCTION THERAPY: CPT | Mod: GN

## 2025-05-12 PROCEDURE — 99223 1ST HOSP IP/OBS HIGH 75: CPT

## 2025-05-12 PROCEDURE — 83036 HEMOGLOBIN GLYCOSYLATED A1C: CPT

## 2025-05-12 RX ORDER — AMLODIPINE BESYLATE 10 MG/1
10 TABLET ORAL DAILY
Refills: 0 | Status: DISCONTINUED | OUTPATIENT
Start: 2025-05-12 | End: 2025-05-15

## 2025-05-12 RX ORDER — MAGNESIUM GLUCONATE 27 MG(500)
500 TABLET ORAL DAILY
Refills: 0 | Status: DISCONTINUED | OUTPATIENT
Start: 2025-05-12 | End: 2025-05-15

## 2025-05-12 RX ORDER — SODIUM CHLORIDE 9 G/1000ML
1000 INJECTION, SOLUTION INTRAVENOUS
Refills: 0 | Status: DISCONTINUED | OUTPATIENT
Start: 2025-05-12 | End: 2025-05-15

## 2025-05-12 RX ORDER — SODIUM CHLORIDE 9 G/1000ML
1000 INJECTION, SOLUTION INTRAVENOUS ONCE
Refills: 0 | Status: COMPLETED | OUTPATIENT
Start: 2025-05-12 | End: 2025-05-12

## 2025-05-12 RX ORDER — PIPERACILLIN-TAZO-DEXTROSE,ISO 2.25G/50ML
3.38 IV SOLUTION, PIGGYBACK PREMIX FROZEN(ML) INTRAVENOUS ONCE
Refills: 0 | Status: COMPLETED | OUTPATIENT
Start: 2025-05-13 | End: 2025-05-13

## 2025-05-12 RX ORDER — DEXTROSE 50 % IN WATER 50 %
12.5 SYRINGE (ML) INTRAVENOUS ONCE
Refills: 0 | Status: DISCONTINUED | OUTPATIENT
Start: 2025-05-12 | End: 2025-05-15

## 2025-05-12 RX ORDER — B1/B2/B3/B5/B6/B12/VIT C/FOLIC 500-0.5 MG
1 TABLET ORAL DAILY
Refills: 0 | Status: DISCONTINUED | OUTPATIENT
Start: 2025-05-12 | End: 2025-05-15

## 2025-05-12 RX ORDER — INSULIN LISPRO 100 U/ML
INJECTION, SOLUTION INTRAVENOUS; SUBCUTANEOUS
Refills: 0 | Status: DISCONTINUED | OUTPATIENT
Start: 2025-05-12 | End: 2025-05-15

## 2025-05-12 RX ORDER — DEXTROSE 50 % IN WATER 50 %
25 SYRINGE (ML) INTRAVENOUS ONCE
Refills: 0 | Status: DISCONTINUED | OUTPATIENT
Start: 2025-05-12 | End: 2025-05-15

## 2025-05-12 RX ORDER — ACETAMINOPHEN 500 MG/5ML
650 LIQUID (ML) ORAL EVERY 6 HOURS
Refills: 0 | Status: DISCONTINUED | OUTPATIENT
Start: 2025-05-12 | End: 2025-05-15

## 2025-05-12 RX ORDER — ONDANSETRON HCL/PF 4 MG/2 ML
4 VIAL (ML) INJECTION EVERY 8 HOURS
Refills: 0 | Status: DISCONTINUED | OUTPATIENT
Start: 2025-05-12 | End: 2025-05-15

## 2025-05-12 RX ORDER — CEFEPIME 2 G/20ML
1000 INJECTION, POWDER, FOR SOLUTION INTRAVENOUS ONCE
Refills: 0 | Status: COMPLETED | OUTPATIENT
Start: 2025-05-12 | End: 2025-05-12

## 2025-05-12 RX ORDER — MELATONIN 5 MG
3 TABLET ORAL AT BEDTIME
Refills: 0 | Status: DISCONTINUED | OUTPATIENT
Start: 2025-05-12 | End: 2025-05-15

## 2025-05-12 RX ORDER — PIPERACILLIN-TAZO-DEXTROSE,ISO 2.25G/50ML
3.38 IV SOLUTION, PIGGYBACK PREMIX FROZEN(ML) INTRAVENOUS ONCE
Refills: 0 | Status: COMPLETED | OUTPATIENT
Start: 2025-05-12 | End: 2025-05-12

## 2025-05-12 RX ORDER — INSULIN LISPRO 100 U/ML
INJECTION, SOLUTION INTRAVENOUS; SUBCUTANEOUS
Refills: 0 | Status: DISCONTINUED | OUTPATIENT
Start: 2025-05-12 | End: 2025-05-12

## 2025-05-12 RX ORDER — FOLIC ACID 1 MG/1
1 TABLET ORAL DAILY
Refills: 0 | Status: DISCONTINUED | OUTPATIENT
Start: 2025-05-12 | End: 2025-05-15

## 2025-05-12 RX ORDER — GLUCAGON 3 MG/1
1 POWDER NASAL ONCE
Refills: 0 | Status: DISCONTINUED | OUTPATIENT
Start: 2025-05-12 | End: 2025-05-15

## 2025-05-12 RX ORDER — APIXABAN 2.5 MG/1
2.5 TABLET, FILM COATED ORAL EVERY 12 HOURS
Refills: 0 | Status: DISCONTINUED | OUTPATIENT
Start: 2025-05-12 | End: 2025-05-15

## 2025-05-12 RX ORDER — MAGNESIUM, ALUMINUM HYDROXIDE 200-200 MG
30 TABLET,CHEWABLE ORAL EVERY 4 HOURS
Refills: 0 | Status: DISCONTINUED | OUTPATIENT
Start: 2025-05-12 | End: 2025-05-15

## 2025-05-12 RX ORDER — PIPERACILLIN-TAZO-DEXTROSE,ISO 2.25G/50ML
3.38 IV SOLUTION, PIGGYBACK PREMIX FROZEN(ML) INTRAVENOUS EVERY 12 HOURS
Refills: 0 | Status: DISCONTINUED | OUTPATIENT
Start: 2025-05-13 | End: 2025-05-15

## 2025-05-12 RX ADMIN — AMLODIPINE BESYLATE 10 MILLIGRAM(S): 10 TABLET ORAL at 20:28

## 2025-05-12 RX ADMIN — SODIUM CHLORIDE 1000 MILLILITER(S): 9 INJECTION, SOLUTION INTRAVENOUS at 10:08

## 2025-05-12 RX ADMIN — Medication 200 GRAM(S): at 18:43

## 2025-05-12 RX ADMIN — APIXABAN 2.5 MILLIGRAM(S): 2.5 TABLET, FILM COATED ORAL at 18:44

## 2025-05-12 RX ADMIN — SODIUM CHLORIDE 50 MILLILITER(S): 9 INJECTION, SOLUTION INTRAVENOUS at 19:11

## 2025-05-12 RX ADMIN — CEFEPIME 100 MILLIGRAM(S): 2 INJECTION, POWDER, FOR SOLUTION INTRAVENOUS at 10:59

## 2025-05-12 NOTE — CONSULT NOTE ADULT - NS ATTEND AMEND GEN_ALL_CORE FT
Patient was seen and examined with Urology PA at bedside.   Labs and imaging were personally reviewed and interpreted by me and discussed with the patient.  CTAP this admission shows bilateral ureteral stent in adequate position. Multiple non-obstructing renal stones on the right side. Left side with stone burden of ~1.3cm. Stable moderate HUN w distal stone measuring 9mm.   Pt is afebrile, no leukocytosis. Cr downtrending.   F/u ID recs for abx  Fu final cutlure results.  No plan for any  intervention at this time.   Dye draining clear urine with scant debris.    Note was billed and signed at this time do to scheduling conflicts. Pt was seen and examined within 24hrs of initial consult placement.

## 2025-05-12 NOTE — PATIENT PROFILE ADULT - FALL HARM RISK - HARM RISK INTERVENTIONS

## 2025-05-12 NOTE — ED PROVIDER NOTE - OBJECTIVE STATEMENT
Patient is a 70-year-old male history of diabetes, CKD, neurogenic bladder with suprapubic catheter, bedbound here for evaluation of hematuria and confusion since this morning.  Patient was recently discharged from the hospital after having right ureteral stent placed for a nonobstructing intrarenal stone with associated UTI.  Patient has been taking cefepime via midline catheter that was placed.

## 2025-05-12 NOTE — H&P ADULT - ASSESSMENT
70 YOM w a PMH of nephrolithiasis, CKD 5 (No HD), neurodysfunctional bladder s/p suprapubic cath, paraplegia, bedbound, DM II, HTN, s/p b/l JJ stent placement for septic stone 5/1/25 presenting to the ED for confusion this morning Pt was recently  discharged on IV Cefepime 2gm daily on 5/7.  Per pt's brother (his caretaker) pt has not been "right" since he got home. He has had some confusion but this morning when he went to see him he was very disoriented. Unable to obtain full ROS given altered mentation; however pt denies any complaints of pain. Urine draining clear urine.       #Acute encephalopathy likely 2/2 JENNIFER (Cefepime induced neurotoxicity)   #B/L Nephrolithiasis   #Hx CKD 5 (No HD)  #Ho Neurodysfcunctional bladder s/p SP cath  -Admit to inpatient level of care under medicine  -CT head negative   -Urology recs appreciated - no acute intervention, outpatient f/u with  for further tx of stones and removal of stents   -Will hold IV Cefepime   - ID eval for antibx change  - IVF  - F/U Ucx (sent by ED)  - F/U blood cx   -Trend WBC and fever curve  - Monitor urine output   - CHG ordered     #Ho DMII  -FS achs, well controlled     #Ho HTN  -C/w amlodipine 5mg once daily     Diet: RR/DASH  Activity: OOB with assistance   CHG: ordered    Above discussed with       70 YOM w a PMH of nephrolithiasis, CKD 5 (No HD), neurodysfunctional bladder s/p suprapubic cath, paraplegia, bedbound, DM II, HTN, s/p b/l JJ stent placement for septic stone 5/1/25 presenting to the ED for confusion this morning Pt was recently  discharged on IV Cefepime 2gm daily on 5/7.  Per pt's brother (his caretaker) pt has not been "right" since he got home. He has had some confusion but this morning when he went to see him he was very disoriented. Unable to obtain full ROS given altered mentation; however pt denies any complaints of pain. Urine draining clear urine.       #Acute encephalopathy likely 2/2 JENNIFER (Cefepime induced neurotoxicity)   #B/L Nephrolithiasis   #Hx CKD 5 (No HD)  #Ho Neurodysfcunctional bladder s/p SP cath  -Admit to inpatient level of care under medicine  -CT head negative   -Urology recs appreciated - no acute intervention, outpatient f/u with  for further tx of stones and removal of stents   -Will hold IV Cefepime   - ID eval for antibx change  - IVF  - F/U Ucx (sent by ED)  - F/U blood cx   -Trend WBC and fever curve  - Monitor urine output   - CHG ordered     #Ho DMII  -FS q6 while NPO, ISS inpatient    #Ho HTN  -C/w amlodipine 5mg once daily     Diet: NPO pending bedside swallow  Activity: OOB with assistance   CHG: ordered    Above discussed with Dr. Hernandez      70 YOM w a PMH of nephrolithiasis, CKD 5 (No HD), neurodysfunctional bladder s/p suprapubic cath, paraplegia, bedbound, DM II, HTN, s/p b/l JJ stent placement for septic stone 5/1/25 presenting to the ED for confusion this morning Pt was recently  discharged on IV Cefepime 2gm daily on 5/7.  Per pt's brother (his caretaker) pt has not been "right" since he got home. He has had some confusion but this morning when he went to see him he was very disoriented. Unable to obtain full ROS given altered mentation; however pt denies any complaints of pain. Urine draining clear urine.       #Acute encephalopathy likely 2/2 JENNIFER (Cefepime induced neurotoxicity)   #B/L Nephrolithiasis   #Hx CKD 5 (No HD)  #Ho Neurodysfcunctional bladder s/p SP cath  -Admit to inpatient level of care under medicine  -CT head negative   -Urology recs appreciated - no acute intervention, outpatient f/u with  for further tx of stones and removal of stents   -Will hold IV Cefepime   - ID eval for antibx change  - IVF  - F/U Ucx (sent by ED)  - F/U blood cx   -Trend WBC and fever curve  - Monitor urine output   - CHG ordered     #Ho DMII  -FS q6 while NPO, ISS inpatient    #Ho HTN  -C/w amlodipine 5mg once daily     #H/o A fib (diagnosed 8/2024 post urologic procedure)  -C/w Eliquis 2.5 BID     Diet: NPO pending bedside swallow  Activity: OOB with assistance   CHG: ordered  VTE ppx: On eliquis     Above discussed with Dr. Hernandez      70 YOM w a PMH of nephrolithiasis, CKD 5 (No HD), neurodysfunctional bladder s/p suprapubic cath, paraplegia, bedbound, DM II, HTN, s/p b/l JJ stent placement for septic stone 5/1/25 presenting to the ED for confusion this morning Pt was recently  discharged on IV Cefepime 2gm daily on 5/7.  Per pt's brother (his caretaker) pt has not been "right" since he got home. He has had some confusion but this morning when he went to see him he was very disoriented. Unable to obtain full ROS given altered mentation; however pt denies any complaints of pain. Urine draining clear urine.       #Acute encephalopathy likely 2/2 JENNIFER (Cefepime induced neurotoxicity)   #B/L Nephrolithiasis   #Hx CKD 5 (No HD)  #Ho Neurodysfcunctional bladder s/p SP cath  -Admit to inpatient level of care under medicine  -CT head negative   -Urology recs appreciated - no acute intervention, outpatient f/u with  for further tx of stones and removal of stents   -Will hold IV Cefepime   - f/u ID  - zosyn for now  - gentle IVF overnight   - F/U Ucx (sent by ED)  - F/U blood cx   -Trend WBC and fever curve  - Monitor urine output   - CHG ordered     #Ho DMII  -ISS / FS / a1c / titrate    #Ho HTN  -C/w amlodipine 5mg once daily     #H/o A fib (diagnosed 8/2024 post urologic procedure)  -C/w Eliquis 2.5 BID     Diet: NPO pending bedside swallow  Activity: OOB with assistance   CHG: ordered  VTE ppx: On eliquis     Above discussed with Dr. Hernandez      70 YOM w a PMH of nephrolithiasis, CKD 5 (No HD), neurodysfunctional bladder s/p suprapubic cath, paraplegia, bedbound, DM II, HTN, s/p b/l JJ stent placement for septic stone 5/1/25 presenting to the ED for confusion this morning Pt was recently  discharged on IV Cefepime 2gm daily on 5/7.  Per pt's brother (his caretaker) pt has not been "right" since he got home. He has had some confusion but this morning when he went to see him he was very disoriented. Unable to obtain full ROS given altered mentation; however pt denies any complaints of pain. Urine draining clear urine.       #Acute encephalopathy likely 2/2 JENNIFER (Cefepime induced neurotoxicity)   #B/L Nephrolithiasis   #Hx CKD 5 (No HD)  #Ho Neurodysfcunctional bladder s/p SP cath  -Admit to inpatient level of care under medicine  -CT head negative   -Urology recs appreciated - no acute intervention, outpatient f/u with  for further tx of stones and removal of stents   -Will hold IV Cefepime   - f/u ID  - zosyn for now  - gentle IVF overnight   - F/U Ucx (sent by ED)  - F/U blood cx   -Trend WBC and fever curve  - Monitor urine output   - CHG ordered     #Ho DMII  -ISS / FS / a1c / titrate    #Ho HTN  -C/w amlodipine 5mg once daily     #H/o A fib (diagnosed 8/2024 post urologic procedure)  -C/w Eliquis 2.5 BID     # effective quadriplegia   - bed bound  - supportive care  - OT once mentation improves    # malnutrition  - low bmi  - nutrition   - mv        Diet: NPO pending bedside swallow  Activity: OOB with assistance   CHG: ordered  VTE ppx: On eliquis     Above discussed with Dr. Hernandez

## 2025-05-12 NOTE — CONSULT NOTE ADULT - ASSESSMENT
69 yo male with CKD and confusion while on Cefepime - most likely JENNIFER (Cefepime induced neurotoxicity)        Plan:  1. H/O septicemia on IV Cefepime, now with probable JENNIFER. No signs of active infx  - stop Cefepime  - ID eval for antibx change  - IV hydration  - send Ucx (if approved by ID)  - trend WBC and fever curve  - no acute  intervention necess at this time  - fup OP with Dr Schilling for further treatment of stones and removal of stents    All above D/W  69 yo male with CKD and confusion while on Cefepime - most likely JENNIFER (Cefepime induced neurotoxicity)        Plan:  1. H/O septicemia on IV Cefepime, now with probable JENNIFER. No signs of active infx  - stop Cefepime  - ID eval for antibx change  - IV hydration  - send Ucx (if approved by ID)  - trend WBC and fever curve  - no acute  intervention necess at this time  - fup OP with Dr Schilling for further treatment of stones and removal of stents    All above D/W Dr. Morocho - agrees with all above.

## 2025-05-12 NOTE — ED PROVIDER NOTE - ATTENDING APP SHARED VISIT CONTRIBUTION OF CARE
Patient recently admitted for infected infrarenal stone.  He was sent home on IV cefepime.  Since returning home patient has developed increasing confusion.  According to the family, there is been no fever or trauma or rash.  Vital signs noted.  Patient is awake but somnolent.  He is oriented.  No focal deficits.  Abdomen is nontender.  Suprapubic catheter changed and functioning.  Diagnostic testing reviewed.  Patient seen by urology.  Recommend admission for continuation and possible modification of IV antibiotics.  Medication related metabolic encephalopathy contemplated.

## 2025-05-12 NOTE — H&P ADULT - NSHPLABSRESULTS_GEN_ALL_CORE
9.4    7.15  )-----------( 137      ( 12 May 2025 09:59 )             30.3           141  |  99  |  78[HH]  ----------------------------<  83  3.7   |  28  |  4.7[HH]    Ca    7.3[L]      12 May 2025 09:59    TPro  7.2  /  Alb  3.3[L]  /  TBili  0.3  /  DBili  x   /  AST  33  /  ALT  25  /  AlkPhos  192[H]            Urinalysis Basic - ( 12 May 2025 09:59 )  Color: Orange / Appearance: Cloudy / S.009 / pH: x  Gluc: 83 mg/dL / Ketone: x  / Bili: Negative / Urobili: 0.2 mg/dL   Blood: x / Protein: 100 mg/dL / Nitrite: Negative   Leuk Esterase: Large / RBC: >50 /HPF / WBC >50 /HPF   Sq Epi: x / Non Sq Epi: 2 /HPF / Bacteria: Moderate /HPF      PT/INR - ( 12 May 2025 09:59 )   PT: 12.20 sec;   INR: 1.03 ratio    PTT - ( 12 May 2025 09:59 )  PTT:35.4 sec    Lactate Trend   @ 09:59 Lactate:0.4     CAPILLARY BLOOD GLUCOSE      Culture Results:   >100,000 CFU/ml Serratia marcescens  >100,000 CFU/ml Staphylococcus aureus  50,000 - 99,000 CFU/mL Pseudomonas aeruginosa  50,000 - 99,000 CFU/mL Proteus mirabilis ( @ 09:34)      ACC: [id number] EXAM:  CT BRAIN   ORDERED BY: VIVIANA SMILEY     PROCEDURE DATE:  2025          INTERPRETATION:  CLINICAL INDICATION: Confusion    Technique: CT of the head was performed without contrast.    Multiple contiguous axial images were acquired from the skullbase to the   vertex without the administration of intravenous contrast.  Coronal and   sagittal reformations were made.    COMPARISON:  prior head CT dated 2008    FINDINGS:    The ventricles and sulci are unremarkable in appearance.    There is patchy periventricular white matter hypodensity. There is no   intraparenchymal hematoma, mass effect or midline shift. No abnormal   extra-axial fluid collections are present.    The calvarium is intact. The visualized intraorbital compartments,   paranasal sinuses and mastoid complexes appear free of acute disease.    IMPRESSION:  No acute intracranial pathology. No evidence of midline shift, mass   effect or intracranial hemorrhage.    Mild chronic microvascular type changes.    --- End of Report ---    CTAP noncontrast: Since 2025,    Status post bilateral double-J ureteral stents in satisfactory position.    Multiple right-sided nonobstructing renal calculi measure up to 5 mm. No   calculi seen along the course of the right ureter.    Multiple prominent left-sided renal calculi measuring up to 1.3 cm at the   renal pelvis.    There is stable moderate hydroureteronephrosis and a stable distal   ureteral calculus measuring 6 x 4 x 9 mm (306 Hounsfield units).    Multiple prominent left para-aortic retroperitoneal lymph nodes measuring   up to 0.9 cm.

## 2025-05-12 NOTE — H&P ADULT - HISTORY OF PRESENT ILLNESS
70 YOM w a PMH of nephrloithiasis, CKD 5 (No HD), neurodysfunctional bladder s/p suprapubic cath, paraplegia, bedbound, DM II, HTN, s/p b/l JJ stent placement for septic stone 5/1/25 presenting to the ED for confusion this morning Pt was recently  discharged on IV Cefepime 2gm daily on 5/7.  Per pt's brother (his caretaker) pt has not been "right" since he got home. He has had some confusion but this morning when he went to see him he was very disoriented. Unable to obtain full ROS given altered mentation; however pt denies any complaints of pain. Urine draining clear urine.

## 2025-05-12 NOTE — ED PROVIDER NOTE - PHYSICAL EXAMINATION
VITAL SIGNS: I have reviewed nursing notes and confirm.  CONSTITUTIONAL: ill-appearing  SKIN:  skin exam is warm and dry, no acute rash.    HEAD: Normocephalic; atraumatic.  EYES: conjunctiva and sclera clear.  ENT: No nasal discharge; airway clear.  CARD: S1, S2 normal; no murmurs, gallops, or rubs. Regular rate and rhythm.   RESP: No wheezes, rales or rhonchi.  ABD: suprapubic catheter in place Normal bowel sounds; soft; non-distended; non-tender  EXT: Normal ROM.  No clubbing, cyanosis or edema.   NEURO: Alert, oriented, grossly unremarkable

## 2025-05-12 NOTE — ED ADULT NURSE NOTE - EXTENSIONS OF SELF_ADULT
-- DO NOT REPLY / DO NOT REPLY ALL --  -- This inbox is not monitored. If this was sent to the wrong provider or department, reroute message to  MOG. --  -- Message is from Engagement Center Operations (ECO) --    General Patient Message: patient  called in because he has call for the 4th time because he still has not received his refill for       Eliquis 5 MG Tab   He will be leaving for out of town at 4AM in the morning so he would like to  today and he has a appointment for  05/07/025   so please call him back to confirm     Caller Information       Contact Date/Time Type Contact Phone/Fax    04/23/2025 09:55 AM CDT Interface (Incoming) [id number] [place] [id number] [address] AT [facility] [address] (Pharmacy) [phone]    04/23/2025 09:56 AM CDT Phone (Incoming) Dallas Colvin (Self) [phone] (M)    04/24/2025 11:00 AM CDT Phone (Incoming) Dallas Colvin (Self) [phone] (M)    04/24/2025 05:48 PM CDT Phone (Incoming) dallas colvin (Self) [phone]    cell phone     Can a detailed message be left? Yes - Voicemail   Patient has been advised the message will be addressed within 2-3 business days.                 None

## 2025-05-12 NOTE — H&P ADULT - NSHPPHYSICALEXAM_GEN_ALL_CORE
T(C): 36.4 (05-12-25 @ 09:23), Max: 36.4 (05-12-25 @ 09:23)  HR: 70 (05-12-25 @ 09:23) (70 - 70)  BP: 165/86 (05-12-25 @ 09:23) (165/86 - 165/86)  RR: 16 (05-12-25 @ 09:23) (16 - 16)  SpO2: 99% (05-12-25 @ 09:23) (99% - 99%)    PHYSICAL EXAM:  GENERAL: NAD, confused  HEAD:  Atraumatic, Normocephalic  EYES: EOMI, PERRL, conjunctiva and sclera clear  NECK: Supple,   CHEST/LUNG: Clear to auscultation bilaterally; No rales, rhonchi or wheezing  HEART: S1,S2 Regular rate and rhythm; No murmurs, rubs, or gallops  ABDOMEN: Soft, nontender, nondistended, no rebound tenderness; Bowel sounds present and normoactive  : +SPT in place without any surrounding cellulitis or erythema, draining yellow urine   EXTREMITIES: Distal pulses present   NEUROLOGY: non-focal, PANTOJA x4

## 2025-05-12 NOTE — ED PROVIDER NOTE - CLINICAL SUMMARY MEDICAL DECISION MAKING FREE TEXT BOX
In my opinion, inpatient treatment is medically justifiable and appropriate.  See attending note for documentation of medical decision making.

## 2025-05-13 LAB
A1C WITH ESTIMATED AVERAGE GLUCOSE RESULT: 6.2 % — HIGH (ref 4–5.6)
ANION GAP SERPL CALC-SCNC: 15 MMOL/L — HIGH (ref 7–14)
BASOPHILS # BLD AUTO: 0.05 K/UL — SIGNIFICANT CHANGE UP (ref 0–0.2)
BASOPHILS NFR BLD AUTO: 0.7 % — SIGNIFICANT CHANGE UP (ref 0–1)
BUN SERPL-MCNC: 72 MG/DL — CRITICAL HIGH (ref 10–20)
CALCIUM SERPL-MCNC: 6.6 MG/DL — LOW (ref 8.4–10.5)
CHLORIDE SERPL-SCNC: 100 MMOL/L — SIGNIFICANT CHANGE UP (ref 98–110)
CO2 SERPL-SCNC: 24 MMOL/L — SIGNIFICANT CHANGE UP (ref 17–32)
CREAT SERPL-MCNC: 4.6 MG/DL — CRITICAL HIGH (ref 0.7–1.5)
CULTURE RESULTS: SIGNIFICANT CHANGE UP
EGFR: 13 ML/MIN/1.73M2 — LOW
EGFR: 13 ML/MIN/1.73M2 — LOW
EOSINOPHIL # BLD AUTO: 0.15 K/UL — SIGNIFICANT CHANGE UP (ref 0–0.7)
EOSINOPHIL NFR BLD AUTO: 2.1 % — SIGNIFICANT CHANGE UP (ref 0–8)
ESTIMATED AVERAGE GLUCOSE: 131 MG/DL — HIGH (ref 68–114)
GLUCOSE BLDC GLUCOMTR-MCNC: 104 MG/DL — HIGH (ref 70–99)
GLUCOSE BLDC GLUCOMTR-MCNC: 148 MG/DL — HIGH (ref 70–99)
GLUCOSE BLDC GLUCOMTR-MCNC: 163 MG/DL — HIGH (ref 70–99)
GLUCOSE BLDC GLUCOMTR-MCNC: 165 MG/DL — HIGH (ref 70–99)
GLUCOSE BLDC GLUCOMTR-MCNC: 69 MG/DL — LOW (ref 70–99)
GLUCOSE SERPL-MCNC: 99 MG/DL — SIGNIFICANT CHANGE UP (ref 70–99)
HCT VFR BLD CALC: 28.1 % — LOW (ref 42–52)
HGB BLD-MCNC: 8.6 G/DL — LOW (ref 14–18)
IMM GRANULOCYTES NFR BLD AUTO: 0.6 % — HIGH (ref 0.1–0.3)
LYMPHOCYTES # BLD AUTO: 1.76 K/UL — SIGNIFICANT CHANGE UP (ref 1.2–3.4)
LYMPHOCYTES # BLD AUTO: 25.1 % — SIGNIFICANT CHANGE UP (ref 20.5–51.1)
MAGNESIUM SERPL-MCNC: 1.4 MG/DL — LOW (ref 1.8–2.4)
MCHC RBC-ENTMCNC: 29.1 PG — SIGNIFICANT CHANGE UP (ref 27–31)
MCHC RBC-ENTMCNC: 30.6 G/DL — LOW (ref 32–37)
MCV RBC AUTO: 94.9 FL — HIGH (ref 80–94)
MONOCYTES # BLD AUTO: 0.68 K/UL — HIGH (ref 0.1–0.6)
MONOCYTES NFR BLD AUTO: 9.7 % — HIGH (ref 1.7–9.3)
NEUTROPHILS # BLD AUTO: 4.34 K/UL — SIGNIFICANT CHANGE UP (ref 1.4–6.5)
NEUTROPHILS NFR BLD AUTO: 61.8 % — SIGNIFICANT CHANGE UP (ref 42.2–75.2)
NRBC BLD AUTO-RTO: 0 /100 WBCS — SIGNIFICANT CHANGE UP (ref 0–0)
PHOSPHATE SERPL-MCNC: 6.1 MG/DL — HIGH (ref 2.1–4.9)
PLATELET # BLD AUTO: 118 K/UL — LOW (ref 130–400)
PMV BLD: 12 FL — HIGH (ref 7.4–10.4)
POTASSIUM SERPL-MCNC: 3.9 MMOL/L — SIGNIFICANT CHANGE UP (ref 3.5–5)
POTASSIUM SERPL-SCNC: 3.9 MMOL/L — SIGNIFICANT CHANGE UP (ref 3.5–5)
RBC # BLD: 2.96 M/UL — LOW (ref 4.7–6.1)
RBC # FLD: 12.7 % — SIGNIFICANT CHANGE UP (ref 11.5–14.5)
SODIUM SERPL-SCNC: 139 MMOL/L — SIGNIFICANT CHANGE UP (ref 135–146)
SPECIMEN SOURCE: SIGNIFICANT CHANGE UP
WBC # BLD: 7.02 K/UL — SIGNIFICANT CHANGE UP (ref 4.8–10.8)
WBC # FLD AUTO: 7.02 K/UL — SIGNIFICANT CHANGE UP (ref 4.8–10.8)

## 2025-05-13 PROCEDURE — 99233 SBSQ HOSP IP/OBS HIGH 50: CPT

## 2025-05-13 RX ORDER — MAGNESIUM SULFATE 500 MG/ML
2 SYRINGE (ML) INJECTION
Refills: 0 | Status: COMPLETED | OUTPATIENT
Start: 2025-05-13 | End: 2025-05-13

## 2025-05-13 RX ORDER — HYDROXYZINE HYDROCHLORIDE 25 MG/1
25 TABLET, FILM COATED ORAL ONCE
Refills: 0 | Status: COMPLETED | OUTPATIENT
Start: 2025-05-13 | End: 2025-05-13

## 2025-05-13 RX ADMIN — AMLODIPINE BESYLATE 10 MILLIGRAM(S): 10 TABLET ORAL at 06:18

## 2025-05-13 RX ADMIN — SODIUM CHLORIDE 50 MILLILITER(S): 9 INJECTION, SOLUTION INTRAVENOUS at 06:08

## 2025-05-13 RX ADMIN — APIXABAN 2.5 MILLIGRAM(S): 2.5 TABLET, FILM COATED ORAL at 17:07

## 2025-05-13 RX ADMIN — Medication 3 MILLIGRAM(S): at 00:01

## 2025-05-13 RX ADMIN — APIXABAN 2.5 MILLIGRAM(S): 2.5 TABLET, FILM COATED ORAL at 06:17

## 2025-05-13 RX ADMIN — Medication 500 MILLIGRAM(S): at 11:57

## 2025-05-13 RX ADMIN — Medication 1 APPLICATION(S): at 06:09

## 2025-05-13 RX ADMIN — HYDROXYZINE HYDROCHLORIDE 25 MILLIGRAM(S): 25 TABLET, FILM COATED ORAL at 01:42

## 2025-05-13 RX ADMIN — Medication 25 GRAM(S): at 15:18

## 2025-05-13 RX ADMIN — Medication 25 GRAM(S): at 12:11

## 2025-05-13 RX ADMIN — Medication 25 GRAM(S): at 17:02

## 2025-05-13 RX ADMIN — FOLIC ACID 1 MILLIGRAM(S): 1 TABLET ORAL at 11:57

## 2025-05-13 RX ADMIN — Medication 25 GRAM(S): at 13:05

## 2025-05-13 RX ADMIN — Medication 25 GRAM(S): at 00:01

## 2025-05-13 RX ADMIN — Medication 1 TABLET(S): at 11:57

## 2025-05-13 NOTE — CONSULT NOTE ADULT - SUBJECTIVE AND OBJECTIVE BOX
INFECTIOUS DISEASE CONSULT NOTE    Patient is a 70y old  Male who presents with a chief complaint of AMS 2/2 acute UTI (12 May 2025 15:12)    HPI:  70 YOM w a PMH of nephrloithiasis, CKD 5 (No HD), neurodysfunctional bladder s/p suprapubic cath, paraplegia, bedbound, DM II, HTN, s/p b/l JJ stent placement for septic stone 5/1/25 presenting to the ED for confusion this morning Pt was recently  discharged on IV Cefepime 2gm daily on 5/7.  Per pt's brother (his caretaker) pt has not been "right" since he got home. He has had some confusion but this morning when he went to see him he was very disoriented. Unable to obtain full ROS given altered mentation; however pt denies any complaints of pain. Urine draining clear urine.    (12 May 2025 15:12)     Prior hospital charts reviewed [Yes]  Primary team notes reviewed [Yes]  Other consultant notes reviewed [Yes]    REVIEW OF SYSTEMS:  Unable to provide due to cognitive impairment    PAST MEDICAL & SURGICAL HISTORY:  DM (diabetes mellitus)      HTN (hypertension)      H/O paraplegia      Spinal abscess      Renal stones      Stage 5 chronic kidney disease not on chronic dialysis      Neurogenic dysfunction of the urinary bladder      Encounter for care or replacement of suprapubic tube      Spinal abscess  S/P Surgery      Encounter for care or replacement of suprapubic tube      S/P ORIF (open reduction internal fixation) fracture          SOCIAL HISTORY:  Unable to provide due to cognitive impairment      FAMILY HISTORY:  FH: HTN (hypertension)    FH: breast cancer    FH: melanoma    FH: heart disease        Allergies:  No Known Allergies      ANTIMICROBIALS:  piperacillin/tazobactam IVPB.. 3.375 every 12 hours      ANTIMICROBIALS (past 90 days):  MEDICATIONS  (STANDING):  cefepime   IVPB   100 mL/Hr IV Intermittent (05-12-25 @ 10:59)    piperacillin/tazobactam IVPB.   200 mL/Hr IV Intermittent (05-12-25 @ 18:43)    piperacillin/tazobactam IVPB.-   25 mL/Hr IV Intermittent (05-13-25 @ 00:01)        OTHER MEDS:   MEDICATIONS  (STANDING):  acetaminophen     Tablet .. 650 every 6 hours PRN  aluminum hydroxide/magnesium hydroxide/simethicone Suspension 30 every 4 hours PRN  amLODIPine   Tablet 10 daily  apixaban 2.5 every 12 hours  dextrose 50% Injectable 25 once  dextrose 50% Injectable 12.5 once  dextrose 50% Injectable 25 once  glucagon  Injectable 1 once  insulin lispro (ADMELOG) corrective regimen sliding scale  three times a day before meals  melatonin 3 at bedtime PRN  ondansetron Injectable 4 every 8 hours PRN      VITALS:  Vital Signs Last 24 Hrs  T(F): 97 (05-13-25 @ 05:12), Max: 98 (05-06-25 @ 13:27)    Vital Signs Last 24 Hrs  HR: 64 (05-13-25 @ 05:12) (64 - 69)  BP: 152/69 (05-13-25 @ 05:12) (137/54 - 175/82)  RR: 18 (05-13-25 @ 05:12)  SpO2: 98% (05-13-25 @ 05:12) (98% - 98%)  Wt(kg): --    EXAM:  GENERAL: NAD, lying in bed  HEAD: No head lesions  EYES: Conjunctiva pink and cornea white  EAR, NOSE, MOUTH, THROAT: Normal external ears and nose, no discharges; moist mucous membranes  NECK: Supple, nontender to palpation; no JVD  RESPIRATORY: Bibasilar crackles  CARDIOVASCULAR: S1 S2  GASTROINTESTINAL: Soft, nontender, nondistended; normoactive bowel sounds  GENITOURINARY: No angelo catheter, no CVA tenderness; + SPC   EXTREMITIES: No clubbing, cyanosis, or petal edema  NERVOUS SYSTEM: Awake and alert, not oriented at all  MUSCULOSKELETAL: No joint erythema, swelling or pain  SKIN: No rashes or lesions, no superficial thrombophlebitis  PSYCH: Normal affect      Labs:                        8.6    7.02  )-----------( 118      ( 13 May 2025 09:17 )             28.1     05-13    139  |  100  |  72[HH]  ----------------------------<  99  3.9   |  24  |  4.6[HH]    Ca    6.6[L]      13 May 2025 09:17  Phos  6.1     05-13  Mg     1.4     05-13    TPro  7.2  /  Alb  3.3[L]  /  TBili  0.3  /  DBili  x   /  AST  33  /  ALT  25  /  AlkPhos  192[H]  05-12      WBC Trend:  WBC Count: 7.02 (05-13-25 @ 09:17)  WBC Count: 7.15 (05-12-25 @ 09:59)      Auto Neutrophil #: 6.81 K/uL (08-13-24 @ 06:21)  Auto Neutrophil #: 7.22 K/uL (08-12-24 @ 07:24)  Auto Neutrophil #: 6.57 K/uL (08-11-24 @ 08:36)  Auto Neutrophil #: 7.03 K/uL (08-09-24 @ 11:44)  Auto Neutrophil #: 7.64 K/uL (08-08-24 @ 07:09)      Creatine Trend:  Creatinine: 4.6 (05-13)  Creatinine: 4.7 (05-12)  Creatinine: 4.8 (05-07)      Liver Biochemical Testing Trend:  Alanine Aminotransferase (ALT/SGPT): 25 (05-12)  Alanine Aminotransferase (ALT/SGPT): 27 (05-07)  Alanine Aminotransferase (ALT/SGPT): 33 (05-05)  Alanine Aminotransferase (ALT/SGPT): 43 *H* (05-04)  Alanine Aminotransferase (ALT/SGPT): 113 *H* (05-01)  Aspartate Aminotransferase (AST/SGOT): 33 (05-12-25 @ 09:59)  Aspartate Aminotransferase (AST/SGOT): 36 (05-07-25 @ 07:42)  Aspartate Aminotransferase (AST/SGOT): 21 (05-05-25 @ 06:26)  Aspartate Aminotransferase (AST/SGOT): 33 (05-04-25 @ 06:10)  Aspartate Aminotransferase (AST/SGOT): 244 (05-01-25 @ 06:05)  Bilirubin Total: 0.3 (05-12)  Bilirubin Total: 0.3 (05-07)  Bilirubin Total: 0.3 (05-05)  Bilirubin Total: 0.3 (05-04)  Bilirubin Total: 0.7 (05-01)      Trend LDH  08-11-24 @ 15:52  240  08-11-24 @ 08:36  238  08-09-24 @ 11:44  181          Urinalysis Basic - ( 13 May 2025 09:17 )    Color: x / Appearance: x / SG: x / pH: x  Gluc: 99 mg/dL / Ketone: x  / Bili: x / Urobili: x   Blood: x / Protein: x / Nitrite: x   Leuk Esterase: x / RBC: x / WBC x   Sq Epi: x / Non Sq Epi: x / Bacteria: x          MICROBIOLOGY:    MRSA PCR Result.: Positive (05-01-25 @ 09:00)    Lactate, Blood: 0.4 (05-12 @ 09:59)    A1C with Estimated Average Glucose Result: 6.2 % (05-13-25 @ 09:17)  A1C with Estimated Average Glucose Result: 6.0 % (04-29-25 @ 08:48)        RADIOLOGY:  imaging below personally reviewed    < from: CT Abdomen and Pelvis No Cont (05.12.25 @ 10:53) >  IMPRESSION:  Since 4/30/2025,    Status post bilateral double-J ureteral stents in satisfactory position.    Multiple right-sided nonobstructing renal calculi measure up to 5 mm. No   calculi seen along the course of the right ureter.    Multiple prominent left-sided renal calculi measuring up to 1.3 cm at the   renal pelvis.    There is stable moderate hydroureteronephrosis and a stable distal   ureteral calculus measuring 6 x 4 x 9 mm (306 Hounsfield units).    Multiple prominent left para-aortic retroperitoneal lymph nodes measuring   up to 0.9 cm.    < end of copied text >    < from: CT Head No Cont (05.12.25 @ 10:53) >  IMPRESSION:  No acute intracranial pathology. No evidence of midline shift, mass   effect or intracranial hemorrhage.    Mild chronic microvascular typechanges.    < end of copied text >  
HPI: 71 yo male bedbound with recent admission for urosepsis s/p b/l JJ stent placement. Pt was sent home with Midline and IV Cefepime 2gm daily. He missed his dose yesterday.  Per pts brother (his caretaker) pt has not been "right" since he got home. He has had some confusion but this morning when he went to see him he was very disoriented. Denies any fever/chills, complaints of pain. Urine has been clear.         PAST MEDICAL & SURGICAL HISTORY:  DM (diabetes mellitus)      HTN (hypertension)      H/O paraplegia      Spinal abscess      Renal stones      Stage 5 chronic kidney disease not on chronic dialysis      Neurogenic dysfunction of the urinary bladder      Encounter for care or replacement of suprapubic tube      Spinal abscess  S/P Surgery      Encounter for care or replacement of suprapubic tube      S/P ORIF (open reduction internal fixation) fracture          MEDICATIONS  (STANDING):    MEDICATIONS  (PRN):      Allergies    No Known Allergies    Intolerances        SOCIAL HISTORY:    FAMILY HISTORY:  FH: HTN (hypertension)    FH: breast cancer    FH: melanoma    FH: heart disease        Vital Signs Last 24 Hrs  T(C): 36.4 (12 May 2025 09:23), Max: 36.4 (12 May 2025 09:23)  T(F): 97.6 (12 May 2025 09:23), Max: 97.6 (12 May 2025 09:23)  HR: 70 (12 May 2025 09:23) (70 - 70)  BP: 165/86 (12 May 2025 09:23) (165/86 - 165/86)  BP(mean): --  RR: 16 (12 May 2025 09:23) (16 - 16)  SpO2: 99% (12 May 2025 09:23) (99% - 99%)    Parameters below as of 12 May 2025 09:23  Patient On (Oxygen Delivery Method): room air        Physical Exam:  General: NAD, resting comfortably  HEENT: NC/AT, EOMI, normal hearing, no oral lesions, no LAD, neck supple  Pulmonary: normal resp effort, CTA-B  Cardiovascular: NSR, no murmurs  Abdominal: soft, ND/NT, no organomegaly, SPT without any surrounding cellulitis, urine clear yellow  Extremities: WWP, normal strength, no clubbing/cyanosis/edema  Neuro: A/O x 1  Pulses: palpable distal pulses      I&O's Summary        LABS:                        9.4    7.15  )-----------( 137      ( 12 May 2025 09:59 )             30.3     05-12    141  |  99  |  78[HH]  ----------------------------<  83  3.7   |  28  |  4.7[HH]    Ca    7.3[L]      12 May 2025 09:59    TPro  7.2  /  Alb  3.3[L]  /  TBili  0.3  /  DBili  x   /  AST  33  /  ALT  25  /  AlkPhos  192[H]  05-12    PT/INR - ( 12 May 2025 09:59 )   PT: 12.20 sec;   INR: 1.03 ratio         PTT - ( 12 May 2025 09:59 )  PTT:35.4 sec  Urinalysis Basic - ( 12 May 2025 09:59 )    Color: x / Appearance: x / SG: x / pH: x  Gluc: 83 mg/dL / Ketone: x  / Bili: x / Urobili: x   Blood: x / Protein: x / Nitrite: x   Leuk Esterase: x / RBC: x / WBC x   Sq Epi: x / Non Sq Epi: x / Bacteria: x      CAPILLARY BLOOD GLUCOSE        LIVER FUNCTIONS - ( 12 May 2025 09:59 )  Alb: 3.3 g/dL / Pro: 7.2 g/dL / ALK PHOS: 192 U/L / ALT: 25 U/L / AST: 33 U/L / GGT: x             Cultures: OR cx +serratia, pseudomonas, Klebsiella and staph      RADIOLOGY & ADDITIONAL STUDIES:  < from: CT Abdomen and Pelvis No Cont (05.12.25 @ 10:53) >  IMPRESSION:  Since 4/30/2025,    Status post bilateral double-J ureteral stents in satisfactory position.    Multiple right-sided nonobstructing renal calculi measure up to 5 mm. No   calculi seen along the course of the right ureter.    Multiple prominent left-sided renal calculi measuring up to 1.3 cm at the   renal pelvis.    There is stable moderate hydroureteronephrosis and a stable distal   ureteral calculus measuring 6 x 4 x 9 mm (306 Hounsfield units).    Multiple prominent left para-aortic retroperitoneal lymph nodes measuring   up to 0.9 cm.    --- End of Report ---            MITCH FRAGOSO MD; Attending Radiologist  This document has been electronically signed. May 12 2025 11:18AM    < end of copied text >

## 2025-05-13 NOTE — CONSULT NOTE ADULT - ASSESSMENT
IV iron> check allergy  Iron panel in 3 months  Cologuard  RTC in 3 months ID is consulted for UTI  Recently admitted for Serratia and Proteus bacteremia 2/2 obstructive uropathy  Discharged on IV cefepime via midline for 10 days total  Afebrile  WBC Count: 7.02 (05-13-25 @ 09:17)  WBC Count: 7.15 (05-12-25 @ 09:59)  WBC Count: 10.97 (05-07-25 @ 07:42)  On room air  BCx pending  UCx pending    CT abdomen/pelvis  Since 4/30/2025,  Status post bilateral double-J ureteral stents in satisfactory position.  Multiple right-sided nonobstructing renal calculi measure up to 5 mm. No   calculi seen along the course of the right ureter.  Multiple prominent left-sided renal calculi measuring up to 1.3 cm at the   renal pelvis.  There is stable moderate hydroureteronephrosis and a stable distal   ureteral calculus measuring 6 x 4 x 9 mm (306 Hounsfield units).  Multiple prominent left para-aortic retroperitoneal lymph nodes measuring   up to 0.9 cm.      IMPRESSION:  UTI  Acute metabolic encephalopathy  Hydronephrosis  Kidney stone  SPC  CKD  Immunosuppression / Immunosenescence secondary to multiple comorbidities which could result in poor clinical outcome    RECOMMENDATIONS:  - Mental status is worse than previous admission, used to be AAO x 3; could be 2/2 adverse effects from cefepime  - Switch to IV Zosyn 3.375g q12hrs (CrCl ~13)  - Follow up BCx and UCx  - Previous abx course would end on 5/14, can keep abx for now until repeat Cx come back  - Urology follow up  - Offloading and frequent position changes, aspiration precaution  - Trend WBC, fever curve, transaminases, creatinine daily  - Please inform ID of any patient clinical change or any new pertinent laboratory or radiographic data      Jenn Leal D.O.  Attending Physician  Division of Infectious Diseases  [facility]  Please contact me via [employer] Teams

## 2025-05-13 NOTE — PROGRESS NOTE ADULT - ASSESSMENT
70 YOM w a PMH of nephrolithiasis, CKD 5 (No HD), neurodysfunctional bladder s/p suprapubic cath, paraplegia, bedbound, DM II, HTN, s/p b/l JJ stent placement for septic stone 5/1/25 presenting to the ED for confusion     #Acute encephalopathy likely 2/2 JENNIFER (Cefepime induced neurotoxicity)   #B/L Nephrolithiasis   #Hx CKD 5 (No HD)  #Ho Neurodysfcunctional bladder s/p SP cath  -Admit to inpatient level of care under medicine  -CT head negative   -Urology recs appreciated - no acute intervention, outpatient f/u with  for further tx of stones and removal of stents   -Will hold IV Cefepime   - ID appreciated   - zosyn for now  - gentle IVF overnight   - F/U Ucx (sent by ED)  - F/U blood cx   -Trend WBC and fever curve  - Monitor urine output     #Ho DMII  -ISS / FS / a1c / titrate    #Ho HTN  -C/w amlodipine 5mg once daily     #H/o A fib (diagnosed 8/2024 post urologic procedure)  -C/w Eliquis 2.5 BID     # effective quadriplegia   - bed bound  - supportive care  - OT once mentation improves    # malnutrition  - low bmi  - nutrition   - mv        Diet: NPO pending bedside swallow  Activity: OOB with assistance   CHG: ordered  VTE ppx: On eliquis        70 YOM w a PMH of nephrolithiasis, CKD 5 (No HD), neurodysfunctional bladder s/p suprapubic cath, paraplegia, bedbound, DM II, HTN, s/p b/l JJ stent placement for septic stone 5/1/25 presenting to the ED for confusion     #Acute encephalopathy likely 2/2 JENNIFER (Cefepime induced neurotoxicity)   #B/L Nephrolithiasis   #Hx CKD 5 (No HD)  #Ho Neurodysfcunctional bladder s/p SP cath  -Admit to inpatient level of care under medicine  -CT head negative; out of an abundance of caution, getting MR brain  - f/u b12/folate  -Urology recs appreciated - no acute intervention, outpatient f/u with  for further tx of stones and removal of stents   -Will hold IV Cefepime   - ID appreciated   - zosyn for now  - gentle IVF overnight   - F/U Ucx (sent by ED)  - F/U blood cx   -Trend WBC and fever curve  - Monitor urine output     #Ho DMII  -ISS / FS / a1c / titrate    #Ho HTN  -C/w amlodipine 5mg once daily     #H/o A fib (diagnosed 8/2024 post urologic procedure)  -C/w Eliquis 2.5 BID     # effective quadriplegia   - bed bound  - supportive care  - OT once mentation improves    # malnutrition  - low bmi  - nutrition   - mv        Diet: NPO pending bedside swallow  Activity: OOB with assistance   CHG: ordered  VTE ppx: On eliquis

## 2025-05-14 LAB
FOLATE SERPL-MCNC: 12.2 NG/ML — SIGNIFICANT CHANGE UP
GLUCOSE BLDC GLUCOMTR-MCNC: 111 MG/DL — HIGH (ref 70–99)
GLUCOSE BLDC GLUCOMTR-MCNC: 117 MG/DL — HIGH (ref 70–99)
GLUCOSE BLDC GLUCOMTR-MCNC: 131 MG/DL — HIGH (ref 70–99)
GLUCOSE BLDC GLUCOMTR-MCNC: 97 MG/DL — SIGNIFICANT CHANGE UP (ref 70–99)
VIT B12 SERPL-MCNC: 1269 PG/ML — HIGH (ref 232–1245)

## 2025-05-14 PROCEDURE — 99232 SBSQ HOSP IP/OBS MODERATE 35: CPT

## 2025-05-14 RX ADMIN — Medication 25 GRAM(S): at 23:55

## 2025-05-14 RX ADMIN — Medication 25 GRAM(S): at 00:00

## 2025-05-14 RX ADMIN — Medication 667 MILLIGRAM(S): at 09:16

## 2025-05-14 RX ADMIN — Medication 1 APPLICATION(S): at 05:16

## 2025-05-14 RX ADMIN — APIXABAN 2.5 MILLIGRAM(S): 2.5 TABLET, FILM COATED ORAL at 05:15

## 2025-05-14 RX ADMIN — FOLIC ACID 1 MILLIGRAM(S): 1 TABLET ORAL at 11:13

## 2025-05-14 RX ADMIN — AMLODIPINE BESYLATE 10 MILLIGRAM(S): 10 TABLET ORAL at 05:15

## 2025-05-14 RX ADMIN — Medication 500 MILLIGRAM(S): at 11:12

## 2025-05-14 RX ADMIN — Medication 25 GRAM(S): at 11:12

## 2025-05-14 RX ADMIN — APIXABAN 2.5 MILLIGRAM(S): 2.5 TABLET, FILM COATED ORAL at 17:10

## 2025-05-14 RX ADMIN — Medication 1 TABLET(S): at 11:14

## 2025-05-14 NOTE — PROGRESS NOTE ADULT - ASSESSMENT
70 YOM w a PMH of nephrolithiasis, CKD 5 (No HD), neurodysfunctional bladder s/p suprapubic cath, paraplegia, bedbound, DM II, HTN, s/p b/l JJ stent placement for septic stone 5/1/25 presenting to the ED for confusion     #Acute encephalopathy likely 2/2 JENNIFER (Cefepime induced neurotoxicity)   #B/L Nephrolithiasis   #Hx CKD 5 (No HD)  #Ho Neurodysfcunctional bladder s/p SP cath  -CT head negative; out of an abundance of caution, getting MR brain  - f/u b12/folate  -Urology recs appreciated - no acute intervention, outpatient f/u with  for further tx of stones and removal of stents   -Will hold IV Cefepime   - ID appreciated   -Will complete Zosyn today  -Cx neg  -back to baseline mental status    #Ho DMII  -ISS / FS / a1c / titrate    #Ho HTN  -C/w amlodipine 5mg once daily     #H/o A fib (diagnosed 8/2024 post urologic procedure)  -C/w Eliquis 2.5 BID     # effective quadriplegia   - bed bound  - supportive care  - OT once mentation improves    # malnutrition  - low bmi  - nutrition   - mv        Activity: OOB with assistance   CHG: ordered  VTE ppx: On eliquis

## 2025-05-14 NOTE — SWALLOW BEDSIDE ASSESSMENT ADULT - SWALLOW EVAL: RECOMMENDED DIET
Continue soft + bite sized consistency and thin liquids
Continue soft + bite sized consistency and thin liquids

## 2025-05-14 NOTE — SWALLOW BEDSIDE ASSESSMENT ADULT - SLP PERTINENT HISTORY OF CURRENT PROBLEM
70 YOM w a PMH of nephrloithiasis, CKD 5 (No HD), neurodysfunctional bladder s/p suprapubic cath, paraplegia, bedbound, DM II, HTN, s/p b/l JJ stent placement for septic stone 5/1/25 presenting to the ED for confusion. Pt was recently  discharged on IV Cefepime 2gm daily on 5/7. Admitted to [facility] for Acute encephalopathy likely 2/2 JENNIFER (Cefepime induced neurotoxicity).
70 YOM w a PMH of nephrloithiasis, CKD 5 (No HD), neurodysfunctional bladder s/p suprapubic cath, paraplegia, bedbound, DM II, HTN, s/p b/l JJ stent placement for septic stone 5/1/25 presenting to the ED for confusion. Pt was recently  discharged on IV Cefepime 2gm daily on 5/7. Admitted to [facility] for Acute encephalopathy likely 2/2 JENNIFER (Cefepime induced neurotoxicity).

## 2025-05-14 NOTE — PROGRESS NOTE ADULT - SUBJECTIVE AND OBJECTIVE BOX
70 YOM w a PMH of nephrolithiasis, CKD 5 (No HD), neurodysfunctional bladder s/p suprapubic cath, paraplegia, bedbound, DM II, HTN, s/p b/l JJ stent placement for septic stone 5/1/25 presenting to the ED for confusion    Today:  Seen at bedside, no new complaints.          REVIEW OF SYSTEMS:  No new complaints      MEDICATIONS  (STANDING):  amLODIPine   Tablet 10 milliGRAM(s) Oral daily  apixaban 2.5 milliGRAM(s) Oral every 12 hours  chlorhexidine 2% Cloths 1 Application(s) Topical <User Schedule>  dextrose 5%. 1000 milliLiter(s) (100 mL/Hr) IV Continuous <Continuous>  dextrose 50% Injectable 25 Gram(s) IV Push once  dextrose 50% Injectable 12.5 Gram(s) IV Push once  dextrose 50% Injectable 25 Gram(s) IV Push once  folic acid 1 milliGRAM(s) Oral daily  glucagon  Injectable 1 milliGRAM(s) IntraMuscular once  insulin lispro (ADMELOG) corrective regimen sliding scale   SubCutaneous three times a day before meals  lactated ringers. 1000 milliLiter(s) (50 mL/Hr) IV Continuous <Continuous>  magnesium gluconate 500 milliGRAM(s) Oral daily  multivitamin 1 Tablet(s) Oral daily  piperacillin/tazobactam IVPB.. 3.375 Gram(s) IV Intermittent every 12 hours    MEDICATIONS  (PRN):  acetaminophen     Tablet .. 650 milliGRAM(s) Oral every 6 hours PRN Temp greater or equal to 38C (100.4F), Mild Pain (1 - 3)  aluminum hydroxide/magnesium hydroxide/simethicone Suspension 30 milliLiter(s) Oral every 4 hours PRN Dyspepsia  melatonin 3 milliGRAM(s) Oral at bedtime PRN Insomnia  ondansetron Injectable 4 milliGRAM(s) IV Push every 8 hours PRN Nausea and/or Vomiting      Allergies  No Known Allergies      FAMILY HISTORY:  FH: HTN (hypertension)  FH: breast cancer  FH: melanoma  FH: heart disease        Vital Signs Last 24 Hrs  T(C): 36.2 (14 May 2025 14:17), Max: 36.8 (13 May 2025 19:53)  T(F): 97.2 (14 May 2025 14:17), Max: 98.3 (13 May 2025 19:53)  HR: 68 (14 May 2025 14:17) (65 - 76)  BP: 122/67 (14 May 2025 14:17) (116/48 - 144/68)  RR: 16 (14 May 2025 14:17) (16 - 20)  SpO2: 97% (14 May 2025 04:38) (97% - 98%)        PHYSICAL EXAM:  Gen: NAD, resting in bed; thin/frail  HEENT: Normocephalic, atraumatic  Neck: supple, no lymphadenopathy  CV: Regular rate & regular rhythm  Lungs: decreased BS at bases, no fremitus  Abdomen: Soft, BS present  Ext: Warm, well perfused  Neuro: moves all extremities against resistance, no droop, awake  Skin: no rash, no erythema      LABS:                        8.6    7.02  )-----------( 118      ( 13 May 2025 09:17 )             28.1     05-13    139  |  100  |  72[HH]  ----------------------------<  99  3.9   |  24  |  4.6[HH]    Ca    6.6[L]      13 May 2025 09:17  Phos  6.1     05-13  Mg     1.4     05-13        Urinalysis Basic - ( 13 May 2025 09:17 )    Color: x / Appearance: x / SG: x / pH: x  Gluc: 99 mg/dL / Ketone: x  / Bili: x / Urobili: x   Blood: x / Protein: x / Nitrite: x   Leuk Esterase: x / RBC: x / WBC x   Sq Epi: x / Non Sq Epi: x / Bacteria: x        
INFECTIOUS DISEASE FOLLOW UP NOTE:    Interval History/ROS: Patient is a 70y old  Male who presents with a chief complaint of AMS 2/2 acute UTI (14 May 2025 18:07)    Overnight events: Back to baseline mental status.    REVIEW OF SYSTEMS:  CONSTITUTIONAL: No fever or chills  HEAD: No lesion on scalp  EYES: No visual disturbance  ENT: No sore throat  RESPIRATORY: No cough, no shortness of breath  CARDIOVASCULAR: No chest pain or palpitations  GASTROINTESTINAL: No abdominal or epigastric pain  GENITOURINARY: + SPC, mild hematuria  NEUROLOGICAL: No headache/dizziness  MUSCULOSKELETAL: No joint pain, erythema, or swelling; no back pain  SKIN: No itching, rashes  All other ROS negative except noted above        Prior hospital charts reviewed [Yes]  Primary team notes reviewed [Yes]  Other consultant notes reviewed [Yes]    Allergies:  No Known Allergies      ANTIMICROBIALS:   piperacillin/tazobactam IVPB.. 3.375 every 12 hours      OTHER MEDS: MEDICATIONS  (STANDING):  acetaminophen     Tablet .. 650 every 6 hours PRN  aluminum hydroxide/magnesium hydroxide/simethicone Suspension 30 every 4 hours PRN  amLODIPine   Tablet 10 daily  apixaban 2.5 every 12 hours  dextrose 50% Injectable 25 once  dextrose 50% Injectable 12.5 once  dextrose 50% Injectable 25 once  glucagon  Injectable 1 once  insulin lispro (ADMELOG) corrective regimen sliding scale  three times a day before meals  melatonin 3 at bedtime PRN  ondansetron Injectable 4 every 8 hours PRN      Vital Signs Last 24 Hrs  T(F): 97.4 (05-14-25 @ 21:15), Max: 98.3 (05-13-25 @ 19:53)    Vital Signs Last 24 Hrs  HR: 66 (05-14-25 @ 21:15) (65 - 69)  BP: 133/72 (05-14-25 @ 21:15) (116/48 - 133/72)  RR: 16 (05-14-25 @ 21:15)  SpO2: 99% (05-14-25 @ 21:15) (97% - 99%)  Wt(kg): --    EXAM:  GENERAL: NAD, lying in bed  HEAD: No head lesions  EYES: Conjunctiva pink and cornea white  EAR, NOSE, MOUTH, THROAT: Normal external ears and nose, no discharges; moist mucous membranes  NECK: Supple, nontender to palpation; no JVD  RESPIRATORY: Bibasilar crackles  CARDIOVASCULAR: S1 S2  GASTROINTESTINAL: Soft, nontender, nondistended; normoactive bowel sounds  GENITOURINARY: No angelo catheter, no CVA tenderness; + SPC with mild hematuria  EXTREMITIES: No clubbing, cyanosis, or petal edema  NERVOUS SYSTEM: Awake and alert, oriented to self, time, location today  MUSCULOSKELETAL: No joint erythema, swelling or pain  SKIN: No rashes or lesions, no superficial thrombophlebitis  PSYCH: Normal affect    Labs:                        8.6    7.02  )-----------( 118      ( 13 May 2025 09:17 )             28.1     05-13    139  |  100  |  72[HH]  ----------------------------<  99  3.9   |  24  |  4.6[HH]    Ca    6.6[L]      13 May 2025 09:17  Phos  6.1     05-13  Mg     1.4     05-13        WBC Trend:  WBC Count: 7.02 (05-13-25 @ 09:17)  WBC Count: 7.15 (05-12-25 @ 09:59)      Creatine Trend:  Creatinine: 4.6 (05-13)  Creatinine: 4.7 (05-12)      Liver Biochemical Testing Trend:  Alanine Aminotransferase (ALT/SGPT): 25 (05-12)  Alanine Aminotransferase (ALT/SGPT): 27 (05-07)  Alanine Aminotransferase (ALT/SGPT): 33 (05-05)  Alanine Aminotransferase (ALT/SGPT): 43 *H* (05-04)  Alanine Aminotransferase (ALT/SGPT): 113 *H* (05-01)  Aspartate Aminotransferase (AST/SGOT): 33 (05-12-25 @ 09:59)  Aspartate Aminotransferase (AST/SGOT): 36 (05-07-25 @ 07:42)  Aspartate Aminotransferase (AST/SGOT): 21 (05-05-25 @ 06:26)  Aspartate Aminotransferase (AST/SGOT): 33 (05-04-25 @ 06:10)  Aspartate Aminotransferase (AST/SGOT): 244 (05-01-25 @ 06:05)  Bilirubin Total: 0.3 (05-12)  Bilirubin Total: 0.3 (05-07)  Bilirubin Total: 0.3 (05-05)  Bilirubin Total: 0.3 (05-04)  Bilirubin Total: 0.7 (05-01)      Trend LDH  08-11-24 @ 15:52  240  08-11-24 @ 08:36  238  08-09-24 @ 11:44  181      Urinalysis Basic - ( 13 May 2025 09:17 )    Color: x / Appearance: x / SG: x / pH: x  Gluc: 99 mg/dL / Ketone: x  / Bili: x / Urobili: x   Blood: x / Protein: x / Nitrite: x   Leuk Esterase: x / RBC: x / WBC x   Sq Epi: x / Non Sq Epi: x / Bacteria: x        MICROBIOLOGY:    MRSA PCR Result.: Positive (05-01-25 @ 09:00)      Culture - Blood (collected 12 May 2025 09:59)  Source: Blood Blood-Peripheral  Preliminary Report:    No growth at 24 hours    Culture - Blood (collected 12 May 2025 09:59)  Source: Blood Blood-Peripheral  Preliminary Report:    No growth at 24 hours    Culture - Urine (collected 12 May 2025 09:59)  Source: Clean Catch Clean Catch (Midstream)  Final Report:    <10,000 CFU/mL Normal Urogenital Lilibeth    Culture - Urine (collected 01 May 2025 09:34)  Source: OR Collect right kidney urine for culture  Final Report:    >100,000 CFU/ml Serratia marcescens    >100,000 CFU/ml Staphylococcus aureus    50,000 - 99,000 CFU/mL Pseudomonas aeruginosa    50,000 - 99,000 CFU/mL Proteus mirabilis  Organism: Serratia marcescens  Staphylococcus aureus  Pseudomonas aeruginosa  Proteus mirabilis  Organism: Pseudomonas aeruginosa    Sensitivities:      -  Levofloxacin: S <=0.5      -  Aztreonam: S <=4      -  Cefepime: S <=2      -  Piperacillin/Tazobactam: S <=8      -  Ciprofloxacin: S <=0.25      -  Imipenem: S <=1      Method Type: ALF      -  Meropenem: S <=1      -  Ceftazidime: S 4  Organism: Proteus mirabilis    Sensitivities:      -  Levofloxacin: R >4      -  Tobramycin: I 4      -  Aztreonam: S <=4      -  Gentamicin: I 4      -  Cefazolin: R 16 For uncomplicated UTI with K. pneumoniae, E. coli, or P. mirablis: ALF <=16 is sensitive and ALF >=32 is resistant. This also predicts results for oral agents cefaclor, cefdinir, cefpodoxime, cefprozil, cefuroxime axetil, cephalexin and locarbeffor uncomplicated UTI. Note that some isolates may be susceptible to these agents while testing resistant to cefazolin.      -  Cefepime: S <=2      -  Piperacillin/Tazobactam: S <=8      -  Ciprofloxacin: R >2      -  Ceftriaxone: S <=1      -  Ampicillin: R >16 These ampicillin results predict results for amoxicillin      Method Type: ALF      -  Meropenem: S <=1      -  Ampicillin/Sulbactam: R >16/8      -  Cefuroxime: S <=4      -  Cefoxitin: S <=8      -  Amoxicillin/Clavulanic Acid: I 16/8      -  Trimethoprim/Sulfamethoxazole: R >2/38      -  Ertapenem: S <=0.5  Organism: Staphylococcus aureus    Sensitivities:      -  Oxacillin: S 1 Oxacillin predicts susceptibility for dicloxacillin, methicillin, and nafcillin      -  Gentamicin: S <=4 Should not be used as monotherapy      -  Vancomycin: S 1      -  Tetracycline: S <=4      Method Type: ALF      -  Rifampin: S <=1 Should not be used as monotherapy      -  Penicillin: R >2      -  Trimethoprim/Sulfamethoxazole: S <=0.5/9.5  Organism: Serratia marcescens    Sensitivities:      -  Levofloxacin: S <=0.5      -  Tobramycin: I 4      -  Aztreonam: S <=4      -  Gentamicin: S <=2      -  Cefazolin: R >16      -  Cefepime: S <=2      -  Piperacillin/Tazobactam: S <=8      -  Ciprofloxacin: S <=0.25      -  Ceftriaxone: S <=1      -  Ampicillin: R >16 These ampicillin results predict results for amoxicillin      Method Type: ALF      -  Meropenem: S <=1      -  Ampicillin/Sulbactam: R >16/8      -  Cefoxitin: R 16      -  Amoxicillin/Clavulanic Acid: R >16/8      -  Trimethoprim/Sulfamethoxazole: S <=0.5/9.5      -  Ertapenem: S <=0.5    Culture - Urine (collected 01 May 2025 09:30)  Source: OR Collect left kidney urine for culture  Final Report:    50,000 - 99,000 CFU/mL Serratia marcescens    50,000 - 99,000 CFU/mL Staphylococcus aureus    >100,000 CFU/ml Pseudomonas aeruginosa    10,000 - 49,000 CFU/mL Proteus mirabilis  Organism: Serratia marcescens  Staphylococcus aureus  Pseudomonas aeruginosa  Proteus mirabilis  Organism: Proteus mirabilis    Sensitivities:      -  Levofloxacin: R >4      -  Tobramycin: I 4      -  Aztreonam: S <=4      -  Gentamicin: I 4      -  Cefazolin: R 8 For uncomplicated UTI with K. pneumoniae, E. coli, or P. mirablis: ALF <=16 is sensitive and ALF >=32 is resistant. This also predicts results for oral agents cefaclor, cefdinir, cefpodoxime, cefprozil, cefuroxime axetil, cephalexin and locarbef for uncomplicated UTI. Note that some isolates may be susceptible to these agents while testing resistant to cefazolin.      -  Cefepime: S <=2      -  Piperacillin/Tazobactam: S <=8      -  Ciprofloxacin: R >2      -  Ceftriaxone: S <=1      -  Ampicillin: R >16 These ampicillin results predict results for amoxicillin      Method Type: ALF      -  Meropenem: S <=1      -  Ampicillin/Sulbactam: R >16/8      -  Cefuroxime: S <=4      -  Cefoxitin: S <=8      -  Amoxicillin/Clavulanic Acid: I 16/8      -  Trimethoprim/Sulfamethoxazole: R >2/38      -  Ertapenem: S <=0.5  Organism: Serratia marcescens    Sensitivities:      -  Levofloxacin: S <=0.5      -  Tobramycin: S <=2      -  Aztreonam: S <=4      -  Gentamicin: S <=2      -  Cefazolin: R >16      -  Cefepime: S <=2      -  Piperacillin/Tazobactam: S <=8      -  Ciprofloxacin: S <=0.25      -  Ceftriaxone: S <=1      -  Ampicillin: R >16 These ampicillin results predict results for amoxicillin      Method Type: ALF      -  Meropenem: S <=1      -  Ampicillin/Sulbactam: R 16/8      -  Cefoxitin: R <=8      -  Amoxicillin/Clavulanic Acid: R >16/8      -  Trimethoprim/Sulfamethoxazole: S <=0.5/9.5      -  Ertapenem: S <=0.5  Organism: Staphylococcus aureus    Sensitivities:      -  Oxacillin: S 1 Oxacillin predicts susceptibility for dicloxacillin, methicillin, and nafcillin      -  Gentamicin: S <=4 Should not be used as monotherapy      -  Vancomycin: S 0.5      -  Tetracycline: S <=4      Method Type: ALF      -  Rifampin: S <=1 Should not be used as monotherapy      -  Penicillin: R >2      -  Trimethoprim/Sulfamethoxazole: S <=0.5/9.5  Organism: Pseudomonas aeruginosa    Sensitivities:      -  Levofloxacin: R >4      -  Aztreonam: S <=4      -  Cefepime: S <=2      -  Piperacillin/Tazobactam: S <=8      -  Ciprofloxacin: R >2      -  Imipenem: S 2      Method Type: ALF      -  Meropenem: S <=1      -  Ceftazidime: S 4    Urinalysis with Rflx Culture (collected 30 Apr 2025 20:03)    Culture - Urine (collected 30 Apr 2025 20:03)  Source: Clean Catch None  Final Report:    >=3 organisms. Probable collection contamination.    Culture - Blood (collected 30 Apr 2025 17:54)  Source: Blood Blood-Peripheral  Final Report:    Growth in anaerobic bottle: Serratia marcescens    Direct identification is available within approximately 3-5    hours either by Blood Panel Multiplexed PCR or Direct    MALDI-TOF. Details: [email or web address]  Organism: Blood Culture PCR  Serratia marcescens  Organism: Blood Culture PCR    Sensitivities:      Method Type: PCR      -  Serratia marcescens: Detec  Organism: Serratia marcescens    Sensitivities:      -  Levofloxacin: S <=0.5      -  Tobramycin: S <=2      -  Aztreonam: S <=4      -  Gentamicin: S <=2      -  Cefazolin: R >16      -  Cefepime: S <=2      -  Piperacillin/Tazobactam: S <=8      -  Ciprofloxacin: S <=0.25      -  Ceftriaxone: S <=1      -  Ampicillin: R >16 These ampicillin results predict results for amoxicillin      Method Type: ALF      -  Meropenem: S <=1      -  Ampicillin/Sulbactam: R >16/8      -  Cefoxitin: R <=8      -  Trimethoprim/Sulfamethoxazole: S <=0.5/9.5      -  Ertapenem: S <=0.5    Culture - Blood (collected 30 Apr 2025 17:54)  Source: Blood Blood-Peripheral  Final Report:    Growth in anaerobic bottle: Proteus mirabilis  Organism: Proteus mirabilis  Organism: Proteus mirabilis    Sensitivities:      -  Levofloxacin: R >4      -  Tobramycin: S <=2      -  Aztreonam: S <=4      -  Gentamicin: I 4      -  Cefazolin: R 8      -  Cefepime: S <=2      -  Piperacillin/Tazobactam: S <=8      -  Ciprofloxacin: R >2      -  Ceftriaxone: S <=1      -  Ampicillin: R >16 These ampicillin results predict results for amoxicillin      Method Type: ALF      -  Meropenem: S <=1      -  Ampicillin/Sulbactam: R >16/8      -  Cefoxitin: S <=8      -  Trimethoprim/Sulfamethoxazole: R >2/38      -  Ertapenem: S <=0.5    Culture - Fungal, Body Fluid (collected 11 Aug 2024 16:08)  Source: Pleural Fl Pleural Fluid  Final Report:    No fungus isolated at 4 weeks.      Lactate, Blood: 0.4 (05-12 @ 09:59)      RADIOLOGY:  imaging below personally reviewed    < from: CT Abdomen and Pelvis No Cont (05.12.25 @ 10:53) >  IMPRESSION:  Since 4/30/2025,    Status post bilateral double-J ureteral stents in satisfactory position.    Multiple right-sided nonobstructing renal calculi measure up to 5 mm. No   calculi seen along the course of the right ureter.    Multiple prominent left-sided renal calculi measuring up to 1.3 cm at the   renal pelvis.    There is stable moderate hydroureteronephrosis and a stable distal   ureteral calculus measuring 6 x 4 x 9 mm (306 Hounsfield units).    Multiple prominent left para-aortic retroperitoneal lymph nodes measuring   up to 0.9 cm.    < end of copied text >    < from: CT Head No Cont (05.12.25 @ 10:53) >  IMPRESSION:  No acute intracranial pathology. No evidence of midline shift, mass   effect or intracranial hemorrhage.    Mild chronic microvascular typechanges.    < end of copied text >  
LENGTH OF HOSPITAL STAY: 1d    CHIEF COMPLAINT:    Patient is a 70y old  Male who presents with a chief complaint of AMS (13 May 2025 12:03)    OVERNIGHT EVENTS:    - no overnight events  - pt examined at bedside, still pleasantly confused with nonsensical answers to questions, but generally cooperative;   - tolerating PO, having BMs, making urine without urinary sxs    FOLLOW UP:    - cultures  - mr brain    HPI:    70 YOM w a PMH of nephrloithiasis, CKD 5 (No HD), neurodysfunctional bladder s/p suprapubic cath, paraplegia, bedbound, DM II, HTN, s/p b/l JJ stent placement for septic stone 5/1/25 presenting to the ED for confusion this morning Pt was recently  discharged on IV Cefepime 2gm daily on 5/7.  Per pt's brother (his caretaker) pt has not been "right" since he got home. He has had some confusion but this morning when he went to see him he was very disoriented. Unable to obtain full ROS given altered mentation; however pt denies any complaints of pain. Urine draining clear urine.    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 1000 milliLiter(s) IV Continuous <Continuous>  dextrose 50% Injectable 25 Gram(s) IV Push once  dextrose 50% Injectable 12.5 Gram(s) IV Push once  dextrose 50% Injectable 25 Gram(s) IV Push once  folic acid 1 milliGRAM(s) Oral daily  glucagon  Injectable 1 milliGRAM(s) IntraMuscular once  insulin lispro (ADMELOG) corrective regimen sliding scale   SubCutaneous three times a day before meals  lactated ringers. 1000 milliLiter(s) IV Continuous <Continuous>  magnesium gluconate 500 milliGRAM(s) Oral daily  magnesium sulfate  IVPB 2 Gram(s) IV Intermittent every 2 hours  multivitamin 1 Tablet(s) Oral daily  piperacillin/tazobactam IVPB.. 3.375 Gram(s) IV Intermittent every 12 hours    PRN MEDICATIONS  acetaminophen     Tablet .. 650 milliGRAM(s) Oral every 6 hours PRN  aluminum hydroxide/magnesium hydroxide/simethicone Suspension 30 milliLiter(s) Oral every 4 hours PRN  melatonin 3 milliGRAM(s) Oral at bedtime PRN  ondansetron Injectable 4 milliGRAM(s) IV Push every 8 hours PRN      LABS:                        8.6    7.02  )-----------( 118      ( 13 May 2025 09:17 )             28.1     05-13    139  |  100  |  72[HH]  ----------------------------<  99  3.9   |  24  |  4.6[HH]    Ca    6.6[L]      13 May 2025 09:17  Phos  6.1     05-13  Mg     1.4     05-13    TPro  7.2  /  Alb  3.3[L]  /  TBili  0.3  /  DBili  x   /  AST  33  /  ALT  25  /  AlkPhos  192[H]  05-12    PT/INR - ( 12 May 2025 09:59 )   PT: 12.20 sec;   INR: 1.03 ratio         PTT - ( 12 May 2025 09:59 )  PTT:35.4 sec  Urinalysis Basic - ( 13 May 2025 09:17 )    Color: x / Appearance: x / SG: x / pH: x  Gluc: 99 mg/dL / Ketone: x  / Bili: x / Urobili: x   Blood: x / Protein: x / Nitrite: x   Leuk Esterase: x / RBC: x / WBC x   Sq Epi: x / Non Sq Epi: x / Bacteria: x      VITALS:   T(F): 97  HR: 64  BP: 152/69  RR: 18  SpO2: 98%        PHYSICAL EXAM:    Gen: NAD, resting in bed; thin/frail  HEENT: Normocephalic, atraumatic  Neck: supple, no lymphadenopathy  CV: Regular rate & regular rhythm  Lungs: decreased BS at bases, no fremitus  Abdomen: Soft, BS present  Ext: Warm, well perfused  Neuro: moves all extremities against resistance, no droop, awake  Skin: no rash, no erythema

## 2025-05-14 NOTE — PROGRESS NOTE ADULT - ASSESSMENT
ID is consulted for UTI  Recently admitted for Serratia and Proteus bacteremia 2/2 obstructive uropathy  Discharged on IV cefepime via midline for 10 days total  Afebrile  WBC Count: 7.02 (05-13-25 @ 09:17)  WBC Count: 7.15 (05-12-25 @ 09:59)  WBC Count: 10.97 (05-07-25 @ 07:42)  On room air  BCx NGTD  UCx normal yolette    CT abdomen/pelvis  Since 4/30/2025,  Status post bilateral double-J ureteral stents in satisfactory position.  Multiple right-sided nonobstructing renal calculi measure up to 5 mm. No   calculi seen along the course of the right ureter.  Multiple prominent left-sided renal calculi measuring up to 1.3 cm at the   renal pelvis.  There is stable moderate hydroureteronephrosis and a stable distal   ureteral calculus measuring 6 x 4 x 9 mm (306 Hounsfield units).  Multiple prominent left para-aortic retroperitoneal lymph nodes measuring   up to 0.9 cm.      IMPRESSION:  UTI  Acute metabolic encephalopathy - resolved  Hydronephrosis  Kidney stone  SPC  CKD  Immunosuppression / Immunosenescence secondary to multiple comorbidities which could result in poor clinical outcome    RECOMMENDATIONS:  - Mental status is worse than previous admission, used to be AAO x 3; could be 2/2 adverse effects from cefepime, now back to normal mental status  - Complete IV Zosyn 3.375g q12hrs (CrCl ~13) today  - Follow up BCx and UCx  - Urology follow up  - Offloading and frequent position changes, aspiration precaution  - Trend WBC, fever curve, transaminases, creatinine daily  - Please inform ID of any patient clinical change or any new pertinent laboratory or radiographic data  - Will sign off. Please recall ID PRN for further questions      Jenn Leal D.O.  Attending Physician  Division of Infectious Diseases  [facility]  Please contact me via [employer] Teams

## 2025-05-14 NOTE — SWALLOW BEDSIDE ASSESSMENT ADULT - SWALLOW EVAL: DIAGNOSIS
+toleration of soft & bite sized with thin liquids w/o overt s/s of penetration/aspiration.
Declined trials of PO solids. +toleration observed without overt s/s of aspiration/penetration for thin liquids.

## 2025-05-14 NOTE — SWALLOW BEDSIDE ASSESSMENT ADULT - SLP GENERAL OBSERVATIONS
Pt received awake, in bed, O2 via room air, confused.
Pt received awake, in bed, O2 via room air, confused.

## 2025-05-15 ENCOUNTER — TRANSCRIPTION ENCOUNTER (OUTPATIENT)
Age: 70
End: 2025-05-15

## 2025-05-15 VITALS
TEMPERATURE: 97 F | DIASTOLIC BLOOD PRESSURE: 59 MMHG | HEART RATE: 51 BPM | OXYGEN SATURATION: 99 % | SYSTOLIC BLOOD PRESSURE: 92 MMHG | RESPIRATION RATE: 17 BRPM

## 2025-05-15 LAB
GLUCOSE BLDC GLUCOMTR-MCNC: 106 MG/DL — HIGH (ref 70–99)
GLUCOSE BLDC GLUCOMTR-MCNC: 164 MG/DL — HIGH (ref 70–99)
GLUCOSE BLDC GLUCOMTR-MCNC: 76 MG/DL — SIGNIFICANT CHANGE UP (ref 70–99)

## 2025-05-15 PROCEDURE — 99239 HOSP IP/OBS DSCHRG MGMT >30: CPT

## 2025-05-15 RX ORDER — B1/B2/B3/B5/B6/B12/VIT C/FOLIC 500-0.5 MG
1 TABLET ORAL
Qty: 0 | Refills: 0 | DISCHARGE
Start: 2025-05-15

## 2025-05-15 RX ADMIN — INSULIN LISPRO 2: 100 INJECTION, SOLUTION INTRAVENOUS; SUBCUTANEOUS at 11:54

## 2025-05-15 RX ADMIN — Medication 500 MILLIGRAM(S): at 11:55

## 2025-05-15 RX ADMIN — APIXABAN 2.5 MILLIGRAM(S): 2.5 TABLET, FILM COATED ORAL at 17:15

## 2025-05-15 RX ADMIN — Medication 1 TABLET(S): at 11:55

## 2025-05-15 RX ADMIN — AMLODIPINE BESYLATE 10 MILLIGRAM(S): 10 TABLET ORAL at 05:50

## 2025-05-15 RX ADMIN — FOLIC ACID 1 MILLIGRAM(S): 1 TABLET ORAL at 11:55

## 2025-05-15 RX ADMIN — Medication 1 APPLICATION(S): at 05:51

## 2025-05-15 RX ADMIN — APIXABAN 2.5 MILLIGRAM(S): 2.5 TABLET, FILM COATED ORAL at 05:50

## 2025-05-15 NOTE — DISCHARGE NOTE PROVIDER - NSDCFUSCHEDAPPT_GEN_ALL_CORE_FT
[facility]  UROLOGY [address]  Scheduled Appointment: 05/21/2025    Blake Schilling  [facility] PreAdmits  Scheduled Appointment: 05/22/2025    Blake Schilling  [facility]  UROLOGY [address]  Scheduled Appointment: 05/22/2025    Martin Mcallister  [facility]  NEPHRO [address]  Scheduled Appointment: 05/30/2025

## 2025-05-15 NOTE — DISCHARGE NOTE PROVIDER - NSDCMRMEDTOKEN_GEN_ALL_CORE_FT
amLODIPine 10 mg oral tablet: 1 tab(s) orally once a day  Eliquis 5 mg oral tablet: 0.5 tab(s) orally every 12 hours  folic acid 1 mg oral tablet: 1 tab(s) orally once a day  magnesium gluconate 500 mg oral tablet: 1 tab(s) orally once a day  Multiple Vitamins oral tablet: 1 tab(s) orally once a day

## 2025-05-15 NOTE — DISCHARGE NOTE NURSING/CASE MANAGEMENT/SOCIAL WORK - FINANCIAL ASSISTANCE
[facility] provides services at a reduced cost to those who are determined to be eligible through [facility]’s financial assistance program. Information regarding [facility]’s financial assistance program can be found by going to [email or web address] or by calling [phone].

## 2025-05-15 NOTE — DISCHARGE NOTE PROVIDER - HOSPITAL COURSE
70 YOM w a PMH of nephrolithiasis, CKD 5 (No HD), neurodysfunctional bladder s/p suprapubic cath, paraplegia, bedbound, DM II, HTN, s/p b/l JJ stent placement for septic stone 5/1/25 presenting to the ED for confusion     #Acute encephalopathy likely 2/2 JENNIFER (Cefepime induced neurotoxicity)   #B/L Nephrolithiasis   #Hx CKD 5 (No HD)  #Ho Neurodysfcunctional bladder s/p SP cath  -CT head negative  -Urology recs appreciated - no acute intervention, outpatient f/u with  for further tx of stones and removal of stents   -stopped IV cefepime  - ID appreciated   -completed Zosyn  -Cx neg  -back to baseline mental status    #Ho DMII  -home regimen in discharge    #Ho HTN  -C/w amlodipine 5mg once daily     #H/o A fib (diagnosed 8/2024 post urologic procedure)  -C/w Eliquis 2.5 BID     # effective quadriplegia   - bed bound  - supportive care  - DC home with home care

## 2025-05-15 NOTE — DISCHARGE NOTE NURSING/CASE MANAGEMENT/SOCIAL WORK - PATIENT PORTAL LINK FT
You can access the FollowMyHealth Patient Portal offered by [facility] by registering at the following website: [email or web address]. By joining [facility]s FollowMyHealth portal, you will also be able to view your health information using other applications (apps) compatible with our system.

## 2025-05-15 NOTE — DISCHARGE NOTE PROVIDER - NSDCCPCAREPLAN_GEN_ALL_CORE_FT
PRINCIPAL DISCHARGE DIAGNOSIS  Diagnosis: Altered mental status  Assessment and Plan of Treatment: Cefepime likely made you confused.  We completed your treatment with a different antibiotic (Zosyn) and your infection has been treated.  Please follow with your primary doctor and Dr. Schilling as scheduled.

## 2025-05-21 ENCOUNTER — APPOINTMENT (OUTPATIENT)
Dept: UROLOGY | Facility: CLINIC | Age: 70
End: 2025-05-21

## 2025-05-21 DIAGNOSIS — Z79.01 LONG TERM (CURRENT) USE OF ANTICOAGULANTS: ICD-10-CM

## 2025-05-21 DIAGNOSIS — N13.6 PYONEPHROSIS: ICD-10-CM

## 2025-05-21 DIAGNOSIS — I12.0 HYPERTENSIVE CHRONIC KIDNEY DISEASE WITH STAGE 5 CHRONIC KIDNEY DISEASE OR END STAGE RENAL DISEASE: ICD-10-CM

## 2025-05-21 DIAGNOSIS — N31.9 NEUROMUSCULAR DYSFUNCTION OF BLADDER, UNSPECIFIED: ICD-10-CM

## 2025-05-21 DIAGNOSIS — B95.61 METHICILLIN SUSCEPTIBLE STAPHYLOCOCCUS AUREUS INFECTION AS THE CAUSE OF DISEASES CLASSIFIED ELSEWHERE: ICD-10-CM

## 2025-05-21 DIAGNOSIS — B96.89 OTHER SPECIFIED BACTERIAL AGENTS AS THE CAUSE OF DISEASES CLASSIFIED ELSEWHERE: ICD-10-CM

## 2025-05-21 DIAGNOSIS — E46 UNSPECIFIED PROTEIN-CALORIE MALNUTRITION: ICD-10-CM

## 2025-05-21 DIAGNOSIS — I48.91 UNSPECIFIED ATRIAL FIBRILLATION: ICD-10-CM

## 2025-05-21 DIAGNOSIS — G92.8 OTHER TOXIC ENCEPHALOPATHY: ICD-10-CM

## 2025-05-21 DIAGNOSIS — Z79.899 OTHER LONG TERM (CURRENT) DRUG THERAPY: ICD-10-CM

## 2025-05-21 DIAGNOSIS — G82.20 PARAPLEGIA, UNSPECIFIED: ICD-10-CM

## 2025-05-21 DIAGNOSIS — Z74.01 BED CONFINEMENT STATUS: ICD-10-CM

## 2025-05-21 DIAGNOSIS — N18.5 CHRONIC KIDNEY DISEASE, STAGE 5: ICD-10-CM

## 2025-05-21 DIAGNOSIS — T36.1X5A ADVERSE EFFECT OF CEPHALOSPORINS AND OTHER BETA-LACTAM ANTIBIOTICS, INITIAL ENCOUNTER: ICD-10-CM

## 2025-05-21 DIAGNOSIS — E11.22 TYPE 2 DIABETES MELLITUS WITH DIABETIC CHRONIC KIDNEY DISEASE: ICD-10-CM

## 2025-05-22 ENCOUNTER — APPOINTMENT (OUTPATIENT)
Dept: UROLOGY | Facility: HOSPITAL | Age: 70
End: 2025-05-22

## 2025-05-23 ENCOUNTER — APPOINTMENT (OUTPATIENT)
Dept: NEPHROLOGY | Facility: CLINIC | Age: 70
End: 2025-05-23

## 2025-05-23 ENCOUNTER — INPATIENT (INPATIENT)
Facility: HOSPITAL | Age: 70
LOS: 12 days | Discharge: HOME | DRG: 684 | End: 2025-06-05
Attending: INTERNAL MEDICINE | Admitting: STUDENT IN AN ORGANIZED HEALTH CARE EDUCATION/TRAINING PROGRAM
Payer: MEDICARE

## 2025-05-23 VITALS
RESPIRATION RATE: 18 BRPM | OXYGEN SATURATION: 98 % | WEIGHT: 169.98 LBS | DIASTOLIC BLOOD PRESSURE: 83 MMHG | HEIGHT: 64 IN | HEART RATE: 71 BPM | SYSTOLIC BLOOD PRESSURE: 135 MMHG | TEMPERATURE: 96 F

## 2025-05-23 VITALS
WEIGHT: 168 LBS | DIASTOLIC BLOOD PRESSURE: 62 MMHG | SYSTOLIC BLOOD PRESSURE: 100 MMHG | HEIGHT: 72 IN | HEART RATE: 80 BPM | BODY MASS INDEX: 22.75 KG/M2 | OXYGEN SATURATION: 99 %

## 2025-05-23 DIAGNOSIS — Z43.5 ENCOUNTER FOR ATTENTION TO CYSTOSTOMY: Chronic | ICD-10-CM

## 2025-05-23 DIAGNOSIS — N17.9 ACUTE KIDNEY FAILURE, UNSPECIFIED: ICD-10-CM

## 2025-05-23 DIAGNOSIS — M46.20 OSTEOMYELITIS OF VERTEBRA, SITE UNSPECIFIED: Chronic | ICD-10-CM

## 2025-05-23 DIAGNOSIS — Z98.890 OTHER SPECIFIED POSTPROCEDURAL STATES: Chronic | ICD-10-CM

## 2025-05-23 LAB
ALBUMIN SERPL ELPH-MCNC: 2.7 G/DL — LOW (ref 3.5–5.2)
ALP SERPL-CCNC: 521 U/L — HIGH (ref 30–115)
ALT FLD-CCNC: 99 U/L — HIGH (ref 0–41)
ANION GAP SERPL CALC-SCNC: 20 MMOL/L — HIGH (ref 7–14)
ANION GAP SERPL CALC-SCNC: 22 MMOL/L — HIGH (ref 7–14)
APPEARANCE UR: ABNORMAL
AST SERPL-CCNC: 119 U/L — HIGH (ref 0–41)
BASOPHILS # BLD AUTO: 0.04 K/UL — SIGNIFICANT CHANGE UP (ref 0–0.2)
BASOPHILS NFR BLD AUTO: 0.7 % — SIGNIFICANT CHANGE UP (ref 0–1)
BILIRUB SERPL-MCNC: 0.2 MG/DL — SIGNIFICANT CHANGE UP (ref 0.2–1.2)
BILIRUB UR-MCNC: NEGATIVE — SIGNIFICANT CHANGE UP
BUN SERPL-MCNC: 101 MG/DL — CRITICAL HIGH (ref 10–20)
BUN SERPL-MCNC: 102 MG/DL — CRITICAL HIGH (ref 10–20)
CALCIUM SERPL-MCNC: 6.6 MG/DL — LOW (ref 8.4–10.5)
CALCIUM SERPL-MCNC: 6.6 MG/DL — LOW (ref 8.4–10.5)
CHLORIDE SERPL-SCNC: 100 MMOL/L — SIGNIFICANT CHANGE UP (ref 98–110)
CHLORIDE SERPL-SCNC: 103 MMOL/L — SIGNIFICANT CHANGE UP (ref 98–110)
CO2 SERPL-SCNC: 14 MMOL/L — LOW (ref 17–32)
CO2 SERPL-SCNC: 15 MMOL/L — LOW (ref 17–32)
COLOR SPEC: YELLOW — SIGNIFICANT CHANGE UP
CREAT SERPL-MCNC: 9.3 MG/DL — CRITICAL HIGH (ref 0.7–1.5)
CREAT SERPL-MCNC: 9.5 MG/DL — CRITICAL HIGH (ref 0.7–1.5)
DIFF PNL FLD: ABNORMAL
EGFR: 5 ML/MIN/1.73M2 — LOW
EGFR: 5 ML/MIN/1.73M2 — LOW
EGFR: 6 ML/MIN/1.73M2 — LOW
EGFR: 6 ML/MIN/1.73M2 — LOW
EOSINOPHIL # BLD AUTO: 0.06 K/UL — SIGNIFICANT CHANGE UP (ref 0–0.7)
EOSINOPHIL NFR BLD AUTO: 1 % — SIGNIFICANT CHANGE UP (ref 0–8)
GAS PNL BLDV: SIGNIFICANT CHANGE UP
GLUCOSE SERPL-MCNC: 77 MG/DL — SIGNIFICANT CHANGE UP (ref 70–99)
GLUCOSE SERPL-MCNC: 90 MG/DL — SIGNIFICANT CHANGE UP (ref 70–99)
GLUCOSE UR QL: NEGATIVE MG/DL — SIGNIFICANT CHANGE UP
HCT VFR BLD CALC: 24.6 % — LOW (ref 42–52)
HGB BLD-MCNC: 8 G/DL — LOW (ref 14–18)
IMM GRANULOCYTES NFR BLD AUTO: 1.5 % — HIGH (ref 0.1–0.3)
KETONES UR QL: NEGATIVE MG/DL — SIGNIFICANT CHANGE UP
LEUKOCYTE ESTERASE UR-ACNC: ABNORMAL
LYMPHOCYTES # BLD AUTO: 1.1 K/UL — LOW (ref 1.2–3.4)
LYMPHOCYTES # BLD AUTO: 17.9 % — LOW (ref 20.5–51.1)
MCHC RBC-ENTMCNC: 29.4 PG — SIGNIFICANT CHANGE UP (ref 27–31)
MCHC RBC-ENTMCNC: 32.5 G/DL — SIGNIFICANT CHANGE UP (ref 32–37)
MCV RBC AUTO: 90.4 FL — SIGNIFICANT CHANGE UP (ref 80–94)
MONOCYTES # BLD AUTO: 0.65 K/UL — HIGH (ref 0.1–0.6)
MONOCYTES NFR BLD AUTO: 10.6 % — HIGH (ref 1.7–9.3)
NEUTROPHILS # BLD AUTO: 4.2 K/UL — SIGNIFICANT CHANGE UP (ref 1.4–6.5)
NEUTROPHILS NFR BLD AUTO: 68.3 % — SIGNIFICANT CHANGE UP (ref 42.2–75.2)
NITRITE UR-MCNC: NEGATIVE — SIGNIFICANT CHANGE UP
NRBC BLD AUTO-RTO: 0 /100 WBCS — SIGNIFICANT CHANGE UP (ref 0–0)
PH UR: 6.5 — SIGNIFICANT CHANGE UP (ref 5–8)
PLATELET # BLD AUTO: 156 K/UL — SIGNIFICANT CHANGE UP (ref 130–400)
PMV BLD: 11.4 FL — HIGH (ref 7.4–10.4)
POTASSIUM SERPL-MCNC: 5.4 MMOL/L — HIGH (ref 3.5–5)
POTASSIUM SERPL-MCNC: 5.4 MMOL/L — HIGH (ref 3.5–5)
POTASSIUM SERPL-SCNC: 5.4 MMOL/L — HIGH (ref 3.5–5)
POTASSIUM SERPL-SCNC: 5.4 MMOL/L — HIGH (ref 3.5–5)
PROT SERPL-MCNC: 7.2 G/DL — SIGNIFICANT CHANGE UP (ref 6–8)
PROT UR-MCNC: 100 MG/DL
RBC # BLD: 2.72 M/UL — LOW (ref 4.7–6.1)
RBC # FLD: 13.4 % — SIGNIFICANT CHANGE UP (ref 11.5–14.5)
SODIUM SERPL-SCNC: 135 MMOL/L — SIGNIFICANT CHANGE UP (ref 135–146)
SODIUM SERPL-SCNC: 139 MMOL/L — SIGNIFICANT CHANGE UP (ref 135–146)
SP GR SPEC: 1.01 — SIGNIFICANT CHANGE UP (ref 1–1.03)
UROBILINOGEN FLD QL: 0.2 MG/DL — SIGNIFICANT CHANGE UP (ref 0.2–1)
WBC # BLD: 6.14 K/UL — SIGNIFICANT CHANGE UP (ref 4.8–10.8)
WBC # FLD AUTO: 6.14 K/UL — SIGNIFICANT CHANGE UP (ref 4.8–10.8)

## 2025-05-23 PROCEDURE — 85027 COMPLETE CBC AUTOMATED: CPT

## 2025-05-23 PROCEDURE — 86850 RBC ANTIBODY SCREEN: CPT

## 2025-05-23 PROCEDURE — 82306 VITAMIN D 25 HYDROXY: CPT

## 2025-05-23 PROCEDURE — 90935 HEMODIALYSIS ONE EVALUATION: CPT

## 2025-05-23 PROCEDURE — 71045 X-RAY EXAM CHEST 1 VIEW: CPT

## 2025-05-23 PROCEDURE — 80053 COMPREHEN METABOLIC PANEL: CPT

## 2025-05-23 PROCEDURE — 83550 IRON BINDING TEST: CPT

## 2025-05-23 PROCEDURE — 86803 HEPATITIS C AB TEST: CPT

## 2025-05-23 PROCEDURE — 76705 ECHO EXAM OF ABDOMEN: CPT | Mod: 26

## 2025-05-23 PROCEDURE — 36415 COLL VENOUS BLD VENIPUNCTURE: CPT

## 2025-05-23 PROCEDURE — 82803 BLOOD GASES ANY COMBINATION: CPT

## 2025-05-23 PROCEDURE — 83540 ASSAY OF IRON: CPT

## 2025-05-23 PROCEDURE — 76937 US GUIDE VASCULAR ACCESS: CPT

## 2025-05-23 PROCEDURE — 83970 ASSAY OF PARATHORMONE: CPT

## 2025-05-23 PROCEDURE — 99284 EMERGENCY DEPT VISIT MOD MDM: CPT

## 2025-05-23 PROCEDURE — 84100 ASSAY OF PHOSPHORUS: CPT

## 2025-05-23 PROCEDURE — 85025 COMPLETE CBC W/AUTO DIFF WBC: CPT

## 2025-05-23 PROCEDURE — 86709 HEPATITIS A IGM ANTIBODY: CPT

## 2025-05-23 PROCEDURE — 83735 ASSAY OF MAGNESIUM: CPT

## 2025-05-23 PROCEDURE — 78226 HEPATOBILIARY SYSTEM IMAGING: CPT

## 2025-05-23 PROCEDURE — 85730 THROMBOPLASTIN TIME PARTIAL: CPT

## 2025-05-23 PROCEDURE — 82977 ASSAY OF GGT: CPT

## 2025-05-23 PROCEDURE — 82310 ASSAY OF CALCIUM: CPT

## 2025-05-23 PROCEDURE — 36558 INSERT TUNNELED CV CATH: CPT | Mod: RT

## 2025-05-23 PROCEDURE — 80048 BASIC METABOLIC PNL TOTAL CA: CPT

## 2025-05-23 PROCEDURE — 86705 HEP B CORE ANTIBODY IGM: CPT

## 2025-05-23 PROCEDURE — 84460 ALANINE AMINO (ALT) (SGPT): CPT

## 2025-05-23 PROCEDURE — 74176 CT ABD & PELVIS W/O CONTRAST: CPT | Mod: 26

## 2025-05-23 PROCEDURE — 99285 EMERGENCY DEPT VISIT HI MDM: CPT

## 2025-05-23 PROCEDURE — 73610 X-RAY EXAM OF ANKLE: CPT | Mod: 50

## 2025-05-23 PROCEDURE — 87040 BLOOD CULTURE FOR BACTERIA: CPT

## 2025-05-23 PROCEDURE — 77001 FLUOROGUIDE FOR VEIN DEVICE: CPT

## 2025-05-23 PROCEDURE — 85610 PROTHROMBIN TIME: CPT

## 2025-05-23 PROCEDURE — 99223 1ST HOSP IP/OBS HIGH 75: CPT

## 2025-05-23 PROCEDURE — 93005 ELECTROCARDIOGRAM TRACING: CPT

## 2025-05-23 PROCEDURE — 92610 EVALUATE SWALLOWING FUNCTION: CPT | Mod: GN

## 2025-05-23 PROCEDURE — 99215 OFFICE O/P EST HI 40 MIN: CPT

## 2025-05-23 PROCEDURE — 87340 HEPATITIS B SURFACE AG IA: CPT

## 2025-05-23 PROCEDURE — 86900 BLOOD TYPING SEROLOGIC ABO: CPT

## 2025-05-23 PROCEDURE — C1769: CPT

## 2025-05-23 PROCEDURE — 80076 HEPATIC FUNCTION PANEL: CPT

## 2025-05-23 PROCEDURE — 86901 BLOOD TYPING SEROLOGIC RH(D): CPT

## 2025-05-23 PROCEDURE — 86704 HEP B CORE ANTIBODY TOTAL: CPT

## 2025-05-23 PROCEDURE — 83605 ASSAY OF LACTIC ACID: CPT

## 2025-05-23 PROCEDURE — 82652 VIT D 1 25-DIHYDROXY: CPT

## 2025-05-23 PROCEDURE — C1750: CPT

## 2025-05-23 PROCEDURE — A9510: CPT

## 2025-05-23 PROCEDURE — 86706 HEP B SURFACE ANTIBODY: CPT

## 2025-05-23 RX ORDER — SODIUM ZIRCONIUM CYCLOSILICATE 5 G/5G
5 POWDER, FOR SUSPENSION ORAL ONCE
Refills: 0 | Status: COMPLETED | OUTPATIENT
Start: 2025-05-23 | End: 2025-05-23

## 2025-05-23 RX ORDER — CEFTRIAXONE 500 MG/1
2000 INJECTION, POWDER, FOR SOLUTION INTRAMUSCULAR; INTRAVENOUS ONCE
Refills: 0 | Status: COMPLETED | OUTPATIENT
Start: 2025-05-23 | End: 2025-05-23

## 2025-05-23 RX ORDER — SODIUM BICARBONATE 1 MEQ/ML
0.15 SYRINGE (ML) INTRAVENOUS
Qty: 150 | Refills: 0 | Status: DISCONTINUED | OUTPATIENT
Start: 2025-05-23 | End: 2025-05-24

## 2025-05-23 RX ADMIN — Medication 1000 MILLILITER(S): at 12:20

## 2025-05-23 RX ADMIN — CEFTRIAXONE 100 MILLIGRAM(S): 500 INJECTION, POWDER, FOR SOLUTION INTRAMUSCULAR; INTRAVENOUS at 15:33

## 2025-05-23 RX ADMIN — Medication 75 MEQ/KG/HR: at 20:25

## 2025-05-23 RX ADMIN — SODIUM ZIRCONIUM CYCLOSILICATE 5 GRAM(S): 5 POWDER, FOR SUSPENSION ORAL at 19:12

## 2025-05-23 NOTE — H&P ADULT - NSHPPHYSICALEXAM_GEN_ALL_CORE
PHYSICAL EXAM:  GEN: NAD, Resting comfortably in bed, cooperative  PULM: Clear to auscultation bilaterally, No wheezing, rales, rhonchi  CVS: Regular rate and rhythm, S1-S2, no murmurs, heaves, thrills  ABD: Soft, non-tender, non-distended, no guarding, BS +  EXT: No edema/cyanosis, extremities warm/dry, peripheral pulses palpable   NEURO: A&Ox3, no focal deficits

## 2025-05-23 NOTE — ED PROVIDER NOTE - CLINICAL SUMMARY MEDICAL DECISION MAKING FREE TEXT BOX
70y M pmh CKD V, HTN, NIDDM, prior spinal abscess, nephrolithiasis and recurrent UTI s/p b/l stent placement and SPC presenting for evaluation. Patient sent in by Dr. Mcallister for evaluation for acute rise in creatinine. sCr noted to increase from 5.5>9.5 with reduced UOP, hyperkalemia and hyperphosphatemia.     ROS -ve for fever, cp, sob, abdominal pain, n/v/d/c, hematuria, dysuria, rash, joint pain, confusion, dizziness, lightheadedness.    Vitals in the ED  T 95.6  HR 71  /83  RR 18 SpO2 98 On RA     Significant Labs  wbc 6.14 hgb 8.0 plt 156  Na 135 K 5.4 BUN/Cr 102/9.5  CO2 15, AGAP 20  Ca 6.6      ALT 99    VBG 7.20/37/67/14  UA + LE, WBC, bacteria, rbc, casts    CTAP   Interval marked distention with gallbladder wall thickening and pericholecystic fluid. Cholelithiasis. No definite biliary duct dilatation.  Symmetric in size. Bilateral nephroureteral catheters beginning in the renal pelvis and terminating within the urinary bladder.   Improved right hydronephrosis now with right renal pelvic fullness. Mild improvement in left moderate hydronephrosis. Bilateral renal and pelvic stones difficult to compare given motion artifact at the level of the kidneys.   No definite stone along the course of the ureters.  Increased periaortic lymph node measuring 1.1 cm previously 0.9 cm, may be reactive   No bowel obstruction. Large rectal stool burden with distention measuring 7.8 cm   Nephroureteral catheters and a suprapubic catheter terminating within an underdistended urinary bladder. Air is noted within   the urinary bladder which may be due to instrumentation. Prostate measures 4.9 cm in transverse dimension   Calcified atherosclerotic disease of the aorta and its branches.  Trace free fluid surrounding the gallbladder and liver.  Right basilar consolidative opacity adjacent likely trace pleural effusion.    RUQ:   Findings concerning for cholecystitis despite a negative sonographic Iglesias's sign, as described.    Right renal pelvic fullness.  Trace right pleural effusion.    Patient received rocephin, 1L NS bolus, sodium bicarb ggt, lokelma in the ED. Admitted to medicine for management.

## 2025-05-23 NOTE — H&P ADULT - ASSESSMENT
70y M pmh CKD V, HTN, NIDDM, prior spinal abscess, nephrolithiasis and recurrent UTI s/p b/l stent placement and SPC presenting for worsening creatinine admitted for JOÃO on CKD and cystitis.     #JOÃO on CKD V  #Cystitis   #SPC  #Hx nephrolithiasis sp b/l stent placement   - bsl Cr 5.5>9.5, CO2 15, K 5.4, pH 7.2 on vbg  - UA +ve bacteria, WBC >998, +ve LE  - CTAP  Bilateral nephroureteral catheters beginning in the renal pelvis and terminating within the urinary bladder. Improved right hydronephrosis now with right renal pelvic fullness. Mild improvement in left moderate hydronephrosis. Bilateral renal and pelvic stones difficult to compare given motion artifact at the level of the kidneys. No definite stone along the course of the ureters. Nephroureteral catheters and a suprapubic catheter terminating within an underdistended urinary bladder. Prostate measures 4.9 cm in transverse dimension   - IV hydration - D5W + NaHCO3 150mEq @ 60-70cc/hr  - Urology Cs for stent removal and stone management  - [facility] PRN  - ID eval - received rocephin in ED    #GB Wall thickening   - GB wall thickened 0.4mm on RUQ US  - lactate -ve, Tbili wnl, CTAP showing GB wall thickening with pericholecystic fluid  - no clinical signs of cholecystitis   - surgery c/s no acute intervention     #Constipation  - CTAP - no bowel obstruction, large rectal stool burden with distention measuring 7.8 cm   - bowel regimen     #Normocytic anemia suspected 2/2 CKD  - b12 1269, folate 12.2  - iron 95, ferritin 438  - no active bleeding, continue to monitor     #Hypocalcemia  - Corrected Ca 7.6  - elevated ALP   - check PTH, Phos, vit D     #HANDOFF    # Misc  - DVT Prophylaxis:   - GI Prophylaxis: none  - Diet: renal  - Activity: iat  - Code Status: Full   - Dispo: MED/SURG    Moses Jumana Garica  PGY3, Internal Medicine   [facility]     70y M pmh CKD V, HTN, NIDDM, afib, prior spinal abscess, nephrolithiasis and recurrent UTI s/p b/l stent placement and SPC presenting for worsening creatinine admitted for JOÃO on CKD and cystitis.     #JOÃO on CKD V  #Cystitis   #SPC  #Hx nephrolithiasis sp b/l stent placement   - bsl Cr 5.5>9.5, CO2 15, K 5.4, pH 7.2 on vbg  - UA +ve bacteria, WBC >998, +ve LE  - CTAP  Bilateral nephroureteral catheters beginning in the renal pelvis and terminating within the urinary bladder. Improved right hydronephrosis now with right renal pelvic fullness. Mild improvement in left moderate hydronephrosis. Bilateral renal and pelvic stones difficult to compare given motion artifact at the level of the kidneys. No definite stone along the course of the ureters. Nephroureteral catheters and a suprapubic catheter terminating within an underdistended urinary bladder. Prostate measures 4.9 cm in transverse dimension.   - IV hydration - D5W + NaHCO3 150mEq @ 60-70cc/hr  - Urology Cs for stent removal and stone management  - [facility] PRN  - ID eval - received rocephin in ED - vanc/rocephin    #GB Wall thickening   - GB wall thickened 0.4mm on RUQ US  - lactate -ve, Tbili wnl, CTAP showing GB wall thickening with pericholecystic fluid  - no clinical signs of cholecystitis   - surgery c/s no acute intervention   - HIDA    #Afib  #HTN  - eliquis 2.5mg bid   - nifedipine 60mg po qd    #Constipation  - CTAP - no bowel obstruction, large rectal stool burden with distention measuring 7.8 cm   - bowel regimen     #Normocytic anemia suspected 2/2 CKD  - b12 1269, folate 12.2  - iron 95, ferritin 438  - no active bleeding, continue to monitor     #Hypocalcemia  - Corrected Ca 7.6  - elevated ALP   - check PTH, Phos, vit D     #HANDOFF  - fu nephro, id, uro  - fu cultures  - check bmp, wean bicarb when able     # Misc  - DVT Prophylaxis: eliquis  - GI Prophylaxis: none  - Diet: renal  - Activity: iat  - Code Status: Full   - Dispo: MED/SURG    Moses Jumana Garcia  PGY3, Internal Medicine   [facility]

## 2025-05-23 NOTE — CONSULT NOTE ADULT - CONSULT REASON
Thoracic Surgery Brief Op Note    Preoperative Diagnosis: left lower lobe NSCLC, left upper lobe nodule    Postoperative Diagnosis: same    Procedure: robotic left lower lobectomy with lymph node dissection and upper lobe wedge resection    Surgeon: Robert Benjamin MD    Assistant: Linda Bernard PA-C    Anesthesia: General endotracheal    Anesthesia Staff:   Anesthesiologist: Irvin Dutton MD  Anesthesia Technician: Anne-Marie Garcia    Findings: as above     Estimated Blood Loss: Minimal    Specimens:  left lower lobe, left upper lobe wedge and stations 9L, 7, 10L, 5 and 11L lymph nodes    Elevated Creatinine value

## 2025-05-23 NOTE — CONSULT NOTE ADULT - SUBJECTIVE AND OBJECTIVE BOX
HPI  Patient 70-year-old male with past med history of CKD 5 (Not on HD), nephrloithiasis, paraplegia, hypertension, DM II, status post suprapubic catheter placement, and s/p b/l JJ stent placement for septic stone on 25 who presented to the ED for evaluation of JOÃO on CKD due to rising creatinine lab values. Patient follows Dr. Mcallister as his nephrologist. Patient saw Dr. Mcallister this morning and noted to have increased creatinine levels. Dr. Mcallister spoke to ED on the phone and recommended repeat blood work and possible imaging.  Patient denies any confusion or dizziness, denies abdominal pain.  No fevers, chest pain, shortness of breath, nausea, vomiting, diarrhea or constipation.  Denies any numbness or tingling, headaches, blurry vision. Patient was alert and oriented and in a pleasant mood. Catheter draining normal urine.     Home Medications:  Eliquis 5 mg oral tablet: 0.5 tab(s) orally every 12 hours (2025 23:24)  folic acid 1 mg oral tablet: 1 tab(s) orally once a day (2025 09:09)  Multiple Vitamins oral tablet: 1 tab(s) orally once a day (15 May 2025 14:05)    MEDICATIONS  (STANDING):  sodium bicarbonate  Infusion 0.146 mEq/kG/Hr (75 mL/Hr) IV Continuous    Vital Signs Last 24 Hrs  T(C): 36 (23 May 2025 15:48), Max: 36 (23 May 2025 15:48)  T(F): 96.8 (23 May 2025 15:48), Max: 96.8 (23 May 2025 15:48)  HR: 73 (23 May 2025 15:48) (71 - 73)  BP: 113/70 (23 May 2025 15:48) (113/70 - 135/83)  BP(mean): --  RR: 18 (23 May 2025 15:48) (18 - 18)  SpO2: 99% (23 May 2025 15:48) (98% - 99%)  Patient On (Oxygen Delivery Method): room air    Physical Exam:  GENERAL: well appearing, no acute distress  EYES: EOMI, PERRL, conjunctiva and sclera clear  CHEST/LUNG: Clear to auscultation bilaterally; No rales, rhonchi or wheezing  HEART: S1,S2 Regular rate and rhythm; No murmurs, rubs, or gallops  ABDOMEN: Soft, nontender, nondistended, normal bowel sounds  : +SPT in place without pain, draining yellow urine  EXTREMITIES: Distal pulses present  NEUROLOGY: AOx3    Labs:             8.0    6.14  )-----------( 156      ( 23 May 2025 12:30 )             24.6     135  |  100  |  102[HH]  ----------------------------<  90  5.4[H]   |  15[L]  |  9.5[HH]    Ca    6.6[L]      23 May 2025 12:30    TPro  7.2  /  Alb  2.7[L]  /  TBili  0.2  /  DBili  x   /  AST  119[H]  /  ALT  99[H]  /  AlkPhos  521[H]  05-23    Urinalysis Basic - ( 23 May 2025 12:30 )  Color: Yellow / Appearance: Turbid / S.011 / pH: x  Gluc: 90 mg/dL / Ketone: x  / Bili: Negative / Urobili: 0.2 mg/dL   Blood: x / Protein: 100 mg/dL / Nitrite: Negative   Leuk Esterase: Large / RBC: 281 /HPF / WBC >998 /HPF   Sq Epi: x / Non Sq Epi: 1 /HPF / Bacteria: Many /HPF    Venous Blood Gas:  pH: 7.20   Base excess: -12.5  Na 129  K >10.0  Ca+ ionized 0.82  pCO2 37  pO2 67  HCO3 14    Imaging:   CT Abdomen/Pelvis no contrast:  Kidneys: Symmetric in size. Bilateral nephroureteral catheters   beginning in the renal pelvis and terminating within the urinary bladder.   Improved right hydronephrosis now with right renal pelvic fullness. Mild   improvement in left moderate hydronephrosis. Bilateral renal and pelvic   stones difficult to compare given motion artifact at the level of the   kidneys. No definite stone along the course of the ureters.

## 2025-05-23 NOTE — CONSULT NOTE ADULT - ASSESSMENT
Patient 70-year-old male with past med history of CKD 5 (Not on HD), nephrloithiasis, paraplegia, hypertension, DM II, status post suprapubic catheter placement, and s/p b/l JJ stent placement for septic stone on 5/1/25 who presented to the ED for evaluation of JOÃO on CKD due to rising creatinine lab values. Given that this patient is asymptomatic with elevated creatinine levels that have stabilized at ~9.5 iso UTI as indicated by the UA, the most likely diagnosis is JOÃO on CKD iso UTI.     #JOÃO on CKD 5 iso UTI  -Cr 9.5 has remained stable since Cr of 9 on 5/21/25  -Continue with hydration  -Start sodium bicarbonate drip for 24hr  -c/w ceftriaxone pending culture results  -Trend Cr levels

## 2025-05-23 NOTE — CONSULT NOTE ADULT - ATTENDING COMMENTS
Discussed with ED team,  Reviewed imaging and lab work.    70 M brought to [facility] ED for elevated Cr from nephrologist. During work-up had a CT of the abdomen and RUQ US showing a distended GB. Patient denies any abdominal pain during visit. Denies any nausea/vomiting. Denies any fever/chills. Is wheelchair bound, has assistance from his brother.    PE: male, in bed, comfortable  Head; NC/AT  Heart: RRR  Lungs; even chest rise  Abdomen: soft, non-tender, non-peritoneal, no guarding, negative Iglesias's sign    A/P:  70 M with known CKD, elevated Cr, with no clinical signs of cholecystitis during present evaluation.   - Would continue to monitor for development of abdominal pain, if having RUQ abdominal pain would recommend a HIDA scan, if HIDA scan positive for obstruction and patient has pain, would recommend a percutaneous drainage of his gallbladder   - Trend LFTs

## 2025-05-23 NOTE — H&P ADULT - NSHPLABSRESULTS_GEN_ALL_CORE
8.0    6.14  )-----------( 156      ( 23 May 2025 12:30 )             24.6       05-23    139  |  103  |  101[HH]  ----------------------------<  77  5.4[H]   |  14[L]  |  9.3[HH]    Ca    6.6[L]      23 May 2025 20:22    TPro  7.2  /  Alb  2.7[L]  /  TBili  0.2  /  DBili  x   /  AST  119[H]  /  ALT  99[H]  /  AlkPhos  521[H]  05-23              Urinalysis Basic - ( 23 May 2025 20:22 )    Color: x / Appearance: x / SG: x / pH: x  Gluc: 77 mg/dL / Ketone: x  / Bili: x / Urobili: x   Blood: x / Protein: x / Nitrite: x   Leuk Esterase: x / RBC: x / WBC x   Sq Epi: x / Non Sq Epi: x / Bacteria: x            Lactate Trend            CAPILLARY BLOOD GLUCOSE            Culture Results:   No growth at 5 days (05-12 @ 09:59)  Culture Results:   No growth at 5 days (05-12 @ 09:59)  Culture Results:   <10,000 CFU/mL Normal Urogenital Lilibeth (05-12 @ 09:59)  Culture Results:   >100,000 CFU/ml Serratia marcescens  >100,000 CFU/ml Staphylococcus aureus  50,000 - 99,000 CFU/mL Pseudomonas aeruginosa  50,000 - 99,000 CFU/mL Proteus mirabilis (05-01 @ 09:34)  Culture Results:   50,000 - 99,000 CFU/mL Serratia marcescens  50,000 - 99,000 CFU/mL Staphylococcus aureus  >100,000 CFU/ml Pseudomonas aeruginosa  10,000 - 49,000 CFU/mL Proteus mirabilis (05-01 @ 09:30)  Culture Results:   >=3 organisms. Probable collection contamination. (04-30 @ 20:03)  Culture Results:   Growth in anaerobic bottle: Serratia marcescens  Direct identification is available within approximately 3-5  hours either by Blood Panel Multiplexed PCR or Direct  MALDI-TOF. Details: [email or web address] (04-30 @ 17:54)  Culture Results:   Growth in anaerobic bottle: Proteus mirabilis (04-30 @ 17:54)

## 2025-05-23 NOTE — ED PROVIDER NOTE - PHYSICAL EXAMINATION
Constitutional: Well developed, well nourished, no acute distress  Head: Normocephalic, Atraumatic  Eyes: PERRLA, EOMI, conjunctiva and sclera WNL  ENT: Moist mucous membranes, no rhinorrhea   Neck: Supple, Nontender   Respiratory: Normal chest excursion with respiration; Breath sounds clear and equal B/L; No wheezes, rales, or rhonchi   Cardiovascular: RRR; Normal S1, S2; No murmurs, rubs or gallops   ABD/GI: Suprapubic catheter in place with no surrounding erythema; Nontender; No guarding, rigidity or rebound; No CVA tenderness

## 2025-05-23 NOTE — H&P ADULT - ATTENDING COMMENTS
Patient seen and examined at bedside independently of the residents. I read the resident's note and agree with the plan with the additions and corrections as noted below. My note supersedes the resident's note.     REVIEW OF SYSTEMS:  Negative except in HPI.     PMH: HTN, HLD, DM II, Paraplegia s/p SPC, CKD stage 5 (not on dialysis)    FHx: Reviewed. No fhx of asthma/copd, No fhx of liver and pulmonary disease. No fhx of hematological disorder.     Physical Exam:  GEN: No acute distress. Awake, Alert and oriented x 3.   Head: Atraumatic, Normocephalic.   Eye: PEERLA. No sclera icterus. EOMI.   ENT: Normal oropharynx, no thyromegaly, no mass, no lymphadenopathy.   LUNGS: Clear to auscultation bilaterally. No wheeze/rales/crackles.   HEART: Normal. S1/S2 present. RRR. No murmur/gallops.   ABD: Soft, non-tender, non-distended. Bowel sounds present.   EXT: No pitting edema. No erythema. No tenderness.  Integumentary: No rash, No sore, No petechia.   NEURO: CN III-XII intact. Strength: 5/5 b/l ULE. Sensory intact b/l ULE.     Vital Signs Last 24 Hrs  T(C): 36 (23 May 2025 15:48), Max: 36 (23 May 2025 15:48)  T(F): 96.8 (23 May 2025 15:48), Max: 96.8 (23 May 2025 15:48)  HR: 73 (23 May 2025 15:48) (71 - 73)  BP: 113/70 (23 May 2025 15:48) (113/70 - 135/83)  BP(mean): --  RR: 18 (23 May 2025 15:48) (18 - 18)  SpO2: 99% (23 May 2025 15:48) (98% - 99%)    Parameters below as of 23 May 2025 15:48  Patient On (Oxygen Delivery Method): room air      Please see the above notes for Labs and radiology.     Assessment and Plan:     69 yo M with hx of HTN, HLD, DM II, Paraplegia s/p SPC, CKD stage 5 (not on dialysis) presents to ED for JOÃO on CKD.     JOÃO on CKD stage 5  - CT abdomen Improved bilateral hydronephrosis as described.  - s/p eval by Nephrology in ED.   - c/w D5 HCO3 150mEq 75cc/hr   - check urine studies  - monitor electrolytes and correct prn  - Follow up Nephrology.    Complicated UTI vs. CAP   - CT shows Right basilar consolidative opacity with adjacent likely trace pleural effusion. Infectious process is not excluded.  - UA grossly positive.   - Vanc and Ceftriaxone   - Follow up UCx.     GB wall thickening   - CT abdomen shows Findings compatible with acute cholecystitis.  - RUQ US shows Findings concerning for cholecystitis despite a negative sonographic Iglesias's sign, as described.  - patient denied abdominal pain/tenderness. No Iglesias sign.   - s/p eval by Surgery team in ED --> no signs of acute wisam.   - will get HIDA given elevated LFT with previous CT abdomen neg for acute GB pathology and normal LFT.     Constipation  - CT abdomen shows Large rectal stool burden with distention measuring 7.8 cm. No bowel obstruction.  - bowel regimen.     DVT ppx: Heparin SC  GI ppx: not indicated.   Diet: renal diet  Activity: as tolerated.     Date seen by the attendin2025  I have spent 75 minutes of time on the encounter which excludes teaching and/or separately reported services.

## 2025-05-23 NOTE — PATIENT PROFILE ADULT - FALL HARM RISK - HARM RISK INTERVENTIONS

## 2025-05-23 NOTE — H&P ADULT - PATIENT'S SEXUAL ORIENTATION
The ABCs of the Annual Wellness Visit  Subsequent Medicare Wellness Visit    Subjective      Cosmo Gauthier is a 75 y.o. male who presents for a Subsequent Medicare Wellness Visit.    The following portions of the patient's history were reviewed and   updated as appropriate: allergies, current medications, past family history, past medical history, past social history, past surgical history and problem list.    Compared to one year ago, the patient feels his physical   health is worse. = fatigue/dyspnea.    Compared to one year ago, the patient feels his mental   health is worse. = This is worse secondary to decline in physical status noted above.    Recent Hospitalizations:  He was not admitted to the hospital during the last year.       Current Medical Providers:  Patient Care Team:  Alex Buckley MD as PCP - General (Internal Medicine)    Outpatient Medications Prior to Visit   Medication Sig Dispense Refill   • aspirin 81 MG EC tablet Take 1 tablet by mouth Daily.     • donepezil (ARICEPT) 10 MG tablet Take 1 tablet by mouth every night at bedtime.     • ferrous sulfate 325 (65 FE) MG tablet Take 1 tablet by mouth Daily With Breakfast.     • finasteride (PROSCAR) 5 MG tablet Take 1 tablet by mouth Daily. 60 tablet 5   • furosemide (LASIX) 40 MG tablet Take 1 tablet by mouth Daily. 90 tablet 3   • insulin glargine (LANTUS, SEMGLEE) 100 UNIT/ML injection Inject 30 Units under the skin into the appropriate area as directed Every Night.     • insulin lispro (humaLOG, ADMELOG) 100 UNIT/ML injection Inject 15 Units under the skin into the appropriate area as directed 3 (Three) Times a Day Before Meals.     • levothyroxine (SYNTHROID, LEVOTHROID) 125 MCG tablet Take 2 tablets by mouth Every Morning.     • metFORMIN (GLUCOPHAGE) 1000 MG tablet Take 1 tablet by mouth 2 (Two) Times a Day With Meals.     • nadolol (CORGARD) 20 MG tablet TAKE 1 TABLET BY MOUTH DAILY 90 tablet 1   • nitroglycerin (NITROSTAT) 0.4 MG SL  tablet      • sertraline (ZOLOFT) 100 MG tablet Take 2 tablets by mouth Daily. 180 tablet 1   • simvastatin (ZOCOR) 40 MG tablet Take 20 mg by mouth Every Night.     • tamsulosin (FLOMAX) 0.4 MG capsule 24 hr capsule Take 1 capsule by mouth Daily. 30 capsule 5   • vitamin B-12 (CYANOCOBALAMIN) 1000 MCG tablet Take 5 tablets by mouth Every Night.     • vitamin D3 125 MCG (5000 UT) capsule capsule Take 1 capsule by mouth Daily.     • gabapentin (NEURONTIN) 600 MG tablet TAKE 1/2 TABLET BY MOUTH EVERY DAY (Patient taking differently: Take 300 mg by mouth Every Night.) 45 tablet 0     No facility-administered medications prior to visit.       No opioid medication identified on active medication list. I have reviewed chart for other potential  high risk medication/s and harmful drug interactions in the elderly.          Aspirin is on active medication list. Aspirin use is indicated based on review of current medical condition/s. Pros and cons of this therapy have been discussed today. Benefits of this medication outweigh potential harm.  Patient has been encouraged to continue taking this medication.  .      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)   • Stage 3b chronic kidney disease (HCC)   • Thrombocytopenia (HCC)   • Coronary artery disease involving native coronary artery of native heart without angina pectoris   • Hx of CABG   • Hyperlipemia, mixed   • Degenerative spondylolisthesis   • Hypertension, essential   • Hereditary and idiopathic neuropathy, unspecified   • Low lying conus medullaris   • Mood disorder (HCC)   • Non-alcoholic cirrhosis (HCC)   • Sleep apnea   • Spinal stenosis of lumbar region with neurogenic claudication   • Vascular dementia without behavioral disturbance   • Iron deficiency anemia   • Hypothyroidism, adult   • Traumatic subarachnoid hemorrhage with loss of consciousness of 30 minutes or less   • Morbid (severe) obesity due  "to excess calories   • Claudication of both lower extremities   • Dyspnea on exertion   • Medicare annual wellness visit, subsequent     Advance Care Planning   Advance Care Planning     Advance Directive is not on file.  ACP discussion was held with the patient during this visit. Patient does not have an advance directive, information provided.     Objective    Vitals:    23 1352   BP: 130/70   Pulse: 59   Temp: 97.8 °F (36.6 °C)   TempSrc: Skin   SpO2: 96%   Weight: 117 kg (257 lb 6.4 oz)   Height: 182.9 cm (72.01\")     Estimated body mass index is 34.9 kg/m² as calculated from the following:    Height as of this encounter: 182.9 cm (72.01\").    Weight as of this encounter: 117 kg (257 lb 6.4 oz).    BMI is >= 30 and <35. (Class 1 Obesity). The following options were offered after discussion;: nutrition counseling/recommendations      Does the patient have evidence of cognitive impairment?   Yes: Continue current medications.    Lab Results   Component Value Date    TRIG 140 2023    HDL 40 2023    LDL 62 2023    VLDL 24 2023    HGBA1C 6.70 (H) 2023          HEALTH RISK ASSESSMENT    Smoking Status:  Social History     Tobacco Use   Smoking Status Never   Smokeless Tobacco Never     Alcohol Consumption:  Social History     Substance and Sexual Activity   Alcohol Use Not Currently    Comment: QUIT DRINKING 32 YEARS AGO     Fall Risk Screen:    OM Fall Risk Assessment was completed, and patient is at HIGH risk for falls. Assessment completed on:2023    Depression Screenin/25/2023     1:54 PM   PHQ-2/PHQ-9 Depression Screening   Little Interest or Pleasure in Doing Things 0-->not at all   Feeling Down, Depressed or Hopeless 0-->not at all   PHQ-9: Brief Depression Severity Measure Score 0       Health Habits and Functional and Cognitive Screenin/25/2023     1:00 PM   Functional & Cognitive Status   Do you have difficulty preparing food and eating? Yes   Do " you have difficulty bathing yourself, getting dressed or grooming yourself? Yes   Do you have difficulty using the toilet? No   Do you have difficulty moving around from place to place? Yes   Do you have trouble with steps or getting out of a bed or a chair? Yes   Current Diet Well Balanced Diet   Do you need help using the phone?  No   Are you deaf or do you have serious difficulty hearing?  Yes   Do you need help with transportation? Yes   Do you need help shopping? Yes   Do you need help preparing meals?  Yes   Do you need help with housework?  Yes   Do you need help with laundry? Yes   Do you need help taking your medications? Yes   Do you need help managing money? Yes   Do you ever drive or ride in a car without wearing a seat belt? No   Do you have difficulty concentrating, remembering or making decisions? Yes       Age-appropriate Screening Schedule:  Refer to the list below for future screening recommendations based on patient's age, sex and/or medical conditions. Orders for these recommended tests are listed in the plan section. The patient has been provided with a written plan.    Health Maintenance   Topic Date Due   • ZOSTER VACCINE (1 of 2) Never done   • Hepatitis B (1 of 3 - Risk 3-dose series) Never done   • ANNUAL WELLNESS VISIT  Never done   • DIABETIC FOOT EXAM  Never done   • TDAP/TD VACCINES (1 - Tdap) 07/21/2022   • Pneumococcal Vaccine 65+ (2 - PCV) 04/20/2023   • URINE MICROALBUMIN  07/13/2023   • INFLUENZA VACCINE  08/01/2023   • DIABETIC EYE EXAM  09/19/2023   • HEMOGLOBIN A1C  10/20/2023   • LIPID PANEL  04/20/2024   • COLORECTAL CANCER SCREENING  07/29/2029   • HEPATITIS C SCREENING  Completed   • COVID-19 Vaccine  Completed                  CMS Preventative Services Quick Reference  Risk Factors Identified During Encounter:    Fall Risk-High or Moderate: Discussed Fall Prevention in the home    The above risks/problems have been discussed with the patient.  Pertinent information has been  shared with the patient in the After Visit Summary.    Diagnoses and all orders for this visit:    1. Medicare annual wellness visit, subsequent (Primary)  Assessment & Plan:  AWV completed 4/23.  Patient with progressive decline in ADLs, needing more assistance, increased falls despite rollator, no overnight hospitalizations yet.  Information given regarding advance directive.      2. Non-alcoholic cirrhosis (HCC)  Assessment & Plan:  LFTs are normal and his AFP is less than 2 as of 4/23.      3. 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  He initially got it under control utilizing the continuous glucose monitor, actually been without that for several months now, but sugars maintaining excellent control.  He will continue with the 30 unit of Lantus and his sliding scale Humalog.  GFR is better here lately, so we will continue with full dose metformin as well    Orders:  -     gabapentin (NEURONTIN) 600 MG tablet; Take 0.5 tablets by mouth 2 (Two) Times a Day.  Dispense: 90 tablet; Refill: 3    4. Stage 3b chronic kidney disease (HCC)  Assessment & Plan:  GFR is maintaining better than previous baseline, it is in the mid 50s still as of 4/23.  Electrolytes are fine as well      5. Hypertension, essential  Assessment & Plan:  Blood pressure mary well controlled as of his 4/23 office visit.  He stable to continue with just low dose of Corgard and low to moderate dose Lasix.      6. Hx of CABG  Assessment & Plan:  BMP is stable to improved at 400 as of 4/23.  Patient maintained on low to moderate dose Lasix, and his electrolytes/GFR are stable presently      7. Hypothyroidism, adult  Assessment & Plan:  TSH is stable at 0.8 as of 4/23.  He has 100.5 mcg tablets, he takes 2 daily except on Sunday only 1-1/2, certainly fine to continue same      8. Hyperlipemia, mixed  Assessment & Plan:  LDL is down from 75 to 62 as of his 4/23 office  visit.  Patient stable to continue with just low-dose simvastatin      9. Thrombocytopenia (HCC)  Assessment & Plan:  Patient platelet count has improved to 127 as of 4/23.  He still keeps close follow-up with  as well.      10. Vascular dementia without behavioral disturbance  Assessment & Plan:  Patient remains very appropriate this regards as of  4/23 office visit.  There has been no altered mental status etc.  Patient stable to continue low-dose Aricept.      11. Hereditary and idiopathic neuropathy, unspecified  Assessment & Plan:  Patient with increased issues this regards as of 4/23.  Having increased symptoms in his feet.  He is only on low-dose gabapentin, he is taking half of the 600 mg tablet, certainly reasonable to increase to half tablet twice daily now.    Orders:  -     gabapentin (NEURONTIN) 600 MG tablet; Take 0.5 tablets by mouth 2 (Two) Times a Day.  Dispense: 90 tablet; Refill: 3    12. Claudication of both lower extremities  Assessment & Plan:  Reviewed this at his 4/23 office visit.  Patient unable to do the stress component of it, but at rest it looks fine, and patient does have symptoms at rest, so likely does not have significant PAD        Follow Up:   Next Medicare Wellness visit to be scheduled in 1 year.      An After Visit Summary and PPPS were made available to the patient.           Heterosexual

## 2025-05-23 NOTE — ED PROVIDER NOTE - OBJECTIVE STATEMENT
70-year-old male with past med history of CKD stage V not on dialysis, renal stones, paraplegia, hypertension, diabetes, status post suprapubic catheter placement, right renal tube stenting by Dr. Mcallister for JOÃO on CKD.  Patient's nephrologist concerned that his creatinine levels have increased rapidly, spoke to Dr. Mcallister on the phone who recommended repeat blood work and possible imaging.  Patient denies any abdominal pain.  No fevers, chest pain, shortness of breath, nausea, vomiting, diarrhea or constipation.  Denies any numbness or tingling, dizziness, headaches, blurry vision.  Patient has no other complaints at this time.

## 2025-05-23 NOTE — ED PROVIDER NOTE - NS ED MD DISPO ISOLATION TYPES
Patient presents for evaluation of anemia she is demented and cannot provide any reliable history at this time she is afebrile with no complaints however appears pale we obtained labs which reveal a significantly decreased h/h we initiated transfusion I will admit for further workup at this time None

## 2025-05-23 NOTE — ED ADULT TRIAGE NOTE - CHIEF COMPLAINT QUOTE
Pt BIBA from PCP for evaluation of abnormal lab results. States he was told his creatinine level was elevated on routine labs.

## 2025-05-23 NOTE — CONSULT NOTE ADULT - ATTENDING COMMENTS
Patient seen and examined with medical student.  Noted 71 yo M with h/o CKD stage 5, HTN, NIDDM, prior spinal abscess c/b need for SPC, nephrolithiasis, recent urinary infection and hydro requiring b/l stent placement, sent by me to ED following outpatient evaluation for medical clearance to remove his stents and labwork noted acute rise in sCr 5.5->9.5 with reduced UOP, worsening hyperkalemia and hyperphosphatemia, concern for new obstruction vs. infection, and to assess for HD needs acutely.  Repeat labwork noted stable sCr with elevated potassium, MA and signs of complicated UTI given SPC use.  Imaging without new or worsening hydronephrosis, did note multiple kidney stones but all nonobstructing.  Would admit to medicine for IV hydration and IV Abx to treat for complicated UTI, would seek ID evaluation for choice of regimen.  IV Hydration with D5W + NaHCO3 150mEq at 60-70 cc/hr, and would consult urology for stent removal and stone management after improvement in JOÃO.  Resin binders to keep K<5.7.  No acute indication for RRT but will need to follow closely over weekend for acute needs if they arise.  Nephrology will follow.

## 2025-05-23 NOTE — ED PROVIDER NOTE - ATTENDING CONTRIBUTION TO CARE
70-year-old male with past med history of CKD stage V not on dialysis, renal stones, paraplegia, hypertension, diabetes, status post suprapubic catheter placement, right renal tube sent in by by Dr. Mcallsiter for JOÃO on CKD.  Agree with above exam edition no evidence of urinary retention.  Plan for labs imaging for evaluation.  Additionally patient seen by nephrology and started on bicarb drip

## 2025-05-23 NOTE — CONSULT NOTE ADULT - SUBJECTIVE AND OBJECTIVE BOX
ACUTE CARE SURGERY CONSULT NOTE    Patient: KRISTY GARCIA , 70y (55)Male   MRN: [id number]  Location: [facility] ED Hold 016 A  Visit: 25 Inpatient  Date: 25 @ 21:17    HPI:  70 y.o. M w/ PMHx of CKD 5 not on HD, nephrolithiasis, paraplegia (wheelchair bound), HTN, DM, s/p suprapubic cath, s/p B/L JJ stent for kidney stones (2025) presents to the ED for evaluation of elevated Cr. Patient was seen at his nephrologist's office, Dr. Mcallister and was noted to have increased Cr and was sent to the ED for repeat labs and possible imaging.     Surgery was c/s for CT A/P finding of acute cholecystitis. Patient denies abd pain. Elevated LFTs, normal Tbili and CBD 4.45.       PAST MEDICAL & SURGICAL HISTORY:  DM (diabetes mellitus)      HTN (hypertension)      H/O paraplegia      Spinal abscess      Renal stones      Stage 5 chronic kidney disease not on chronic dialysis      Neurogenic dysfunction of the urinary bladder      Encounter for care or replacement of suprapubic tube      Spinal abscess  S/P Surgery      Encounter for care or replacement of suprapubic tube      S/P ORIF (open reduction internal fixation) fracture          Home Medications:  Eliquis 5 mg oral tablet: 0.5 tab(s) orally every 12 hours (2025 23:24)  folic acid 1 mg oral tablet: 1 tab(s) orally once a day (2025 09:09)  Multiple Vitamins oral tablet: 1 tab(s) orally once a day (15 May 2025 14:05)        VITALS:  T(F): 96.8 (25 @ 15:48), Max: 96.8 (25 @ 15:48)  HR: 73 (25 @ 15:48) (71 - 73)  BP: 113/70 (25 @ 15:48) (113/70 - 135/83)  RR: 18 (25 @ 15:48) (18 - 18)  SpO2: 99% (25 @ 15:48) (98% - 99%)    PHYSICAL EXAM:  General: NAD  HEENT: NCAT, SATHISH, EOMI, Trachea ML, Neck supple  Cardiac: Normal rate   Respiratory: No labored breathing, equal chest rise and fall on RA   Abdomen: Soft, non-distended, non-tender, no rebound, no guarding      MEDICATIONS  (STANDING):  sodium bicarbonate  Infusion 0.146 mEq/kG/Hr (75 mL/Hr) IV Continuous <Continuous>        LAB/STUDIES:                        8.0    6.14  )-----------( 156      ( 23 May 2025 12:30 )             24.6         135  |  100  |  102[HH]  ----------------------------<  90  5.4[H]   |  15[L]  |  9.5[HH]    Ca    6.6[L]      23 May 2025 12:30    TPro  7.2  /  Alb  2.7[L]  /  TBili  0.2  /  DBili  x   /  AST  119[H]  /  ALT  99[H]  /  AlkPhos  521[H]        LIVER FUNCTIONS - ( 23 May 2025 12:30 )  Alb: 2.7 g/dL / Pro: 7.2 g/dL / ALK PHOS: 521 U/L / ALT: 99 U/L / AST: 119 U/L / GGT: x           Urinalysis Basic - ( 23 May 2025 12:30 )    Color: Yellow / Appearance: Turbid / S.011 / pH: x  Gluc: 90 mg/dL / Ketone: x  / Bili: Negative / Urobili: 0.2 mg/dL   Blood: x / Protein: 100 mg/dL / Nitrite: Negative   Leuk Esterase: Large / RBC: 281 /HPF / WBC >998 /HPF   Sq Epi: x / Non Sq Epi: 1 /HPF / Bacteria: Many /HPF                  IMAGING:  RUQ US :   Findings concerning for cholecystitis despite a negative sonographic Iglesias's sign, as described.    Right renal pelvic fullness.    Trace right pleural effusion.      CT A/P :   Findings compatible with acute cholecystitis.    Improved bilateral hydronephrosis as described.    Right basilar consolidative opacity with adjacent likely trace pleural effusion. Infectious process is not excluded.    Large rectal stool burden with distention measuring 7.8 cm. No bowel obstruction.      ASSESSMENT:  70 y.o. M w/ PMHx of CKD 5 not on HD, nephrolithiasis, paraplegia (wheelchair bound), HTN, DM, s/p suprapubic cath, s/p B/L JJ stent for kidney stones (2025) presents to the ED for evaluation of elevated Cr. Patient was seen at his nephrologist's office, Dr. Mcallister and was noted to have increased Cr and was sent to the ED for repeat labs and possible imaging.     Surgery was c/s for CT A/P finding of acute cholecystitis. Patient denies abd pain. Elevated LFTs, normal Tbili and CBD 4.45.     PLAN:  - No acute surgical intervention, no clinical signs of acute cholecystitis  - Surgery will s/o, recall PRN  - Rest per primary team     Above plan discussed with Attending Surgeon Dr. Foote.  05-23- @ 21:17    CONSULT SPECTRA: [id number]

## 2025-05-24 LAB
ALBUMIN SERPL ELPH-MCNC: 2.5 G/DL — LOW (ref 3.5–5.2)
ALBUMIN SERPL ELPH-MCNC: 2.6 G/DL — LOW (ref 3.5–5.2)
ALP SERPL-CCNC: 521 U/L — HIGH (ref 30–115)
ALP SERPL-CCNC: 590 U/L — HIGH (ref 30–115)
ALT FLD-CCNC: 75 U/L — HIGH (ref 0–41)
ALT FLD-CCNC: 83 U/L — HIGH (ref 0–41)
ANION GAP SERPL CALC-SCNC: 20 MMOL/L — HIGH (ref 7–14)
AST SERPL-CCNC: 106 U/L — HIGH (ref 0–41)
AST SERPL-CCNC: 97 U/L — HIGH (ref 0–41)
BASE EXCESS BLDA CALC-SCNC: -6.5 MMOL/L — LOW (ref -2–3)
BASOPHILS # BLD AUTO: 0.03 K/UL — SIGNIFICANT CHANGE UP (ref 0–0.2)
BASOPHILS NFR BLD AUTO: 0.5 % — SIGNIFICANT CHANGE UP (ref 0–1)
BILIRUB SERPL-MCNC: <0.2 MG/DL — SIGNIFICANT CHANGE UP (ref 0.2–1.2)
BILIRUB SERPL-MCNC: <0.2 MG/DL — SIGNIFICANT CHANGE UP (ref 0.2–1.2)
BUN SERPL-MCNC: 105 MG/DL — CRITICAL HIGH (ref 10–20)
BUN SERPL-MCNC: 108 MG/DL — CRITICAL HIGH (ref 10–20)
CALCIUM SERPL-MCNC: 6.1 MG/DL — LOW (ref 8.4–10.5)
CALCIUM SERPL-MCNC: 6.5 MG/DL — LOW (ref 8.4–10.5)
CHLORIDE SERPL-SCNC: 100 MMOL/L — SIGNIFICANT CHANGE UP (ref 98–110)
CHLORIDE SERPL-SCNC: 102 MMOL/L — SIGNIFICANT CHANGE UP (ref 98–110)
CO2 SERPL-SCNC: 19 MMOL/L — SIGNIFICANT CHANGE UP (ref 17–32)
CO2 SERPL-SCNC: 20 MMOL/L — SIGNIFICANT CHANGE UP (ref 17–32)
CREAT SERPL-MCNC: 8.6 MG/DL — CRITICAL HIGH (ref 0.7–1.5)
CREAT SERPL-MCNC: 8.9 MG/DL — CRITICAL HIGH (ref 0.7–1.5)
EGFR: 6 ML/MIN/1.73M2 — LOW
EOSINOPHIL # BLD AUTO: 0.13 K/UL — SIGNIFICANT CHANGE UP (ref 0–0.7)
EOSINOPHIL NFR BLD AUTO: 2.2 % — SIGNIFICANT CHANGE UP (ref 0–8)
GLUCOSE SERPL-MCNC: 183 MG/DL — HIGH (ref 70–99)
GLUCOSE SERPL-MCNC: 96 MG/DL — SIGNIFICANT CHANGE UP (ref 70–99)
HCO3 BLDA-SCNC: 20 MMOL/L — LOW (ref 21–28)
HCT VFR BLD CALC: 22.6 % — LOW (ref 42–52)
HCT VFR BLD CALC: 23.6 % — LOW (ref 42–52)
HGB BLD-MCNC: 7.6 G/DL — LOW (ref 14–18)
HGB BLD-MCNC: 7.8 G/DL — LOW (ref 14–18)
IMM GRANULOCYTES NFR BLD AUTO: 1.9 % — HIGH (ref 0.1–0.3)
LYMPHOCYTES # BLD AUTO: 1.02 K/UL — LOW (ref 1.2–3.4)
LYMPHOCYTES # BLD AUTO: 17.3 % — LOW (ref 20.5–51.1)
MAGNESIUM SERPL-MCNC: 2.1 MG/DL — SIGNIFICANT CHANGE UP (ref 1.8–2.4)
MCHC RBC-ENTMCNC: 29.4 PG — SIGNIFICANT CHANGE UP (ref 27–31)
MCHC RBC-ENTMCNC: 29.7 PG — SIGNIFICANT CHANGE UP (ref 27–31)
MCHC RBC-ENTMCNC: 33.1 G/DL — SIGNIFICANT CHANGE UP (ref 32–37)
MCHC RBC-ENTMCNC: 33.6 G/DL — SIGNIFICANT CHANGE UP (ref 32–37)
MCV RBC AUTO: 88.3 FL — SIGNIFICANT CHANGE UP (ref 80–94)
MCV RBC AUTO: 89.1 FL — SIGNIFICANT CHANGE UP (ref 80–94)
MONOCYTES # BLD AUTO: 0.49 K/UL — SIGNIFICANT CHANGE UP (ref 0.1–0.6)
MONOCYTES NFR BLD AUTO: 8.3 % — SIGNIFICANT CHANGE UP (ref 1.7–9.3)
NEUTROPHILS # BLD AUTO: 4.1 K/UL — SIGNIFICANT CHANGE UP (ref 1.4–6.5)
NEUTROPHILS NFR BLD AUTO: 69.8 % — SIGNIFICANT CHANGE UP (ref 42.2–75.2)
NRBC BLD AUTO-RTO: 0 /100 WBCS — SIGNIFICANT CHANGE UP (ref 0–0)
NRBC BLD AUTO-RTO: 0 /100 WBCS — SIGNIFICANT CHANGE UP (ref 0–0)
PCO2 BLDA: 41 MMHG — SIGNIFICANT CHANGE UP (ref 35–48)
PH BLDA: 7.29 — LOW (ref 7.35–7.45)
PHOSPHATE SERPL-MCNC: 10.1 MG/DL — HIGH (ref 2.1–4.9)
PLATELET # BLD AUTO: 153 K/UL — SIGNIFICANT CHANGE UP (ref 130–400)
PLATELET # BLD AUTO: 175 K/UL — SIGNIFICANT CHANGE UP (ref 130–400)
PMV BLD: 11 FL — HIGH (ref 7.4–10.4)
PMV BLD: 11.5 FL — HIGH (ref 7.4–10.4)
PO2 BLDA: 40 MMHG — CRITICAL LOW (ref 83–108)
POTASSIUM SERPL-MCNC: 4.7 MMOL/L — SIGNIFICANT CHANGE UP (ref 3.5–5)
POTASSIUM SERPL-MCNC: 4.7 MMOL/L — SIGNIFICANT CHANGE UP (ref 3.5–5)
POTASSIUM SERPL-SCNC: 4.7 MMOL/L — SIGNIFICANT CHANGE UP (ref 3.5–5)
POTASSIUM SERPL-SCNC: 4.7 MMOL/L — SIGNIFICANT CHANGE UP (ref 3.5–5)
PROT SERPL-MCNC: 6.3 G/DL — SIGNIFICANT CHANGE UP (ref 6–8)
PROT SERPL-MCNC: 6.8 G/DL — SIGNIFICANT CHANGE UP (ref 6–8)
RBC # BLD: 2.56 M/UL — LOW (ref 4.7–6.1)
RBC # BLD: 2.65 M/UL — LOW (ref 4.7–6.1)
RBC # FLD: 13.2 % — SIGNIFICANT CHANGE UP (ref 11.5–14.5)
RBC # FLD: 13.4 % — SIGNIFICANT CHANGE UP (ref 11.5–14.5)
SAO2 % BLDA: 62.8 % — LOW (ref 94–98)
SODIUM SERPL-SCNC: 140 MMOL/L — SIGNIFICANT CHANGE UP (ref 135–146)
SODIUM SERPL-SCNC: 141 MMOL/L — SIGNIFICANT CHANGE UP (ref 135–146)
WBC # BLD: 5.59 K/UL — SIGNIFICANT CHANGE UP (ref 4.8–10.8)
WBC # BLD: 5.88 K/UL — SIGNIFICANT CHANGE UP (ref 4.8–10.8)
WBC # FLD AUTO: 5.59 K/UL — SIGNIFICANT CHANGE UP (ref 4.8–10.8)
WBC # FLD AUTO: 5.88 K/UL — SIGNIFICANT CHANGE UP (ref 4.8–10.8)

## 2025-05-24 PROCEDURE — 78226 HEPATOBILIARY SYSTEM IMAGING: CPT | Mod: 26

## 2025-05-24 PROCEDURE — 99222 1ST HOSP IP/OBS MODERATE 55: CPT

## 2025-05-24 PROCEDURE — 99232 SBSQ HOSP IP/OBS MODERATE 35: CPT

## 2025-05-24 PROCEDURE — 99233 SBSQ HOSP IP/OBS HIGH 50: CPT

## 2025-05-24 RX ORDER — METRONIDAZOLE 250 MG
500 TABLET ORAL EVERY 12 HOURS
Refills: 0 | Status: DISCONTINUED | OUTPATIENT
Start: 2025-05-24 | End: 2025-05-26

## 2025-05-24 RX ORDER — CEFEPIME 2 G/20ML
2000 INJECTION, POWDER, FOR SOLUTION INTRAVENOUS EVERY 8 HOURS
Refills: 0 | Status: DISCONTINUED | OUTPATIENT
Start: 2025-05-24 | End: 2025-05-24

## 2025-05-24 RX ORDER — CEFTRIAXONE 500 MG/1
2000 INJECTION, POWDER, FOR SOLUTION INTRAMUSCULAR; INTRAVENOUS EVERY 24 HOURS
Refills: 0 | Status: DISCONTINUED | OUTPATIENT
Start: 2025-05-24 | End: 2025-05-24

## 2025-05-24 RX ORDER — CEFEPIME 2 G/20ML
1000 INJECTION, POWDER, FOR SOLUTION INTRAVENOUS DAILY
Refills: 0 | Status: DISCONTINUED | OUTPATIENT
Start: 2025-05-24 | End: 2025-05-28

## 2025-05-24 RX ORDER — VANCOMYCIN HCL IN 5 % DEXTROSE 1.5G/250ML
1250 PLASTIC BAG, INJECTION (ML) INTRAVENOUS
Refills: 0 | Status: DISCONTINUED | OUTPATIENT
Start: 2025-05-24 | End: 2025-05-24

## 2025-05-24 RX ORDER — SODIUM BICARBONATE 1 MEQ/ML
0.29 SYRINGE (ML) INTRAVENOUS
Qty: 150 | Refills: 0 | Status: DISCONTINUED | OUTPATIENT
Start: 2025-05-24 | End: 2025-05-24

## 2025-05-24 RX ORDER — CEFEPIME 2 G/20ML
2000 INJECTION, POWDER, FOR SOLUTION INTRAVENOUS ONCE
Refills: 0 | Status: COMPLETED | OUTPATIENT
Start: 2025-05-24 | End: 2025-05-24

## 2025-05-24 RX ORDER — APIXABAN 2.5 MG/1
2.5 TABLET, FILM COATED ORAL
Refills: 0 | Status: DISCONTINUED | OUTPATIENT
Start: 2025-05-24 | End: 2025-05-29

## 2025-05-24 RX ORDER — SODIUM BICARBONATE 1 MEQ/ML
650 SYRINGE (ML) INTRAVENOUS THREE TIMES A DAY
Refills: 0 | Status: DISCONTINUED | OUTPATIENT
Start: 2025-05-24 | End: 2025-05-25

## 2025-05-24 RX ORDER — CEFEPIME 2 G/20ML
INJECTION, POWDER, FOR SOLUTION INTRAVENOUS
Refills: 0 | Status: DISCONTINUED | OUTPATIENT
Start: 2025-05-24 | End: 2025-05-24

## 2025-05-24 RX ADMIN — CEFTRIAXONE 100 MILLIGRAM(S): 500 INJECTION, POWDER, FOR SOLUTION INTRAMUSCULAR; INTRAVENOUS at 06:15

## 2025-05-24 RX ADMIN — Medication 650 MILLIGRAM(S): at 21:42

## 2025-05-24 RX ADMIN — APIXABAN 2.5 MILLIGRAM(S): 2.5 TABLET, FILM COATED ORAL at 06:15

## 2025-05-24 RX ADMIN — APIXABAN 2.5 MILLIGRAM(S): 2.5 TABLET, FILM COATED ORAL at 17:45

## 2025-05-24 RX ADMIN — Medication 100 MILLIGRAM(S): at 12:47

## 2025-05-24 RX ADMIN — Medication 166.67 MILLIGRAM(S): at 06:15

## 2025-05-24 RX ADMIN — Medication 100 MILLIGRAM(S): at 17:48

## 2025-05-24 RX ADMIN — Medication 150 MEQ/KG/HR: at 13:55

## 2025-05-24 RX ADMIN — CEFEPIME 100 MILLIGRAM(S): 2 INJECTION, POWDER, FOR SOLUTION INTRAVENOUS at 12:12

## 2025-05-24 RX ADMIN — CEFEPIME 100 MILLIGRAM(S): 2 INJECTION, POWDER, FOR SOLUTION INTRAVENOUS at 18:50

## 2025-05-24 NOTE — CONSULT NOTE ADULT - SUBJECTIVE AND OBJECTIVE BOX
INTERVENTIONAL RADIOLOGY CONSULT:     Procedure Requested: Perc wisam    HPI:  70y M pmh CKD V, HTN, NIDDM, prior spinal abscess, nephrolithiasis and recurrent UTI s/p b/l stent placement and SPC presenting for evaluation. Patient sent in by Dr. Mcallister for evaluation for acute rise in creatinine. sCr noted to increase from 5.5>9.5 with reduced UOP, hyperkalemia and hyperphosphatemia.     ROS -ve for fever, cp, sob, abdominal pain, n/v/d/c, hematuria, dysuria, rash, joint pain, confusion, dizziness, lightheadedness.      RUQ:   Findings concerning for cholecystitis despite a negative sonographic Iglesias's sign, as described.    Right renal pelvic fullness.  Trace right pleural effusion.    Patient received rocephin, 1L NS bolus, sodium bicarb ggt, lokelma in the ED. Admitted to medicine for management.     (23 May 2025 21:05)      PAST MEDICAL & SURGICAL HISTORY:  DM (diabetes mellitus)      HTN (hypertension)      H/O paraplegia      Spinal abscess      Renal stones      Stage 5 chronic kidney disease not on chronic dialysis      Neurogenic dysfunction of the urinary bladder      Encounter for care or replacement of suprapubic tube      Spinal abscess  S/P Surgery      Encounter for care or replacement of suprapubic tube      S/P ORIF (open reduction internal fixation) fracture          MEDICATIONS  (STANDING):  apixaban 2.5 milliGRAM(s) Oral two times a day  cefepime   IVPB 1000 milliGRAM(s) IV Intermittent daily  metroNIDAZOLE  IVPB 500 milliGRAM(s) IV Intermittent every 12 hours  sodium bicarbonate 650 milliGRAM(s) Oral three times a day    MEDICATIONS  (PRN):      Allergies    No Known Allergies      FAMILY HISTORY:  FH: HTN (hypertension)    FH: breast cancer    FH: melanoma    FH: heart disease        Physical Exam:   Vital Signs Last 24 Hrs  T(C): 36.4 (24 May 2025 20:00), Max: 36.8 (24 May 2025 07:25)  T(F): 97.5 (24 May 2025 20:00), Max: 98.3 (24 May 2025 07:25)  HR: 70 (24 May 2025 20:00) (70 - 73)  BP: 135/78 (24 May 2025 20:00) (116/65 - 148/88)  BP(mean): --  RR: 18 (24 May 2025 20:00) (18 - 18)  SpO2: 98% (24 May 2025 20:00) (97% - 98%)    Parameters below as of 24 May 2025 15:58  Patient On (Oxygen Delivery Method): room air      Labs:                         7.6    5.59  )-----------( 153      ( 24 May 2025 15:59 )             22.6     05-24    140  |  100  |  105[HH]  ----------------------------<  183[H]  4.7   |  20  |  8.9[HH]    Ca    6.1[L]      24 May 2025 15:59  Phos  10.1     05-24  Mg     2.1     05-24    TPro  6.3  /  Alb  2.5[L]  /  TBili  <0.2  /  DBili  x   /  AST  97[H]  /  ALT  75[H]  /  AlkPhos  521[H]  05-24      Radiology & Additional Studies:     Radiology imaging reviewed.       ASSESSMENT/ PLAN:     Positive HIDA on the face of no RUQ pain, no WBC, afebrile.    Likely chronic cholecystitis continue conservative management.    No IR intervention planned at this time.    Thank you for the courtesy of this consult, please call [phone] 9or TEAMS) with any further questions.

## 2025-05-24 NOTE — CONSULT NOTE ADULT - SUBJECTIVE AND OBJECTIVE BOX
LISAKRISTY MARIE  70y, Male  Allergies    No Known Allergies    Intolerances    LOS  1d    HPI  HPI:  70y M pmh CKD V, HTN, NIDDM, prior spinal abscess, nephrolithiasis and recurrent UTI s/p b/l stent placement and SPC presenting for evaluation. Patient sent in by Dr. Mcallister for evaluation for acute rise in creatinine. sCr noted to increase from 5.5>9.5 with reduced UOP, hyperkalemia and hyperphosphatemia.     ROS -ve for fever, cp, sob, abdominal pain, n/v/d/c, hematuria, dysuria, rash, joint pain, confusion, dizziness, lightheadedness.    Vitals in the ED  T 95.6  HR 71  /83  RR 18 SpO2 98 On RA     Significant Labs  wbc 6.14 hgb 8.0 plt 156  Na 135 K 5.4 BUN/Cr 102/9.5  CO2 15, AGAP 20  Ca 6.6      ALT 99    VBG 7.20/37/67/14  UA + LE, WBC, bacteria, rbc, casts    CTAP   Interval marked distention with gallbladder wall thickening and pericholecystic fluid. Cholelithiasis. No definite biliary duct dilatation.  Symmetric in size. Bilateral nephroureteral catheters beginning in the renal pelvis and terminating within the urinary bladder.   Improved right hydronephrosis now with right renal pelvic fullness. Mild improvement in left moderate hydronephrosis. Bilateral renal and pelvic stones difficult to compare given motion artifact at the level of the kidneys.   No definite stone along the course of the ureters.  Increased periaortic lymph node measuring 1.1 cm previously 0.9 cm, may be reactive   No bowel obstruction. Large rectal stool burden with distention measuring 7.8 cm   Nephroureteral catheters and a suprapubic catheter terminating within an underdistended urinary bladder. Air is noted within   the urinary bladder which may be due to instrumentation. Prostate measures 4.9 cm in transverse dimension   Calcified atherosclerotic disease of the aorta and its branches.  Trace free fluid surrounding the gallbladder and liver.  Right basilar consolidative opacity adjacent likely trace pleural effusion.    RUQ:   Findings concerning for cholecystitis despite a negative sonographic Iglesias's sign, as described.    Right renal pelvic fullness.  Trace right pleural effusion.    Patient received rocephin, 1L NS bolus, sodium bicarb ggt, lokelma in the ED. Admitted to medicine for management.     (23 May 2025 21:05)      INFECTIOUS DISEASE HISTORY:  ID consulted for antimicrobial recommendations.     Prior hospital charts reviewed [Yes]  Primary team notes reviewed [Yes]  Other consultant notes reviewed [Yes]    REVIEW OF SYSTEMS:  CONSTITUTIONAL: No fever or chills  HEENT: No sore throat  RESPIRATORY: No cough, no shortness of breath  CARDIOVASCULAR: No chest pain or palpitations  GASTROINTESTINAL: No abdominal or epigastric pain  GENITOURINARY: No dysuria  NEUROLOGICAL: No headache/dizziness  MSK: No joint pain, erythema, or swelling; no back pain  SKIN: No itching, rashes  All other ROS negative except noted above    PAST MEDICAL & SURGICAL HISTORY:  DM (diabetes mellitus)      HTN (hypertension)      H/O paraplegia      Spinal abscess      Renal stones      Stage 5 chronic kidney disease not on chronic dialysis      Neurogenic dysfunction of the urinary bladder      Encounter for care or replacement of suprapubic tube      Spinal abscess  S/P Surgery      Encounter for care or replacement of suprapubic tube      S/P ORIF (open reduction internal fixation) fracture    SOCIAL HISTORY:  - No recent travel    FAMILY HISTORY:  FH: HTN (hypertension)    FH: breast cancer    FH: melanoma    FH: heart disease    ANTIMICROBIALS:  cefepime   IVPB    cefepime   IVPB 2000 every 8 hours  metroNIDAZOLE  IVPB 500 every 12 hours      ANTIMICROBIALS (past 90 days):  MEDICATIONS  (STANDING):    cefepime   IVPB   100 mL/Hr IV Intermittent (05-24-25 @ 12:12)    cefTRIAXone   IVPB   100 mL/Hr IV Intermittent (05-24-25 @ 06:15)    cefTRIAXone   IVPB   100 mL/Hr IV Intermittent (05-23-25 @ 15:33)    metroNIDAZOLE  IVPB   100 mL/Hr IV Intermittent (05-24-25 @ 12:47)    vancomycin  IVPB   166.67 mL/Hr IV Intermittent (05-24-25 @ 06:15)        OTHER MEDS:   MEDICATIONS  (STANDING):  apixaban 2.5 two times a day      VITALS:  Vital Signs Last 24 Hrs  T(F): 98.3 (05-24-25 @ 07:25), Max: 98.3 (05-24-25 @ 07:25)    Vital Signs Last 24 Hrs  HR: 71 (05-24-25 @ 07:25) (71 - 73)  BP: 116/65 (05-24-25 @ 07:25) (113/70 - 121/65)  RR: 18 (05-24-25 @ 07:25)  SpO2: 98% (05-24-25 @ 07:25) (98% - 99%)  Wt(kg): --    EXAM:  GENERAL: NAD  HEAD: Very poor dentition.  NECK: Supple  CHEST/LUNG: Shallow breath sounds.   HEART: S1 S2  ABDOMEN: Soft, nontender, +Supra-pubic catheter.   EXTREMITIES: No clubbing, cyanosis, or petal edema  NERVOUS SYSTEM: Alert and oriented to person  MSK: No joint erythema, swelling or pain  SKIN: No rashes or lesions, no superficial thrombophlebitis    Labs:                        7.8    5.88  )-----------( 175      ( 24 May 2025 13:20 )             23.6     05-24    141  |  102  |  108[HH]  ----------------------------<  96  4.7   |  19  |  8.6[HH]    Ca    6.5[L]      24 May 2025 13:20  Mg     2.1     05-24    TPro  6.8  /  Alb  2.6[L]  /  TBili  <0.2  /  DBili  x   /  AST  106[H]  /  ALT  83[H]  /  AlkPhos  590[H]  05-24      WBC Trend:  WBC Count: 5.88 (05-24-25 @ 13:20)  WBC Count: 6.14 (05-23-25 @ 12:30)      Auto Neutrophil #: 6.81 K/uL (08-13-24 @ 06:21)  Auto Neutrophil #: 7.22 K/uL (08-12-24 @ 07:24)  Auto Neutrophil #: 6.57 K/uL (08-11-24 @ 08:36)  Auto Neutrophil #: 7.03 K/uL (08-09-24 @ 11:44)  Auto Neutrophil #: 7.64 K/uL (08-08-24 @ 07:09)      Creatine Trend:  Creatinine: 8.6 (05-24)  Creatinine: 9.3 (05-23)  Creatinine: 9.5 (05-23)      Liver Biochemical Testing Trend:  Alanine Aminotransferase (ALT/SGPT): 83 *H* (05-24)  Alanine Aminotransferase (ALT/SGPT): 99 *H* (05-23)  Alanine Aminotransferase (ALT/SGPT): 25 (05-12)  Alanine Aminotransferase (ALT/SGPT): 27 (05-07)  Alanine Aminotransferase (ALT/SGPT): 33 (05-05)  Aspartate Aminotransferase (AST/SGOT): 106 (05-24-25 @ 13:20)  Aspartate Aminotransferase (AST/SGOT): 119 (05-23-25 @ 12:30)  Aspartate Aminotransferase (AST/SGOT): 33 (05-12-25 @ 09:59)  Aspartate Aminotransferase (AST/SGOT): 36 (05-07-25 @ 07:42)  Aspartate Aminotransferase (AST/SGOT): 21 (05-05-25 @ 06:26)  Bilirubin Total: <0.2 (05-24)  Bilirubin Total: 0.2 (05-23)  Bilirubin Total: 0.3 (05-12)  Bilirubin Total: 0.3 (05-07)  Bilirubin Total: 0.3 (05-05)      Trend LDH  08-11-24 @ 15:52  240  08-11-24 @ 08:36  238  08-09-24 @ 11:44  181          Urinalysis Basic - ( 24 May 2025 13:20 )    Color: x / Appearance: x / SG: x / pH: x  Gluc: 96 mg/dL / Ketone: x  / Bili: x / Urobili: x   Blood: x / Protein: x / Nitrite: x   Leuk Esterase: x / RBC: x / WBC x   Sq Epi: x / Non Sq Epi: x / Bacteria: x        MICROBIOLOGY:    Male    Blood Gas Arterial, Lactate: 0.9 (05-24 @ 09:25)  Blood Gas Venous - Lactate: 1.1 (05-23 @ 14:04)    A1C with Estimated Average Glucose Result: 6.2 % (05-13-25 @ 09:17)  A1C with Estimated Average Glucose Result: 6.0 % (04-29-25 @ 08:48)      INFLAMMATORY MARKERS      RADIOLOGY & ADDITIONAL TESTS:  I have personally reviewed the imagings.  CXR      CT  CT Abdomen and Pelvis No Cont:   ACC: [id number] EXAM:  CT ABDOMEN AND PELVIS   ORDERED BY: AMELIA WONG     PROCEDURE DATE:  05/23/2025          INTERPRETATION:  Clinical Indication: Acute kidney injury.    Technique: Multidetector-row CT images of the abdomen and pelvis were   obtained from the xiphoid through the symphysis pubis. No contrast was   administered. Coronal and sagittal reconstructions were performed.    Comparison: CT abdomen and pelvis 5/12/2025.    Findings:    01. LIVER: Normal unenhanced.  02. SPLEEN: Normalunenhanced.  03. PANCREAS: Normal unenhanced.  04. GALLBLADDER/BILIARY TREE: Interval marked distention with gallbladder   wall thickening and pericholecystic fluid. Cholelithiasis. No definite   biliary duct dilatation.  05. ADRENALS: Normal.  06. KIDNEYS: Symmetric in size. Bilateral nephroureteral catheters   beginning in the renal pelvis and terminating within the urinary bladder.   Improved right hydronephrosis now with right renal pelvic fullness. Mild   improvement in left moderate hydronephrosis. Bilateral renal and pelvic   stones difficult to compare given motion artifact at the level of the   kidneys. No definite stone along the course of the ureters.  07. LYMPHADENOPATHY/RETROPERITONEUM: Increased periaortic lymph node   measuring1.1 cm previously 0.9 cm, may be reactive (series 4 image 48).  08. BOWEL: No bowel obstruction. Large rectal stool burden with   distention measuring 7.8 cm (series 601 image 73).  09. PELVIC VISCERA: Nephroureteral catheters and a suprapubic catheter   terminating within an underdistended urinary bladder. Air is noted within   the urinary bladder which may be due to instrumentation. Prostate   measures 4.9 cm in transverse dimension (series 4 image 107).  10. PELVIC LYMPH NODES: No enlarged lymph nodes.  11. VASCULATURE: No abdominal aortic aneurysm. Calcified atherosclerotic   disease of the aorta and its branches.  12. PERITONEUM/ABDOMINAL WALL: Trace free fluid surrounding the   gallbladder and liver.  13. SKELETAL: Degenerative changes.  14. LUNG BASES: Right basilar consolidative opacity adjacent likely trace   pleural effusion.    IMPRESSION:    Findings compatible with acute cholecystitis.    Improved bilateral hydronephrosis as described.    Right basilar consolidative opacity with adjacent likely trace pleural   effusion. Infectious process is not excluded.    Large rectal stool burden with distention measuring 7.8 cm. No bowel   obstruction.    Dr. Guerra spoke to Dr. Wong at 3:36 PM on 5/23/2025 regarding   findings with read back.    --- End of Report ---            CHANDLER GUERRA MD; Attending Radiologist  This document has been electronically signed. May 23 2025  3:37PM (05-23-25 @ 15:22)    < from: NM Hepatobiliary Imaging (05.24.25 @ 12:08) >  NO DEFINITE VISUALIZATION OF THE GALLBLADDER THROUGH 4 HOURS   POST-INJECTION, CONSISTENT WITH CHOLECYSTITIS, AS DESCRIBED ABOVE.   CLINICAL CORRELATION IS SUGGESTED.    < end of copied text >  < from: US Abdomen Upper Quadrant Right (05.23.25 @ 16:46) >    Findings concerning for cholecystitis despite a negative sonographic   Iglesias's sign, as described.    Right renal pelvic fullness.    Trace right pleural effusion.    < end of copied text >  < from: CT Abdomen and Pelvis No Cont (05.12.25 @ 10:53) >  IMPRESSION:  Since 4/30/2025,    Status post bilateral double-J ureteral stents in satisfactory position.    Multiple right-sided nonobstructing renal calculi measure up to 5 mm. No   calculi seen along the course of the right ureter.    Multiple prominent left-sided renal calculi measuring up to 1.3 cm at the   renal pelvis.    There is stable moderate hydroureteronephrosis and a stable distal   ureteral calculus measuring 6 x 4 x 9 mm (306 Hounsfield units).    Multiple prominent left para-aortic retroperitoneal lymph nodes measuring   up to 0.9 cm.    < end of copied text >    CARDIOLOGY TESTING  12 Lead ECG:   Ventricular Rate 70 BPM    Atrial Rate 70 BPM    P-R Interval 226 ms    QRS Duration 96 ms    Q-T Interval 452 ms    QTC Calculation(Bazett) 488 ms    P Axis -6 degrees    R Axis 9 degrees    T Axis 14 degrees    Diagnosis Line Sinus rhythm with 1st degree A-V block  Minimal voltage criteria for LVH, may be normal variant ( R in aVL )  Inferior infarct , age undetermined  Abnormal ECG    Confirmed by Julius Don ([id number]) on 5/23/2025 2:18:14 PM (05-23-25 @ 12:45)  12 Lead ECG:   Ventricular Rate 69 BPM    Atrial Rate 69 BPM    P-R Interval 276 ms    QRS Duration 86 ms    Q-T Interval 446 ms    QTC Calculation(Bazett) 477 ms    P Axis 96 degrees    R Axis 76 degrees    T Axis 79 degrees    Diagnosis Line Sinus rhythm with 1st degree A-V block  Minimal voltage criteria for LVH, may be normal variant  Anterior infarct , age undetermined  Abnormal ECG    Confirmed by Moody Edwards ([id number]) on 5/12/2025 12:08:13 PM (05-12-25 @ 11:08)

## 2025-05-24 NOTE — PROGRESS NOTE ADULT - SUBJECTIVE AND OBJECTIVE BOX
SUBJECTIVE/OVERNIGHT EVENTS  Today is hospital day 1d. This morning patient was seen and examined at bedside, resting comfortably in bed. No acute or major events overnight.    HOSPITAL COURSE      CODE STATUS:    FAMILY COMMUNICATION  Contact date:  Name of person contacted:  Relationship to patient:  Communication details:    MEDICATIONS  STANDING MEDICATIONS  apixaban 2.5 milliGRAM(s) Oral two times a day  cefepime   IVPB      cefepime   IVPB 2000 milliGRAM(s) IV Intermittent once  cefepime   IVPB 2000 milliGRAM(s) IV Intermittent every 8 hours  metroNIDAZOLE  IVPB 500 milliGRAM(s) IV Intermittent every 12 hours  sodium bicarbonate  Infusion 0.146 mEq/kG/Hr IV Continuous <Continuous>    PRN MEDICATIONS    VITALS  T(F): 98.3 (05-24-25 @ 07:25), Max: 98.3 (05-24-25 @ 07:25)  HR: 71 (05-24-25 @ 07:25) (71 - 73)  BP: 116/65 (05-24-25 @ 07:25) (113/70 - 135/83)  RR: 18 (05-24-25 @ 07:25) (18 - 18)  SpO2: 98% (05-24-25 @ 07:25) (98% - 99%)    PHYSICAL EXAM  GENERAL  ( x ) NAD, lying in bed comfortably     (  ) obtunded     (  ) lethargic     (  ) somnolent    HEAD  ( x ) Atraumatic     (  ) hematoma     (  ) laceration (specify location:       )     NECK  ( x ) Supple     (  ) neck stiffness     (  ) nuchal rigidity     (  )  no JVD     (  ) JVD present ( -- cm)    HEART  Rate -->  (x  ) normal rate    (  ) bradycardic    (  ) tachycardic  Rhythm -->  (  ) regular    (  ) regularly irregular    (  ) irregularly irregular  Murmurs -->  (  ) normal s1/s2    (  ) systolic murmur    (  ) diastolic murmur    (  ) continuous murmur     (  ) S3 present    (  ) S4 present    LUNGS  (x  )Unlabored respirations     (  ) tachypnea  (  ) B/L air entry     (  ) decreased breath sounds in:  (location     )    (  ) no adventitious sound     (  ) crackles     (  ) wheezing      (  ) rhonchi      (specify location:       )  (  ) chest wall tenderness (specify location:       )    ABDOMEN  ( x ) Soft     (  ) tense   |   (  ) nondistended     (  ) distended   |   (  ) +BS     (  ) hypoactive bowel sounds     (  ) hyperactive bowel sounds  (  ) nontender     (  ) RUQ tenderness     (  ) RLQ tenderness     (  ) LLQ tenderness     (  ) epigastric tenderness     (  ) diffuse tenderness  (  ) Splenomegaly      (  ) Hepatomegaly      (  ) Jaundice     (  ) ecchymosis     EXTREMITIES  ( x ) Normal     (  ) Rash     (  ) ecchymosis     (  ) varicose veins      (  ) pitting edema     (  ) non-pitting edema   (  ) ulceration     (  ) gangrene:     (location:     )    NERVOUS SYSTEM  ( x ) A&Ox3     (  ) confused     (  ) lethargic  CN II-XII:     (  ) Intact     (  ) focal deficits  (Specify:     )   Upper extremities:     (  ) strength X/5     (  ) focal deficit (specify:    )  Lower extremities:     (  ) strength  X/5    (  ) focal deficit (specify:    )    SKIN  (x  ) No rashes or lesions     (  ) maculopapular rash     (  ) pustules     (  ) vesicles     (  ) ulcer     (  ) ecchymosis     (specify location:     )    (  ) Indwelling Dye Catheter   Date insterted:    Reason (  ) Critical illness     (  ) urinary retention    (  ) Accurate Ins/Outs Monitoring     (  ) CMO patient    (  ) Central Line  Date inserted:  Location: (  ) Right IJ   (  ) Left IJ   (  ) Right Fem   (  ) Left Fem    (  ) SPC  (  ) pigtail  (  ) PEG tube  (  ) colostomy  (  ) jejunostomy  (  ) U-Dall    LABS             8.0    6.14  )-----------( 156      ( 05-23-25 @ 12:30 )             24.6     139  |  103  |  101  -------------------------<  77   05-23-25 @ 20:22  5.4  |  14  |  9.3    Ca      6.6     05-23-25 @ 20:22    TPro  7.2  /  Alb  2.7  /  TBili  0.2  /  DBili  x   /  AST  119  /  ALT  99  /  AlkPhos  521  /  GGT  x     05-23-25 @ 12:30        Urinalysis Basic - ( 23 May 2025 20:22 )    Color: x / Appearance: x / SG: x / pH: x  Gluc: 77 mg/dL / Ketone: x  / Bili: x / Urobili: x   Blood: x / Protein: x / Nitrite: x   Leuk Esterase: x / RBC: x / WBC x   Sq Epi: x / Non Sq Epi: x / Bacteria: x          IMAGING

## 2025-05-24 NOTE — PROGRESS NOTE ADULT - ASSESSMENT
70y M pmh CKD V, HTN, NIDDM, afib, prior spinal abscess, nephrolithiasis and recurrent UTI s/p b/l stent placement and SPC presenting for worsening creatinine admitted for JOÃO on CKD and cystitis.       Plans:    #JOÃO on CKD V  #Cystitis   #SPC  #Hx nephrolithiasis sp b/l stent placement   - bsl Cr 5.5>9.5, CO2 15, K 5.4, pH 7.2 on vbg  - UA +ve bacteria, WBC >998, +ve LE  - CTAP  Bilateral nephroureteral catheters beginning in the renal pelvis and terminating within the urinary bladder. Improved right hydronephrosis now with right renal pelvic fullness. Mild improvement in left moderate hydronephrosis. Bilateral renal and pelvic stones difficult to compare given motion artifact at the level of the kidneys. No definite stone along the course of the ureters. Nephroureteral catheters and a suprapubic catheter terminating within an underdistended urinary bladder. Prostate measures 4.9 cm in transverse dimension.   - IV hydration - D5W + NaHCO3 150mEq @ 60-70cc/hr  - VBG noted pH 7.2.  f/u repeat   - Urology Cs: stent in place. f/u OP for  for definitive stone treatment and b/l JJ stent removal once stable  - Lokelma PRN      #GB Wall thickening   - GB wall thickened 0.4mm on RUQ US  - lactate -ve, Tbili wnl, CTAP showing GB wall thickening with pericholecystic fluid  - no clinical signs of cholecystitis   - surgery c/s no acute intervention   - f/u HIDA  - f/u ID Cs  - switch rocephin and vanc to cefepime and flagyl      #Afib  #HTN  - eliquis 2.5mg bid   - nifedipine 60mg po qd    #Constipation  - CTAP - no bowel obstruction, large rectal stool burden with distention measuring 7.8 cm   - bowel regimen     #Normocytic anemia suspected 2/2 CKD  - b12 1269, folate 12.2  - iron 95, ferritin 438  - no active bleeding, continue to monitor     #Hypocalcemia  - Corrected Ca 7.6  - elevated ALP   - check PTH, Phos, vit D     #HANDOFF  - fu nephro, id, uro  - fu cultures  - check bmp, wean bicarb when able     # Misc  - DVT Prophylaxis: eliquis 2.5mg   - GI Prophylaxis: none  - Diet: renal  - Activity: iat  - Code Status: Full   - Dispo: MED/SURG   70y M pmh CKD V, HTN, NIDDM, afib, prior spinal abscess, nephrolithiasis and recurrent UTI s/p b/l stent placement and SPC presenting for worsening creatinine admitted for JOÃO on CKD and cystitis.       Plans:    #JOÃO on CKD V  #Cystitis   #SPC  #Hx nephrolithiasis sp b/l stent placement   - bsl Cr 5.5>9.5, CO2 15, K 5.4, pH 7.2 on vbg  - UA +ve bacteria, WBC >998, +ve LE  - CTAP  Bilateral nephroureteral catheters beginning in the renal pelvis and terminating within the urinary bladder. Improved right hydronephrosis now with right renal pelvic fullness. Mild improvement in left moderate hydronephrosis. Bilateral renal and pelvic stones difficult to compare given motion artifact at the level of the kidneys. No definite stone along the course of the ureters. Nephroureteral catheters and a suprapubic catheter terminating within an underdistended urinary bladder. Prostate measures 4.9 cm in transverse dimension.   - IV hydration - D5W + NaHCO3 150mEq @ 60-70cc/hr  - VBG noted pH 7.2.  f/u repeat   - Urology Cs: stent in place. f/u OP for  for definitive stone treatment and b/l JJ stent removal once stable  - Lokelma PRN      #GB Wall thickening   - GB wall thickened 0.4mm on RUQ US  - lactate -ve, Tbili wnl, CTAP showing GB wall thickening with pericholecystic fluid  - no clinical signs of cholecystitis   - surgery c/s no acute intervention   - HIDA + -> f/u IR   - f/u ID Cs  - switch rocephin and vanc to cefepime and flagyl      #Afib  #HTN  - eliquis 2.5mg bid   - nifedipine 60mg po qd    #Constipation  - CTAP - no bowel obstruction, large rectal stool burden with distention measuring 7.8 cm   - bowel regimen     #Normocytic anemia suspected 2/2 CKD  - b12 1269, folate 12.2  - iron 95, ferritin 438  - no active bleeding, continue to monitor     #Hypocalcemia  - Corrected Ca 7.6  - elevated ALP   - check PTH, Phos, vit D     #HANDOFF  - fu nephro, id, uro  - fu cultures  - check bmp, wean bicarb when able     # Misc  - DVT Prophylaxis: eliquis 2.5mg   - GI Prophylaxis: none  - Diet: renal  - Activity: iat  - Code Status: Full   - Dispo: MED/SURG

## 2025-05-24 NOTE — PROGRESS NOTE ADULT - SUBJECTIVE AND OBJECTIVE BOX
Nephrology progress note    Patient is seen and examined, events over the last 24 h noted.  Creatinine slightly improved.  On Cefepime and Flagyl.      Allergies:  No Known Allergies    Hospital Medications:   MEDICATIONS  (STANDING):  apixaban 2.5 milliGRAM(s) Oral two times a day  cefepime   IVPB      cefepime   IVPB 2000 milliGRAM(s) IV Intermittent every 8 hours  metroNIDAZOLE  IVPB 500 milliGRAM(s) IV Intermittent every 12 hours  sodium bicarbonate  Infusion 0.146 mEq/kG/Hr (75 mL/Hr) IV Continuous <Continuous>        VITALS:  T(F): 98.3 (05-24-25 @ 07:25), Max: 98.3 (05-24-25 @ 07:25)  HR: 71 (05-24-25 @ 07:25)  BP: 116/65 (05-24-25 @ 07:25)  RR: 18 (05-24-25 @ 07:25)  SpO2: 98% (05-24-25 @ 07:25)  Wt(kg): --        PHYSICAL EXAM:  GENERAL: well appearing, no acute distress  EYES: EOMI, PERRL, conjunctiva and sclera clear  CHEST/LUNG: Clear to auscultation bilaterally; No rales, rhonchi or wheezing  HEART: S1,S2 Regular rate and rhythm; No murmurs, rubs, or gallops  ABDOMEN: Soft, nontender, nondistended, normal bowel sounds  : +SPT in place without pain, draining yellow urine  EXTREMITIES: Distal pulses present  NEUROLOGY: AOx3      LABS:  05-23    139  |  103  |  101[HH]  ----------------------------<  77  5.4[H]   |  14[L]  |  9.3[HH]    Ca    6.6[L]      23 May 2025 20:22    TPro  7.2  /  Alb  2.7[L]  /  TBili  0.2  /  DBili      /  AST  119[H]  /  ALT  99[H]  /  AlkPhos  521[H]  05-23                          8.0    6.14  )-----------( 156      ( 23 May 2025 12:30 )             24.6       Urine Studies:  Urinalysis Basic - ( 23 May 2025 20:22 )    Color:  / Appearance:  / SG:  / pH:   Gluc: 77 mg/dL / Ketone:   / Bili:  / Urobili:    Blood:  / Protein:  / Nitrite:    Leuk Esterase:  / RBC:  / WBC    Sq Epi:  / Non Sq Epi:  / Bacteria:         RADIOLOGY & ADDITIONAL STUDIES:

## 2025-05-24 NOTE — PROGRESS NOTE ADULT - ASSESSMENT
71 yo M with h/o CKD stage 5, HTN, NIDDM, prior spinal abscess c/b need for SPC, nephrolithiasis, recent urinary infection and hydro requiring b/l stent placement, sent by me to ED following outpatient evaluation for medical clearance to remove his stents and labwork noted acute rise in sCr 5.5->9.5 with reduced UOP, worsening hyperkalemia and hyperphosphatemia, concern for new obstruction vs. infection, and to assess for HD needs acutely.  Repeat labwork noted stable sCr with elevated potassium, MA and signs of complicated UTI given SPC use.    #JOÃO on CKD stage 5  #Moderate hydronephrosis  #Complicated Cystitis  #HTN  #NIDDM  #prior spinal abscess c/b need for SPC  #nephrolithiasis  Imaging without new or worsening hydronephrosis, did note multiple kidney stones but all nonobstructing.    - appreciate urology f/u  - continue IV Abx as ordered, follow ID recs  - acidosis stable, continue IV Hydration with D5W + NaHCO3 150mEq   - Resin binders to keep K<5.7  - Dose medications to eGFR <15  - Avoid Nephrotoxic agents  - No acute indication for RRT but will need to follow closely over weekend for acute needs if they arise.  If no significant improvement in the next few days he may need to start RRT.    Nephrology will follow.

## 2025-05-24 NOTE — PROGRESS NOTE ADULT - ATTENDING COMMENTS
#JOÃO on CKD V  c/b ag met acidosis, hyperkalemia  ph 7.2  repeat blood gas  cmp, phos  bicarb gtt 150 meq at 75 cc/hr  ekg noted  s/p lokelma  low k diet  f/u renal    #Transaminitis, hepatocellular  incidentally noted on ct abd: gb wall thickening, distention  ruq us concern for acute wisam  hida  f/u sx    #Progress Note Handoff  Pending (specify): blood gas, cmp, hida  Family discussion: d/w pt at bedside  Disposition: home vs. snf

## 2025-05-24 NOTE — ED ADULT NURSE NOTE - CCCP TRG CHIEF CMPLNT
Show Aperture Variable?: Yes Render Note In Bullet Format When Appropriate: No Detail Level: Detailed Application Tool (Optional): Liquid Nitrogen Sprayer Consent: The patient's consent was obtained including but not limited to risks of crusting, scabbing, blistering, scarring, darker or lighter pigmentary change, recurrence, incomplete removal and infection. Duration Of Freeze Thaw-Cycle (Seconds): 3 abnormal lab result Aperture Size (Optional): C Post-Care Instructions: I reviewed with the patient in detail post-care instructions. Patient is to wear sunprotection, and avoid picking at any of the treated lesions. Pt may apply Vaseline to crusted or scabbing areas. Number Of Freeze-Thaw Cycles: 3 freeze-thaw cycles

## 2025-05-24 NOTE — CONSULT NOTE ADULT - SUBJECTIVE AND OBJECTIVE BOX
Pt is a 69yo Male  HPI:  70y M pmh CKD V, HTN, NIDDM, prior spinal abscess, nephrolithiasis and recurrent UTI s/p b/l stent placement and SPC presenting for evaluation. Patient sent in by Dr. Mcallister for evaluation for acute rise in creatinine. sCr noted to increase from 5.5>9.5 with reduced UOP, hyperkalemia and hyperphosphatemia.   Pt was had b/l stents placed by Dr. Schilling on 5/1/25 emergently for septic stones. He was being seen today by renal for medical clearence prior to lithotripsy and stent removal. Pt was sent to ED for increase in Cr and decreased urine output. Imaging without new or worsening hydronephrosis, did note multiple kidney stones but all nonobstructing. Pt was seen by  on 5/12 for CKD and confusion, believed to be secondary to Cefepime induced neurotoxicity.     PAST MEDICAL & SURGICAL HISTORY:  DM (diabetes mellitus)  HTN (hypertension)  H/O paraplegia  Spinal abscess  Renal stones  Stage 5 chronic kidney disease not on chronic dialysis  Neurogenic dysfunction of the urinary bladder  Encounter for care or replacement of suprapubic tube  Spinal abscess  S/P Surgery  Encounter for care or replacement of suprapubic tube  S/P ORIF (open reduction internal fixation) fracture    MEDICATIONS  (STANDING):  apixaban 2.5 milliGRAM(s) Oral two times a day  cefTRIAXone   IVPB 2000 milliGRAM(s) IV Intermittent every 24 hours  sodium bicarbonate  Infusion 0.146 mEq/kG/Hr (75 mL/Hr) IV Continuous <Continuous>  vancomycin  IVPB 1250 milliGRAM(s) IV Intermittent every 48 hours    Allergies  No Known Allergies    SOCIAL HISTORY: No illicit drug use    FAMILY HISTORY:  FH: HTN (hypertension)  FH: breast cancer  FH: melanoma  FH: heart disease    REVIEW OF SYSTEMS   [x] A ten-point review of systems was otherwise negative except as noted.  [ ] Due to altered mental status/intubation, subjective information were not able to be obtained from patient. History was obtained, to the extent possible, from review of the chart and collateral sources of information.    Vital Signs Last 24 Hrs  T(C): 36.3 (24 May 2025 00:04), Max: 36.3 (24 May 2025 00:04)  T(F): 97.3 (24 May 2025 00:04), Max: 97.3 (24 May 2025 00:04)  HR: 73 (24 May 2025 00:04) (71 - 73)  BP: 121/65 (24 May 2025 00:04) (113/70 - 135/83)  BP(mean): --  RR: 18 (23 May 2025 15:48) (18 - 18)  SpO2: 98% (24 May 2025 00:04) (98% - 99%)    Parameters below as of 24 May 2025 00:04  Patient On (Oxygen Delivery Method): room air    PHYSICAL EXAM:    GEN: NAD, awake and alert.  SKIN: Good color, non diaphoretic.  RESP: Non-labored breathing. No use of accessory muscles.  CARDIO: +S1/S2  ABDO: Soft, NT/ND, no palpable bladder, no suprapubic tenderness, + Suprapubic Tube draining clear yellow urine.  BACK: No CVAT B/L  EXT: PANTOJA x 4    LABS:                        8.0    6.14  )-----------( 156      ( 23 May 2025 12:30 )             24.6     05-23    139  |  103  |  101[HH]  ----------------------------<  77  5.4[H]   |  14[L]  |  9.3[HH]    Ca    6.6[L]      23 May 2025 20:22    TPro  7.2  /  Alb  2.7[L]  /  TBili  0.2  /  DBili  x   /  AST  119[H]  /  ALT  99[H]  /  AlkPhos  521[H]  05-23      Urinalysis Basic - ( 23 May 2025 20:22 )    Color: x / Appearance: x / SG: x / pH: x  Gluc: 77 mg/dL / Ketone: x  / Bili: x / Urobili: x   Blood: x / Protein: x / Nitrite: x   Leuk Esterase: x / RBC: x / WBC x   Sq Epi: x / Non Sq Epi: x / Bacteria: x    RADIOLOGY & ADDITIONAL STUDIES:  < from: CT Abdomen and Pelvis No Cont (05.23.25 @ 15:22) >    ACC: [id number] EXAM:  CT ABDOMEN AND PELVIS   ORDERED BY: AMELIA WONG     PROCEDURE DATE:  05/23/2025          INTERPRETATION:  Clinical Indication: Acute kidney injury.    Technique: Multidetector-row CT images of the abdomen and pelvis were   obtained from the xiphoid through the symphysis pubis. No contrast was   administered. Coronal and sagittal reconstructions were performed.    Comparison: CT abdomen and pelvis 5/12/2025.    Findings:    01. LIVER: Normal unenhanced.  02. SPLEEN: Normalunenhanced.  03. PANCREAS: Normal unenhanced.  04. GALLBLADDER/BILIARY TREE: Interval marked distention with gallbladder   wall thickening and pericholecystic fluid. Cholelithiasis. No definite   biliary duct dilatation.  05. ADRENALS: Normal.  06. KIDNEYS: Symmetric in size. Bilateral nephroureteral catheters   beginning in the renal pelvis and terminating within the urinary bladder.   Improved right hydronephrosis now with right renal pelvic fullness. Mild   improvement in left moderate hydronephrosis. Bilateral renal and pelvic   stones difficult to compare given motion artifact at the level of the   kidneys. No definite stone along the course of the ureters.  07. LYMPHADENOPATHY/RETROPERITONEUM: Increased periaortic lymph node   measuring1.1 cm previously 0.9 cm, may be reactive (series 4 image 48).  08. BOWEL: No bowel obstruction. Large rectal stool burden with   distention measuring 7.8 cm (series 601 image 73).  09. PELVIC VISCERA: Nephroureteral catheters and a suprapubic catheter   terminating within an underdistended urinary bladder. Air is noted within   the urinary bladder which may be due to instrumentation. Prostate   measures 4.9 cm in transverse dimension (series 4 image 107).  10. PELVIC LYMPH NODES: No enlarged lymph nodes.  11. VASCULATURE: No abdominal aortic aneurysm. Calcified atherosclerotic   disease of the aorta and its branches.  12. PERITONEUM/ABDOMINAL WALL: Trace free fluid surrounding the   gallbladder and liver.  13. SKELETAL: Degenerative changes.  14. LUNG BASES: Right basilar consolidative opacity adjacent likely trace   pleural effusion.    IMPRESSION:    Findings compatible with acute cholecystitis.    Improved bilateral hydronephrosis as described.    Right basilar consolidative opacity with adjacent likely trace pleural   effusion. Infectious process is not excluded.    Large rectal stool burden with distention measuring 7.8 cm. No bowel   obstruction.    Dr. Guerra spoke to Dr. Wong at 3:36 PM on 5/23/2025 regarding   findings with read back.    --- End of Report ---            CHANDLER GUERRA MD; Attending Radiologist  This document has been electronically signed. May 23 2025  3:37PM    < end of copied text >

## 2025-05-24 NOTE — CONSULT NOTE ADULT - ASSESSMENT
ASSESSMENT  This is a 70 year old male with PMH of CKD V, HTN, NIDDM, prior spinal abscess, nephrolithiasis and recurrent UTI s/p b/l stent placement and SPC presenting for evaluation for JOÃO    IMPRESSION  #Acute cholecystitis? Transaminitis with elevation in Alkaline phosphatase   #Pyuria  #JOÃO over CKD 5  #History of nephrolithiasis with bilateral ureteral stent placement. 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  #Prior history of spinal abscess     Colonized with Proteus/Serratia/Pseudomonas and MSSA in the urine     RECOMMENDATIONS  -Follow up with urine and blood cultures.  -Surgery evaluation.   -Nephro follow up for JOÃO.  -D/C Vanc.  -IV Cefepime 1 gram q 24 hours+ Flagyll IV/PO 500mg BID (S/D 5/24)  -Management of constipation as per primary.  -Off loading to prevent pressure sores and preventive measures to avoid aspiration.    If any questions, please send a message or call on [employer]  Please continue to update ID with any pertinent new laboratory or radiographic findings.    Madeleine Avila M.D  Infectious Diseases Attending/   [employer] at [facility]

## 2025-05-24 NOTE — CONSULT NOTE ADULT - ASSESSMENT
71 y/o male with b/l ureteral stents presenting with decreased UO and elevated Cr.   - Stents appear to be in good position  - Care per primary team  - f/u as o/p with Dr. Schilling for definitive stone treatment and b/l JJ stent removal once stable  - Will discuss with attending

## 2025-05-25 DIAGNOSIS — N17.9 ACUTE KIDNEY FAILURE, UNSPECIFIED: ICD-10-CM

## 2025-05-25 DIAGNOSIS — N20.0 CALCULUS OF KIDNEY: ICD-10-CM

## 2025-05-25 DIAGNOSIS — I10 ESSENTIAL (PRIMARY) HYPERTENSION: ICD-10-CM

## 2025-05-25 DIAGNOSIS — E11.9 TYPE 2 DIABETES MELLITUS WITHOUT COMPLICATIONS: ICD-10-CM

## 2025-05-25 DIAGNOSIS — E83.51 HYPOCALCEMIA: ICD-10-CM

## 2025-05-25 DIAGNOSIS — Z79.899 OTHER LONG TERM (CURRENT) DRUG THERAPY: ICD-10-CM

## 2025-05-25 DIAGNOSIS — R74.01 ELEVATION OF LEVELS OF LIVER TRANSAMINASE LEVELS: ICD-10-CM

## 2025-05-25 LAB
ANION GAP SERPL CALC-SCNC: 21 MMOL/L — HIGH (ref 7–14)
BASOPHILS # BLD AUTO: 0.03 K/UL — SIGNIFICANT CHANGE UP (ref 0–0.2)
BASOPHILS NFR BLD AUTO: 0.5 % — SIGNIFICANT CHANGE UP (ref 0–1)
BUN SERPL-MCNC: 100 MG/DL — CRITICAL HIGH (ref 10–20)
CALCIUM SERPL-MCNC: 6 MG/DL — LOW (ref 8.4–10.5)
CHLORIDE SERPL-SCNC: 99 MMOL/L — SIGNIFICANT CHANGE UP (ref 98–110)
CO2 SERPL-SCNC: 18 MMOL/L — SIGNIFICANT CHANGE UP (ref 17–32)
CREAT SERPL-MCNC: 9 MG/DL — CRITICAL HIGH (ref 0.7–1.5)
EGFR: 6 ML/MIN/1.73M2 — LOW
EGFR: 6 ML/MIN/1.73M2 — LOW
EOSINOPHIL # BLD AUTO: 0.16 K/UL — SIGNIFICANT CHANGE UP (ref 0–0.7)
EOSINOPHIL NFR BLD AUTO: 2.7 % — SIGNIFICANT CHANGE UP (ref 0–8)
GLUCOSE SERPL-MCNC: 76 MG/DL — SIGNIFICANT CHANGE UP (ref 70–99)
HCT VFR BLD CALC: 22.8 % — LOW (ref 42–52)
HGB BLD-MCNC: 7.4 G/DL — LOW (ref 14–18)
IMM GRANULOCYTES NFR BLD AUTO: 2.9 % — HIGH (ref 0.1–0.3)
LYMPHOCYTES # BLD AUTO: 1.03 K/UL — LOW (ref 1.2–3.4)
LYMPHOCYTES # BLD AUTO: 17.5 % — LOW (ref 20.5–51.1)
MAGNESIUM SERPL-MCNC: 1.9 MG/DL — SIGNIFICANT CHANGE UP (ref 1.8–2.4)
MCHC RBC-ENTMCNC: 28.8 PG — SIGNIFICANT CHANGE UP (ref 27–31)
MCHC RBC-ENTMCNC: 32.5 G/DL — SIGNIFICANT CHANGE UP (ref 32–37)
MCV RBC AUTO: 88.7 FL — SIGNIFICANT CHANGE UP (ref 80–94)
MONOCYTES # BLD AUTO: 0.75 K/UL — HIGH (ref 0.1–0.6)
MONOCYTES NFR BLD AUTO: 12.7 % — HIGH (ref 1.7–9.3)
NEUTROPHILS # BLD AUTO: 3.76 K/UL — SIGNIFICANT CHANGE UP (ref 1.4–6.5)
NEUTROPHILS NFR BLD AUTO: 63.7 % — SIGNIFICANT CHANGE UP (ref 42.2–75.2)
NRBC BLD AUTO-RTO: 0 /100 WBCS — SIGNIFICANT CHANGE UP (ref 0–0)
PLATELET # BLD AUTO: 168 K/UL — SIGNIFICANT CHANGE UP (ref 130–400)
PMV BLD: 11.7 FL — HIGH (ref 7.4–10.4)
POTASSIUM SERPL-MCNC: 5.2 MMOL/L — HIGH (ref 3.5–5)
POTASSIUM SERPL-SCNC: 5.2 MMOL/L — HIGH (ref 3.5–5)
RBC # BLD: 2.57 M/UL — LOW (ref 4.7–6.1)
RBC # FLD: 13.3 % — SIGNIFICANT CHANGE UP (ref 11.5–14.5)
SODIUM SERPL-SCNC: 138 MMOL/L — SIGNIFICANT CHANGE UP (ref 135–146)
WBC # BLD: 5.9 K/UL — SIGNIFICANT CHANGE UP (ref 4.8–10.8)
WBC # FLD AUTO: 5.9 K/UL — SIGNIFICANT CHANGE UP (ref 4.8–10.8)

## 2025-05-25 PROCEDURE — 99233 SBSQ HOSP IP/OBS HIGH 50: CPT

## 2025-05-25 RX ORDER — SODIUM CHLORIDE 9 G/1000ML
1000 INJECTION, SOLUTION INTRAVENOUS
Refills: 0 | Status: DISCONTINUED | OUTPATIENT
Start: 2025-05-25 | End: 2025-05-26

## 2025-05-25 RX ORDER — SODIUM ZIRCONIUM CYCLOSILICATE 5 G/5G
5 POWDER, FOR SUSPENSION ORAL
Refills: 0 | Status: COMPLETED | OUTPATIENT
Start: 2025-05-25 | End: 2025-05-26

## 2025-05-25 RX ADMIN — Medication 100 MILLIGRAM(S): at 17:03

## 2025-05-25 RX ADMIN — APIXABAN 2.5 MILLIGRAM(S): 2.5 TABLET, FILM COATED ORAL at 05:35

## 2025-05-25 RX ADMIN — SODIUM CHLORIDE 75 MILLILITER(S): 9 INJECTION, SOLUTION INTRAVENOUS at 12:01

## 2025-05-25 RX ADMIN — CEFEPIME 100 MILLIGRAM(S): 2 INJECTION, POWDER, FOR SOLUTION INTRAVENOUS at 11:00

## 2025-05-25 RX ADMIN — Medication 650 MILLIGRAM(S): at 13:00

## 2025-05-25 RX ADMIN — SODIUM ZIRCONIUM CYCLOSILICATE 5 GRAM(S): 5 POWDER, FOR SUSPENSION ORAL at 17:03

## 2025-05-25 RX ADMIN — Medication 100 MILLIGRAM(S): at 05:34

## 2025-05-25 RX ADMIN — APIXABAN 2.5 MILLIGRAM(S): 2.5 TABLET, FILM COATED ORAL at 17:03

## 2025-05-25 RX ADMIN — Medication 667 MILLIGRAM(S): at 17:03

## 2025-05-25 RX ADMIN — Medication 650 MILLIGRAM(S): at 05:35

## 2025-05-25 RX ADMIN — Medication 667 MILLIGRAM(S): at 11:51

## 2025-05-25 NOTE — PROGRESS NOTE ADULT - NSPROGADDITIONALINFOA_GEN_ALL_CORE
#Progress Note Handoff  Pending (specify): sheron, f/u renal, hypoca w/u  Family discussion: d/w pt at bedside  Disposition: home vs. snf

## 2025-05-25 NOTE — PROGRESS NOTE ADULT - ASSESSMENT
70M PMHx CKD V, HTN, DM2, nephrolithiasis s/p BL stent, SPC; here with JOÃO on CKD V. Transaminitis.

## 2025-05-25 NOTE — PROGRESS NOTE ADULT - PROBLEM SELECTOR PLAN 1
c/b ag met acidosis, hyperkalemia; resolving  bicarb gtt 150 meq at 75 cc/hr  ekg noted  lokelma 5 bid  low k diet  trend  f/u renal if plan for hd  monitor uop

## 2025-05-25 NOTE — PROGRESS NOTE ADULT - SUBJECTIVE AND OBJECTIVE BOX
INTERVAL HPI/OVERNIGHT EVENTS:    SUBJECTIVE: Patient seen and examined at bedside.     no cp, sob, abd pain, fever    OBJECTIVE:    VITAL SIGNS:  Vital Signs Last 24 Hrs  T(C): 36.9 (25 May 2025 07:46), Max: 36.9 (25 May 2025 07:46)  T(F): 98.5 (25 May 2025 07:46), Max: 98.5 (25 May 2025 07:46)  HR: 85 (25 May 2025 07:46) (70 - 85)  BP: 129/77 (25 May 2025 07:46) (125/72 - 148/88)  BP(mean): 94 (25 May 2025 07:46) (90 - 94)  RR: 17 (25 May 2025 07:46) (17 - 18)  SpO2: 98% (25 May 2025 07:46) (96% - 98%)    Parameters below as of 25 May 2025 07:46  Patient On (Oxygen Delivery Method): room air          PHYSICAL EXAM:    General: NAD  HEENT: NC/AT; PERRL, clear conjunctiva  Neck: supple  Respiratory: CTA b/l  Cardiovascular: +S1/S2; RRR  Abdomen: soft, NT/ND; +BS x4  Extremities: WWP, 2+ peripheral pulses b/l; no LE edema  Skin: normal color and turgor; no rash  Neurological:    MEDICATIONS:  MEDICATIONS  (STANDING):  apixaban 2.5 milliGRAM(s) Oral two times a day  cefepime   IVPB 1000 milliGRAM(s) IV Intermittent daily  dextrose 5% 1000 milliLiter(s) (75 mL/Hr) IV Continuous <Continuous>  metroNIDAZOLE  IVPB 500 milliGRAM(s) IV Intermittent every 12 hours  sodium bicarbonate 650 milliGRAM(s) Oral three times a day  sodium zirconium cyclosilicate 5 Gram(s) Oral two times a day    MEDICATIONS  (PRN):      ALLERGIES:  Allergies    No Known Allergies    Intolerances        LABS:                        7.4    5.90  )-----------( 168      ( 25 May 2025 04:36 )             22.8     Hemoglobin: 7.4 g/dL (05-25 @ 04:36)  Hemoglobin: 7.6 g/dL (05-24 @ 15:59)  Hemoglobin: 7.8 g/dL (05-24 @ 13:20)  Hemoglobin: 8.0 g/dL (05-23 @ 12:30)    CBC Full  -  ( 25 May 2025 04:36 )  WBC Count : 5.90 K/uL  RBC Count : 2.57 M/uL  Hemoglobin : 7.4 g/dL  Hematocrit : 22.8 %  Platelet Count - Automated : 168 K/uL  Mean Cell Volume : 88.7 fL  Mean Cell Hemoglobin : 28.8 pg  Mean Cell Hemoglobin Concentration : 32.5 g/dL  Auto Neutrophil # : x  Auto Lymphocyte # : x  Auto Monocyte # : x  Auto Eosinophil # : x  Auto Basophil # : x  Auto Neutrophil % : x  Auto Lymphocyte % : x  Auto Monocyte % : x  Auto Eosinophil % : x  Auto Basophil % : x    05-25    138  |  99  |  100[HH]  ----------------------------<  76  5.2[H]   |  18  |  9.0[HH]    Ca    6.0[L]      25 May 2025 04:36  Phos  10.1     05-24  Mg     1.9     05-25    TPro  6.3  /  Alb  2.5[L]  /  TBili  <0.2  /  DBili  x   /  AST  97[H]  /  ALT  75[H]  /  AlkPhos  521[H]  05-24    Creatinine Trend: 9.0<--, 8.9<--, 8.6<--, 9.3<--, 9.5<--, 4.6<--  LIVER FUNCTIONS - ( 24 May 2025 15:59 )  Alb: 2.5 g/dL / Pro: 6.3 g/dL / ALK PHOS: 521 U/L / ALT: 75 U/L / AST: 97 U/L / GGT: x               hs Troponin:    ABG - ( 24 May 2025 09:25 )  pH, Arterial: 7.29  pH, Blood: x     /  pCO2: 41    /  pO2: 40    / HCO3: 20    / Base Excess: -6.5  /  SaO2: 62.8                    Urinalysis Basic - ( 25 May 2025 04:36 )    Color: x / Appearance: x / SG: x / pH: x  Gluc: 76 mg/dL / Ketone: x  / Bili: x / Urobili: x   Blood: x / Protein: x / Nitrite: x   Leuk Esterase: x / RBC: x / WBC x   Sq Epi: x / Non Sq Epi: x / Bacteria: x      CSF:                      EKG:   MICROBIOLOGY:    Culture - Urine (collected 23 May 2025 12:30)  Source: Clean Catch Clean Catch (Midstream)  Preliminary Report (24 May 2025 23:03):    Culture positive, 10,000 - 49,000 CFU/mL . Identification to follow.      IMAGING:      Labs, imaging, EKG personally reviewed    RADIOLOGY & ADDITIONAL TESTS: Reviewed.

## 2025-05-25 NOTE — PROGRESS NOTE ADULT - PROBLEM SELECTOR PROBLEM 1
WebIZ Checked & Epic Updated: yes  [facility] Verified: no  Verify PCP: yes    Communication Preference Obtained: yes     Review Care Team: no/ pt was busy    Annual Wellness Visit Scheduling  1. Scheduling Status:Scheduled     Care Gap Scheduling (Attempt to Schedule EACH Overdue Care Gap!)     Health Maintenance Due   Topic Date Due   • Annual Wellness Visit  Scheduled         MyChart Activation: already active     Acute kidney injury superimposed on CKD

## 2025-05-25 NOTE — PROGRESS NOTE ADULT - SUBJECTIVE AND OBJECTIVE BOX
Nephrology progress note    Patient is seen and examined, events over the last 24 h noted.  Renal function improving, nonoliguric.    Allergies:  No Known Allergies    Hospital Medications:   MEDICATIONS  (STANDING):  apixaban 2.5 milliGRAM(s) Oral two times a day  cefepime   IVPB 1000 milliGRAM(s) IV Intermittent daily  metroNIDAZOLE  IVPB 500 milliGRAM(s) IV Intermittent every 12 hours  sodium bicarbonate 650 milliGRAM(s) Oral three times a day        VITALS:  T(F): 98.5 (05-25-25 @ 07:46), Max: 98.5 (05-25-25 @ 07:46)  HR: 85 (05-25-25 @ 07:46)  BP: 129/77 (05-25-25 @ 07:46)  RR: 17 (05-25-25 @ 07:46)  SpO2: 98% (05-25-25 @ 07:46)  Wt(kg): --    05-24 @ 07:01  -  05-25 @ 07:00  --------------------------------------------------------  IN: 218 mL / OUT: 2900 mL / NET: -2682 mL          PHYSICAL EXAM:  GENERAL: well appearing, no acute distress  EYES: EOMI, PERRL, conjunctiva and sclera clear  CHEST/LUNG: Clear to auscultation bilaterally; No rales, rhonchi or wheezing  HEART: S1,S2 Regular rate and rhythm; No murmurs, rubs, or gallops  ABDOMEN: Soft, nontender, nondistended, normal bowel sounds  : +SPT in place without pain, draining yellow urine  EXTREMITIES: Distal pulses present  NEUROLOGY: AOx3      LABS:  05-25    138  |  99  |  100[HH]  ----------------------------<  76  5.2[H]   |  18  |  9.0[HH]    Ca    6.0[L]      25 May 2025 04:36  Phos  10.1     05-24  Mg     1.9     05-25    TPro  6.3  /  Alb  2.5[L]  /  TBili  <0.2  /  DBili      /  AST  97[H]  /  ALT  75[H]  /  AlkPhos  521[H]  05-24                          7.4    5.90  )-----------( 168      ( 25 May 2025 04:36 )             22.8       Urine Studies:  Urinalysis Basic - ( 25 May 2025 04:36 )    Color:  / Appearance:  / SG:  / pH:   Gluc: 76 mg/dL / Ketone:   / Bili:  / Urobili:    Blood:  / Protein:  / Nitrite:    Leuk Esterase:  / RBC:  / WBC    Sq Epi:  / Non Sq Epi:  / Bacteria:         RADIOLOGY & ADDITIONAL STUDIES:

## 2025-05-25 NOTE — PROGRESS NOTE ADULT - PROBLEM SELECTOR PLAN 2
hepatocellular  incidentally noted on ct abd: gb wall thickening, distention  ruq us concern for acute wisam  hida positive  no intervention per ir  cefepime, flagyl  f/u ucx  f/u id

## 2025-05-26 LAB
24R-OH-CALCIDIOL SERPL-MCNC: 11 NG/ML — SIGNIFICANT CHANGE UP
ANION GAP SERPL CALC-SCNC: 20 MMOL/L — HIGH (ref 7–14)
ANION GAP SERPL CALC-SCNC: 22 MMOL/L — HIGH (ref 7–14)
BASOPHILS # BLD AUTO: 0.04 K/UL — SIGNIFICANT CHANGE UP (ref 0–0.2)
BASOPHILS NFR BLD AUTO: 0.8 % — SIGNIFICANT CHANGE UP (ref 0–1)
BUN SERPL-MCNC: 103 MG/DL — CRITICAL HIGH (ref 10–20)
BUN SERPL-MCNC: 107 MG/DL — CRITICAL HIGH (ref 10–20)
CALCIUM SERPL-MCNC: 6.1 MG/DL — CRITICAL LOW (ref 8.4–10.5)
CALCIUM SERPL-MCNC: 6.4 MG/DL — LOW (ref 8.4–10.5)
CALCIUM SERPL-MCNC: 6.4 MG/DL — LOW (ref 8.4–10.5)
CHLORIDE SERPL-SCNC: 96 MMOL/L — LOW (ref 98–110)
CHLORIDE SERPL-SCNC: 99 MMOL/L — SIGNIFICANT CHANGE UP (ref 98–110)
CO2 SERPL-SCNC: 22 MMOL/L — SIGNIFICANT CHANGE UP (ref 17–32)
CO2 SERPL-SCNC: 25 MMOL/L — SIGNIFICANT CHANGE UP (ref 17–32)
CREAT SERPL-MCNC: 8.4 MG/DL — CRITICAL HIGH (ref 0.7–1.5)
CREAT SERPL-MCNC: 8.5 MG/DL — CRITICAL HIGH (ref 0.7–1.5)
CULTURE RESULTS: ABNORMAL
EGFR: 6 ML/MIN/1.73M2 — LOW
EOSINOPHIL # BLD AUTO: 0.18 K/UL — SIGNIFICANT CHANGE UP (ref 0–0.7)
EOSINOPHIL NFR BLD AUTO: 3.5 % — SIGNIFICANT CHANGE UP (ref 0–8)
GLUCOSE SERPL-MCNC: 88 MG/DL — SIGNIFICANT CHANGE UP (ref 70–99)
GLUCOSE SERPL-MCNC: 94 MG/DL — SIGNIFICANT CHANGE UP (ref 70–99)
HCT VFR BLD CALC: 22.3 % — LOW (ref 42–52)
HGB BLD-MCNC: 7.3 G/DL — LOW (ref 14–18)
IMM GRANULOCYTES NFR BLD AUTO: 3.5 % — HIGH (ref 0.1–0.3)
LYMPHOCYTES # BLD AUTO: 0.99 K/UL — LOW (ref 1.2–3.4)
LYMPHOCYTES # BLD AUTO: 19 % — LOW (ref 20.5–51.1)
MAGNESIUM SERPL-MCNC: 1.9 MG/DL — SIGNIFICANT CHANGE UP (ref 1.8–2.4)
MCHC RBC-ENTMCNC: 29 PG — SIGNIFICANT CHANGE UP (ref 27–31)
MCHC RBC-ENTMCNC: 32.7 G/DL — SIGNIFICANT CHANGE UP (ref 32–37)
MCV RBC AUTO: 88.5 FL — SIGNIFICANT CHANGE UP (ref 80–94)
MONOCYTES # BLD AUTO: 0.51 K/UL — SIGNIFICANT CHANGE UP (ref 0.1–0.6)
MONOCYTES NFR BLD AUTO: 9.8 % — HIGH (ref 1.7–9.3)
NEUTROPHILS # BLD AUTO: 3.3 K/UL — SIGNIFICANT CHANGE UP (ref 1.4–6.5)
NEUTROPHILS NFR BLD AUTO: 63.4 % — SIGNIFICANT CHANGE UP (ref 42.2–75.2)
NRBC BLD AUTO-RTO: 0 /100 WBCS — SIGNIFICANT CHANGE UP (ref 0–0)
PLATELET # BLD AUTO: 162 K/UL — SIGNIFICANT CHANGE UP (ref 130–400)
PMV BLD: 11.3 FL — HIGH (ref 7.4–10.4)
POTASSIUM SERPL-MCNC: 4.3 MMOL/L — SIGNIFICANT CHANGE UP (ref 3.5–5)
POTASSIUM SERPL-MCNC: 4.5 MMOL/L — SIGNIFICANT CHANGE UP (ref 3.5–5)
POTASSIUM SERPL-SCNC: 4.3 MMOL/L — SIGNIFICANT CHANGE UP (ref 3.5–5)
POTASSIUM SERPL-SCNC: 4.5 MMOL/L — SIGNIFICANT CHANGE UP (ref 3.5–5)
PTH-INTACT FLD-MCNC: 288 PG/ML — HIGH (ref 15–65)
RBC # BLD: 2.52 M/UL — LOW (ref 4.7–6.1)
RBC # FLD: 13.2 % — SIGNIFICANT CHANGE UP (ref 11.5–14.5)
SODIUM SERPL-SCNC: 141 MMOL/L — SIGNIFICANT CHANGE UP (ref 135–146)
SODIUM SERPL-SCNC: 143 MMOL/L — SIGNIFICANT CHANGE UP (ref 135–146)
SPECIMEN SOURCE: SIGNIFICANT CHANGE UP
WBC # BLD: 5.2 K/UL — SIGNIFICANT CHANGE UP (ref 4.8–10.8)
WBC # FLD AUTO: 5.2 K/UL — SIGNIFICANT CHANGE UP (ref 4.8–10.8)

## 2025-05-26 PROCEDURE — 71045 X-RAY EXAM CHEST 1 VIEW: CPT | Mod: 26

## 2025-05-26 PROCEDURE — 99232 SBSQ HOSP IP/OBS MODERATE 35: CPT

## 2025-05-26 RX ORDER — METRONIDAZOLE 250 MG
500 TABLET ORAL EVERY 12 HOURS
Refills: 0 | Status: DISCONTINUED | OUTPATIENT
Start: 2025-05-26 | End: 2025-05-28

## 2025-05-26 RX ORDER — SODIUM CHLORIDE 9 G/1000ML
1000 INJECTION, SOLUTION INTRAVENOUS
Refills: 0 | Status: DISCONTINUED | OUTPATIENT
Start: 2025-05-26 | End: 2025-05-27

## 2025-05-26 RX ORDER — ONDANSETRON HCL/PF 4 MG/2 ML
4 VIAL (ML) INJECTION EVERY 8 HOURS
Refills: 0 | Status: DISCONTINUED | OUTPATIENT
Start: 2025-05-26 | End: 2025-06-05

## 2025-05-26 RX ADMIN — CEFEPIME 100 MILLIGRAM(S): 2 INJECTION, POWDER, FOR SOLUTION INTRAVENOUS at 11:59

## 2025-05-26 RX ADMIN — Medication 667 MILLIGRAM(S): at 12:00

## 2025-05-26 RX ADMIN — Medication 40 MILLIGRAM(S): at 12:16

## 2025-05-26 RX ADMIN — SODIUM ZIRCONIUM CYCLOSILICATE 5 GRAM(S): 5 POWDER, FOR SUSPENSION ORAL at 05:15

## 2025-05-26 RX ADMIN — Medication 667 MILLIGRAM(S): at 08:12

## 2025-05-26 RX ADMIN — Medication 500 MILLIGRAM(S): at 17:21

## 2025-05-26 RX ADMIN — APIXABAN 2.5 MILLIGRAM(S): 2.5 TABLET, FILM COATED ORAL at 05:15

## 2025-05-26 RX ADMIN — Medication 50 MILLILITER(S): at 12:00

## 2025-05-26 RX ADMIN — SODIUM CHLORIDE 75 MILLILITER(S): 9 INJECTION, SOLUTION INTRAVENOUS at 08:13

## 2025-05-26 RX ADMIN — Medication 4 MILLIGRAM(S): at 12:17

## 2025-05-26 RX ADMIN — SODIUM CHLORIDE 50 MILLILITER(S): 9 INJECTION, SOLUTION INTRAVENOUS at 14:09

## 2025-05-26 RX ADMIN — APIXABAN 2.5 MILLIGRAM(S): 2.5 TABLET, FILM COATED ORAL at 17:21

## 2025-05-26 RX ADMIN — Medication 100 MILLIGRAM(S): at 05:15

## 2025-05-26 RX ADMIN — Medication 2001 MILLIGRAM(S): at 17:21

## 2025-05-26 NOTE — PROGRESS NOTE ADULT - ATTENDING COMMENTS
#Acute kidney injury superimposed on CKD.   c/b ag met acidosis, hyperkalemia; resolving s/p bicarb gtt  ns 50 cc/hr  low k diet  trend  f/u renal if plan for hd  monitor uop.    #Transaminitis.   hepatocellular  incidentally noted on ct abd: gb wall thickening, distention  ruq us concern for acute wisam  hida positive  no intervention per ir  cefepime, flagyl  f/u ucx  f/u id.    #Hypocalcemia.   presumed due to hyperphos  phoslo 667 tid  check pth  vit d 25oh, 1,25 oh  trend.    #Progress Note Handoff  Pending (specify): sheron, f/u renal, hypoca w/u  Family discussion: d/w pt at bedside  Disposition: home vs. snf

## 2025-05-26 NOTE — PROGRESS NOTE ADULT - ASSESSMENT
69 yo M with h/o CKD stage 5, HTN, NIDDM, prior spinal abscess c/b need for SPC, nephrolithiasis, recent urinary infection and hydro requiring b/l stent placement, sent by me to ED following outpatient evaluation for medical clearance to remove his stents and labwork noted acute rise in sCr 5.5->9.5 with reduced UOP, worsening hyperkalemia and hyperphosphatemia, concern for new obstruction vs. infection, and to assess for HD needs acutely.  Repeat labwork noted stable sCr with elevated potassium, MA and signs of complicated UTI given SPC use.    #JOÃO on CKD stage 5  #Moderate hydronephrosis  #Complicated Cystitis  #HTN  #NIDDM  #prior spinal abscess c/b need for SPC  #nephrolithiasis  Imaging without new or worsening hydronephrosis, did note multiple kidney stones but all nonobstructing.    - appreciate urology f/u  - continue IV Abx as ordered, follow ID recs  - acidosis resolved with iv bicarb  - creat slightly decreasing but remains severely elevated  - start 1/2NS at 75cc/hr / repeat chemistry in PM to monitor electrollytes  - phos is severely elevated-- increase phoslo to 3 tabs tid with meals/ monitor calcium / check PTH  - renal diet  - Dose medications to eGFR <15  - Avoid Nephrotoxic agents  - No acute indication for RRT / hyperkalemia resolved / 5/2/25 TTE w/o pericardial effusion / mental status seems to be at baseline  - however, given advanced CKD and multiple episodes of severe JOÃO, would benefit from start RRT / should be discussed with pt's family (?brother) in GOC discussions (pt lacks insight)    Nephrology will follow.

## 2025-05-26 NOTE — PROGRESS NOTE ADULT - SUBJECTIVE AND OBJECTIVE BOX
Nephrology Progress Note    KRISTY GARCIA  [id number]  70y  Male    S:  Patient is seen and examined, events over the last 24h noted.    O:  Allergies:  No Known Allergies    Hospital Medications:   MEDICATIONS  (STANDING):  apixaban 2.5 milliGRAM(s) Oral two times a day  calcium acetate 667 milliGRAM(s) Oral three times a day with meals  cefepime   IVPB 1000 milliGRAM(s) IV Intermittent daily  chlorhexidine 2% Cloths 1 Application(s) Topical <User Schedule>  metroNIDAZOLE    Tablet 500 milliGRAM(s) Oral every 12 hours  pantoprazole    Tablet 40 milliGRAM(s) Oral daily    MEDICATIONS  (PRN):  ondansetron Injectable 4 milliGRAM(s) IV Push every 8 hours PRN Nausea and/or Vomiting    Home Medications:  Eliquis 5 mg oral tablet: 0.5 tab(s) orally every 12 hours (30 Apr 2025 23:24)  folic acid 1 mg oral tablet: 1 tab(s) orally once a day (29 Apr 2025 09:09)  Multiple Vitamins oral tablet: 1 tab(s) orally once a day (15 May 2025 14:05)      VITALS:  Daily     Daily   T(F): 97.9 (05-26-25 @ 04:18), Max: 97.9 (05-26-25 @ 04:18)  HR: 55 (05-26-25 @ 04:18)  BP: 118/70 (05-26-25 @ 04:18)  RR: 18 (05-26-25 @ 04:18)  SpO2: 92% (05-26-25 @ 04:18)  Wt(kg): --  I&O's Detail    25 May 2025 07:01  -  26 May 2025 07:00  --------------------------------------------------------  IN:    dextrose 5% w/ Additives: 525 mL    Oral Fluid: 322 mL  Total IN: 847 mL    OUT:    Indwelling Catheter - Suprapubic (mL): 1900 mL    Stool (mL): 3 mL  Total OUT: 1903 mL    Total NET: -1056 mL      26 May 2025 07:01  -  26 May 2025 13:24  --------------------------------------------------------  IN:    Oral Fluid: 240 mL  Total IN: 240 mL    OUT:  Total OUT: 0 mL    Total NET: 240 mL        I&O's Summary    25 May 2025 07:01  -  26 May 2025 07:00  --------------------------------------------------------  IN: 847 mL / OUT: 1903 mL / NET: -1056 mL    26 May 2025 07:01  -  26 May 2025 13:24  --------------------------------------------------------  IN: 240 mL / OUT: 0 mL / NET: 240 mL          PHYSICAL EXAM:  Gen: NAD ill appearing  Chest: b/l breath sounds  Abd: soft, SPC  Extremities: no edema      LABS:    05-26    143  |  99  |  107[HH]  ----------------------------<  94  4.3   |  22  |  8.4[HH]    Ca    6.4[L]      26 May 2025 04:47  Phos  10.1     05-24  Mg     1.9     05-26    TPro  6.3  /  Alb  2.5[L]  /  TBili  <0.2  /  DBili      /  AST  97[H]  /  ALT  75[H]  /  AlkPhos  521[H]  05-24    Phosphorus: 10.1 mg/dL (05-24-25 @ 15:59)  Phosphorus: 6.1 mg/dL (05-13-25 @ 09:17)    Vitamin D, 25-Hydroxy: 11 ng/mL (05-26-25 @ 04:47)                          7.3    5.20  )-----------( 162      ( 26 May 2025 04:47 )             22.3     Mean Cell Volume: 88.5 fL (05-26-25 @ 04:47)        Urine Studies:  Protein, Urine: 100 mg/dL (05-23-25 @ 12:30)  White Blood Cell - Urine: >998 /HPF (05-23-25 @ 12:30)  Red Blood Cell - Urine: 281 /HPF (05-23-25 @ 12:30)    Culture Results:   No growth at 24 hours (05-24 @ 13:20)  Culture Results:   Culture positive, 10,000 - 49,000 CFU/mL . Identification to follow. (05-23 @ 12:30)    Creatinine trend:  Creatinine: 8.4 mg/dL (05-26-25 @ 04:47)  Creatinine: 9.0 mg/dL (05-25-25 @ 04:36)  Creatinine: 8.9 mg/dL (05-24-25 @ 15:59)  Creatinine: 8.6 mg/dL (05-24-25 @ 13:20)  Creatinine: 9.3 mg/dL (05-23-25 @ 20:22)

## 2025-05-26 NOTE — PROGRESS NOTE ADULT - ASSESSMENT
70y M pmh CKD V, HTN, NIDDM, afib, prior spinal abscess, nephrolithiasis and recurrent UTI s/p b/l stent placement and SPC presenting for worsening creatinine admitted for JOÃO on CKD and chronic cholecystitis    #JOÃO on CKD V  #HAGMA   #Hyperkalemia - resolved   #Cystitis   #Hx SPC  #Hx nephrolithiasis sp b/l stent placement   - bsl Cr 5.5>9.5, CO2 15, K 5.4, pH 7.2 on vbg  - UA +ve bacteria, WBC >998, +ve LE  - CTAP  Bilateral nephroureteral catheters beginning in the renal pelvis and terminating within the urinary bladder. Improved right hydronephrosis now with right renal pelvic fullness. Mild improvement in left moderate hydronephrosis. Bilateral renal and pelvic stones difficult to compare given motion artifact at the level of the kidneys. No definite stone along the course of the ureters. Nephroureteral catheters and a suprapubic catheter terminating within an underdistended urinary bladder. Prostate measures 4.9 cm in transverse dimension.   - uro c/s appreciated: f/u OP for definitive stone treatment and stent removal once stable   - nephro c/s appreciated: no RRT for now  - c/w phoslo TIDAC  - s/p bicarb gtt   - s/p lokelma  - bcx ngtd  - f/u ucx  - strict I/O, avoid nephrotoxins, dose meds per GFR    #Chronic cholecystitis  - GB wall thickened 0.4mm on RUQ US  - lactate -ve, Tbili wnl, CTAP showing GB wall thickening with pericholecystic fluid  - HIDA (+)  - IR: likely chronic, conservative mgmt  - surgery recs appreciated   - ID recs appreciated: c/w cefepime, flagyl  - bcx ngtd  - f/u ID for duration    #Afib  #HTN  - eliquis 2.5mg bid   - hold home nifedipine    #Constipation - resolved  - CTAP - no bowel obstruction, large rectal stool burden with distention measuring 7.8 cm   - bowel regimen discontinued, pt having 3 BMs daily   - stool count    #Normocytic anemia suspected 2/2 CKD  - b12 1269, folate 12.2  - iron 95, ferritin 438  - no active bleeding, continue to monitor     #Hypocalcemia 2/2 hyperphosphatemia  - Corrected Ca 7.6  - elevated ALP   - f/u PTH, Phos, vit D     ---------------------  DVT ppx: eliquis   Diet: Renal  GI ppx/Bowel regimen: none  Activity: IAT  GOC: DNR/DNI trial NIV  Dispo: pending  #Summary/Handoff:  - nephro f/u, ID f/u, ucx f/u

## 2025-05-27 ENCOUNTER — TRANSCRIPTION ENCOUNTER (OUTPATIENT)
Age: 70
End: 2025-05-27

## 2025-05-27 LAB
ALBUMIN SERPL ELPH-MCNC: 2.5 G/DL — LOW (ref 3.5–5.2)
ALP SERPL-CCNC: 419 U/L — HIGH (ref 30–115)
ALT FLD-CCNC: 42 U/L — HIGH (ref 0–41)
ANION GAP SERPL CALC-SCNC: 19 MMOL/L — HIGH (ref 7–14)
AST SERPL-CCNC: 44 U/L — HIGH (ref 0–41)
BASOPHILS # BLD AUTO: 0.04 K/UL — SIGNIFICANT CHANGE UP (ref 0–0.2)
BASOPHILS NFR BLD AUTO: 0.7 % — SIGNIFICANT CHANGE UP (ref 0–1)
BILIRUB DIRECT SERPL-MCNC: <0.2 MG/DL — SIGNIFICANT CHANGE UP (ref 0–0.3)
BILIRUB INDIRECT FLD-MCNC: >0 MG/DL — LOW (ref 0.2–1.2)
BILIRUB SERPL-MCNC: 0.2 MG/DL — SIGNIFICANT CHANGE UP (ref 0.2–1.2)
BUN SERPL-MCNC: 106 MG/DL — CRITICAL HIGH (ref 10–20)
CALCIUM SERPL-MCNC: 7 MG/DL — LOW (ref 8.4–10.5)
CHLORIDE SERPL-SCNC: 100 MMOL/L — SIGNIFICANT CHANGE UP (ref 98–110)
CO2 SERPL-SCNC: 25 MMOL/L — SIGNIFICANT CHANGE UP (ref 17–32)
CREAT SERPL-MCNC: 8.2 MG/DL — CRITICAL HIGH (ref 0.7–1.5)
EGFR: 6 ML/MIN/1.73M2 — LOW
EGFR: 6 ML/MIN/1.73M2 — LOW
EOSINOPHIL # BLD AUTO: 0.21 K/UL — SIGNIFICANT CHANGE UP (ref 0–0.7)
EOSINOPHIL NFR BLD AUTO: 3.6 % — SIGNIFICANT CHANGE UP (ref 0–8)
GLUCOSE SERPL-MCNC: 76 MG/DL — SIGNIFICANT CHANGE UP (ref 70–99)
HCT VFR BLD CALC: 24 % — LOW (ref 42–52)
HGB BLD-MCNC: 7.7 G/DL — LOW (ref 14–18)
IMM GRANULOCYTES NFR BLD AUTO: 4.3 % — HIGH (ref 0.1–0.3)
LYMPHOCYTES # BLD AUTO: 1.18 K/UL — LOW (ref 1.2–3.4)
LYMPHOCYTES # BLD AUTO: 20.2 % — LOW (ref 20.5–51.1)
MAGNESIUM SERPL-MCNC: 1.9 MG/DL — SIGNIFICANT CHANGE UP (ref 1.8–2.4)
MCHC RBC-ENTMCNC: 29.1 PG — SIGNIFICANT CHANGE UP (ref 27–31)
MCHC RBC-ENTMCNC: 32.1 G/DL — SIGNIFICANT CHANGE UP (ref 32–37)
MCV RBC AUTO: 90.6 FL — SIGNIFICANT CHANGE UP (ref 80–94)
MONOCYTES # BLD AUTO: 0.5 K/UL — SIGNIFICANT CHANGE UP (ref 0.1–0.6)
MONOCYTES NFR BLD AUTO: 8.6 % — SIGNIFICANT CHANGE UP (ref 1.7–9.3)
NEUTROPHILS # BLD AUTO: 3.66 K/UL — SIGNIFICANT CHANGE UP (ref 1.4–6.5)
NEUTROPHILS NFR BLD AUTO: 62.6 % — SIGNIFICANT CHANGE UP (ref 42.2–75.2)
NRBC BLD AUTO-RTO: 0 /100 WBCS — SIGNIFICANT CHANGE UP (ref 0–0)
PHOSPHATE SERPL-MCNC: 9.3 MG/DL — HIGH (ref 2.1–4.9)
PLATELET # BLD AUTO: 182 K/UL — SIGNIFICANT CHANGE UP (ref 130–400)
PMV BLD: 11.8 FL — HIGH (ref 7.4–10.4)
POTASSIUM SERPL-MCNC: 4.9 MMOL/L — SIGNIFICANT CHANGE UP (ref 3.5–5)
POTASSIUM SERPL-SCNC: 4.9 MMOL/L — SIGNIFICANT CHANGE UP (ref 3.5–5)
PROT SERPL-MCNC: 6.7 G/DL — SIGNIFICANT CHANGE UP (ref 6–8)
RBC # BLD: 2.65 M/UL — LOW (ref 4.7–6.1)
RBC # FLD: 13.6 % — SIGNIFICANT CHANGE UP (ref 11.5–14.5)
SODIUM SERPL-SCNC: 144 MMOL/L — SIGNIFICANT CHANGE UP (ref 135–146)
VIT D25+D1,25 OH+D1,25 PNL SERPL-MCNC: 14.6 PG/ML — LOW (ref 19.9–79.3)
WBC # BLD: 5.84 K/UL — SIGNIFICANT CHANGE UP (ref 4.8–10.8)
WBC # FLD AUTO: 5.84 K/UL — SIGNIFICANT CHANGE UP (ref 4.8–10.8)

## 2025-05-27 PROCEDURE — 99233 SBSQ HOSP IP/OBS HIGH 50: CPT

## 2025-05-27 PROCEDURE — 93010 ELECTROCARDIOGRAM REPORT: CPT

## 2025-05-27 RX ORDER — CALCIUM CARBONATE 750 MG/1
1 TABLET ORAL EVERY 6 HOURS
Refills: 0 | Status: DISCONTINUED | OUTPATIENT
Start: 2025-05-27 | End: 2025-05-27

## 2025-05-27 RX ADMIN — Medication 2001 MILLIGRAM(S): at 07:52

## 2025-05-27 RX ADMIN — APIXABAN 2.5 MILLIGRAM(S): 2.5 TABLET, FILM COATED ORAL at 17:38

## 2025-05-27 RX ADMIN — Medication 1 APPLICATION(S): at 06:05

## 2025-05-27 RX ADMIN — Medication 2001 MILLIGRAM(S): at 11:43

## 2025-05-27 RX ADMIN — APIXABAN 2.5 MILLIGRAM(S): 2.5 TABLET, FILM COATED ORAL at 06:04

## 2025-05-27 RX ADMIN — Medication 500 MILLIGRAM(S): at 17:39

## 2025-05-27 RX ADMIN — CEFEPIME 100 MILLIGRAM(S): 2 INJECTION, POWDER, FOR SOLUTION INTRAVENOUS at 11:42

## 2025-05-27 RX ADMIN — Medication 500 MILLIGRAM(S): at 06:04

## 2025-05-27 RX ADMIN — Medication 40 MILLIGRAM(S): at 11:43

## 2025-05-27 RX ADMIN — Medication 1000 UNIT(S): at 11:43

## 2025-05-27 RX ADMIN — Medication 2001 MILLIGRAM(S): at 17:38

## 2025-05-27 NOTE — PROGRESS NOTE ADULT - SUBJECTIVE AND OBJECTIVE BOX
LISAKRISTY MARIE  70y, Male  Allergy: No Known Allergies      LOS  4d    CHIEF COMPLAINT: JOÃO on CKD (27 May 2025 10:16)      INTERVAL EVENTS/HPI  - No acute events overnight  - T(F): , Max: 98.2 (05-26-25 @ 19:45)  - Denies any worsening symptoms  - Tolerating medication  - WBC Count: 5.84 (05-27-25 @ 04:41)  WBC Count: 5.20 (05-26-25 @ 04:47)     - Creatinine: 8.2 (05-27-25 @ 04:41)  Creatinine: 8.5 (05-26-25 @ 20:57)       ROS  General: Denies rigors, nightsweats  HEENT: Denies headache, rhinorrhea, sore throat, eye pain  CV: Denies CP, palpitations  PULM: Denies wheezing, hemoptysis  GI: Denies hematemesis, hematochezia, melena  : Denies discharge, hematuria  MSK: Denies arthralgias, myalgias  SKIN: Denies rash, lesions  NEURO: Denies paresthesias, weakness  PSYCH: Denies depression, anxiety    VITALS:  T(F): 97.5, Max: 98.2 (05-26-25 @ 19:45)  HR: 70  BP: 142/81  RR: 18Vital Signs Last 24 Hrs  T(C): 36.4 (27 May 2025 13:06), Max: 36.8 (26 May 2025 19:45)  T(F): 97.5 (27 May 2025 13:06), Max: 98.2 (26 May 2025 19:45)  HR: 70 (27 May 2025 13:06) (59 - 70)  BP: 142/81 (27 May 2025 13:06) (116/69 - 142/81)  BP(mean): 85 (27 May 2025 04:00) (85 - 85)  RR: 18 (27 May 2025 13:06) (18 - 18)  SpO2: --        PHYSICAL EXAM:  Gen: NAD, resting in bed  HEENT: Normocephalic, atraumatic  Neck: supple, no lymphadenopathy  CV: Regular rate & regular rhythm  Lungs: decreased BS at bases, no fremitus  Abdomen: Soft, BS present  Ext: Warm, well perfused  Neuro: non focal, awake  Skin: no rash, no erythema  Lines: no phlebitis    FH: Non-contributory  Social Hx: Non-contributory    TESTS & MEASUREMENTS:                        7.7    5.84  )-----------( 182      ( 27 May 2025 04:41 )             24.0     05-27    144  |  100  |  106[HH]  ----------------------------<  76  4.9   |  25  |  8.2[HH]    Ca    7.0[L]      27 May 2025 04:41  Phos  9.3     05-27  Mg     1.9     05-27    TPro  6.7  /  Alb  2.5[L]  /  TBili  0.2  /  DBili  <0.2  /  AST  44[H]  /  ALT  42[H]  /  AlkPhos  419[H]  05-27      LIVER FUNCTIONS - ( 27 May 2025 10:22 )  Alb: 2.5 g/dL / Pro: 6.7 g/dL / ALK PHOS: 419 U/L / ALT: 42 U/L / AST: 44 U/L / GGT: x           Urinalysis Basic - ( 27 May 2025 04:41 )    Color: x / Appearance: x / SG: x / pH: x  Gluc: 76 mg/dL / Ketone: x  / Bili: x / Urobili: x   Blood: x / Protein: x / Nitrite: x   Leuk Esterase: x / RBC: x / WBC x   Sq Epi: x / Non Sq Epi: x / Bacteria: x        Culture - Blood (collected 05-24-25 @ 13:20)  Source: Blood None  Preliminary Report (05-26-25 @ 22:01):    No growth at 48 Hours    Culture - Urine (collected 05-23-25 @ 12:30)  Source: Clean Catch Clean Catch (Midstream)  Final Report (05-26-25 @ 16:19):    10,000 - 49,000 CFU/mL Candida albicans "Susceptibilities not performed"    Culture - Blood (collected 05-12-25 @ 09:59)  Source: Blood Blood-Peripheral  Final Report (05-17-25 @ 23:07):    No growth at 5 days    Culture - Blood (collected 05-12-25 @ 09:59)  Source: Blood Blood-Peripheral  Final Report (05-17-25 @ 23:07):    No growth at 5 days    Culture - Urine (collected 05-12-25 @ 09:59)  Source: Clean Catch Clean Catch (Midstream)  Final Report (05-13-25 @ 20:03):    <10,000 CFU/mL Normal Urogenital Lilibeth    Culture - Urine (collected 05-01-25 @ 09:34)  Source: OR Collect right kidney urine for culture  Final Report (05-04-25 @ 15:47):    >100,000 CFU/ml Serratia marcescens    >100,000 CFU/ml Staphylococcus aureus    50,000 - 99,000 CFU/mL Pseudomonas aeruginosa    50,000 - 99,000 CFU/mL Proteus mirabilis  Organism: Serratia marcescens  Staphylococcus aureus  Pseudomonas aeruginosa  Proteus mirabilis (05-04-25 @ 15:47)  Organism: Pseudomonas aeruginosa (05-04-25 @ 15:47)      -  Levofloxacin: S <=0.5      -  Aztreonam: S <=4      -  Cefepime: S <=2      -  Piperacillin/Tazobactam: S <=8      -  Ciprofloxacin: S <=0.25      -  Imipenem: S <=1      Method Type: ALF      -  Meropenem: S <=1      -  Ceftazidime: S 4  Organism: Proteus mirabilis (05-04-25 @ 15:47)      -  Levofloxacin: R >4      -  Tobramycin: I 4      -  Aztreonam: S <=4      -  Gentamicin: I 4      -  Cefazolin: R 16 For uncomplicated UTI with K. pneumoniae, E. coli, or P. mirablis: ALF <=16 is sensitive and ALF >=32 is resistant. This also predicts results for oral agents cefaclor, cefdinir, cefpodoxime, cefprozil, cefuroxime axetil, cephalexin and locarbeffor uncomplicated UTI. Note that some isolates may be susceptible to these agents while testing resistant to cefazolin.      -  Cefepime: S <=2      -  Piperacillin/Tazobactam: S <=8      -  Ciprofloxacin: R >2      -  Ceftriaxone: S <=1      -  Ampicillin: R >16 These ampicillin results predict results for amoxicillin      Method Type: ALF      -  Meropenem: S <=1      -  Ampicillin/Sulbactam: R >16/8      -  Cefoxitin: S <=8      -  Cefuroxime: S <=4      -  Amoxicillin/Clavulanic Acid: I 16/8      -  Trimethoprim/Sulfamethoxazole: R >2/38      -  Ertapenem: S <=0.5  Organism: Serratia marcescens (05-04-25 @ 15:47)      -  Levofloxacin: S <=0.5      -  Tobramycin: I 4      -  Aztreonam: S <=4      -  Gentamicin: S <=2      -  Cefazolin: R >16      -  Cefepime: S <=2      -  Piperacillin/Tazobactam: S <=8      -  Ciprofloxacin: S <=0.25      -  Ceftriaxone: S <=1      -  Ampicillin: R >16 These ampicillin results predict results for amoxicillin      Method Type: ALF      -  Meropenem: S <=1      -  Ampicillin/Sulbactam: R >16/8      -  Cefoxitin: R 16      -  Amoxicillin/Clavulanic Acid: R >16/8      -  Trimethoprim/Sulfamethoxazole: S <=0.5/9.5      -  Ertapenem: S <=0.5  Organism: Staphylococcus aureus (05-04-25 @ 15:47)      -  Oxacillin: S 1 Oxacillin predicts susceptibility for dicloxacillin, methicillin, and nafcillin      -  Gentamicin: S <=4 Should not be used as monotherapy      -  Vancomycin: S 1      -  Tetracycline: S <=4      Method Type: ALF      -  Penicillin: R >2      -  Rifampin: S <=1 Should not be used as monotherapy      -  Trimethoprim/Sulfamethoxazole: S <=0.5/9.5    Culture - Urine (collected 05-01-25 @ 09:30)  Source: OR Collect left kidney urine for culture  Final Report (05-04-25 @ 15:50):    50,000 - 99,000 CFU/mL Serratia marcescens    50,000 - 99,000 CFU/mL Staphylococcus aureus    >100,000 CFU/ml Pseudomonas aeruginosa    10,000 - 49,000 CFU/mL Proteus mirabilis  Organism: Serratia marcescens  Staphylococcus aureus  Pseudomonas aeruginosa  Proteus mirabilis (05-04-25 @ 15:50)  Organism: Proteus mirabilis (05-04-25 @ 15:50)      -  Levofloxacin: R >4      -  Tobramycin: I 4      -  Aztreonam: S <=4      -  Gentamicin: I 4      -  Cefepime: S <=2      -  Cefazolin: R 8 For uncomplicated UTI with K. pneumoniae, E. coli, or P. mirablis: ALF <=16 is sensitive and ALF >=32 is resistant. This also predicts results for oral agents cefaclor, cefdinir, cefpodoxime, cefprozil, cefuroxime axetil, cephalexin and locarbef for uncomplicated UTI. Note that some isolates may be susceptible to these agents while testing resistant to cefazolin.      -  Piperacillin/Tazobactam: S <=8      -  Ciprofloxacin: R >2      -  Ceftriaxone: S <=1      -  Ampicillin: R >16 These ampicillin results predict results for amoxicillin      Method Type: ALF      -  Meropenem: S <=1      -  Ampicillin/Sulbactam: R >16/8      -  Cefoxitin: S <=8      -  Cefuroxime: S <=4      -  Amoxicillin/Clavulanic Acid: I 16/8      -  Trimethoprim/Sulfamethoxazole: R >2/38      -  Ertapenem: S <=0.5  Organism: Pseudomonas aeruginosa (05-04-25 @ 15:50)      -  Levofloxacin: R >4      -  Aztreonam: S <=4      -  Cefepime: S <=2      -  Piperacillin/Tazobactam: S <=8      -  Ciprofloxacin: R >2      -  Imipenem: S 2      Method Type: ALF      -  Meropenem: S <=1      -  Ceftazidime: S 4  Organism: Serratia marcescens (05-04-25 @ 15:50)      -  Levofloxacin: S <=0.5      -  Tobramycin: S <=2      -  Aztreonam: S <=4      -  Gentamicin: S <=2      -  Cefepime: S <=2      -  Cefazolin: R >16      -  Piperacillin/Tazobactam: S <=8      -  Ciprofloxacin: S <=0.25      -  Ceftriaxone: S <=1      -  Ampicillin: R >16 These ampicillin results predict results for amoxicillin      Method Type: ALF      -  Meropenem: S <=1      -  Ampicillin/Sulbactam: R 16/8      -  Cefoxitin: R <=8      -  Amoxicillin/Clavulanic Acid: R >16/8      -  Trimethoprim/Sulfamethoxazole: S <=0.5/9.5      -  Ertapenem: S <=0.5  Organism: Staphylococcus aureus (05-04-25 @ 15:50)      -  Oxacillin: S 1 Oxacillin predicts susceptibility for dicloxacillin, methicillin, and nafcillin      -  Gentamicin: S <=4 Should not be used as monotherapy      -  Vancomycin: S 0.5      -  Tetracycline: S <=4      Method Type: ALF      -  Penicillin: R >2      -  Rifampin: S <=1 Should not be used as monotherapy      -  Trimethoprim/Sulfamethoxazole: S <=0.5/9.5    Urinalysis with Rflx Culture (collected 04-30-25 @ 20:03)    Culture - Urine (collected 04-30-25 @ 20:03)  Source: Clean Catch None  Final Report (05-02-25 @ 11:08):    >=3 organisms. Probable collection contamination.    Culture - Blood (collected 04-30-25 @ 17:54)  Source: Blood Blood-Peripheral  Gram Stain (05-02-25 @ 09:05):    Growth in anaerobic bottle: Gram Negative Rods  Final Report (05-04-25 @ 08:04):    Growth in anaerobic bottle: Proteus mirabilis  Organism: Proteus mirabilis (05-04-25 @ 08:04)  Organism: Proteus mirabilis (05-04-25 @ 08:04)      -  Levofloxacin: R >4      -  Tobramycin: S <=2      -  Aztreonam: S <=4      -  Gentamicin: I 4      -  Cefepime: S <=2      -  Cefazolin: R 8      -  Piperacillin/Tazobactam: S <=8      -  Ciprofloxacin: R >2      -  Ceftriaxone: S <=1      -  Ampicillin: R >16 These ampicillin results predict results for amoxicillin      Method Type: LAF      -  Meropenem: S <=1      -  Ampicillin/Sulbactam: R >16/8      -  Cefoxitin: S <=8      -  Trimethoprim/Sulfamethoxazole: R >2/38      -  Ertapenem: S <=0.5    Culture - Blood (collected 04-30-25 @ 17:54)  Source: Blood Blood-Peripheral  Gram Stain (05-01-25 @ 13:23):    Growth in anaerobic bottle: Gram Negative Rods  Final Report (05-03-25 @ 11:55):    Growth in anaerobic bottle: Serratia marcescens    Direct identification is available within approximately 3-5    hours either by Blood Panel Multiplexed PCR or Direct    MALDI-TOF. Details: [email or web address]  Organism: Blood Culture PCR  Serratia marcescens (05-03-25 @ 11:55)  Organism: Blood Culture PCR (05-03-25 @ 11:55)      Method Type: PCR      -  Serratia marcescens: Detec  Organism: Serratia marcescens (05-03-25 @ 11:55)      -  Levofloxacin: S <=0.5      -  Tobramycin: S <=2      -  Aztreonam: S <=4      -  Gentamicin: S <=2      -  Cefepime: S <=2      -  Cefazolin: R >16      -  Piperacillin/Tazobactam: S <=8      -  Ciprofloxacin: S <=0.25      -  Ceftriaxone: S <=1      -  Ampicillin: R >16 These ampicillin results predict results for amoxicillin      Method Type: ALF      -  Meropenem: S <=1      -  Ampicillin/Sulbactam: R >16/8      -  Cefoxitin: R <=8      -  Trimethoprim/Sulfamethoxazole: S <=0.5/9.5      -  Ertapenem: S <=0.5        Blood Gas Venous - Lactate: 1.1 mmol/L (05-23-25 @ 14:04)      INFECTIOUS DISEASES TESTING  MRSA PCR Result.: Positive (05-01-25 @ 09:00)  Procalcitonin: 0.51 (08-03-24 @ 08:12)      INFLAMMATORY MARKERS      RADIOLOGY & ADDITIONAL TESTS:  I have personally reviewed the last available Chest xray  CXR      CT      CARDIOLOGY TESTING  12 Lead ECG:   Ventricular Rate 69 BPM    Atrial Rate 69 BPM    P-R Interval 234 ms    QRS Duration 96 ms    Q-T Interval 416 ms    QTC Calculation(Bazett) 445 ms    P Axis 36 degrees    R Axis 61 degrees    T Axis 50 degrees    Diagnosis Line Sinus rhythm with 1st degree A-V block  Otherwise normal ECG    Confirmed by Keven Gallegos ([id number]) on 5/27/2025 12:20:49 PM (05-27-25 @ 07:41)  12 Lead ECG:   Ventricular Rate 70 BPM    Atrial Rate 70 BPM    P-R Interval 226 ms    QRS Duration 96 ms    Q-T Interval 452 ms    QTC Calculation(Bazett) 488 ms    P Axis -6 degrees    R Axis 9 degrees    T Axis 14 degrees    Diagnosis Line Sinus rhythm with 1st degree A-V block  Minimal voltage criteria for LVH, may be normal variant ( R in aVL )  Inferior infarct , age undetermined  Abnormal ECG    Confirmed by Julius Don ([id number]) on 5/23/2025 2:18:14 PM (05-23-25 @ 12:45)      MEDICATIONS  apixaban 2.5 Oral two times a day  calcium acetate 2001 Oral three times a day with meals  cefepime   IVPB 1000 IV Intermittent daily  chlorhexidine 2% Cloths 1 Topical <User Schedule>  cholecalciferol 1000 Oral daily  metroNIDAZOLE    Tablet 500 Oral every 12 hours  pantoprazole  Injectable 40 IV Push daily      WEIGHT  Weight (kg): 77.1 (05-23-25 @ 11:15)  Creatinine: 8.2 mg/dL (05-27-25 @ 04:41)  Creatinine: 8.5 mg/dL (05-26-25 @ 20:57)      ANTIBIOTICS:  cefepime   IVPB 1000 milliGRAM(s) IV Intermittent daily  metroNIDAZOLE    Tablet 500 milliGRAM(s) Oral every 12 hours      All available historical records have been reviewed

## 2025-05-27 NOTE — PROGRESS NOTE ADULT - SUBJECTIVE AND OBJECTIVE BOX
seen and examined  24 h events noted   no distress      PAST HISTORY  --------------------------------------------------------------------------------  No significant changes to PMH, PSH, FHx, SHx, unless otherwise noted    ALLERGIES & MEDICATIONS  --------------------------------------------------------------------------------  Allergies    No Known Allergies    Intolerances      Standing Inpatient Medications  apixaban 2.5 milliGRAM(s) Oral two times a day  calcium acetate 2001 milliGRAM(s) Oral three times a day with meals  cefepime   IVPB 1000 milliGRAM(s) IV Intermittent daily  chlorhexidine 2% Cloths 1 Application(s) Topical <User Schedule>  metroNIDAZOLE    Tablet 500 milliGRAM(s) Oral every 12 hours  pantoprazole    Tablet 40 milliGRAM(s) Oral daily    PRN Inpatient Medications  ondansetron Injectable 4 milliGRAM(s) IV Push every 8 hours PRN      VITALS/PHYSICAL EXAM  --------------------------------------------------------------------------------  T(C): 36.6 (05-27-25 @ 04:00), Max: 36.8 (05-26-25 @ 19:45)  HR: 65 (05-27-25 @ 04:00) (59 - 65)  BP: 116/69 (05-27-25 @ 04:00) (100/56 - 120/71)  RR: 18 (05-27-25 @ 04:00) (18 - 18)  SpO2: --  Wt(kg): --        05-26-25 @ 07:01  -  05-27-25 @ 07:00  --------------------------------------------------------  IN: 1170 mL / OUT: 1300 mL / NET: -130 mL      Physical Exam:  	Gen: NAD  	Pulm: CTA B/L  	CV: S1S2; no rub  	Abd: +distended  	Vascular access:    LABS/STUDIES  --------------------------------------------------------------------------------              7.7    5.84  >-----------<  182      [05-27-25 @ 04:41]              24.0     144  |  100  |  106  ----------------------------<  76      [05-27-25 @ 04:41]  4.9   |  25  |  8.2        Ca     7.0     [05-27-25 @ 04:41]      Mg     1.9     [05-27-25 @ 04:41]      Phos  9.3     [05-27-25 @ 04:41]        Creatinine Trend:  SCr 8.2 [05-27 @ 04:41]  SCr 8.5 [05-26 @ 20:57]  SCr 8.4 [05-26 @ 04:47]  SCr 9.0 [05-25 @ 04:36]  SCr 8.9 [05-24 @ 15:59]    Urinalysis - [05-27-25 @ 04:41]      Color  / Appearance  / SG  / pH       Gluc 76 / Ketone   / Bili  / Urobili        Blood  / Protein  / Leuk Est  / Nitrite       RBC  / WBC  / Hyaline  / Gran  / Sq Epi  / Non Sq Epi  / Bacteria       Iron 95, TIBC --, %sat --      [05-04-25 @ 06:41]  Ferritin 438      [05-04-25 @ 06:10]  PTH -- (Ca 6.1)      [05-26-25 @ 04:47]   288  PTH -- (Ca 8.3)      [07-10-24 @ 21:08]   43  Vitamin D (25OH) 11      [05-26-25 @ 04:47]  TSH 5.27      [04-30-25 @ 17:55]

## 2025-05-27 NOTE — PROGRESS NOTE ADULT - SUBJECTIVE AND OBJECTIVE BOX
KRISTY GARCIA  70y Male    CHIEF COMPLAINT:    Patient is a 70y old  Male who presents with a chief complaint of JOÃO on CKD (26 May 2025 13:24)      INTERVAL HPI/OVERNIGHT EVENTS:    Patient seen and examined.    ROS: All other systems are negative.    Vital Signs:    T(F): 97.8 (25 @ 04:00), Max: 98.2 (25 @ 19:45)  HR: 65 (25 @ 04:00) (59 - 65)  BP: 116/69 (25 @ 04:00) (100/56 - 120/71)  RR: 18 (25 @ 04:00) (18 - 18)  SpO2: --  I&O's Summary    26 May 2025 07:01  -  27 May 2025 07:00  --------------------------------------------------------  IN: 1170 mL / OUT: 1300 mL / NET: -130 mL      Daily     Daily Weight in k (27 May 2025 04:50)  CAPILLARY BLOOD GLUCOSE          PHYSICAL EXAM:    GENERAL:  NAD  SKIN: No rashes or lesions  HENT: Atraumatic. Normocephalic. PERRL. Moist membranes.  NECK: Supple, No JVD. No lymphadenopathy.  PULMONARY: CTA B/L. No wheezing. No rales  CVS: Normal S1, S2. Rate and Rhythm are regular. No murmurs.  ABDOMEN/GI: Soft, Nontender, Nondistended; BS present  EXTREMITIES: Peripheral pulses intact. No edema B/L LE.  NEUROLOGIC:  No motor or sensory deficit.  PSYCH: Alert & oriented x 3    Consultant(s) Notes Reviewed:  [x ] YES  [ ] NO  Care Discussed with Consultants/Other Providers [ x] YES  [ ] NO    EKG reviewed  Telemetry reviewed    LABS:                        7.7    5.84  )-----------( 182      ( 27 May 2025 04:41 )             24.0         141  |  96[L]  |  103[HH]  ----------------------------<  88  4.5   |  25  |  8.5[HH]    Ca    6.4[L]      26 May 2025 20:57  Mg     1.9                   Culture - Blood (collected 24 May 2025 13:20)  Source: Blood None  Preliminary Report (26 May 2025 22:01):    No growth at 48 Hours        RADIOLOGY & ADDITIONAL TESTS:      Imaging or report Personally Reviewed:  [ ] YES  [ ] NO    Medications:  Standing  apixaban 2.5 milliGRAM(s) Oral two times a day  calcium acetate 2001 milliGRAM(s) Oral three times a day with meals  cefepime   IVPB 1000 milliGRAM(s) IV Intermittent daily  chlorhexidine 2% Cloths 1 Application(s) Topical <User Schedule>  metroNIDAZOLE    Tablet 500 milliGRAM(s) Oral every 12 hours  pantoprazole    Tablet 40 milliGRAM(s) Oral daily    PRN Meds  ondansetron Injectable 4 milliGRAM(s) IV Push every 8 hours PRN      Case discussed with resident    Care discussed with pt/family           KRISTY GARCIA  70y Male    CHIEF COMPLAINT:    Patient is a 70y old  Male who presents with a chief complaint of JOÃO on CKD (26 May 2025 13:24)      INTERVAL HPI/OVERNIGHT EVENTS:    Patient seen and examined. Pt states that he is feeling good. No sob. On RA. Needs HD as per renal    ROS: All other systems are negative.    Vital Signs:    T(F): 97.8 (25 @ 04:00), Max: 98.2 (25 @ 19:45)  HR: 65 (25 @ 04:00) (59 - 65)  BP: 116/69 (25 @ 04:00) (100/56 - 120/71)  RR: 18 (25 @ 04:00) (18 - 18)  SpO2: --  I&O's Summary    26 May 2025 07:01  -  27 May 2025 07:00  --------------------------------------------------------  IN: 1170 mL / OUT: 1300 mL / NET: -130 mL      Daily     Daily Weight in k (27 May 2025 04:50)  CAPILLARY BLOOD GLUCOSE          PHYSICAL EXAM:    GENERAL:  NAD  SKIN: No rashes or lesions  HENT: Atraumatic. Normocephalic. PERRL. Moist membranes.  NECK: Supple, No JVD. No lymphadenopathy.  PULMONARY: CTA B/L. No wheezing. No rales  CVS: Normal S1, S2. Rate and Rhythm are regular. No murmurs.  ABDOMEN/GI: Soft, Nontender, Nondistended; BS present  EXTREMITIES: Peripheral pulses intact. No edema B/L LE.  NEUROLOGIC:  No motor or sensory deficit.  PSYCH: Alert & oriented x 3    Consultant(s) Notes Reviewed:  [x ] YES  [ ] NO  Care Discussed with Consultants/Other Providers [ x] YES  [ ] NO    EKG reviewed  Telemetry reviewed    LABS:                        7.7    5.84  )-----------( 182      ( 27 May 2025 04:41 )             24.0         141  |  96[L]  |  103[HH]  ----------------------------<  88  4.5   |  25  |  8.5[HH]    Ca    6.4[L]      26 May 2025 20:57  Mg     1.9                   Culture - Blood (collected 24 May 2025 13:20)  Source: Blood None  Preliminary Report (26 May 2025 22:01):    No growth at 48 Hours        RADIOLOGY & ADDITIONAL TESTS:    < from: CT Abdomen and Pelvis No Cont (25 @ 15:22) >  IMPRESSION:    Findings compatible with acute cholecystitis.    Improved bilateral hydronephrosis as described.    Right basilar consolidative opacity with adjacent likely trace pleural   effusion. Infectious process is not excluded.    Large rectal stool burden with distention measuring 7.8 cm. No bowel   obstruction.    < end of copied text >  < from: US Abdomen Upper Quadrant Right (25 @ 16:46) >  IMPRESSION:    Findings concerning for cholecystitis despite a negative sonographic   Iglesias's sign, as described.    Right renal pelvic fullness.    Trace right pleural effusion.    < end of copied text >  < from: NM Hepatobiliary Imaging (25 @ 12:08) >  IMPRESSION:    NO DEFINITE VISUALIZATION OF THE GALLBLADDER THROUGH 4 HOURS   POST-INJECTION, CONSISTENT WITH CHOLECYSTITIS, AS DESCRIBED ABOVE.   CLINICAL CORRELATION IS SUGGESTED.    < end of copied text >  < from: Xray Chest 1 View- PORTABLE-Routine (Xray Chest 1 View- PORTABLE-Routine .) (25 @ 12:23) >  IMPRESSION:    Bilateral opacities/right pleural effusion. Redemonstration cardiomegaly.    < end of copied text >    Imaging or report Personally Reviewed:  [x ] YES  [ ] NO    Medications:  Standing  apixaban 2.5 milliGRAM(s) Oral two times a day  calcium acetate 2001 milliGRAM(s) Oral three times a day with meals  cefepime   IVPB 1000 milliGRAM(s) IV Intermittent daily  chlorhexidine 2% Cloths 1 Application(s) Topical <User Schedule>  metroNIDAZOLE    Tablet 500 milliGRAM(s) Oral every 12 hours  pantoprazole    Tablet 40 milliGRAM(s) Oral daily    PRN Meds  ondansetron Injectable 4 milliGRAM(s) IV Push every 8 hours PRN      Case discussed with resident    Care discussed with pt/family

## 2025-05-27 NOTE — PROGRESS NOTE ADULT - SUBJECTIVE AND OBJECTIVE BOX
Patient is a 70y old Male who presents with a chief complaint of JOÃO on CKD (25 May 2025 11:20)    Today is hospital day     Overnight events/Interval History:  - No acute overnight events  - Telemetry: slow afib overnight  - At bedside, patient has no acute concerns or complaints. Breathing comfortably on room air. Denies fevers, chills, SOB, chest pain, N/V/D/C, abdominal pain.    ROS:  - All ROS is negative unless indicated in HPI    Code Status:    ALLERGIES:  No Known Allergies    MEDICATIONS:  STANDING MEDICATIONS  apixaban 2.5 milliGRAM(s) Oral two times a day  calcium acetate 2001 milliGRAM(s) Oral three times a day with meals  cefepime   IVPB 1000 milliGRAM(s) IV Intermittent daily  chlorhexidine 2% Cloths 1 Application(s) Topical <User Schedule>  metroNIDAZOLE    Tablet 500 milliGRAM(s) Oral every 12 hours  pantoprazole    Tablet 40 milliGRAM(s) Oral daily    PRN MEDICATIONS  ondansetron Injectable 4 milliGRAM(s) IV Push every 8 hours PRN    VITALS LAST 24H:  Vital Signs Last 24 Hrs  T(C): 36.6 (27 May 2025 04:00), Max: 36.8 (26 May 2025 19:45)  T(F): 97.8 (27 May 2025 04:00), Max: 98.2 (26 May 2025 19:45)  HR: 65 (27 May 2025 04:00) (59 - 65)  BP: 116/69 (27 May 2025 04:00) (100/56 - 120/71)  BP(mean): 85 (27 May 2025 04:00) (85 - 85)  RR: 18 (27 May 2025 04:00) (18 - 18)  SpO2: --      PHYSICAL EXAM:  GENERAL: NAD  HEENT:  Moist mucous membranes  CHEST/LUNG: Clear to auscultation bilaterally; normal respiratory effort, no coughing, wheezes, crackles, or rales.  HEART: Regular rate and rhythm  ABDOMEN: Soft, nontender, nondistended, +SPC  EXTREMITIES:  No lower extremity edema bilaterally, w/ scabs  NERVOUS SYSTEM:  A&Ox3, no focal deficits     LABS:                        7.7    5.84  )-----------( 182      ( 27 May 2025 04:41 )             24.0     05-27    144  |  100  |  106[HH]  ----------------------------<  76  4.9   |  25  |  8.2[HH]    Ca    7.0[L]      27 May 2025 04:41  Phos  9.3     05-27  Mg     1.9     05-27        Urinalysis Basic - ( 27 May 2025 04:41 )    Color: x / Appearance: x / SG: x / pH: x  Gluc: 76 mg/dL / Ketone: x  / Bili: x / Urobili: x   Blood: x / Protein: x / Nitrite: x   Leuk Esterase: x / RBC: x / WBC x   Sq Epi: x / Non Sq Epi: x / Bacteria: x            Culture - Blood (collected 24 May 2025 13:20)  Source: Blood None  Preliminary Report (26 May 2025 22:01):    No growth at 48 Hours          RADIOLOGY:  CXR      CT

## 2025-05-27 NOTE — DISCHARGE NOTE NURSING/CASE MANAGEMENT/SOCIAL WORK - NSDCPEFALRISK_GEN_ALL_CORE
For information on Fall & Injury Prevention, visit: [email or web address] OR  [email or web address] OR  https://www.cdc.gov/steadi/patient.html

## 2025-05-27 NOTE — PROGRESS NOTE ADULT - ASSESSMENT
70y M pmh CKD V, HTN, NIDDM, afib, prior spinal abscess, nephrolithiasis and recurrent UTI s/p b/l stent placement and SPC presenting for worsening creatinine admitted for JOÃO on CKD and chronic cholecystitis    #JOÃO on CKD V  #HAGMA likely 2/2 uremia  #Hyperkalemia - resolved   #Cystitis   #Hx SPC  #Hx nephrolithiasis sp b/l stent placement   - bsl Cr 5.5>9.5, CO2 15, K 5.4, pH 7.2 on vbg  - UA +ve bacteria, WBC >998, +ve LE  - CTAP  Bilateral nephroureteral catheters beginning in the renal pelvis and terminating within the urinary bladder. Improved right hydronephrosis now with right renal pelvic fullness. Mild improvement in left moderate hydronephrosis. Bilateral renal and pelvic stones difficult to compare given motion artifact at the level of the kidneys. No definite stone along the course of the ureters. Nephroureteral catheters and a suprapubic catheter terminating within an underdistended urinary bladder. Prostate measures 4.9 cm in transverse dimension.   - uro c/s appreciated: f/u OP for definitive stone treatment and stent removal once stable   - nephro c/s appreciated: GOC with brother for possible RRT  - s/p bicarb gtt   - s/p lokelma  - c/w phoslo 3 tabs TIDAC  - strict I/O, avoid nephrotoxins, dose meds per GFR    #Chronic cholecystitis  - GB wall thickened 0.4mm on RUQ US  - lactate -ve, Tbili wnl, CTAP showing GB wall thickening with pericholecystic fluid  - HIDA (+)  - IR: likely chronic, conservative mgmt  - surgery recs appreciated   - ID recs appreciated: c/w cefepime, flagyl  - bcx ngtd  - f/u ID for duration    #Afib  #HTN  - eliquis 2.5mg bid   - hold home nifedipine    #Constipation - resolved  - CTAP - no bowel obstruction, large rectal stool burden with distention measuring 7.8 cm   - bowel regimen discontinued, pt having 3 BMs daily   - stool count    #Normocytic anemia suspected 2/2 CKD  - b12 1269, folate 12.2  - iron 95, ferritin 438  - no active bleeding, continue to monitor     #Hypocalcemia 2/2 hyperphosphatemia and CKD  - Corrected Ca 7.6  - elevated ALP, elevated PTH, elevated phos, low 25OH vit D and 1-25OH vit D    #Concern for malnutrition  - pt frequently self-induces emesis  - f/u RD consult  - c/w PPI  - zofran PRN    ---------------------  DVT ppx: eliquis   Diet: Renal  GI ppx/Bowel regimen: none  Activity: IAT  GOC: DNR/DNI trial NIV  Dispo: pending  #Summary/Handoff:  - nephro f/u, ID f/u, RD f/u   70y M pmh CKD V, HTN, NIDDM, afib, prior spinal abscess, nephrolithiasis and recurrent UTI s/p b/l stent placement and SPC presenting for worsening creatinine admitted for JOÃO on CKD and chronic cholecystitis    #JOÃO on CKD V  #HAGMA likely 2/2 uremia  #Hyperkalemia - resolved   #Cystitis   #Hx SPC  #Hx nephrolithiasis sp b/l stent placement   - bsl Cr 5.5>9.5, CO2 15, K 5.4, pH 7.2 on vbg  - UA +ve bacteria, WBC >998, +ve LE  - CTAP  Bilateral nephroureteral catheters beginning in the renal pelvis and terminating within the urinary bladder. Improved right hydronephrosis now with right renal pelvic fullness. Mild improvement in left moderate hydronephrosis. Bilateral renal and pelvic stones difficult to compare given motion artifact at the level of the kidneys. No definite stone along the course of the ureters. Nephroureteral catheters and a suprapubic catheter terminating within an underdistended urinary bladder. Prostate measures 4.9 cm in transverse dimension.   - uro c/s appreciated: f/u OP for definitive stone treatment and stent removal once stable   - nephro c/s appreciated: GOC with brother for possible RRT  - s/p bicarb gtt   - s/p lokelma  - c/w phoslo 3 tabs TIDAC  - strict I/O, avoid nephrotoxins, dose meds per GFR    #Chronic cholecystitis  - GB wall thickened 0.4mm on RUQ US  - lactate -ve, Tbili wnl, CTAP showing GB wall thickening with pericholecystic fluid  - HIDA (+)  - IR: likely chronic, conservative mgmt  - surgery recs appreciated   - ID recs appreciated: c/w cefepime, flagyl  - bcx ngtd  - f/u ID for duration    #Afib  #HTN  - eliquis 2.5mg bid   - hold home nifedipine    #Constipation - resolved  - CTAP - no bowel obstruction, large rectal stool burden with distention measuring 7.8 cm   - bowel regimen discontinued, pt having 3 BMs daily   - stool count    #Normocytic anemia suspected 2/2 CKD  - b12 1269, folate 12.2  - iron 95, ferritin 438  - no active bleeding, continue to monitor     #Hypocalcemia 2/2 hyperphosphatemia and CKD  #Vitamin D deficiency likely 2/2 CKD  - Corrected Ca 7.6  - elevated ALP, elevated PTH, elevated phos, low 25OH vit D and 1-25OH vit D  - c/w vit D supplementation    #Concern for malnutrition  - pt frequently self-induces emesis  - f/u RD consult  - c/w PPI  - zofran PRN    ---------------------  DVT ppx: eliquis   Diet: Renal  GI ppx/Bowel regimen: none  Activity: IAT  GOC: DNR/DNI trial NIV  Dispo: pending  #Summary/Handoff:  - nephro f/u, ID f/u, RD f/u

## 2025-05-27 NOTE — PROGRESS NOTE ADULT - ASSESSMENT
69 yo M with h/o CKD stage 5, HTN, NIDDM, prior spinal abscess c/b need for SPC, nephrolithiasis, recent urinary infection and hydro requiring b/l stent placement, sent by me to ED following outpatient evaluation for medical clearance to remove his stents and labwork noted acute rise in sCr 5.5->9.5 with reduced UOP, worsening hyperkalemia and hyperphosphatemia, concern for new obstruction vs. infection, and to assess for HD needs acutely.  Repeat labwork noted stable sCr with elevated potassium, MA and signs of complicated UTI given SPC use.  #JOÃO on CKD stage 5  #Moderate hydronephrosis  #Complicated Cystitis  #HTN  #NIDDM  #prior spinal abscess c/b need for SPC  #nephrolithiasis  Imaging without new or worsening hydronephrosis, did note multiple kidney stones but all nonobstructing.    - appreciate urology f/u  - continue IV Abx as ordered, follow ID recs  - creat stable  - phos is severely elevated--  phoslo 3 tabs tid with meals/ monitor calcium / check PTH/ replete vit D , check albumin to correct calcium   - renal diet  - might need to initiate RRT ? GOC   - Dose medications to eGFR <15  - Avoid Nephrotoxic agents

## 2025-05-27 NOTE — DISCHARGE NOTE NURSING/CASE MANAGEMENT/SOCIAL WORK - FINANCIAL ASSISTANCE
[facility] provides services at a reduced cost to those who are determined to be eligible through [facility]’s financial assistance program. Information regarding [facility]’s financial assistance program can be found by going to [email or web address] or by calling [phone].

## 2025-05-27 NOTE — PROGRESS NOTE ADULT - ASSESSMENT
70y M pmh CKD V, HTN, NIDDM, prior spinal abscess, nephrolithiasis and recurrent UTI s/p b/l stent placement and SPC wa sent in by Dr. Mcallister for evaluation for acute rise in creatinine. Pt's Cr noted to increase from 5.5>9.5 with reduced UOP, hyperkalemia and hyperphosphatemia.     JOÃO on CKD-V  Cystitis  DM-2 / HTN  Hx of spinal abscess c/b need for SPC  Hx of B/L ureteral stents    70y M pmh CKD V, HTN, NIDDM, prior spinal abscess, nephrolithiasis and recurrent UTI s/p b/l stent placement and SPC was sent in by Dr. Mcallister for evaluation for acute rise in creatinine. Pt's Cr noted to increase from 5.5>9.5 with reduced UOP, hyperkalemia and hyperphosphatemia.     JOÃO on CKD-V  HAGMA  Chronic cholecystitis  DM-2 / HTN  Hx of spinal abscess c/b need for SPC  Hx of B/L ureteral stents   Hyperkalemia, resolved  Sterile Pyuria                  PLAN:    ·	Tele reviewed by me. No events  ·	EKG on admission: NSR 70/min. Q waves in inferior leads (Interpreted by me)  ·	Hyperkalemia and acidosis have resolved  ·	Care d/w the Nephrologist. Pt needs HD. Called pt's brother Erich. Updated him about pt's condition. He will talk to his out pt nephrologist and get back to me regarding his decision about HD  ·	Strict I's and O's  ·	Cont to monitor renal function and electrolytes.   ·	Pt's w/u noted. CT scan, RUQ us and HIDA scan are positive for acute cholecystitis but pt has no RUQ pain/tenderness, normal WBC count and no fever. Unlikely acute cholecystitis. Pt likely has chronic cholecystitis.   ·	Pt has Hx of b/l ureteral stents and suprapubic cystotomy. Urology eval noted. Patent stents. Recommended out pt f/u.   ·	ID f/u. Pt is on IV Cefepime and PO Flagyl. Doubt pt has any acute infection. Possible will d/c Abx if OK with ID.   ·	Monitor FS. On no meds for DM-2    Progress Note Handoff    Pending (specify):  Consults__ID f/u_______, Tests________, Test Results_______, Other_Brother to decide about HD________  Family discussion: Diagnosis and plan of care d/w the brother Erich  Disposition: Home___/SNF___/Other________/Unknown at this time________    Ney Collier MD  Spectra: [phone]

## 2025-05-27 NOTE — DISCHARGE NOTE NURSING/CASE MANAGEMENT/SOCIAL WORK - PATIENT PORTAL LINK FT
You can access the FollowMyHealth Patient Portal offered by [facility] by registering at the following website: [email or web address]. By joining [employer]’s FollowMyHealth portal, you will also be able to view your health information using other applications (apps) compatible with our system.

## 2025-05-27 NOTE — PROGRESS NOTE ADULT - ASSESSMENT
ASSESSMENT  This is a 70 year old male with PMH of CKD V, HTN, NIDDM, prior spinal abscess, nephrolithiasis and recurrent UTI s/p b/l stent placement and SPC presenting for evaluation for JOÃO    IMPRESSION  #Acute cholecystitis? Transaminitis with elevation in Alkaline phosphatase  - US Abdomen Upper Quadrant Right (05.23.25 @ 16:46): Findings concerning for cholecystitis despite a negative sonographic  Iglesias's sign, as described. Right renal pelvic fullness. Trace right pleural effusion.  -  NM Hepatobiliary Imaging (05.24.25 @ 12:08): IMPRESSION: NO DEFINITE VISUALIZATION OF THE GALLBLADDER THROUGH 4 HOURS  POST-INJECTION, CONSISTENT WITH CHOLECYSTITIS, AS DESCRIBED ABOVE.   CLINICAL CORRELATION IS SUGGESTED.    #Pyuria  #JOÃO over CKD 5  #History of nephrolithiasis with bilateral ureteral stent placement. 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  #Prior history of spinal abscess     Colonized with Proteus/Serratia/Pseudomonas and MSSA in the urine     RECOMMENDATIONS  - noted HIDA scan -- reports episodes of nausea/vomiting which seem to be chronic   - lower suspicion for acute cholecystitis   - for now would monitor off antibiotics -- reviewed IR and gen surgery notes -- will need outpatient follow-up for cholecystectomy  - monitor for fevers while in house      Please call or message on Microsoft Teams if with any questions.  Spectra [phone]

## 2025-05-28 LAB
ALBUMIN SERPL ELPH-MCNC: 2.7 G/DL — LOW (ref 3.5–5.2)
ALP SERPL-CCNC: 379 U/L — HIGH (ref 30–115)
ALT FLD-CCNC: 34 U/L — SIGNIFICANT CHANGE UP (ref 0–41)
ANION GAP SERPL CALC-SCNC: 19 MMOL/L — HIGH (ref 7–14)
AST SERPL-CCNC: 33 U/L — SIGNIFICANT CHANGE UP (ref 0–41)
BASOPHILS # BLD AUTO: 0.06 K/UL — SIGNIFICANT CHANGE UP (ref 0–0.2)
BASOPHILS NFR BLD AUTO: 0.7 % — SIGNIFICANT CHANGE UP (ref 0–1)
BILIRUB SERPL-MCNC: <0.2 MG/DL — SIGNIFICANT CHANGE UP (ref 0.2–1.2)
BUN SERPL-MCNC: 104 MG/DL — CRITICAL HIGH (ref 10–20)
CALCIUM SERPL-MCNC: 7.9 MG/DL — LOW (ref 8.4–10.5)
CHLORIDE SERPL-SCNC: 99 MMOL/L — SIGNIFICANT CHANGE UP (ref 98–110)
CO2 SERPL-SCNC: 24 MMOL/L — SIGNIFICANT CHANGE UP (ref 17–32)
CREAT SERPL-MCNC: 9 MG/DL — CRITICAL HIGH (ref 0.7–1.5)
EGFR: 6 ML/MIN/1.73M2 — LOW
EGFR: 6 ML/MIN/1.73M2 — LOW
EOSINOPHIL # BLD AUTO: 0.28 K/UL — SIGNIFICANT CHANGE UP (ref 0–0.7)
EOSINOPHIL NFR BLD AUTO: 3.3 % — SIGNIFICANT CHANGE UP (ref 0–8)
GLUCOSE SERPL-MCNC: 86 MG/DL — SIGNIFICANT CHANGE UP (ref 70–99)
HCT VFR BLD CALC: 25.5 % — LOW (ref 42–52)
HGB BLD-MCNC: 7.9 G/DL — LOW (ref 14–18)
IMM GRANULOCYTES NFR BLD AUTO: 1.9 % — HIGH (ref 0.1–0.3)
IRON SATN MFR SERPL: 68 % — HIGH (ref 15–50)
IRON SATN MFR SERPL: 73 UG/DL — SIGNIFICANT CHANGE UP (ref 35–150)
LYMPHOCYTES # BLD AUTO: 1.66 K/UL — SIGNIFICANT CHANGE UP (ref 1.2–3.4)
LYMPHOCYTES # BLD AUTO: 19.8 % — LOW (ref 20.5–51.1)
MAGNESIUM SERPL-MCNC: 1.8 MG/DL — SIGNIFICANT CHANGE UP (ref 1.8–2.4)
MCHC RBC-ENTMCNC: 29.3 PG — SIGNIFICANT CHANGE UP (ref 27–31)
MCHC RBC-ENTMCNC: 31 G/DL — LOW (ref 32–37)
MCV RBC AUTO: 94.4 FL — HIGH (ref 80–94)
MONOCYTES # BLD AUTO: 0.49 K/UL — SIGNIFICANT CHANGE UP (ref 0.1–0.6)
MONOCYTES NFR BLD AUTO: 5.8 % — SIGNIFICANT CHANGE UP (ref 1.7–9.3)
NEUTROPHILS # BLD AUTO: 5.75 K/UL — SIGNIFICANT CHANGE UP (ref 1.4–6.5)
NEUTROPHILS NFR BLD AUTO: 68.5 % — SIGNIFICANT CHANGE UP (ref 42.2–75.2)
NRBC BLD AUTO-RTO: 0 /100 WBCS — SIGNIFICANT CHANGE UP (ref 0–0)
PHOSPHATE SERPL-MCNC: 8.1 MG/DL — HIGH (ref 2.1–4.9)
PLATELET # BLD AUTO: 213 K/UL — SIGNIFICANT CHANGE UP (ref 130–400)
PMV BLD: 11 FL — HIGH (ref 7.4–10.4)
POTASSIUM SERPL-MCNC: 4.9 MMOL/L — SIGNIFICANT CHANGE UP (ref 3.5–5)
POTASSIUM SERPL-SCNC: 4.9 MMOL/L — SIGNIFICANT CHANGE UP (ref 3.5–5)
PROT SERPL-MCNC: 6.4 G/DL — SIGNIFICANT CHANGE UP (ref 6–8)
RBC # BLD: 2.7 M/UL — LOW (ref 4.7–6.1)
RBC # FLD: 13.8 % — SIGNIFICANT CHANGE UP (ref 11.5–14.5)
SODIUM SERPL-SCNC: 142 MMOL/L — SIGNIFICANT CHANGE UP (ref 135–146)
TIBC SERPL-MCNC: 107 UG/DL — LOW (ref 220–430)
UIBC SERPL-MCNC: 34 UG/DL — LOW (ref 110–370)
WBC # BLD: 8.4 K/UL — SIGNIFICANT CHANGE UP (ref 4.8–10.8)
WBC # FLD AUTO: 8.4 K/UL — SIGNIFICANT CHANGE UP (ref 4.8–10.8)

## 2025-05-28 PROCEDURE — 99233 SBSQ HOSP IP/OBS HIGH 50: CPT

## 2025-05-28 RX ORDER — SEVELAMER HYDROCHLORIDE 800 MG/1
1600 TABLET ORAL
Refills: 0 | Status: DISCONTINUED | OUTPATIENT
Start: 2025-05-28 | End: 2025-06-05

## 2025-05-28 RX ADMIN — Medication 2001 MILLIGRAM(S): at 11:30

## 2025-05-28 RX ADMIN — Medication 40 MILLIGRAM(S): at 11:30

## 2025-05-28 RX ADMIN — APIXABAN 2.5 MILLIGRAM(S): 2.5 TABLET, FILM COATED ORAL at 17:13

## 2025-05-28 RX ADMIN — Medication 1000 UNIT(S): at 11:30

## 2025-05-28 RX ADMIN — APIXABAN 2.5 MILLIGRAM(S): 2.5 TABLET, FILM COATED ORAL at 06:09

## 2025-05-28 RX ADMIN — SEVELAMER HYDROCHLORIDE 1600 MILLIGRAM(S): 800 TABLET ORAL at 17:14

## 2025-05-28 RX ADMIN — Medication 1 APPLICATION(S): at 06:12

## 2025-05-28 RX ADMIN — Medication 500 MILLIGRAM(S): at 06:12

## 2025-05-28 RX ADMIN — Medication 2001 MILLIGRAM(S): at 07:40

## 2025-05-28 NOTE — DIETITIAN INITIAL EVALUATION ADULT - OTHER CALCULATIONS
S/P  shunt    Chronic, non communicating shunt  No evidence of hydrocephalus on imaging     Energy: 1447-1736kcal/day using MSJ x 1-1.2; or 2313-2698kcal/day (using 30-35kcal/kg) if on HD with consideration for age, weight status  Protein: 46-62g/day using 0.6-0.8g/kg if not on HD; or 100-116g/day (using 1.3-1.5g/kg) if on HD  Fluid: 1500mL/day

## 2025-05-28 NOTE — DIETITIAN INITIAL EVALUATION ADULT - PERTINENT MEDS FT
MEDICATIONS  (STANDING):  apixaban 2.5 milliGRAM(s) Oral two times a day  chlorhexidine 2% Cloths 1 Application(s) Topical <User Schedule>  cholecalciferol 1000 Unit(s) Oral daily  pantoprazole  Injectable 40 milliGRAM(s) IV Push daily  sevelamer carbonate Powder 1600 milliGRAM(s) Oral three times a day with meals    MEDICATIONS  (PRN):  ondansetron Injectable 4 milliGRAM(s) IV Push every 8 hours PRN Nausea and/or Vomiting

## 2025-05-28 NOTE — DIETITIAN INITIAL EVALUATION ADULT - NS FNS DIET ORDER
Diet, Renal Restrictions:   For patients receiving Renal Replacement - No Protein Restr, No Conc K, No Conc Phos, Low Sodium  Consistent Carbohydrate {Evening Snack} (CSTCHOSN)  DASH/TLC {Sodium & Cholesterol Restricted} (DASH)  Supplement Feeding Modality:  Oral  Nepro Cans or Servings Per Day:  3       Frequency:  Three Times a day (05-28-25 @ 08:14)

## 2025-05-28 NOTE — DIETITIAN INITIAL EVALUATION ADULT - OTHER INFO
Patient is a 71yo M with a PMHx of CKD5, HTN, NIDDM, prior spinal abscess, nephrolithiasis and recurrent UTIs s/p b/l stent placement and SPC, presented for acute rise in creatinine; JOÃO on CKD. Per Nephrology note on 5/28/25, patient may need HD.     Patient noted with anemia, elevated LFTs, hyperkalemia (resolved), hyperphosphatemia (improving), elevated BUN, elevated creatinine.

## 2025-05-28 NOTE — PROGRESS NOTE ADULT - ATTENDING COMMENTS
70y M pmh CKD V, HTN, NIDDM, afib, prior spinal abscess, nephrolithiasis and recurrent UTI s/p b/l stent placement and SPC presenting for worsening creatinine admitted for JOÃO on CKD and chronic cholecystitis    #JOÃO on CKD V  #HAGMA likely 2/2 uremia   #Hyperkalemia - resolved   #Hx SPC  #Hx nephrolithiasis sp b/l stent placement   - bsl Cr 5.5>9.5, CO2 15, K 5.4, pH 7.2 on vbg  - UA +ve bacteria, WBC >998, +ve LE  - CTAP  Bilateral nephroureteral catheters beginning in the renal pelvis and terminating within the urinary bladder. Improved right hydronephrosis now with right renal pelvic fullness. Mild improvement in left moderate hydronephrosis. Bilateral renal and pelvic stones difficult to compare given motion artifact at the level of the kidneys. No definite stone along the course of the ureters. Nephroureteral catheters and a suprapubic catheter terminating within an underdistended urinary bladder. Prostate measures 4.9 cm in transverse dimension.   - uro c/s appreciated: f/u OP for definitive stone treatment and stent removal once stable   - nephro c/s appreciated: GOC with brother for possible RRT >> ID said ok to place line  - s/p bicarb gtt   - s/p lokelma  - transition phoslo to sevelamer powder as patient has trouble swallowing the phoslo pills  - strict I/O, avoid nephrotoxins, dose meds per GFR    #Chronic cholecystitis  - GB wall thickened 0.4mm on RUQ US  - lactate -ve, Tbili wnl, CTAP showing GB wall thickening with pericholecystic fluid  - HIDA (+)  - IR: likely chronic, conservative mgmt  - surgery recs appreciated   - bcx ngtd  - s/p cefepime/flagyl  - ID recs appreciated: monitor off abx  - eventually needs CCY    #Afib  #HTN  - eliquis 2.5mg bid   - hold home nifedipine    #Constipation - resolved  - CTAP - no bowel obstruction, large rectal stool burden with distention measuring 7.8 cm   - bowel regimen discontinued, pt having 3 BMs daily   - stool count    #Normocytic anemia suspected 2/2 advanced CKD  - b12 1269, folate 12.2  - iron 95, sat elevated , ferritin 438  - no active bleeding, continue to monitor   - nephro f/up for possible TACHO    #Hypocalcemia 2/2 hyperphosphatemia and CKD  #Vitamin D deficiency likely 2/2 CKD  - Corrected Ca 7.6  - elevated ALP, elevated PTH, elevated phos, low 25OH vit D and 1-25OH vit D  - c/w vit D supplementation    #Self-induced emesis due to sour feeling/sensation of food stuck - GERD vs zenker diverticulum?  #Concern for malnutrition  - pt frequently self-induces emesis due to sour feeling of throat and/or sensation of food being stuck; no dysphagia, not specific to solid/liquid or specific food  - f/u RD consult  - c/w PPI  - zofran PRN    ---------------------  DVT ppx: eliquis   Diet: Renal  GI ppx/Bowel regimen: none  Activity: IAT  GOC: DNR/DNI trial NIV  Dispo: pending 70y M pmh CKD V, HTN, NIDDM, afib, prior spinal abscess, nephrolithiasis and recurrent UTI s/p b/l stent placement and SPC presenting for worsening creatinine admitted for JOÃO on CKD and chronic cholecystitis    #JOÃO on CKD V  #HAGMA likely 2/2 uremia   #Hyperkalemia - resolved   #Hx SPC  #Hx nephrolithiasis sp b/l stent placement   - bsl Cr 5.5>9.5, CO2 15, K 5.4, pH 7.2 on vbg  - UA +ve bacteria, WBC >998, +ve LE  - CTAP  Bilateral nephroureteral catheters beginning in the renal pelvis and terminating within the urinary bladder. Improved right hydronephrosis now with right renal pelvic fullness. Mild improvement in left moderate hydronephrosis. Bilateral renal and pelvic stones difficult to compare given motion artifact at the level of the kidneys. No definite stone along the course of the ureters. Nephroureteral catheters and a suprapubic catheter terminating within an underdistended urinary bladder. Prostate measures 4.9 cm in transverse dimension.   - uro c/s appreciated: f/u OP for definitive stone treatment and stent removal once stable   - nephro c/s appreciated: GOC with brother for possible RRT >> ID said ok to place line  - s/p bicarb gtt   - s/p lokelma  - transition phoslo to sevelamer powder as patient has trouble swallowing the phoslo pills  - strict I/O, avoid nephrotoxins, dose meds per GFR    #Chronic cholecystitis  - GB wall thickened 0.4mm on RUQ US  - lactate -ve, Tbili wnl, CTAP showing GB wall thickening with pericholecystic fluid  - HIDA (+)  - IR: likely chronic, conservative mgmt  - surgery recs appreciated   - bcx ngtd  - s/p cefepime/flagyl  - ID recs appreciated: monitor off abx  - eventually needs CCY    #Afib  #HTN  - eliquis 2.5mg bid   - hold home nifedipine    #Constipation - resolved  - CTAP - no bowel obstruction, large rectal stool burden with distention measuring 7.8 cm   - bowel regimen discontinued, pt having 3 BMs daily   - stool count    #Normocytic anemia suspected 2/2 advanced CKD  - b12 1269, folate 12.2  - iron 95, sat elevated , ferritin 438  - no active bleeding, continue to monitor   - nephro f/up for possible TACHO    #Hypocalcemia 2/2 hyperphosphatemia and CKD  #Vitamin D deficiency likely 2/2 CKD  - Corrected Ca 7.6  - elevated ALP, elevated PTH, elevated phos, low 25OH vit D and 1-25OH vit D  - c/w vit D supplementation    #Self-induced emesis due to sour feeling/sensation of food stuck - GERD vs zenker diverticulum?  #Concern for malnutrition  - pt frequently self-induces emesis due to sour feeling of throat and/or sensation of food being stuck; no dysphagia, not specific to solid/liquid or specific food  - f/u RD consult  - c/w PPI  - zofran PRN    ---------------------  DVT ppx: eliquis   Diet: Renal  GI ppx/Bowel regimen: none  Activity: IAT  GOC: DNR/DNI trial NIV  Dispo: pending      COMMUNICATION: Diagnosis and plan of care discussed in detail with brother Erich Jacob. He is requesting to discuss hemodialysis with his brother and nephrologist in person. He will come to the hospital on Friday in AM.

## 2025-05-28 NOTE — DIETITIAN INITIAL EVALUATION ADULT - ORAL INTAKE PTA/DIET HISTORY
Patient noted to be disoriented at time of RD visit and provided limited history. Obtained history from brother Erich over the phone.     Reports patient has adequate appetite at home usually eating 3 meals per day, although reports patient is a picky eater. Denies difficulty chewing (despite significant missing dentition); denies difficulty swallowing. NKFA or Restoration/cultural food restrictions.     Reports UBW is 75kg, with no significant weight changes within the past 12 months. Current weight documented is 77.1kg, reflective of stable weight.    Patient noted to be consuming ~50% of breakfast this morning per RN; flowsheet documents PO intake of % of meals and ONS in hospital. No symptoms of N/V/D/Constipation noted per RN. Although, MD note on 5/28/25 reports patient has been self inducing V. Brother reports patient does this sometimes if the food he eats upsets his stomach (occurs ~1x every few months). No BMs documented in flowsheet. RN did not observe a BM today. Fecal incontinence documented today per flowsheet.

## 2025-05-28 NOTE — DIETITIAN INITIAL EVALUATION ADULT - ADD RECOMMEND
Interventions:  -Continue current diet order  -Provide K and PO4 binders PRN for hyperkalemia and hyperphosphatemia    Monitor:  -PO intake  -Diet/Diet texture tolerance  -Weight  -Nutrition related labs  -Nutrition focused physical findings  -GI function     Moderate Nutrition Risk Follow Up

## 2025-05-28 NOTE — PROGRESS NOTE ADULT - SUBJECTIVE AND OBJECTIVE BOX
Patient is a 70y old Male who presents with a chief complaint of JOÃO on CKD (25 May 2025 11:20)    Today is hospital day     Overnight events/Interval History:  - No acute overnight events  - Telemetry: slow afib overnight, NSVT  - At bedside, patient has no acute concerns or complaints. Still self-inducing emesis, patient feels "sour feelings" and something stuck in throat. Breathing comfortably on room air. Denies fevers, chills, SOB, chest pain, N/V/D/C, abdominal pain.    ROS:  - All ROS is negative unless indicated in HPI    Code Status:    ALLERGIES:  No Known Allergies    MEDICATIONS:  STANDING MEDICATIONS  apixaban 2.5 milliGRAM(s) Oral two times a day  calcium acetate 2001 milliGRAM(s) Oral three times a day with meals  chlorhexidine 2% Cloths 1 Application(s) Topical <User Schedule>  cholecalciferol 1000 Unit(s) Oral daily  pantoprazole  Injectable 40 milliGRAM(s) IV Push daily    PRN MEDICATIONS  ondansetron Injectable 4 milliGRAM(s) IV Push every 8 hours PRN    VITALS LAST 24H:  Vital Signs Last 24 Hrs  T(C): 36.6 (28 May 2025 13:10), Max: 36.6 (28 May 2025 13:10)  T(F): 97.8 (28 May 2025 13:10), Max: 97.8 (28 May 2025 13:10)  HR: 76 (28 May 2025 13:10) (76 - 78)  BP: 159/81 (28 May 2025 13:10) (145/67 - 163/80)  BP(mean): 93 (28 May 2025 06:54) (93 - 107)  RR: 18 (28 May 2025 13:10) (18 - 18)  SpO2: 97% (28 May 2025 06:54) (96% - 97%)    Parameters below as of 28 May 2025 06:54  Patient On (Oxygen Delivery Method): room air      PHYSICAL EXAM:  GENERAL: NAD  HEENT:  Moist mucous membranes  CHEST/LUNG: Clear to auscultation bilaterally; normal respiratory effort, no coughing, wheezes, crackles, or rales.  HEART: Regular rate and rhythm  ABDOMEN: Soft, nontender, nondistended, +SPC  EXTREMITIES:  No lower extremity edema bilaterally, w/ scabs  NERVOUS SYSTEM:  A&Ox3, no focal deficits     LABS:                        7.9    8.40  )-----------( 213      ( 28 May 2025 11:08 )             25.5     05-28    142  |  99  |  104[HH]  ----------------------------<  86  4.9   |  24  |  9.0[HH]    Ca    7.9[L]      28 May 2025 11:08  Phos  8.1     05-28  Mg     1.8     05-28    TPro  6.4  /  Alb  2.7[L]  /  TBili  <0.2  /  DBili  x   /  AST  33  /  ALT  34  /  AlkPhos  379[H]  05-28      Urinalysis Basic - ( 28 May 2025 11:08 )    Color: x / Appearance: x / SG: x / pH: x  Gluc: 86 mg/dL / Ketone: x  / Bili: x / Urobili: x   Blood: x / Protein: x / Nitrite: x   Leuk Esterase: x / RBC: x / WBC x   Sq Epi: x / Non Sq Epi: x / Bacteria: x                RADIOLOGY:  CXR      CT                   Patient is a 70y old Male who presents with a chief complaint of JOÃO on CKD (25 May 2025 11:20)    Today is hospital day     Overnight events/Interval History:  - No acute overnight events  - Telemetry: slow afib overnight, NSVT  - At bedside, patient has no acute concerns or complaints. Still self-inducing emesis, patient feels "sour feelings" and something stuck in throat. Breathing comfortably on room air. Denies fevers, chills, SOB, chest pain, N/V/D/C, abdominal pain.    ROS:  - All ROS is negative unless indicated in HPI    Code Status:    ALLERGIES:  No Known Allergies    MEDICATIONS:  STANDING MEDICATIONS  apixaban 2.5 milliGRAM(s) Oral two times a day  calcium acetate 2001 milliGRAM(s) Oral three times a day with meals  chlorhexidine 2% Cloths 1 Application(s) Topical <User Schedule>  cholecalciferol 1000 Unit(s) Oral daily  pantoprazole  Injectable 40 milliGRAM(s) IV Push daily    PRN MEDICATIONS  ondansetron Injectable 4 milliGRAM(s) IV Push every 8 hours PRN    VITALS LAST 24H:  Vital Signs Last 24 Hrs  T(C): 36.6 (28 May 2025 13:10), Max: 36.6 (28 May 2025 13:10)  T(F): 97.8 (28 May 2025 13:10), Max: 97.8 (28 May 2025 13:10)  HR: 76 (28 May 2025 13:10) (76 - 78)  BP: 159/81 (28 May 2025 13:10) (145/67 - 163/80)  BP(mean): 93 (28 May 2025 06:54) (93 - 107)  RR: 18 (28 May 2025 13:10) (18 - 18)  SpO2: 97% (28 May 2025 06:54) (96% - 97%)    Parameters below as of 28 May 2025 06:54  Patient On (Oxygen Delivery Method): room air      PHYSICAL EXAM:  GENERAL: NAD  HEENT:  Moist mucous membranes  CHEST/LUNG: Clear to auscultation bilaterally; normal respiratory effort, no coughing, wheezes, crackles, or rales.  HEART: Regular rate and rhythm  ABDOMEN: Soft, nontender, nondistended, +SPC  EXTREMITIES:  No lower extremity edema bilaterally, w/ scabs  NERVOUS SYSTEM:  A&Ox2-3, no focal deficits     LABS:                        7.9    8.40  )-----------( 213      ( 28 May 2025 11:08 )             25.5     05-28    142  |  99  |  104[HH]  ----------------------------<  86  4.9   |  24  |  9.0[HH]    Ca    7.9[L]      28 May 2025 11:08  Phos  8.1     05-28  Mg     1.8     05-28    TPro  6.4  /  Alb  2.7[L]  /  TBili  <0.2  /  DBili  x   /  AST  33  /  ALT  34  /  AlkPhos  379[H]  05-28    Iron with Total Binding Capacity (05.28.25 @ 11:08)   Iron - Total Binding Capacity.: 107 ug/dL  % Saturation, Iron: 68 %  Iron Total: 73 ug/dL  Unsaturated Iron Binding Capacity: 34 ug/dL      Urinalysis Basic - ( 28 May 2025 11:08 )    Color: x / Appearance: x / SG: x / pH: x  Gluc: 86 mg/dL / Ketone: x  / Bili: x / Urobili: x   Blood: x / Protein: x / Nitrite: x   Leuk Esterase: x / RBC: x / WBC x   Sq Epi: x / Non Sq Epi: x / Bacteria: x      RADIOLOGY:  CXR      CT

## 2025-05-28 NOTE — PROGRESS NOTE ADULT - ASSESSMENT
69 yo M with h/o CKD stage 5, HTN, NIDDM, prior spinal abscess c/b need for SPC, nephrolithiasis, recent urinary infection and hydro requiring b/l stent placement, sent by me to ED following outpatient evaluation for medical clearance to remove his stents and labwork noted acute rise in sCr 5.5->9.5 with reduced UOP, worsening hyperkalemia and hyperphosphatemia, concern for new obstruction vs. infection, and to assess for HD needs acutely.  Repeat labwork noted stable sCr with elevated potassium, MA and signs of complicated UTI given SPC use.  #JOÃO on CKD stage 5  #Moderate hydronephrosis  #Complicated Cystitis  #HTN  #NIDDM  #prior spinal abscess c/b need for SPC  #nephrolithiasis  Imaging without new or worsening hydronephrosis, did note multiple kidney stones but all nonobstructing.    - followed by    - creat stable/ check repeat today   - UO noted   - phos is severely elevated--  phoslo 3 tabs tid with meals/ monitor calcium / PTH noted / on vit D , corrected calcium around 8   - renal diet  - spoke to patient brother at [facility] yesterday / he requested to speak to the patient  and decide about HD / if agreeable and cleared by ID / please call IR for TDC / if not cleared by ID will need a udall   -Chronic cholecystitis/ followed by surgery/ IR   - BP controlled   - h/h noted tx if h < 7/ will start TACHO with HD / sat pending   - Dose medications to eGFR <15  - Avoid Nephrotoxic agents  will follow

## 2025-05-28 NOTE — DIETITIAN INITIAL EVALUATION ADULT - NSFNSGIIOFT_GEN_A_CORE
05-27-25 @ 07:01  -  05-28-25 @ 07:00  --------------------------------------------------------  OUT:    Stool (mL): 1 mL  Total OUT: 1 mL    Total NET: -1 mL

## 2025-05-28 NOTE — DIETITIAN INITIAL EVALUATION ADULT - PERTINENT LABORATORY DATA
05-28    142  |  99  |  104[HH]  ----------------------------<  86  4.9   |  24  |  9.0[HH]    Ca    7.9[L]      28 May 2025 11:08  Phos  8.1     05-28  Mg     1.8     05-28    TPro  6.4  /  Alb  2.7[L]  /  TBili  <0.2  /  DBili  x   /  AST  33  /  ALT  34  /  AlkPhos  379[H]  05-28  A1C with Estimated Average Glucose Result: 6.2 % (05-13-25 @ 09:17)  A1C with Estimated Average Glucose Result: 6.0 % (04-29-25 @ 08:48)  A1C with Estimated Average Glucose Result: 6.9 % (07-10-24 @ 06:36)

## 2025-05-28 NOTE — PROGRESS NOTE ADULT - SUBJECTIVE AND OBJECTIVE BOX
seen and examined  24 h events noted   no distress         PAST HISTORY  --------------------------------------------------------------------------------  No significant changes to PMH, PSH, FHx, SHx, unless otherwise noted    ALLERGIES & MEDICATIONS  --------------------------------------------------------------------------------  Allergies    No Known Allergies    Intolerances      Standing Inpatient Medications  apixaban 2.5 milliGRAM(s) Oral two times a day  calcium acetate 2001 milliGRAM(s) Oral three times a day with meals  chlorhexidine 2% Cloths 1 Application(s) Topical <User Schedule>  cholecalciferol 1000 Unit(s) Oral daily  pantoprazole  Injectable 40 milliGRAM(s) IV Push daily    PRN Inpatient Medications  ondansetron Injectable 4 milliGRAM(s) IV Push every 8 hours PRN          VITALS/PHYSICAL EXAM  --------------------------------------------------------------------------------  T(C): 36.5 (05-28-25 @ 06:54), Max: 36.5 (05-28-25 @ 06:54)  HR: 78 (05-28-25 @ 06:54) (70 - 78)  BP: 145/67 (05-28-25 @ 06:54) (142/81 - 163/80)  RR: 18 (05-28-25 @ 06:54) (18 - 18)  SpO2: 97% (05-28-25 @ 06:54) (96% - 97%)  Wt(kg): --        05-27-25 @ 07:01  -  05-28-25 @ 07:00  --------------------------------------------------------  IN: 120 mL / OUT: 651 mL / NET: -531 mL      Physical Exam:  	Gen: NAD  	Pulm: decrease BS  B/L  	CV:  S1S2; no rub  	Abd: +distended      LABS/STUDIES  --------------------------------------------------------------------------------              7.7    5.84  >-----------<  182      [05-27-25 @ 04:41]              24.0     144  |  100  |  106  ----------------------------<  76      [05-27-25 @ 04:41]  4.9   |  25  |  8.2        Ca     7.0     [05-27-25 @ 04:41]      Mg     1.9     [05-27-25 @ 04:41]      Phos  9.3     [05-27-25 @ 04:41]    TPro  6.7  /  Alb  2.5  /  TBili  0.2  /  DBili  <0.2  /  AST  44  /  ALT  42  /  AlkPhos  419  [05-27-25 @ 10:22]    Creatinine Trend:  SCr 8.2 [05-27 @ 04:41]  SCr 8.5 [05-26 @ 20:57]  SCr 8.4 [05-26 @ 04:47]  SCr 9.0 [05-25 @ 04:36]  SCr 8.9 [05-24 @ 15:59]    Urinalysis - [05-27-25 @ 04:41]      Color  / Appearance  / SG  / pH       Gluc 76 / Ketone   / Bili  / Urobili        Blood  / Protein  / Leuk Est  / Nitrite       RBC  / WBC  / Hyaline  / Gran  / Sq Epi  / Non Sq Epi  / Bacteria       Iron 95, TIBC --, %sat --      [05-04-25 @ 06:41]  Ferritin 438      [05-04-25 @ 06:10]  PTH -- (Ca 6.1)      [05-26-25 @ 04:47]   288  PTH -- (Ca 8.3)      [07-10-24 @ 21:08]   43  Vitamin D (25OH) 11      [05-26-25 @ 04:47]  TSH 5.27      [04-30-25 @ 17:55]

## 2025-05-28 NOTE — PROGRESS NOTE ADULT - ASSESSMENT
70y M pmh CKD V, HTN, NIDDM, afib, prior spinal abscess, nephrolithiasis and recurrent UTI s/p b/l stent placement and SPC presenting for worsening creatinine admitted for JOÃO on CKD and chronic cholecystitis    #JOÃO on CKD V  #HAGMA likely 2/2 uremia   #Hyperkalemia - resolved   #Hx SPC  #Hx nephrolithiasis sp b/l stent placement   - bsl Cr 5.5>9.5, CO2 15, K 5.4, pH 7.2 on vbg  - UA +ve bacteria, WBC >998, +ve LE  - CTAP  Bilateral nephroureteral catheters beginning in the renal pelvis and terminating within the urinary bladder. Improved right hydronephrosis now with right renal pelvic fullness. Mild improvement in left moderate hydronephrosis. Bilateral renal and pelvic stones difficult to compare given motion artifact at the level of the kidneys. No definite stone along the course of the ureters. Nephroureteral catheters and a suprapubic catheter terminating within an underdistended urinary bladder. Prostate measures 4.9 cm in transverse dimension.   - uro c/s appreciated: f/u OP for definitive stone treatment and stent removal once stable   - nephro c/s appreciated: GOC with brother for possible RRT >> ID said ok to place line  - s/p bicarb gtt   - s/p lokelma  - transition phoslo to sevelamer powder as patient has trouble swallowing the phoslo pills  - strict I/O, avoid nephrotoxins, dose meds per GFR    #Chronic cholecystitis  - GB wall thickened 0.4mm on RUQ US  - lactate -ve, Tbili wnl, CTAP showing GB wall thickening with pericholecystic fluid  - HIDA (+)  - IR: likely chronic, conservative mgmt  - surgery recs appreciated   - bcx ngtd  - s/p cefepime/flagyl  - ID recs appreciated: monitor off abx  - eventually needs CCY    #Afib  #HTN  - eliquis 2.5mg bid   - hold home nifedipine    #Constipation - resolved  - CTAP - no bowel obstruction, large rectal stool burden with distention measuring 7.8 cm   - bowel regimen discontinued, pt having 3 BMs daily   - stool count    #Normocytic anemia suspected 2/2 CKD  - b12 1269, folate 12.2  - iron 95, sat elevated , ferritin 438  - no active bleeding, continue to monitor     #Hypocalcemia 2/2 hyperphosphatemia and CKD  #Vitamin D deficiency likely 2/2 CKD  - Corrected Ca 7.6  - elevated ALP, elevated PTH, elevated phos, low 25OH vit D and 1-25OH vit D  - c/w vit D supplementation    #Self-induced emesis due to sour feeling/sensation of food stuck - GERD vs zenker diverticulum?  #Concern for malnutrition  - pt frequently self-induces emesis due to sour feeling of throat and/or sensation of food being stuck; no dysphagia, not specific to solid/liquid or specific food  - f/u RD consult  - c/w PPI  - zofran PRN    ---------------------  DVT ppx: eliquis   Diet: Renal  GI ppx/Bowel regimen: none  Activity: IAT  GOC: DNR/DNI trial NIV  Dispo: pending  #Summary/Handoff:  - nephro f/u for HD   70y M pmh CKD V, HTN, NIDDM, afib, prior spinal abscess, nephrolithiasis and recurrent UTI s/p b/l stent placement and SPC presenting for worsening creatinine admitted for JOÃO on CKD and chronic cholecystitis    #JOÃO on CKD V  #HAGMA likely 2/2 uremia   #Hyperkalemia - resolved   #Hx SPC  #Hx nephrolithiasis sp b/l stent placement   - bsl Cr 5.5>9.5, CO2 15, K 5.4, pH 7.2 on vbg  - UA +ve bacteria, WBC >998, +ve LE  - CTAP  Bilateral nephroureteral catheters beginning in the renal pelvis and terminating within the urinary bladder. Improved right hydronephrosis now with right renal pelvic fullness. Mild improvement in left moderate hydronephrosis. Bilateral renal and pelvic stones difficult to compare given motion artifact at the level of the kidneys. No definite stone along the course of the ureters. Nephroureteral catheters and a suprapubic catheter terminating within an underdistended urinary bladder. Prostate measures 4.9 cm in transverse dimension.   - uro c/s appreciated: f/u OP for definitive stone treatment and stent removal once stable   - nephro c/s appreciated: GOC with brother for possible RRT >> ID said ok to place line  - s/p bicarb gtt   - s/p lokelma  - transition phoslo to sevelamer powder as patient has trouble swallowing the phoslo pills  - strict I/O, avoid nephrotoxins, dose meds per GFR    #Chronic cholecystitis  - GB wall thickened 0.4mm on RUQ US  - lactate -ve, Tbili wnl, CTAP showing GB wall thickening with pericholecystic fluid  - HIDA (+)  - IR: likely chronic, conservative mgmt  - surgery recs appreciated   - bcx ngtd  - s/p cefepime/flagyl  - ID recs appreciated: monitor off abx  - eventually needs CCY    #Afib  #HTN  - eliquis 2.5mg bid   - hold home nifedipine    #Constipation - resolved  - CTAP - no bowel obstruction, large rectal stool burden with distention measuring 7.8 cm   - bowel regimen discontinued, pt having 3 BMs daily   - stool count    #Normocytic anemia suspected 2/2 advanced CKD  - b12 1269, folate 12.2  - iron 95, sat elevated , ferritin 438  - no active bleeding, continue to monitor   - nephro f/up for possible TACHO    #Hypocalcemia 2/2 hyperphosphatemia and CKD  #Vitamin D deficiency likely 2/2 CKD  - Corrected Ca 7.6  - elevated ALP, elevated PTH, elevated phos, low 25OH vit D and 1-25OH vit D  - c/w vit D supplementation    #Self-induced emesis due to sour feeling/sensation of food stuck - GERD vs zenker diverticulum?  #Concern for malnutrition  - pt frequently self-induces emesis due to sour feeling of throat and/or sensation of food being stuck; no dysphagia, not specific to solid/liquid or specific food  - f/u RD consult  - c/w PPI  - zofran PRN    ---------------------  DVT ppx: eliquis   Diet: Renal  GI ppx/Bowel regimen: none  Activity: IAT  GOC: DNR/DNI trial NIV  Dispo: pending  #Summary/Handoff:  - nephro f/u for HD

## 2025-05-29 LAB
ALBUMIN SERPL ELPH-MCNC: 2.5 G/DL — LOW (ref 3.5–5.2)
ALP SERPL-CCNC: 357 U/L — HIGH (ref 30–115)
ALT FLD-CCNC: 27 U/L — SIGNIFICANT CHANGE UP (ref 0–41)
ANION GAP SERPL CALC-SCNC: 16 MMOL/L — HIGH (ref 7–14)
AST SERPL-CCNC: 25 U/L — SIGNIFICANT CHANGE UP (ref 0–41)
BASOPHILS # BLD AUTO: 0.05 K/UL — SIGNIFICANT CHANGE UP (ref 0–0.2)
BASOPHILS NFR BLD AUTO: 0.6 % — SIGNIFICANT CHANGE UP (ref 0–1)
BILIRUB SERPL-MCNC: <0.2 MG/DL — SIGNIFICANT CHANGE UP (ref 0.2–1.2)
BUN SERPL-MCNC: 100 MG/DL — CRITICAL HIGH (ref 10–20)
CALCIUM SERPL-MCNC: 7.5 MG/DL — LOW (ref 8.4–10.5)
CHLORIDE SERPL-SCNC: 100 MMOL/L — SIGNIFICANT CHANGE UP (ref 98–110)
CO2 SERPL-SCNC: 25 MMOL/L — SIGNIFICANT CHANGE UP (ref 17–32)
CREAT SERPL-MCNC: 8.3 MG/DL — CRITICAL HIGH (ref 0.7–1.5)
CULTURE RESULTS: SIGNIFICANT CHANGE UP
EGFR: 6 ML/MIN/1.73M2 — LOW
EGFR: 6 ML/MIN/1.73M2 — LOW
EOSINOPHIL # BLD AUTO: 0.36 K/UL — SIGNIFICANT CHANGE UP (ref 0–0.7)
EOSINOPHIL NFR BLD AUTO: 4.3 % — SIGNIFICANT CHANGE UP (ref 0–8)
GLUCOSE SERPL-MCNC: 120 MG/DL — HIGH (ref 70–99)
HCT VFR BLD CALC: 25.4 % — LOW (ref 42–52)
HGB BLD-MCNC: 7.9 G/DL — LOW (ref 14–18)
IMM GRANULOCYTES NFR BLD AUTO: 2.7 % — HIGH (ref 0.1–0.3)
LYMPHOCYTES # BLD AUTO: 1.8 K/UL — SIGNIFICANT CHANGE UP (ref 1.2–3.4)
LYMPHOCYTES # BLD AUTO: 21.3 % — SIGNIFICANT CHANGE UP (ref 20.5–51.1)
MAGNESIUM SERPL-MCNC: 2.1 MG/DL — SIGNIFICANT CHANGE UP (ref 1.8–2.4)
MCHC RBC-ENTMCNC: 28.9 PG — SIGNIFICANT CHANGE UP (ref 27–31)
MCHC RBC-ENTMCNC: 31.1 G/DL — LOW (ref 32–37)
MCV RBC AUTO: 93 FL — SIGNIFICANT CHANGE UP (ref 80–94)
MONOCYTES # BLD AUTO: 0.59 K/UL — SIGNIFICANT CHANGE UP (ref 0.1–0.6)
MONOCYTES NFR BLD AUTO: 7 % — SIGNIFICANT CHANGE UP (ref 1.7–9.3)
NEUTROPHILS # BLD AUTO: 5.42 K/UL — SIGNIFICANT CHANGE UP (ref 1.4–6.5)
NEUTROPHILS NFR BLD AUTO: 64.1 % — SIGNIFICANT CHANGE UP (ref 42.2–75.2)
NRBC BLD AUTO-RTO: 0 /100 WBCS — SIGNIFICANT CHANGE UP (ref 0–0)
PHOSPHATE SERPL-MCNC: 6.3 MG/DL — HIGH (ref 2.1–4.9)
PLATELET # BLD AUTO: 217 K/UL — SIGNIFICANT CHANGE UP (ref 130–400)
PMV BLD: 11.3 FL — HIGH (ref 7.4–10.4)
POTASSIUM SERPL-MCNC: 4.4 MMOL/L — SIGNIFICANT CHANGE UP (ref 3.5–5)
POTASSIUM SERPL-SCNC: 4.4 MMOL/L — SIGNIFICANT CHANGE UP (ref 3.5–5)
PROT SERPL-MCNC: 7.1 G/DL — SIGNIFICANT CHANGE UP (ref 6–8)
RBC # BLD: 2.73 M/UL — LOW (ref 4.7–6.1)
RBC # FLD: 13.6 % — SIGNIFICANT CHANGE UP (ref 11.5–14.5)
SODIUM SERPL-SCNC: 141 MMOL/L — SIGNIFICANT CHANGE UP (ref 135–146)
SPECIMEN SOURCE: SIGNIFICANT CHANGE UP
WBC # BLD: 8.45 K/UL — SIGNIFICANT CHANGE UP (ref 4.8–10.8)
WBC # FLD AUTO: 8.45 K/UL — SIGNIFICANT CHANGE UP (ref 4.8–10.8)

## 2025-05-29 PROCEDURE — 99223 1ST HOSP IP/OBS HIGH 75: CPT

## 2025-05-29 PROCEDURE — 99232 SBSQ HOSP IP/OBS MODERATE 35: CPT

## 2025-05-29 RX ORDER — HEPARIN SODIUM 1000 [USP'U]/ML
5000 INJECTION INTRAVENOUS; SUBCUTANEOUS EVERY 12 HOURS
Refills: 0 | Status: DISCONTINUED | OUTPATIENT
Start: 2025-05-29 | End: 2025-06-03

## 2025-05-29 RX ADMIN — Medication 1 APPLICATION(S): at 05:18

## 2025-05-29 RX ADMIN — SEVELAMER HYDROCHLORIDE 1600 MILLIGRAM(S): 800 TABLET ORAL at 12:22

## 2025-05-29 RX ADMIN — Medication 40 MILLIGRAM(S): at 12:22

## 2025-05-29 RX ADMIN — APIXABAN 2.5 MILLIGRAM(S): 2.5 TABLET, FILM COATED ORAL at 05:18

## 2025-05-29 RX ADMIN — SEVELAMER HYDROCHLORIDE 1600 MILLIGRAM(S): 800 TABLET ORAL at 08:27

## 2025-05-29 RX ADMIN — Medication 1000 UNIT(S): at 12:21

## 2025-05-29 RX ADMIN — HEPARIN SODIUM 5000 UNIT(S): 1000 INJECTION INTRAVENOUS; SUBCUTANEOUS at 17:44

## 2025-05-29 NOTE — PROGRESS NOTE ADULT - ASSESSMENT
70y M pmh CKD V, HTN, NIDDM, afib, prior spinal abscess, nephrolithiasis and recurrent UTI s/p b/l stent placement and SPC presenting for worsening creatinine admitted for JOÃO on CKD and chronic cholecystitis    #JOÃO on CKD V  #HAGMA likely 2/2 uremia   #Hyperkalemia - resolved   #Hx SPC  #Hx nephrolithiasis sp b/l stent placement   - bsl Cr 5.5>9.5, CO2 15, K 5.4, pH 7.2 on vbg  - UA +ve bacteria, WBC >998, +ve LE  - CTAP  Bilateral nephroureteral catheters beginning in the renal pelvis and terminating within the urinary bladder. Improved right hydronephrosis now with right renal pelvic fullness. Mild improvement in left moderate hydronephrosis. Bilateral renal and pelvic stones difficult to compare given motion artifact at the level of the kidneys. No definite stone along the course of the ureters. Nephroureteral catheters and a suprapubic catheter terminating within an underdistended urinary bladder. Prostate measures 4.9 cm in transverse dimension.   - uro c/s appreciated: f/u OP for definitive stone treatment and stent removal once stable   - nephro c/s appreciated: GOC with brother for possible RRT >> ID needed for clearance  - F/u with ID regarding clearance for TDC  - s/p bicarb gtt   - s/p lokelma  - Start sevelamer powder 1600 mg TID.  - strict I/O, avoid nephrotoxins, dose meds per GFR  - Urology consulted for suprapubic cathether exchange    #Chronic cholecystitis  - GB wall thickened 0.4mm on RUQ US  - lactate -ve, Tbili wnl, CTAP showing GB wall thickening with pericholecystic fluid  - HIDA (+)  - IR: likely chronic, conservative mgmt  - surgery recs appreciated   - bcx ngtd  - s/p cefepime/flagyl  - ID recs appreciated: monitor off abx  - eventually needs CCY    #Afib  #HTN  - BP: 124/86  - eliquis 2.5mg bid   - Continue to hold home nifedipine    #Normocytic anemia suspected 2/2 advanced CKD  - b12 1269, folate 12.2  - iron 95, sat elevated , ferritin 438  - no active bleeding, continue to monitor   - nephro f/up for possible TACHO    #Hypocalcemia 2/2 hyperphosphatemia and CKD  #Vitamin D deficiency likely 2/2 CKD  - Corrected Ca 7.6  - elevated ALP, elevated PTH, elevated phos, low 25OH vit D and 1-25OH vit D  - c/w vit D supplementation    #Self-induced emesis due to sour feeling/sensation of food stuck - GERD vs zenker diverticulum?  #Concern for malnutrition  - pt frequently self-induces emesis due to sour feeling of throat and/or sensation of food being stuck; no dysphagia, not specific to solid/liquid or specific food  - Dietician recs noted  - c/w PPI  - zofran PRN    #Constipation - resolved  - CTAP - no bowel obstruction, large rectal stool burden with distention measuring 7.8 cm   - bowel regimen discontinued, pt having 3 BMs daily   - stool count

## 2025-05-29 NOTE — CONSULT NOTE ADULT - SUBJECTIVE AND OBJECTIVE BOX
INTERVENTIONAL RADIOLOGY CONSULT:     Procedure Requested:     HPI:  70y M pmh CKD V, HTN, NIDDM, prior spinal abscess, nephrolithiasis and recurrent UTI s/p b/l stent placement and SPC presenting for evaluation. Patient sent in by Dr. Mcallister for evaluation for acute rise in creatinine. sCr noted to increase from 5.5>9.5 with reduced UOP, hyperkalemia and hyperphosphatemia.     ROS -ve for fever, cp, sob, abdominal pain, n/v/d/c, hematuria, dysuria, rash, joint pain, confusion, dizziness, lightheadedness.    Vitals in the ED  T 95.6  HR 71  /83  RR 18 SpO2 98 On RA     Significant Labs  wbc 6.14 hgb 8.0 plt 156  Na 135 K 5.4 BUN/Cr 102/9.5  CO2 15, AGAP 20  Ca 6.6      ALT 99    VBG 7.20/37/67/14  UA + LE, WBC, bacteria, rbc, casts    CTAP   Interval marked distention with gallbladder wall thickening and pericholecystic fluid. Cholelithiasis. No definite biliary duct dilatation.  Symmetric in size. Bilateral nephroureteral catheters beginning in the renal pelvis and terminating within the urinary bladder.   Improved right hydronephrosis now with right renal pelvic fullness. Mild improvement in left moderate hydronephrosis. Bilateral renal and pelvic stones difficult to compare given motion artifact at the level of the kidneys.   No definite stone along the course of the ureters.  Increased periaortic lymph node measuring 1.1 cm previously 0.9 cm, may be reactive   No bowel obstruction. Large rectal stool burden with distention measuring 7.8 cm   Nephroureteral catheters and a suprapubic catheter terminating within an underdistended urinary bladder. Air is noted within   the urinary bladder which may be due to instrumentation. Prostate measures 4.9 cm in transverse dimension   Calcified atherosclerotic disease of the aorta and its branches.  Trace free fluid surrounding the gallbladder and liver.  Right basilar consolidative opacity adjacent likely trace pleural effusion.    RUQ:   Findings concerning for cholecystitis despite a negative sonographic Iglesias's sign, as described.    Right renal pelvic fullness.  Trace right pleural effusion.    Patient received rocephin, 1L NS bolus, sodium bicarb ggt, lokelma in the ED. Admitted to medicine for management.     (23 May 2025 21:05)      PAST MEDICAL & SURGICAL HISTORY:  DM (diabetes mellitus)      HTN (hypertension)      H/O paraplegia      Spinal abscess      Renal stones      Stage 5 chronic kidney disease not on chronic dialysis      Neurogenic dysfunction of the urinary bladder      Encounter for care or replacement of suprapubic tube      Spinal abscess  S/P Surgery      Encounter for care or replacement of suprapubic tube      S/P ORIF (open reduction internal fixation) fracture          MEDICATIONS  (STANDING):  apixaban 2.5 milliGRAM(s) Oral two times a day  chlorhexidine 2% Cloths 1 Application(s) Topical <User Schedule>  cholecalciferol 1000 Unit(s) Oral daily  pantoprazole  Injectable 40 milliGRAM(s) IV Push daily  sevelamer carbonate Powder 1600 milliGRAM(s) Oral three times a day with meals    MEDICATIONS  (PRN):  ondansetron Injectable 4 milliGRAM(s) IV Push every 8 hours PRN Nausea and/or Vomiting      Allergies    No Known Allergies    Intolerances          FAMILY HISTORY:  FH: HTN (hypertension)    FH: breast cancer    FH: melanoma    FH: heart disease        Physical Exam:   Vital Signs Last 24 Hrs  T(C): 36.6 (29 May 2025 12:56), Max: 36.8 (29 May 2025 04:52)  T(F): 97.9 (29 May 2025 12:56), Max: 98.2 (29 May 2025 04:52)  HR: 59 (29 May 2025 12:56) (59 - 85)  BP: 149/82 (29 May 2025 12:56) (124/86 - 154/75)  BP(mean): --  RR: 18 (29 May 2025 12:56) (18 - 19)  SpO2: 98% (29 May 2025 12:56) (94% - 98%)    Parameters below as of 28 May 2025 20:36  Patient On (Oxygen Delivery Method): room air          Labs:                         7.9    8.45  )-----------( 217      ( 29 May 2025 11:34 )             25.4     05-28    142  |  99  |  104[HH]  ----------------------------<  86  4.9   |  24  |  9.0[HH]    Ca    7.9[L]      28 May 2025 11:08  Phos  8.1     05-28  Mg     1.8     05-28    TPro  6.4  /  Alb  2.7[L]  /  TBili  <0.2  /  DBili  x   /  AST  33  /  ALT  34  /  AlkPhos  379[H]  05-28        Pertinent labs:                      7.9    8.45  )-----------( 217      ( 29 May 2025 11:34 )             25.4       05-28    142  |  99  |  104[HH]  ----------------------------<  86  4.9   |  24  |  9.0[HH]    Ca    7.9[L]      28 May 2025 11:08  Phos  8.1     05-28  Mg     1.8     05-28    TPro  6.4  /  Alb  2.7[L]  /  TBili  <0.2  /  DBili  x   /  AST  33  /  ALT  34  /  AlkPhos  379[H]  05-28          Radiology & Additional Studies:     Radiology imaging reviewed.       ASSESSMENT AND PLAN:    -INCOMPLETE    -NPO after midnight  -draw CBC/CMP/Coags prior to procedure  -hold anticoagulation until after procedure    Please call Interventional Radiology with questions or concerns:   - M-F [phone]: [phone]   - All other hours: [phone]         INTERVENTIONAL RADIOLOGY CONSULT:     Procedure Requested: Marga    HPI:  70y M pmh CKD V, HTN, NIDDM, prior spinal abscess, nephrolithiasis and recurrent UTI s/p b/l stent placement and SPC presenting for evaluation. Patient sent in by Dr. Mcallister for evaluation for acute rise in creatinine. sCr noted to increase from 5.5>9.5 with reduced UOP, hyperkalemia and hyperphosphatemia.     ROS -ve for fever, cp, sob, abdominal pain, n/v/d/c, hematuria, dysuria, rash, joint pain, confusion, dizziness, lightheadedness.    Vitals in the ED  T 95.6  HR 71  /83  RR 18 SpO2 98 On RA     Significant Labs  wbc 6.14 hgb 8.0 plt 156  Na 135 K 5.4 BUN/Cr 102/9.5  CO2 15, AGAP 20  Ca 6.6      ALT 99    VBG 7.20/37/67/14  UA + LE, WBC, bacteria, rbc, casts    CTAP   Interval marked distention with gallbladder wall thickening and pericholecystic fluid. Cholelithiasis. No definite biliary duct dilatation.  Symmetric in size. Bilateral nephroureteral catheters beginning in the renal pelvis and terminating within the urinary bladder.   Improved right hydronephrosis now with right renal pelvic fullness. Mild improvement in left moderate hydronephrosis. Bilateral renal and pelvic stones difficult to compare given motion artifact at the level of the kidneys.   No definite stone along the course of the ureters.  Increased periaortic lymph node measuring 1.1 cm previously 0.9 cm, may be reactive   No bowel obstruction. Large rectal stool burden with distention measuring 7.8 cm   Nephroureteral catheters and a suprapubic catheter terminating within an underdistended urinary bladder. Air is noted within   the urinary bladder which may be due to instrumentation. Prostate measures 4.9 cm in transverse dimension   Calcified atherosclerotic disease of the aorta and its branches.  Trace free fluid surrounding the gallbladder and liver.  Right basilar consolidative opacity adjacent likely trace pleural effusion.    RUQ:   Findings concerning for cholecystitis despite a negative sonographic Iglesias's sign, as described.    Right renal pelvic fullness.  Trace right pleural effusion.    Patient received rocephin, 1L NS bolus, sodium bicarb ggt, lokelma in the ED. Admitted to medicine for management.     (23 May 2025 21:05)      PAST MEDICAL & SURGICAL HISTORY:  DM (diabetes mellitus)    HTN (hypertension)    H/O paraplegia    Spinal abscess    Renal stones    Stage 5 chronic kidney disease not on chronic dialysis    Neurogenic dysfunction of the urinary bladder    Encounter for care or replacement of suprapubic tube      Spinal abscess  S/P Surgery    Encounter for care or replacement of suprapubic tube    S/P ORIF (open reduction internal fixation) fracture    MEDICATIONS  (STANDING):  apixaban 2.5 milliGRAM(s) Oral two times a day  chlorhexidine 2% Cloths 1 Application(s) Topical <User Schedule>  cholecalciferol 1000 Unit(s) Oral daily  pantoprazole  Injectable 40 milliGRAM(s) IV Push daily  sevelamer carbonate Powder 1600 milliGRAM(s) Oral three times a day with meals    MEDICATIONS  (PRN):  ondansetron Injectable 4 milliGRAM(s) IV Push every 8 hours PRN Nausea and/or Vomiting    Allergies    No Known Allergies    Intolerances    FAMILY HISTORY:  FH: HTN (hypertension)    FH: breast cancer    FH: melanoma    FH: heart disease    Physical Exam:   Vital Signs Last 24 Hrs  T(C): 36.6 (29 May 2025 12:56), Max: 36.8 (29 May 2025 04:52)  T(F): 97.9 (29 May 2025 12:56), Max: 98.2 (29 May 2025 04:52)  HR: 59 (29 May 2025 12:56) (59 - 85)  BP: 149/82 (29 May 2025 12:56) (124/86 - 154/75)  BP(mean): --  RR: 18 (29 May 2025 12:56) (18 - 19)  SpO2: 98% (29 May 2025 12:56) (94% - 98%)    Parameters below as of 28 May 2025 20:36  Patient On (Oxygen Delivery Method): room air      Labs:                         7.9    8.45  )-----------( 217      ( 29 May 2025 11:34 )             25.4     05-28    142  |  99  |  104[HH]  ----------------------------<  86  4.9   |  24  |  9.0[HH]    Ca    7.9[L]      28 May 2025 11:08  Phos  8.1     05-28  Mg     1.8     05-28    TPro  6.4  /  Alb  2.7[L]  /  TBili  <0.2  /  DBili  x   /  AST  33  /  ALT  34  /  AlkPhos  379[H]  05-28        Pertinent labs:                      7.9    8.45  )-----------( 217      ( 29 May 2025 11:34 )             25.4       05-28    142  |  99  |  104[HH]  ----------------------------<  86  4.9   |  24  |  9.0[HH]    Ca    7.9[L]      28 May 2025 11:08  Phos  8.1     05-28  Mg     1.8     05-28    TPro  6.4  /  Alb  2.7[L]  /  TBili  <0.2  /  DBili  x   /  AST  33  /  ALT  34  /  AlkPhos  379[H]  05-28        Radiology & Additional Studies:     Radiology imaging reviewed.       ASSESSMENT AND PLAN:    -INCOMPLETE    -NPO after midnight  -draw CBC/CMP/Coags prior to procedure  -hold anticoagulation until after procedure    Please call Interventional Radiology with questions or concerns:   - M-F [phone]: [phone]   - All other hours: [phone]         INTERVENTIONAL RADIOLOGY CONSULT:     Procedure Requested: TDC    HPI:  70y M pmh CKD V, HTN, NIDDM, prior spinal abscess, nephrolithiasis and recurrent UTI s/p b/l stent placement and SPC presenting for evaluation. Patient sent in by Dr. Mcallister for evaluation for acute rise in creatinine. sCr noted to increase from 5.5>9.5 with reduced UOP, hyperkalemia and hyperphosphatemia.     ROS -ve for fever, cp, sob, abdominal pain, n/v/d/c, hematuria, dysuria, rash, joint pain, confusion, dizziness, lightheadedness.    Vitals in the ED  T 95.6  HR 71  /83  RR 18 SpO2 98 On RA     Significant Labs  wbc 6.14 hgb 8.0 plt 156  Na 135 K 5.4 BUN/Cr 102/9.5  CO2 15, AGAP 20  Ca 6.6      ALT 99    VBG 7.20/37/67/14  UA + LE, WBC, bacteria, rbc, casts    CTAP   Interval marked distention with gallbladder wall thickening and pericholecystic fluid. Cholelithiasis. No definite biliary duct dilatation.  Symmetric in size. Bilateral nephroureteral catheters beginning in the renal pelvis and terminating within the urinary bladder.   Improved right hydronephrosis now with right renal pelvic fullness. Mild improvement in left moderate hydronephrosis. Bilateral renal and pelvic stones difficult to compare given motion artifact at the level of the kidneys.   No definite stone along the course of the ureters.  Increased periaortic lymph node measuring 1.1 cm previously 0.9 cm, may be reactive   No bowel obstruction. Large rectal stool burden with distention measuring 7.8 cm   Nephroureteral catheters and a suprapubic catheter terminating within an underdistended urinary bladder. Air is noted within   the urinary bladder which may be due to instrumentation. Prostate measures 4.9 cm in transverse dimension   Calcified atherosclerotic disease of the aorta and its branches.  Trace free fluid surrounding the gallbladder and liver.  Right basilar consolidative opacity adjacent likely trace pleural effusion.    RUQ:   Findings concerning for cholecystitis despite a negative sonographic Iglesias's sign, as described.    Right renal pelvic fullness.  Trace right pleural effusion.    Patient received rocephin, 1L NS bolus, sodium bicarb ggt, lokelma in the ED. Admitted to medicine for management.     (23 May 2025 21:05)      PAST MEDICAL & SURGICAL HISTORY:  DM (diabetes mellitus)    HTN (hypertension)    H/O paraplegia    Spinal abscess    Renal stones    Stage 5 chronic kidney disease not on chronic dialysis    Neurogenic dysfunction of the urinary bladder    Encounter for care or replacement of suprapubic tube      Spinal abscess  S/P Surgery    Encounter for care or replacement of suprapubic tube    S/P ORIF (open reduction internal fixation) fracture    MEDICATIONS  (STANDING):  apixaban 2.5 milliGRAM(s) Oral two times a day  chlorhexidine 2% Cloths 1 Application(s) Topical <User Schedule>  cholecalciferol 1000 Unit(s) Oral daily  pantoprazole  Injectable 40 milliGRAM(s) IV Push daily  sevelamer carbonate Powder 1600 milliGRAM(s) Oral three times a day with meals    MEDICATIONS  (PRN):  ondansetron Injectable 4 milliGRAM(s) IV Push every 8 hours PRN Nausea and/or Vomiting    Allergies    No Known Allergies    Intolerances    FAMILY HISTORY:  FH: HTN (hypertension)    FH: breast cancer    FH: melanoma    FH: heart disease    Physical Exam:   Vital Signs Last 24 Hrs  T(C): 36.6 (29 May 2025 12:56), Max: 36.8 (29 May 2025 04:52)  T(F): 97.9 (29 May 2025 12:56), Max: 98.2 (29 May 2025 04:52)  HR: 59 (29 May 2025 12:56) (59 - 85)  BP: 149/82 (29 May 2025 12:56) (124/86 - 154/75)  BP(mean): --  RR: 18 (29 May 2025 12:56) (18 - 19)  SpO2: 98% (29 May 2025 12:56) (94% - 98%)    Parameters below as of 28 May 2025 20:36  Patient On (Oxygen Delivery Method): room air      Labs:                         7.9    8.45  )-----------( 217      ( 29 May 2025 11:34 )             25.4     05-28    142  |  99  |  104[HH]  ----------------------------<  86  4.9   |  24  |  9.0[HH]    Ca    7.9[L]      28 May 2025 11:08  Phos  8.1     05-28  Mg     1.8     05-28    TPro  6.4  /  Alb  2.7[L]  /  TBili  <0.2  /  DBili  x   /  AST  33  /  ALT  34  /  AlkPhos  379[H]  05-28        Pertinent labs:                      7.9    8.45  )-----------( 217      ( 29 May 2025 11:34 )             25.4       05-28    142  |  99  |  104[HH]  ----------------------------<  86  4.9   |  24  |  9.0[HH]    Ca    7.9[L]      28 May 2025 11:08  Phos  8.1     05-28  Mg     1.8     05-28    TPro  6.4  /  Alb  2.7[L]  /  TBili  <0.2  /  DBili  x   /  AST  33  /  ALT  34  /  AlkPhos  379[H]  05-28      Radiology & Additional Studies:     Radiology imaging reviewed.     ASSESSMENT AND PLAN:  -Patient gogo Monday 6/2/25 for a TDC placement.   -NPO past midnight.  -Please draw CBC, CMP, and coags prior to procedure  -Holy anticoagulation until after the procedure.     Please call Interventional Radiology with questions or concerns:   - M-F [id number]: [phone]   - All other hours: [phone]

## 2025-05-29 NOTE — CONSULT NOTE ADULT - ATTENDING COMMENTS
Patient seen and examined during the GI rounds, case discussed with the GI fellow, plan communicated to the primary team, assessment, recommendations and plan as above. Patient seen and examined during the GI rounds, case discussed with the GI fellow, plan communicated to the primary team, assessment, recommendations and plan as above. Reviewed all pertinent clinical information and reviewed all relevant imaging and diagnostic studies.  Counseled the patient /HCP about diagnostic testing and treatment plan. All questions were answered.  Discussed recommendations with the primary team and coordinated care.

## 2025-05-29 NOTE — CONSULT NOTE ADULT - SUBJECTIVE AND OBJECTIVE BOX
Gastroenterology Consultation:    Patient is a 70y old  Male who presents with a chief complaint of JOÃO on CKD (28 May 2025 14:46)    Admitted on: 05-23-25    HPI:  70y M pmh, ascending thoracic aneurysm, CKD V, HTN, NIDDM, prior spinal abscess, nephrolithiasis and recurrent UTI s/p b/l stent placement and SPC presenting for evaluation. Patient sent in by Dr. Mcallister for evaluation for acute rise in creatinine. sCr noted to increase from 5.5>9.5 with reduced UOP, hyperkalemia and hyperphosphatemia.         Prior EGD:    Prior Colonoscopy:      PAST MEDICAL & SURGICAL HISTORY:  DM (diabetes mellitus)  HTN (hypertension)  H/O paraplegia  Spinal abscess  Renal stones  Stage 5 chronic kidney disease not on chronic dialysis  Neurogenic dysfunction of the urinary bladder  Encounter for care or replacement of suprapubic tube  Spinal abscess  S/P Surgery  Encounter for care or replacement of suprapubic tube  S/P ORIF (open reduction internal fixation) fracture            FAMILY HISTORY:  FH: HTN (hypertension)  FH: breast cancer  FH: melanoma  FH: heart disease        Social History:  neg x 3       Home Medications:  Eliquis 5 mg oral tablet: 0.5 tab(s) orally every 12 hours (30 Apr 2025 23:24)  folic acid 1 mg oral tablet: 1 tab(s) orally once a day (29 Apr 2025 09:09)  Multiple Vitamins oral tablet: 1 tab(s) orally once a day (15 May 2025 14:05)        MEDICATIONS  (STANDING):  apixaban 2.5 milliGRAM(s) Oral two times a day  chlorhexidine 2% Cloths 1 Application(s) Topical <User Schedule>  cholecalciferol 1000 Unit(s) Oral daily  pantoprazole  Injectable 40 milliGRAM(s) IV Push daily  sevelamer carbonate Powder 1600 milliGRAM(s) Oral three times a day with meals    MEDICATIONS  (PRN):  ondansetron Injectable 4 milliGRAM(s) IV Push every 8 hours PRN Nausea and/or Vomiting      Allergies  No Known Allergies      Review of Systems:   Constitutional:  No Fever, No Chills  ENT/Mouth:  No Hearing Changes,  No Difficulty Swallowing  Eyes:  No Eye Pain, No Vision Changes  Cardiovascular:  No Chest Pain, No Palpitations  Respiratory:  No Cough, No Dyspnea  Gastrointestinal:  As described in HPI  Musculoskeletal:  No Joint Swelling, No Back Pain  Skin:  No Skin Lesions, No Jaundice  Neuro:  No Syncope, No Dizziness  Heme/Lymph:  No Bruising, No Bleeding.          Physical Examination:  T(C): 36.8 (05-29-25 @ 04:52), Max: 36.8 (05-29-25 @ 04:52)  HR: 61 (05-29-25 @ 04:52) (61 - 85)  BP: 124/86 (05-29-25 @ 04:52) (124/86 - 159/81)  RR: 18 (05-29-25 @ 04:52) (18 - 19)  SpO2: 94% (05-29-25 @ 04:52) (94% - 97%)  Height (cm): 162.6 (05-28-25 @ 14:46)    05-27-25 @ 07:01  -  05-28-25 @ 07:00  --------------------------------------------------------  IN: 120 mL / OUT: 651 mL / NET: -531 mL    05-28-25 @ 07:01  -  05-29-25 @ 07:00  --------------------------------------------------------  IN: 560 mL / OUT: 2000 mL / NET: -1440 mL          GENERAL: AAOx3, no acute distress.  HEAD:  Atraumatic, Normocephalic  EYES: conjunctiva and sclera clear  NECK: Supple, no JVD or thyromegaly  CHEST/LUNG: Clear to auscultation bilaterally; No wheeze, rhonchi, or rales  HEART: Regular rate and rhythm; normal S1, S2, No murmurs.  ABDOMEN: Soft, nontender, nondistended; Bowel sounds present  NEUROLOGY: No asterixis or tremor.   SKIN: Intact, no jaundice        Data:                        7.9    8.40  )-----------( 213      ( 28 May 2025 11:08 )             25.5     Hgb Trend:  7.9  05-28-25 @ 11:08  7.7  05-27-25 @ 04:41        05-28    142  |  99  |  104[HH]  ----------------------------<  86  4.9   |  24  |  9.0[HH]    Ca    7.9[L]      28 May 2025 11:08  Phos  8.1     05-28  Mg     1.8     05-28    TPro  6.4  /  Alb  2.7[L]  /  TBili  <0.2  /  DBili  x   /  AST  33  /  ALT  34  /  AlkPhos  379[H]  05-28    Liver panel trend:  TBili <0.2   /   AST 33   /   ALT 34   /   AlkP 379   /   Tptn 6.4   /   Alb 2.7    /   DBili --      05-28  TBili 0.2   /   AST 44   /   ALT 42   /   AlkP 419   /   Tptn 6.7   /   Alb 2.5    /   DBili <0.2      05-27  TBili <0.2   /   AST 97   /   ALT 75   /   AlkP 521   /   Tptn 6.3   /   Alb 2.5    /   DBili --      05-24  TBili <0.2   /      /   ALT 83   /   AlkP 590   /   Tptn 6.8   /   Alb 2.6    /   DBili --      05-24  TBili 0.2   /      /   ALT 99   /   AlkP 521   /   Tptn 7.2   /   Alb 2.7    /   DBili --      05-23              Radiology:        ACC: [id number] EXAM:  CT ABDOMEN AND PELVIS   ORDERED BY: AMELIA WONG     PROCEDURE DATE:  05/23/2025          INTERPRETATION:  Clinical Indication: Acute kidney injury.    Technique: Multidetector-row CT images of the abdomen and pelvis were   obtained from the xiphoid through the symphysis pubis. No contrast was   administered. Coronal and sagittal reconstructions were performed.    Comparison: CT abdomen and pelvis 5/12/2025.    Findings:    01. LIVER: Normal unenhanced.  02. SPLEEN: Normalunenhanced.  03. PANCREAS: Normal unenhanced.  04. GALLBLADDER/BILIARY TREE: Interval marked distention with gallbladder   wall thickening and pericholecystic fluid. Cholelithiasis. No definite   biliary duct dilatation.  05. ADRENALS: Normal.  06. KIDNEYS: Symmetric in size. Bilateral nephroureteral catheters   beginning in the renal pelvis and terminating within the urinary bladder.   Improved right hydronephrosis now with right renal pelvic fullness. Mild   improvement in left moderate hydronephrosis. Bilateral renal and pelvic   stones difficult to compare given motion artifact at the level of the   kidneys. No definite stone along the course of the ureters.  07. LYMPHADENOPATHY/RETROPERITONEUM: Increased periaortic lymph node   measuring1.1 cm previously 0.9 cm, may be reactive (series 4 image 48).  08. BOWEL: No bowel obstruction. Large rectal stool burden with   distention measuring 7.8 cm (series 601 image 73).  09. PELVIC VISCERA: Nephroureteral catheters and a suprapubic catheter   terminating within an underdistended urinary bladder. Air is noted within   the urinary bladder which may be due to instrumentation. Prostate   measures 4.9 cm in transverse dimension (series 4 image 107).  10. PELVIC LYMPH NODES: No enlarged lymph nodes.  11. VASCULATURE: No abdominal aortic aneurysm. Calcified atherosclerotic   disease of the aorta and its branches.  12. PERITONEUM/ABDOMINAL WALL: Trace free fluid surrounding the   gallbladder and liver.  13. SKELETAL: Degenerative changes.  14. LUNG BASES: Right basilar consolidative opacity adjacent likely trace   pleural effusion.    IMPRESSION:    Findings compatible with acute cholecystitis.    Improved bilateral hydronephrosis as described.    Right basilar consolidative opacity with adjacent likely trace pleural   effusion. Infectious process is not excluded.    Large rectal stool burden with distention measuring 7.8 cm. No bowel   obstruction.    Dr. Guerra spoke to Dr. Wong at 3:36 PM on 5/23/2025 regarding   findings with read back.    --- End of Report ---            CHANDLER GUERRA MD; Attending Radiologist  This document has been electronically signed. May 23 2025  3:37PM   Gastroenterology Consultation:    Patient is a 70y old  Male who presents with a chief complaint of JOÃO on CKD (28 May 2025 14:46)    Admitted on: 05-23-25    HPI:  70y M pmh, ascending thoracic aneurysm, CKD V, HTN, NIDDM, prior spinal abscess, nephrolithiasis and recurrent UTI s/p b/l stent placement and SPC presenting for evaluation. Patient sent in by Dr. Mcallister for evaluation for acute rise in creatinine. sCr noted to increase from 5.5>9.5 with reduced UOP, hyperkalemia and hyperphosphatemia.     GI consulted for vomiting. Pt is poor historian. Mentions he has not had any episodes of vomiting today.     Prior EGD: none in system     Prior Colonoscopy: none in system       PAST MEDICAL & SURGICAL HISTORY:  DM (diabetes mellitus)  HTN (hypertension)  H/O paraplegia  Spinal abscess  Renal stones  Stage 5 chronic kidney disease not on chronic dialysis  Neurogenic dysfunction of the urinary bladder  Encounter for care or replacement of suprapubic tube  Spinal abscess  S/P Surgery  Encounter for care or replacement of suprapubic tube  S/P ORIF (open reduction internal fixation) fracture            FAMILY HISTORY:  FH: HTN (hypertension)  FH: breast cancer  FH: melanoma  FH: heart disease        Social History:  neg x 3       Home Medications:  Eliquis 5 mg oral tablet: 0.5 tab(s) orally every 12 hours (30 Apr 2025 23:24)  folic acid 1 mg oral tablet: 1 tab(s) orally once a day (29 Apr 2025 09:09)  Multiple Vitamins oral tablet: 1 tab(s) orally once a day (15 May 2025 14:05)        MEDICATIONS  (STANDING):  apixaban 2.5 milliGRAM(s) Oral two times a day  chlorhexidine 2% Cloths 1 Application(s) Topical <User Schedule>  cholecalciferol 1000 Unit(s) Oral daily  pantoprazole  Injectable 40 milliGRAM(s) IV Push daily  sevelamer carbonate Powder 1600 milliGRAM(s) Oral three times a day with meals    MEDICATIONS  (PRN):  ondansetron Injectable 4 milliGRAM(s) IV Push every 8 hours PRN Nausea and/or Vomiting      Allergies  No Known Allergies      Review of Systems:   unable to obtain           Physical Examination:  T(C): 36.8 (05-29-25 @ 04:52), Max: 36.8 (05-29-25 @ 04:52)  HR: 61 (05-29-25 @ 04:52) (61 - 85)  BP: 124/86 (05-29-25 @ 04:52) (124/86 - 159/81)  RR: 18 (05-29-25 @ 04:52) (18 - 19)  SpO2: 94% (05-29-25 @ 04:52) (94% - 97%)  Height (cm): 162.6 (05-28-25 @ 14:46)    05-27-25 @ 07:01  -  05-28-25 @ 07:00  --------------------------------------------------------  IN: 120 mL / OUT: 651 mL / NET: -531 mL    05-28-25 @ 07:01  -  05-29-25 @ 07:00  --------------------------------------------------------  IN: 560 mL / OUT: 2000 mL / NET: -1440 mL          GENERAL:no acute distress.  NECK: Supple, no JVD or thyromegaly  CHEST/LUNG: Clear to auscultation bilaterally; No wheeze, rhonchi, or rales  HEART: Regular rate and rhythm; normal S1, S2, No murmurs.  ABDOMEN: Soft, nontender, nondistended; Bowel sounds present          Data:                        7.9    8.40  )-----------( 213      ( 28 May 2025 11:08 )             25.5     Hgb Trend:  7.9  05-28-25 @ 11:08  7.7  05-27-25 @ 04:41        05-28    142  |  99  |  104[HH]  ----------------------------<  86  4.9   |  24  |  9.0[HH]    Ca    7.9[L]      28 May 2025 11:08  Phos  8.1     05-28  Mg     1.8     05-28    TPro  6.4  /  Alb  2.7[L]  /  TBili  <0.2  /  DBili  x   /  AST  33  /  ALT  34  /  AlkPhos  379[H]  05-28    Liver panel trend:  TBili <0.2   /   AST 33   /   ALT 34   /   AlkP 379   /   Tptn 6.4   /   Alb 2.7    /   DBili --      05-28  TBili 0.2   /   AST 44   /   ALT 42   /   AlkP 419   /   Tptn 6.7   /   Alb 2.5    /   DBili <0.2      05-27  TBili <0.2   /   AST 97   /   ALT 75   /   AlkP 521   /   Tptn 6.3   /   Alb 2.5    /   DBili --      05-24  TBili <0.2   /      /   ALT 83   /   AlkP 590   /   Tptn 6.8   /   Alb 2.6    /   DBili --      05-24  TBili 0.2   /      /   ALT 99   /   AlkP 521   /   Tptn 7.2   /   Alb 2.7    /   DBili --      05-23              Radiology:        ACC: [id number] EXAM:  CT ABDOMEN AND PELVIS   ORDERED BY: AMELIA WONG     PROCEDURE DATE:  05/23/2025          INTERPRETATION:  Clinical Indication: Acute kidney injury.    Technique: Multidetector-row CT images of the abdomen and pelvis were   obtained from the xiphoid through the symphysis pubis. No contrast was   administered. Coronal and sagittal reconstructions were performed.    Comparison: CT abdomen and pelvis 5/12/2025.    Findings:    01. LIVER: Normal unenhanced.  02. SPLEEN: Normalunenhanced.  03. PANCREAS: Normal unenhanced.  04. GALLBLADDER/BILIARY TREE: Interval marked distention with gallbladder   wall thickening and pericholecystic fluid. Cholelithiasis. No definite   biliary duct dilatation.  05. ADRENALS: Normal.  06. KIDNEYS: Symmetric in size. Bilateral nephroureteral catheters   beginning in the renal pelvis and terminating within the urinary bladder.   Improved right hydronephrosis now with right renal pelvic fullness. Mild   improvement in left moderate hydronephrosis. Bilateral renal and pelvic   stones difficult to compare given motion artifact at the level of the   kidneys. No definite stone along the course of the ureters.  07. LYMPHADENOPATHY/RETROPERITONEUM: Increased periaortic lymph node   measuring1.1 cm previously 0.9 cm, may be reactive (series 4 image 48).  08. BOWEL: No bowel obstruction. Large rectal stool burden with   distention measuring 7.8 cm (series 601 image 73).  09. PELVIC VISCERA: Nephroureteral catheters and a suprapubic catheter   terminating within an underdistended urinary bladder. Air is noted within   the urinary bladder which may be due to instrumentation. Prostate   measures 4.9 cm in transverse dimension (series 4 image 107).  10. PELVIC LYMPH NODES: No enlarged lymph nodes.  11. VASCULATURE: No abdominal aortic aneurysm. Calcified atherosclerotic   disease of the aorta and its branches.  12. PERITONEUM/ABDOMINAL WALL: Trace free fluid surrounding the   gallbladder and liver.  13. SKELETAL: Degenerative changes.  14. LUNG BASES: Right basilar consolidative opacity adjacent likely trace   pleural effusion.    IMPRESSION:    Findings compatible with acute cholecystitis.    Improved bilateral hydronephrosis as described.    Right basilar consolidative opacity with adjacent likely trace pleural   effusion. Infectious process is not excluded.    Large rectal stool burden with distention measuring 7.8 cm. No bowel   obstruction.    Dr. Guerra spoke to Dr. Wong at 3:36 PM on 5/23/2025 regarding   findings with read back.    --- End of Report ---            CHANDLER GUERRA MD; Attending Radiologist  This document has been electronically signed. May 23 2025  3:37PM   Gastroenterology Consultation:    Patient is a 70y old  Male who presents with a chief complaint of JOÃO on CKD (28 May 2025 14:46)    Admitted on: 05-23-25    HPI:  70y M PMH , ascending thoracic aneurysm, CKD V, HTN, NIDDM, prior spinal abscess, nephrolithiasis and recurrent UTI s/p b/l stent placement and SPC presenting for evaluation. Patient sent in by Dr. Mcallister for evaluation for acute rise in creatinine. sCr noted to increase from 5.5>9.5 with reduced UOP, hyperkalemia and hyperphosphatemia.     GI consulted for vomiting. Pt is poor historian. Mentions he has not had any episodes of vomiting today. Patient denies any difficulty swallowing, denies GI bleed, denies abdominal pain     Prior EGD: none in system     Prior Colonoscopy: none in system       PAST MEDICAL & SURGICAL HISTORY:  DM (diabetes mellitus)  HTN (hypertension)  H/O paraplegia  Spinal abscess  Renal stones  Stage 5 chronic kidney disease not on chronic dialysis  Neurogenic dysfunction of the urinary bladder  Encounter for care or replacement of suprapubic tube  Spinal abscess  S/P Surgery  Encounter for care or replacement of suprapubic tube  S/P ORIF (open reduction internal fixation) fracture            FAMILY HISTORY:  FH: HTN (hypertension)  FH: breast cancer  FH: melanoma  FH: heart disease        Social History:  neg x 3       Home Medications:  Eliquis 5 mg oral tablet: 0.5 tab(s) orally every 12 hours (30 Apr 2025 23:24)  folic acid 1 mg oral tablet: 1 tab(s) orally once a day (29 Apr 2025 09:09)  Multiple Vitamins oral tablet: 1 tab(s) orally once a day (15 May 2025 14:05)        MEDICATIONS  (STANDING):  apixaban 2.5 milliGRAM(s) Oral two times a day  chlorhexidine 2% Cloths 1 Application(s) Topical <User Schedule>  cholecalciferol 1000 Unit(s) Oral daily  pantoprazole  Injectable 40 milliGRAM(s) IV Push daily  sevelamer carbonate Powder 1600 milliGRAM(s) Oral three times a day with meals    MEDICATIONS  (PRN):  ondansetron Injectable 4 milliGRAM(s) IV Push every 8 hours PRN Nausea and/or Vomiting      Allergies  No Known Allergies      Review of Systems:   unable to obtain           Physical Examination:  T(C): 36.8 (05-29-25 @ 04:52), Max: 36.8 (05-29-25 @ 04:52)  HR: 61 (05-29-25 @ 04:52) (61 - 85)  BP: 124/86 (05-29-25 @ 04:52) (124/86 - 159/81)  RR: 18 (05-29-25 @ 04:52) (18 - 19)  SpO2: 94% (05-29-25 @ 04:52) (94% - 97%)  Height (cm): 162.6 (05-28-25 @ 14:46)    05-27-25 @ 07:01  -  05-28-25 @ 07:00  --------------------------------------------------------  IN: 120 mL / OUT: 651 mL / NET: -531 mL    05-28-25 @ 07:01  -  05-29-25 @ 07:00  --------------------------------------------------------  IN: 560 mL / OUT: 2000 mL / NET: -1440 mL          GENERAL:no acute distress.  NECK: Supple, no JVD or thyromegaly  CHEST/LUNG: Clear to auscultation bilaterally; No wheeze, rhonchi, or rales  HEART: Regular rate and rhythm; normal S1, S2, No murmurs.  ABDOMEN: Soft, nontender, nondistended; Bowel sounds present          Data:                        7.9    8.40  )-----------( 213      ( 28 May 2025 11:08 )             25.5     Hgb Trend:  7.9  05-28-25 @ 11:08  7.7  05-27-25 @ 04:41        05-28    142  |  99  |  104[HH]  ----------------------------<  86  4.9   |  24  |  9.0[HH]    Ca    7.9[L]      28 May 2025 11:08  Phos  8.1     05-28  Mg     1.8     05-28    TPro  6.4  /  Alb  2.7[L]  /  TBili  <0.2  /  DBili  x   /  AST  33  /  ALT  34  /  AlkPhos  379[H]  05-28    Liver panel trend:  TBili <0.2   /   AST 33   /   ALT 34   /   AlkP 379   /   Tptn 6.4   /   Alb 2.7    /   DBili --      05-28  TBili 0.2   /   AST 44   /   ALT 42   /   AlkP 419   /   Tptn 6.7   /   Alb 2.5    /   DBili <0.2      05-27  TBili <0.2   /   AST 97   /   ALT 75   /   AlkP 521   /   Tptn 6.3   /   Alb 2.5    /   DBili --      05-24  TBili <0.2   /      /   ALT 83   /   AlkP 590   /   Tptn 6.8   /   Alb 2.6    /   DBili --      05-24  TBili 0.2   /      /   ALT 99   /   AlkP 521   /   Tptn 7.2   /   Alb 2.7    /   DBili --      05-23              Radiology:        ACC: [id number] EXAM:  CT ABDOMEN AND PELVIS   ORDERED BY: AMELIA WONG     PROCEDURE DATE:  05/23/2025          INTERPRETATION:  Clinical Indication: Acute kidney injury.    Technique: Multidetector-row CT images of the abdomen and pelvis were   obtained from the xiphoid through the symphysis pubis. No contrast was   administered. Coronal and sagittal reconstructions were performed.    Comparison: CT abdomen and pelvis 5/12/2025.    Findings:    01. LIVER: Normal unenhanced.  02. SPLEEN: Normal unenhanced.  03. PANCREAS: Normal unenhanced.  04. GALLBLADDER/BILIARY TREE: Interval marked distention with gallbladder   wall thickening and pericholecystic fluid. Cholelithiasis. No definite   biliary duct dilatation.  05. ADRENALS: Normal.  06. KIDNEYS: Symmetric in size. Bilateral nephroureteral catheters   beginning in the renal pelvis and terminating within the urinary bladder.   Improved right hydronephrosis now with right renal pelvic fullness. Mild   improvement in left moderate hydronephrosis. Bilateral renal and pelvic   stones difficult to compare given motion artifact at the level of the   kidneys. No definite stone along the course of the ureters.  07. LYMPHADENOPATHY/RETROPERITONEUM: Increased periaortic lymph node   measuring1.1 cm previously 0.9 cm, may be reactive (series 4 image 48).  08. BOWEL: No bowel obstruction. Large rectal stool burden with   distention measuring 7.8 cm (series 601 image 73).  09. PELVIC VISCERA: Nephroureteral catheters and a suprapubic catheter   terminating within an underdistended urinary bladder. Air is noted within   the urinary bladder which may be due to instrumentation. Prostate   measures 4.9 cm in transverse dimension (series 4 image 107).  10. PELVIC LYMPH NODES: No enlarged lymph nodes.  11. VASCULATURE: No abdominal aortic aneurysm. Calcified atherosclerotic   disease of the aorta and its branches.  12. PERITONEUM/ABDOMINAL WALL: Trace free fluid surrounding the   gallbladder and liver.  13. SKELETAL: Degenerative changes.  14. LUNG BASES: Right basilar consolidative opacity adjacent likely trace   pleural effusion.    IMPRESSION:    Findings compatible with acute cholecystitis.    Improved bilateral hydronephrosis as described.    Right basilar consolidative opacity with adjacent likely trace pleural   effusion. Infectious process is not excluded.    Large rectal stool burden with distention measuring 7.8 cm. No bowel   obstruction.    Dr. Guerra spoke to Dr. Wong at 3:36 PM on 5/23/2025 regarding   findings with read back.    --- End of Report ---            CHANDLER GUERRA MD; Attending Radiologist  This document has been electronically signed. May 23 2025  3:37PM

## 2025-05-29 NOTE — CONSULT NOTE ADULT - ASSESSMENT
70 y.o. M w/ PMHx of CKD 5 not on HD, nephrolithiasis, paraplegia (wheelchair bound), HTN, DM, s/p suprapubic cath, s/p B/L JJ stent for kidney stones (5/1/2025) presents to the ED for evaluation of elevated Cr. GI consulted for globus sensation.     # N/V in the setting of uremia   # Globus sensation   # Anemia of chronic disease   # JOÃO on CKD stage 5- pending possible HD   # Concern for cholecystitis on imaging with no correlating clinical symptoms- no intervention per sx or IR   # Paraplegia   # Constipation/ large recta load   - currently on Soft and bite sized diet   - S&S eval 5/14: +toleration of soft & bite sized with thin liquids w/o overt s/s of penetration/aspiration.  - On Eliquis for Afib   - DNR/DNI     Plan   - S&S eval  - Bowel regimen with Miralax 17gm TID and senna 2 tabs at bedtime   - Monitor and record BMs   - Further recs pending above   70 y.o. M w/ PMHx of CKD 5 not on HD, nephrolithiasis, paraplegia (wheelchair bound), HTN, DM, s/p suprapubic cath, s/p B/L JJ stent for kidney stones (5/1/2025) presents to the ED for evaluation of elevated Cr. GI consulted for vomiting.     # N/V in the setting of uremia   # Anemia of chronic disease   # JOÃO on CKD stage 5- pending possible HD   # Concern for cholecystitis on imaging with no correlating clinical symptoms- no intervention per sx or IR   # Paraplegia   # Constipation/ large rectal load   - currently on Soft and bite sized diet   - S&S eval 5/14: +toleration of soft & bite sized with thin liquids w/o overt s/s of penetration/aspiration.  - On Eliquis for Afib   - DNR/DNI     Plan   - S&S eval  - Diet as per S&S   - Bowel regimen with Miralax 17gm TID and senna 2 tabs at bedtime   - Monitor and record BMs   - antiemetics prn   - HD as per nephrology   - pt will benefit from EGD and colonoscopy as OP for evaluation of anemia    70 y.o. M w/ PMHx of CKD 5 not on HD, nephrolithiasis, paraplegia (wheelchair bound), HTN, DM, s/p suprapubic cath, s/p B/L JJ stent for kidney stones (5/1/2025) presents to the ED for evaluation of elevated Cr. GI consulted for vomiting.     # N/V in the setting of uremia   # Anemia of chronic disease   # JOÃO on CKD stage 5- pending possible HD   # Concern for cholecystitis on imaging with no correlating clinical symptoms- no intervention per sx or IR   # Paraplegia   # Constipation/ large rectal load   - currently on Soft and bite sized diet   - S&S eval 5/14: +toleration of soft & bite sized with thin liquids w/o overt s/s of penetration/aspiration.  - On Eliquis for Afib   - DNR/DNI     Plan   - S&S eval  - Diet as per S&S   - Bowel regimen with Miralax 17gm TID and senna 2 tabs at bedtime   - Monitor and record BMs   - antiemetics prn   - HD as per nephrology   - pt will benefit from EGD and colonoscopy as OP for evaluation of anemia if within goals of care    70 y.o. M w/ PMHx of CKD 5 not on HD, nephrolithiasis, paraplegia (wheelchair bound), HTN, DM, s/p suprapubic cath, s/p B/L JJ stent for kidney stones (5/1/2025) presents to the ED for evaluation of elevated Cr. GI consulted for vomiting.     # N/V in the setting of uremia   # Anemia of chronic disease, no overt GI bleed   # JOÃO on CKD stage 5- pending possible HD   # Concern for cholecystitis on imaging with no correlating clinical symptoms- no intervention per sx or IR   # Paraplegia   # Constipation/ large rectal load   - currently on Soft and bite sized diet   - S&S eval 5/14: +toleration of soft & bite sized with thin liquids w/o overt s/s of penetration/aspiration.  - On Eliquis for Afib   - DNR/DNI     Plan   - S&S eval  - Diet as per S&S   - Bowel regimen with Miralax 17gm TID and senna 2 tabs at bedtime   - Monitor and record BMs   - antiemetics prn   - HD as per nephrology   - pt will benefit from EGD and colonoscopy as OP for evaluation of anemia if within goals of care    70 y.o. M w/ PMHx of CKD 5 not on HD, nephrolithiasis, paraplegia (wheelchair bound), HTN, DM, s/p suprapubic cath, s/p B/L JJ stent for kidney stones (5/1/2025) presents to the ED for evaluation of elevated Cr. GI consulted for vomiting.     # N/V in the setting of uremia   # Anemia of chronic disease, no overt GI bleed   # JOÃO on CKD stage 5- pending possible HD   # Concern for cholecystitis on imaging with no correlating clinical symptoms- no intervention per sx or IR   # Paraplegia   # Constipation/ large rectal load   #Transaminitis improved, except for isolated alkaline phosphatase elevation, likely non hepatic origin   - currently on Soft and bite sized diet   - S&S eval 5/14: +toleration of soft & bite sized with thin liquids w/o overt s/s of penetration/aspiration.  - On Eliquis for Afib   - DNR/DNI   - IR and surgery input appreciated     Plan   - S&S eval  - Diet as per S&S   - Bowel regimen with Miralax 17gm TID and senna 2 tabs at bedtime   - Monitor and record BMs   - antiemetics prn if no contraindication   - check GGT  - HD as per nephrology   - pt will benefit from EGD and colonoscopy as OP for evaluation of anemia if within goals of care and after resolution of acute issues

## 2025-05-29 NOTE — PROCEDURE NOTE - ADDITIONAL PROCEDURE DETAILS
Under sterile technique, successfully placed 22 Fr catheter with positive output of clear yellow urine. Balloon was inflated with 10cc of sterile water. Patient tolerated procedure well, no complications

## 2025-05-29 NOTE — PROGRESS NOTE ADULT - SUBJECTIVE AND OBJECTIVE BOX
pt seen and examined.     My notes supersede resident's notes in case of discrepancy       ROS: no cp, no sob, no fever, has nausea, and difficulty swallowing     Vital Signs Last 24 Hrs  T(C): 36.8 (29 May 2025 04:52), Max: 36.8 (29 May 2025 04:52)  T(F): 98.2 (29 May 2025 04:52), Max: 98.2 (29 May 2025 04:52)  HR: 61 (29 May 2025 04:52) (61 - 85)  BP: 124/86 (29 May 2025 04:52) (124/86 - 159/81)  RR: 18 (29 May 2025 04:52) (18 - 19)  SpO2: 94% (29 May 2025 04:52) (94% - 97%)    Parameters below as of 28 May 2025 20:36  Patient On (Oxygen Delivery Method): room air    physical exam  constitutional NAD, AAOX3, Respiratory  lungs CTA, CVS heart RRR, GI: abdomen Soft NT, ND, BS+, skin: suprapubic in place, site is clean   neuro exam paraplegia ( chronic )     MEDICATIONS  (STANDING):  apixaban 2.5 milliGRAM(s) Oral two times a day  chlorhexidine 2% Cloths 1 Application(s) Topical <User Schedule>  cholecalciferol 1000 Unit(s) Oral daily  pantoprazole  Injectable 40 milliGRAM(s) IV Push daily  sevelamer carbonate Powder 1600 milliGRAM(s) Oral three times a day with meals    MEDICATIONS  (PRN):  ondansetron Injectable 4 milliGRAM(s) IV Push every 8 hours PRN Nausea and/or Vomiting                        7.9    8.40  )-----------( 213      ( 28 May 2025 11:08 )             25.5     05-28    142  |  99  |  104[HH]  ----------------------------<  86  4.9   |  24  |  9.0[HH]    Ca    7.9[L]      28 May 2025 11:08  Phos  8.1     05-28  Mg     1.8     05-28    TPro  6.4  /  Alb  2.7[L]  /  TBili  <0.2  /  DBili  x   /  AST  33  /  ALT  34  /  AlkPhos  379[H]  05-28    Ferritin: 438 ng/mL [30 - 400] (05-04-25 @ 06:10)    a/p  HPI:  70y M pmh CKD V, HTN, NIDDM, prior spinal abscess, nephrolithiasis and recurrent UTI s/p b/l stent placement and SPC presenting for evaluation. Patient sent in by Dr. Mcallister for evaluation for acute rise in creatinine.     #JOÃO on CKD V, will need HD  but not emergency  Hyperkalemia - resolved     #Hx SPC, was changed in april, needs to be changed again , urology consulted     #Chronic cholecystitis  hida positive,   sexton negative  pt is not septic,   doubt needs to be treated just based on imaging since he is not symptomatic     #Afib, on eliquis     #HTN, no need for meds, his bp is controlled off meds     #Constipation - resolved  - CTAP - no bowel obstruction, large rectal stool burden with distention measuring 7.8 cm   - bowel regimen discontinued, pt having 3 BMs daily     #Normocytic anemia suspected 2/2 advanced CKD  - b12 1269, folate 12.2  - iron 95, sat elevated , ferritin 438  - no active bleeding, continue to monitor   - nephro f/up for possible TACHO    #Hypocalcemia , corrected calcium is wnl: 8.9    #Vitamin D deficiency likely 2/2 CKD  - c/w vit D supplementation    #Self-induced emesis due to sour feeling/sensation of food stuck - GERD vs zenker diverticulum?  swallow eval appreciated   GI consult, pt may benefit from egd or barium swallow, in view of high risk status for egd, probably it is better if we can do barium swallow first,  if gi approves, will do barium swallow     #Concern for malnutrition  - pt frequently self-induces emesis due to sour feeling of throat and/or sensation of food being stuck; no dysphagia, not specific to solid/liquid or specific food  - f/u RD consult    #Progress Note Handoff    Pending: gi eval, ID followup, possible tesio catheter placement ( ? need to hold eliquis prior to procedure? )   Family discussion: dw pt   Disposition: home with homecare   code status: full code                                pt seen and examined.     My notes supersede resident's notes in case of discrepancy       ROS: no cp, no sob, no fever, has nausea, and difficulty swallowing     Vital Signs Last 24 Hrs  T(C): 36.8 (29 May 2025 04:52), Max: 36.8 (29 May 2025 04:52)  T(F): 98.2 (29 May 2025 04:52), Max: 98.2 (29 May 2025 04:52)  HR: 61 (29 May 2025 04:52) (61 - 85)  BP: 124/86 (29 May 2025 04:52) (124/86 - 159/81)  RR: 18 (29 May 2025 04:52) (18 - 19)  SpO2: 94% (29 May 2025 04:52) (94% - 97%)    Parameters below as of 28 May 2025 20:36  Patient On (Oxygen Delivery Method): room air    physical exam  constitutional NAD, AAOX3, Respiratory  lungs CTA, CVS heart RRR, GI: abdomen Soft NT, ND, BS+, skin: suprapubic in place, site is clean   neuro exam paraplegia ( chronic )     MEDICATIONS  (STANDING):  apixaban 2.5 milliGRAM(s) Oral two times a day  chlorhexidine 2% Cloths 1 Application(s) Topical <User Schedule>  cholecalciferol 1000 Unit(s) Oral daily  pantoprazole  Injectable 40 milliGRAM(s) IV Push daily  sevelamer carbonate Powder 1600 milliGRAM(s) Oral three times a day with meals    MEDICATIONS  (PRN):  ondansetron Injectable 4 milliGRAM(s) IV Push every 8 hours PRN Nausea and/or Vomiting                        7.9    8.40  )-----------( 213      ( 28 May 2025 11:08 )             25.5     05-28    142  |  99  |  104[HH]  ----------------------------<  86  4.9   |  24  |  9.0[HH]    Ca    7.9[L]      28 May 2025 11:08  Phos  8.1     05-28  Mg     1.8     05-28    TPro  6.4  /  Alb  2.7[L]  /  TBili  <0.2  /  DBili  x   /  AST  33  /  ALT  34  /  AlkPhos  379[H]  05-28    Ferritin: 438 ng/mL [30 - 400] (05-04-25 @ 06:10)    a/p  HPI:  70y M pmh CKD V, HTN, NIDDM, prior spinal abscess, nephrolithiasis and recurrent UTI s/p b/l stent placement and SPC presenting for evaluation. Patient sent in by Dr. Mcallister for evaluation for acute rise in creatinine.     #JOÃO on CKD V, will need HD  but not emergency  Hyperkalemia - resolved   IR consulted for Tesio   dw nephro   will need to hold Eliquis prior to procedure     #Hx SPC, was changed in april, needs to be changed again , urology consulted     #Chronic cholecystitis  hida positive,   sexton negative  pt is not septic,   doubt needs to be treated just based on imaging since he is not symptomatic     #Afib, on eliquis , will need to hold for procedure ( Tesio )     #HTN, no need for meds, his bp is controlled off meds     #Constipation - resolved  - CTAP - no bowel obstruction, large rectal stool burden with distention measuring 7.8 cm   - bowel regimen discontinued, pt having 3 BMs daily     #Normocytic anemia suspected 2/2 advanced CKD  - b12 1269, folate 12.2  - iron 95, sat elevated , ferritin 438  - no active bleeding, continue to monitor   - nephro f/up for possible TACHO    #Hypocalcemia , corrected calcium is wnl: 8.9    #Vitamin D deficiency likely 2/2 CKD  - c/w vit D supplementation    #Self-induced emesis due to sour feeling/sensation of food stuck - GERD vs zenker diverticulum?  swallow eval appreciated   GI consult, pt may benefit from egd or barium swallow, in view of high risk status for egd, probably it is better if we can do barium swallow first,  if gi approves, will do barium swallow     #Concern for malnutrition  - pt frequently self-induces emesis due to sour feeling of throat and/or sensation of food being stuck; no dysphagia, not specific to solid/liquid or specific food  - f/u RD consult    #Progress Note Handoff    Pending: gi eval, ID followup, IR consult for  tesio catheter placement ( ? need to hold eliquis prior to procedure? )   Family discussion: dw pt   Disposition: home with homecare when stable   code status: full code

## 2025-05-29 NOTE — CONSULT NOTE ADULT - TIME BILLING
Reviewed all pertinent clinical information and reviewed all relevant imaging and diagnostic studies. Counseled the patient /HCP about diagnostic testing and treatment plan. All questions were answered.  Discussed recommendations with the primary team and coordinated care. Time spent on this encounter excludes teaching time and separately reported services.

## 2025-05-29 NOTE — PROGRESS NOTE ADULT - SUBJECTIVE AND OBJECTIVE BOX
seen and examined   24 h events noted       PAST HISTORY  --------------------------------------------------------------------------------  No significant changes to PMH, PSH, FHx, SHx, unless otherwise noted    ALLERGIES & MEDICATIONS  --------------------------------------------------------------------------------  Allergies    No Known Allergies    Intolerances      Standing Inpatient Medications  apixaban 2.5 milliGRAM(s) Oral two times a day  chlorhexidine 2% Cloths 1 Application(s) Topical <User Schedule>  cholecalciferol 1000 Unit(s) Oral daily  pantoprazole  Injectable 40 milliGRAM(s) IV Push daily  sevelamer carbonate Powder 1600 milliGRAM(s) Oral three times a day with meals    PRN Inpatient Medications  ondansetron Injectable 4 milliGRAM(s) IV Push every 8 hours PRN          VITALS/PHYSICAL EXAM  --------------------------------------------------------------------------------  T(C): 36.8 (05-29-25 @ 04:52), Max: 36.8 (05-29-25 @ 04:52)  HR: 61 (05-29-25 @ 04:52) (61 - 85)  BP: 124/86 (05-29-25 @ 04:52) (124/86 - 159/81)  RR: 18 (05-29-25 @ 04:52) (18 - 19)  SpO2: 94% (05-29-25 @ 04:52) (94% - 97%)  Wt(kg): --  Height (cm): 162.6 (05-28-25 @ 14:46)      05-28-25 @ 07:01  -  05-29-25 @ 07:00  --------------------------------------------------------  IN: 560 mL / OUT: 2000 mL / NET: -1440 mL      Physical Exam:  	Gen: NAD  	Pulm: decrease BS B/L  	CV: S1S2; no rub  	Abd: +distended      LABS/STUDIES  --------------------------------------------------------------------------------              7.9    8.40  >-----------<  213      [05-28-25 @ 11:08]              25.5     142  |  99  |  104  ----------------------------<  86      [05-28-25 @ 11:08]  4.9   |  24  |  9.0        Ca     7.9     [05-28-25 @ 11:08]      Mg     1.8     [05-28-25 @ 11:08]      Phos  8.1     [05-28-25 @ 11:08]    TPro  6.4  /  Alb  2.7  /  TBili  <0.2  /  DBili  x   /  AST  33  /  ALT  34  /  AlkPhos  379  [05-28-25 @ 11:08]      Creatinine Trend:  SCr 9.0 [05-28 @ 11:08]  SCr 8.2 [05-27 @ 04:41]  SCr 8.5 [05-26 @ 20:57]  SCr 8.4 [05-26 @ 04:47]  SCr 9.0 [05-25 @ 04:36]    Urinalysis - [05-28-25 @ 11:08]      Color  / Appearance  / SG  / pH       Gluc 86 / Ketone   / Bili  / Urobili        Blood  / Protein  / Leuk Est  / Nitrite       RBC  / WBC  / Hyaline  / Gran  / Sq Epi  / Non Sq Epi  / Bacteria       Iron 73, TIBC 107, %sat 68      [05-28-25 @ 11:08]  Ferritin 438      [05-04-25 @ 06:10]  PTH -- (Ca 6.1)      [05-26-25 @ 04:47]   288  PTH -- (Ca 8.3)      [07-10-24 @ 21:08]   43  Vitamin D (25OH) 11      [05-26-25 @ 04:47]  TSH 5.27      [04-30-25 @ 17:55]

## 2025-05-29 NOTE — PROGRESS NOTE ADULT - SUBJECTIVE AND OBJECTIVE BOX
SUBJECTIVE/OVERNIGHT EVENTS  Today is hospital day 6d. This morning patient was seen and examined at bedside, resting comfortably in bed. No acute or major events overnight.    HOSPITAL COURSE  Day 1:   Day 2:   Day 3:     CODE STATUS:    FAMILY COMMUNICATION  Contact date:  Name of person contacted:  Relationship to patient:  Communication details:    MEDICATIONS  STANDING MEDICATIONS  apixaban 2.5 milliGRAM(s) Oral two times a day  chlorhexidine 2% Cloths 1 Application(s) Topical <User Schedule>  cholecalciferol 1000 Unit(s) Oral daily  pantoprazole  Injectable 40 milliGRAM(s) IV Push daily  sevelamer carbonate Powder 1600 milliGRAM(s) Oral three times a day with meals    PRN MEDICATIONS  ondansetron Injectable 4 milliGRAM(s) IV Push every 8 hours PRN    VITALS  T(F): 98.2 (05-29-25 @ 04:52), Max: 98.2 (05-29-25 @ 04:52)  HR: 61 (05-29-25 @ 04:52) (61 - 85)  BP: 124/86 (05-29-25 @ 04:52) (124/86 - 159/81)  RR: 18 (05-29-25 @ 04:52) (18 - 19)  SpO2: 94% (05-29-25 @ 04:52) (94% - 97%)    PHYSICAL EXAM  CONSTITUTIONAL: Well groomed, no apparent distress  EYES:EOMI, No conjunctival or scleral injection, non-icteric  ENMT: Oral mucosa with moist membranes. Normal dentition; no pharyngeal injection or exudates  NECK: Supple, symmetric  RESP: No respiratory distress, no use of accessory muscles; CTA b/l, no WRR  CV: RRR, +S1S2; no peripheral edema  GI: Soft, NT, ND, no rebound, no guarding; no palpable masses  MSK:  Normal ROM without pain, normal muscle strength/tone  SKIN: No rashes or ulcers noted  NEURO: Grossly intact  PSYCH: AOX3    (  ) Indwelling Dye Catheter   Date insterted:    Reason (  ) Critical illness     (  ) urinary retention    (  ) Accurate Ins/Outs Monitoring     (  ) CMO patient    (  ) Central Line  Date inserted:  Location: (  ) Right IJ   (  ) Left IJ   (  ) Right Fem   (  ) Left Fem    (  ) SPC  (  ) pigtail  (  ) PEG tube  (  ) colostomy  (  ) jejunostomy  (  ) U-Dall    LABS             7.9    8.40  )-----------( 213      ( 05-28-25 @ 11:08 )             25.5     142  |  99  |  104  -------------------------<  86   05-28-25 @ 11:08  4.9  |  24  |  9.0    Ca      7.9     05-28-25 @ 11:08  Phos   8.1     05-28-25 @ 11:08  Mg     1.8     05-28-25 @ 11:08    TPro  6.4  /  Alb  2.7  /  TBili  <0.2  /  DBili  x   /  AST  33  /  ALT  34  /  AlkPhos  379  /  GGT  x     05-28-25 @ 11:08        Urinalysis Basic - ( 28 May 2025 11:08 )    Color: x / Appearance: x / SG: x / pH: x  Gluc: 86 mg/dL / Ketone: x  / Bili: x / Urobili: x   Blood: x / Protein: x / Nitrite: x   Leuk Esterase: x / RBC: x / WBC x   Sq Epi: x / Non Sq Epi: x / Bacteria: x          IMAGING

## 2025-05-29 NOTE — PROGRESS NOTE ADULT - ASSESSMENT
71 yo M with h/o CKD stage 5, HTN, NIDDM, prior spinal abscess c/b need for SPC, nephrolithiasis, recent urinary infection and hydro requiring b/l stent placement, sent by me to ED following outpatient evaluation for medical clearance to remove his stents and labwork noted acute rise in sCr 5.5->9.5 with reduced UOP, worsening hyperkalemia and hyperphosphatemia, concern for new obstruction vs. infection, and to assess for HD needs acutely.  Repeat labwork noted stable sCr with elevated potassium, MA and signs of complicated UTI given SPC use.  #JOÃO on CKD stage 5  #Moderate hydronephrosis  #Complicated Cystitis  #HTN  #NIDDM  #prior spinal abscess c/b need for SPC  #nephrolithiasis  Imaging without new or worsening hydronephrosis, did note multiple kidney stones but all nonobstructing.    - followed by    - creat trending up   - GI notes appreciated   - UO noted   - phos is severely elevated--  phoslo 3 tabs tid with meals/ monitor calcium / PTH noted / on vit D , corrected calcium in the mid 8  - renal diet  - spoke to patient brother at [facility]  / he requested to speak to the patient  and decide about HD / if agreeable and cleared by ID / please call IR for TDC / if not cleared by ID will need a udall/ will need to start HD    -Chronic cholecystitis/ followed by surgery/ IR   - BP controlled   - h/h noted tx if h < 7/ will start TACHO with HD / sat elevated   - Dose medications to eGFR <15  - Avoid Nephrotoxic agents  will follow

## 2025-05-30 LAB
ALBUMIN SERPL ELPH-MCNC: 2.8 G/DL — LOW (ref 3.5–5.2)
ALP SERPL-CCNC: 323 U/L — HIGH (ref 30–115)
ALT FLD-CCNC: 23 U/L — SIGNIFICANT CHANGE UP (ref 0–41)
ANION GAP SERPL CALC-SCNC: 16 MMOL/L — HIGH (ref 7–14)
AST SERPL-CCNC: 23 U/L — SIGNIFICANT CHANGE UP (ref 0–41)
BASOPHILS # BLD AUTO: 0 K/UL — SIGNIFICANT CHANGE UP (ref 0–0.2)
BASOPHILS NFR BLD AUTO: 0 % — SIGNIFICANT CHANGE UP (ref 0–1)
BILIRUB SERPL-MCNC: 0.3 MG/DL — SIGNIFICANT CHANGE UP (ref 0.2–1.2)
BUN SERPL-MCNC: 93 MG/DL — CRITICAL HIGH (ref 10–20)
CALCIUM SERPL-MCNC: 7.5 MG/DL — LOW (ref 8.4–10.5)
CHLORIDE SERPL-SCNC: 100 MMOL/L — SIGNIFICANT CHANGE UP (ref 98–110)
CO2 SERPL-SCNC: 24 MMOL/L — SIGNIFICANT CHANGE UP (ref 17–32)
CREAT SERPL-MCNC: 8.3 MG/DL — CRITICAL HIGH (ref 0.7–1.5)
EGFR: 6 ML/MIN/1.73M2 — LOW
EGFR: 6 ML/MIN/1.73M2 — LOW
EOSINOPHIL # BLD AUTO: 0.64 K/UL — SIGNIFICANT CHANGE UP (ref 0–0.7)
EOSINOPHIL NFR BLD AUTO: 7.8 % — SIGNIFICANT CHANGE UP (ref 0–8)
GGT SERPL-CCNC: 65 U/L — HIGH (ref 1–40)
GLUCOSE SERPL-MCNC: 66 MG/DL — LOW (ref 70–99)
HCT VFR BLD CALC: 26.2 % — LOW (ref 42–52)
HGB BLD-MCNC: 8.3 G/DL — LOW (ref 14–18)
LYMPHOCYTES # BLD AUTO: 1.87 K/UL — SIGNIFICANT CHANGE UP (ref 1.2–3.4)
LYMPHOCYTES # BLD AUTO: 22.6 % — SIGNIFICANT CHANGE UP (ref 20.5–51.1)
MAGNESIUM SERPL-MCNC: 1.8 MG/DL — SIGNIFICANT CHANGE UP (ref 1.8–2.4)
MCHC RBC-ENTMCNC: 29.6 PG — SIGNIFICANT CHANGE UP (ref 27–31)
MCHC RBC-ENTMCNC: 31.7 G/DL — LOW (ref 32–37)
MCV RBC AUTO: 93.6 FL — SIGNIFICANT CHANGE UP (ref 80–94)
MONOCYTES # BLD AUTO: 0.5 K/UL — SIGNIFICANT CHANGE UP (ref 0.1–0.6)
MONOCYTES NFR BLD AUTO: 6.1 % — SIGNIFICANT CHANGE UP (ref 1.7–9.3)
NEUTROPHILS # BLD AUTO: 5.25 K/UL — SIGNIFICANT CHANGE UP (ref 1.4–6.5)
NEUTROPHILS NFR BLD AUTO: 63.5 % — SIGNIFICANT CHANGE UP (ref 42.2–75.2)
PHOSPHATE SERPL-MCNC: 6.3 MG/DL — HIGH (ref 2.1–4.9)
PLATELET # BLD AUTO: 202 K/UL — SIGNIFICANT CHANGE UP (ref 130–400)
PMV BLD: 11.2 FL — HIGH (ref 7.4–10.4)
POTASSIUM SERPL-MCNC: 4.2 MMOL/L — SIGNIFICANT CHANGE UP (ref 3.5–5)
POTASSIUM SERPL-SCNC: 4.2 MMOL/L — SIGNIFICANT CHANGE UP (ref 3.5–5)
PROT SERPL-MCNC: 6.7 G/DL — SIGNIFICANT CHANGE UP (ref 6–8)
RBC # BLD: 2.8 M/UL — LOW (ref 4.7–6.1)
RBC # FLD: 13.5 % — SIGNIFICANT CHANGE UP (ref 11.5–14.5)
SODIUM SERPL-SCNC: 140 MMOL/L — SIGNIFICANT CHANGE UP (ref 135–146)
WBC # BLD: 8.26 K/UL — SIGNIFICANT CHANGE UP (ref 4.8–10.8)
WBC # FLD AUTO: 8.26 K/UL — SIGNIFICANT CHANGE UP (ref 4.8–10.8)

## 2025-05-30 PROCEDURE — 99233 SBSQ HOSP IP/OBS HIGH 50: CPT

## 2025-05-30 PROCEDURE — 99232 SBSQ HOSP IP/OBS MODERATE 35: CPT

## 2025-05-30 RX ADMIN — HEPARIN SODIUM 5000 UNIT(S): 1000 INJECTION INTRAVENOUS; SUBCUTANEOUS at 17:39

## 2025-05-30 RX ADMIN — Medication 1 APPLICATION(S): at 05:47

## 2025-05-30 RX ADMIN — SEVELAMER HYDROCHLORIDE 1600 MILLIGRAM(S): 800 TABLET ORAL at 11:41

## 2025-05-30 RX ADMIN — HEPARIN SODIUM 5000 UNIT(S): 1000 INJECTION INTRAVENOUS; SUBCUTANEOUS at 05:45

## 2025-05-30 RX ADMIN — Medication 40 MILLIGRAM(S): at 11:41

## 2025-05-30 RX ADMIN — SEVELAMER HYDROCHLORIDE 1600 MILLIGRAM(S): 800 TABLET ORAL at 08:06

## 2025-05-30 RX ADMIN — Medication 1000 UNIT(S): at 11:40

## 2025-05-30 NOTE — PROGRESS NOTE ADULT - ASSESSMENT
70 y.o. M w/ PMHx of CKD 5 not on HD, nephrolithiasis, paraplegia (wheelchair bound), HTN, DM, s/p suprapubic cath, s/p B/L JJ stent for kidney stones (5/1/2025) presents to the ED for evaluation of elevated Cr. GI following for vomiting.     # N/V in the setting of uremia  # Metallic taste likely 2/2 uremia   # possible GERD   # Anemia of chronic disease, no overt GI bleed   # JOÃO on CKD stage 5- pending possible HD   # Concern for cholecystitis on imaging with no correlating clinical symptoms- no intervention per sx or IR   # Paraplegia   # Constipation/ large rectal load   #Transaminitis improved, except for isolated alkaline phosphatase elevation, likely non hepatic origin   - currently on Soft and bite sized diet   - S&S eval 5/14: +toleration of soft & bite sized with thin liquids w/o overt s/s of penetration/aspiration.  - S&S eval 5/30:  +toleration observed without overt s/s of aspiration/penetration for soft + bite sized consistency and thin liquids  - On zofran prn, last dose received was on 5/26   - GGT 65   - On Eliquis for Afib   - DNR/DNI   - IR and surgery input appreciated  - plan for HD as per nephro, TLC on Monday     Plan   - Diet as per S&S   - please d/c PPI IV QD and start PPI PO BID for now  - Bowel regimen with Miralax 17gm TID and senna 2 tabs at bedtime   - Monitor and record BMs   - antiemetics prn if no contraindication   - HD as per nephrology   - pt will benefit from EGD and colonoscopy as OP for evaluation of anemia if within goals of care and after resolution of acute issues  - Aspiration precautions   - GI to sign off please recall prn   - Follow up with our [facility] located at [address] [place] [address]. Phone Number: [phone]

## 2025-05-30 NOTE — PROGRESS NOTE ADULT - SUBJECTIVE AND OBJECTIVE BOX
Nephrology progress note    Patient is seen and examined, events over the last 24 h noted .  lying in bed   has angelo catheter     Allergies:  No Known Allergies    Hospital Medications:   MEDICATIONS  (STANDING):  cholecalciferol 1000 Unit(s) Oral daily  heparin   Injectable 5000 Unit(s) SubCutaneous every 12 hours  pantoprazole  Injectable 40 milliGRAM(s) IV Push daily  sevelamer carbonate Powder 1600 milliGRAM(s) Oral three times a day with meals        VITALS:  T(F): 97.9 (05-30-25 @ 12:56), Max: 97.9 (05-29-25 @ 20:39)  HR: 66 (05-30-25 @ 12:56)  BP: 159/80 (05-30-25 @ 12:56)  RR: 19 (05-30-25 @ 12:56)  SpO2: 99% (05-30-25 @ 12:56)      05-28 @ 07:01  -  05-29 @ 07:00  --------------------------------------------------------  IN: 560 mL / OUT: 2000 mL / NET: -1440 mL    05-29 @ 07:01  -  05-30 @ 07:00  --------------------------------------------------------  IN: 0 mL / OUT: 1375 mL / NET: -1375 mL          PHYSICAL EXAM:  Constitutional: NAD  Respiratory: CTAB,  Cardiovascular: S1, S2, RRR  Gastrointestinal: BS+, soft, NT/ND  Extremities: No cyanosis or clubbing. No peripheral edema  :  No angelo.   Skin: No rashes    LABS:  05-30    140  |  100  |  93[HH]  ----------------------------<  66[L]  4.2   |  24  |  8.3[HH]  Creatinine Trend: 8.3<--, 8.3<--, 9.0<--, 8.2<--, 8.5<--, 8.4<--  Ca    7.5[L]      30 May 2025 05:56  Phos  6.3     05-30  Mg     1.8     05-30    TPro  6.7  /  Alb  2.8[L]  /  TBili  0.3  /  DBili      /  AST  23  /  ALT  23  /  AlkPhos  323[H]  05-30                          8.3    8.26  )-----------( 202      ( 30 May 2025 05:56 )             26.2       Urine Studies:  Urinalysis Basic - ( 30 May 2025 05:56 )    Color:  / Appearance:  / SG:  / pH:   Gluc: 66 mg/dL / Ketone:   / Bili:  / Urobili:    Blood:  / Protein:  / Nitrite:    Leuk Esterase:  / RBC:  / WBC    Sq Epi:  / Non Sq Epi:  / Bacteria:           Iron 73, TIBC 107, %sat 68      [05-28-25 @ 11:08]  Ferritin 438      [05-04-25 @ 06:10]  PTH -- (Ca 6.1)      [05-26-25 @ 04:47]   288  PTH -- (Ca 8.3)      [07-10-24 @ 21:08]   43  Vitamin D (25OH) 11      [05-26-25 @ 04:47]  TSH 5.27      [04-30-25 @ 17:55]    HBsAg Nonreact      [09-17-23 @ 07:23]  HCV 0.16, Nonreact      [09-17-23 @ 07:23]    TAY: titer Negative, pattern --      [08-13-24 @ 06:21]  dsDNA <12      [09-17-23 @ 07:23]  C3 Complement 132      [09-16-23 @ 15:44]  C4 Complement 35      [09-16-23 @ 15:44]  Rheumatoid Factor <10      [08-13-24 @ 06:21]  ANCA: cANCA Negative, pANCA Negative, atypical ANCA Negative      [09-17-23 @ 07:23]  PLA2R: ARLEEN <1.8, IFA --      [09-17-23 @ 07:43]  Free Light Chains: kappa 35.40, lambda 35.96, ratio = 0.98      [09-17 @ 07:23]  Immunofixation Serum:   No Monoclonal Band Identified      Reference Range: None Detected      [09-17-23 @ 07:23]  SPEP Interpretation: Polyclonal Gammopathy      [09-17-23 @ 07:23]      RADIOLOGY & ADDITIONAL STUDIES:

## 2025-05-30 NOTE — PROGRESS NOTE ADULT - ASSESSMENT
70y M pmh CKD V, HTN, NIDDM, afib, prior spinal abscess, nephrolithiasis and recurrent UTI s/p b/l stent placement and SPC presenting for worsening creatinine admitted for JOÃO on CKD and chronic cholecystitis    #JOÃO on CKD V  #HAGMA likely 2/2 uremia   #Hyperkalemia - resolved   #Hx SPC  #Hx nephrolithiasis sp b/l stent placement   - bsl Cr 5.5>9.5, CO2 15, K 5.4, pH 7.2 on vbg  - UA +ve bacteria, WBC >998, +ve LE  - CTAP  Bilateral nephroureteral catheters beginning in the renal pelvis and terminating within the urinary bladder. Improved right hydronephrosis now with right renal pelvic fullness. Mild improvement in left moderate hydronephrosis. Bilateral renal and pelvic stones difficult to compare given motion artifact at the level of the kidneys. No definite stone along the course of the ureters. Nephroureteral catheters and a suprapubic catheter terminating within an underdistended urinary bladder. Prostate measures 4.9 cm in transverse dimension.   - uro c/s appreciated: f/u OP for definitive stone treatment and stent removal once stable   - nephro c/s appreciated: GOC with brother for possible RRT >> ID needed for clearance  - F/u with ID regarding clearance for TDC  - s/p bicarb gtt   - s/p lokelma  - Start sevelamer powder 1600 mg TID.  - strict I/O, avoid nephrotoxins, dose meds per GFR  - Urology consulted for suprapubic cathether exchange, Performed ON 5/29    #Chronic cholecystitis  - GB wall thickened 0.4mm on RUQ US  - lactate -ve, Tbili wnl, CTAP showing GB wall thickening with pericholecystic fluid  - HIDA (+)  - IR: likely chronic, conservative mgmt  - surgery recs appreciated   - bcx ngtd  - s/p cefepime/flagyl  - ID recs appreciated: monitor off abx  - eventually needs CCY  - GGT: 65  - F/u GI recs    #Afib  #HTN  - BP: 124/86  - eliquis 2.5mg bid   - Continue to hold home nifedipine    #Normocytic anemia suspected 2/2 advanced CKD  - b12 1269, folate 12.2  - iron 95, sat elevated , ferritin 438  - no active bleeding, continue to monitor   - nephro f/up for possible TACHO    #Hypocalcemia 2/2 hyperphosphatemia and CKD  #Vitamin D deficiency likely 2/2 CKD  - Corrected Ca 7.6  - elevated ALP, elevated PTH, elevated phos, low 25OH vit D and 1-25OH vit D  - c/w vit D supplementation    #Self-induced emesis due to sour feeling/sensation of food stuck - GERD vs zenker diverticulum?  #Concern for malnutrition  - pt frequently self-induces emesis due to sour feeling of throat and/or sensation of food being stuck; no dysphagia, not specific to solid/liquid or specific food  - Dietician recs noted  - c/w PPI  - zofran PRN    #Constipation - resolved  - CTAP - no bowel obstruction, large rectal stool burden with distention measuring 7.8 cm   - bowel regimen discontinued, pt having 3 BMs daily   - stool count

## 2025-05-30 NOTE — SWALLOW BEDSIDE ASSESSMENT ADULT - SWALLOW EVAL: DIAGNOSIS
Suspect GERD/LPR. +toleration observed without overt s/s of aspiration/penetration for soft + bite sized consistency and thin liquids

## 2025-05-30 NOTE — PROGRESS NOTE ADULT - ASSESSMENT
69 yo M with h/o CKD stage 5, HTN, NIDDM, prior spinal abscess c/b need for SPC, nephrolithiasis, recent urinary infection and hydro requiring b/l stent placement, sent by me to ED following outpatient evaluation for medical clearance to remove his stents and labwork noted acute rise in sCr 5.5->9.5 with reduced UOP, worsening hyperkalemia and hyperphosphatemia, concern for new obstruction vs. infection, and to assess for HD needs acutely.  Repeat labwork noted stable sCr with elevated potassium, MA and signs of complicated UTI given SPC use.    #JOÃO on CKD stage 5  #Moderate hydronephrosis  #Complicated Cystitis  #HTN  #NIDDM  #prior spinal abscess c/b need for SPC  #nephrolithiasis    Imaging without new or worsening hydronephrosis, did note multiple kidney stones but all non-obstructing.    - followed by    - creat trending up noted   - GI notes appreciated   - UO noted   - phos is severely elevated--  phoslo 3 tabs tid with meals/ monitor calcium / PTH noted / on vit D , corrected calcium in the mid 8  - renal diet  - team spoke to patient brother at length  / he requested to speak to the patient  and decide about HD- discussed with patient brother again today  / planned for TDC by IR on Monday 6/2 appreciate IR notes    - for HD once TDC obtained   -Chronic cholecystitis/ followed by surgery/ IR   - BP controlled   - h/h noted tx if h < 7/ will start TACHO once on  HD / sat elevated   - Dose medications to e GFR <15  - Avoid Nephrotoxic agents  renal team will follow

## 2025-05-30 NOTE — PROGRESS NOTE ADULT - SUBJECTIVE AND OBJECTIVE BOX
pt seen and examined.     My notes supersede resident's notes in case of discrepancy       ROS: no cp, no sob, no n/v, no fever    Vital Signs Last 24 Hrs  T(C): 36.4 (30 May 2025 05:47), Max: 36.6 (29 May 2025 12:56)  T(F): 97.5 (30 May 2025 05:47), Max: 97.9 (29 May 2025 12:56)  HR: 67 (30 May 2025 05:47) (51 - 67)  BP: 138/73 (30 May 2025 05:47) (130/69 - 149/82)  BP(mean): 95 (30 May 2025 05:47) (95 - 95)  RR: 19 (30 May 2025 05:47) (18 - 19)  SpO2: 97% (30 May 2025 05:47) (94% - 98%)    Parameters below as of 30 May 2025 05:47  Patient On (Oxygen Delivery Method): room air    physical exam  constitutional NAD, AAOX3, Respiratory  lungs CTA, CVS heart RRR, GI: abdomen Soft NT, ND, BS+, skin: intact  neuro exam no focal deficit     MEDICATIONS  (STANDING):  chlorhexidine 2% Cloths 1 Application(s) Topical <User Schedule>  cholecalciferol 1000 Unit(s) Oral daily  heparin   Injectable 5000 Unit(s) SubCutaneous every 12 hours  pantoprazole  Injectable 40 milliGRAM(s) IV Push daily  sevelamer carbonate Powder 1600 milliGRAM(s) Oral three times a day with meals    MEDICATIONS  (PRN):  ondansetron Injectable 4 milliGRAM(s) IV Push every 8 hours PRN Nausea and/or Vomiting                        8.3    8.26  )-----------( 202      ( 30 May 2025 05:56 )             26.2     05-30    140  |  100  |  93[HH]  ----------------------------<  66[L]  4.2   |  24  |  8.3[HH]    Ca    7.5[L]      30 May 2025 05:56  Phos  6.3     05-30  Mg     1.8     05-30    TPro  6.7  /  Alb  2.8[L]  /  TBili  0.3  /  DBili  x   /  AST  23  /  ALT  23  /  AlkPhos  323[H]  05-30    Ferritin: 438 ng/mL [30 - 400] (05-04-25 @ 06:10)    a/p  70y M pmh CKD V, HTN, NIDDM, prior spinal abscess, nephrolithiasis and recurrent UTI s/p b/l stent placement and SPC presenting for evaluation. Patient sent in by Dr. Mcallister for evaluation for acute rise in creatinine.     #JOÃO on CKD V, will need HD  but not emergency  Hyperkalemia - resolved   TDC planned for Monday by IR   holding eliquis   dw nephro     #Hx SPC, was changed 5/29 by urology     #Chronic cholecystitis  hida positive,   sexton negative  pt is not septic,   doubt needs to be treated just based on imaging since he is not symptomatic   dw ID : no need for abx     #Afib, on eliquis , will need to hold for procedure ( TDC  Monday )     #HTN, no need for meds, his bp is controlled off meds     #Constipation - resolved  - CTAP - no bowel obstruction, large rectal stool burden with distention measuring 7.8 cm   - bowel regimen discontinued, pt having 3 BMs daily     #Normocytic anemia suspected 2/2 advanced CKD  - b12 1269, folate 12.2  - iron 95, sat elevated , ferritin 438  - no active bleeding, continue to monitor   - nephro f/up for possible TACHO    #Hypocalcemia , corrected calcium is wnl: 8.9    #Vitamin D deficiency likely 2/2 CKD  - c/w vit D supplementation    #Self-induced emesis due to sour feeling/sensation of food stuck - GERD vs zenker diverticulum?  swallow eval appreciated   nausea could be due to uremia,   antiemetics for now   GI consult, pt may benefit from egd as outpt is still symptomatic after HD and control of uremia     #Concern for malnutrition  cont supplement   antiemetics  reevaluate after HD started to see if pt has better food intake     #Progress Note Handoff    Pending: TDC on Monday, resume   Family discussion: dw pt   Disposition: home with home care when stable   code status: full code

## 2025-05-30 NOTE — PROGRESS NOTE ADULT - SUBJECTIVE AND OBJECTIVE BOX
Gastroenterology progress note:     Patient is a 70y old  Male who presents with a chief complaint of JOÃO on CKD (30 May 2025 13:10)       Admitted on: 05-23-25    We are following the patient for: vomiting        Interval History:    No acute events overnight. Awaiting HD.         PAST MEDICAL & SURGICAL HISTORY:  DM (diabetes mellitus)  HTN (hypertension)  H/o paraplegia  Spinal abscess  Renal stones  Stage 5 chronic kidney disease not on chronic dialysis  Neurogenic dysfunction of the urinary bladder  Encounter for care or replacement of suprapubic tube  Spinal abscess  S/P Surgery  Encounter for care or replacement of suprapubic tube  S/P ORIF (open reduction internal fixation) fracture          MEDICATIONS  (STANDING):  chlorhexidine 2% Cloths 1 Application(s) Topical <User Schedule>  cholecalciferol 1000 Unit(s) Oral daily  heparin   Injectable 5000 Unit(s) SubCutaneous every 12 hours  pantoprazole  Injectable 40 milliGRAM(s) IV Push daily  sevelamer carbonate Powder 1600 milliGRAM(s) Oral three times a day with meals    MEDICATIONS  (PRN):  ondansetron Injectable 4 milliGRAM(s) IV Push every 8 hours PRN Nausea and/or Vomiting      Allergies  No Known Allergies      Review of Systems:   unable to obtain     Physical Examination:  T(C): 36.6 (05-30-25 @ 12:56), Max: 36.6 (05-29-25 @ 20:39)  HR: 66 (05-30-25 @ 12:56) (51 - 67)  BP: 159/80 (05-30-25 @ 12:56) (130/69 - 159/80)  RR: 19 (05-30-25 @ 12:56) (18 - 19)  SpO2: 99% (05-30-25 @ 12:56) (94% - 99%)      05-29-25 @ 07:01  -  05-30-25 @ 07:00  --------------------------------------------------------  IN: 0 mL / OUT: 1375 mL / NET: -1375 mL        GENERAL:no acute distress.  NECK: Supple, no JVD or thyromegaly  CHEST/LUNG: Clear to auscultation bilaterally; No wheeze, rhonchi, or rales  HEART: Regular rate and rhythm; normal S1, S2, No murmurs.  ABDOMEN: Soft, nontender, nondistended; Bowel sounds present    Data:                        8.3    8.26  )-----------( 202      ( 30 May 2025 05:56 )             26.2     Hgb trend:  8.3  05-30-25 @ 05:56  7.9  05-29-25 @ 11:34  7.9  05-28-25 @ 11:08        05-30    140  |  100  |  93[HH]  ----------------------------<  66[L]  4.2   |  24  |  8.3[HH]    Ca    7.5[L]      30 May 2025 05:56  Phos  6.3     05-30  Mg     1.8     05-30    TPro  6.7  /  Alb  2.8[L]  /  TBili  0.3  /  DBili  x   /  AST  23  /  ALT  23  /  AlkPhos  323[H]  05-30    Liver panel trend:  TBili 0.3   /   AST 23   /   ALT 23   /   AlkP 323   /   Tptn 6.7   /   Alb 2.8    /   DBili --      05-30  TBili <0.2   /   AST 25   /   ALT 27   /   AlkP 357   /   Tptn 7.1   /   Alb 2.5    /   DBili --      05-29  TBili <0.2   /   AST 33   /   ALT 34   /   AlkP 379   /   Tptn 6.4   /   Alb 2.7    /   DBili --      05-28  TBili 0.2   /   AST 44   /   ALT 42   /   AlkP 419   /   Tptn 6.7   /   Alb 2.5    /   DBili <0.2      05-27  TBili <0.2   /   AST 97   /   ALT 75   /   AlkP 521   /   Tptn 6.3   /   Alb 2.5    /   DBili --      05-24  TBili <0.2   /      /   ALT 83   /   AlkP 590   /   Tptn 6.8   /   Alb 2.6    /   DBili --      05-24  TBili 0.2   /      /   ALT 99   /   AlkP 521   /   Tptn 7.2   /   Alb 2.7    /   DBili --      05-23

## 2025-05-30 NOTE — SWALLOW BEDSIDE ASSESSMENT ADULT - SLP PERTINENT HISTORY OF CURRENT PROBLEM
69 yo M with h/o CKD stage 5, HTN, NIDDM, prior spinal abscess c/b need for SPC, nephrolithiasis, recent urinary infection and hydro requiring b/l stent placement, sent by me to ED following outpatient evaluation for medical clearance to remove his stents and labwork noted acute rise in sCr 5.5->9.5 with reduced UOP, worsening hyperkalemia and hyperphosphatemia, concern for new obstruction vs. infection, and to assess for HD needs acutely.  Repeat labwork noted stable sCr with elevated potassium, MA and signs of complicated UTI given SPC use.

## 2025-05-30 NOTE — PROGRESS NOTE ADULT - SUBJECTIVE AND OBJECTIVE BOX
SUBJECTIVE/OVERNIGHT EVENTS  Today is hospital day 7d. This morning patient was seen and examined at bedside, resting comfortably in bed. No acute or major events overnight.    HOSPITAL COURSE  Day 1:   Day 2:   Day 3:     CODE STATUS:    FAMILY COMMUNICATION  Contact date:  Name of person contacted:  Relationship to patient:  Communication details:    MEDICATIONS  STANDING MEDICATIONS  chlorhexidine 2% Cloths 1 Application(s) Topical <User Schedule>  cholecalciferol 1000 Unit(s) Oral daily  heparin   Injectable 5000 Unit(s) SubCutaneous every 12 hours  pantoprazole  Injectable 40 milliGRAM(s) IV Push daily  sevelamer carbonate Powder 1600 milliGRAM(s) Oral three times a day with meals    PRN MEDICATIONS  ondansetron Injectable 4 milliGRAM(s) IV Push every 8 hours PRN    VITALS  T(F): 97.5 (05-30-25 @ 05:47), Max: 97.9 (05-29-25 @ 12:56)  HR: 67 (05-30-25 @ 05:47) (51 - 67)  BP: 138/73 (05-30-25 @ 05:47) (130/69 - 149/82)  RR: 19 (05-30-25 @ 05:47) (18 - 19)  SpO2: 97% (05-30-25 @ 05:47) (94% - 98%)    PHYSICAL EXAM  CONSTITUTIONAL: Well groomed, no apparent distress  EYES:EOMI, No conjunctival or scleral injection, non-icteric  ENMT: Oral mucosa with moist membranes. Normal dentition; no pharyngeal injection or exudates  NECK: Supple, symmetric  RESP: No respiratory distress, no use of accessory muscles; CTA b/l, no WRR  CV: RRR, +S1S2; no peripheral edema  GI: Soft, NT, ND, no rebound, no guarding; no palpable masses  MSK:  Normal ROM without pain, normal muscle strength/tone  SKIN: No rashes or ulcers noted  NEURO: Grossly intact  PSYCH: AOX3        (  ) Indwelling Dye Catheter   Date insterted:    Reason (  ) Critical illness     (  ) urinary retention    (  ) Accurate Ins/Outs Monitoring     (  ) CMO patient    (  ) Central Line  Date inserted:  Location: (  ) Right IJ   (  ) Left IJ   (  ) Right Fem   (  ) Left Fem    (  ) SPC  (  ) pigtail  (  ) PEG tube  (  ) colostomy  (  ) jejunostomy  (  ) U-Dall    LABS             8.3    8.26  )-----------( 202      ( 05-30-25 @ 05:56 )             26.2     140  |  100  |  93  -------------------------<  66   05-30-25 @ 05:56  4.2  |  24  |  8.3    Ca      7.5     05-30-25 @ 05:56  Phos   6.3     05-30-25 @ 05:56  Mg     1.8     05-30-25 @ 05:56    TPro  x   /  Alb  x   /  TBili  x   /  DBili  x   /  AST  x   /  ALT  x   /  AlkPhos  x   /  GGT  65    05-30-25 @ 08:00        Urinalysis Basic - ( 30 May 2025 05:56 )    Color: x / Appearance: x / SG: x / pH: x  Gluc: 66 mg/dL / Ketone: x  / Bili: x / Urobili: x   Blood: x / Protein: x / Nitrite: x   Leuk Esterase: x / RBC: x / WBC x   Sq Epi: x / Non Sq Epi: x / Bacteria: x          IMAGING

## 2025-05-31 LAB
ALBUMIN SERPL ELPH-MCNC: 2.6 G/DL — LOW (ref 3.5–5.2)
ALP SERPL-CCNC: 305 U/L — HIGH (ref 30–115)
ALT FLD-CCNC: 20 U/L — SIGNIFICANT CHANGE UP (ref 0–41)
ANION GAP SERPL CALC-SCNC: 15 MMOL/L — HIGH (ref 7–14)
AST SERPL-CCNC: 22 U/L — SIGNIFICANT CHANGE UP (ref 0–41)
BASOPHILS # BLD AUTO: 0.05 K/UL — SIGNIFICANT CHANGE UP (ref 0–0.2)
BASOPHILS NFR BLD AUTO: 0.5 % — SIGNIFICANT CHANGE UP (ref 0–1)
BILIRUB SERPL-MCNC: 0.2 MG/DL — SIGNIFICANT CHANGE UP (ref 0.2–1.2)
BUN SERPL-MCNC: 87 MG/DL — CRITICAL HIGH (ref 10–20)
CALCIUM SERPL-MCNC: 7.4 MG/DL — LOW (ref 8.4–10.5)
CHLORIDE SERPL-SCNC: 102 MMOL/L — SIGNIFICANT CHANGE UP (ref 98–110)
CO2 SERPL-SCNC: 24 MMOL/L — SIGNIFICANT CHANGE UP (ref 17–32)
CREAT SERPL-MCNC: 7.3 MG/DL — CRITICAL HIGH (ref 0.7–1.5)
EGFR: 7 ML/MIN/1.73M2 — LOW
EGFR: 7 ML/MIN/1.73M2 — LOW
EOSINOPHIL # BLD AUTO: 0.36 K/UL — SIGNIFICANT CHANGE UP (ref 0–0.7)
EOSINOPHIL NFR BLD AUTO: 3.9 % — SIGNIFICANT CHANGE UP (ref 0–8)
GLUCOSE SERPL-MCNC: 82 MG/DL — SIGNIFICANT CHANGE UP (ref 70–99)
HCT VFR BLD CALC: 25.2 % — LOW (ref 42–52)
HGB BLD-MCNC: 7.9 G/DL — LOW (ref 14–18)
IMM GRANULOCYTES NFR BLD AUTO: 2 % — HIGH (ref 0.1–0.3)
LYMPHOCYTES # BLD AUTO: 2.59 K/UL — SIGNIFICANT CHANGE UP (ref 1.2–3.4)
LYMPHOCYTES # BLD AUTO: 28.1 % — SIGNIFICANT CHANGE UP (ref 20.5–51.1)
MAGNESIUM SERPL-MCNC: 1.7 MG/DL — LOW (ref 1.8–2.4)
MCHC RBC-ENTMCNC: 29.4 PG — SIGNIFICANT CHANGE UP (ref 27–31)
MCHC RBC-ENTMCNC: 31.3 G/DL — LOW (ref 32–37)
MCV RBC AUTO: 93.7 FL — SIGNIFICANT CHANGE UP (ref 80–94)
MONOCYTES # BLD AUTO: 0.66 K/UL — HIGH (ref 0.1–0.6)
MONOCYTES NFR BLD AUTO: 7.2 % — SIGNIFICANT CHANGE UP (ref 1.7–9.3)
NEUTROPHILS # BLD AUTO: 5.37 K/UL — SIGNIFICANT CHANGE UP (ref 1.4–6.5)
NEUTROPHILS NFR BLD AUTO: 58.3 % — SIGNIFICANT CHANGE UP (ref 42.2–75.2)
NRBC BLD AUTO-RTO: 0 /100 WBCS — SIGNIFICANT CHANGE UP (ref 0–0)
PHOSPHATE SERPL-MCNC: 5.2 MG/DL — HIGH (ref 2.1–4.9)
PLATELET # BLD AUTO: 206 K/UL — SIGNIFICANT CHANGE UP (ref 130–400)
PMV BLD: 11.3 FL — HIGH (ref 7.4–10.4)
POTASSIUM SERPL-MCNC: 4.2 MMOL/L — SIGNIFICANT CHANGE UP (ref 3.5–5)
POTASSIUM SERPL-SCNC: 4.2 MMOL/L — SIGNIFICANT CHANGE UP (ref 3.5–5)
PROT SERPL-MCNC: 7.1 G/DL — SIGNIFICANT CHANGE UP (ref 6–8)
RBC # BLD: 2.69 M/UL — LOW (ref 4.7–6.1)
RBC # FLD: 13.4 % — SIGNIFICANT CHANGE UP (ref 11.5–14.5)
SODIUM SERPL-SCNC: 141 MMOL/L — SIGNIFICANT CHANGE UP (ref 135–146)
WBC # BLD: 9.21 K/UL — SIGNIFICANT CHANGE UP (ref 4.8–10.8)
WBC # FLD AUTO: 9.21 K/UL — SIGNIFICANT CHANGE UP (ref 4.8–10.8)

## 2025-05-31 PROCEDURE — 99232 SBSQ HOSP IP/OBS MODERATE 35: CPT

## 2025-05-31 PROCEDURE — 73610 X-RAY EXAM OF ANKLE: CPT | Mod: 26,50

## 2025-05-31 RX ORDER — POLYETHYLENE GLYCOL 3350 17 G/17G
17 POWDER, FOR SOLUTION ORAL
Refills: 0 | Status: DISCONTINUED | OUTPATIENT
Start: 2025-05-31 | End: 2025-06-05

## 2025-05-31 RX ORDER — SENNA 187 MG
2 TABLET ORAL AT BEDTIME
Refills: 0 | Status: DISCONTINUED | OUTPATIENT
Start: 2025-05-31 | End: 2025-06-05

## 2025-05-31 RX ORDER — MAGNESIUM SULFATE 500 MG/ML
2 SYRINGE (ML) INJECTION ONCE
Refills: 0 | Status: COMPLETED | OUTPATIENT
Start: 2025-05-31 | End: 2025-05-31

## 2025-05-31 RX ADMIN — Medication 1000 UNIT(S): at 12:29

## 2025-05-31 RX ADMIN — POLYETHYLENE GLYCOL 3350 17 GRAM(S): 17 POWDER, FOR SOLUTION ORAL at 12:29

## 2025-05-31 RX ADMIN — SEVELAMER HYDROCHLORIDE 1600 MILLIGRAM(S): 800 TABLET ORAL at 18:25

## 2025-05-31 RX ADMIN — Medication 2 TABLET(S): at 21:17

## 2025-05-31 RX ADMIN — SEVELAMER HYDROCHLORIDE 1600 MILLIGRAM(S): 800 TABLET ORAL at 12:30

## 2025-05-31 RX ADMIN — Medication 25 GRAM(S): at 09:03

## 2025-05-31 RX ADMIN — Medication 40 MILLIGRAM(S): at 18:25

## 2025-05-31 RX ADMIN — Medication 40 MILLIGRAM(S): at 07:37

## 2025-05-31 RX ADMIN — HEPARIN SODIUM 5000 UNIT(S): 1000 INJECTION INTRAVENOUS; SUBCUTANEOUS at 18:25

## 2025-05-31 RX ADMIN — HEPARIN SODIUM 5000 UNIT(S): 1000 INJECTION INTRAVENOUS; SUBCUTANEOUS at 05:09

## 2025-05-31 RX ADMIN — SEVELAMER HYDROCHLORIDE 1600 MILLIGRAM(S): 800 TABLET ORAL at 09:08

## 2025-05-31 RX ADMIN — Medication 1 APPLICATION(S): at 05:09

## 2025-05-31 NOTE — PROGRESS NOTE ADULT - SUBJECTIVE AND OBJECTIVE BOX
seen and examined  24 h events noted   no distress       PAST HISTORY  --------------------------------------------------------------------------------  No significant changes to PMH, PSH, FHx, SHx, unless otherwise noted    ALLERGIES & MEDICATIONS  --------------------------------------------------------------------------------  Allergies    No Known Allergies    Intolerances      Standing Inpatient Medications  chlorhexidine 2% Cloths 1 Application(s) Topical <User Schedule>  cholecalciferol 1000 Unit(s) Oral daily  heparin   Injectable 5000 Unit(s) SubCutaneous every 12 hours  pantoprazole    Tablet 40 milliGRAM(s) Oral two times a day  polyethylene glycol 3350 17 Gram(s) Oral <User Schedule>  senna 2 Tablet(s) Oral at bedtime  sevelamer carbonate Powder 1600 milliGRAM(s) Oral three times a day with meals    PRN Inpatient Medications  ondansetron Injectable 4 milliGRAM(s) IV Push every 8 hours PRN          VITALS/PHYSICAL EXAM  --------------------------------------------------------------------------------  T(C): 36.7 (05-31-25 @ 04:23), Max: 36.8 (05-30-25 @ 19:57)  HR: 68 (05-31-25 @ 04:23) (66 - 71)  BP: 162/81 (05-31-25 @ 04:23) (115/71 - 162/81)  RR: 17 (05-31-25 @ 04:23) (17 - 19)  SpO2: 98% (05-31-25 @ 04:23) (98% - 99%)  Wt(kg): --        05-30-25 @ 07:01  -  05-31-25 @ 07:00  --------------------------------------------------------  IN: 0 mL / OUT: 1175 mL / NET: -1175 mL      Physical Exam:  	Gen: NAD  	Pulm: decrease BS  B/L  	CV:  S1S2; no rub  	Abd: +distended  	    LABS/STUDIES  --------------------------------------------------------------------------------              8.3    8.26  >-----------<  202      [05-30-25 @ 05:56]              26.2     140  |  100  |  93  ----------------------------<  66      [05-30-25 @ 05:56]  4.2   |  24  |  8.3        Ca     7.5     [05-30-25 @ 05:56]      Mg     1.8     [05-30-25 @ 05:56]      Phos  6.3     [05-30-25 @ 05:56]    TPro  6.7  /  Alb  2.8  /  TBili  0.3  /  DBili  x   /  AST  23  /  ALT  23  /  AlkPhos  323  [05-30-25 @ 05:56]    Creatinine Trend:  SCr 8.3 [05-30 @ 05:56]  SCr 8.3 [05-29 @ 11:34]  SCr 9.0 [05-28 @ 11:08]  SCr 8.2 [05-27 @ 04:41]  SCr 8.5 [05-26 @ 20:57]    Urinalysis - [05-30-25 @ 05:56]      Color  / Appearance  / SG  / pH       Gluc 66 / Ketone   / Bili  / Urobili        Blood  / Protein  / Leuk Est  / Nitrite       RBC  / WBC  / Hyaline  / Gran  / Sq Epi  / Non Sq Epi  / Bacteria       Iron 73, TIBC 107, %sat 68      [05-28-25 @ 11:08]  Ferritin 438      [05-04-25 @ 06:10]  PTH -- (Ca 6.1)      [05-26-25 @ 04:47]   288  PTH -- (Ca 8.3)      [07-10-24 @ 21:08]   43  Vitamin D (25OH) 11      [05-26-25 @ 04:47]  TSH 5.27      [04-30-25 @ 17:55]

## 2025-05-31 NOTE — PROGRESS NOTE ADULT - ASSESSMENT
70y M pmh CKD V, HTN, NIDDM, afib, prior spinal abscess, nephrolithiasis and recurrent UTI s/p b/l stent placement and SPC presenting for worsening creatinine admitted for JOÃO on CKD and chronic cholecystitis    #JOÃO on CKD V  #HAGMA likely 2/2 uremia   #Hyperkalemia - resolved   #Hx SPC  #Hx nephrolithiasis sp b/l stent placement   - bsl Cr 5.5>9.5, CO2 15, K 5.4, pH 7.2 on vbg  - UA +ve bacteria, WBC >998, +ve LE  - CTAP  Bilateral nephroureteral catheters beginning in the renal pelvis and terminating within the urinary bladder. Improved right hydronephrosis now with right renal pelvic fullness. Mild improvement in left moderate hydronephrosis. Bilateral renal and pelvic stones difficult to compare given motion artifact at the level of the kidneys. No definite stone along the course of the ureters. Nephroureteral catheters and a suprapubic catheter terminating within an underdistended urinary bladder. Prostate measures 4.9 cm in transverse dimension.   - uro c/s appreciated: f/u OP for definitive stone treatment and stent removal once stable   - nephro c/s appreciated: GOC with brother for possible RRT >> ID needed for clearance  - F/u with ID regarding clearance for TDC  - s/p bicarb gtt   - s/p lokelma  - Start sevelamer powder 1600 mg TID.  - strict I/O, avoid nephrotoxins, dose meds per GFR  - Urology consulted for suprapubic cathether exchange, Performed ON 5/29  - F/u 20:00 prop labs on 06/1/2025    #Chronic cholecystitis  - GB wall thickened 0.4mm on RUQ US  - lactate -ve, Tbili wnl, CTAP showing GB wall thickening with pericholecystic fluid  - HIDA (+)  - IR: likely chronic, conservative mgmt  - surgery recs appreciated   - bcx ngtd  - s/p cefepime/flagyl  - ID recs appreciated: monitor off abx  - eventually needs CCY  - GGT: 65  - F/u GI recs    #Afib  #HTN  - BP: 124/86  - eliquis 2.5mg bid   - Continue to hold home nifedipine    #Normocytic anemia suspected 2/2 advanced CKD  - b12 1269, folate 12.2  - iron 95, sat elevated , ferritin 438  - no active bleeding, continue to monitor   - nephro f/up for possible TACHO    #Hypocalcemia 2/2 hyperphosphatemia and CKD  #Vitamin D deficiency likely 2/2 CKD  - Corrected Ca 7.6  - elevated ALP, elevated PTH, elevated phos, low 25OH vit D and 1-25OH vit D  - c/w vit D supplementation    #Self-induced emesis due to sour feeling/sensation of food stuck - GERD vs zenker diverticulum?  #Concern for malnutrition  - pt frequently self-induces emesis due to sour feeling of throat and/or sensation of food being stuck; no dysphagia, not specific to solid/liquid or specific food  - Dietician recs noted  - c/w PPI  - zofran PRN    #Constipation - resolved  - CTAP - no bowel obstruction, large rectal stool burden with distention measuring 7.8 cm   - bowel regimen discontinued, pt having 3 BMs daily   - stool count

## 2025-05-31 NOTE — CONSULT NOTE ADULT - SUBJECTIVE AND OBJECTIVE BOX
Podiatry Consult Note    Subjective:  KRISTY GARCIA  Seen Bedside 70y Male  .   Patient is a 70y old  Male who presents with a chief complaint of JOÃO on CKD (31 May 2025 07:21)    HPI:  70y M pmh CKD V, HTN, NIDDM, prior spinal abscess, nephrolithiasis and recurrent UTI s/p b/l stent placement and SPC presenting for evaluation. Patient sent in by Dr. Mcallister for evaluation for acute rise in creatinine. sCr noted to increase from 5.5>9.5 with reduced UOP, hyperkalemia and hyperphosphatemia.     ROS -ve for fever, cp, sob, abdominal pain, n/v/d/c, hematuria, dysuria, rash, joint pain, confusion, dizziness, lightheadedness.    Vitals in the ED  T 95.6  HR 71  /83  RR 18 SpO2 98 On RA     Significant Labs  wbc 6.14 hgb 8.0 plt 156  Na 135 K 5.4 BUN/Cr 102/9.5  CO2 15, AGAP 20  Ca 6.6      ALT 99    VBG 7.20/37/67/14  UA + LE, WBC, bacteria, rbc, casts    CTAP   Interval marked distention with gallbladder wall thickening and pericholecystic fluid. Cholelithiasis. No definite biliary duct dilatation.  Symmetric in size. Bilateral nephroureteral catheters beginning in the renal pelvis and terminating within the urinary bladder.   Improved right hydronephrosis now with right renal pelvic fullness. Mild improvement in left moderate hydronephrosis. Bilateral renal and pelvic stones difficult to compare given motion artifact at the level of the kidneys.   No definite stone along the course of the ureters.  Increased periaortic lymph node measuring 1.1 cm previously 0.9 cm, may be reactive   No bowel obstruction. Large rectal stool burden with distention measuring 7.8 cm   Nephroureteral catheters and a suprapubic catheter terminating within an underdistended urinary bladder. Air is noted within   the urinary bladder which may be due to instrumentation. Prostate measures 4.9 cm in transverse dimension   Calcified atherosclerotic disease of the aorta and its branches.  Trace free fluid surrounding the gallbladder and liver.  Right basilar consolidative opacity adjacent likely trace pleural effusion.    RUQ:   Findings concerning for cholecystitis despite a negative sonographic Iglesias's sign, as described.    Right renal pelvic fullness.  Trace right pleural effusion.    Patient received rocephin, 1L NS bolus, sodium bicarb ggt, lokelma in the ED. Admitted to medicine for management.     (23 May 2025 21:05)      Past Medical History and Surgical History  PAST MEDICAL & SURGICAL HISTORY:  DM (diabetes mellitus)      HTN (hypertension)      H/O paraplegia      Spinal abscess      Renal stones      Stage 5 chronic kidney disease not on chronic dialysis      Neurogenic dysfunction of the urinary bladder      Encounter for care or replacement of suprapubic tube      Spinal abscess  S/P Surgery      Encounter for care or replacement of suprapubic tube      S/P ORIF (open reduction internal fixation) fracture           Review of Systems:  [X] Ten point review of systems is otherwise negative except as noted     Objective:  Vital Signs Last 24 Hrs  T(C): 36.7 (31 May 2025 04:23), Max: 36.8 (30 May 2025 19:57)  T(F): 98.1 (31 May 2025 04:23), Max: 98.2 (30 May 2025 19:57)  HR: 68 (31 May 2025 04:23) (66 - 71)  BP: 162/81 (31 May 2025 04:23) (115/71 - 162/81)  BP(mean): --  RR: 17 (31 May 2025 04:23) (17 - 19)  SpO2: 100% (31 May 2025 07:42) (98% - 100%)    Parameters below as of 31 May 2025 07:42  Patient On (Oxygen Delivery Method): room air                            7.9    9.21  )-----------( 206      ( 31 May 2025 05:28 )             25.2                 05-31    141  |  102  |  87[HH]  ----------------------------<  82  4.2   |  24  |  7.3[HH]    Ca    7.4[L]      31 May 2025 05:28  Phos  5.2     05-31  Mg     1.7     05-31    TPro  7.1  /  Alb  2.6[L]  /  TBili  0.2  /  DBili  x   /  AST  22  /  ALT  20  /  AlkPhos  305[H]  05-31        Physical Exam - Lower Extremity Focused:   Derm:   B/L Feet - diffuse xerosis with scaling skin, no open wounds, no clinical signs of infection   - All nails at level of hygenic length, no interdigital maceration  - Hyperkeratotic eschar to dorsum of right foot, removed and no underlying tissue damage of note   Vascular: DP and PT Pulses Diminished; Foot is Warm to the touch; Capillary Refill Time < 3 Seconds;    Neuro: Protective Sensation Diminished / Moderately Neuropathic   MSK: Pain On Palpation at Wound Site     Assessment:  B/L Nail Evaluation      Plan:  Chart reviewed and Patient evaluated. All Questions and Concerns Addressed and Answered  Weight Bearing Status; WBAT   No Wound Care necessary at this time   No further Podiatric Intervention at this time  - Recommend daily moisturizer to be applied to feet as needed   Patient stable from podiatry standpoint, can f/u o/p clinic with Dr. Pelon DPM  Discussed Plan w/ Dr Pelon DPM    Podiatry   Please message on teams for any further questions

## 2025-05-31 NOTE — PROGRESS NOTE ADULT - ASSESSMENT
69 yo M with h/o CKD stage 5, HTN, NIDDM, prior spinal abscess c/b need for SPC, nephrolithiasis, recent urinary infection and hydro requiring b/l stent placement, sent by me to ED following outpatient evaluation for medical clearance to remove his stents and labwork noted acute rise in sCr 5.5->9.5 with reduced UOP, worsening hyperkalemia and hyperphosphatemia, concern for new obstruction vs. infection, and to assess for HD needs acutely.  Repeat labwork noted stable sCr with elevated potassium, MA and signs of complicated UTI given SPC use.  #JOÃO on CKD stage 5  #Moderate hydronephrosis  #Complicated Cystitis  #HTN  #NIDDM  #prior spinal abscess c/b need for SPC  #nephrolithiasis  Imaging without new or worsening hydronephrosis, did note multiple kidney stones but all non-obstructing.    - followed by    - creat stable   -followed by    - phos noted -  phoslo 3 tabs tid with meals/ monitor calcium / PTH noted / on vit D , corrected calcium in the mid 8  - renal diet  -  planned for TDC by IR on Monday 6/2 appreciate IR notes  / will start HD after TDC placement   -Chronic cholecystitis/ followed by surgery/ IR   -  follow bp readings   - h/h noted tx if h < 7/ will start TACHO once on  HD / sat elevated   - Dose medications to e GFR <15  - Avoid Nephrotoxic agents  renal team will follow  69 yo M with h/o CKD stage 5, HTN, NIDDM, prior spinal abscess c/b need for SPC, nephrolithiasis, recent urinary infection and hydro requiring b/l stent placement, sent by me to ED following outpatient evaluation for medical clearance to remove his stents and labwork noted acute rise in sCr 5.5->9.5 with reduced UOP, worsening hyperkalemia and hyperphosphatemia, concern for new obstruction vs. infection, and to assess for HD needs acutely.  Repeat labwork noted stable sCr with elevated potassium, MA and signs of complicated UTI given SPC use.  #JOÃO on CKD stage 5  #Moderate hydronephrosis  #Complicated Cystitis  #HTN  #NIDDM  #prior spinal abscess c/b need for SPC  #nephrolithiasis  Imaging without new or worsening hydronephrosis, did note multiple kidney stones but all non-obstructing.    - followed by    - creat stable   - phos noted -  phoslo 3 tabs tid with meals/ monitor calcium / PTH noted / on vit D , corrected calcium in the mid 8  - renal diet  -  planned for TDC by IR on Monday 6/2 appreciate IR notes  / will start HD after TDC placement   -Chronic cholecystitis/ followed by surgery/ IR   -  follow bp readings   - h/h noted tx if h < 7/ will start TACHO once on  HD / sat elevated   - Dose medications to e GFR <15  - Avoid Nephrotoxic agents  renal team will follow

## 2025-05-31 NOTE — PROGRESS NOTE ADULT - ATTENDING COMMENTS
70y M pmh CKD V, HTN, NIDDM, prior spinal abscess, nephrolithiasis and recurrent UTI s/p b/l stent placement and SPC presenting for evaluation. Patient sent in by Dr. Mcallister for evaluation for acute rise in creatinine.     #JOÃO on CKD V, will need HD  but not emergency  Hyperkalemia - resolved   TDC planned for Monday by IR   holding eliquis   dw nephro     #Hx SPC, was changed 5/29 by urology     #Chronic cholecystitis  hida positive,   sexton negative  pt is not septic,   doubt needs to be treated just based on imaging since he is not symptomatic   dw ID : no need for abx     #Afib, on eliquis , will need to hold for procedure ( TDC  Monday )     #HTN, no need for meds, his bp is controlled off meds     #Constipation - resolved  - CTAP - no bowel obstruction, large rectal stool burden with distention measuring 7.8 cm   - bowel regimen discontinued, pt having 3 BMs daily     #Normocytic anemia suspected 2/2 advanced CKD  - b12 1269, folate 12.2  - iron 95, sat elevated , ferritin 438  - no active bleeding, continue to monitor   - nephro f/up for possible TACHO    #Hypocalcemia , corrected calcium is wnl: 8.9    #Vitamin D deficiency likely 2/2 CKD  - c/w vit D supplementation    #Self-induced emesis due to sour feeling/sensation of food stuck - GERD vs zenker diverticulum?  swallow eval appreciated   nausea could be due to uremia,   antiemetics for now   GI consult, pt may benefit from egd as outpt is still symptomatic after HD and control of uremia     #Concern for malnutrition  cont supplement   antiemetics  reevaluate after HD started to see if pt has better food intake     #Progress Note Handoff    Pending: TDC on Monday  Family discussion: dw pt   Disposition: home with home care when stable   code status: full code

## 2025-05-31 NOTE — PROGRESS NOTE ADULT - SUBJECTIVE AND OBJECTIVE BOX
SUBJECTIVE/OVERNIGHT EVENTS  Today is hospital day 8d. This morning patient was seen and examined at bedside, resting comfortably in bed. No acute or major events overnight.    HOSPITAL COURSE  Day 1:   Day 2:   Day 3:     CODE STATUS:    FAMILY COMMUNICATION  Contact date:  Name of person contacted:  Relationship to patient:  Communication details:    MEDICATIONS  STANDING MEDICATIONS  chlorhexidine 2% Cloths 1 Application(s) Topical <User Schedule>  cholecalciferol 1000 Unit(s) Oral daily  heparin   Injectable 5000 Unit(s) SubCutaneous every 12 hours  pantoprazole    Tablet 40 milliGRAM(s) Oral two times a day  polyethylene glycol 3350 17 Gram(s) Oral <User Schedule>  senna 2 Tablet(s) Oral at bedtime  sevelamer carbonate Powder 1600 milliGRAM(s) Oral three times a day with meals    PRN MEDICATIONS  ondansetron Injectable 4 milliGRAM(s) IV Push every 8 hours PRN    VITALS  T(F): 98.1 (05-31-25 @ 04:23), Max: 98.2 (05-30-25 @ 19:57)  HR: 68 (05-31-25 @ 04:23) (66 - 71)  BP: 162/81 (05-31-25 @ 04:23) (115/71 - 162/81)  RR: 17 (05-31-25 @ 04:23) (17 - 19)  SpO2: 100% (05-31-25 @ 07:42) (98% - 100%)    PHYSICAL EXAM  GENERAL  (  ) NAD, lying in bed comfortably     (  ) obtunded     (  ) lethargic     (  ) somnolent    HEAD  (  ) Atraumatic     (  ) hematoma     (  ) laceration (specify location:       )     NECK  (  ) Supple     (  ) neck stiffness     (  ) nuchal rigidity     (  )  no JVD     (  ) JVD present ( -- cm)    HEART  Rate -->  (  ) normal rate    (  ) bradycardic    (  ) tachycardic  Rhythm -->  (  ) regular    (  ) regularly irregular    (  ) irregularly irregular  Murmurs -->  (  ) normal s1/s2    (  ) systolic murmur    (  ) diastolic murmur    (  ) continuous murmur     (  ) S3 present    (  ) S4 present    LUNGS  (  )Unlabored respirations     (  ) tachypnea  (  ) B/L air entry     (  ) decreased breath sounds in:  (location     )    (  ) no adventitious sound     (  ) crackles     (  ) wheezing      (  ) rhonchi      (specify location:       )  (  ) chest wall tenderness (specify location:       )    ABDOMEN  (  ) Soft     (  ) tense   |   (  ) nondistended     (  ) distended   |   (  ) +BS     (  ) hypoactive bowel sounds     (  ) hyperactive bowel sounds  (  ) nontender     (  ) RUQ tenderness     (  ) RLQ tenderness     (  ) LLQ tenderness     (  ) epigastric tenderness     (  ) diffuse tenderness  (  ) Splenomegaly      (  ) Hepatomegaly      (  ) Jaundice     (  ) ecchymosis     EXTREMITIES  (  ) Normal     (  ) Rash     (  ) ecchymosis     (  ) varicose veins      (  ) pitting edema     (  ) non-pitting edema   (  ) ulceration     (  ) gangrene:     (location:     )    NERVOUS SYSTEM  (  ) A&Ox3     (  ) confused     (  ) lethargic  CN II-XII:     (  ) Intact     (  ) focal deficits  (Specify:     )   Upper extremities:     (  ) strength X/5     (  ) focal deficit (specify:    )  Lower extremities:     (  ) strength  X/5    (  ) focal deficit (specify:    )    SKIN  (  ) No rashes or lesions     (  ) maculopapular rash     (  ) pustules     (  ) vesicles     (  ) ulcer     (  ) ecchymosis     (specify location:     )    (  ) Indwelling Dye Catheter   Date insterted:    Reason (  ) Critical illness     (  ) urinary retention    (  ) Accurate Ins/Outs Monitoring     (  ) CMO patient    (  ) Central Line  Date inserted:  Location: (  ) Right IJ   (  ) Left IJ   (  ) Right Fem   (  ) Left Fem    (  ) SPC  (  ) pigtail  (  ) PEG tube  (  ) colostomy  (  ) jejunostomy  (  ) U-Dall    LABS             7.9    9.21  )-----------( 206      ( 05-31-25 @ 05:28 )             25.2     141  |  102  |  87  -------------------------<  82   05-31-25 @ 05:28  4.2  |  24  |  7.3    Ca      7.4     05-31-25 @ 05:28  Phos   5.2     05-31-25 @ 05:28  Mg     1.7     05-31-25 @ 05:28    TPro  7.1  /  Alb  2.6  /  TBili  0.2  /  DBili  x   /  AST  22  /  ALT  20  /  AlkPhos  305  /  GGT  x     05-31-25 @ 05:28        Urinalysis Basic - ( 31 May 2025 05:28 )    Color: x / Appearance: x / SG: x / pH: x  Gluc: 82 mg/dL / Ketone: x  / Bili: x / Urobili: x   Blood: x / Protein: x / Nitrite: x   Leuk Esterase: x / RBC: x / WBC x   Sq Epi: x / Non Sq Epi: x / Bacteria: x          IMAGING SUBJECTIVE/OVERNIGHT EVENTS  Today is hospital day 8d. This morning patient was seen and examined at bedside, resting comfortably in bed. No acute or major events overnight.    MEDICATIONS  STANDING MEDICATIONS  chlorhexidine 2% Cloths 1 Application(s) Topical <User Schedule>  cholecalciferol 1000 Unit(s) Oral daily  heparin   Injectable 5000 Unit(s) SubCutaneous every 12 hours  pantoprazole    Tablet 40 milliGRAM(s) Oral two times a day  polyethylene glycol 3350 17 Gram(s) Oral <User Schedule>  senna 2 Tablet(s) Oral at bedtime  sevelamer carbonate Powder 1600 milliGRAM(s) Oral three times a day with meals    PRN MEDICATIONS  ondansetron Injectable 4 milliGRAM(s) IV Push every 8 hours PRN    VITALS  T(F): 98.1 (05-31-25 @ 04:23), Max: 98.2 (05-30-25 @ 19:57)  HR: 68 (05-31-25 @ 04:23) (66 - 71)  BP: 162/81 (05-31-25 @ 04:23) (115/71 - 162/81)  RR: 17 (05-31-25 @ 04:23) (17 - 19)  SpO2: 100% (05-31-25 @ 07:42) (98% - 100%)    PHYSICAL EXAM  GENERAL  (  ) NAD, lying in bed comfortably     (  ) obtunded     (  ) lethargic     (  ) somnolent    HEAD  (  ) Atraumatic     (  ) hematoma     (  ) laceration (specify location:       )     NECK  (  ) Supple     (  ) neck stiffness     (  ) nuchal rigidity     (  )  no JVD     (  ) JVD present ( -- cm)    HEART  Rate -->  (  ) normal rate    (  ) bradycardic    (  ) tachycardic  Rhythm -->  (  ) regular    (  ) regularly irregular    (  ) irregularly irregular  Murmurs -->  (  ) normal s1/s2    (  ) systolic murmur    (  ) diastolic murmur    (  ) continuous murmur     (  ) S3 present    (  ) S4 present    LUNGS  (  )Unlabored respirations     (  ) tachypnea  (  ) B/L air entry     (  ) decreased breath sounds in:  (location     )    (  ) no adventitious sound     (  ) crackles     (  ) wheezing      (  ) rhonchi      (specify location:       )  (  ) chest wall tenderness (specify location:       )    ABDOMEN  (  ) Soft     (  ) tense   |   (  ) nondistended     (  ) distended   |   (  ) +BS     (  ) hypoactive bowel sounds     (  ) hyperactive bowel sounds  (  ) nontender     (  ) RUQ tenderness     (  ) RLQ tenderness     (  ) LLQ tenderness     (  ) epigastric tenderness     (  ) diffuse tenderness  (  ) Splenomegaly      (  ) Hepatomegaly      (  ) Jaundice     (  ) ecchymosis     EXTREMITIES  (  ) Normal     (  ) Rash     (  ) ecchymosis     (  ) varicose veins      (  ) pitting edema     (  ) non-pitting edema   (  ) ulceration     (  ) gangrene:     (location:     )    NERVOUS SYSTEM  (  ) A&Ox3     (  ) confused     (  ) lethargic  CN II-XII:     (  ) Intact     (  ) focal deficits  (Specify:     )   Upper extremities:     (  ) strength X/5     (  ) focal deficit (specify:    )  Lower extremities:     (  ) strength  X/5    (  ) focal deficit (specify:    )    SKIN  (  ) No rashes or lesions     (  ) maculopapular rash     (  ) pustules     (  ) vesicles     (  ) ulcer     (  ) ecchymosis     (specify location:     )    (  ) Indwelling Dye Catheter   Date insterted:    Reason (  ) Critical illness     (  ) urinary retention    (  ) Accurate Ins/Outs Monitoring     (  ) CMO patient    (  ) Central Line  Date inserted:  Location: (  ) Right IJ   (  ) Left IJ   (  ) Right Fem   (  ) Left Fem    (  ) SPC  (  ) pigtail  (  ) PEG tube  (  ) colostomy  (  ) jejunostomy  (  ) U-Dall    LABS             7.9    9.21  )-----------( 206      ( 05-31-25 @ 05:28 )             25.2     141  |  102  |  87  -------------------------<  82   05-31-25 @ 05:28  4.2  |  24  |  7.3    Ca      7.4     05-31-25 @ 05:28  Phos   5.2     05-31-25 @ 05:28  Mg     1.7     05-31-25 @ 05:28    TPro  7.1  /  Alb  2.6  /  TBili  0.2  /  DBili  x   /  AST  22  /  ALT  20  /  AlkPhos  305  /  GGT  x     05-31-25 @ 05:28        Urinalysis Basic - ( 31 May 2025 05:28 )    Color: x / Appearance: x / SG: x / pH: x  Gluc: 82 mg/dL / Ketone: x  / Bili: x / Urobili: x   Blood: x / Protein: x / Nitrite: x   Leuk Esterase: x / RBC: x / WBC x   Sq Epi: x / Non Sq Epi: x / Bacteria: x          IMAGING

## 2025-06-01 LAB
ALBUMIN SERPL ELPH-MCNC: 2.6 G/DL — LOW (ref 3.5–5.2)
ALP SERPL-CCNC: 292 U/L — HIGH (ref 30–115)
ALT FLD-CCNC: 17 U/L — SIGNIFICANT CHANGE UP (ref 0–41)
ANION GAP SERPL CALC-SCNC: 14 MMOL/L — SIGNIFICANT CHANGE UP (ref 7–14)
ANION GAP SERPL CALC-SCNC: 16 MMOL/L — HIGH (ref 7–14)
APTT BLD: 38.2 SEC — SIGNIFICANT CHANGE UP (ref 27–39.2)
AST SERPL-CCNC: 20 U/L — SIGNIFICANT CHANGE UP (ref 0–41)
AST SERPL-CCNC: 21 U/L — SIGNIFICANT CHANGE UP (ref 0–41)
BASOPHILS # BLD AUTO: 0.04 K/UL — SIGNIFICANT CHANGE UP (ref 0–0.2)
BASOPHILS # BLD AUTO: 0.06 K/UL — SIGNIFICANT CHANGE UP (ref 0–0.2)
BASOPHILS NFR BLD AUTO: 0.4 % — SIGNIFICANT CHANGE UP (ref 0–1)
BASOPHILS NFR BLD AUTO: 0.6 % — SIGNIFICANT CHANGE UP (ref 0–1)
BILIRUB SERPL-MCNC: 0.3 MG/DL — SIGNIFICANT CHANGE UP (ref 0.2–1.2)
BILIRUB SERPL-MCNC: 0.3 MG/DL — SIGNIFICANT CHANGE UP (ref 0.2–1.2)
BUN SERPL-MCNC: 86 MG/DL — CRITICAL HIGH (ref 10–20)
BUN SERPL-MCNC: 87 MG/DL — CRITICAL HIGH (ref 10–20)
CALCIUM SERPL-MCNC: 7.5 MG/DL — LOW (ref 8.4–10.5)
CALCIUM SERPL-MCNC: 7.6 MG/DL — LOW (ref 8.4–10.5)
CHLORIDE SERPL-SCNC: 100 MMOL/L — SIGNIFICANT CHANGE UP (ref 98–110)
CO2 SERPL-SCNC: 23 MMOL/L — SIGNIFICANT CHANGE UP (ref 17–32)
CO2 SERPL-SCNC: 24 MMOL/L — SIGNIFICANT CHANGE UP (ref 17–32)
CREAT SERPL-MCNC: 6.7 MG/DL — CRITICAL HIGH (ref 0.7–1.5)
EGFR: 8 ML/MIN/1.73M2 — LOW
EGFR: 8 ML/MIN/1.73M2 — LOW
EOSINOPHIL # BLD AUTO: 0.23 K/UL — SIGNIFICANT CHANGE UP (ref 0–0.7)
EOSINOPHIL # BLD AUTO: 0.27 K/UL — SIGNIFICANT CHANGE UP (ref 0–0.7)
EOSINOPHIL NFR BLD AUTO: 2.4 % — SIGNIFICANT CHANGE UP (ref 0–8)
EOSINOPHIL NFR BLD AUTO: 2.9 % — SIGNIFICANT CHANGE UP (ref 0–8)
GLUCOSE SERPL-MCNC: 80 MG/DL — SIGNIFICANT CHANGE UP (ref 70–99)
GLUCOSE SERPL-MCNC: 89 MG/DL — SIGNIFICANT CHANGE UP (ref 70–99)
HCT VFR BLD CALC: 24.7 % — LOW (ref 42–52)
HCT VFR BLD CALC: 25.7 % — LOW (ref 42–52)
HGB BLD-MCNC: 7.5 G/DL — LOW (ref 14–18)
HGB BLD-MCNC: 8 G/DL — LOW (ref 14–18)
IMM GRANULOCYTES NFR BLD AUTO: 1.3 % — HIGH (ref 0.1–0.3)
IMM GRANULOCYTES NFR BLD AUTO: 1.4 % — HIGH (ref 0.1–0.3)
INR BLD: 1.03 RATIO — SIGNIFICANT CHANGE UP (ref 0.65–1.3)
LYMPHOCYTES # BLD AUTO: 2.3 K/UL — SIGNIFICANT CHANGE UP (ref 1.2–3.4)
LYMPHOCYTES # BLD AUTO: 2.63 K/UL — SIGNIFICANT CHANGE UP (ref 1.2–3.4)
LYMPHOCYTES # BLD AUTO: 24.8 % — SIGNIFICANT CHANGE UP (ref 20.5–51.1)
LYMPHOCYTES # BLD AUTO: 27.9 % — SIGNIFICANT CHANGE UP (ref 20.5–51.1)
MAGNESIUM SERPL-MCNC: 2 MG/DL — SIGNIFICANT CHANGE UP (ref 1.8–2.4)
MAGNESIUM SERPL-MCNC: 2.2 MG/DL — SIGNIFICANT CHANGE UP (ref 1.8–2.4)
MCHC RBC-ENTMCNC: 28.4 PG — SIGNIFICANT CHANGE UP (ref 27–31)
MCHC RBC-ENTMCNC: 29.6 PG — SIGNIFICANT CHANGE UP (ref 27–31)
MCHC RBC-ENTMCNC: 30.4 G/DL — LOW (ref 32–37)
MCHC RBC-ENTMCNC: 31.1 G/DL — LOW (ref 32–37)
MCV RBC AUTO: 93.6 FL — SIGNIFICANT CHANGE UP (ref 80–94)
MCV RBC AUTO: 95.2 FL — HIGH (ref 80–94)
MONOCYTES # BLD AUTO: 0.64 K/UL — HIGH (ref 0.1–0.6)
MONOCYTES # BLD AUTO: 0.68 K/UL — HIGH (ref 0.1–0.6)
MONOCYTES NFR BLD AUTO: 6.8 % — SIGNIFICANT CHANGE UP (ref 1.7–9.3)
MONOCYTES NFR BLD AUTO: 7.3 % — SIGNIFICANT CHANGE UP (ref 1.7–9.3)
NEUTROPHILS # BLD AUTO: 5.77 K/UL — SIGNIFICANT CHANGE UP (ref 1.4–6.5)
NEUTROPHILS # BLD AUTO: 5.85 K/UL — SIGNIFICANT CHANGE UP (ref 1.4–6.5)
NEUTROPHILS NFR BLD AUTO: 61.1 % — SIGNIFICANT CHANGE UP (ref 42.2–75.2)
NEUTROPHILS NFR BLD AUTO: 63.1 % — SIGNIFICANT CHANGE UP (ref 42.2–75.2)
NRBC BLD AUTO-RTO: 0 /100 WBCS — SIGNIFICANT CHANGE UP (ref 0–0)
NRBC BLD AUTO-RTO: 0 /100 WBCS — SIGNIFICANT CHANGE UP (ref 0–0)
PHOSPHATE SERPL-MCNC: 4.8 MG/DL — SIGNIFICANT CHANGE UP (ref 2.1–4.9)
PHOSPHATE SERPL-MCNC: 5.2 MG/DL — HIGH (ref 2.1–4.9)
PLATELET # BLD AUTO: 192 K/UL — SIGNIFICANT CHANGE UP (ref 130–400)
PLATELET # BLD AUTO: 209 K/UL — SIGNIFICANT CHANGE UP (ref 130–400)
PMV BLD: 11.3 FL — HIGH (ref 7.4–10.4)
PMV BLD: 11.5 FL — HIGH (ref 7.4–10.4)
POTASSIUM SERPL-MCNC: 4.7 MMOL/L — SIGNIFICANT CHANGE UP (ref 3.5–5)
POTASSIUM SERPL-SCNC: 4.7 MMOL/L — SIGNIFICANT CHANGE UP (ref 3.5–5)
PROT SERPL-MCNC: 7.2 G/DL — SIGNIFICANT CHANGE UP (ref 6–8)
PROT SERPL-MCNC: 7.3 G/DL — SIGNIFICANT CHANGE UP (ref 6–8)
PROTHROM AB SERPL-ACNC: 12.2 SEC — SIGNIFICANT CHANGE UP (ref 9.95–12.87)
RBC # BLD: 2.64 M/UL — LOW (ref 4.7–6.1)
RBC # BLD: 2.7 M/UL — LOW (ref 4.7–6.1)
RBC # FLD: 13.3 % — SIGNIFICANT CHANGE UP (ref 11.5–14.5)
RBC # FLD: 13.4 % — SIGNIFICANT CHANGE UP (ref 11.5–14.5)
SODIUM SERPL-SCNC: 138 MMOL/L — SIGNIFICANT CHANGE UP (ref 135–146)
WBC # BLD: 9.28 K/UL — SIGNIFICANT CHANGE UP (ref 4.8–10.8)
WBC # BLD: 9.44 K/UL — SIGNIFICANT CHANGE UP (ref 4.8–10.8)
WBC # FLD AUTO: 9.28 K/UL — SIGNIFICANT CHANGE UP (ref 4.8–10.8)
WBC # FLD AUTO: 9.44 K/UL — SIGNIFICANT CHANGE UP (ref 4.8–10.8)

## 2025-06-01 PROCEDURE — 99232 SBSQ HOSP IP/OBS MODERATE 35: CPT

## 2025-06-01 RX ADMIN — Medication 2 TABLET(S): at 21:18

## 2025-06-01 RX ADMIN — Medication 1000 UNIT(S): at 13:20

## 2025-06-01 RX ADMIN — HEPARIN SODIUM 5000 UNIT(S): 1000 INJECTION INTRAVENOUS; SUBCUTANEOUS at 05:31

## 2025-06-01 RX ADMIN — SEVELAMER HYDROCHLORIDE 1600 MILLIGRAM(S): 800 TABLET ORAL at 13:20

## 2025-06-01 RX ADMIN — SEVELAMER HYDROCHLORIDE 1600 MILLIGRAM(S): 800 TABLET ORAL at 08:42

## 2025-06-01 RX ADMIN — Medication 40 MILLIGRAM(S): at 05:31

## 2025-06-01 RX ADMIN — Medication 1 APPLICATION(S): at 05:34

## 2025-06-01 RX ADMIN — HEPARIN SODIUM 5000 UNIT(S): 1000 INJECTION INTRAVENOUS; SUBCUTANEOUS at 18:08

## 2025-06-01 RX ADMIN — SEVELAMER HYDROCHLORIDE 1600 MILLIGRAM(S): 800 TABLET ORAL at 18:08

## 2025-06-01 RX ADMIN — Medication 40 MILLIGRAM(S): at 18:08

## 2025-06-01 NOTE — PROGRESS NOTE ADULT - SUBJECTIVE AND OBJECTIVE BOX
Nephrology progress note    Patient is seen and examined, events over the last 24 h noted .  Lying in bed   no events     Allergies:  No Known Allergies    Hospital Medications:   MEDICATIONS  (STANDING):  cholecalciferol 1000 Unit(s) Oral daily  heparin   Injectable 5000 Unit(s) SubCutaneous every 12 hours  pantoprazole    Tablet 40 milliGRAM(s) Oral two times a day  polyethylene glycol 3350 17 Gram(s) Oral <User Schedule>  senna 2 Tablet(s) Oral at bedtime  sevelamer carbonate Powder 1600 milliGRAM(s) Oral three times a day with meals        VITALS:  T(F): 97.7 (06-01-25 @ 05:21), Max: 98.1 (05-31-25 @ 21:07)  HR: 64 (06-01-25 @ 05:21)  BP: 147/80 (06-01-25 @ 05:21)  RR: 18 (06-01-25 @ 05:21)  SpO2: 96% (06-01-25 @ 05:21)  Wt(kg): --    05-30 @ 07:01  -  05-31 @ 07:00  --------------------------------------------------------  IN: 0 mL / OUT: 1175 mL / NET: -1175 mL    05-31 @ 07:01  -  06-01 @ 07:00  --------------------------------------------------------  IN: 960 mL / OUT: 500 mL / NET: 460 mL          PHYSICAL EXAM:  Constitutional: NAD  Respiratory: CTAB, no wheezes, rales or rhonchi  Cardiovascular: S1, S2, RRR  Gastrointestinal: BS+, soft, NT/ND  Extremities: No cyanosis or clubbing. No peripheral edema  :  No angelo.   Skin: No rashes    LABS:  06-01    138  |  100  |  86[HH]  ----------------------------<  80  4.7   |  24  |  6.7[HH]    Ca    7.6[L]      01 Jun 2025 07:20  Phos  5.2     06-01  Mg     2.2     06-01    TPro  7.2  /  Alb  2.6[L]  /  TBili  0.3  /  DBili      /  AST  21  /  ALT  17  /  AlkPhos  292[H]  06-01                          8.0    9.28  )-----------( 192      ( 01 Jun 2025 07:20 )             25.7       Urine Studies:  Urinalysis Basic - ( 01 Jun 2025 07:20 )    Color:  / Appearance:  / SG:  / pH:   Gluc: 80 mg/dL / Ketone:   / Bili:  / Urobili:    Blood:  / Protein:  / Nitrite:    Leuk Esterase:  / RBC:  / WBC    Sq Epi:  / Non Sq Epi:  / Bacteria:           Iron 73, TIBC 107, %sat 68      [05-28-25 @ 11:08]  Ferritin 438      [05-04-25 @ 06:10]  PTH -- (Ca 6.1)      [05-26-25 @ 04:47]   288  PTH -- (Ca 8.3)      [07-10-24 @ 21:08]   43  Vitamin D (25OH) 11      [05-26-25 @ 04:47]  TSH 5.27      [04-30-25 @ 17:55]    HBsAg Nonreact      [09-17-23 @ 07:23]  HCV 0.16, Nonreact      [09-17-23 @ 07:23]    TAY: titer Negative, pattern --      [08-13-24 @ 06:21]  dsDNA <12      [09-17-23 @ 07:23]  C3 Complement 132      [09-16-23 @ 15:44]  C4 Complement 35      [09-16-23 @ 15:44]  Rheumatoid Factor <10      [08-13-24 @ 06:21]  ANCA: cANCA Negative, pANCA Negative, atypical ANCA Negative      [09-17-23 @ 07:23]  PLA2R: ARLEEN <1.8, IFA --      [09-17-23 @ 07:43]  Free Light Chains: kappa 35.40, lambda 35.96, ratio = 0.98      [09-17 @ 07:23]  Immunofixation Serum:   No Monoclonal Band Identified      Reference Range: None Detected      [09-17-23 @ 07:23]  SPEP Interpretation: Polyclonal Gammopathy      [09-17-23 @ 07:23]      RADIOLOGY & ADDITIONAL STUDIES:

## 2025-06-01 NOTE — CONSULT NOTE ADULT - REASON FOR ADMISSION
JOÃO on CKD
Elevated Creatinine value
JOÃO on CKD

## 2025-06-01 NOTE — CONSULT NOTE ADULT - PROVIDER SPECIALTY LIST ADULT
Dental
Intervent Radiology
Nephrology
Surgery
Infectious Disease
Urology
Podiatry
Gastroenterology
Intervent Radiology

## 2025-06-01 NOTE — PROGRESS NOTE ADULT - SUBJECTIVE AND OBJECTIVE BOX
pt seen and examined.     My notes supersede resident's notes in case of discrepancy       ROS: no cp, no sob, no n/v, no fever    Vital Signs Last 24 Hrs  T(C): 36.5 (01 Jun 2025 05:21), Max: 36.7 (31 May 2025 21:07)  T(F): 97.7 (01 Jun 2025 05:21), Max: 98.1 (31 May 2025 21:07)  HR: 64 (01 Jun 2025 05:21) (64 - 82)  BP: 147/80 (01 Jun 2025 05:21) (126/73 - 176/86)  RR: 18 (01 Jun 2025 05:21) (18 - 19)  SpO2: 96% (01 Jun 2025 05:21) (96% - 98%)    physical exam  constitutional NAD, AAOX3, very poor dentation, Respiratory  lungs CTA, CVS heart RRR, GI: abdomen Soft NT, ND, BS+, skin: intact  neuro exam paraplegic ( now new)     MEDICATIONS  (STANDING):  chlorhexidine 2% Cloths 1 Application(s) Topical <User Schedule>  cholecalciferol 1000 Unit(s) Oral daily  heparin   Injectable 5000 Unit(s) SubCutaneous every 12 hours  pantoprazole    Tablet 40 milliGRAM(s) Oral two times a day  polyethylene glycol 3350 17 Gram(s) Oral <User Schedule>  senna 2 Tablet(s) Oral at bedtime  sevelamer carbonate Powder 1600 milliGRAM(s) Oral three times a day with meals    MEDICATIONS  (PRN):  ondansetron Injectable 4 milliGRAM(s) IV Push every 8 hours PRN Nausea and/or Vomiting                        8.0    9.28  )-----------( 192      ( 01 Jun 2025 07:20 )             25.7     06-01    138  |  100  |  86[HH]  ----------------------------<  80  4.7   |  24  |  6.7[HH]    Ca    7.6[L]      01 Jun 2025 07:20  Phos  5.2     06-01  Mg     2.2     06-01    TPro  7.2  /  Alb  2.6[L]  /  TBili  0.3  /  DBili  x   /  AST  21  /  ALT  17  /  AlkPhos  292[H]  06-01    Ferritin: 438 ng/mL [30 - 400] (05-04-25 @ 06:10)    a/p  70y M pmh CKD V, HTN, NIDDM, prior spinal abscess, nephrolithiasis and recurrent UTI s/p b/l stent placement and SPC presenting for evaluation. Patient sent in by Dr. Mcallister for evaluation for acute rise in creatinine.     #JOÃO on CKD V, will need HD  but not emergency  Hyperkalemia - resolved   TDC planned for Monday by IR   holding eliquis   dw nephro     #Hx SPC, was changed 5/29 by urology     #Chronic cholecystitis  hida positive,   sexton negative  pt is not septic,   doubt needs to be treated just based on imaging since he is not symptomatic   dw ID : no need for abx     #Afib, on eliquis , will need to hold for procedure ( TDC  Monday )     #HTN, no need for meds, his bp is controlled off meds     #Constipation - resolved  - CTAP - no bowel obstruction, large rectal stool burden with distention measuring 7.8 cm   - bowel regimen discontinued, pt having 3 BMs daily     #Normocytic anemia suspected 2/2 advanced CKD  - b12 1269, folate 12.2  - iron 95, sat elevated , ferritin 438  - no active bleeding, continue to monitor   - nephro f/up for possible TACHO    #Hypocalcemia , corrected calcium is wnl: 8.9    #Vitamin D deficiency likely 2/2 CKD  - c/w vit D supplementation    #Self-induced emesis due to sour feeling/sensation of food stuck - GERD vs zenker diverticulum?  swallow eval appreciated   nausea could be due to uremia,   antiemetics for now   GI consult, pt may benefit from egd as outpt is still symptomatic after HD and control of uremia     #Concern for malnutrition  cont supplement   antiemetics  reevaluate after HD started to see if pt has better food intake     # very poor dentation, dental eval     #Progress Note Handoff    Pending: TDC on Monday  Family discussion: dw pt   Disposition: home with home care when stable   code status: full code .

## 2025-06-01 NOTE — CONSULT NOTE ADULT - SUBJECTIVE AND OBJECTIVE BOX
Patient is a 70y old  Male who presents with a chief complaint of JOÃO on CKD (01 Jun 2025 12:23)      HPI:  70y M pmh CKD V, HTN, NIDDM, prior spinal abscess, nephrolithiasis and recurrent UTI s/p b/l stent placement and SPC presenting for evaluation. Patient sent in by Dr. Mcallister for evaluation for acute rise in creatinine. sCr noted to increase from 5.5>9.5 with reduced UOP, hyperkalemia and hyperphosphatemia.     ROS -ve for fever, cp, sob, abdominal pain, n/v/d/c, hematuria, dysuria, rash, joint pain, confusion, dizziness, lightheadedness.    Vitals in the ED  T 95.6  HR 71  /83  RR 18 SpO2 98 On RA     Significant Labs  wbc 6.14 hgb 8.0 plt 156  Na 135 K 5.4 BUN/Cr 102/9.5  CO2 15, AGAP 20  Ca 6.6      ALT 99    VBG 7.20/37/67/14  UA + LE, WBC, bacteria, rbc, casts    CTAP   Interval marked distention with gallbladder wall thickening and pericholecystic fluid. Cholelithiasis. No definite biliary duct dilatation.  Symmetric in size. Bilateral nephroureteral catheters beginning in the renal pelvis and terminating within the urinary bladder.   Improved right hydronephrosis now with right renal pelvic fullness. Mild improvement in left moderate hydronephrosis. Bilateral renal and pelvic stones difficult to compare given motion artifact at the level of the kidneys.   No definite stone along the course of the ureters.  Increased periaortic lymph node measuring 1.1 cm previously 0.9 cm, may be reactive   No bowel obstruction. Large rectal stool burden with distention measuring 7.8 cm   Nephroureteral catheters and a suprapubic catheter terminating within an underdistended urinary bladder. Air is noted within   the urinary bladder which may be due to instrumentation. Prostate measures 4.9 cm in transverse dimension   Calcified atherosclerotic disease of the aorta and its branches.  Trace free fluid surrounding the gallbladder and liver.  Right basilar consolidative opacity adjacent likely trace pleural effusion.    RUQ:   Findings concerning for cholecystitis despite a negative sonographic Iglesias's sign, as described.    Right renal pelvic fullness.  Trace right pleural effusion.    Patient received rocephin, 1L NS bolus, sodium bicarb ggt, lokelma in the ED. Admitted to medicine for management.     (23 May 2025 21:05)      PAST MEDICAL & SURGICAL HISTORY:  DM (diabetes mellitus)      HTN (hypertension)      H/O paraplegia      Spinal abscess      Renal stones      Stage 5 chronic kidney disease not on chronic dialysis      Neurogenic dysfunction of the urinary bladder      Encounter for care or replacement of suprapubic tube      Spinal abscess  S/P Surgery      Encounter for care or replacement of suprapubic tube      S/P ORIF (open reduction internal fixation) fracture        ( - ) heart valve replacement  ( - ) joint replacement  ( - ) pregnancy    MEDICATIONS  (STANDING):  chlorhexidine 2% Cloths 1 Application(s) Topical <User Schedule>  cholecalciferol 1000 Unit(s) Oral daily  heparin   Injectable 5000 Unit(s) SubCutaneous every 12 hours  pantoprazole    Tablet 40 milliGRAM(s) Oral two times a day  polyethylene glycol 3350 17 Gram(s) Oral <User Schedule>  senna 2 Tablet(s) Oral at bedtime  sevelamer carbonate Powder 1600 milliGRAM(s) Oral three times a day with meals    MEDICATIONS  (PRN):  ondansetron Injectable 4 milliGRAM(s) IV Push every 8 hours PRN Nausea and/or Vomiting      Allergies    No Known Allergies    Intolerances        FAMILY HISTORY:  FH: HTN (hypertension)    FH: breast cancer    FH: melanoma    FH: heart disease        *SOCIAL HISTORY: (   ) Tobacco; (   ) ETOH    *Last Dental Visit:    Vital Signs Last 24 Hrs  T(C): 36.7 (01 Jun 2025 12:23), Max: 36.7 (31 May 2025 21:07)  T(F): 98 (01 Jun 2025 12:23), Max: 98.1 (31 May 2025 21:07)  HR: 71 (01 Jun 2025 12:23) (64 - 71)  BP: 134/76 (01 Jun 2025 12:23) (126/73 - 147/80)  BP(mean): --  RR: 18 (01 Jun 2025 12:23) (18 - 19)  SpO2: 98% (01 Jun 2025 12:23) (96% - 98%)        LABS:                        8.0    9.28  )-----------( 192      ( 01 Jun 2025 07:20 )             25.7     06-01    138  |  100  |  86[HH]  ----------------------------<  80  4.7   |  24  |  6.7[HH]    Ca    7.6[L]      01 Jun 2025 07:20  Phos  5.2     06-01  Mg     2.2     06-01    TPro  7.2  /  Alb  2.6[L]  /  TBili  0.3  /  DBili  x   /  AST  21  /  ALT  17  /  AlkPhos  292[H]  06-01    WBC Count: 9.28 K/uL [4.80 - 10.80] (06-01 @ 07:20)  Platelet Count - Automated: 192 K/uL [130 - 400] (06-01 @ 07:20)  WBC Count: 9.21 K/uL [4.80 - 10.80] (05-31 @ 05:28)  Platelet Count - Automated: 206 K/uL [130 - 400] (05-31 @ 05:28)  WBC Count: 8.26 K/uL [4.80 - 10.80] (05-30 @ 05:56)  Platelet Count - Automated: 202 K/uL [130 - 400] (05-30 @ 05:56)    Urinalysis Basic - ( 01 Jun 2025 07:20 )    Color: x / Appearance: x / SG: x / pH: x  Gluc: 80 mg/dL / Ketone: x  / Bili: x / Urobili: x   Blood: x / Protein: x / Nitrite: x   Leuk Esterase: x / RBC: x / WBC x   Sq Epi: x / Non Sq Epi: x / Bacteria: x          EOE:  TMJ ( - ) clicks                     ( - ) pops                     ( - ) crepitus             Mandible <<FROM>>             Facial bones and MOM <<grossly intact>>             ( - ) trismus             ( - ) lymphadenopathy             ( - ) swelling             ( - ) asymmetry             ( - ) palpation             ( - ) dyspnea             ( - ) dysphagia             ( - ) loss of consciousness    IOE:  <<permanent>> dentition: <<multiple missing and carious teeth>>           hard/soft palate: <<No pathology noted>>           tongue/FOM <<No pathology noted>>           labial/buccal mucosa <<No pathology noted>>           ( - ) percussion           ( - ) palpation           ( - ) swelling            ( - ) abscess           ( - ) sinus tract    Dentition present: <<y>>  Mobility: <<n>>  Caries: <<y>>         *DENTAL RADIOGRAPHS: None taken    RADIOLOGY & ADDITIONAL STUDIES: N/A    *ASSESSMENT: multiple broken down and carious teeth, no swelling or abscess present, no mobile teeth at risk of aspiration.      *PLAN: No emergent dental care needed at this time. Patient will need to follow up with outpatient dentist for dental care.    PROCEDURE:   Intraoral and extraoral examination completed.  Treatment: Limited Oral Examination    RECOMMENDATIONS:  1) Dental F/U with outpatient dentist for comprehensive dental care or [facility] dental [facility]     Resident Name: Olaf Marquez (ALLI), dental resident

## 2025-06-01 NOTE — CONSULT NOTE ADULT - CONSULT REQUESTED DATE/TIME
24-May-2025 14:42
23-May-2025 21:16
31-May-2025 11:24
23-May-2025 16:02
29-May-2025 13:17
29-May-2025 11:02
01-Jun-2025 16:31
24-May-2025 03:00
24-May-2025 21:30

## 2025-06-01 NOTE — PROGRESS NOTE ADULT - ASSESSMENT
69 yo M with h/o CKD stage 5, HTN, NIDDM, prior spinal abscess c/b need for SPC, nephrolithiasis, recent urinary infection and hydro requiring b/l stent placement, sent by me to ED following outpatient evaluation for medical clearance to remove his stents and labwork noted acute rise in sCr 5.5->9.5 with reduced UOP, worsening hyperkalemia and hyperphosphatemia, concern for new obstruction vs. infection, and to assess for HD needs acutely.  Repeat labwork noted stable sCr with elevated potassium, MA and signs of complicated UTI given SPC use.  #JOÃO on CKD stage 5  #Moderate hydronephrosis  #Complicated Cystitis  #HTN  #NIDDM  #prior spinal abscess c/b need for SPC  #nephrolithiasis  Imaging without new or worsening hydronephrosis, did note multiple kidney stones but all non-obstructing.    - followed by    - creat stable -slightly better today  - phos noted -  phoslo 3 tabs tid with meals/ monitor calcium / PTH noted / on vit D , corrected calcium in the mid 8  - renal diet  -  planned for TDC by IR in AM  6/2 appreciate IR notes  / will start HD after TDC placement   -Chronic cholecystitis/ followed by surgery/ IR   -  follow bp readings   - h/h noted tx if h < 7/ will start TACHO once on  HD / sat elevated   - Dose medications to e GFR <15  - Avoid Nephrotoxic agents  renal team will follow

## 2025-06-02 LAB
ALBUMIN SERPL ELPH-MCNC: 2.6 G/DL — LOW (ref 3.5–5.2)
ALBUMIN SERPL ELPH-MCNC: 2.7 G/DL — LOW (ref 3.5–5.2)
ALP SERPL-CCNC: 253 U/L — HIGH (ref 30–115)
ALP SERPL-CCNC: 264 U/L — HIGH (ref 30–115)
ALT FLD-CCNC: 14 U/L — SIGNIFICANT CHANGE UP (ref 0–41)
ALT FLD-CCNC: 15 U/L — SIGNIFICANT CHANGE UP (ref 0–41)
ALT FLD-CCNC: 17 U/L — SIGNIFICANT CHANGE UP (ref 0–41)
ANION GAP SERPL CALC-SCNC: 13 MMOL/L — SIGNIFICANT CHANGE UP (ref 7–14)
AST SERPL-CCNC: 18 U/L — SIGNIFICANT CHANGE UP (ref 0–41)
BASOPHILS # BLD AUTO: 0.04 K/UL — SIGNIFICANT CHANGE UP (ref 0–0.2)
BASOPHILS NFR BLD AUTO: 0.4 % — SIGNIFICANT CHANGE UP (ref 0–1)
BILIRUB SERPL-MCNC: 0.3 MG/DL — SIGNIFICANT CHANGE UP (ref 0.2–1.2)
BUN SERPL-MCNC: 85 MG/DL — CRITICAL HIGH (ref 10–20)
CALCIUM SERPL-MCNC: 7.6 MG/DL — LOW (ref 8.4–10.5)
CHLORIDE SERPL-SCNC: 102 MMOL/L — SIGNIFICANT CHANGE UP (ref 98–110)
CHLORIDE SERPL-SCNC: 103 MMOL/L — SIGNIFICANT CHANGE UP (ref 98–110)
CO2 SERPL-SCNC: 23 MMOL/L — SIGNIFICANT CHANGE UP (ref 17–32)
CREAT SERPL-MCNC: 7 MG/DL — CRITICAL HIGH (ref 0.7–1.5)
CREAT SERPL-MCNC: 7.1 MG/DL — CRITICAL HIGH (ref 0.7–1.5)
EGFR: 8 ML/MIN/1.73M2 — LOW
EOSINOPHIL # BLD AUTO: 0.22 K/UL — SIGNIFICANT CHANGE UP (ref 0–0.7)
EOSINOPHIL NFR BLD AUTO: 2.3 % — SIGNIFICANT CHANGE UP (ref 0–8)
GLUCOSE SERPL-MCNC: 74 MG/DL — SIGNIFICANT CHANGE UP (ref 70–99)
HCT VFR BLD CALC: 25.1 % — LOW (ref 42–52)
HCV AB S/CO SERPL IA: 0.06 COI — SIGNIFICANT CHANGE UP
HCV AB SERPL-IMP: SIGNIFICANT CHANGE UP
HGB BLD-MCNC: 7.8 G/DL — LOW (ref 14–18)
IMM GRANULOCYTES NFR BLD AUTO: 1.3 % — HIGH (ref 0.1–0.3)
LYMPHOCYTES # BLD AUTO: 2.43 K/UL — SIGNIFICANT CHANGE UP (ref 1.2–3.4)
LYMPHOCYTES # BLD AUTO: 25.7 % — SIGNIFICANT CHANGE UP (ref 20.5–51.1)
MAGNESIUM SERPL-MCNC: 2.1 MG/DL — SIGNIFICANT CHANGE UP (ref 1.8–2.4)
MCHC RBC-ENTMCNC: 29.1 PG — SIGNIFICANT CHANGE UP (ref 27–31)
MCHC RBC-ENTMCNC: 31.1 G/DL — LOW (ref 32–37)
MCV RBC AUTO: 93.7 FL — SIGNIFICANT CHANGE UP (ref 80–94)
MONOCYTES # BLD AUTO: 0.66 K/UL — HIGH (ref 0.1–0.6)
MONOCYTES NFR BLD AUTO: 7 % — SIGNIFICANT CHANGE UP (ref 1.7–9.3)
NEUTROPHILS # BLD AUTO: 6 K/UL — SIGNIFICANT CHANGE UP (ref 1.4–6.5)
NEUTROPHILS NFR BLD AUTO: 63.3 % — SIGNIFICANT CHANGE UP (ref 42.2–75.2)
NRBC BLD AUTO-RTO: 0 /100 WBCS — SIGNIFICANT CHANGE UP (ref 0–0)
PHOSPHATE SERPL-MCNC: 5 MG/DL — HIGH (ref 2.1–4.9)
PLATELET # BLD AUTO: 222 K/UL — SIGNIFICANT CHANGE UP (ref 130–400)
PMV BLD: 11.6 FL — HIGH (ref 7.4–10.4)
POTASSIUM SERPL-MCNC: 4.3 MMOL/L — SIGNIFICANT CHANGE UP (ref 3.5–5)
POTASSIUM SERPL-MCNC: 4.5 MMOL/L — SIGNIFICANT CHANGE UP (ref 3.5–5)
POTASSIUM SERPL-SCNC: 4.3 MMOL/L — SIGNIFICANT CHANGE UP (ref 3.5–5)
POTASSIUM SERPL-SCNC: 4.5 MMOL/L — SIGNIFICANT CHANGE UP (ref 3.5–5)
PROT SERPL-MCNC: 7.1 G/DL — SIGNIFICANT CHANGE UP (ref 6–8)
RBC # BLD: 2.68 M/UL — LOW (ref 4.7–6.1)
RBC # FLD: 13.3 % — SIGNIFICANT CHANGE UP (ref 11.5–14.5)
SODIUM SERPL-SCNC: 139 MMOL/L — SIGNIFICANT CHANGE UP (ref 135–146)
SODIUM SERPL-SCNC: 140 MMOL/L — SIGNIFICANT CHANGE UP (ref 135–146)
WBC # BLD: 9.47 K/UL — SIGNIFICANT CHANGE UP (ref 4.8–10.8)
WBC # FLD AUTO: 9.47 K/UL — SIGNIFICANT CHANGE UP (ref 4.8–10.8)

## 2025-06-02 PROCEDURE — 77001 FLUOROGUIDE FOR VEIN DEVICE: CPT | Mod: 26

## 2025-06-02 PROCEDURE — 36558 INSERT TUNNELED CV CATH: CPT | Mod: RT

## 2025-06-02 PROCEDURE — 71045 X-RAY EXAM CHEST 1 VIEW: CPT | Mod: 26

## 2025-06-02 PROCEDURE — 76937 US GUIDE VASCULAR ACCESS: CPT | Mod: 26

## 2025-06-02 PROCEDURE — 99232 SBSQ HOSP IP/OBS MODERATE 35: CPT

## 2025-06-02 RX ORDER — CEFAZOLIN SODIUM IN 0.9 % NACL 3 G/100 ML
2000 INTRAVENOUS SOLUTION, PIGGYBACK (ML) INTRAVENOUS ONCE
Refills: 0 | Status: COMPLETED | OUTPATIENT
Start: 2025-06-02 | End: 2025-06-02

## 2025-06-02 RX ADMIN — Medication 100 MILLIGRAM(S): at 18:45

## 2025-06-02 RX ADMIN — Medication 40 MILLIGRAM(S): at 05:37

## 2025-06-02 RX ADMIN — SEVELAMER HYDROCHLORIDE 1600 MILLIGRAM(S): 800 TABLET ORAL at 18:02

## 2025-06-02 RX ADMIN — Medication 1000 UNIT(S): at 18:02

## 2025-06-02 RX ADMIN — Medication 40 MILLIGRAM(S): at 18:02

## 2025-06-02 RX ADMIN — HEPARIN SODIUM 5000 UNIT(S): 1000 INJECTION INTRAVENOUS; SUBCUTANEOUS at 18:14

## 2025-06-02 RX ADMIN — Medication 1 APPLICATION(S): at 05:38

## 2025-06-02 NOTE — PROGRESS NOTE ADULT - SUBJECTIVE AND OBJECTIVE BOX
Patient Age: 71 yo    Patient Gender: Male    Procedure (including site / side if known):  Tunneled hemodialysis catheter placement    Diagnosis / Indication:  JOÃO on CKD    Interventional Radiology Attending Physician: Dr. Arcos    Ordering Attending Physician:  Dr. Antnoio Merrill    Pertinent Medical History:  Acute rise in serum creatinine    PAST MEDICAL & SURGICAL HISTORY:  DM (diabetes mellitus)  HTN (hypertension)  H/O paraplegia  Spinal abscess  Renal stones  Stage 5 chronic kidney disease not on chronic dialysis  Neurogenic dysfunction of the urinary bladder  Encounter for care or replacement of suprapubic tube    Spinal abscess, s/p Surgery  Encounter for care or replacement of suprapubic tube  S/P ORIF (open reduction internal fixation) fracture      Allergies:  No Known Allergies    Pertinent Labs:                        7.8    9.47  )-----------( 222      ( 02 Jun 2025 05:34 )             25.1       06-02    139  |  103  |  85[HH]  ----------------------------<  74  4.5   |  23  |  7.0[HH]    Ca    7.6[L]      02 Jun 2025 05:34  Phos  5.0     06-02  Mg     2.1     06-02    TPro  x   /  Alb  x   /  TBili  x   /  DBili  x   /  AST  x   /  ALT  15  /  AlkPhos  x   06-02    PT/INR - ( 01 Jun 2025 21:37 )   PT: 12.20 sec;   INR: 1.03 ratio    PTT - ( 01 Jun 2025 21:37 )  PTT:38.2 sec    Consentable: [X]Y [ ] N    NPO: [X]Y [ ]N    Code Status: DNR [ ]  DNI [ ] Full Code [ ]     Patient and Family aware:   [X ]Y   [  ]N      Risks, benefits, and alternatives to treatment discussed. All questions answered with understanding.    Please call [phone] with any further questions.

## 2025-06-02 NOTE — PRE-ANESTHESIA EVALUATION ADULT - NSANTHADDINFOFT_GEN_ALL_CORE
risks, benefits, alternatives, general anesthesia as a backup discussed with the patient and he agrees to proceed as planned. patient seen, examined, consent obtained prior to transfer to OR    Patient is DNR/DNI, this was discussed with the patient and interventional radiologist- patient agrees to suspend DNR/DNI for the procedure.

## 2025-06-02 NOTE — PRE-OP CHECKLIST - SPO2 (%)
Obstetric History       T0      TAB0   SAB0   E0   M0   L0      Urine Dip 3/21/2017   Blood neg   Leukocytes neg     No leaking of fluid or bleeding.  Denies contractions.  Baby is active.    Concerns:  None     98

## 2025-06-02 NOTE — PROGRESS NOTE ADULT - SUBJECTIVE AND OBJECTIVE BOX
INTERVENTIONAL RADIOLOGY BRIEF-OPERATIVE NOTE    Procedure:  Right jugular vein tunneled hemodialysis catheter    Pre-Op Diagnosis:  Renal failure    Post-Op Diagnosis:  Same    Attending:  Isrrael (IR) / Leslie (Anaesthesia)  Resident:  None    Anesthesia (type):  [ ] General Anesthesia  [X] Sedation-- see anaesthesia record  [ ] Spinal Anesthesia  [X] Local/Regional-- 1% Lidocaine, SQ, right neck/chest-- 10 cc and 1% Lidocaine with epinephrine, SQ, , 8 cc    Contrast:  None     Estimated Blood Loss:  5 cc    Condition:   [ ] Critical  [ ] Serious  [ ] Fair   [X] Good    Findings/Follow up Plan of Care:  24 cm tunneled HD catheter placed via right IJ vein approach with tip in the SVC at the caval-atrial junction.  The catheter works well and is ready to use.    Specimens Removed:  None    Other:  Heparin, 1,900 units to red lumen and 2,100 units to blue lumen.    Implants:  None    Complications:  None immediate    Disposition:  Back to floor.      Please call Interventional Radiology [phone] with any questions, concerns, or issues.

## 2025-06-02 NOTE — PROGRESS NOTE ADULT - SUBJECTIVE AND OBJECTIVE BOX
seen and examined   24 h events noted  no distress         PAST HISTORY  --------------------------------------------------------------------------------  No significant changes to PMH, PSH, FHx, SHx, unless otherwise noted    ALLERGIES & MEDICATIONS  --------------------------------------------------------------------------------  Allergies    No Known Allergies    Intolerances      Standing Inpatient Medications  chlorhexidine 2% Cloths 1 Application(s) Topical <User Schedule>  cholecalciferol 1000 Unit(s) Oral daily  heparin   Injectable 5000 Unit(s) SubCutaneous every 12 hours  pantoprazole    Tablet 40 milliGRAM(s) Oral two times a day  polyethylene glycol 3350 17 Gram(s) Oral <User Schedule>  senna 2 Tablet(s) Oral at bedtime  sevelamer carbonate Powder 1600 milliGRAM(s) Oral three times a day with meals    PRN Inpatient Medications  ondansetron Injectable 4 milliGRAM(s) IV Push every 8 hours PRN        VITALS/PHYSICAL EXAM  --------------------------------------------------------------------------------  T(C): 36.7 (06-02-25 @ 04:00), Max: 36.7 (06-01-25 @ 12:23)  HR: 77 (06-02-25 @ 04:00) (60 - 77)  BP: 126/78 (06-02-25 @ 04:00) (126/78 - 134/76)  RR: 18 (06-01-25 @ 20:28) (18 - 18)  SpO2: 100% (06-02-25 @ 04:00) (96% - 100%)  Wt(kg): --        06-01-25 @ 07:01  -  06-02-25 @ 07:00  --------------------------------------------------------  IN: 850 mL / OUT: 1290 mL / NET: -440 mL      Physical Exam:  	Gen: NAD  	Pulm: CTA B/L  	CV: S1S2; no rub  	Abd: +distended  	LE:  no edema      LABS/STUDIES  --------------------------------------------------------------------------------              7.8    9.47  >-----------<  222      [06-02-25 @ 05:34]              25.1     140  |  102  |  87  ----------------------------<  89      [06-01-25 @ 21:37]  4.3   |  23  |  7.1        Ca     7.5     [06-01-25 @ 21:37]      Mg     2.0     [06-01-25 @ 21:37]      Phos  4.8     [06-01-25 @ 21:37]    TPro  7.3  /  Alb  2.6  /  TBili  0.3  /  DBili  x   /  AST  20  /  ALT  17  /  AlkPhos  253  [06-01-25 @ 21:37]    PT/INR: PT 12.20, INR 1.03       [06-01-25 @ 21:37]  PTT: 38.2       [06-01-25 @ 21:37]      Creatinine Trend:  SCr 7.1 [06-01 @ 21:37]  SCr 6.7 [06-01 @ 07:20]  SCr 7.3 [05-31 @ 05:28]  SCr 8.3 [05-30 @ 05:56]  SCr 8.3 [05-29 @ 11:34]    Urinalysis - [06-01-25 @ 21:37]      Color  / Appearance  / SG  / pH       Gluc 89 / Ketone   / Bili  / Urobili        Blood  / Protein  / Leuk Est  / Nitrite       RBC  / WBC  / Hyaline  / Gran  / Sq Epi  / Non Sq Epi  / Bacteria       Iron 73, TIBC 107, %sat 68      [05-28-25 @ 11:08]  Ferritin 438      [05-04-25 @ 06:10]  PTH -- (Ca 6.1)      [05-26-25 @ 04:47]   288  PTH -- (Ca 8.3)      [07-10-24 @ 21:08]   43  Vitamin D (25OH) 11      [05-26-25 @ 04:47]  TSH 5.27      [04-30-25 @ 17:55]

## 2025-06-02 NOTE — PROGRESS NOTE ADULT - SUBJECTIVE AND OBJECTIVE BOX
KRISTY GARCIA 70y Male  MRN#: [id number]     Hospital Day: 10d    Pt is currently admitted with the primary diagnosis of JOÃO                                          ----------------------------------------------------------  OBJECTIVE  PAST MEDICAL & SURGICAL HISTORY  DM (diabetes mellitus)    HTN (hypertension)    H/O paraplegia    Spinal abscess    Renal stones    Stage 5 chronic kidney disease not on chronic dialysis    Neurogenic dysfunction of the urinary bladder    Encounter for care or replacement of suprapubic tube    Spinal abscess  S/P Surgery    Encounter for care or replacement of suprapubic tube    S/P ORIF (open reduction internal fixation) fracture                                              -----------------------------------------------------------  MEDICATIONS:  STANDING MEDICATIONS  chlorhexidine 2% Cloths 1 Application(s) Topical <User Schedule>  cholecalciferol 1000 Unit(s) Oral daily  heparin   Injectable 5000 Unit(s) SubCutaneous every 12 hours  pantoprazole    Tablet 40 milliGRAM(s) Oral two times a day  polyethylene glycol 3350 17 Gram(s) Oral <User Schedule>  senna 2 Tablet(s) Oral at bedtime  sevelamer carbonate Powder 1600 milliGRAM(s) Oral three times a day with meals    PRN MEDICATIONS  ondansetron Injectable 4 milliGRAM(s) IV Push every 8 hours PRN                                            ------------------------------------------------------------  VITAL SIGNS: Last 24 Hours  T(C): 36.7 (02 Jun 2025 04:00), Max: 36.7 (01 Jun 2025 12:23)  T(F): 98.1 (02 Jun 2025 04:00), Max: 98.1 (01 Jun 2025 20:28)  HR: 77 (02 Jun 2025 04:00) (60 - 77)  BP: 126/78 (02 Jun 2025 04:00) (126/78 - 134/76)  BP(mean): --  RR: 18 (01 Jun 2025 20:28) (18 - 18)  SpO2: 100% (02 Jun 2025 04:00) (96% - 100%)      06-01-25 @ 07:01  -  06-02-25 @ 07:00  --------------------------------------------------------  IN: 850 mL / OUT: 1290 mL / NET: -440 mL                                             --------------------------------------------------------------  LABS:                        7.8    9.47  )-----------( 222      ( 02 Jun 2025 05:34 )             25.1     06-02    139  |  103  |  85[HH]  ----------------------------<  74  4.5   |  23  |  7.0[HH]    Ca    7.6[L]      02 Jun 2025 05:34  Phos  5.0     06-02  Mg     2.1     06-02    TPro  7.1  /  Alb  2.7[L]  /  TBili  0.3  /  DBili  x   /  AST  18  /  ALT  14  /  AlkPhos  264[H]  06-02    PT/INR - ( 01 Jun 2025 21:37 )   PT: 12.20 sec;   INR: 1.03 ratio         PTT - ( 01 Jun 2025 21:37 )  PTT:38.2 sec  Urinalysis Basic - ( 02 Jun 2025 05:34 )    Color: x / Appearance: x / SG: x / pH: x  Gluc: 74 mg/dL / Ketone: x  / Bili: x / Urobili: x   Blood: x / Protein: x / Nitrite: x   Leuk Esterase: x / RBC: x / WBC x   Sq Epi: x / Non Sq Epi: x / Bacteria: x                                                            --------------------------------------------------------------  PHYSICAL EXAM:  GENERAL: NAD  HEENT: atraumatic, poor dentition  LUNGS: CTAB  HEART: S1/S2. No heaves or thrills  ABD: Soft, non-tender, non-distended.  SKIN: No edema    PLAN:  70y M pmh CKD V, HTN, NIDDM, afib, prior spinal abscess, nephrolithiasis and recurrent UTI s/p b/l stent placement and SPC presenting for worsening creatinine admitted for JOÃO on CKD and chronic cholecystitis    #JOÃO on CKD V  #HAGMA likely 2/2 uremia   #Hyperkalemia - resolved   #Hx SPC  #Hx nephrolithiasis sp b/l stent placement   - bsl Cr 5.5>9.5, CO2 15, K 5.4, pH 7.2 on vbg  - UA +ve bacteria, WBC >998, +ve LE  - CTAP  Bilateral nephroureteral catheters beginning in the renal pelvis and terminating within the urinary bladder. Improved right hydronephrosis now with right renal pelvic fullness. Mild improvement in left moderate hydronephrosis. Bilateral renal and pelvic stones difficult to compare given motion artifact at the level of the kidneys. No definite stone along the course of the ureters. Nephroureteral catheters and a suprapubic catheter terminating within an underdistended urinary bladder. Prostate measures 4.9 cm in transverse dimension.   - uro c/s appreciated: f/u OP for definitive stone treatment and stent removal once stable   - nephro c/s appreciated: GOC with brother for possible RRT >> ID needed for clearance  - s/p bicarb gtt   - s/p lokelma  - Start sevelamer powder 1600 mg TID.  - strict I/O, avoid nephrotoxins, dose meds per GFR  - Urology consulted for suprapubic cathether exchange, Performed ON 5/29  - IR today for TDC    #Chronic cholecystitis  - GB wall thickened 0.4mm on RUQ US  - lactate -ve, Tbili wnl, CTAP showing GB wall thickening with pericholecystic fluid  - HIDA (+)  - IR: likely chronic, conservative mgmt  - surgery recs appreciated   - bcx ngtd  - s/p cefepime/flagyl  - ID recs appreciated: monitor off abx  - eventually needs CCY  - GGT: 65  - F/u GI recs    #Afib  #HTN  - BP: 124/86  - eliquis 2.5mg bid > resume post IR  - Continue to hold home nifedipine    #Normocytic anemia suspected 2/2 advanced CKD  - b12 1269, folate 12.2  - iron 95, sat elevated , ferritin 438  - no active bleeding, continue to monitor   - nephro f/up for possible TACHO    #Hypocalcemia 2/2 hyperphosphatemia and CKD  #Vitamin D deficiency likely 2/2 CKD  - Corrected Ca 7.6  - elevated ALP, elevated PTH, elevated phos, low 25OH vit D and 1-25OH vit D  - c/w vit D supplementation    #Self-induced emesis due to sour feeling/sensation of food stuck - GERD vs zenker diverticulum?  #Concern for malnutrition  - pt frequently self-induces emesis due to sour feeling of throat and/or sensation of food being stuck; no dysphagia, not specific to solid/liquid or specific food  - Dietician recs noted  - c/w PPI  - zofran PRN    #Constipation - resolved  - CTAP - no bowel obstruction, large rectal stool burden with distention measuring 7.8 cm   - bowel regimen discontinued, pt having 3 BMs daily   - stool count    f/u post TDC/IR recs, reusme eliquis per IR

## 2025-06-02 NOTE — CHART NOTE - NSCHARTNOTEFT_GEN_A_CORE
PACU ANESTHESIA ADMISSION NOTE    Procedure: tunnelled dialysis catheter   Post op diagnosis:  chronic kidney disease stage 5    __x__  Patent Airway    __x__  Full return of protective reflexes    __x__  Full recovery from anesthesia / back to baseline status    Vitals:  T(C): 97 F  HR: 56  BP: 109/57  RR: 16  SpO2: 99%    Mental Status:  __x__ Awake   _x___ Alert   _____ Drowsy   _____ Sedated    Nausea/Vomiting:  __x__ NO  ______Yes,   See Post - Op Orders          Pain Scale (0-10):  _____    Treatment: ____ None    __x__ See Post - Op/PCA Orders    Post - Operative Fluids:   ____ Oral   _x___ See Post - Op Orders    Plan: Discharge:   ____Home       __x___Floor     _____Critical Care    _____  Other:_________________    Comments: uneventful anesthesia course no complications. Vitals stable. Pt transferred to PACU. Transfer to floor when criteria is met

## 2025-06-02 NOTE — PROGRESS NOTE ADULT - SUBJECTIVE AND OBJECTIVE BOX
pt seen and examined.     My notes supersede resident's notes in case of discrepancy       ROS: no cp, no sob, no n/v, no fever    Vital Signs Last 24 Hrs  T(C): 36.7 (02 Jun 2025 04:00), Max: 36.7 (01 Jun 2025 12:23)  T(F): 98.1 (02 Jun 2025 04:00), Max: 98.1 (01 Jun 2025 20:28)  HR: 77 (02 Jun 2025 04:00) (60 - 77)  BP: 126/78 (02 Jun 2025 04:00) (126/78 - 134/76)  BP(mean): --  RR: 18 (01 Jun 2025 20:28) (18 - 18)  SpO2: 100% (02 Jun 2025 04:00) (96% - 100%)    physical exam  constitutional NAD, AAOX3, very poor dentation, Respiratory  lungs CTA, CVS heart RRR, GI: abdomen Soft NT, ND, BS+, skin: intact  neuro exam functional paraplegic , cannot walk but is able to move legs  ( now new)     MEDICATIONS  (STANDING):  chlorhexidine 2% Cloths 1 Application(s) Topical <User Schedule>  cholecalciferol 1000 Unit(s) Oral daily  heparin   Injectable 5000 Unit(s) SubCutaneous every 12 hours  pantoprazole    Tablet 40 milliGRAM(s) Oral two times a day  polyethylene glycol 3350 17 Gram(s) Oral <User Schedule>  senna 2 Tablet(s) Oral at bedtime  sevelamer carbonate Powder 1600 milliGRAM(s) Oral three times a day with meals    MEDICATIONS  (PRN):  ondansetron Injectable 4 milliGRAM(s) IV Push every 8 hours PRN Nausea and/or Vomiting                        7.8    9.47  )-----------( 222      ( 02 Jun 2025 05:34 )             25.1     06-02    139  |  103  |  85[HH]  ----------------------------<  74  4.5   |  23  |  7.0[HH]    Ca    7.6[L]      02 Jun 2025 05:34  Phos  5.0     06-02  Mg     2.1     06-02    TPro  7.1  /  Alb  2.7[L]  /  TBili  0.3  /  DBili  x   /  AST  18  /  ALT  14  /  AlkPhos  264[H]  06-02    Ferritin: 438 ng/mL [30 - 400] (05-04-25 @ 06:10)    PT/INR - ( 01 Jun 2025 21:37 )   PT: 12.20 sec;   INR: 1.03 ratio      PTT - ( 01 Jun 2025 21:37 )  PTT:38.2 sec    a/p    70y M pmh CKD V, HTN, NIDDM, prior spinal abscess, nephrolithiasis and recurrent UTI s/p b/l stent placement and SPC presenting for evaluation. Patient sent in by Dr. Mcallister for evaluation for acute rise in creatinine.     #JOÃO on CKD V, will need HD  but not emergency  Hyperkalemia - resolved   TDC today     #Hx SPC, was changed 5/29 by urology     #Chronic cholecystitis  hida positive,   sexton negative  pt is not septic,   doubt needs to be treated just based on imaging since he is not symptomatic   dw ID : no need for abx     #Afib, on eliquis , will need to hold for procedure ( TDC  Monday )     #HTN, no need for meds, his bp is controlled off meds     #Constipation - resolved  - CTAP - no bowel obstruction, large rectal stool burden with distention measuring 7.8 cm   - bowel regimen discontinued, pt having 3 BMs daily     #Normocytic anemia suspected 2/2 advanced CKD  - b12 1269, folate 12.2  - iron 95, sat elevated , ferritin 438  - no active bleeding, continue to monitor   - nephro f/up for possible TACHO    #Hypocalcemia , corrected calcium is wnl: 8.9    #Vitamin D deficiency likely 2/2 CKD  - c/w vit D supplementation    #Self-induced emesis due to sour feeling/sensation of food stuck - GERD vs zenker diverticulum?  swallow eval appreciated   nausea could be due to uremia,   antiemetics for now   GI consult, pt may benefit from egd as outpt is still symptomatic after HD and control of uremia     #Concern for malnutrition  cont supplement   antiemetics  reevaluate after HD started to see if pt has better food intake     # very poor dentation, dental eval     #Progress Note Handoff    Pending: TDC today   Family discussion: dw pt   Disposition: home with home care when stable   code status: full code

## 2025-06-02 NOTE — PROGRESS NOTE ADULT - ASSESSMENT
71 yo M with h/o CKD stage 5, HTN, NIDDM, prior spinal abscess c/b need for SPC, nephrolithiasis, recent urinary infection and hydro requiring b/l stent placement, sent by me to ED following outpatient evaluation for medical clearance to remove his stents and labwork noted acute rise in sCr 5.5->9.5 with reduced UOP, worsening hyperkalemia and hyperphosphatemia, concern for new obstruction vs. infection, and to assess for HD needs acutely.  Repeat labwork noted stable sCr with elevated potassium, MA and signs of complicated UTI given SPC use.  #JOÃO on CKD stage 5  #Moderate hydronephrosis  #Complicated Cystitis  #HTN  #NIDDM  #prior spinal abscess c/b need for SPC  #nephrolithiasis  Imaging without new or worsening hydronephrosis, did note multiple kidney stones but all non-obstructing.    - followed by    - creat stable   - phos noted - on binders  monitor calcium / PTH noted / on vit D , corrected calcium in the mid 8  - renal diet  -  planned for TDC by IR in AM  6/2 will start HD after TDC placement   -Chronic cholecystitis/ followed by surgery/ IR   -  bp controlled   - h/h noted tx if h < 7/ will start TACHO once on  HD / sat elevated   - Dose medications to e GFR <15  - Avoid Nephrotoxic agents  renal team will follow

## 2025-06-03 LAB
ALBUMIN SERPL ELPH-MCNC: 2.6 G/DL — LOW (ref 3.5–5.2)
ALP SERPL-CCNC: 231 U/L — HIGH (ref 30–115)
ALT FLD-CCNC: 6 U/L — SIGNIFICANT CHANGE UP (ref 0–41)
ANION GAP SERPL CALC-SCNC: 15 MMOL/L — HIGH (ref 7–14)
AST SERPL-CCNC: 18 U/L — SIGNIFICANT CHANGE UP (ref 0–41)
BASOPHILS # BLD AUTO: 0.06 K/UL — SIGNIFICANT CHANGE UP (ref 0–0.2)
BASOPHILS NFR BLD AUTO: 0.7 % — SIGNIFICANT CHANGE UP (ref 0–1)
BILIRUB SERPL-MCNC: 0.4 MG/DL — SIGNIFICANT CHANGE UP (ref 0.2–1.2)
BUN SERPL-MCNC: 86 MG/DL — CRITICAL HIGH (ref 10–20)
CALCIUM SERPL-MCNC: 7.9 MG/DL — LOW (ref 8.4–10.5)
CHLORIDE SERPL-SCNC: 104 MMOL/L — SIGNIFICANT CHANGE UP (ref 98–110)
CO2 SERPL-SCNC: 22 MMOL/L — SIGNIFICANT CHANGE UP (ref 17–32)
CREAT SERPL-MCNC: 7.1 MG/DL — CRITICAL HIGH (ref 0.7–1.5)
EGFR: 8 ML/MIN/1.73M2 — LOW
EGFR: 8 ML/MIN/1.73M2 — LOW
EOSINOPHIL # BLD AUTO: 0.14 K/UL — SIGNIFICANT CHANGE UP (ref 0–0.7)
EOSINOPHIL NFR BLD AUTO: 1.6 % — SIGNIFICANT CHANGE UP (ref 0–8)
GLUCOSE SERPL-MCNC: 68 MG/DL — LOW (ref 70–99)
HAV IGM SER-ACNC: SIGNIFICANT CHANGE UP
HBV CORE AB SER-ACNC: SIGNIFICANT CHANGE UP
HBV CORE IGM SER-ACNC: SIGNIFICANT CHANGE UP
HBV SURFACE AB SER-ACNC: <3.3 MIU/ML — LOW
HBV SURFACE AG SER-ACNC: SIGNIFICANT CHANGE UP
HCT VFR BLD CALC: 26 % — LOW (ref 42–52)
HGB BLD-MCNC: 8.1 G/DL — LOW (ref 14–18)
IMM GRANULOCYTES NFR BLD AUTO: 1.1 % — HIGH (ref 0.1–0.3)
LYMPHOCYTES # BLD AUTO: 1.31 K/UL — SIGNIFICANT CHANGE UP (ref 1.2–3.4)
LYMPHOCYTES # BLD AUTO: 15.4 % — LOW (ref 20.5–51.1)
MAGNESIUM SERPL-MCNC: 2.4 MG/DL — SIGNIFICANT CHANGE UP (ref 1.8–2.4)
MCHC RBC-ENTMCNC: 29.5 PG — SIGNIFICANT CHANGE UP (ref 27–31)
MCHC RBC-ENTMCNC: 31.2 G/DL — LOW (ref 32–37)
MCV RBC AUTO: 94.5 FL — HIGH (ref 80–94)
MONOCYTES # BLD AUTO: 0.68 K/UL — HIGH (ref 0.1–0.6)
MONOCYTES NFR BLD AUTO: 8 % — SIGNIFICANT CHANGE UP (ref 1.7–9.3)
NEUTROPHILS # BLD AUTO: 6.23 K/UL — SIGNIFICANT CHANGE UP (ref 1.4–6.5)
NEUTROPHILS NFR BLD AUTO: 73.2 % — SIGNIFICANT CHANGE UP (ref 42.2–75.2)
NRBC BLD AUTO-RTO: 0 /100 WBCS — SIGNIFICANT CHANGE UP (ref 0–0)
PLATELET # BLD AUTO: 228 K/UL — SIGNIFICANT CHANGE UP (ref 130–400)
PMV BLD: 11.6 FL — HIGH (ref 7.4–10.4)
POTASSIUM SERPL-MCNC: 4.9 MMOL/L — SIGNIFICANT CHANGE UP (ref 3.5–5)
POTASSIUM SERPL-SCNC: 4.9 MMOL/L — SIGNIFICANT CHANGE UP (ref 3.5–5)
PROT SERPL-MCNC: 7.1 G/DL — SIGNIFICANT CHANGE UP (ref 6–8)
RBC # BLD: 2.75 M/UL — LOW (ref 4.7–6.1)
RBC # FLD: 13.5 % — SIGNIFICANT CHANGE UP (ref 11.5–14.5)
SODIUM SERPL-SCNC: 141 MMOL/L — SIGNIFICANT CHANGE UP (ref 135–146)
WBC # BLD: 8.51 K/UL — SIGNIFICANT CHANGE UP (ref 4.8–10.8)
WBC # FLD AUTO: 8.51 K/UL — SIGNIFICANT CHANGE UP (ref 4.8–10.8)

## 2025-06-03 PROCEDURE — 99232 SBSQ HOSP IP/OBS MODERATE 35: CPT

## 2025-06-03 RX ORDER — APIXABAN 2.5 MG/1
2.5 TABLET, FILM COATED ORAL
Refills: 0 | Status: DISCONTINUED | OUTPATIENT
Start: 2025-06-03 | End: 2025-06-03

## 2025-06-03 RX ORDER — APIXABAN 2.5 MG/1
2.5 TABLET, FILM COATED ORAL ONCE
Refills: 0 | Status: COMPLETED | OUTPATIENT
Start: 2025-06-03 | End: 2025-06-03

## 2025-06-03 RX ORDER — APIXABAN 2.5 MG/1
2.5 TABLET, FILM COATED ORAL
Refills: 0 | Status: DISCONTINUED | OUTPATIENT
Start: 2025-06-04 | End: 2025-06-05

## 2025-06-03 RX ADMIN — SEVELAMER HYDROCHLORIDE 1600 MILLIGRAM(S): 800 TABLET ORAL at 18:08

## 2025-06-03 RX ADMIN — HEPARIN SODIUM 5000 UNIT(S): 1000 INJECTION INTRAVENOUS; SUBCUTANEOUS at 06:00

## 2025-06-03 RX ADMIN — Medication 40 MILLIGRAM(S): at 06:00

## 2025-06-03 RX ADMIN — Medication 40 MILLIGRAM(S): at 18:09

## 2025-06-03 RX ADMIN — SEVELAMER HYDROCHLORIDE 1600 MILLIGRAM(S): 800 TABLET ORAL at 08:19

## 2025-06-03 RX ADMIN — Medication 1 APPLICATION(S): at 06:01

## 2025-06-03 RX ADMIN — Medication 2 TABLET(S): at 21:48

## 2025-06-03 RX ADMIN — SEVELAMER HYDROCHLORIDE 1600 MILLIGRAM(S): 800 TABLET ORAL at 15:01

## 2025-06-03 RX ADMIN — Medication 1000 UNIT(S): at 15:00

## 2025-06-03 RX ADMIN — APIXABAN 2.5 MILLIGRAM(S): 2.5 TABLET, FILM COATED ORAL at 18:09

## 2025-06-03 NOTE — PHARMACOTHERAPY INTERVENTION NOTE - INTERVENTION TYPE RECOOMEND
Therapy duplication
Timing/Frequency of Administration Recommended
Therapy Recommended - Alternative treatment

## 2025-06-03 NOTE — PHARMACOTHERAPY INTERVENTION NOTE - COMMENTS
Patient with hyperphosphatemia per nursing patient unable to swallow calcium acetate pills, inducing vomiting. If patient unable to take can consider switching to Sevelamer powder. Non formulary product would require non formulary form to be filled out.
apixaban 2.5mg twice a day, timed for 14:00, slotted for 14:00 & 18:00 administration  d/w Dr Lima, apixaban changed to x1 today, start 2.5mg q12h 6/4
resuming apixaban 2.5mg po twice a day, d/w Dr Lima to evaluate heparin 5000units sc q12h-d/c

## 2025-06-03 NOTE — PROGRESS NOTE ADULT - SUBJECTIVE AND OBJECTIVE BOX
KRISTY GARCIA 70y Male  MRN#: [id number]     Hospital Day: 11d    Pt is currently admitted with the primary diagnosis of JOÃO                                        ----------------------------------------------------------  OBJECTIVE  PAST MEDICAL & SURGICAL HISTORY  DM (diabetes mellitus)    HTN (hypertension)    H/O paraplegia    Spinal abscess    Renal stones    Stage 5 chronic kidney disease not on chronic dialysis    Neurogenic dysfunction of the urinary bladder    Encounter for care or replacement of suprapubic tube    Spinal abscess  S/P Surgery    Encounter for care or replacement of suprapubic tube    S/P ORIF (open reduction internal fixation) fracture                                              -----------------------------------------------------------  MEDICATIONS:  STANDING MEDICATIONS  apixaban 2.5 milliGRAM(s) Oral once  chlorhexidine 2% Cloths 1 Application(s) Topical <User Schedule>  cholecalciferol 1000 Unit(s) Oral daily  pantoprazole    Tablet 40 milliGRAM(s) Oral two times a day  polyethylene glycol 3350 17 Gram(s) Oral <User Schedule>  senna 2 Tablet(s) Oral at bedtime  sevelamer carbonate Powder 1600 milliGRAM(s) Oral three times a day with meals    PRN MEDICATIONS  ondansetron Injectable 4 milliGRAM(s) IV Push every 8 hours PRN                                            ------------------------------------------------------------  VITAL SIGNS: Last 24 Hours  T(C): 36.4 (03 Jun 2025 14:54), Max: 36.6 (03 Jun 2025 05:00)  T(F): 97.5 (03 Jun 2025 14:54), Max: 97.9 (03 Jun 2025 05:00)  HR: 69 (03 Jun 2025 14:54) (56 - 77)  BP: 147/67 (03 Jun 2025 14:54) (105/56 - 154/83)  BP(mean): --  RR: 18 (03 Jun 2025 14:54) (18 - 18)  SpO2: 99% (03 Jun 2025 14:54) (96% - 100%)      06-02-25 @ 07:01  -  06-03-25 @ 07:00  --------------------------------------------------------  IN: 0 mL / OUT: 500 mL / NET: -500 mL    06-03-25 @ 07:01  -  06-03-25 @ 15:07  --------------------------------------------------------  IN: 0 mL / OUT: 0 mL / NET: 0 mL                                             --------------------------------------------------------------  LABS:                        8.1    8.51  )-----------( 228      ( 03 Jun 2025 05:55 )             26.0     06-03    141  |  104  |  86[HH]  ----------------------------<  68[L]  4.9   |  22  |  7.1[HH]    Ca    7.9[L]      03 Jun 2025 05:55  Phos  5.0     06-02  Mg     2.4     06-03    TPro  7.1  /  Alb  2.6[L]  /  TBili  0.4  /  DBili  x   /  AST  18  /  ALT  6   /  AlkPhos  231[H]  06-03    PT/INR - ( 01 Jun 2025 21:37 )   PT: 12.20 sec;   INR: 1.03 ratio         PTT - ( 01 Jun 2025 21:37 )  PTT:38.2 sec  Urinalysis Basic - ( 03 Jun 2025 05:55 )    Color: x / Appearance: x / SG: x / pH: x  Gluc: 68 mg/dL / Ketone: x  / Bili: x / Urobili: x   Blood: x / Protein: x / Nitrite: x   Leuk Esterase: x / RBC: x / WBC x   Sq Epi: x / Non Sq Epi: x / Bacteria: x                                                            --------------------------------------------------------------  PHYSICAL EXAM:  GENERAL: NAD  HEENT: atraumatic, poor dentition  LUNGS: CTAB  HEART: S1/S2. No heaves or thrills  ABD: Soft, non-tender, non-distended.  SKIN: No edema    PLAN:  70y M pmh CKD V, HTN, NIDDM, afib, prior spinal abscess, nephrolithiasis and recurrent UTI s/p b/l stent placement and SPC presenting for worsening creatinine admitted for JOÃO on CKD and chronic cholecystitis    #JOÃO on CKD V  #HAGMA likely 2/2 uremia   #Hyperkalemia - resolved   #Hx SPC  #Hx nephrolithiasis sp b/l stent placement   - bsl Cr 5.5>9.5, CO2 15, K 5.4, pH 7.2 on vbg  - UA +ve bacteria, WBC >998, +ve LE  - CTAP  Bilateral nephroureteral catheters beginning in the renal pelvis and terminating within the urinary bladder. Improved right hydronephrosis now with right renal pelvic fullness. Mild improvement in left moderate hydronephrosis. Bilateral renal and pelvic stones difficult to compare given motion artifact at the level of the kidneys. No definite stone along the course of the ureters. Nephroureteral catheters and a suprapubic catheter terminating within an underdistended urinary bladder. Prostate measures 4.9 cm in transverse dimension.   - uro c/s appreciated: f/u OP for definitive stone treatment and stent removal once stable   - nephro c/s appreciated: GOC with brother for possible RRT >> ID needed for clearance  - s/p bicarb gtt   - s/p lokelma  - Start sevelamer powder 1600 mg TID.  - strict I/O, avoid nephrotoxins, dose meds per GFR  - Urology consulted for suprapubic cathether exchange, Performed ON 5/29  - IR placed TDC 6/2 > HD today  - f/u nephro; pending HD slot  - restarted eliquis    #Chronic cholecystitis  - GB wall thickened 0.4mm on RUQ US  - lactate -ve, Tbili wnl, CTAP showing GB wall thickening with pericholecystic fluid  - HIDA (+)  - IR: likely chronic, conservative mgmt  - surgery recs appreciated   - bcx ngtd  - s/p cefepime/flagyl  - ID recs appreciated: monitor off abx  - eventually needs CCY  - GGT: 65  - F/u GI recs    #Afib  #HTN  - BP: 124/86  - eliquis 2.5mg bid  - Continue to hold home nifedipine    #Normocytic anemia suspected 2/2 advanced CKD  - b12 1269, folate 12.2  - iron 95, sat elevated , ferritin 438  - no active bleeding, continue to monitor   - nephro f/up for possible TACHO    #Hypocalcemia 2/2 hyperphosphatemia and CKD  #Vitamin D deficiency likely 2/2 CKD  - Corrected Ca 7.6  - elevated ALP, elevated PTH, elevated phos, low 25OH vit D and 1-25OH vit D  - c/w vit D supplementation    #Self-induced emesis due to sour feeling/sensation of food stuck - GERD vs zenker diverticulum?  #Concern for malnutrition  - pt frequently self-induces emesis due to sour feeling of throat and/or sensation of food being stuck; no dysphagia, not specific to solid/liquid or specific food  - Dietician recs noted  - c/w PPI  - zofran PRN    #Constipation - resolved  - CTAP - no bowel obstruction, large rectal stool burden with distention measuring 7.8 cm   - bowel regimen discontinued, pt having 3 BMs daily   - stool count

## 2025-06-03 NOTE — PROGRESS NOTE ADULT - ASSESSMENT
69 yo M with h/o CKD stage 5, HTN, NIDDM, prior spinal abscess c/b need for SPC, nephrolithiasis, recent urinary infection and hydro requiring b/l stent placement, sent by me to ED following outpatient evaluation for medical clearance to remove his stents and labwork noted acute rise in sCr 5.5->9.5 with reduced UOP, worsening hyperkalemia and hyperphosphatemia, concern for new obstruction vs. infection, and to assess for HD needs acutely.  Repeat labwork noted stable sCr with elevated potassium, MA and signs of complicated UTI given SPC use.  #JOÃO on CKD stage 5  #Moderate hydronephrosis  #Complicated Cystitis  #HTN  #NIDDM  #prior spinal abscess c/b need for SPC  #nephrolithiasis  Imaging without new or worsening hydronephrosis, did note multiple kidney stones but all non-obstructing.    - followed by    - creat stable   - phos noted - on binders  monitor calcium / PTH noted / on vit D , corrected calcium in the mid 8  - renal diet  -  s/p TDC  placement / hd today 2 h no uf   -Chronic cholecystitis/ followed by surgery/ IR   -  bp controlled   - h/h noted tx if h < 7/ will start  aranesp 25 weekly with HD / sat elevated   - Dose medications to  HD   -  will need dialysis spit at [address]   renal team will follow

## 2025-06-03 NOTE — PROGRESS NOTE ADULT - SUBJECTIVE AND OBJECTIVE BOX
pt seen and examined.     My notes supersede resident's notes in case of discrepancy       ROS: no cp, no sob, no n/v, no fever    Vital Signs Last 24 Hrs  T(C): 36.6 (03 Jun 2025 05:00), Max: 36.6 (03 Jun 2025 05:00)  T(F): 97.9 (03 Jun 2025 05:00), Max: 97.9 (03 Jun 2025 05:00)  HR: 70 (03 Jun 2025 05:00) (55 - 78)  BP: 133/79 (03 Jun 2025 05:00) (102/51 - 159/83)  BP(mean): 95 (02 Jun 2025 13:29) (95 - 95)  RR: 18 (03 Jun 2025 05:00) (18 - 18)  SpO2: 100% (03 Jun 2025 05:00) (96% - 100%)    physical exam  constitutional NAD, AAOX3, Respiratory  lungs CTA, CVS heart RRR, GI: abdomen Soft NT, ND, BS+, skin: TDC on the right subclavian area, site is clean, not tender, bilat lower ext skin excoriation , not red, chronci   neuro exam able to  both legs but not able to walk     MEDICATIONS  (STANDING):  apixaban 2.5 milliGRAM(s) Oral two times a day  chlorhexidine 2% Cloths 1 Application(s) Topical <User Schedule>  cholecalciferol 1000 Unit(s) Oral daily  heparin   Injectable 5000 Unit(s) SubCutaneous every 12 hours  pantoprazole    Tablet 40 milliGRAM(s) Oral two times a day  polyethylene glycol 3350 17 Gram(s) Oral <User Schedule>  senna 2 Tablet(s) Oral at bedtime  sevelamer carbonate Powder 1600 milliGRAM(s) Oral three times a day with meals    MEDICATIONS  (PRN):  ondansetron Injectable 4 milliGRAM(s) IV Push every 8 hours PRN Nausea and/or Vomiting                        8.1    8.51  )-----------( 228      ( 03 Jun 2025 05:55 )             26.0     06-03    141  |  104  |  86[HH]  ----------------------------<  68[L]  4.9   |  22  |  7.1[HH]    Ca    7.9[L]      03 Jun 2025 05:55  Phos  5.0     06-02  Mg     2.4     06-03    TPro  7.1  /  Alb  2.6[L]  /  TBili  0.4  /  DBili  x   /  AST  18  /  ALT  6   /  AlkPhos  231[H]  06-03    Ferritin: 438 ng/mL [30 - 400] (05-04-25 @ 06:10)    PT/INR - ( 01 Jun 2025 21:37 )   PT: 12.20 sec;   INR: 1.03 ratio      PTT - ( 01 Jun 2025 21:37 )  PTT:38.2 sec    a/p    70y M pmh CKD V, HTN, NIDDM, prior spinal abscess, nephrolithiasis and recurrent UTI s/p b/l stent placement and SPC presenting for evaluation. Patient sent in by Dr. Mcallister for evaluation for acute rise in creatinine.     #JOÃO on CKD V, will need HD  but not emergency  Hyperkalemia - resolved   sp TDC 6/2  first HD session today 6/3     #Hx SPC, was changed 5/29 by urology     #Chronic cholecystitis  hida positive,   sexton negative  pt is not septic,   doubt needs to be treated just based on imaging since he is not symptomatic   dw ID : no need for abx     #Afib, on eliquis , resumed     #HTN, no need for meds, his bp is controlled off meds     #Constipation - resolved  - CTAP - no bowel obstruction, large rectal stool burden with distention measuring 7.8 cm   - bowel regimen discontinued, pt having 3 BMs daily     #Normocytic anemia suspected 2/2 advanced CKD  - b12 1269, folate 12.2  - iron 95, sat elevated , ferritin 438  - no active bleeding, continue to monitor   - needs epogen , nephro following     #Hypocalcemia , corrected calcium is wnl: 8.9    #Vitamin D deficiency likely 2/2 CKD  - c/w vit D supplementation  Vitamin D, 25-Hydroxy: 11 (05.26.25 @ 04:47)    #Concern for malnutrition  cont supplement   antiemetics  reevaluate after HD started to see if pt has better food intake     # very poor dentation, dental eval appreciated, outpt fu     #Progress Note Handoff    Pending: TDC today   Family discussion: dw pt   Disposition: home with home care when stable   code status: full code                     pt seen and examined.     My notes supersede resident's notes in case of discrepancy       ROS: no cp, no sob, no n/v, no fever    Vital Signs Last 24 Hrs  T(C): 36.6 (03 Jun 2025 05:00), Max: 36.6 (03 Jun 2025 05:00)  T(F): 97.9 (03 Jun 2025 05:00), Max: 97.9 (03 Jun 2025 05:00)  HR: 70 (03 Jun 2025 05:00) (55 - 78)  BP: 133/79 (03 Jun 2025 05:00) (102/51 - 159/83)  BP(mean): 95 (02 Jun 2025 13:29) (95 - 95)  RR: 18 (03 Jun 2025 05:00) (18 - 18)  SpO2: 100% (03 Jun 2025 05:00) (96% - 100%)    physical exam  constitutional NAD, AAOX3, Respiratory  lungs CTA, CVS heart RRR, GI: abdomen Soft NT, ND, BS+, skin: TDC on the right subclavian area, site is clean, not tender, bilat lower ext skin excoriation , not red, chronci   neuro exam able to  both legs but not able to walk     MEDICATIONS  (STANDING):  apixaban 2.5 milliGRAM(s) Oral two times a day  chlorhexidine 2% Cloths 1 Application(s) Topical <User Schedule>  cholecalciferol 1000 Unit(s) Oral daily  heparin   Injectable 5000 Unit(s) SubCutaneous every 12 hours  pantoprazole    Tablet 40 milliGRAM(s) Oral two times a day  polyethylene glycol 3350 17 Gram(s) Oral <User Schedule>  senna 2 Tablet(s) Oral at bedtime  sevelamer carbonate Powder 1600 milliGRAM(s) Oral three times a day with meals    MEDICATIONS  (PRN):  ondansetron Injectable 4 milliGRAM(s) IV Push every 8 hours PRN Nausea and/or Vomiting                        8.1    8.51  )-----------( 228      ( 03 Jun 2025 05:55 )             26.0     06-03    141  |  104  |  86[HH]  ----------------------------<  68[L]  4.9   |  22  |  7.1[HH]    Ca    7.9[L]      03 Jun 2025 05:55  Phos  5.0     06-02  Mg     2.4     06-03    TPro  7.1  /  Alb  2.6[L]  /  TBili  0.4  /  DBili  x   /  AST  18  /  ALT  6   /  AlkPhos  231[H]  06-03    Ferritin: 438 ng/mL [30 - 400] (05-04-25 @ 06:10)    PT/INR - ( 01 Jun 2025 21:37 )   PT: 12.20 sec;   INR: 1.03 ratio      PTT - ( 01 Jun 2025 21:37 )  PTT:38.2 sec    a/p    70y M pmh CKD V, HTN, NIDDM, prior spinal abscess, nephrolithiasis and recurrent UTI s/p b/l stent placement and SPC presenting for evaluation. Patient sent in by Dr. Mcallister for evaluation for acute rise in creatinine.     #JOÃO on CKD V, will need HD  but not emergency  Hyperkalemia - resolved   sp TDC 6/2  first HD session today 6/3     #Hx SPC, was changed 5/29 by urology     #Chronic cholecystitis  hida positive,   sexton negative  pt is not septic,   doubt needs to be treated just based on imaging since he is not symptomatic   dw ID : no need for abx     #Afib, on eliquis , resumed     #HTN, no need for meds, his bp is controlled off meds     #Constipation - resolved  - CTAP - no bowel obstruction, large rectal stool burden with distention measuring 7.8 cm   - bowel regimen discontinued, pt having 3 BMs daily     #Normocytic anemia suspected 2/2 advanced CKD  - b12 1269, folate 12.2  - iron 95, sat elevated , ferritin 438  - no active bleeding, continue to monitor    - cont aranesp 25 per nephro     #Hypocalcemia , corrected calcium is wnl: 8.9    #Vitamin D deficiency likely 2/2 CKD  - c/w vit D supplementation  Vitamin D, 25-Hydroxy: 11 (05.26.25 @ 04:47)    #Concern for malnutrition  cont supplement   antiemetics  reevaluate after HD started to see if pt has better food intake     # very poor dentation, dental eval appreciated, outpt fu     #Progress Note Handoff    Pending: TDC today   Family discussion: dw pt   Disposition: home with home care when stable   code status: full code

## 2025-06-04 ENCOUNTER — TRANSCRIPTION ENCOUNTER (OUTPATIENT)
Age: 70
End: 2025-06-04

## 2025-06-04 LAB
ALBUMIN SERPL ELPH-MCNC: 2.7 G/DL — LOW (ref 3.5–5.2)
ALP SERPL-CCNC: 219 U/L — HIGH (ref 30–115)
ALT FLD-CCNC: <5 U/L — SIGNIFICANT CHANGE UP (ref 0–41)
ANION GAP SERPL CALC-SCNC: 12 MMOL/L — SIGNIFICANT CHANGE UP (ref 7–14)
AST SERPL-CCNC: 15 U/L — SIGNIFICANT CHANGE UP (ref 0–41)
BASOPHILS # BLD AUTO: 0.06 K/UL — SIGNIFICANT CHANGE UP (ref 0–0.2)
BASOPHILS NFR BLD AUTO: 0.6 % — SIGNIFICANT CHANGE UP (ref 0–1)
BILIRUB SERPL-MCNC: 0.3 MG/DL — SIGNIFICANT CHANGE UP (ref 0.2–1.2)
BUN SERPL-MCNC: 54 MG/DL — HIGH (ref 10–20)
CALCIUM SERPL-MCNC: 7.9 MG/DL — LOW (ref 8.4–10.5)
CHLORIDE SERPL-SCNC: 102 MMOL/L — SIGNIFICANT CHANGE UP (ref 98–110)
CO2 SERPL-SCNC: 25 MMOL/L — SIGNIFICANT CHANGE UP (ref 17–32)
CREAT SERPL-MCNC: 4.8 MG/DL — CRITICAL HIGH (ref 0.7–1.5)
EGFR: 12 ML/MIN/1.73M2 — LOW
EGFR: 12 ML/MIN/1.73M2 — LOW
EOSINOPHIL # BLD AUTO: 0.18 K/UL — SIGNIFICANT CHANGE UP (ref 0–0.7)
EOSINOPHIL NFR BLD AUTO: 1.8 % — SIGNIFICANT CHANGE UP (ref 0–8)
GLUCOSE SERPL-MCNC: 77 MG/DL — SIGNIFICANT CHANGE UP (ref 70–99)
HBV SURFACE AB SER-ACNC: <3.3 MIU/ML — LOW
HCT VFR BLD CALC: 24.5 % — LOW (ref 42–52)
HGB BLD-MCNC: 7.6 G/DL — LOW (ref 14–18)
IMM GRANULOCYTES NFR BLD AUTO: 0.9 % — HIGH (ref 0.1–0.3)
LYMPHOCYTES # BLD AUTO: 1.71 K/UL — SIGNIFICANT CHANGE UP (ref 1.2–3.4)
LYMPHOCYTES # BLD AUTO: 17 % — LOW (ref 20.5–51.1)
MAGNESIUM SERPL-MCNC: 1.9 MG/DL — SIGNIFICANT CHANGE UP (ref 1.8–2.4)
MCHC RBC-ENTMCNC: 29.1 PG — SIGNIFICANT CHANGE UP (ref 27–31)
MCHC RBC-ENTMCNC: 31 G/DL — LOW (ref 32–37)
MCV RBC AUTO: 93.9 FL — SIGNIFICANT CHANGE UP (ref 80–94)
MONOCYTES # BLD AUTO: 0.97 K/UL — HIGH (ref 0.1–0.6)
MONOCYTES NFR BLD AUTO: 9.7 % — HIGH (ref 1.7–9.3)
NEUTROPHILS # BLD AUTO: 7.02 K/UL — HIGH (ref 1.4–6.5)
NEUTROPHILS NFR BLD AUTO: 70 % — SIGNIFICANT CHANGE UP (ref 42.2–75.2)
NRBC BLD AUTO-RTO: 0 /100 WBCS — SIGNIFICANT CHANGE UP (ref 0–0)
PLATELET # BLD AUTO: 200 K/UL — SIGNIFICANT CHANGE UP (ref 130–400)
PMV BLD: 11.6 FL — HIGH (ref 7.4–10.4)
POTASSIUM SERPL-MCNC: 4.3 MMOL/L — SIGNIFICANT CHANGE UP (ref 3.5–5)
POTASSIUM SERPL-SCNC: 4.3 MMOL/L — SIGNIFICANT CHANGE UP (ref 3.5–5)
PROT SERPL-MCNC: 7.3 G/DL — SIGNIFICANT CHANGE UP (ref 6–8)
RBC # BLD: 2.61 M/UL — LOW (ref 4.7–6.1)
RBC # FLD: 13.5 % — SIGNIFICANT CHANGE UP (ref 11.5–14.5)
SODIUM SERPL-SCNC: 139 MMOL/L — SIGNIFICANT CHANGE UP (ref 135–146)
WBC # BLD: 10.03 K/UL — SIGNIFICANT CHANGE UP (ref 4.8–10.8)
WBC # FLD AUTO: 10.03 K/UL — SIGNIFICANT CHANGE UP (ref 4.8–10.8)

## 2025-06-04 PROCEDURE — 99232 SBSQ HOSP IP/OBS MODERATE 35: CPT

## 2025-06-04 RX ORDER — CALCITRIOL 0.5 UG/1
1 CAPSULE, GELATIN COATED ORAL
Qty: 30 | Refills: 0
Start: 2025-06-04 | End: 2025-07-03

## 2025-06-04 RX ORDER — SEVELAMER HYDROCHLORIDE 800 MG/1
2 TABLET ORAL
Qty: 180 | Refills: 0
Start: 2025-06-04 | End: 2025-07-03

## 2025-06-04 RX ORDER — CALCITRIOL 0.5 UG/1
0.25 CAPSULE, GELATIN COATED ORAL DAILY
Refills: 0 | Status: DISCONTINUED | OUTPATIENT
Start: 2025-06-04 | End: 2025-06-05

## 2025-06-04 RX ADMIN — SEVELAMER HYDROCHLORIDE 1600 MILLIGRAM(S): 800 TABLET ORAL at 11:31

## 2025-06-04 RX ADMIN — Medication 2 TABLET(S): at 21:42

## 2025-06-04 RX ADMIN — POLYETHYLENE GLYCOL 3350 17 GRAM(S): 17 POWDER, FOR SOLUTION ORAL at 05:31

## 2025-06-04 RX ADMIN — APIXABAN 2.5 MILLIGRAM(S): 2.5 TABLET, FILM COATED ORAL at 18:18

## 2025-06-04 RX ADMIN — Medication 40 MILLIGRAM(S): at 05:30

## 2025-06-04 RX ADMIN — Medication 1 APPLICATION(S): at 05:34

## 2025-06-04 RX ADMIN — Medication 40 MILLIGRAM(S): at 18:18

## 2025-06-04 RX ADMIN — Medication 1000 UNIT(S): at 11:25

## 2025-06-04 RX ADMIN — CALCITRIOL 0.25 MICROGRAM(S): 0.5 CAPSULE, GELATIN COATED ORAL at 15:20

## 2025-06-04 RX ADMIN — SEVELAMER HYDROCHLORIDE 1600 MILLIGRAM(S): 800 TABLET ORAL at 08:44

## 2025-06-04 RX ADMIN — APIXABAN 2.5 MILLIGRAM(S): 2.5 TABLET, FILM COATED ORAL at 05:30

## 2025-06-04 RX ADMIN — SEVELAMER HYDROCHLORIDE 1600 MILLIGRAM(S): 800 TABLET ORAL at 18:18

## 2025-06-04 NOTE — DISCHARGE NOTE PROVIDER - NSDCFUADDINST_GEN_ALL_CORE_FT
follow up with your PCP and specialists as scheduled. Go to ed in case of new or worsening symptoms.

## 2025-06-04 NOTE — DISCHARGE NOTE PROVIDER - CARE PROVIDER_API CALL
Kwauk Garibay  Family Medicine  [address] [place] [address]  Phone: [phone]  Fax: [phone]  Follow Up Time: 1 week    Vanda Vernon  Nephrology  [address] [place] [address]  Phone: [phone]  Fax: [phone]  Follow Up Time: 1-3 days    Pacheco Bird  Podiatric Medicine and Surgery  [address] [facility], [address]st [address]  [place] [address]  Phone: [phone]  Fax: [phone]  Follow Up Time: 1 week   Kwaku Garibay  Family Medicine  [address] [place] [address]  Phone: [phone]  Fax: [phone]  Follow Up Time: 1 week    Vanda Vernon  Nephrology  [address] [place] [address]  Phone: [phone]  Fax: [phone]  Follow Up Time: 1-3 days    Pacheco Bird  Podiatric Medicine and Surgery  [address] [facility]st [address]  [place] [address]  Phone: [phone]  Fax: [phone]  Follow Up Time: 1 week    Saniya Foote  Surgical Critical Care  [address]  [place] [address]  Phone: [phone]  Follow Up Time: Routine    Page Samayoa  Gastroenterology  [address] [place] [address]  Phone: [phone]  Fax: [phone]  Follow Up Time: Routine

## 2025-06-04 NOTE — DISCHARGE NOTE PROVIDER - NSFOLLOWUPCLINICS_GEN_ALL_ED_FT
[facility]  Dental  [address] [place] [address]  Phone: [phone]  Fax:

## 2025-06-04 NOTE — PROGRESS NOTE ADULT - ASSESSMENT
a/p    70y M pmh CKD V, HTN, NIDDM, prior spinal abscess, nephrolithiasis and recurrent UTI s/p b/l stent placement and SPC presenting for evaluation. Patient sent in by Dr. Mcallister for evaluation for acute rise in creatinine.     #JOÃO on CKD V,   on HD  Hyperkalemia - resolved   sp TDC 6/2  first HD session today 6/3     #Hx SPC, was changed 5/29 by urology     #Chronic cholecystitis  hida positive,   sexton negative  pt is not septic,   doubt needs to be treated just based on imaging since he is not symptomatic   dw ID : no need for abx   OP surgery follow up     #Afib, on eliquis , resumed     #HTN, no need for meds, his bp is controlled off meds     #Constipation - resolved  - CTAP - no bowel obstruction, large rectal stool burden with distention measuring 7.8 cm   - bowel regimen discontinued, pt having 3 BMs daily     #Normocytic anemia suspected 2/2 advanced CKD  - b12 1269, folate 12.2  - iron 95, sat elevated , ferritin 438  - no active bleeding, continue to monitor    - cont aranesp 25 per nephro     #Hypocalcemia , corrected calcium is wnl: 8.9    #Vitamin D deficiency likely 2/2 CKD  - c/w vit D supplementation  Vitamin D, 25-Hydroxy: 11 (05.26.25 @ 04:47)    #Concern for malnutrition  cont supplement   antiemetics  reevaluate after HD started to see if pt has better food intake     # very poor dentation, dental eval appreciated, outpt fu     #Progress Note Handoff    follow up: pending nephrology clearance and HD spot availability. destin discharge tomorrow.

## 2025-06-04 NOTE — PROGRESS NOTE ADULT - SUBJECTIVE AND OBJECTIVE BOX
seen and examined  24 h events noted   no distress         PAST HISTORY  --------------------------------------------------------------------------------  No significant changes to PMH, PSH, FHx, SHx, unless otherwise noted    ALLERGIES & MEDICATIONS  --------------------------------------------------------------------------------  Allergies    No Known Allergies    Intolerances      Standing Inpatient Medications  apixaban 2.5 milliGRAM(s) Oral two times a day  chlorhexidine 2% Cloths 1 Application(s) Topical <User Schedule>  cholecalciferol 1000 Unit(s) Oral daily  pantoprazole    Tablet 40 milliGRAM(s) Oral two times a day  polyethylene glycol 3350 17 Gram(s) Oral <User Schedule>  senna 2 Tablet(s) Oral at bedtime  sevelamer carbonate Powder 1600 milliGRAM(s) Oral three times a day with meals    PRN Inpatient Medications  ondansetron Injectable 4 milliGRAM(s) IV Push every 8 hours PRN        VITALS/PHYSICAL EXAM  --------------------------------------------------------------------------------  T(C): 36.4 (06-04-25 @ 05:00), Max: 36.7 (06-03-25 @ 19:28)  HR: 71 (06-04-25 @ 05:00) (69 - 76)  BP: 142/73 (06-04-25 @ 05:00) (108/52 - 147/67)  RR: 18 (06-04-25 @ 05:00) (18 - 18)  SpO2: 98% (06-03-25 @ 19:28) (98% - 100%)  Wt(kg): --        06-03-25 @ 07:01  -  06-04-25 @ 07:00  --------------------------------------------------------  IN: 0 mL / OUT: 1325 mL / NET: -1325 mL      Physical Exam:  	Gen: NAD  	Pulm: CTA B/L  	CV: S1S2; no rub  	Abd: soft, /nondistended  	LE: dressing   	Vascular access:tdc     LABS/STUDIES  --------------------------------------------------------------------------------              7.6    10.03 >-----------<  200      [06-04-25 @ 05:35]              24.5     141  |  104  |  86  ----------------------------<  68      [06-03-25 @ 05:55]  4.9   |  22  |  7.1        Ca     7.9     [06-03-25 @ 05:55]      Mg     1.9     [06-04-25 @ 05:35]    TPro  7.1  /  Alb  2.6  /  TBili  0.4  /  DBili  x   /  AST  18  /  ALT  6   /  AlkPhos  231  [06-03-25 @ 05:55]      Creatinine Trend:  SCr 7.1 [06-03 @ 05:55]  SCr 7.0 [06-02 @ 05:34]  SCr 7.1 [06-01 @ 21:37]  SCr 6.7 [06-01 @ 07:20]  SCr 7.3 [05-31 @ 05:28]    Urinalysis - [06-03-25 @ 05:55]      Color  / Appearance  / SG  / pH       Gluc 68 / Ketone   / Bili  / Urobili        Blood  / Protein  / Leuk Est  / Nitrite       RBC  / WBC  / Hyaline  / Gran  / Sq Epi  / Non Sq Epi  / Bacteria       Iron 73, TIBC 107, %sat 68      [05-28-25 @ 11:08]  Ferritin 438      [05-04-25 @ 06:10]  PTH -- (Ca 6.1)      [05-26-25 @ 04:47]   288  PTH -- (Ca 8.3)      [07-10-24 @ 21:08]   43  Vitamin D (25OH) 11      [05-26-25 @ 04:47]  TSH 5.27      [04-30-25 @ 17:55]    HBsAb <3.3      [06-02-25 @ 11:28]  HBsAg Nonreact      [06-02-25 @ 11:28]  HBcAb Nonreact      [06-02-25 @ 11:28]  HCV 0.06, Nonreact      [06-02-25 @ 11:28]

## 2025-06-04 NOTE — DISCHARGE NOTE PROVIDER - ATTENDING DISCHARGE PHYSICAL EXAMINATION:
Patient seen at bedside. HD spot available. nephrology cleared for discharge. patient to follow up with PCP and specialist as scheduled. advised to go to ed in case of new or worsening symptoms. medical team updated family

## 2025-06-04 NOTE — DISCHARGE NOTE PROVIDER - HOSPITAL COURSE
History of Present Illness:   70y M pmh CKD V, HTN, NIDDM, prior spinal abscess, nephrolithiasis and recurrent UTI s/p b/l stent placement and SPC presenting for evaluation. Patient sent in by Dr. Mcallister for evaluation for acute rise in creatinine. sCr noted to increase from 5.5>9.5 with reduced UOP, hyperkalemia and hyperphosphatemia.     The patient presented to the ED without fever, chest pain, shortness of breath, abdominal pain, nausea, vomiting, diarrhea, constipation, bloody or painful urination, rash, joint pain, confusion, dizziness, or lightheadedness.  Vital signs showed a slightly low temperature (95.6°F), normal heart and respiratory rates (71 bpm and 18 breaths/min respectively), and normal blood pressure (135/83 mmHg). Oxygen saturation was 98% on room air. Labs revealed anemia, low calcium, elevated kidney function, a metabolic acidosis, and elevated liver enzymes. Urinalysis was positive for infection.  CT imaging showed findings concerning for cholecystitis, improving hydronephrosis, stable kidney stones, a slightly enlarged lymph node, and a large amount of stool in the rectum. The patient received ceftriaxone, IV fluids, sodium bicarbonate, and Lokelma in the ED and was admitted to medicine.    Hospital Course:  70y M pmh CKD V, HTN, NIDDM, prior spinal abscess, nephrolithiasis and recurrent UTI s/p b/l stent placement and SPC presenting for evaluation. Patient sent in by Dr. Mcallister for evaluation for acute rise in creatinine.     #JOÃO on CKD V   #Hyperkalemia - resolved   -sp TDC 6/2  -on HD  -first HD session today 6/3 > next session 6/4  -pending dialysis spit at [address]     #Hx SPC  -exchanged 5/29 by urology     #Chronic cholecystitis  #hida positive   -sexton negative  -no septic   -asymptomatic  -dw ID : no need for abx   -OP surgery follow up     #Afib  -on eliquis     #HTN  -controlled off meds     #Constipation - resolved  -CTAP - no bowel obstruction, large rectal stool burden with distention measuring 7.8 cm   -bowel regimen discontinued, pt having 3 BMs daily     #Normocytic anemia suspected 2/2 advanced CKD  -b12 1269, folate 12.2  -iron 95, sat elevated , ferritin 438  -no active bleeding, continue to monitor   -cont aranesp 25 per nephro     #Hypocalcemia  -corrected calcium is wnl: 8.9    #Vitamin D deficiency likely 2/2 CKD  -c/w vit D supplementation  -Vitamin D, 25-Hydroxy: 11 (05.26.25 @ 04:47)    #Concern for malnutrition  -cont supplement   -antiemetics  -reevaluate after HD started to see if pt has better food intake     #very poor dentition  -outpt fu     Patient is appropriate for discharge. Discharge plan discussed with attending physician. History of Present Illness:   70y M pmh CKD V, HTN, NIDDM, prior spinal abscess, nephrolithiasis and recurrent UTI s/p b/l stent placement and SPC presenting for evaluation. Patient sent in by Dr. Mcallister for evaluation for acute rise in creatinine. sCr noted to increase from 5.5>9.5 with reduced UOP, hyperkalemia and hyperphosphatemia.     The patient presented to the ED without fever, chest pain, shortness of breath, abdominal pain, nausea, vomiting, diarrhea, constipation, bloody or painful urination, rash, joint pain, confusion, dizziness, or lightheadedness.  Vital signs showed a slightly low temperature (95.6°F), normal heart and respiratory rates (71 bpm and 18 breaths/min respectively), and normal blood pressure (135/83 mmHg). Oxygen saturation was 98% on room air. Labs revealed anemia, low calcium, elevated kidney function, a metabolic acidosis, and elevated liver enzymes. Urinalysis was positive for infection.  CT imaging showed findings concerning for cholecystitis, improving hydronephrosis, stable kidney stones, a slightly enlarged lymph node, and a large amount of stool in the rectum. The patient received ceftriaxone, IV fluids, sodium bicarbonate, and Lokelma in the ED and was admitted to medicine.    Hospital Course:  70y M pmh CKD V, HTN, NIDDM, prior spinal abscess, nephrolithiasis and recurrent UTI s/p b/l stent placement and SPC presenting for evaluation. Patient sent in by Dr. Mcallister for evaluation for acute rise in creatinine.     #JOÃO on CKD V   #Hyperkalemia - resolved   -sp TDC 6/2  -on HD  -first HD session 6/3 >   HD spot available. nephrology notified ok with discharge     #Hx SPC  -exchanged 5/29 by urology     #Chronic cholecystitis  #hida positive   -sexton negative  -no septic   -asymptomatic  -dw ID : no need for abx   -OP surgery follow up     #Afib  -on eliquis     #HTN  -controlled off meds     #Constipation - resolved  -CTAP - no bowel obstruction, large rectal stool burden with distention measuring 7.8 cm   -bowel regimen discontinued, pt having 3 BMs daily     #Normocytic anemia suspected 2/2 advanced CKD  -b12 1269, folate 12.2  -iron 95, sat elevated , ferritin 438  -no active bleeding, continue to monitor   -cont aranesp 25 per nephro     #Hypocalcemia  -corrected calcium is wnl: 8.9    #Vitamin D deficiency likely 2/2 CKD  -c/w vit D supplementation  -Vitamin D, 25-Hydroxy: 11 (05.26.25 @ 04:47)    #Concern for malnutrition  -cont supplement   -antiemetics  -reevaluate after HD started to see if pt has better food intake     #very poor dentition  -outpt fu     Patient is appropriate for discharge. Discharge plan discussed with attending physician.

## 2025-06-04 NOTE — DISCHARGE NOTE PROVIDER - NSDCCPCAREPLAN_GEN_ALL_CORE_FT
PRINCIPAL DISCHARGE DIAGNOSIS  Diagnosis: Acute kidney injury superimposed on CKD  Assessment and Plan of Treatment: You were admitted to the hospital due to a sudden rise in your creatinine level, indicating worsening kidney function. You also had high potassium levels, which have since resolved. You received a temporary dialysis catheter and started hemodialysis. Your suprapubic catheter was recently changed.  You also have chronic cholecystitis, but are not currently experiencing symptoms and do not need antibiotics.  We will schedule a follow-up appointment with surgery. Your atrial fibrillation is being managed with Eliquis, your high blood pressure is well controlled, and your constipation has resolved. We are also monitoring your anemia and providing vitamin D supplements. You should continue to take your prescribed Aranesp. We are also addressing your nutritional needs with supplements and antiemetics.  While recovering, please be aware of any signs of infection such as fever, chills, redness, swelling, or drainage around your dialysis catheter or suprapubic catheter site.  Also, watch for signs of low blood sugar like shakiness, sweating, dizziness, or confusion. Pay attention to your heart rhythm and notify your doctor if you feel palpitations or an irregular heartbeat.  If you experience severe abdominal pain, chest pain, shortness of breath, confusion, or any other concerning symptoms, please call 911 immediately. It's important to follow your diet and medication instructions closely, and to keep your follow-up appointments.      SECONDARY DISCHARGE DIAGNOSES  Diagnosis: Metabolic acidosis  Assessment and Plan of Treatment:      PRINCIPAL DISCHARGE DIAGNOSIS  Diagnosis: Acute kidney injury superimposed on CKD  Assessment and Plan of Treatment: You were admitted to the hospital due to a sudden rise in your creatinine level, indicating worsening kidney function. You also had high potassium levels, which have since resolved. You received a temporary dialysis catheter and started hemodialysis. Your suprapubic catheter was recently changed.  You also have chronic cholecystitis, but are not currently experiencing symptoms and do not need antibiotics.  We will schedule a follow-up appointment with surgery. Your atrial fibrillation is being managed with Eliquis, your high blood pressure is well controlled, and your constipation has resolved. We are also monitoring your anemia and providing vitamin D supplements. You should continue to take your prescribed Aranesp. We are also addressing your nutritional needs with supplements and antiemetics.  While recovering, please be aware of any signs of infection such as fever, chills, redness, swelling, or drainage around your dialysis catheter or suprapubic catheter site.  Also, watch for signs of low blood sugar like shakiness, sweating, dizziness, or confusion. Pay attention to your heart rhythm and notify your doctor if you feel palpitations or an irregular heartbeat.  If you experience severe abdominal pain, chest pain, shortness of breath, confusion, or any other concerning symptoms, please call 911 immediately. It's important to follow your diet and medication instructions closely, and to keep your follow-up appointments.  Please follow up with the [facility] located at [address] [place] [address]. Phone Number: [phone]  for outpatient EGD/colonoscopy.        SECONDARY DISCHARGE DIAGNOSES  Diagnosis: Metabolic acidosis  Assessment and Plan of Treatment:

## 2025-06-04 NOTE — DISCHARGE NOTE PROVIDER - NPI NUMBER (FOR SYSADMIN USE ONLY) :
[[id number]] [[id number]]

## 2025-06-04 NOTE — DISCHARGE NOTE PROVIDER - PROVIDER TOKENS
PROVIDER:[TOKEN:[[id number]],FOLLOWUP:[1 week]],PROVIDER:[TOKEN:[[id number]],FOLLOWUP:[1-3 days]],PROVIDER:[TOKEN:[[id number]],FOLLOWUP:[1 week]] PROVIDER:[TOKEN:[[id number]],FOLLOWUP:[1 week]],PROVIDER:[TOKEN:[[id number]],FOLLOWUP:[1-3 days]],PROVIDER:[TOKEN:[[id number]],FOLLOWUP:[1 week]],PROVIDER:[TOKEN:[[id number]],FOLLOWUP:[Routine]],PROVIDER:[TOKEN:[[id number]],FOLLOWUP:[Routine]]

## 2025-06-04 NOTE — DISCHARGE NOTE PROVIDER - NSDCFUADDAPPT_GEN_ALL_CORE_FT
Do you need a primary care doctor or follow-up with a specialist? Our care coordinators will help you find providers near you and schedule any follow-up care visits.    Monday-Friday: 9am-5pm    Call our [facility] team: [phone]

## 2025-06-04 NOTE — DISCHARGE NOTE PROVIDER - CARE PROVIDERS DIRECT ADDRESSES
[email or web address],[email or web address]

## 2025-06-04 NOTE — PROGRESS NOTE ADULT - SUBJECTIVE AND OBJECTIVE BOX
pt seen and examined.     My notes supersede resident's notes in case of discrepancy    patient no complaits. pending HD spot      ROS: no cp, no sob, no n/v, no fever    Vital Signs Last 24 Hrs  Vital Signs Last 24 Hrs  T(C): 36.4 (04 Jun 2025 05:00), Max: 36.7 (03 Jun 2025 19:28)  T(F): 97.6 (04 Jun 2025 05:00), Max: 98 (03 Jun 2025 19:28)  HR: 71 (04 Jun 2025 05:00) (69 - 76)  BP: 142/73 (04 Jun 2025 05:00) (108/52 - 147/67)  BP(mean): --  RR: 18 (04 Jun 2025 05:00) (18 - 18)  SpO2: 98% (03 Jun 2025 19:28) (98% - 100%)    Parameters below as of 03 Jun 2025 13:25  Patient On (Oxygen Delivery Method): room air        physical exam  constitutional NAD, AAOX3, Respiratory  lungs CTA, CVS heart RRR, GI: abdomen Soft NT, ND, BS+, skin: TDC on the right subclavian area, site is clean, not tender, bilat lower ext skin excoriation , not red, chronci     MEDICATIONS  (STANDING):  apixaban 2.5 milliGRAM(s) Oral two times a day  chlorhexidine 2% Cloths 1 Application(s) Topical <User Schedule>  cholecalciferol 1000 Unit(s) Oral daily  heparin   Injectable 5000 Unit(s) SubCutaneous every 12 hours  pantoprazole    Tablet 40 milliGRAM(s) Oral two times a day  polyethylene glycol 3350 17 Gram(s) Oral <User Schedule>  senna 2 Tablet(s) Oral at bedtime  sevelamer carbonate Powder 1600 milliGRAM(s) Oral three times a day with meals    MEDICATIONS  (PRN):  ondansetron Injectable 4 milliGRAM(s) IV Push every 8 hours PRN Nausea and/or Vomiting               LABS:                        7.6    10.03 )-----------( 200      ( 04 Jun 2025 05:35 )             24.5     06-04    139  |  102  |  54[H]  ----------------------------<  77  4.3   |  25  |  4.8[HH]    Ca    7.9[L]      04 Jun 2025 05:35  Mg     1.9     06-04    TPro  7.3  /  Alb  2.7[L]  /  TBili  0.3  /  DBili  x   /  AST  15  /  ALT  <5  /  AlkPhos  219[H]  06-04

## 2025-06-04 NOTE — PROGRESS NOTE ADULT - ASSESSMENT
69 yo M with h/o CKD stage 5, HTN, NIDDM, prior spinal abscess c/b need for SPC, nephrolithiasis, recent urinary infection and hydro requiring b/l stent placement, sent by me to ED following outpatient evaluation for medical clearance to remove his stents and labwork noted acute rise in sCr 5.5->9.5 with reduced UOP, worsening hyperkalemia and hyperphosphatemia, concern for new obstruction vs. infection, and to assess for HD needs acutely.  Repeat labwork noted stable sCr with elevated potassium, MA and signs of complicated UTI given SPC use.  #JOÃO on CKD stage 5  #Moderate hydronephrosis  #Complicated Cystitis  #HTN  #NIDDM  #prior spinal abscess c/b need for SPC  #nephrolithiasis  -  s/p TDC  placement / hd again today second tt uf 0.5 liter as tolerated   -Chronic cholecystitis/ followed by surgery/ IR   -  bp controlled   - f/up   - corrected calcium in the mid 8/ on vit / start calcitriol 0.25 daily   - h/h noted tx if h < 7/ start   aranesp 25 weekly with HD / sat elevated   - Dose medications to  HD   -  will need dialysis spit at [address]   renal team will follow

## 2025-06-04 NOTE — DISCHARGE NOTE PROVIDER - NSDCMRMEDTOKEN_GEN_ALL_CORE_FT
calcitriol 0.25 mcg oral capsule: 1 cap(s) orally once a day  Eliquis 5 mg oral tablet: 0.5 tab(s) orally every 12 hours  folic acid 1 mg oral tablet: 1 tab(s) orally once a day  magnesium gluconate 500 mg oral tablet: 1 tab(s) orally once a day  Multiple Vitamins oral tablet: 1 tab(s) orally once a day  sevelamer carbonate 0.8 g oral powder for reconstitution: 2 packet(s) orally 3 times a day (with meals)   calcitriol 0.25 mcg oral capsule: 1 cap(s) orally once a day  Eliquis 5 mg oral tablet: 0.5 tab(s) orally every 12 hours  folic acid 1 mg oral tablet: 1 tab(s) orally once a day  magnesium gluconate 500 mg oral tablet: 1 tab(s) orally once a day  Multiple Vitamins oral tablet: 1 tab(s) orally once a day  pantoprazole 40 mg oral delayed release tablet: 1 tab(s) orally 2 times a day  sevelamer carbonate 0.8 g oral powder for reconstitution: 2 packet(s) orally 3 times a day (with meals)

## 2025-06-05 VITALS
DIASTOLIC BLOOD PRESSURE: 70 MMHG | OXYGEN SATURATION: 96 % | HEART RATE: 72 BPM | TEMPERATURE: 98 F | SYSTOLIC BLOOD PRESSURE: 111 MMHG | RESPIRATION RATE: 18 BRPM

## 2025-06-05 LAB
ALBUMIN SERPL ELPH-MCNC: 3 G/DL — LOW (ref 3.5–5.2)
ALP SERPL-CCNC: 211 U/L — HIGH (ref 30–115)
ALT FLD-CCNC: <5 U/L — SIGNIFICANT CHANGE UP (ref 0–41)
ANION GAP SERPL CALC-SCNC: 11 MMOL/L — SIGNIFICANT CHANGE UP (ref 7–14)
AST SERPL-CCNC: 15 U/L — SIGNIFICANT CHANGE UP (ref 0–41)
BASOPHILS # BLD AUTO: 0.06 K/UL — SIGNIFICANT CHANGE UP (ref 0–0.2)
BASOPHILS NFR BLD AUTO: 0.6 % — SIGNIFICANT CHANGE UP (ref 0–1)
BILIRUB SERPL-MCNC: 0.3 MG/DL — SIGNIFICANT CHANGE UP (ref 0.2–1.2)
BUN SERPL-MCNC: 23 MG/DL — HIGH (ref 10–20)
CALCIUM SERPL-MCNC: 7.9 MG/DL — LOW (ref 8.4–10.5)
CHLORIDE SERPL-SCNC: 99 MMOL/L — SIGNIFICANT CHANGE UP (ref 98–110)
CO2 SERPL-SCNC: 26 MMOL/L — SIGNIFICANT CHANGE UP (ref 17–32)
CREAT SERPL-MCNC: 3.4 MG/DL — HIGH (ref 0.7–1.5)
EGFR: 19 ML/MIN/1.73M2 — LOW
EGFR: 19 ML/MIN/1.73M2 — LOW
EOSINOPHIL # BLD AUTO: 0.13 K/UL — SIGNIFICANT CHANGE UP (ref 0–0.7)
EOSINOPHIL NFR BLD AUTO: 1.3 % — SIGNIFICANT CHANGE UP (ref 0–8)
GLUCOSE SERPL-MCNC: 73 MG/DL — SIGNIFICANT CHANGE UP (ref 70–99)
HCT VFR BLD CALC: 24.2 % — LOW (ref 42–52)
HGB BLD-MCNC: 7.5 G/DL — LOW (ref 14–18)
IMM GRANULOCYTES NFR BLD AUTO: 0.9 % — HIGH (ref 0.1–0.3)
LYMPHOCYTES # BLD AUTO: 1.97 K/UL — SIGNIFICANT CHANGE UP (ref 1.2–3.4)
LYMPHOCYTES # BLD AUTO: 19.7 % — LOW (ref 20.5–51.1)
MAGNESIUM SERPL-MCNC: 1.7 MG/DL — LOW (ref 1.8–2.4)
MCHC RBC-ENTMCNC: 29.3 PG — SIGNIFICANT CHANGE UP (ref 27–31)
MCHC RBC-ENTMCNC: 31 G/DL — LOW (ref 32–37)
MCV RBC AUTO: 94.5 FL — HIGH (ref 80–94)
MONOCYTES # BLD AUTO: 0.9 K/UL — HIGH (ref 0.1–0.6)
MONOCYTES NFR BLD AUTO: 9 % — SIGNIFICANT CHANGE UP (ref 1.7–9.3)
NEUTROPHILS # BLD AUTO: 6.85 K/UL — HIGH (ref 1.4–6.5)
NEUTROPHILS NFR BLD AUTO: 68.5 % — SIGNIFICANT CHANGE UP (ref 42.2–75.2)
NRBC BLD AUTO-RTO: 0 /100 WBCS — SIGNIFICANT CHANGE UP (ref 0–0)
PLATELET # BLD AUTO: 205 K/UL — SIGNIFICANT CHANGE UP (ref 130–400)
PMV BLD: 11.5 FL — HIGH (ref 7.4–10.4)
POTASSIUM SERPL-MCNC: 4.4 MMOL/L — SIGNIFICANT CHANGE UP (ref 3.5–5)
POTASSIUM SERPL-SCNC: 4.4 MMOL/L — SIGNIFICANT CHANGE UP (ref 3.5–5)
PROT SERPL-MCNC: 7.4 G/DL — SIGNIFICANT CHANGE UP (ref 6–8)
RBC # BLD: 2.56 M/UL — LOW (ref 4.7–6.1)
RBC # FLD: 13.2 % — SIGNIFICANT CHANGE UP (ref 11.5–14.5)
SODIUM SERPL-SCNC: 136 MMOL/L — SIGNIFICANT CHANGE UP (ref 135–146)
WBC # BLD: 10 K/UL — SIGNIFICANT CHANGE UP (ref 4.8–10.8)
WBC # FLD AUTO: 10 K/UL — SIGNIFICANT CHANGE UP (ref 4.8–10.8)

## 2025-06-05 PROCEDURE — 99239 HOSP IP/OBS DSCHRG MGMT >30: CPT

## 2025-06-05 RX ORDER — MAGNESIUM SULFATE 500 MG/ML
2 SYRINGE (ML) INJECTION ONCE
Refills: 0 | Status: COMPLETED | OUTPATIENT
Start: 2025-06-05 | End: 2025-06-05

## 2025-06-05 RX ADMIN — SEVELAMER HYDROCHLORIDE 1600 MILLIGRAM(S): 800 TABLET ORAL at 12:15

## 2025-06-05 RX ADMIN — SEVELAMER HYDROCHLORIDE 1600 MILLIGRAM(S): 800 TABLET ORAL at 08:31

## 2025-06-05 RX ADMIN — Medication 25 GRAM(S): at 12:16

## 2025-06-05 RX ADMIN — Medication 40 MILLIGRAM(S): at 05:19

## 2025-06-05 RX ADMIN — POLYETHYLENE GLYCOL 3350 17 GRAM(S): 17 POWDER, FOR SOLUTION ORAL at 05:19

## 2025-06-05 RX ADMIN — Medication 1000 UNIT(S): at 12:15

## 2025-06-05 RX ADMIN — APIXABAN 2.5 MILLIGRAM(S): 2.5 TABLET, FILM COATED ORAL at 05:18

## 2025-06-05 RX ADMIN — CALCITRIOL 0.25 MICROGRAM(S): 0.5 CAPSULE, GELATIN COATED ORAL at 12:15

## 2025-06-05 RX ADMIN — APIXABAN 2.5 MILLIGRAM(S): 2.5 TABLET, FILM COATED ORAL at 17:47

## 2025-06-05 RX ADMIN — SEVELAMER HYDROCHLORIDE 1600 MILLIGRAM(S): 800 TABLET ORAL at 17:47

## 2025-06-05 RX ADMIN — Medication 1 APPLICATION(S): at 05:21

## 2025-06-05 RX ADMIN — Medication 40 MILLIGRAM(S): at 17:47

## 2025-06-05 RX ADMIN — Medication 2 TABLET(S): at 22:24

## 2025-06-05 NOTE — PROGRESS NOTE ADULT - SUBJECTIVE AND OBJECTIVE BOX
seen and examined  24 h events noted   no distress       PAST HISTORY  --------------------------------------------------------------------------------  No significant changes to PMH, PSH, FHx, SHx, unless otherwise noted    ALLERGIES & MEDICATIONS  --------------------------------------------------------------------------------  Allergies    No Known Allergies    Intolerances      Standing Inpatient Medications  apixaban 2.5 milliGRAM(s) Oral two times a day  calcitriol   Capsule 0.25 MICROGram(s) Oral daily  chlorhexidine 2% Cloths 1 Application(s) Topical <User Schedule>  cholecalciferol 1000 Unit(s) Oral daily  pantoprazole    Tablet 40 milliGRAM(s) Oral two times a day  polyethylene glycol 3350 17 Gram(s) Oral <User Schedule>  senna 2 Tablet(s) Oral at bedtime  sevelamer carbonate Powder 1600 milliGRAM(s) Oral three times a day with meals    PRN Inpatient Medications  ondansetron Injectable 4 milliGRAM(s) IV Push every 8 hours PRN          VITALS/PHYSICAL EXAM  --------------------------------------------------------------------------------  T(C): 36.9 (06-05-25 @ 05:00), Max: 36.9 (06-04-25 @ 20:45)  HR: 60 (06-05-25 @ 05:00) (60 - 85)  BP: 105/66 (06-05-25 @ 05:00) (105/66 - 124/73)  RR: 18 (06-05-25 @ 05:00) (18 - 18)  SpO2: 95% (06-05-25 @ 05:00) (95% - 96%)  Wt(kg): --        06-04-25 @ 07:01  -  06-05-25 @ 07:00  --------------------------------------------------------  IN: 825 mL / OUT: 975 mL / NET: -150 mL      Physical Exam:  	Gen: NAD  	Pulm: CTA B/L  	CV:  S1S2; no rub  	Abd:  soft, nontender/nondistended  	LE: dressing   	Vascular access:tdc     LABS/STUDIES  --------------------------------------------------------------------------------              7.6    10.03 >-----------<  200      [06-04-25 @ 05:35]              24.5     139  |  102  |  54  ----------------------------<  77      [06-04-25 @ 05:35]  4.3   |  25  |  4.8        Ca     7.9     [06-04-25 @ 05:35]      Mg     1.9     [06-04-25 @ 05:35]    TPro  7.3  /  Alb  2.7  /  TBili  0.3  /  DBili  x   /  AST  15  /  ALT  <5  /  AlkPhos  219  [06-04-25 @ 05:35]    Creatinine Trend:  SCr 4.8 [06-04 @ 05:35]  SCr 7.1 [06-03 @ 05:55]  SCr 7.0 [06-02 @ 05:34]  SCr 7.1 [06-01 @ 21:37]  SCr 6.7 [06-01 @ 07:20]    Urinalysis - [06-04-25 @ 05:35]      Color  / Appearance  / SG  / pH       Gluc 77 / Ketone   / Bili  / Urobili        Blood  / Protein  / Leuk Est  / Nitrite       RBC  / WBC  / Hyaline  / Gran  / Sq Epi  / Non Sq Epi  / Bacteria       Iron 73, TIBC 107, %sat 68      [05-28-25 @ 11:08]  Ferritin 438      [05-04-25 @ 06:10]  PTH -- (Ca 6.1)      [05-26-25 @ 04:47]   288  PTH -- (Ca 8.3)      [07-10-24 @ 21:08]   43  Vitamin D (25OH) 11      [05-26-25 @ 04:47]  TSH 5.27      [04-30-25 @ 17:55]    HBsAb <3.3      [06-03-25 @ 10:30]  HBsAg Nonreact      [06-02-25 @ 11:28]  HBcAb Nonreact      [06-02-25 @ 11:28]  HCV 0.06, Nonreact      [06-02-25 @ 11:28]

## 2025-06-05 NOTE — PROGRESS NOTE ADULT - REASON FOR ADMISSION
JOÃO on CKD

## 2025-06-05 NOTE — PROGRESS NOTE ADULT - PROVIDER SPECIALTY LIST ADULT
Hospitalist
Internal Medicine
Intervent Radiology
Nephrology
Internal Medicine
Intervent Radiology
Nephrology
Gastroenterology
Hospitalist
Internal Medicine
Nephrology
Infectious Disease
Internal Medicine
Internal Medicine
Nephrology
Internal Medicine

## 2025-06-05 NOTE — PROGRESS NOTE ADULT - ASSESSMENT
69 yo M with h/o CKD stage 5, HTN, NIDDM, prior spinal abscess c/b need for SPC, nephrolithiasis, recent urinary infection and hydro requiring b/l stent placement, sent by me to ED following outpatient evaluation for medical clearance to remove his stents and labwork noted acute rise in sCr 5.5->9.5 with reduced UOP, worsening hyperkalemia and hyperphosphatemia, concern for new obstruction vs. infection, and to assess for HD needs acutely.  Repeat labwork noted stable sCr with elevated potassium, MA and signs of complicated UTI given SPC use.  #JOÃO on CKD stage 5  #Moderate hydronephrosis  #Complicated Cystitis  #HTN  #NIDDM  #prior spinal abscess c/b need for SPC  #nephrolithiasis  -  s/p TDC  placement / hd in am   -Chronic cholecystitis/ followed by surgery/ IR   -  bp controlled   - f/up   - corrected calcium in the mid 8/ on vit /  and calcitriol 0.25 daily   - h/h noted tx if h < 7/ start   aranesp 25 weekly with HD / sat elevated   - Dose medications to  HD   -  will need dialysis spot at [address]   renal team will follow

## 2025-06-06 ENCOUNTER — INPATIENT (INPATIENT)
Facility: HOSPITAL | Age: 70
LOS: 4 days | Discharge: HOME | DRG: 698 | End: 2025-06-11
Attending: HOSPITALIST | Admitting: INTERNAL MEDICINE
Payer: MEDICARE

## 2025-06-06 VITALS
SYSTOLIC BLOOD PRESSURE: 106 MMHG | TEMPERATURE: 98 F | RESPIRATION RATE: 18 BRPM | HEIGHT: 72 IN | DIASTOLIC BLOOD PRESSURE: 68 MMHG | HEART RATE: 62 BPM | WEIGHT: 134.92 LBS | OXYGEN SATURATION: 100 %

## 2025-06-06 DIAGNOSIS — Z98.890 OTHER SPECIFIED POSTPROCEDURAL STATES: Chronic | ICD-10-CM

## 2025-06-06 DIAGNOSIS — N10 ACUTE PYELONEPHRITIS: ICD-10-CM

## 2025-06-06 DIAGNOSIS — M46.20 OSTEOMYELITIS OF VERTEBRA, SITE UNSPECIFIED: Chronic | ICD-10-CM

## 2025-06-06 DIAGNOSIS — Z43.5 ENCOUNTER FOR ATTENTION TO CYSTOSTOMY: Chronic | ICD-10-CM

## 2025-06-06 LAB
ALBUMIN SERPL ELPH-MCNC: 2.7 G/DL — LOW (ref 3.5–5.2)
ALP SERPL-CCNC: 191 U/L — HIGH (ref 30–115)
ALT FLD-CCNC: <5 U/L — SIGNIFICANT CHANGE UP (ref 0–41)
ANION GAP SERPL CALC-SCNC: 9 MMOL/L — SIGNIFICANT CHANGE UP (ref 7–14)
APPEARANCE UR: ABNORMAL
APTT BLD: 35.5 SEC — SIGNIFICANT CHANGE UP (ref 27–39.2)
AST SERPL-CCNC: 19 U/L — SIGNIFICANT CHANGE UP (ref 0–41)
BASOPHILS # BLD AUTO: 0.09 K/UL — SIGNIFICANT CHANGE UP (ref 0–0.2)
BASOPHILS NFR BLD AUTO: 0.9 % — SIGNIFICANT CHANGE UP (ref 0–1)
BILIRUB SERPL-MCNC: 0.3 MG/DL — SIGNIFICANT CHANGE UP (ref 0.2–1.2)
BILIRUB UR-MCNC: NEGATIVE — SIGNIFICANT CHANGE UP
BUN SERPL-MCNC: 10 MG/DL — SIGNIFICANT CHANGE UP (ref 10–20)
CALCIUM SERPL-MCNC: 7.8 MG/DL — LOW (ref 8.4–10.5)
CHLORIDE SERPL-SCNC: 95 MMOL/L — LOW (ref 98–110)
CO2 SERPL-SCNC: 30 MMOL/L — SIGNIFICANT CHANGE UP (ref 17–32)
COLOR SPEC: ABNORMAL
CREAT SERPL-MCNC: 2 MG/DL — HIGH (ref 0.7–1.5)
DIFF PNL FLD: ABNORMAL
EGFR: 35 ML/MIN/1.73M2 — LOW
EGFR: 35 ML/MIN/1.73M2 — LOW
EOSINOPHIL # BLD AUTO: 0.08 K/UL — SIGNIFICANT CHANGE UP (ref 0–0.7)
EOSINOPHIL NFR BLD AUTO: 0.8 % — SIGNIFICANT CHANGE UP (ref 0–8)
GAS PNL BLDV: SIGNIFICANT CHANGE UP
GLUCOSE SERPL-MCNC: 75 MG/DL — SIGNIFICANT CHANGE UP (ref 70–99)
GLUCOSE UR QL: NEGATIVE MG/DL — SIGNIFICANT CHANGE UP
HCT VFR BLD CALC: 23.9 % — LOW (ref 42–52)
HGB BLD-MCNC: 7.5 G/DL — LOW (ref 14–18)
IMM GRANULOCYTES NFR BLD AUTO: 0.9 % — HIGH (ref 0.1–0.3)
INR BLD: 1.07 RATIO — SIGNIFICANT CHANGE UP (ref 0.65–1.3)
KETONES UR QL: NEGATIVE MG/DL — SIGNIFICANT CHANGE UP
LEUKOCYTE ESTERASE UR-ACNC: ABNORMAL
LYMPHOCYTES # BLD AUTO: 19.4 % — LOW (ref 20.5–51.1)
LYMPHOCYTES # BLD AUTO: 2.03 K/UL — SIGNIFICANT CHANGE UP (ref 1.2–3.4)
MAGNESIUM SERPL-MCNC: 2 MG/DL — SIGNIFICANT CHANGE UP (ref 1.8–2.4)
MCHC RBC-ENTMCNC: 29.5 PG — SIGNIFICANT CHANGE UP (ref 27–31)
MCHC RBC-ENTMCNC: 31.4 G/DL — LOW (ref 32–37)
MCV RBC AUTO: 94.1 FL — HIGH (ref 80–94)
MONOCYTES # BLD AUTO: 0.95 K/UL — HIGH (ref 0.1–0.6)
MONOCYTES NFR BLD AUTO: 9.1 % — SIGNIFICANT CHANGE UP (ref 1.7–9.3)
NEUTROPHILS # BLD AUTO: 7.23 K/UL — HIGH (ref 1.4–6.5)
NEUTROPHILS NFR BLD AUTO: 68.9 % — SIGNIFICANT CHANGE UP (ref 42.2–75.2)
NITRITE UR-MCNC: NEGATIVE — SIGNIFICANT CHANGE UP
NRBC BLD AUTO-RTO: 0 /100 WBCS — SIGNIFICANT CHANGE UP (ref 0–0)
PH UR: 7.5 — SIGNIFICANT CHANGE UP (ref 5–8)
PHOSPHATE SERPL-MCNC: 2.5 MG/DL — SIGNIFICANT CHANGE UP (ref 2.1–4.9)
PLATELET # BLD AUTO: 209 K/UL — SIGNIFICANT CHANGE UP (ref 130–400)
PMV BLD: 11.6 FL — HIGH (ref 7.4–10.4)
POTASSIUM SERPL-MCNC: 4 MMOL/L — SIGNIFICANT CHANGE UP (ref 3.5–5)
POTASSIUM SERPL-SCNC: 4 MMOL/L — SIGNIFICANT CHANGE UP (ref 3.5–5)
PROT SERPL-MCNC: 7.4 G/DL — SIGNIFICANT CHANGE UP (ref 6–8)
PROT UR-MCNC: 300 MG/DL
PROTHROM AB SERPL-ACNC: 12.7 SEC — SIGNIFICANT CHANGE UP (ref 9.95–12.87)
RBC # BLD: 2.54 M/UL — LOW (ref 4.7–6.1)
RBC # FLD: 13.2 % — SIGNIFICANT CHANGE UP (ref 11.5–14.5)
SODIUM SERPL-SCNC: 134 MMOL/L — LOW (ref 135–146)
SP GR SPEC: 1.01 — SIGNIFICANT CHANGE UP (ref 1–1.03)
UROBILINOGEN FLD QL: 0.2 MG/DL — SIGNIFICANT CHANGE UP (ref 0.2–1)
WBC # BLD: 10.47 K/UL — SIGNIFICANT CHANGE UP (ref 4.8–10.8)
WBC # FLD AUTO: 10.47 K/UL — SIGNIFICANT CHANGE UP (ref 4.8–10.8)

## 2025-06-06 PROCEDURE — 86900 BLOOD TYPING SEROLOGIC ABO: CPT

## 2025-06-06 PROCEDURE — 99223 1ST HOSP IP/OBS HIGH 75: CPT

## 2025-06-06 PROCEDURE — 83735 ASSAY OF MAGNESIUM: CPT

## 2025-06-06 PROCEDURE — 82962 GLUCOSE BLOOD TEST: CPT

## 2025-06-06 PROCEDURE — 80053 COMPREHEN METABOLIC PANEL: CPT

## 2025-06-06 PROCEDURE — 85027 COMPLETE CBC AUTOMATED: CPT

## 2025-06-06 PROCEDURE — P9016: CPT

## 2025-06-06 PROCEDURE — 99285 EMERGENCY DEPT VISIT HI MDM: CPT

## 2025-06-06 PROCEDURE — 74176 CT ABD & PELVIS W/O CONTRAST: CPT | Mod: 26

## 2025-06-06 PROCEDURE — 80048 BASIC METABOLIC PNL TOTAL CA: CPT

## 2025-06-06 PROCEDURE — A9562: CPT

## 2025-06-06 PROCEDURE — 85730 THROMBOPLASTIN TIME PARTIAL: CPT

## 2025-06-06 PROCEDURE — 84100 ASSAY OF PHOSPHORUS: CPT

## 2025-06-06 PROCEDURE — 90935 HEMODIALYSIS ONE EVALUATION: CPT

## 2025-06-06 PROCEDURE — 85025 COMPLETE CBC W/AUTO DIFF WBC: CPT

## 2025-06-06 PROCEDURE — 76770 US EXAM ABDO BACK WALL COMP: CPT

## 2025-06-06 PROCEDURE — 99232 SBSQ HOSP IP/OBS MODERATE 35: CPT

## 2025-06-06 PROCEDURE — 71045 X-RAY EXAM CHEST 1 VIEW: CPT | Mod: 26

## 2025-06-06 PROCEDURE — 36415 COLL VENOUS BLD VENIPUNCTURE: CPT

## 2025-06-06 PROCEDURE — 76705 ECHO EXAM OF ABDOMEN: CPT

## 2025-06-06 PROCEDURE — 93010 ELECTROCARDIOGRAM REPORT: CPT

## 2025-06-06 PROCEDURE — 86901 BLOOD TYPING SEROLOGIC RH(D): CPT

## 2025-06-06 PROCEDURE — 78708 K FLOW/FUNCT IMAGE W/DRUG: CPT

## 2025-06-06 PROCEDURE — 86923 COMPATIBILITY TEST ELECTRIC: CPT

## 2025-06-06 PROCEDURE — 36430 TRANSFUSION BLD/BLD COMPNT: CPT

## 2025-06-06 PROCEDURE — 86850 RBC ANTIBODY SCREEN: CPT

## 2025-06-06 RX ORDER — CEFEPIME 2 G/20ML
2000 INJECTION, POWDER, FOR SOLUTION INTRAVENOUS
Refills: 0 | Status: DISCONTINUED | OUTPATIENT
Start: 2025-06-06 | End: 2025-06-10

## 2025-06-06 RX ORDER — SEVELAMER HYDROCHLORIDE 800 MG/1
800 TABLET ORAL THREE TIMES A DAY
Refills: 0 | Status: DISCONTINUED | OUTPATIENT
Start: 2025-06-06 | End: 2025-06-11

## 2025-06-06 RX ORDER — SENNA 187 MG
2 TABLET ORAL AT BEDTIME
Refills: 0 | Status: DISCONTINUED | OUTPATIENT
Start: 2025-06-06 | End: 2025-06-11

## 2025-06-06 RX ORDER — APIXABAN 2.5 MG/1
2.5 TABLET, FILM COATED ORAL EVERY 12 HOURS
Refills: 0 | Status: DISCONTINUED | OUTPATIENT
Start: 2025-06-06 | End: 2025-06-11

## 2025-06-06 RX ORDER — CALCITRIOL 0.5 UG/1
0.25 CAPSULE, GELATIN COATED ORAL DAILY
Refills: 0 | Status: DISCONTINUED | OUTPATIENT
Start: 2025-06-06 | End: 2025-06-11

## 2025-06-06 RX ORDER — CEFEPIME 2 G/20ML
2000 INJECTION, POWDER, FOR SOLUTION INTRAVENOUS ONCE
Refills: 0 | Status: COMPLETED | OUTPATIENT
Start: 2025-06-06 | End: 2025-06-06

## 2025-06-06 RX ORDER — POLYETHYLENE GLYCOL 3350 17 G/17G
17 POWDER, FOR SOLUTION ORAL DAILY
Refills: 0 | Status: DISCONTINUED | OUTPATIENT
Start: 2025-06-06 | End: 2025-06-11

## 2025-06-06 RX ORDER — CEFTRIAXONE 500 MG/1
2000 INJECTION, POWDER, FOR SOLUTION INTRAMUSCULAR; INTRAVENOUS ONCE
Refills: 0 | Status: COMPLETED | OUTPATIENT
Start: 2025-06-06 | End: 2025-06-06

## 2025-06-06 RX ORDER — B1/B2/B3/B5/B6/B12/VIT C/FOLIC 500-0.5 MG
1 TABLET ORAL DAILY
Refills: 0 | Status: DISCONTINUED | OUTPATIENT
Start: 2025-06-06 | End: 2025-06-11

## 2025-06-06 RX ORDER — FOLIC ACID 1 MG/1
1 TABLET ORAL DAILY
Refills: 0 | Status: DISCONTINUED | OUTPATIENT
Start: 2025-06-06 | End: 2025-06-11

## 2025-06-06 RX ADMIN — Medication 2 TABLET(S): at 22:25

## 2025-06-06 RX ADMIN — SEVELAMER HYDROCHLORIDE 800 MILLIGRAM(S): 800 TABLET ORAL at 22:24

## 2025-06-06 RX ADMIN — CEFEPIME 100 MILLIGRAM(S): 2 INJECTION, POWDER, FOR SOLUTION INTRAVENOUS at 22:24

## 2025-06-06 RX ADMIN — Medication 500 MILLILITER(S): at 14:09

## 2025-06-06 RX ADMIN — CEFTRIAXONE 100 MILLIGRAM(S): 500 INJECTION, POWDER, FOR SOLUTION INTRAMUSCULAR; INTRAVENOUS at 14:47

## 2025-06-06 NOTE — H&P ADULT - ASSESSMENT
Assessment  The pt is a 70y M with a PMHx of CKD V on dialysis, HTN, NIDDM, prior spinal abscess, nephrolithiasis and recurrent UTI s/p b/l stent placement and SPC presenting for evaluation of hypotension.    Plan  #hypotension 2/2 UTI?  #True infection vs Colonized   - on admission pt was hypotensive but hypotension has resolved with a 500CC bolus  - the pt has a chronic SPC with was changed recently on 5/29   - CT abd shows Increasing left-sided hydronephrosis. Bilateral nephroureteral stents in   place.   - As per ID last time the pt is colonized with Proteus/Serratia/Pseudomonas and MSSA in the urine   - Only hypotension present on admission  - Can continue with cefepime for now since possible ascending infection with hydronephrosis. Previous cultures showed serratia, proteus and pseudomonas were sensitive to cefepime.  - F/u urology for CT findings and possible exchange of stent  - F/u ID  - F/u BCX and UCX    #CKD V on dialysis   -sp TDC 6/2  -on HD  -unable to complete first HD session out/pt  - Nephro consult for in/pt HD as needed    #Hx SPC  -exchanged 5/29 by urology     #Chronic cholecystitis  #hida positive   -CT shows same findings  -sexton negative  -not septic   -asymptomatic   -OP surgery follow up     #Afib  -on eliquis, hold for now incase Urology plan stent exchange  - If no plan then resume eliquis    #HTN  -controlled off meds     #Constipation   -CTAP -  Large rectal stool burden, similar compared with prior  -Bowel reg    #Normocytic anemia suspected 2/2 advanced CKD  -b12 1269, folate 12.2  -iron 95, sat elevated , ferritin 438  -no active bleeding, continue to monitor   -cont aranesp 25 per nephro     #Hypocalcemia  -corrected calcium is normal    #Vitamin D deficiency likely 2/2 CKD  -c/w vit D supplementation  -Vitamin D, 25-Hydroxy: 11    #Concern for malnutrition  -cont supplement     #very poor dentition  -outpt fu     Dvt ppx- hold for now, resume norma if no plan from uro  Diet- Renal  Ambulate as tolerated      calcitriol 0.25 mcg oral capsule: 1 cap(s) orally once a day  Eliquis 5 mg oral tablet: 0.5 tab(s) orally every 12 hours  folic acid 1 mg oral tablet: 1 tab(s) orally once a day  magnesium gluconate 500 mg oral tablet: 1 tab(s) orally once a day  Multiple Vitamins oral tablet: 1 tab(s) orally once a day  pantoprazole 40 mg oral delayed release tablet: 1 tab(s) orally 2 times a day  sevelamer carbonate 0.8 g oral powder for reconstitution: 2 packet(s) orally 3 times a day (with meals)   Assessment  The pt is a 70y M with a PMHx of CKD V on dialysis, HTN, NIDDM, prior spinal abscess, nephrolithiasis and recurrent UTI s/p b/l stent placement and SPC presenting for evaluation of hypotension.    Plan  #hypotension 2/2 UTI?  #True infection vs Colonized   - on admission pt was hypotensive but hypotension has resolved with a 500CC bolus  - the pt has a chronic SPC with was changed recently on 5/29   - CT abd shows Increasing left-sided hydronephrosis. Bilateral nephroureteral stents in   place.   - As per ID last time the pt is colonized with Proteus/Serratia/Pseudomonas and MSSA in the urine   - Only hypotension present on admission  - Can continue with cefepime for now since possible ascending infection with hydronephrosis. Previous cultures showed serratia, proteus and pseudomonas were sensitive to cefepime.  - F/u urology for CT findings and possible exchange of stent  - F/u ID  - F/u BCX and UCX    #CKD V on dialysis MWF  -sp TDC 6/2  -on HD  -unable to complete first HD session out/pt  - Nephro consult for in/pt HD as needed    #Hx SPC  -exchanged 5/29 by urology     #Chronic cholecystitis  #hida positive   -CT shows same findings  -sexton negative  -not septic   -asymptomatic   -OP surgery follow up     #Afib  -on eliquis, hold for now incase Urology plan stent exchange  - If no plan then resume eliquis    #HTN  -controlled off meds     #Constipation   -CTAP -  Large rectal stool burden, similar compared with prior  -Bowel reg    #Normocytic anemia suspected 2/2 advanced CKD  -b12 1269, folate 12.2  -iron 95, sat elevated , ferritin 438  -no active bleeding, continue to monitor   -cont aranesp 25 per nephro     #Hypocalcemia  -corrected calcium is normal    #Vitamin D deficiency likely 2/2 CKD  -c/w vit D supplementation  -Vitamin D, 25-Hydroxy: 11    #Concern for malnutrition  -cont supplement     #very poor dentition  -outpt fu     Dvt ppx- hold for now, resume norma if no plan from uro  Diet- Renal  Ambulate as tolerated       Assessment  The pt is a 70y M with a PMHx of CKD V on dialysis, HTN, NIDDM, prior spinal abscess, nephrolithiasis and recurrent UTI s/p b/l stent placement and SPC presenting for evaluation of hypotension.    Plan  #hypotension 2/2 UTI?  #True infection vs Colonized   #r/o pyelonephritis  - on admission pt was hypotensive but hypotension has resolved with a 500CC bolus  - the pt has a chronic SPC with was changed recently on 5/29   - CT abd shows Increasing left-sided hydronephrosis. Bilateral nephroureteral stents in   place.   - As per ID last time the pt is colonized with Proteus/Serratia/Pseudomonas and MSSA in the urine   - Only hypotension present on admission  - Can continue with cefepime for now since possible ascending infection with hydronephrosis. Previous cultures showed serratia, proteus and pseudomonas were sensitive to cefepime.  - F/u urology for CT findings and possible exchange of stent  - F/u ID  - F/u BCX and UCX    #CKD V on dialysis MWF  -sp TDC 6/2  -on HD  -unable to complete first HD session out/pt  - Nephro consult for in/pt HD as needed    #Hx SPC  -exchanged 5/29 by urology     #Chronic cholecystitis  #hida positive   -CT shows same findings  -sexton negative  -not septic   -asymptomatic   -OP surgery follow up     #Afib  -on eliquis, hold for now incase Urology plan stent exchange  - If no plan then resume eliquis    #HTN  -controlled off meds     #Constipation   -CTAP -  Large rectal stool burden, similar compared with prior  -Bowel reg    #Normocytic anemia suspected 2/2 advanced CKD  -b12 1269, folate 12.2  -iron 95, sat elevated , ferritin 438  -no active bleeding, continue to monitor   -cont aranesp 25 per nephro     #Hypocalcemia  -corrected calcium is normal    #Vitamin D deficiency likely 2/2 CKD  -c/w vit D supplementation  -Vitamin D, 25-Hydroxy: 11    #Concern for malnutrition  -cont supplement     #very poor dentition  -outpt fu     Dvt ppx- hold for now, resume norma if no plan from uro  Diet- Renal  Ambulate as tolerated

## 2025-06-06 NOTE — CONSULT NOTE ADULT - ASSESSMENT
Patient is a 71 yo M with PMH of CKD V on dialysis, HTN, NIDDM, prior spinal abscess, nephrolithiasis and recurrent UTI s/p b/l stent placement and SPC presenting for evaluation of hypotension-  c/s for CT finding of Increasing left-sided hydronephrosis. Bilateral nephroureteral stents in place. Left intrarenal and ureteral air. Patient is also had hx of septic stone s/p cysto, b/l ureteral stent placement by Dr. Schilling on 5/1/25. +22 fr SPT in place draining cloudy yellow urine w/ debris, last exchanged 5/29/25.     Plan:   Case discussed and CT reviewed with Dr. Schilling, plan as follows:  - Continue IV abx/antifungals per ID - Cefepime in ED   - Continue SPT care  - F/u UCx   - Continue care per primary team   - Patient will need to f/u OP with Dr. Schilling for definitive stone management and stent removal once infection is controlled   Patient had cystoscopy, bilateral JJ stent placed on 5/1/25. If stent has not been removed with 3 months it can become encrusted and infected. Please send patient home with f/u appointment for outpatient ureteral stent removal and definitive stone management once infection is controlled   - d/w attending  Patient is a 71 yo M with PMH of CKD V on dialysis, HTN, NIDDM, prior spinal abscess, nephrolithiasis and recurrent UTI s/p b/l stent placement and SPC presenting for evaluation of hypotension-  c/s for CT finding of Increasing left-sided hydronephrosis. Bilateral nephroureteral stents in place. Left intrarenal and ureteral air. Patient is also had hx of septic stone s/p cysto, b/l ureteral stent placement by Dr. Schilling on 5/1/25. +22 fr SPT in place draining cloudy yellow urine w/ debris, last exchanged 5/29/25.     Plan:   Case discussed and CT reviewed with Dr. Schilling, plan as follows:  - No acute  intervention at this time   - Continue IV abx/antifungals per ID - Cefepime in ED   - Continue SPT care  - F/u UCx   - Continue care per primary team   - Patient will need to f/u OP with Dr. Schilling for definitive stone management and stent removal once infection is controlled   Patient had cystoscopy, bilateral JJ stent placed on 5/1/25. If stent has not been removed with 3 months it can become encrusted and infected. Please send patient home with f/u appointment for outpatient ureteral stent removal and definitive stone management once infection is controlled   - d/w attending  Patient is a 71 yo M with PMH of CKD V on dialysis, HTN, NIDDM, prior spinal abscess, nephrolithiasis and recurrent UTI s/p b/l stent placement and SPC presenting for evaluation of hypotension-  c/s for CT finding of Increasing left-sided hydronephrosis. Bilateral nephroureteral stents in place. Left intrarenal and ureteral air. Patient is also had hx of septic stone s/p cysto, b/l ureteral stent placement by Dr. Schilling on 5/1/25. +22 fr SPT in place draining cloudy yellow urine w/ debris, last exchanged 5/29/25.     Plan:   Case discussed and CT reviewed with Dr. Schilling, plan as follows:  - No acute  intervention at this time   - Continue IV abx/antifungals per ID - Cefepime in ED   - Continue SPT care  - F/u UCx   - Continue care per primary team   - Patient will need to f/u OP with Dr. Schilling for definitive stone management and stent removal once infection is controlled     Attending attestation:  The patient is a 70-year-old male with past medical history of HTN, DM2, CKD5, spinal cord abscess leading to quadriplegia, NGB with SPT, and bilateral kidney stones and bladder stones s/p PCNL x 2 with left ureteroscopy on 8/2024 by Dr. Akins, now with bilateral renal and left ureteral stones who presents for evaluation for further management of his stone disease. I saw him in the office on 4/21/2025 and planned on placing BL stents followed by BL CVAC and if infecitons stone found, PCNL. He presented to the ED on 4/30/2025 with sepsis from the left ureteral stone or migrated right renal stone causing unilateral versus bilateral obstruction of infected urine and I placed bilateral ureteral stents on 5/1/2025. Of note, there was trouble getting the left stent in place and I had to perform a ureteroscopy to get the wire up, which revealed a one centimeter narrowing on retrograde pyelogram and a bulge of tissue with friable edge on ureteroscopy. I did not perform a biopsy given the emergent nature of the procedure. This could be a stricture versus malignancy. Regadless the patient appears to have stent failure due to this compression. He will have a positive urine due to SPT. Would perform a RBUS to assess for jets and a NM renal scan for a more detailed characterization of any obstruction. If obstruction present, would consult IR for NT placement given stent failure with hydro and urine that will be persistently infected. Afterwards the patient can follow up with me, No acute  intervention.  signing off. Please call with questions or concerns.     I personally spent 40 minutes on the total care of this patient.

## 2025-06-06 NOTE — H&P ADULT - NSHPLABSRESULTS_GEN_ALL_CORE
LABS:                          7.5    10.47 )-----------( 209      ( 06 Jun 2025 13:06 )             23.9     06-06    134[L]  |  95[L]  |  10  ----------------------------<  75  4.0   |  30  |  2.0[H]    Ca    7.8[L]      06 Jun 2025 13:06  Phos  2.5     06-06  Mg     2.0     06-06    TPro  7.4  /  Alb  2.7[L]  /  TBili  0.3  /  DBili  x   /  AST  19  /  ALT  <5  /  AlkPhos  191[H]  06-06    PT/INR - ( 06 Jun 2025 13:06 )   PT: 12.70 sec;   INR: 1.07 ratio         PTT - ( 06 Jun 2025 13:06 )  PTT:35.5 sec

## 2025-06-06 NOTE — CONSULT NOTE ADULT - SUBJECTIVE AND OBJECTIVE BOX
UROLOGY CONSULT:        PAST MEDICAL & SURGICAL HISTORY:    DM (diabetes mellitus)  HTN (hypertension)  H/O paraplegia  Spinal abscess  Renal stones  Stage 5 chronic kidney disease not on chronic dialysis  Neurogenic dysfunction of the urinary bladder  Encounter for care or replacement of suprapubic tube  Spinal abscess  S/P Surgery  Encounter for care or replacement of suprapubic tube  S/P ORIF (open reduction internal fixation) fracture          MEDICATIONS  (STANDING):  calcitriol   Capsule 0.25 MICROGram(s) Oral daily  cefepime   IVPB 2000 milliGRAM(s) IV Intermittent <User Schedule>  cefepime   IVPB 2000 milliGRAM(s) IV Intermittent once  chlorhexidine 2% Cloths 1 Application(s) Topical <User Schedule>  folic acid 1 milliGRAM(s) Oral daily  multivitamin 1 Tablet(s) Oral daily  pantoprazole    Tablet 40 milliGRAM(s) Oral before breakfast  polyethylene glycol 3350 17 Gram(s) Oral daily  senna 2 Tablet(s) Oral at bedtime  sevelamer carbonate 800 milliGRAM(s) Oral three times a day    MEDICATIONS  (PRN):      Allergies    No Known Allergies    Intolerances        SOCIAL HISTORY: No illicit drug use    FAMILY HISTORY:  FH: HTN (hypertension)  FH: breast cancer  FH: melanoma  FH: heart disease        REVIEW OF SYSTEMS:  [ ] A ten-point review of systems was otherwise negative except as noted.     Vital Signs Last 24 Hrs  T(C): 36.4 (2025 16:41), Max: 36.4 (2025 11:08)  T(F): 97.6 (2025 16:41), Max: 97.6 (2025 16:41)  HR: 78 (2025 16:41) (62 - 78)  BP: 145/81 (2025 16:41) (106/68 - 145/81)  RR: 18 (2025 16:41) (18 - 18)  SpO2: 98% (2025 16:41) (98% - 100%)    Parameters below as of 2025 11:08  Patient On (Oxygen Delivery Method): nasal cannula  O2 Flow (L/min): 2      PHYSICAL EXAM:        I&O's Summary      LABS:                        7.5    10.47 )-----------( 209      ( 2025 13:06 )             23.9     06-06    134[L]  |  95[L]  |  10  ----------------------------<  75  4.0   |  30  |  2.0[H]    Ca    7.8[L]      2025 13:06  Phos  2.5     -  Mg     2.0     -    TPro  7.4  /  Alb  2.7[L]  /  TBili  0.3  /  DBili  x   /  AST  19  /  ALT  <5  /  AlkPhos  191[H]  06-06    PT/INR - ( 2025 13:06 )   PT: 12.70 sec;   INR: 1.07 ratio         PTT - ( 2025 13:06 )  PTT:35.5 sec  Urinalysis Basic - ( 2025 14:00 )    Color: Orange / Appearance: Turbid / S.013 / pH: x  Gluc: x / Ketone: x  / Bili: Negative / Urobili: 0.2 mg/dL   Blood: x / Protein: 300 mg/dL / Nitrite: Negative   Leuk Esterase: Large / RBC: 311 /HPF / WBC >998 /HPF   Sq Epi: x / Non Sq Epi: 3 /HPF / Bacteria: Many /HPF            RADIOLOGY & ADDITIONAL STUDIES:  < from: CT Abdomen and Pelvis No Cont (25 @ 13:57) >  ACC: [id number] EXAM:  CT ABDOMEN AND PELVIS   ORDERED BY: STEPAN GRAY     PROCEDURE DATE:  2025          INTERPRETATION:  CLINICAL INFORMATION: Nephrolithiasis    COMPARISON: May 23, 2025    CONTRAST/COMPLICATIONS:  IV Contrast: NONE  OralContrast: NONE.    PROCEDURE:  CT of the Abdomen and Pelvis was performed.  Sagittal and coronal reformats were performed.    FINDINGS:  LOWER CHEST: Trace bilateral effusions with lower lobe atelectasis. Small   pericardial effusion, new from prior.Extensive coronary atherosclerosis.    LIVER: Within normal limits.  BILE DUCTS: Normal caliber.  GALLBLADDER: Cholelithiasis with distended gallbladder and wall   thickening, similar from prior.  SPLEEN: Within normal limits.  PANCREAS: Within normal limits.  ADRENALS: Within normal limits.  KIDNEYS/URETERS: Bilateral nephroureteral catheters in place. Increasing   left hydronephrosis. Unchanged mild right hydroureter. Right intrarenal   and ureteral air, new from prior. Unchanged bilateral intrarenal   nephrolithiasis.    BLADDER: Suprapubic catheter within decompressed bladder  REPRODUCTIVE ORGANS: Unchanged    BOWEL: No bowel obstruction. No evidence for appendicitis. Large rectal   stool burden, similar compared with prior.  PERITONEUM/RETROPERITONEUM: Within normal limits.  VESSELS: Atherosclerotic changes.  LYMPH NODES: No lymphadenopathy.  ABDOMINAL WALL: Left inguinal fat-containing hernia.  BONES: Unchanged from prior    IMPRESSION:    Since May 23, 2025;    Increasing left-sided hydronephrosis. Bilateral nephroureteral stents in   place.  Left intrarenal and ureteral air, possibly reflecting infection.  Unchanged distended gallbladder with cholelithiasis, wall thickening and   pericholecystic stranding.  New small pericardial effusion and trace bilateral bilateral pleural   effusions    --- End of Report ---        ELLIOT LANDAU MD; Attending Radiologist  This document has been electronically signed. 2025  2:05PM    < end of copied text >   UROLOGY CONSULT:    Patient is a 71 yo M with PMH of CKD V on dialysis, HTN, NIDDM, prior spinal abscess, nephrolithiasis and recurrent UTI s/p b/l stent placement and SPC presenting for evaluation of hypotension-  c/s for CT finding of Increasing left-sided hydronephrosis. Bilateral nephroureteral stents in place. Left intrarenal and ureteral air. Patient seen and examined at bedside. Of note, patient was recently admitted from  -  for acute rise in Cr, sent in by his Nephrologist Dr. Mcallister. Per chart review, patient had his first outpatient dialysis treatment today but per patient's brother he was tired and not as responsive. Patient reports he was doing well after discharge; however, after dialysis he did not feel well. Denies any fever, chills, SOB/difficulty breathing, CP, abdominal pain, flank pain.     In ED, WBC 10.47, Cr 2.0, UA positive for large leuks, many bacteria, yeast cells. Patient is also had hx of septic stone s/p cysto, b/l ureteral stent placement by Dr. Schilling on 25. +22 fr SPT in place draining cloudy yellow urine w/ debris, last exchanged 25.     PAST MEDICAL & SURGICAL HISTORY:    DM (diabetes mellitus)  HTN (hypertension)  H/O paraplegia  Spinal abscess  Renal stones  Stage 5 chronic kidney disease not on chronic dialysis  Neurogenic dysfunction of the urinary bladder  Encounter for care or replacement of suprapubic tube  Spinal abscess  S/P Surgery  Encounter for care or replacement of suprapubic tube  S/P ORIF (open reduction internal fixation) fracture          MEDICATIONS  (STANDING):  calcitriol   Capsule 0.25 MICROGram(s) Oral daily  cefepime   IVPB 2000 milliGRAM(s) IV Intermittent <User Schedule>  cefepime   IVPB 2000 milliGRAM(s) IV Intermittent once  chlorhexidine 2% Cloths 1 Application(s) Topical <User Schedule>  folic acid 1 milliGRAM(s) Oral daily  multivitamin 1 Tablet(s) Oral daily  pantoprazole    Tablet 40 milliGRAM(s) Oral before breakfast  polyethylene glycol 3350 17 Gram(s) Oral daily  senna 2 Tablet(s) Oral at bedtime  sevelamer carbonate 800 milliGRAM(s) Oral three times a day    MEDICATIONS  (PRN):      Allergies    No Known Allergies    Intolerances        SOCIAL HISTORY: No illicit drug use    FAMILY HISTORY:  FH: HTN (hypertension)  FH: breast cancer  FH: melanoma  FH: heart disease        REVIEW OF SYSTEMS:  [ ] A ten-point review of systems was otherwise negative except as noted.     Vital Signs Last 24 Hrs  T(C): 36.4 (2025 16:41), Max: 36.4 (2025 11:08)  T(F): 97.6 (2025 16:41), Max: 97.6 (2025 16:41)  HR: 78 (2025 16:41) (62 - 78)  BP: 145/81 (2025 16:41) (106/68 - 145/81)  RR: 18 (2025 16:41) (18 - 18)  SpO2: 98% (2025 16:41) (98% - 100%)    Parameters below as of 2025 11:08  Patient On (Oxygen Delivery Method): nasal cannula  O2 Flow (L/min): 2      PHYSICAL EXAM:    GEN: NAD,   SKIN: Good color, non diaphoretic.  HEENT: NC/AT.  RESP: No dyspnea, non-labored breathing. No use of accessory muscles.  CARDIO: +S1/S2  ABDO: Soft, NT/ND, no palpable bladder, no suprapubic tenderness/ + Suprapubic Tube draining cloudy yellow urine w/ debris   BACK: No CVAT B/L  : +Diaper in place       I&O's Summary      LABS:                        7.5    10.47 )-----------( 209      ( 2025 13:06 )             23.9     06-06    134[L]  |  95[L]  |  10  ----------------------------<  75  4.0   |  30  |  2.0[H]    Ca    7.8[L]      2025 13:06  Phos  2.5     06-06  Mg     2.0     06-06    TPro  7.4  /  Alb  2.7[L]  /  TBili  0.3  /  DBili  x   /  AST  19  /  ALT  <5  /  AlkPhos  191[H]  06-06    PT/INR - ( 2025 13:06 )   PT: 12.70 sec;   INR: 1.07 ratio         PTT - ( 2025 13:06 )  PTT:35.5 sec  Urinalysis Basic - ( 2025 14:00 )    Color: Orange / Appearance: Turbid / S.013 / pH: x  Gluc: x / Ketone: x  / Bili: Negative / Urobili: 0.2 mg/dL   Blood: x / Protein: 300 mg/dL / Nitrite: Negative   Leuk Esterase: Large / RBC: 311 /HPF / WBC >998 /HPF   Sq Epi: x / Non Sq Epi: 3 /HPF / Bacteria: Many /HPF            RADIOLOGY & ADDITIONAL STUDIES:  < from: CT Abdomen and Pelvis No Cont (25 @ 13:57) >  ACC: [id number] EXAM:  CT ABDOMEN AND PELVIS   ORDERED BY: STEPAN GRAY     PROCEDURE DATE:  2025          INTERPRETATION:  CLINICAL INFORMATION: Nephrolithiasis    COMPARISON: May 23, 2025    CONTRAST/COMPLICATIONS:  IV Contrast: NONE  OralContrast: NONE.    PROCEDURE:  CT of the Abdomen and Pelvis was performed.  Sagittal and coronal reformats were performed.    FINDINGS:  LOWER CHEST: Trace bilateral effusions with lower lobe atelectasis. Small   pericardial effusion, new from prior.Extensive coronary atherosclerosis.    LIVER: Within normal limits.  BILE DUCTS: Normal caliber.  GALLBLADDER: Cholelithiasis with distended gallbladder and wall   thickening, similar from prior.  SPLEEN: Within normal limits.  PANCREAS: Within normal limits.  ADRENALS: Within normal limits.  KIDNEYS/URETERS: Bilateral nephroureteral catheters in place. Increasing   left hydronephrosis. Unchanged mild right hydroureter. Right intrarenal   and ureteral air, new from prior. Unchanged bilateral intrarenal   nephrolithiasis.    BLADDER: Suprapubic catheter within decompressed bladder  REPRODUCTIVE ORGANS: Unchanged    BOWEL: No bowel obstruction. No evidence for appendicitis. Large rectal   stool burden, similar compared with prior.  PERITONEUM/RETROPERITONEUM: Within normal limits.  VESSELS: Atherosclerotic changes.  LYMPH NODES: No lymphadenopathy.  ABDOMINAL WALL: Left inguinal fat-containing hernia.  BONES: Unchanged from prior    IMPRESSION:    Since May 23, 2025;    Increasing left-sided hydronephrosis. Bilateral nephroureteral stents in   place.  Left intrarenal and ureteral air, possibly reflecting infection.  Unchanged distended gallbladder with cholelithiasis, wall thickening and   pericholecystic stranding.  New small pericardial effusion and trace bilateral bilateral pleural   effusions    --- End of Report ---        ELLIOT LANDAU MD; Attending Radiologist  This document has been electronically signed. 2025  2:05PM    < end of copied text >

## 2025-06-06 NOTE — H&P ADULT - NSHPSOURCEINFORD_GEN_ALL_CORE
Poor oral intake within the last 24 hours/Previous finger stick less than 100 mg/dL None Chart(s)/Patient

## 2025-06-06 NOTE — ED PROVIDER NOTE - PHYSICAL EXAMINATION
VITAL SIGNS: I have reviewed nursing notes and confirm.  CONSTITUTIONAL: NAD  SKIN: Skin exam is warm and dry, no acute rash.  HEAD: Normocephalic; atraumatic.  EYES: PERRL, EOM intact; conjunctiva and sclera clear.  ENT: No nasal discharge; airway clear. .  CARD: S1, S2 normal; no murmurs, gallops, or rubs. Regular rate and rhythm.  RESP: Normal respiratory effort, no tachypnea or distress. Lungs CTAB, no wheezes, rales or rhonchi.  ABD: soft, NT/ND.  EXT: Normal ROM. No clubbing, cyanosis or edema.  NEURO:AAOx3, No focal deficits.  PSYCH: Cooperative, appropriate.

## 2025-06-06 NOTE — ED ADULT NURSE NOTE - DO YOU HAVE ACCESS TO A FIREARM WITHIN OR OUTSIDE YOUR HOUSEHOLD?
DEXA scan reviewed with the patient-shows osteoporosis-patient counseled on increased risk of fracture  Counseled on fall prevention  For now continue calcium and vitamin-D supplementations  She should be on pharmacological therapy to improve bone mineral density and decrease  risk of fractures  I have discussed either oral or IV bisphosphonates as well as Prolia  She has in start oral bisphosphonate once a week  Side effects discussed 
No

## 2025-06-06 NOTE — ED ADULT NURSE NOTE - BREATHING, MLM
"[facility] Obstetrics Post-Op Progress Note  10/12/2019    S: Doing well.  Pain is well controlled. Bleeding Moderate. Infant is being .  Ambulatory.  Tolerating regular diet. Voiding spontaneously. Passing gas, no BM yet.    O:  /83   Pulse 66   Temp 97.7  F (36.5  C) (Oral)   Resp 16   Ht 1.727 m (5' 8\")   Wt 102.5 kg (226 lb)   LMP 2019   SpO2 98%   Breastfeeding? Unknown   BMI 34.36 kg/m     Gen: healthy, alert and no distress   Resp: Nonlabored breathing  Abd: soft, non-distended, appropriately TTP, FF at u  Incision: C/D/I, staples in place   Ext: non-tender, no edema    Hemoglobin   Date Value Ref Range Status   10/11/2019 12.7 11.7 - 15.7 g/dL Final   10/10/2019 13.2 11.7 - 15.7 g/dL Final     Lab Results   Component Value Date    ABO A 10/10/2019    RH Pos 10/10/2019    AS Neg 10/10/2019       A: 36 year old  POD#2 s/p RLTCS   CHTN-stable on labetalol 200mg BID and Nifedipine 30mg     P:   Routine post-operative care  Continue current BP med sand doses  Ambulation encouraged  Breast feeding strategies discussed  Reportable signs and symptoms dicussed with the patient  Staples to be removed and Steri-Strips placed tomorrow  Anticipate discharge tomorrow and discontinue done but no Oxycodone Rx signed (the rest I e-prescribed to [place]) as I had to urgently leave to go to [facility]    Fernanda Parker, DO   Obstetrics, Gynecology, and Infertility      " 97 Spontaneous, unlabored and symmetrical

## 2025-06-06 NOTE — H&P ADULT - ATTENDING COMMENTS
Assessment    Hypotension which resolved with fluid bolus, possibly secondary to UTI  CKD V on dialysis  Ureteral stents and left-sided hydro  Hx of SPC  Chronic cholecystitis  Afib on Eliquis  Constipation    Plan    - c/w cefepime for now, f/u cultures  - f/u nephrology  - at the moment still no abdominal symptoms but CT findings still showing acute cholecystitis, will order RUQ and have ID evaluate to help determine if possible cause of symptoms, seen by surgery in the past with no intervention  - uro recs appreciated  - c/w home eliquis  - bowel regimen, may need to escalate based on bms    Pending: cultures, ID, RUQ US, nephro    # DVT PPX: Eliquis    GENERAL: NAD, lying in bed comfortably  HEAD:  Atraumatic, normocephalic  NERVOUS SYSTEM:  A&Ox3, moving all extremities, no focal deficits   EYES: EOMI, PERRL  NECK: Supple, trachea midline, no JVD  HEART: Regular rate and rhythm  LUNGS: Clear to auscultation bilaterally, no crackles, wheezing, or rhonchi  ABDOMEN: Soft, nontender, nondistended, +BS  EXTREMITIES: 2+ peripheral pulses bilaterally. No clubbing, cyanosis, or edema    75 minutes spent on review of labs, imaging studies, old records, obtaining history, personally examining patient, counselling and communicating with patient/ family, entering orders for medications/tests/etc, discussions with other health care providers, documentation in electronic health records, independent interpretation of labs, imaging/procedure results and care coordination.

## 2025-06-06 NOTE — ED PROVIDER NOTE - PROGRESS NOTE DETAILS
Spoke with Dr. Antonio Denney, the patient's nephrologist who was at the facility.  States that she did not remove any fluid off the patient and dialysis is likely not the reason for hypotension.

## 2025-06-06 NOTE — ED PROVIDER NOTE - CLINICAL SUMMARY MEDICAL DECISION MAKING FREE TEXT BOX
71 y/o M ESRD on HD (on third session total, recently initiated, didn't complete HD today, only 2.5 hours, no fluid taken off), HTN, renal stones, paraplegia sent from HD for hypotension, now resolved. Pt has no complaints. Here with son who says pt at baseline. Thinks he maybe looks a little yellow.     On exam, BP resolved. Pt cachectic, clear lungs, abd soft non tender. No CVA TTP. Contracted LE. Permacath R chest wall with no surrounding signs of infection. Suprapubic catheter in place draining cloudy purulent urine.     Septic work-up sent. 30 cc/kg contraindicated i/s/o ESRD. Giving 500 cc fluids. Broad spectrum abx. Will admit. CXR shows PNA, UA infected.

## 2025-06-06 NOTE — H&P ADULT - NSHPPHYSICALEXAM_GEN_ALL_CORE
CONSTITUTIONAL: NAD  SKIN: Skin exam is warm and dry, no acute rash.  HEAD: Normocephalic; atraumatic.  EYES: PERRL, EOM intact; conjunctiva and sclera clear.  ENT: No nasal discharge; airway clear. .  CARD: S1, S2 normal; no murmurs, gallops, or rubs. Regular rate and rhythm.  RESP: Normal respiratory effort, no tachypnea or distress. Lungs CTAB, no wheezes, rales or rhonchi.  ABD: soft, NT/ND.  EXT: Normal ROM. No clubbing, cyanosis or edema.  NEURO:AAOx3, No focal deficits.  PSYCH: Cooperative, appropriate

## 2025-06-06 NOTE — ED PROVIDER NOTE - OBJECTIVE STATEMENT
70y M pmh CKD V, HTN, NIDDM, prior spinal abscess, nephrolithiasis and recurrent UTI s/p b/l stent placement and SPC presenting for evaluation of hypotension.  Brother at bedside states patient was getting dialysis when he began to feel tired and less responsive.  Unsure of blood pressure however states that it was low.  Brother says patient still "in and out of it".  Of note patient was discharged from [facility] yesterday after admission for urosepsis.  Patient s/p TDC right subclavian during admission.  2 sessions of HD during admission.  First session outpatient today.  Denies fever, chills, chest pain, SOB.  Of note patient with history of kidney stones, states last time he had kidney stone he had no pain.  Patient with suprapubic catheter, changed 2 days ago.

## 2025-06-06 NOTE — H&P ADULT - HISTORY OF PRESENT ILLNESS
The pt is a 70y M with a PMHx of CKD V on dialysis, HTN, NIDDM, prior spinal abscess, nephrolithiasis and recurrent UTI s/p b/l stent placement and SPC presenting for evaluation of hypotension. The pts brother at bedside states that patient was getting dialysis when he began to feel tired and less responsive. He is unsure of the blood pressure but states that it was low. The brother also states that the patient is "in and out of it".  The patient was recently discharged from [facility] yesterday after admission for urosepsis. He has a TDC in the right subclavian.  Today was his first outpt session of dilaysis. He denies fever, chills, chest pain, SOB. His SPC was last exchanged 5/29.    In the ED,  Vital Signs Last 24 Hrs  T(C): 36.4 (06 Jun 2025 16:41), Max: 36.4 (06 Jun 2025 11:08)  T(F): 97.6 (06 Jun 2025 16:41), Max: 97.6 (06 Jun 2025 16:41)  HR: 78 (06 Jun 2025 16:41) (62 - 78)  BP: 145/81 (06 Jun 2025 16:41) (106/68 - 145/81)  BP(mean): --  RR: 18 (06 Jun 2025 16:41) (18 - 18)  SpO2: 98% (06 Jun 2025 16:41) (98% - 100%)    Parameters below as of 06 Jun 2025 11:08  Patient On (Oxygen Delivery Method): nasal cannula  O2 Flow (L/min): 2    Labs show anemia with an hb of 7.5( at baseline), Normal WBC count, UA grossly positive    CTabd  Increasing left-sided hydronephrosis. Bilateral nephroureteral stents in   place.Left intrarenal and ureteral air, possibly reflecting infection.  Unchanged distended gallbladder with cholelithiasis, wall thickening and   pericholecystic stranding.  New small pericardial effusion and trace bilateral bilateral pleural   effusions

## 2025-06-07 LAB
ALBUMIN SERPL ELPH-MCNC: 2.9 G/DL — LOW (ref 3.5–5.2)
ALP SERPL-CCNC: 170 U/L — HIGH (ref 30–115)
ALT FLD-CCNC: <5 U/L — SIGNIFICANT CHANGE UP (ref 0–41)
ANION GAP SERPL CALC-SCNC: 14 MMOL/L — SIGNIFICANT CHANGE UP (ref 7–14)
APTT BLD: 36.2 SEC — SIGNIFICANT CHANGE UP (ref 27–39.2)
AST SERPL-CCNC: 15 U/L — SIGNIFICANT CHANGE UP (ref 0–41)
BASOPHILS # BLD AUTO: 0.08 K/UL — SIGNIFICANT CHANGE UP (ref 0–0.2)
BASOPHILS NFR BLD AUTO: 1 % — SIGNIFICANT CHANGE UP (ref 0–1)
BILIRUB SERPL-MCNC: 0.2 MG/DL — SIGNIFICANT CHANGE UP (ref 0.2–1.2)
BUN SERPL-MCNC: 18 MG/DL — SIGNIFICANT CHANGE UP (ref 10–20)
CALCIUM SERPL-MCNC: 7.7 MG/DL — LOW (ref 8.4–10.5)
CHLORIDE SERPL-SCNC: 95 MMOL/L — LOW (ref 98–110)
CO2 SERPL-SCNC: 25 MMOL/L — SIGNIFICANT CHANGE UP (ref 17–32)
CREAT SERPL-MCNC: 3.3 MG/DL — HIGH (ref 0.7–1.5)
EGFR: 19 ML/MIN/1.73M2 — LOW
EGFR: 19 ML/MIN/1.73M2 — LOW
EOSINOPHIL # BLD AUTO: 0.09 K/UL — SIGNIFICANT CHANGE UP (ref 0–0.7)
EOSINOPHIL NFR BLD AUTO: 1.1 % — SIGNIFICANT CHANGE UP (ref 0–8)
GLUCOSE BLDC GLUCOMTR-MCNC: 142 MG/DL — HIGH (ref 70–99)
GLUCOSE BLDC GLUCOMTR-MCNC: 185 MG/DL — HIGH (ref 70–99)
GLUCOSE BLDC GLUCOMTR-MCNC: 186 MG/DL — HIGH (ref 70–99)
GLUCOSE BLDC GLUCOMTR-MCNC: 54 MG/DL — CRITICAL LOW (ref 70–99)
GLUCOSE BLDC GLUCOMTR-MCNC: 55 MG/DL — LOW (ref 70–99)
GLUCOSE BLDC GLUCOMTR-MCNC: 57 MG/DL — LOW (ref 70–99)
GLUCOSE BLDC GLUCOMTR-MCNC: 57 MG/DL — LOW (ref 70–99)
GLUCOSE BLDC GLUCOMTR-MCNC: 91 MG/DL — SIGNIFICANT CHANGE UP (ref 70–99)
GLUCOSE SERPL-MCNC: 213 MG/DL — HIGH (ref 70–99)
HCT VFR BLD CALC: 23.8 % — LOW (ref 42–52)
HGB BLD-MCNC: 7.2 G/DL — LOW (ref 14–18)
IMM GRANULOCYTES NFR BLD AUTO: 0.7 % — HIGH (ref 0.1–0.3)
LYMPHOCYTES # BLD AUTO: 1.08 K/UL — LOW (ref 1.2–3.4)
LYMPHOCYTES # BLD AUTO: 13.4 % — LOW (ref 20.5–51.1)
MCHC RBC-ENTMCNC: 28.8 PG — SIGNIFICANT CHANGE UP (ref 27–31)
MCHC RBC-ENTMCNC: 30.3 G/DL — LOW (ref 32–37)
MCV RBC AUTO: 95.2 FL — HIGH (ref 80–94)
MONOCYTES # BLD AUTO: 0.53 K/UL — SIGNIFICANT CHANGE UP (ref 0.1–0.6)
MONOCYTES NFR BLD AUTO: 6.6 % — SIGNIFICANT CHANGE UP (ref 1.7–9.3)
NEUTROPHILS # BLD AUTO: 6.2 K/UL — SIGNIFICANT CHANGE UP (ref 1.4–6.5)
NEUTROPHILS NFR BLD AUTO: 77.2 % — HIGH (ref 42.2–75.2)
NRBC BLD AUTO-RTO: 0 /100 WBCS — SIGNIFICANT CHANGE UP (ref 0–0)
PHOSPHATE SERPL-MCNC: 4.4 MG/DL — SIGNIFICANT CHANGE UP (ref 2.1–4.9)
PLATELET # BLD AUTO: 216 K/UL — SIGNIFICANT CHANGE UP (ref 130–400)
PMV BLD: 11.3 FL — HIGH (ref 7.4–10.4)
POTASSIUM SERPL-MCNC: 3.8 MMOL/L — SIGNIFICANT CHANGE UP (ref 3.5–5)
POTASSIUM SERPL-SCNC: 3.8 MMOL/L — SIGNIFICANT CHANGE UP (ref 3.5–5)
PROT SERPL-MCNC: 6.8 G/DL — SIGNIFICANT CHANGE UP (ref 6–8)
RBC # BLD: 2.5 M/UL — LOW (ref 4.7–6.1)
RBC # FLD: 13.2 % — SIGNIFICANT CHANGE UP (ref 11.5–14.5)
SODIUM SERPL-SCNC: 134 MMOL/L — LOW (ref 135–146)
WBC # BLD: 8.04 K/UL — SIGNIFICANT CHANGE UP (ref 4.8–10.8)
WBC # FLD AUTO: 8.04 K/UL — SIGNIFICANT CHANGE UP (ref 4.8–10.8)

## 2025-06-07 PROCEDURE — 99231 SBSQ HOSP IP/OBS SF/LOW 25: CPT

## 2025-06-07 PROCEDURE — 99232 SBSQ HOSP IP/OBS MODERATE 35: CPT

## 2025-06-07 RX ORDER — DEXTROSE 50 % IN WATER 50 %
15 SYRINGE (ML) INTRAVENOUS ONCE
Refills: 0 | Status: DISCONTINUED | OUTPATIENT
Start: 2025-06-07 | End: 2025-06-11

## 2025-06-07 RX ORDER — SODIUM CHLORIDE 9 G/1000ML
1000 INJECTION, SOLUTION INTRAVENOUS
Refills: 0 | Status: DISCONTINUED | OUTPATIENT
Start: 2025-06-07 | End: 2025-06-11

## 2025-06-07 RX ORDER — DEXTROSE 50 % IN WATER 50 %
25 SYRINGE (ML) INTRAVENOUS ONCE
Refills: 0 | Status: DISCONTINUED | OUTPATIENT
Start: 2025-06-07 | End: 2025-06-11

## 2025-06-07 RX ORDER — DEXTROSE 50 % IN WATER 50 %
12.5 SYRINGE (ML) INTRAVENOUS ONCE
Refills: 0 | Status: DISCONTINUED | OUTPATIENT
Start: 2025-06-07 | End: 2025-06-11

## 2025-06-07 RX ORDER — DEXTROSE 50 % IN WATER 50 %
25 SYRINGE (ML) INTRAVENOUS ONCE
Refills: 0 | Status: COMPLETED | OUTPATIENT
Start: 2025-06-07 | End: 2025-06-07

## 2025-06-07 RX ORDER — GLUCAGON 3 MG/1
1 POWDER NASAL ONCE
Refills: 0 | Status: DISCONTINUED | OUTPATIENT
Start: 2025-06-07 | End: 2025-06-11

## 2025-06-07 RX ADMIN — SEVELAMER HYDROCHLORIDE 800 MILLIGRAM(S): 800 TABLET ORAL at 05:22

## 2025-06-07 RX ADMIN — Medication 1 TABLET(S): at 11:20

## 2025-06-07 RX ADMIN — FOLIC ACID 1 MILLIGRAM(S): 1 TABLET ORAL at 11:20

## 2025-06-07 RX ADMIN — Medication 1 APPLICATION(S): at 05:22

## 2025-06-07 RX ADMIN — Medication 2 TABLET(S): at 21:08

## 2025-06-07 RX ADMIN — Medication 40 MILLIGRAM(S): at 05:22

## 2025-06-07 RX ADMIN — APIXABAN 2.5 MILLIGRAM(S): 2.5 TABLET, FILM COATED ORAL at 17:02

## 2025-06-07 RX ADMIN — APIXABAN 2.5 MILLIGRAM(S): 2.5 TABLET, FILM COATED ORAL at 05:22

## 2025-06-07 RX ADMIN — APIXABAN 2.5 MILLIGRAM(S): 2.5 TABLET, FILM COATED ORAL at 00:10

## 2025-06-07 RX ADMIN — SEVELAMER HYDROCHLORIDE 800 MILLIGRAM(S): 800 TABLET ORAL at 21:08

## 2025-06-07 RX ADMIN — Medication 25 GRAM(S): at 08:32

## 2025-06-07 RX ADMIN — CALCITRIOL 0.25 MICROGRAM(S): 0.5 CAPSULE, GELATIN COATED ORAL at 11:20

## 2025-06-07 RX ADMIN — SEVELAMER HYDROCHLORIDE 800 MILLIGRAM(S): 800 TABLET ORAL at 14:04

## 2025-06-07 NOTE — CONSULT NOTE ADULT - SUBJECTIVE AND OBJECTIVE BOX
The pt is a 70y M with a PMHx of CKD V on dialysis, HTN, NIDDM, prior spinal abscess, nephrolithiasis and recurrent UTI s/p b/l stent placement and SPC presenting for evaluation of hypotension  last HD yesterday     PAST HISTORY  --------------------------------------------------------------------------------  PAST MEDICAL & SURGICAL HISTORY:  DM (diabetes mellitus)      HTN (hypertension)      H/O paraplegia      Spinal abscess      Renal stones      Stage 5 chronic kidney disease not on chronic dialysis      Neurogenic dysfunction of the urinary bladder      Encounter for care or replacement of suprapubic tube      Spinal abscess  S/P Surgery      Encounter for care or replacement of suprapubic tube      S/P ORIF (open reduction internal fixation) fracture        FAMILY HISTORY:  FH: HTN (hypertension)    FH: breast cancer    FH: melanoma    FH: heart disease      PAST SOCIAL HISTORY:    ALLERGIES & MEDICATIONS  --------------------------------------------------------------------------------  Allergies    No Known Allergies    Intolerances      Standing Inpatient Medications  apixaban 2.5 milliGRAM(s) Oral every 12 hours  calcitriol   Capsule 0.25 MICROGram(s) Oral daily  cefepime   IVPB 2000 milliGRAM(s) IV Intermittent <User Schedule>  chlorhexidine 2% Cloths 1 Application(s) Topical <User Schedule>  dextrose 5%. 1000 milliLiter(s) IV Continuous <Continuous>  dextrose 5%. 1000 milliLiter(s) IV Continuous <Continuous>  dextrose 50% Injectable 25 Gram(s) IV Push once  dextrose 50% Injectable 12.5 Gram(s) IV Push once  dextrose 50% Injectable 25 Gram(s) IV Push once  dextrose Oral Gel 15 Gram(s) Oral once  folic acid 1 milliGRAM(s) Oral daily  glucagon  Injectable 1 milliGRAM(s) IntraMuscular once  multivitamin 1 Tablet(s) Oral daily  pantoprazole    Tablet 40 milliGRAM(s) Oral before breakfast  polyethylene glycol 3350 17 Gram(s) Oral daily  senna 2 Tablet(s) Oral at bedtime  sevelamer carbonate 800 milliGRAM(s) Oral three times a day      VITALS/PHYSICAL EXAM  --------------------------------------------------------------------------------  T(C): 36.9 (06-07-25 @ 05:04), Max: 36.9 (06-07-25 @ 05:04)  HR: 91 (06-07-25 @ 05:04) (73 - 91)  BP: 106/54 (06-07-25 @ 05:04) (106/54 - 145/81)  RR: 19 (06-07-25 @ 05:04) (18 - 19)  SpO2: 98% (06-07-25 @ 05:04) (98% - 99%)  Wt(kg): --  Height (cm): 182.9 (06-06-25 @ 11:11)  Weight (kg): 61.2 (06-06-25 @ 11:11)  BMI (kg/m2): 18.3 (06-06-25 @ 11:11)  BSA (m2): 1.8 (06-06-25 @ 11:11)      Physical Exam:  	Gen: NAD  	Pulm: CTA B/L  	CV: S1S2; no rub  	Abd: +SPC   	LE:  no edema  	Vascular access:tdc    LABS/STUDIES  --------------------------------------------------------------------------------              7.2    8.04  >-----------<  216      [06-07-25 @ 08:51]              23.8     134  |  95  |  18  ----------------------------<  213      [06-07-25 @ 08:51]  3.8   |  25  |  3.3        Ca     7.7     [06-07-25 @ 08:51]      Mg     2.0     [06-06-25 @ 13:06]      Phos  4.4     [06-07-25 @ 08:51]    TPro  6.8  /  Alb  2.9  /  TBili  0.2  /  DBili  x   /  AST  15  /  ALT  <5  /  AlkPhos  170  [06-07-25 @ 08:51]    PT/INR: PT 12.70, INR 1.07       [06-06-25 @ 13:06]  PTT: 36.2       [06-07-25 @ 08:51]      Creatinine Trend:  SCr 3.3 [06-07 @ 08:51]  SCr 2.0 [06-06 @ 13:06]  SCr 3.4 [06-05 @ 05:53]  SCr 4.8 [06-04 @ 05:35]  SCr 7.1 [06-03 @ 05:55]    Urinalysis - [06-07-25 @ 08:51]      Color  / Appearance  / SG  / pH       Gluc 213 / Ketone   / Bili  / Urobili        Blood  / Protein  / Leuk Est  / Nitrite       RBC  / WBC  / Hyaline  / Gran  / Sq Epi  / Non Sq Epi  / Bacteria       Iron 73, TIBC 107, %sat 68      [05-28-25 @ 11:08]  Ferritin 438      [05-04-25 @ 06:10]  PTH -- (Ca 6.1)      [05-26-25 @ 04:47]   288  PTH -- (Ca 8.3)      [07-10-24 @ 21:08]   43  Vitamin D (25OH) 11      [05-26-25 @ 04:47]  TSH 5.27      [04-30-25 @ 17:55]    HBsAb <3.3      [06-03-25 @ 10:30]  HBsAg Nonreact      [06-02-25 @ 11:28]  HBcAb Nonreact      [06-02-25 @ 11:28]  HCV 0.06, Nonreact      [06-02-25 @ 11:28]    TAY: titer Negative, pattern --      [08-13-24 @ 06:21]  dsDNA <12      [09-17-23 @ 07:23]  C3 Complement 132      [09-16-23 @ 15:44]  C4 Complement 35      [09-16-23 @ 15:44]  Rheumatoid Factor <10      [08-13-24 @ 06:21]  ANCA: cANCA Negative, pANCA Negative, atypical ANCA Negative      [09-17-23 @ 07:23]  PLA2R: ARLEEN <1.8, IFA --      [09-17-23 @ 07:43]  Free Light Chains: kappa 35.40, lambda 35.96, ratio = 0.98      [09-17 @ 07:23]  Immunofixation Serum:   No Monoclonal Band Identified  < from: CT Abdomen and Pelvis No Cont (06.06.25 @ 13:57) >  Increasing left-sided hydronephrosis. Bilateral nephroureteral stents in   place.    Left intrarenal and ureteral air, possibly reflecting infection.    Unchanged distended gallbladder with cholelithiasis, wall thickening and   pericholecystic stranding.    New small pericardial effusion and trace bilateral bilateral pleural   effusions    < end of copied text >      Reference Range: None Detected      [09-17-23 @ 07:23]  SPEP Interpretation: Polyclonal Gammopathy      [09-17-23 @ 07:23]

## 2025-06-07 NOTE — PROGRESS NOTE ADULT - SUBJECTIVE AND OBJECTIVE BOX
UROLOGY DAILY PROGRESS NOTE    Pt. seen and examined at bedside [facility] with Dr. Schilling. SPT in place drain yellow urine. Afebrile. Denies abdominal or flank pain.     MEDICATIONS  (STANDING):  apixaban 2.5 milliGRAM(s) Oral every 12 hours  calcitriol   Capsule 0.25 MICROGram(s) Oral daily  cefepime   IVPB 2000 milliGRAM(s) IV Intermittent <User Schedule>  chlorhexidine 2% Cloths 1 Application(s) Topical <User Schedule>  dextrose 5%. 1000 milliLiter(s) (50 mL/Hr) IV Continuous <Continuous>  dextrose 5%. 1000 milliLiter(s) (100 mL/Hr) IV Continuous <Continuous>  dextrose 50% Injectable 25 Gram(s) IV Push once  dextrose 50% Injectable 12.5 Gram(s) IV Push once  dextrose 50% Injectable 25 Gram(s) IV Push once  dextrose Oral Gel 15 Gram(s) Oral once  folic acid 1 milliGRAM(s) Oral daily  glucagon  Injectable 1 milliGRAM(s) IntraMuscular once  multivitamin 1 Tablet(s) Oral daily  pantoprazole    Tablet 40 milliGRAM(s) Oral before breakfast  polyethylene glycol 3350 17 Gram(s) Oral daily  senna 2 Tablet(s) Oral at bedtime  sevelamer carbonate 800 milliGRAM(s) Oral three times a day    MEDICATIONS  (PRN):      REVIEW OF SYSTEMS   [x] A ten-point review of systems was otherwise negative except as noted.     Vital Signs Last 24 Hrs  T(C): 37.1 (07 Jun 2025 12:35), Max: 37.1 (07 Jun 2025 12:35)  T(F): 98.8 (07 Jun 2025 12:35), Max: 98.8 (07 Jun 2025 12:35)  HR: 95 (07 Jun 2025 12:35) (73 - 95)  BP: 117/70 (07 Jun 2025 12:35) (106/54 - 126/67)  BP(mean): 86 (07 Jun 2025 12:35) (86 - 86)  RR: 18 (07 Jun 2025 12:35) (18 - 19)  SpO2: 99% (07 Jun 2025 12:35) (98% - 99%)    Parameters below as of 07 Jun 2025 12:35  Patient On (Oxygen Delivery Method): room air        PHYSICAL EXAM:  GEN: NAD, awake and alert.  SKIN: Good color, non diaphoretic.  ABDO: Soft, NT/ND, no palpable bladder, no suprapubic tenderness. + SPT in place drains yellow urine   BACK: No CVAT B/L       LABS:                        7.2    8.04  )-----------( 216      ( 07 Jun 2025 08:51 )             23.8     06-07    134[L]  |  95[L]  |  18  ----------------------------<  213[H]  3.8   |  25  |  3.3[H]    Ca    7.7[L]      07 Jun 2025 08:51  Phos  4.4     06-07  Mg     2.0     06-06    TPro  6.8  /  Alb  2.9[L]  /  TBili  0.2  /  DBili  x   /  AST  15  /  ALT  <5  /  AlkPhos  170[H]  06-07    PT/INR - ( 06 Jun 2025 13:06 )   PT: 12.70 sec;   INR: 1.07 ratio         PTT - ( 07 Jun 2025 08:51 )  PTT:36.2 sec   Urinalysis (06.06.25 @ 14:00)   Blood, Urine: Large   Glucose Qualitative, Urine: Negative mg/dL   pH Urine: 7.5   Color: Orange   Urine Appearance: Turbid   Bilirubin: Negative   Ketone , Urine: Negative mg/dL   Specific Gravity: 1.013   Protein, Urine: 300 mg/dL   Urobilinogen: 0.2 mg/dL   Nitrite: Negative   Leukocyte Esterase Concentration: Large      Urine Microscopic-Add On (NC) (06.06.25 @ 14:00)    White Blood Cell - Urine: >998 /HPF   Red Blood Cell - Urine: 311 /HPF   Bacteria: Many /HPF   Cast: >63 /LPF   Epithelial Cells: 3 /HPF   Yeast-like Cells: Present    Culture - Urine (05.23.25 @ 12:30)    Specimen Source: Clean Catch Clean Catch (Midstream)   Culture Results:   10,000 - 49,000 CFU/mL Candida albicans "Susceptibilities not performed"    RADIOLOGY & ADDITIONAL STUDIES:  < from: CT Abdomen and Pelvis No Cont (06.06.25 @ 13:57) >    ACC: [id number] EXAM:  CT ABDOMEN AND PELVIS   ORDERED BY: STEPAN GRAY     PROCEDURE DATE:  06/06/2025          INTERPRETATION:  CLINICAL INFORMATION: Nephrolithiasis    COMPARISON: May 23, 2025    CONTRAST/COMPLICATIONS:  IV Contrast: NONE  OralContrast: NONE.    PROCEDURE:  CT of the Abdomen and Pelvis was performed.  Sagittal and coronal reformats were performed.    FINDINGS:  LOWER CHEST: Trace bilateral effusions with lower lobe atelectasis. Small   pericardial effusion, new from prior.Extensive coronary atherosclerosis.    LIVER: Within normal limits.  BILE DUCTS: Normal caliber.  GALLBLADDER: Cholelithiasis with distended gallbladder and wall   thickening, similar from prior.  SPLEEN: Within normal limits.  PANCREAS: Within normal limits.  ADRENALS: Within normal limits.  KIDNEYS/URETERS: Bilateral nephroureteral catheters in place. Increasing   left hydronephrosis. Unchanged mild right hydroureter. Right intrarenal   and ureteral air, new from prior. Unchanged bilateral intrarenal   nephrolithiasis.    BLADDER: Suprapubic catheter within decompressed bladder  REPRODUCTIVE ORGANS: Unchanged    BOWEL: No bowel obstruction. No evidence for appendicitis. Large rectal   stool burden, similar compared with prior.  PERITONEUM/RETROPERITONEUM: Within normal limits.  VESSELS: Atherosclerotic changes.  LYMPH NODES: No lymphadenopathy.  ABDOMINAL WALL: Left inguinal fat-containing hernia.  BONES: Unchanged from prior    IMPRESSION:    Since May 23, 2025;    Increasing left-sided hydronephrosis. Bilateral nephroureteral stents in   place.    Left intrarenal and ureteral air, possibly reflecting infection.    Unchanged distended gallbladder with cholelithiasis, wall thickening and   pericholecystic stranding.    New small pericardial effusion and trace bilateral bilateral pleural   effusions    --- End of Report ---      ELLIOT LANDAU MD; Attending Radiologist  This document has been electronically signed. Jun 6 2025  2:05PM    < end of copied text >

## 2025-06-07 NOTE — PATIENT PROFILE ADULT - FALL HARM RISK - FACTORS NURSING JUDGEMENT
Received an approval letter for an MRI without contrast     [id number] MRI scan   Request ID [id number]   Effective April 14, 2023- June 12, 2023  
Yes

## 2025-06-07 NOTE — CONSULT NOTE ADULT - SUBJECTIVE AND OBJECTIVE BOX
KRISTY GARCIA  70y, Male  Allergy: No Known Allergies      CHIEF COMPLAINT: Hypotension (06 Jun 2025 20:06)      HPI:  The pt is a 70y M with a PMHx of CKD V on dialysis, HTN, NIDDM, prior spinal abscess, nephrolithiasis and recurrent UTI s/p b/l stent placement and SPC presenting for evaluation of hypotension. The pts brother at bedside states that patient was getting dialysis when he began to feel tired and less responsive. He is unsure of the blood pressure but states that it was low. The brother also states that the patient is "in and out of it".  The patient was recently discharged from [facility] yesterday after admission for urosepsis. He has a TDC in the right subclavian.  Today was his first outpt session of dilaysis. He denies fever, chills, chest pain, SOB. His SPC was last exchanged 5/29.    In the ED,  Vital Signs Last 24 Hrs  T(C): 36.4 (06 Jun 2025 16:41), Max: 36.4 (06 Jun 2025 11:08)  T(F): 97.6 (06 Jun 2025 16:41), Max: 97.6 (06 Jun 2025 16:41)  HR: 78 (06 Jun 2025 16:41) (62 - 78)  BP: 145/81 (06 Jun 2025 16:41) (106/68 - 145/81)  BP(mean): --  RR: 18 (06 Jun 2025 16:41) (18 - 18)  SpO2: 98% (06 Jun 2025 16:41) (98% - 100%)    Parameters below as of 06 Jun 2025 11:08  Patient On (Oxygen Delivery Method): nasal cannula  O2 Flow (L/min): 2    Labs show anemia with an hb of 7.5( at baseline), Normal WBC count, UA grossly positive    CTabd  Increasing left-sided hydronephrosis. Bilateral nephroureteral stents in  place.Left intrarenal and ureteral air, possibly reflecting infection.  Unchanged distended gallbladder with cholelithiasis, wall thickening and  pericholecystic stranding.  New small pericardial effusion and trace bilateral bilateral pleural  effusions   (06 Jun 2025 18:03)      INFECTIOUS DISEASE HISTORY:  History as above.  Limited historian, but presents due to hypotension after dialysis.   Has Suprapubic catheter -- denies bladder tenderness/distension.   Denies nausea, vomiting.     FAMILY HISTORY  FH: HTN (hypertension)    FH: breast cancer    FH: melanoma    FH: heart disease        SOCIAL HISTORY  Social History:  Lives in  with his brother (30 Apr 2025 21:15)        ROS  General: Denies rigors, nightsweats  HEENT: Denies headache, rhinorrhea, sore throat, eye pain  CV: Denies CP, palpitations  PULM: Denies wheezing, hemoptysis  GI: Denies hematemesis, hematochezia, melena  : Denies discharge, hematuria  MSK: Denies arthralgias, myalgias  SKIN: Denies rash, lesions  NEURO: Denies paresthesias, weakness  PSYCH: Denies depression, anxiety    VITALS:  T(F): 98.5, Max: 98.5 (06-07-25 @ 05:04)  HR: 91  BP: 106/54  RR: 19Vital Signs Last 24 Hrs  T(C): 36.9 (07 Jun 2025 05:04), Max: 36.9 (07 Jun 2025 05:04)  T(F): 98.5 (07 Jun 2025 05:04), Max: 98.5 (07 Jun 2025 05:04)  HR: 91 (07 Jun 2025 05:04) (62 - 91)  BP: 106/54 (07 Jun 2025 05:04) (106/54 - 145/81)  BP(mean): --  RR: 19 (07 Jun 2025 05:04) (18 - 19)  SpO2: 98% (07 Jun 2025 05:04) (98% - 100%)    Parameters below as of 06 Jun 2025 23:17  Patient On (Oxygen Delivery Method): room air        PHYSICAL EXAM:  Gen: NAD, resting in bed  HEENT: Normocephalic, atraumatic  Neck: supple, no lymphadenopathy  CV: Regular rate & regular rhythm  Lungs: decreased BS at bases, no fremitus  Abdomen: Soft, BS present  Ext: Warm, well perfused  Neuro: non focal, awake  Skin: no rash, no erythema  Lines: no phlebitis    TESTS & MEASUREMENTS:      RADIOLOGY & ADDITIONAL TESTS:  I have personally reviewed the last Chest xray  CXR  Xray Chest 1 View- PORTABLE-Urgent:  ACC: [id number] EXAM:  XR CHEST PORTABLE URGENT 1V   ORDERED BY: LEONEL WONG    PROCEDURE DATE:  06/06/2025          INTERPRETATION:  CLINICAL HISTORY: Hypotension    COMPARISON: Frontal chest June 2, 2025    TECHNIQUE: Portable frontal chest radiograph.    FINDINGS:    Support devices: Stable positioning.    Cardiac/mediastinum/hilum: Heart size within normal limits, thoracic  aortic calcification.    Lung parenchyma/Pleura: No focal parenchymal opacities, pleural  effusions, or pneumothorax.    Skeleton/soft tissues: Stable. Cervicothoracic hardware. Right clavicular  old fracture deformity      IMPRESSION:    No radiographic evidence of acute cardiopulmonary disease.    --- End of Report ---            VITOR JIANG MD; Attending Radiologist  This document has been electronically signed. Jun 6 2025  1:19PM (06-06-25 @ 12:32)      CT  CT Abdomen and Pelvis No Cont:  ACC: [id number] EXAM:  CT ABDOMEN AND PELVIS   ORDERED BY: STEPAN GRAY    PROCEDURE DATE:  06/06/2025          INTERPRETATION:  CLINICAL INFORMATION: Nephrolithiasis    COMPARISON: May 23, 2025    CONTRAST/COMPLICATIONS:  IV Contrast: NONE  OralContrast: NONE.    PROCEDURE:  CT of the Abdomen and Pelvis was performed.  Sagittal and coronal reformats were performed.    FINDINGS:  LOWER CHEST: Trace bilateral effusions with lower lobe atelectasis. Small  pericardial effusion, new from prior.Extensive coronary atherosclerosis.    LIVER: Within normal limits.  BILE DUCTS: Normal caliber.  GALLBLADDER: Cholelithiasis with distended gallbladder and wall  thickening, similar from prior.  SPLEEN: Within normal limits.  PANCREAS: Within normal limits.  ADRENALS: Within normal limits.  KIDNEYS/URETERS: Bilateral nephroureteral catheters in place. Increasing  left hydronephrosis. Unchanged mild right hydroureter. Right intrarenal  and ureteral air, new from prior. Unchanged bilateral intrarenal  nephrolithiasis.    BLADDER: Suprapubic catheter within decompressed bladder  REPRODUCTIVE ORGANS: Unchanged    BOWEL: No bowel obstruction. No evidence for appendicitis. Large rectal  stool burden, similar compared with prior.  PERITONEUM/RETROPERITONEUM: Within normal limits.  VESSELS: Atherosclerotic changes.  LYMPH NODES: No lymphadenopathy.  ABDOMINAL WALL: Left inguinal fat-containing hernia.  BONES: Unchanged from prior    IMPRESSION:    Since May 23, 2025;    Increasing left-sided hydronephrosis. Bilateral nephroureteral stents in  place.    Left intrarenal and ureteral air, possibly reflecting infection.    Unchanged distended gallbladder with cholelithiasis, wall thickening and  pericholecystic stranding.    New small pericardial effusion and trace bilateral bilateral pleural  effusions    --- End of Report ---            ELLIOT LANDAU MD; Attending Radiologist  This document has been electronically signed. Jun 6 2025  2:05PM (06-06-25 @ 13:57)        MEDICATIONS  apixaban 2.5 Oral every 12 hours  calcitriol   Capsule 0.25 Oral daily  cefepime   IVPB 2000 IV Intermittent <User Schedule>  chlorhexidine 2% Cloths 1 Topical <User Schedule>  folic acid 1 Oral daily  multivitamin 1 Oral daily  pantoprazole    Tablet 40 Oral before breakfast  polyethylene glycol 3350 17 Oral daily  senna 2 Oral at bedtime  sevelamer carbonate 800 Oral three times a day      Weight  Weight (kg): 61.2 (06-06-25 @ 11:11)    ANTIBIOTICS:  cefepime   IVPB 2000 milliGRAM(s) IV Intermittent <User Schedule>      ALLERGIES:  No Known Allergies

## 2025-06-07 NOTE — CONSULT NOTE ADULT - ASSESSMENT
The pt is a 70y M with a PMHx of CKD V on dialysis, HTN, NIDDM, prior spinal abscess, nephrolithiasis and recurrent UTI s/p b/l stent placement and SPC presenting for evaluation of hypotension  last HD yesterday   hd on monday   start midodrine 5 q 8   CT Abdomen and Pelvis No Cont (06.06.25 @ 13:57) >  Increasing left-sided hydronephrosis. Bilateral nephroureteral stents in   place.  Left intrarenal and ureteral air, possibly reflecting infection.  Unchanged distended gallbladder with cholelithiasis, wall thickening and   pericholecystic stranding.  New small pericardial effusion and trace bilateral bilateral pleural   effusions   seen by    check echo  GI evaluation    follow UC/ BC / seen by ID / on cefepime   corrected calcium in the mid 8 / on calcitriol/ replete vit D   will follow

## 2025-06-07 NOTE — PROGRESS NOTE ADULT - ASSESSMENT
70 y.o M w PMH of CKD V on dialysis, HTN, NIDDM, prior spinal abscess, nephrolithiasis and recurrent UTI s/p b/l stent placement and SPC presenting for evaluation of hypotension.   c/s for CT finding of Increasing left-sided hydronephrosis. Bilateral nephroureteral stents in place. Left intrarenal and ureteral air. Patient is also had hx of septic stone s/p cysto, b/l ureteral stent placement by Dr. Schilling on 5/1/25. +22 fr SPT in place draining cloudy yellow urine w/ debris, last exchanged 5/29/25.       Plan:  Consider RBUS vs NM renal scan to r/o obstruction. If Stent is obstructed/stent failure please call IR for Left PCNT.   Monitor BUN/Cr   F/U Urine cx   Abx as per ID   Cont.  SPT  Strict I&O   will F/U   Case d/w and images reviewed with Dr. Schilling    70 y.o M w PMH of CKD V on dialysis, HTN, NIDDM, prior spinal abscess, nephrolithiasis and recurrent UTI s/p b/l stent placement and SPC presenting for evaluation of hypotension.   c/s for CT finding of Increasing left-sided hydronephrosis. Bilateral nephroureteral stents in place. Left intrarenal and ureteral air. Patient is also had hx of septic stone s/p cysto, b/l ureteral stent placement by Dr. Schilling on 5/1/25. +22 fr SPT in place draining cloudy yellow urine w/ debris, last exchanged 5/29/25.       Plan:  Consider RBUS vs NM renal scan to r/o obstruction. If Stent is obstructed/stent failure please call IR for Left PCNT.   Monitor BUN/Cr   F/U Urine cx   Abx as per ID   Cont.  SPT  Strict I&O   will F/U   Case d/w and images reviewed with Dr. Schilling     Attending attestation:  The patient is a 70-year-old male with past medical history of HTN, DM2, CKD5, spinal cord abscess leading to quadriplegia, NGB with SPT, and bilateral kidney stones and bladder stones s/p PCNL x 2 with left ureteroscopy on 8/2024 by Dr. Akins, now with bilateral renal and left ureteral stones who presents for evaluation for further management of his stone disease. I saw him in the office on 4/21/2025 and planned on placing BL stents followed by BL CVAC and if infecitons stone found, PCNL. He presented to the ED on 4/30/2025 with sepsis from the left ureteral stone or migrated right renal stone causing unilateral versus bilateral obstruction of infected urine and I placed bilateral ureteral stents on 5/1/2025. Of note, there was trouble getting the left stent in place and I had to perform a ureteroscopy to get the wire up, which revealed a one centimeter narrowing on retrograde pyelogram and a bulge of tissue with friable edge on ureteroscopy. I did not perform a biopsy given the emergent nature of the procedure. This could be a stricture versus malignancy. Regadless the patient appears to have stent failure due to this compression. He will have a positive urine due to SPT. Would perform a RBUS to assess for jets and a NM renal scan for a more detailed characterization of any obstruction. If obstruction present, would consult IR for NT placement given stent failure with hydro and urine that will be persistently infected. Afterwards the patient can follow up with me, No acute  intervention.  signing off. Please call with questions or concerns.     I personally spent 40 minutes on the total care of this patient.

## 2025-06-07 NOTE — CONSULT NOTE ADULT - ASSESSMENT
KRISTY GARCIA  70y, Male  Allergy: No Known Allergies      CHIEF COMPLAINT: Hypotension (06 Jun 2025 20:06)      HPI:  The pt is a 70y M with a PMHx of CKD V on dialysis, HTN, NIDDM, prior spinal abscess, nephrolithiasis and recurrent UTI s/p b/l stent placement and SPC presenting for evaluation of hypotension. The pts brother at bedside states that patient was getting dialysis when he began to feel tired and less responsive. He is unsure of the blood pressure but states that it was low. The brother also states that the patient is "in and out of it".  The patient was recently discharged from [facility] yesterday after admission for urosepsis. He has a TDC in the right subclavian.  Today was his first outpt session of dilaysis. He denies fever, chills, chest pain, SOB. His SPC was last exchanged 5/29.    In the ED,  Vital Signs Last 24 Hrs  T(C): 36.4 (06 Jun 2025 16:41), Max: 36.4 (06 Jun 2025 11:08)  T(F): 97.6 (06 Jun 2025 16:41), Max: 97.6 (06 Jun 2025 16:41)  HR: 78 (06 Jun 2025 16:41) (62 - 78)  BP: 145/81 (06 Jun 2025 16:41) (106/68 - 145/81)  BP(mean): --  RR: 18 (06 Jun 2025 16:41) (18 - 18)  SpO2: 98% (06 Jun 2025 16:41) (98% - 100%)    Parameters below as of 06 Jun 2025 11:08  Patient On (Oxygen Delivery Method): nasal cannula  O2 Flow (L/min): 2    Labs show anemia with an hb of 7.5( at baseline), Normal WBC count, UA grossly positive    CTabd  Increasing left-sided hydronephrosis. Bilateral nephroureteral stents in  place.Left intrarenal and ureteral air, possibly reflecting infection.  Unchanged distended gallbladder with cholelithiasis, wall thickening and  pericholecystic stranding.  New small pericardial effusion and trace bilateral bilateral pleural  effusions   (06 Jun 2025 18:03)      INFECTIOUS DISEASE HISTORY:    FAMILY HISTORY  FH: HTN (hypertension)    FH: breast cancer    FH: melanoma    FH: heart disease        SOCIAL HISTORY  Social History:  Lives in  with his brother (30 Apr 2025 21:15)        ROS  General: Denies rigors, nightsweats  HEENT: Denies headache, rhinorrhea, sore throat, eye pain  CV: Denies CP, palpitations  PULM: Denies wheezing, hemoptysis  GI: Denies hematemesis, hematochezia, melena  : Denies discharge, hematuria  MSK: Denies arthralgias, myalgias  SKIN: Denies rash, lesions  NEURO: Denies paresthesias, weakness  PSYCH: Denies depression, anxiety    VITALS:  T(F): 98.5, Max: 98.5 (06-07-25 @ 05:04)  HR: 91  BP: 106/54  RR: 19Vital Signs Last 24 Hrs  T(C): 36.9 (07 Jun 2025 05:04), Max: 36.9 (07 Jun 2025 05:04)  T(F): 98.5 (07 Jun 2025 05:04), Max: 98.5 (07 Jun 2025 05:04)  HR: 91 (07 Jun 2025 05:04) (62 - 91)  BP: 106/54 (07 Jun 2025 05:04) (106/54 - 145/81)  BP(mean): --  RR: 19 (07 Jun 2025 05:04) (18 - 19)  SpO2: 98% (07 Jun 2025 05:04) (98% - 100%)    Parameters below as of 06 Jun 2025 23:17  Patient On (Oxygen Delivery Method): room air        PHYSICAL EXAM:  Gen: NAD, resting in bed  HEENT: Normocephalic, atraumatic  Neck: supple, no lymphadenopathy  CV: Regular rate & regular rhythm  Lungs: decreased BS at bases, no fremitus  Abdomen: Soft, BS present  Ext: Warm, well perfused  Neuro: non focal, awake  Skin: no rash, no erythema  Lines: no phlebitis    TESTS & MEASUREMENTS:      RADIOLOGY & ADDITIONAL TESTS:  I have personally reviewed the last Chest xray  CXR  Xray Chest 1 View- PORTABLE-Urgent:  ACC: [id number] EXAM:  XR CHEST PORTABLE URGENT 1V   ORDERED BY: LEONEL WONG    PROCEDURE DATE:  06/06/2025          INTERPRETATION:  CLINICAL HISTORY: Hypotension    COMPARISON: Frontal chest June 2, 2025    TECHNIQUE: Portable frontal chest radiograph.    FINDINGS:    Support devices: Stable positioning.    Cardiac/mediastinum/hilum: Heart size within normal limits, thoracic  aortic calcification.    Lung parenchyma/Pleura: No focal parenchymal opacities, pleural  effusions, or pneumothorax.    Skeleton/soft tissues: Stable. Cervicothoracic hardware. Right clavicular  old fracture deformity      IMPRESSION:    No radiographic evidence of acute cardiopulmonary disease.    --- End of Report ---            VITOR JIANG MD; Attending Radiologist  This document has been electronically signed. Jun 6 2025  1:19PM (06-06-25 @ 12:32)      CT  CT Abdomen and Pelvis No Cont:  ACC: [id number] EXAM:  CT ABDOMEN AND PELVIS   ORDERED BY: STEPAN GRAY    PROCEDURE DATE:  06/06/2025          INTERPRETATION:  CLINICAL INFORMATION: Nephrolithiasis    COMPARISON: May 23, 2025    CONTRAST/COMPLICATIONS:  IV Contrast: NONE  OralContrast: NONE.    PROCEDURE:  CT of the Abdomen and Pelvis was performed.  Sagittal and coronal reformats were performed.    FINDINGS:  LOWER CHEST: Trace bilateral effusions with lower lobe atelectasis. Small  pericardial effusion, new from prior.Extensive coronary atherosclerosis.    LIVER: Within normal limits.  BILE DUCTS: Normal caliber.  GALLBLADDER: Cholelithiasis with distended gallbladder and wall  thickening, similar from prior.  SPLEEN: Within normal limits.  PANCREAS: Within normal limits.  ADRENALS: Within normal limits.  KIDNEYS/URETERS: Bilateral nephroureteral catheters in place. Increasing  left hydronephrosis. Unchanged mild right hydroureter. Right intrarenal  and ureteral air, new from prior. Unchanged bilateral intrarenal  nephrolithiasis.    BLADDER: Suprapubic catheter within decompressed bladder  REPRODUCTIVE ORGANS: Unchanged    BOWEL: No bowel obstruction. No evidence for appendicitis. Large rectal  stool burden, similar compared with prior.  PERITONEUM/RETROPERITONEUM: Within normal limits.  VESSELS: Atherosclerotic changes.  LYMPH NODES: No lymphadenopathy.  ABDOMINAL WALL: Left inguinal fat-containing hernia.  BONES: Unchanged from prior    IMPRESSION:    Since May 23, 2025;    Increasing left-sided hydronephrosis. Bilateral nephroureteral stents in  place.    Left intrarenal and ureteral air, possibly reflecting infection.    Unchanged distended gallbladder with cholelithiasis, wall thickening and  pericholecystic stranding.    New small pericardial effusion and trace bilateral bilateral pleural  effusions    --- End of Report ---            ELLIOT LANDAU MD; Attending Radiologist  This document has been electronically signed. Jun 6 2025  2:05PM (06-06-25 @ 13:57)        MEDICATIONS  apixaban 2.5 Oral every 12 hours  calcitriol   Capsule 0.25 Oral daily  cefepime   IVPB 2000 IV Intermittent <User Schedule>  chlorhexidine 2% Cloths 1 Topical <User Schedule>  folic acid 1 Oral daily  multivitamin 1 Oral daily  pantoprazole    Tablet 40 Oral before breakfast  polyethylene glycol 3350 17 Oral daily  senna 2 Oral at bedtime  sevelamer carbonate 800 Oral three times a day      Weight  Weight (kg): 61.2 (06-06-25 @ 11:11)    ANTIBIOTICS:  cefepime   IVPB 2000 milliGRAM(s) IV Intermittent <User Schedule>      ALLERGIES:  No Known Allergies      ASSESSMENT  The pt is a 70y M with a PMHx of CKD V on dialysis, HTN, NIDDM, prior spinal abscess, nephrolithiasis and recurrent UTI s/p b/l stent placement and SPC presenting for evaluation of hypotension. The pts brother at bedside states that patient was getting dialysis when he began to feel tired and less responsive. He is unsure of the blood pressure but states that it was low. The brother also states that the patient is "in and out of it".  The patient was recently discharged from [facility] yesterday after admission for urosepsis. He has a TDC in the right subclavian.  Today was his first outpt session of dilaysis. He denies fever, chills, chest pain, SOB. His SPC was last exchanged 5/29.    IMPRESSION  #Hypotention  #Nephrolithiasis with Hydronephrosis - rule out UTI  - CT Abdomen and Pelvis No Cont (06.06.25 @ 13:57): Since May 23, 2025; Increasing left-sided hydronephrosis. Bilateral nephroureteral stents in  place. Left intrarenal and ureteral air, possibly reflecting infection. Unchanged distended gallbladder with cholelithiasis, wall thickening and  pericholecystic stranding  New small pericardial effusion and trace bilateral bilateral pleural  effusions    #Chronic cholecystitis  - US Abdomen Upper Quadrant Right (05.23.25 @ 16:46): Findings concerning for cholecystitis despite a negative sonographic  Iglesias's sign, as described. Right renal pelvic fullness. Trace right pleural effusion.  -  NM Hepatobiliary Imaging (05.24.25 @ 12:08): IMPRESSION: NO DEFINITE VISUALIZATION OF THE GALLBLADDER THROUGH 4 HOURS  POST-INJECTION, CONSISTENT WITH CHOLECYSTITIS, AS DESCRIBED ABOVE.  CLINICAL CORRELATION IS SUGGESTED.    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  #Prior history of spinal abscess    Colonized with Proteus/Serratia/Pseudomonas and MSSA in the urine    RECOMMENDATIONS  - continue cefepime -- change to 1g q 24 hours for ESRD dosing   - follow-up blood cx and urine Cx   - urology notes reviewed -- planned for eventual stent removal     Please call or message on [employer] Teams if with any questions.  Spectra [id number]

## 2025-06-07 NOTE — PROGRESS NOTE ADULT - SUBJECTIVE AND OBJECTIVE BOX
MEDICINE ATTENDING PROGRESS NOTE  HPI:  The pt is a 70y M with a PMHx of CKD V on dialysis, HTN, NIDDM, prior spinal abscess, nephrolithiasis and recurrent UTI s/p b/l stent placement and SPC presenting for evaluation of hypotension. The pts brother at bedside states that patient was getting dialysis when he began to feel tired and less responsive. He is unsure of the blood pressure but states that it was low. The brother also states that the patient is "in and out of it".  The patient was recently discharged from [facility] yesterday after admission for urosepsis. He has a TDC in the right subclavian.  Today was his first outpt session of dilaysis. He denies fever, chills, chest pain, SOB. His SPC was last exchanged 5/29.    In the ED,  Vital Signs Last 24 Hrs  T(C): 36.4 (06 Jun 2025 16:41), Max: 36.4 (06 Jun 2025 11:08)  T(F): 97.6 (06 Jun 2025 16:41), Max: 97.6 (06 Jun 2025 16:41)  HR: 78 (06 Jun 2025 16:41) (62 - 78)  BP: 145/81 (06 Jun 2025 16:41) (106/68 - 145/81)  BP(mean): --  RR: 18 (06 Jun 2025 16:41) (18 - 18)  SpO2: 98% (06 Jun 2025 16:41) (98% - 100%)    Parameters below as of 06 Jun 2025 11:08  Patient On (Oxygen Delivery Method): nasal cannula  O2 Flow (L/min): 2    Labs show anemia with an hb of 7.5( at baseline), Normal WBC count, UA grossly positive    CTabd  Increasing left-sided hydronephrosis. Bilateral nephroureteral stents in   place.Left intrarenal and ureteral air, possibly reflecting infection.  Unchanged distended gallbladder with cholelithiasis, wall thickening and   pericholecystic stranding.  New small pericardial effusion and trace bilateral bilateral pleural   effusions   (06 Jun 2025 18:03)      Interval/Overnight Events      ROS  General: Denies fevers, chills, nightsweats, weight loss  HEENT: Denies headache, rhinorrhea, sore throat, eye pain  CV: Denies CP, palpitations  PULM: Denies SOB, cough  GI: Denies abdominal pain, diarrhea  : Denies dysuria, hematuria  MSK: Denies arthralgias  SKIN: Denies rash   NEURO: Denies paresthesias, weakness  PSYCH: Denies depression    MEDICATIONS  (STANDING):  apixaban 2.5 milliGRAM(s) Oral every 12 hours  calcitriol   Capsule 0.25 MICROGram(s) Oral daily  cefepime   IVPB 2000 milliGRAM(s) IV Intermittent <User Schedule>  chlorhexidine 2% Cloths 1 Application(s) Topical <User Schedule>  dextrose 5%. 1000 milliLiter(s) (50 mL/Hr) IV Continuous <Continuous>  dextrose 5%. 1000 milliLiter(s) (100 mL/Hr) IV Continuous <Continuous>  dextrose 50% Injectable 25 Gram(s) IV Push once  dextrose 50% Injectable 12.5 Gram(s) IV Push once  dextrose 50% Injectable 25 Gram(s) IV Push once  dextrose Oral Gel 15 Gram(s) Oral once  folic acid 1 milliGRAM(s) Oral daily  glucagon  Injectable 1 milliGRAM(s) IntraMuscular once  multivitamin 1 Tablet(s) Oral daily  pantoprazole    Tablet 40 milliGRAM(s) Oral before breakfast  polyethylene glycol 3350 17 Gram(s) Oral daily  senna 2 Tablet(s) Oral at bedtime  sevelamer carbonate 800 milliGRAM(s) Oral three times a day    MEDICATIONS  (PRN):    ANTIBIOTICS:  cefepime   IVPB 2000 milliGRAM(s) IV Intermittent <User Schedule>      VITALS:  T(F): 98.5, Max: 98.5 (06-07-25 @ 05:04)  HR: 91  BP: 106/54  RR: 19Vital Signs Last 24 Hrs  T(C): 36.9 (07 Jun 2025 05:04), Max: 36.9 (07 Jun 2025 05:04)  T(F): 98.5 (07 Jun 2025 05:04), Max: 98.5 (07 Jun 2025 05:04)  HR: 91 (07 Jun 2025 05:04) (73 - 91)  BP: 106/54 (07 Jun 2025 05:04) (106/54 - 145/81)  BP(mean): --  RR: 19 (07 Jun 2025 05:04) (18 - 19)  SpO2: 98% (07 Jun 2025 05:04) (98% - 99%)    Parameters below as of 06 Jun 2025 23:17  Patient On (Oxygen Delivery Method): room air     I&O's Summary    PHYSICAL EXAM:  Gen: NAD, resting in bed  HEENT: Normocephalic, atraumatic  Neck: supple, no lymphadenopathy  CV: Regular rate & regular rhythm  Lungs: CTABL no wheeze  Abdomen: Soft, NTND+ BS present  Ext: Warm, well perfused no CCE  Neuro: non focal, awake, CN II-XII intact   Skin: no rash, no erythema  Psych: no SI, HI, Hallucination     LABS:                        7.2    8.04  )-----------( 216      ( 07 Jun 2025 08:51 )             23.8     06-07    134[L]  |  95[L]  |  18  ----------------------------<  213[H]  3.8   |  25  |  3.3[H]    Ca    7.7[L]      07 Jun 2025 08:51  Phos  4.4     06-07  Mg     2.0     06-06    TPro  6.8  /  Alb  2.9[L]  /  TBili  0.2  /  DBili  x   /  AST  15  /  ALT  <5  /  AlkPhos  170[H]  06-07      LIVER FUNCTIONS - ( 07 Jun 2025 08:51 )  Alb: 2.9 g/dL / Pro: 6.8 g/dL / ALK PHOS: 170 U/L / ALT: <5 U/L / AST: 15 U/L / GGT: x           PT/INR - ( 06 Jun 2025 13:06 )   PT: 12.70 sec;   INR: 1.07 ratio         PTT - ( 07 Jun 2025 08:51 )  PTT:36.2 sec    MICROBIOLOGY:  Urinalysis Basic - ( 07 Jun 2025 08:51 )    Color: x / Appearance: x / SG: x / pH: x  Gluc: 213 mg/dL / Ketone: x  / Bili: x / Urobili: x   Blood: x / Protein: x / Nitrite: x   Leuk Esterase: x / RBC: x / WBC x   Sq Epi: x / Non Sq Epi: x / Bacteria: x          Blood Gas Venous - Lactate: 1.2 mmol/L (06-06-25 @ 11:48)    Hepatitis B Surface Antigen: Nonreact (06-02-25 @ 11:28)  MRSA PCR Result.: Positive (05-01-25 @ 09:00)      IMAGING:  CXR  Xray Chest 1 View- PORTABLE-Urgent:   ACC: [id number] EXAM:  XR CHEST PORTABLE URGENT 1V   ORDERED BY: LEONEL WONG     PROCEDURE DATE:  06/06/2025          INTERPRETATION:  CLINICAL HISTORY: Hypotension    COMPARISON: Frontal chest June 2, 2025    TECHNIQUE: Portable frontal chest radiograph.    FINDINGS:    Support devices: Stable positioning.    Cardiac/mediastinum/hilum: Heart size within normal limits, thoracic   aortic calcification.    Lung parenchyma/Pleura: No focal parenchymal opacities, pleural   effusions, or pneumothorax.    Skeleton/soft tissues: Stable. Cervicothoracic hardware. Right clavicular   old fracture deformity      IMPRESSION:    No radiographic evidence of acute cardiopulmonary disease.    --- End of Report ---            VITOR JIANG MD; Attending Radiologist  This document has been electronically signed. Jun 6 2025  1:19PM (06-06-25 @ 12:32)      CT  CT Abdomen and Pelvis No Cont:   ACC: [id number] EXAM:  CT ABDOMEN AND PELVIS   ORDERED BY: STEPAN GRAY     PROCEDURE DATE:  06/06/2025          INTERPRETATION:  CLINICAL INFORMATION: Nephrolithiasis    COMPARISON: May 23, 2025    CONTRAST/COMPLICATIONS:  IV Contrast: NONE  OralContrast: NONE.    PROCEDURE:  CT of the Abdomen and Pelvis was performed.  Sagittal and coronal reformats were performed.    FINDINGS:  LOWER CHEST: Trace bilateral effusions with lower lobe atelectasis. Small   pericardial effusion, new from prior.Extensive coronary atherosclerosis.    LIVER: Within normal limits.  BILE DUCTS: Normal caliber.  GALLBLADDER: Cholelithiasis with distended gallbladder and wall   thickening, similar from prior.  SPLEEN: Within normal limits.  PANCREAS: Within normal limits.  ADRENALS: Within normal limits.  KIDNEYS/URETERS: Bilateral nephroureteral catheters in place. Increasing   left hydronephrosis. Unchanged mild right hydroureter. Right intrarenal   and ureteral air, new from prior. Unchanged bilateral intrarenal   nephrolithiasis.    BLADDER: Suprapubic catheter within decompressed bladder  REPRODUCTIVE ORGANS: Unchanged    BOWEL: No bowel obstruction. No evidence for appendicitis. Large rectal   stool burden, similar compared with prior.  PERITONEUM/RETROPERITONEUM: Within normal limits.  VESSELS: Atherosclerotic changes.  LYMPH NODES: No lymphadenopathy.  ABDOMINAL WALL: Left inguinal fat-containing hernia.  BONES: Unchanged from prior    IMPRESSION:    Since May 23, 2025;    Increasing left-sided hydronephrosis. Bilateral nephroureteral stents in   place.    Left intrarenal and ureteral air, possibly reflecting infection.    Unchanged distended gallbladder with cholelithiasis, wall thickening and   pericholecystic stranding.    New small pericardial effusion and trace bilateral bilateral pleural   effusions    --- End of Report ---            ELLIOT LANDAU MD; Attending Radiologist  This document has been electronically signed. Jun 6 2025  2:05PM (06-06-25 @ 13:57)    CARDIOLOGY TESTING  QRS axis to [] ° and NSR at a rate of [] BPM. There was no atrial enlargement. There was no ventricular hypertrophy. There were no ST-T changes and all intervals were normal.    12 Lead ECG:   Ventricular Rate 56 BPM    QRS Duration 100 ms    Q-T Interval 460 ms    QTC Calculation(Bazett) 443 ms    R Axis 63 degrees    T Axis 31 degrees    Diagnosis Line Atrial fibrillation with slow ventricular response  Anterior infarct , age undetermined  Abnormal ECG    Confirmed by Behuria, Supreeti ([id number]) on 6/6/2025 8:16:28 PM (06-06-25 @ 11:11)  12 Lead ECG:   Ventricular Rate 69 BPM    Atrial Rate 69 BPM    P-R Interval 234 ms    QRS Duration 96 ms    Q-T Interval 416 ms    QTC Calculation(Bazett) 445 ms    P Axis 36 degrees    R Axis 61 degrees    T Axis 50 degrees    Diagnosis Line Sinus rhythm with 1st degree A-V block  Otherwise normal ECG    Confirmed by Keven Gallegos ([id number]) on 5/27/2025 12:20:49 PM (05-27-25 @ 07:41)      ASSESSMENT/PLAN:  The pt is a 70y M with a PMHx of CKD V on dialysis, HTN, NIDDM, prior spinal abscess, nephrolithiasis and recurrent UTI s/p b/l stent placement and SPC presenting for evaluation of hypotension.    Plan  #hypotension 2/2 UTI?  #True infection vs Colonized   #r/o pyelonephritis  - on admission pt was hypotensive but hypotension has resolved with a 500CC bolus  - the pt has a chronic SPC with was changed recently on 5/29   - CT abd shows Increasing left-sided hydronephrosis. Bilateral nephroureteral stents in   place.   - As per ID last time the pt is colonized with Proteus/Serratia/Pseudomonas and MSSA in the urine   - Only hypotension present on admission  - Can continue with cefepime for now since possible ascending infection with hydronephrosis. Previous cultures showed serratia, proteus and pseudomonas were sensitive to cefepime.  - F/u urology for CT findings and possible exchange of stent  - F/u ID  - F/u BCX and UCX    #CKD V on dialysis MWF  -sp TDC 6/2  -on HD  -unable to complete first HD session out/pt  - Nephro consult for in/pt HD as needed    #Hx SPC  -exchanged 5/29 by urology     #Chronic cholecystitis  #hida positive   -CT shows same findings  -sexton negative  -not septic   -asymptomatic   -OP surgery follow up     #Afib  -on eliquis, hold for now incase Urology plan stent exchange  - If no plan then resume eliquis    #HTN  -controlled off meds     #Constipation   -CTAP -  Large rectal stool burden, similar compared with prior  -Bowel reg    #Normocytic anemia suspected 2/2 advanced CKD  -b12 1269, folate 12.2  -iron 95, sat elevated , ferritin 438  -no active bleeding, continue to monitor   -cont aranesp 25 per nephro     #Hypocalcemia  -corrected calcium is normal    #Vitamin D deficiency likely 2/2 CKD  -c/w vit D supplementation  -Vitamin D, 25-Hydroxy: 11    #Concern for malnutrition  -cont supplement     #very poor dentition  -outpt fu     Dvt ppx- hold for now, resume norma if no plan from uro  Diet- Renal  Ambulate as tolerated    #Supportive Management:  Dispo:  Home vs SNF  DVT Ppx: apixaban 2.5 milliGRAM(s) Oral every 12 hours  GI Ppx: pantoprazole    Tablet 40 milliGRAM(s) Oral before breakfast  Diet:  Diet, Renal Restrictions:   For patients receiving Renal Replacement - No Protein Restr, No Conc K, No Conc Phos, Low Sodium (06-06-25 @ 19:53) [Active]      Total time spent to complete patient's bedside assessment, review medical chart, discuss medical plan of care with covering medical team was more than 45 minutes with >50% of time spent face to face with patient, discussion with patient/family and/or coordination of care    Housestaff's notes reviewed. When there is confusion regarding the content or information provided in the notes of a housestaff (resident, medical student, physician assistant, or nurse practioner) and the attending physician notes, the attending physician's note takes precedence and supersedes the other notes.    Omkar Grace MD/Paco  Attending Physician MEDICINE ATTENDING PROGRESS NOTE  The pt is a 70y M with a PMHx of CKD V on dialysis, HTN, NIDDM, prior spinal abscess, nephrolithiasis and recurrent UTI s/p b/l stent placement and SPC presenting for evaluation of hypotension. Admitted for further management.    Interval/Overnight Events  - no acute complaints    ROS  General: Denies fevers, chills, nightsweats, weight loss  HEENT: Denies headache, rhinorrhea, sore throat, eye pain  CV: Denies CP, palpitations  PULM: Denies SOB, cough  GI: Denies abdominal pain, diarrhea  : Denies dysuria, hematuria  MSK: Denies arthralgias  SKIN: Denies rash   NEURO: Denies paresthesias, weakness  PSYCH: Denies depression    MEDICATIONS  (STANDING):  apixaban 2.5 milliGRAM(s) Oral every 12 hours  calcitriol   Capsule 0.25 MICROGram(s) Oral daily  cefepime   IVPB 2000 milliGRAM(s) IV Intermittent <User Schedule>  chlorhexidine 2% Cloths 1 Application(s) Topical <User Schedule>  dextrose 5%. 1000 milliLiter(s) (50 mL/Hr) IV Continuous <Continuous>  dextrose 5%. 1000 milliLiter(s) (100 mL/Hr) IV Continuous <Continuous>  dextrose 50% Injectable 25 Gram(s) IV Push once  dextrose 50% Injectable 12.5 Gram(s) IV Push once  dextrose 50% Injectable 25 Gram(s) IV Push once  dextrose Oral Gel 15 Gram(s) Oral once  folic acid 1 milliGRAM(s) Oral daily  glucagon  Injectable 1 milliGRAM(s) IntraMuscular once  multivitamin 1 Tablet(s) Oral daily  pantoprazole    Tablet 40 milliGRAM(s) Oral before breakfast  polyethylene glycol 3350 17 Gram(s) Oral daily  senna 2 Tablet(s) Oral at bedtime  sevelamer carbonate 800 milliGRAM(s) Oral three times a day    MEDICATIONS  (PRN):    ANTIBIOTICS:  cefepime   IVPB 2000 milliGRAM(s) IV Intermittent <User Schedule>      VITALS:  T(F): 98.5, Max: 98.5 (06-07-25 @ 05:04)  HR: 91  BP: 106/54  RR: 19Vital Signs Last 24 Hrs  T(C): 36.9 (07 Jun 2025 05:04), Max: 36.9 (07 Jun 2025 05:04)  T(F): 98.5 (07 Jun 2025 05:04), Max: 98.5 (07 Jun 2025 05:04)  HR: 91 (07 Jun 2025 05:04) (73 - 91)  BP: 106/54 (07 Jun 2025 05:04) (106/54 - 145/81)  BP(mean): --  RR: 19 (07 Jun 2025 05:04) (18 - 19)  SpO2: 98% (07 Jun 2025 05:04) (98% - 99%)    Parameters below as of 06 Jun 2025 23:17  Patient On (Oxygen Delivery Method): room air     I&O's Summary    PHYSICAL EXAM:  Gen: NAD, resting in bed  HEENT: Normocephalic, atraumatic  Neck: supple, no lymphadenopathy  CV: Regular rate & regular rhythm  Lungs: CTABL no wheeze  Abdomen: Soft, NTND+ BS present  Ext: Warm, well perfused no CCE  Neuro: non focal, awake, CN II-XII intact   Skin: no rash, no erythema  Psych: no SI, HI, Hallucination     LABS:                        7.2    8.04  )-----------( 216      ( 07 Jun 2025 08:51 )             23.8     06-07    134[L]  |  95[L]  |  18  ----------------------------<  213[H]  3.8   |  25  |  3.3[H]    Ca    7.7[L]      07 Jun 2025 08:51  Phos  4.4     06-07  Mg     2.0     06-06    TPro  6.8  /  Alb  2.9[L]  /  TBili  0.2  /  DBili  x   /  AST  15  /  ALT  <5  /  AlkPhos  170[H]  06-07      LIVER FUNCTIONS - ( 07 Jun 2025 08:51 )  Alb: 2.9 g/dL / Pro: 6.8 g/dL / ALK PHOS: 170 U/L / ALT: <5 U/L / AST: 15 U/L / GGT: x           PT/INR - ( 06 Jun 2025 13:06 )   PT: 12.70 sec;   INR: 1.07 ratio         PTT - ( 07 Jun 2025 08:51 )  PTT:36.2 sec    MICROBIOLOGY:  Urinalysis Basic - ( 07 Jun 2025 08:51 )    Color: x / Appearance: x / SG: x / pH: x  Gluc: 213 mg/dL / Ketone: x  / Bili: x / Urobili: x   Blood: x / Protein: x / Nitrite: x   Leuk Esterase: x / RBC: x / WBC x   Sq Epi: x / Non Sq Epi: x / Bacteria: x          Blood Gas Venous - Lactate: 1.2 mmol/L (06-06-25 @ 11:48)    Hepatitis B Surface Antigen: Nonreact (06-02-25 @ 11:28)  MRSA PCR Result.: Positive (05-01-25 @ 09:00)      IMAGING:  CXR  Xray Chest 1 View- PORTABLE-Urgent:   ACC: [id number] EXAM:  XR CHEST PORTABLE URGENT 1V   ORDERED BY: LEONEL WONG     PROCEDURE DATE:  06/06/2025          INTERPRETATION:  CLINICAL HISTORY: Hypotension    COMPARISON: Frontal chest June 2, 2025    TECHNIQUE: Portable frontal chest radiograph.    FINDINGS:    Support devices: Stable positioning.    Cardiac/mediastinum/hilum: Heart size within normal limits, thoracic   aortic calcification.    Lung parenchyma/Pleura: No focal parenchymal opacities, pleural   effusions, or pneumothorax.    Skeleton/soft tissues: Stable. Cervicothoracic hardware. Right clavicular   old fracture deformity      IMPRESSION:    No radiographic evidence of acute cardiopulmonary disease.    --- End of Report ---            VITOR JIANG MD; Attending Radiologist  This document has been electronically signed. Jun 6 2025  1:19PM (06-06-25 @ 12:32)      CT  CT Abdomen and Pelvis No Cont:   ACC: [id number] EXAM:  CT ABDOMEN AND PELVIS   ORDERED BY: STEPAN GRAY     PROCEDURE DATE:  06/06/2025          INTERPRETATION:  CLINICAL INFORMATION: Nephrolithiasis    COMPARISON: May 23, 2025    CONTRAST/COMPLICATIONS:  IV Contrast: NONE  OralContrast: NONE.    PROCEDURE:  CT of the Abdomen and Pelvis was performed.  Sagittal and coronal reformats were performed.    FINDINGS:  LOWER CHEST: Trace bilateral effusions with lower lobe atelectasis. Small   pericardial effusion, new from prior.Extensive coronary atherosclerosis.    LIVER: Within normal limits.  BILE DUCTS: Normal caliber.  GALLBLADDER: Cholelithiasis with distended gallbladder and wall   thickening, similar from prior.  SPLEEN: Within normal limits.  PANCREAS: Within normal limits.  ADRENALS: Within normal limits.  KIDNEYS/URETERS: Bilateral nephroureteral catheters in place. Increasing   left hydronephrosis. Unchanged mild right hydroureter. Right intrarenal   and ureteral air, new from prior. Unchanged bilateral intrarenal   nephrolithiasis.    BLADDER: Suprapubic catheter within decompressed bladder  REPRODUCTIVE ORGANS: Unchanged    BOWEL: No bowel obstruction. No evidence for appendicitis. Large rectal   stool burden, similar compared with prior.  PERITONEUM/RETROPERITONEUM: Within normal limits.  VESSELS: Atherosclerotic changes.  LYMPH NODES: No lymphadenopathy.  ABDOMINAL WALL: Left inguinal fat-containing hernia.  BONES: Unchanged from prior    IMPRESSION:    Since May 23, 2025;    Increasing left-sided hydronephrosis. Bilateral nephroureteral stents in   place.    Left intrarenal and ureteral air, possibly reflecting infection.    Unchanged distended gallbladder with cholelithiasis, wall thickening and   pericholecystic stranding.    New small pericardial effusion and trace bilateral bilateral pleural   effusions    --- End of Report ---            ELLIOT LANDAU MD; Attending Radiologist  This document has been electronically signed. Jun 6 2025  2:05PM (06-06-25 @ 13:57)    CARDIOLOGY TESTING  QRS axis to [] ° and NSR at a rate of [] BPM. There was no atrial enlargement. There was no ventricular hypertrophy. There were no ST-T changes and all intervals were normal.    12 Lead ECG:   Ventricular Rate 56 BPM    QRS Duration 100 ms    Q-T Interval 460 ms    QTC Calculation(Bazett) 443 ms    R Axis 63 degrees    T Axis 31 degrees    Diagnosis Line Atrial fibrillation with slow ventricular response  Anterior infarct , age undetermined  Abnormal ECG    Confirmed by Behuria, Supreeti ([id number]) on 6/6/2025 8:16:28 PM (06-06-25 @ 11:11)  12 Lead ECG:   Ventricular Rate 69 BPM    Atrial Rate 69 BPM    P-R Interval 234 ms    QRS Duration 96 ms    Q-T Interval 416 ms    QTC Calculation(Bazett) 445 ms    P Axis 36 degrees    R Axis 61 degrees    T Axis 50 degrees    Diagnosis Line Sinus rhythm with 1st degree A-V block  Otherwise normal ECG    Confirmed by Keven Gallegos ([id number]) on 5/27/2025 12:20:49 PM (05-27-25 @ 07:41)      ASSESSMENT/PLAN:  The pt is a 70y M with a PMHx of CKD V on dialysis, HTN, NIDDM, prior spinal abscess, nephrolithiasis and recurrent UTI s/p b/l stent placement and SPC presenting for evaluation of hypotension. Admitted for further management.    Plan  #hypotension 2/2 UTI?  #True infection vs Colonized   #r/o pyelonephritis  - on admission pt was hypotensive but hypotension has resolved with a 500CC bolus  - the pt has a chronic SPC with was changed recently on 5/29   - CT abd shows Increasing left-sided hydronephrosis. Bilateral nephroureteral stents in   place.   - As per ID last time the pt is colonized with Proteus/Serratia/Pseudomonas and MSSA in the urine   - Only hypotension present on admission  - Can continue with cefepime for now since possible ascending infection with hydronephrosis. Previous cultures showed serratia, proteus and pseudomonas were sensitive to cefepime.  - F/u urology for CT findings and possible exchange of stent  - F/u ID  - F/u BCX and UCX    #CKD V on dialysis MWF  -sp TDC 6/2  -on HD  -unable to complete first HD session out/pt  - Nephro consult for in/pt HD as needed    #Hx SPC  -exchanged 5/29 by urology     #Chronic cholecystitis  #hida positive   -CT shows same findings  -sexton negative  -not septic   -asymptomatic   -OP surgery follow up     #Afib  -on eliquis, hold for now incase Urology plan stent exchange  - If no plan then resume eliquis    #HTN  -controlled off meds     #Constipation   -CTAP -  Large rectal stool burden, similar compared with prior  -Bowel reg    #Normocytic anemia suspected 2/2 advanced CKD  -b12 1269, folate 12.2  -iron 95, sat elevated , ferritin 438  -no active bleeding, continue to monitor   -cont aranesp 25 per nephro     #Hypocalcemia  -corrected calcium is normal    #Vitamin D deficiency likely 2/2 CKD  -c/w vit D supplementation  -Vitamin D, 25-Hydroxy: 11    #Concern for malnutrition  -cont supplement     #very poor dentition  -outpt fu     Dvt ppx- hold for now, resume norma if no plan from uro  Diet- Renal  Ambulate as tolerated    #Supportive Management:  Dispo:  Home vs SNF  DVT Ppx: apixaban 2.5 milliGRAM(s) Oral every 12 hours  GI Ppx: pantoprazole    Tablet 40 milliGRAM(s) Oral before breakfast  Diet:  Diet, Renal Restrictions:   For patients receiving Renal Replacement - No Protein Restr, No Conc K, No Conc Phos, Low Sodium (06-06-25 @ 19:53) [Active]      Total time spent to complete patient's bedside assessment, review medical chart, discuss medical plan of care with covering medical team was more than 45 minutes with >50% of time spent face to face with patient, discussion with patient/family and/or coordination of care    Housestaff's notes reviewed. When there is confusion regarding the content or information provided in the notes of a housestaff (resident, medical student, physician assistant, or nurse practioner) and the attending physician notes, the attending physician's note takes precedence and supersedes the other notes.    Omkar Grace MD/Paco  Attending Physician

## 2025-06-07 NOTE — CONSULT NOTE ADULT - CONSULT REASON
Ct abd and pelsvis shows Increasing left-sided hydronephrosis in a pt with B/l stents. Eval for possible stent exchnage or repositioning.
ESRD
Hypotension

## 2025-06-08 LAB
ALBUMIN SERPL ELPH-MCNC: 2.4 G/DL — LOW (ref 3.5–5.2)
ALP SERPL-CCNC: 149 U/L — HIGH (ref 30–115)
ALT FLD-CCNC: <5 U/L — SIGNIFICANT CHANGE UP (ref 0–41)
ANION GAP SERPL CALC-SCNC: 12 MMOL/L — SIGNIFICANT CHANGE UP (ref 7–14)
AST SERPL-CCNC: 13 U/L — SIGNIFICANT CHANGE UP (ref 0–41)
BASOPHILS # BLD AUTO: 0.06 K/UL — SIGNIFICANT CHANGE UP (ref 0–0.2)
BASOPHILS NFR BLD AUTO: 0.7 % — SIGNIFICANT CHANGE UP (ref 0–1)
BILIRUB SERPL-MCNC: <0.2 MG/DL — SIGNIFICANT CHANGE UP (ref 0.2–1.2)
BLD GP AB SCN SERPL QL: SIGNIFICANT CHANGE UP
BUN SERPL-MCNC: 31 MG/DL — HIGH (ref 10–20)
CALCIUM SERPL-MCNC: 7.6 MG/DL — LOW (ref 8.4–10.5)
CHLORIDE SERPL-SCNC: 96 MMOL/L — LOW (ref 98–110)
CO2 SERPL-SCNC: 26 MMOL/L — SIGNIFICANT CHANGE UP (ref 17–32)
CREAT SERPL-MCNC: 4.8 MG/DL — CRITICAL HIGH (ref 0.7–1.5)
CULTURE RESULTS: ABNORMAL
EGFR: 12 ML/MIN/1.73M2 — LOW
EGFR: 12 ML/MIN/1.73M2 — LOW
EOSINOPHIL # BLD AUTO: 0.15 K/UL — SIGNIFICANT CHANGE UP (ref 0–0.7)
EOSINOPHIL NFR BLD AUTO: 1.7 % — SIGNIFICANT CHANGE UP (ref 0–8)
GLUCOSE BLDC GLUCOMTR-MCNC: 130 MG/DL — HIGH (ref 70–99)
GLUCOSE BLDC GLUCOMTR-MCNC: 133 MG/DL — HIGH (ref 70–99)
GLUCOSE BLDC GLUCOMTR-MCNC: 99 MG/DL — SIGNIFICANT CHANGE UP (ref 70–99)
GLUCOSE SERPL-MCNC: 123 MG/DL — HIGH (ref 70–99)
HCT VFR BLD CALC: 22 % — LOW (ref 42–52)
HGB BLD-MCNC: 6.7 G/DL — CRITICAL LOW (ref 14–18)
IMM GRANULOCYTES NFR BLD AUTO: 0.8 % — HIGH (ref 0.1–0.3)
LYMPHOCYTES # BLD AUTO: 1.7 K/UL — SIGNIFICANT CHANGE UP (ref 1.2–3.4)
LYMPHOCYTES # BLD AUTO: 19 % — LOW (ref 20.5–51.1)
MCHC RBC-ENTMCNC: 28.6 PG — SIGNIFICANT CHANGE UP (ref 27–31)
MCHC RBC-ENTMCNC: 30.5 G/DL — LOW (ref 32–37)
MCV RBC AUTO: 94 FL — SIGNIFICANT CHANGE UP (ref 80–94)
MONOCYTES # BLD AUTO: 1.03 K/UL — HIGH (ref 0.1–0.6)
MONOCYTES NFR BLD AUTO: 11.5 % — HIGH (ref 1.7–9.3)
NEUTROPHILS # BLD AUTO: 5.96 K/UL — SIGNIFICANT CHANGE UP (ref 1.4–6.5)
NEUTROPHILS NFR BLD AUTO: 66.3 % — SIGNIFICANT CHANGE UP (ref 42.2–75.2)
NRBC BLD AUTO-RTO: 0 /100 WBCS — SIGNIFICANT CHANGE UP (ref 0–0)
PLATELET # BLD AUTO: 229 K/UL — SIGNIFICANT CHANGE UP (ref 130–400)
PMV BLD: 11.2 FL — HIGH (ref 7.4–10.4)
POTASSIUM SERPL-MCNC: 3.5 MMOL/L — SIGNIFICANT CHANGE UP (ref 3.5–5)
POTASSIUM SERPL-SCNC: 3.5 MMOL/L — SIGNIFICANT CHANGE UP (ref 3.5–5)
PROT SERPL-MCNC: 6.8 G/DL — SIGNIFICANT CHANGE UP (ref 6–8)
RBC # BLD: 2.34 M/UL — LOW (ref 4.7–6.1)
RBC # FLD: 13.1 % — SIGNIFICANT CHANGE UP (ref 11.5–14.5)
SODIUM SERPL-SCNC: 134 MMOL/L — LOW (ref 135–146)
SPECIMEN SOURCE: SIGNIFICANT CHANGE UP
WBC # BLD: 8.97 K/UL — SIGNIFICANT CHANGE UP (ref 4.8–10.8)
WBC # FLD AUTO: 8.97 K/UL — SIGNIFICANT CHANGE UP (ref 4.8–10.8)

## 2025-06-08 PROCEDURE — 76705 ECHO EXAM OF ABDOMEN: CPT | Mod: 26

## 2025-06-08 PROCEDURE — 99232 SBSQ HOSP IP/OBS MODERATE 35: CPT

## 2025-06-08 RX ADMIN — Medication 1 TABLET(S): at 11:30

## 2025-06-08 RX ADMIN — CALCITRIOL 0.25 MICROGRAM(S): 0.5 CAPSULE, GELATIN COATED ORAL at 11:30

## 2025-06-08 RX ADMIN — FOLIC ACID 1 MILLIGRAM(S): 1 TABLET ORAL at 11:30

## 2025-06-08 RX ADMIN — APIXABAN 2.5 MILLIGRAM(S): 2.5 TABLET, FILM COATED ORAL at 17:01

## 2025-06-08 RX ADMIN — Medication 40 MILLIGRAM(S): at 05:09

## 2025-06-08 RX ADMIN — SEVELAMER HYDROCHLORIDE 800 MILLIGRAM(S): 800 TABLET ORAL at 05:09

## 2025-06-08 RX ADMIN — SEVELAMER HYDROCHLORIDE 800 MILLIGRAM(S): 800 TABLET ORAL at 21:21

## 2025-06-08 RX ADMIN — Medication 1 APPLICATION(S): at 05:09

## 2025-06-08 RX ADMIN — APIXABAN 2.5 MILLIGRAM(S): 2.5 TABLET, FILM COATED ORAL at 05:09

## 2025-06-08 RX ADMIN — SEVELAMER HYDROCHLORIDE 800 MILLIGRAM(S): 800 TABLET ORAL at 17:02

## 2025-06-08 RX ADMIN — Medication 2 TABLET(S): at 21:21

## 2025-06-08 NOTE — PROGRESS NOTE ADULT - ASSESSMENT
The pt is a 70y M with a PMHx of CKD V on dialysis, HTN, NIDDM, prior spinal abscess, nephrolithiasis and recurrent UTI s/p b/l stent placement and SPC presenting for evaluation of hypotension    ESRD on HD / sepsis   HD in AM   CT Abdomen and Pelvis No Cont (06.06.25 @ 13:57) >  Increasing left-sided hydronephrosis. Bilateral nephroureteral stents in  place.  Left intrarenal and ureteral air, possibly reflecting infection.  Unchanged distended gallbladder with cholelithiasis, wall thickening and  pericholecystic stranding.  New small pericardial effusion and trace bilateral bilateral pleural  effusions   seen by   check echo  GI evaluation    follow BC NGTD / BC / follow UC seen by ID / on cefepime   corrected calcium in the mid 8 / on calcitriol/ replete vit D   will follow

## 2025-06-08 NOTE — PROGRESS NOTE ADULT - SUBJECTIVE AND OBJECTIVE BOX
Nephrology progress note    Patient is seen and examined, events over the last 24 h noted .  Lying in bed comfortable     Allergies:  No Known Allergies    Hospital Medications:   MEDICATIONS  (STANDING):  apixaban 2.5 milliGRAM(s) Oral every 12 hours  calcitriol   Capsule 0.25 MICROGram(s) Oral daily  cefepime   IVPB 2000 milliGRAM(s) IV Intermittent <User Schedule>  dextrose Oral Gel 15 Gram(s) Oral once  folic acid 1 milliGRAM(s) Oral daily  glucagon  Injectable 1 milliGRAM(s) IntraMuscular once  multivitamin 1 Tablet(s) Oral daily  pantoprazole    Tablet 40 milliGRAM(s) Oral before breakfast  polyethylene glycol 3350 17 Gram(s) Oral daily  senna 2 Tablet(s) Oral at bedtime  sevelamer carbonate 800 milliGRAM(s) Oral three times a day        VITALS:  T(F): 97.9 (06-08-25 @ 04:53), Max: 98.8 (06-07-25 @ 12:35)  HR: 55 (06-08-25 @ 04:53)  BP: 119/74 (06-08-25 @ 04:53)  RR: 18 (06-08-25 @ 04:53)  SpO2: 98% (06-08-25 @ 04:53)    Specimen Source: Blood Blood (06.06.25 @ 13:03)          PHYSICAL EXAM:  Constitutional: NAD  Respiratory: CTAB, no wheezes, rales or rhonchi  Cardiovascular: S1, S2, RRR  Gastrointestinal: BS+, soft, NT/ND  Extremities: No cyanosis or clubbing. No peripheral edema   Skin: No rashes    LABS:  06-07    134[L]  |  95[L]  |  18  ----------------------------<  213[H]  3.8   |  25  |  3.3[H]    Ca    7.7[L]      07 Jun 2025 08:51  Phos  4.4     06-07  Mg     2.0     06-06    TPro  6.8  /  Alb  2.9[L]  /  TBili  0.2  /  DBili      /  AST  15  /  ALT  <5  /  AlkPhos  170[H]  06-07                          7.2    8.04  )-----------( 216      ( 07 Jun 2025 08:51 )             23.8       Urine Studies:  Urinalysis Basic - ( 07 Jun 2025 08:51 )    Color:  / Appearance:  / SG:  / pH:   Gluc: 213 mg/dL / Ketone:   / Bili:  / Urobili:    Blood:  / Protein:  / Nitrite:    Leuk Esterase:  / RBC:  / WBC    Sq Epi:  / Non Sq Epi:  / Bacteria:           Iron 73, TIBC 107, %sat 68      [05-28-25 @ 11:08]  Ferritin 438      [05-04-25 @ 06:10]  PTH -- (Ca 6.1)      [05-26-25 @ 04:47]   288  PTH -- (Ca 8.3)      [07-10-24 @ 21:08]   43  Vitamin D (25OH) 11      [05-26-25 @ 04:47]  TSH 5.27      [04-30-25 @ 17:55]    HBsAb <3.3      [06-03-25 @ 10:30]  HBsAg Nonreact      [06-02-25 @ 11:28]  HBcAb Nonreact      [06-02-25 @ 11:28]  HCV 0.06, Nonreact      [06-02-25 @ 11:28]    TAY: titer Negative, pattern --      [08-13-24 @ 06:21]  dsDNA <12      [09-17-23 @ 07:23]  C3 Complement 132      [09-16-23 @ 15:44]  C4 Complement 35      [09-16-23 @ 15:44]  Rheumatoid Factor <10      [08-13-24 @ 06:21]  ANCA: cANCA Negative, pANCA Negative, atypical ANCA Negative      [09-17-23 @ 07:23]  PLA2R: ARLEEN <1.8, IFA --      [09-17-23 @ 07:43]  Free Light Chains: kappa 35.40, lambda 35.96, ratio = 0.98      [09-17 @ 07:23]  Immunofixation Serum:   No Monoclonal Band Identified      Reference Range: None Detected      [09-17-23 @ 07:23]  SPEP Interpretation: Polyclonal Gammopathy      [09-17-23 @ 07:23]      RADIOLOGY & ADDITIONAL STUDIES:

## 2025-06-08 NOTE — PROGRESS NOTE ADULT - SUBJECTIVE AND OBJECTIVE BOX
SUBJECTIVE/OVERNIGHT EVENTS  Today is hospital day 2d. This morning patient was seen and examined at bedside, resting comfortably in bed. No acute or major events overnight.      CODE STATUS: MOLST PRIOR      MEDICATIONS  STANDING MEDICATIONS  apixaban 2.5 milliGRAM(s) Oral every 12 hours  calcitriol   Capsule 0.25 MICROGram(s) Oral daily  cefepime   IVPB 2000 milliGRAM(s) IV Intermittent <User Schedule>  chlorhexidine 2% Cloths 1 Application(s) Topical <User Schedule>  dextrose 5%. 1000 milliLiter(s) IV Continuous <Continuous>  dextrose 5%. 1000 milliLiter(s) IV Continuous <Continuous>  dextrose 50% Injectable 25 Gram(s) IV Push once  dextrose 50% Injectable 12.5 Gram(s) IV Push once  dextrose 50% Injectable 25 Gram(s) IV Push once  dextrose Oral Gel 15 Gram(s) Oral once  folic acid 1 milliGRAM(s) Oral daily  glucagon  Injectable 1 milliGRAM(s) IntraMuscular once  multivitamin 1 Tablet(s) Oral daily  pantoprazole    Tablet 40 milliGRAM(s) Oral before breakfast  polyethylene glycol 3350 17 Gram(s) Oral daily  senna 2 Tablet(s) Oral at bedtime  sevelamer carbonate 800 milliGRAM(s) Oral three times a day    PRN MEDICATIONS    VITALS  T(F): 98.8 (06-08-25 @ 13:06), Max: 98.8 (06-08-25 @ 13:06)  HR: 81 (06-08-25 @ 13:06) (55 - 89)  BP: 134/76 (06-08-25 @ 13:06) (112/69 - 134/76)  RR: 18 (06-08-25 @ 13:06) (18 - 19)  SpO2: 98% (06-08-25 @ 13:06) (98% - 98%)  POCT Blood Glucose.: 133 mg/dL (06-08-25 @ 12:07)  POCT Blood Glucose.: 99 mg/dL (06-08-25 @ 08:05)  POCT Blood Glucose.: 142 mg/dL (06-07-25 @ 21:23)  POCT Blood Glucose.: 91 mg/dL (06-07-25 @ 16:17)    PHYSICAL EXAM  GENERAL: NAD  HEAD:  Atraumatic, normocephalic  EYES: EOMI, PERRL  NECK: Supple, trachea midline, no JVD  HEART: Regular rate and rhythm  LUNGS: decreased BS at bases  ABDOMEN: Soft, nontender, nondistended, +BS  EXTREMITIES: 2+ peripheral pulses bilaterally. No clubbing, cyanosis, or edema  NERVOUS SYSTEM:  A&Ox3, moving all extremities, no focal deficits     (  ) Indwelling Dye Catheter   Date inserted:    Reason (  ) Critical illness     (  ) urinary retention    (  ) Accurate Ins/Outs Monitoring     (  ) CMO patient    (  ) Central Line  Date inserted:  Location: (  ) Right IJ   (  ) Left IJ   (  ) Right Fem   (  ) Left Fem    (  ) SPC  (  ) pigtail  (  ) PEG tube  (  ) colostomy  (  ) jejunostomy  (  ) U-Dall    LABS             7.2    8.04  )-----------( 216      ( 06-07-25 @ 08:51 )             23.8     134  |  95  |  18  -------------------------<  213   06-07-25 @ 08:51  3.8  |  25  |  3.3    Ca      7.7     06-07-25 @ 08:51  Phos   4.4     06-07-25 @ 08:51    TPro  6.8  /  Alb  2.9  /  TBili  0.2  /  DBili  x   /  AST  15  /  ALT  <5  /  AlkPhos  170  /  GGT  x     06-07-25 @ 08:51    PTT - ( 06-07-25 @ 08:51 )  PTT:36.2 sec      Urinalysis Basic - ( 07 Jun 2025 08:51 )    Color: x / Appearance: x / SG: x / pH: x  Gluc: 213 mg/dL / Ketone: x  / Bili: x / Urobili: x   Blood: x / Protein: x / Nitrite: x   Leuk Esterase: x / RBC: x / WBC x   Sq Epi: x / Non Sq Epi: x / Bacteria: x          Culture - Urine (collected 06 Jun 2025 14:00)  Source: Suprapubic Suprapubic  Preliminary Report (07 Jun 2025 19:40):    10,000 - 49,000 CFU/mL Candida albicans "Susceptibilities not performed"    Culture - Blood (collected 06 Jun 2025 13:03)  Source: Blood Blood  Preliminary Report (07 Jun 2025 22:02):    No growth at 24 hours    Culture - Blood (collected 06 Jun 2025 13:03)  Source: Blood Blood  Preliminary Report (07 Jun 2025 22:02):    No growth at 24 hours      IMAGING

## 2025-06-08 NOTE — PROGRESS NOTE ADULT - ASSESSMENT
70y M with a PMHx of CKD V on dialysis, HTN, NIDDM, prior spinal abscess, nephrolithiasis and recurrent UTI s/p b/l stent placement and SPC presenting for evaluation of hypotension.    Plan  #hypotension 2/2 UTI?  #True infection vs Colonized   #r/o pyelonephritis  - on admission pt was hypotensive but hypotension has resolved with a 500CC bolus  - the pt has a chronic SPC with was changed recently on 5/29   - CT abd shows Increasing left-sided hydronephrosis. Bilateral nephroureteral stents in   place.   - As per ID last time the pt is colonized with Proteus/Serratia/Pseudomonas and MSSA in the urine   - Only hypotension present on admission  - Can continue with cefepime for now since possible ascending infection with hydronephrosis. Previous cultures showed serratia, proteus and pseudomonas were sensitive to cefepime.  - Per urology, Consider RBUS vs NM renal scan to r/o obstruction. If Stent is obstructed/stent failure please call IR for Left PCNT.   - Per ID, c/w cefepime -- change to 1g q 24 hours for ESRD dosing   - F/u BCX and UCX    #CKD V on dialysis MWF  -sp TDC 6/2  -on HD  -unable to complete first HD session out/pt  - Nephro consult for in/pt HD as needed    #Hx SPC  -exchanged 5/29 by urology     #Chronic cholecystitis  #hida positive   -CT shows same findings  -sexton negative  -not septic   -asymptomatic   -OP surgery follow up     #Afib  -on eliquis, hold for now incase Urology plan stent exchange  - If no plan then resume eliquis    #HTN  -controlled off meds     #Constipation   -CTAP -  Large rectal stool burden, similar compared with prior  -Bowel reg    #Normocytic anemia suspected 2/2 advanced CKD  -b12 1269, folate 12.2  -iron 95, sat elevated , ferritin 438  -no active bleeding, continue to monitor   -cont aranesp 25 per nephro     #Hypocalcemia  -corrected calcium is normal    #Vitamin D deficiency likely 2/2 CKD  -c/w vit D supplementation  -Vitamin D, 25-Hydroxy: 11    #Concern for malnutrition  -cont supplement     #very poor dentition  -outpt fu     Dvt ppx- apixaban  GI Ppx: pantoprazole  Diet- Renal  Ambulate as tolerated  Dispo:  Home vs SNF    Pending:  - F/u RBUS

## 2025-06-09 LAB
ANION GAP SERPL CALC-SCNC: 14 MMOL/L — SIGNIFICANT CHANGE UP (ref 7–14)
BUN SERPL-MCNC: 36 MG/DL — HIGH (ref 10–20)
CALCIUM SERPL-MCNC: 7.7 MG/DL — LOW (ref 8.4–10.5)
CHLORIDE SERPL-SCNC: 98 MMOL/L — SIGNIFICANT CHANGE UP (ref 98–110)
CO2 SERPL-SCNC: 26 MMOL/L — SIGNIFICANT CHANGE UP (ref 17–32)
CREAT SERPL-MCNC: 5.7 MG/DL — CRITICAL HIGH (ref 0.7–1.5)
EGFR: 10 ML/MIN/1.73M2 — LOW
EGFR: 10 ML/MIN/1.73M2 — LOW
GLUCOSE BLDC GLUCOMTR-MCNC: 137 MG/DL — HIGH (ref 70–99)
GLUCOSE SERPL-MCNC: 78 MG/DL — SIGNIFICANT CHANGE UP (ref 70–99)
HCT VFR BLD CALC: 26.5 % — LOW (ref 42–52)
HGB BLD-MCNC: 8.3 G/DL — LOW (ref 14–18)
MAGNESIUM SERPL-MCNC: 2 MG/DL — SIGNIFICANT CHANGE UP (ref 1.8–2.4)
MCHC RBC-ENTMCNC: 26.9 PG — LOW (ref 27–31)
MCHC RBC-ENTMCNC: 31.3 G/DL — LOW (ref 32–37)
MCV RBC AUTO: 85.8 FL — SIGNIFICANT CHANGE UP (ref 80–94)
NRBC BLD AUTO-RTO: 0 /100 WBCS — SIGNIFICANT CHANGE UP (ref 0–0)
PLATELET # BLD AUTO: 249 K/UL — SIGNIFICANT CHANGE UP (ref 130–400)
PMV BLD: 10.9 FL — HIGH (ref 7.4–10.4)
POTASSIUM SERPL-MCNC: 3.8 MMOL/L — SIGNIFICANT CHANGE UP (ref 3.5–5)
POTASSIUM SERPL-SCNC: 3.8 MMOL/L — SIGNIFICANT CHANGE UP (ref 3.5–5)
RBC # BLD: 3.09 M/UL — LOW (ref 4.7–6.1)
RBC # FLD: SIGNIFICANT CHANGE UP % (ref 11.5–14.5)
SODIUM SERPL-SCNC: 138 MMOL/L — SIGNIFICANT CHANGE UP (ref 135–146)
WBC # BLD: 7.92 K/UL — SIGNIFICANT CHANGE UP (ref 4.8–10.8)
WBC # FLD AUTO: 7.92 K/UL — SIGNIFICANT CHANGE UP (ref 4.8–10.8)

## 2025-06-09 PROCEDURE — 78708 K FLOW/FUNCT IMAGE W/DRUG: CPT | Mod: 26

## 2025-06-09 PROCEDURE — 99232 SBSQ HOSP IP/OBS MODERATE 35: CPT

## 2025-06-09 RX ADMIN — Medication 1 APPLICATION(S): at 05:20

## 2025-06-09 RX ADMIN — CALCITRIOL 0.25 MICROGRAM(S): 0.5 CAPSULE, GELATIN COATED ORAL at 12:34

## 2025-06-09 RX ADMIN — CEFEPIME 100 MILLIGRAM(S): 2 INJECTION, POWDER, FOR SOLUTION INTRAVENOUS at 11:21

## 2025-06-09 RX ADMIN — APIXABAN 2.5 MILLIGRAM(S): 2.5 TABLET, FILM COATED ORAL at 17:26

## 2025-06-09 RX ADMIN — SEVELAMER HYDROCHLORIDE 800 MILLIGRAM(S): 800 TABLET ORAL at 05:20

## 2025-06-09 RX ADMIN — Medication 40 MILLIGRAM(S): at 05:25

## 2025-06-09 RX ADMIN — APIXABAN 2.5 MILLIGRAM(S): 2.5 TABLET, FILM COATED ORAL at 05:20

## 2025-06-09 RX ADMIN — Medication 1 TABLET(S): at 12:33

## 2025-06-09 RX ADMIN — SEVELAMER HYDROCHLORIDE 800 MILLIGRAM(S): 800 TABLET ORAL at 21:38

## 2025-06-09 RX ADMIN — SEVELAMER HYDROCHLORIDE 800 MILLIGRAM(S): 800 TABLET ORAL at 13:16

## 2025-06-09 RX ADMIN — FOLIC ACID 1 MILLIGRAM(S): 1 TABLET ORAL at 12:33

## 2025-06-09 NOTE — PROGRESS NOTE ADULT - ASSESSMENT
70y M with a PMHx of CKD V on dialysis, HTN, NIDDM, prior spinal abscess, nephrolithiasis and recurrent UTI s/p b/l stent placement and SPC presenting for evaluation of hypotension.    Plan  #hypotension 2/2 UTI?  #True infection vs Colonized   #r/o pyelonephritis  - on admission pt was hypotensive but hypotension has resolved with a 500CC bolus  - the pt has a chronic SPC with was changed recently on 5/29   - CT abd shows Increasing left-sided hydronephrosis. Bilateral nephroureteral stents in   place.   - As per ID last time the pt is colonized with Proteus/Serratia/Pseudomonas and MSSA in the urine   - Only hypotension present on admission  - Can continue with cefepime for now since possible ascending infection with hydronephrosis. Previous cultures showed serratia, proteus and pseudomonas were sensitive to cefepime.  - Per urology, Consider RBUS vs NM renal scan to r/o obstruction. If Stent is obstructed/stent failure please call IR for Left PCNT.   - Per ID, c/w cefepime -- change to 1g q 24 hours for ESRD dosing   - F/u BCX and UCX    #CKD V on dialysis MWF  -sp TDC 6/2  -on HD  -unable to complete first HD session out/pt  - Nephro consult for in/pt HD as needed    #Hx SPC  -exchanged 5/29 by urology     #Chronic cholecystitis  #hida positive   -CT shows same findings  -sexton negative  -not septic   -asymptomatic   -OP surgery follow up     #Afib  -on eliquis, hold for now incase Urology plan stent exchange  - If no plan then resume eliquis    #HTN  -controlled off meds     #Constipation   -CTAP -  Large rectal stool burden, similar compared with prior  -Bowel reg    #Normocytic anemia suspected 2/2 advanced CKD  -b12 1269, folate 12.2  -iron 95, sat elevated , ferritin 438  -no active bleeding, continue to monitor   -cont aranesp 25 per nephro     #Hypocalcemia  -corrected calcium is normal    #Vitamin D deficiency likely 2/2 CKD  -c/w vit D supplementation  -Vitamin D, 25-Hydroxy: 11    #Concern for malnutrition  -cont supplement     #very poor dentition  -outpt fu     Dvt ppx- apixaban  GI Ppx: pantoprazole  Diet- Renal  Ambulate as tolerated  Dispo:  Home vs SNF    Pending:  - F/u RBUS 70y M with a PMHx of CKD V on dialysis, HTN, NIDDM, prior spinal abscess, nephrolithiasis and recurrent UTI s/p b/l stent placement and SPC presenting for evaluation of hypotension.    Plan  #hypotension 2/2 UTI?  #True infection vs Colonized   #r/o pyelonephritis  - on admission pt was hypotensive but hypotension has resolved with a 500CC bolus  - the pt has a chronic SPC with was changed recently on 5/29   - CT abd shows Increasing left-sided hydronephrosis. Bilateral nephroureteral stents in place.   - As per ID last time the pt is colonized with Proteus/Serratia/Pseudomonas and MSSA in the urine   - Only hypotension present on admission  - Can continue with cefepime for now since possible ascending infection with hydronephrosis. Previous cultures showed serratia, proteus and pseudomonas were sensitive to cefepime.  - Per urology, RBUS & NM renal scan to r/o obstruction. If Stent is obstructed/stent failure please call IR for Left PCNT.   - Per ID, c/w cefepime -- change to 1g q 24 hours for ESRD dosing , will f/u with ID following scans  - BCX negative and UCX candida    #CKD V on dialysis MWF  -sp TDC 6/2  -on HD  -unable to complete first HD session out/pt  - Nephro consult for in/pt HD as needed, will go for dialysis today 6/9    #Hx SPC  -exchanged 5/29 by urology     #Chronic cholecystitis  #hida positive   -CT shows same findings  -sexton negative  -not septic   -asymptomatic   -OP surgery follow up     #Afib  -on eliquis, held for possible urological procedure  -resumed    #HTN  -controlled off meds     #Constipation   -CTAP -  Large rectal stool burden, similar compared with prior  -Bowel reg    #Normocytic anemia suspected 2/2 advanced CKD  -b12 1269, folate 12.2  -iron 95, sat elevated , ferritin 438  -no active bleeding, continue to monitor   -cont aranesp 25 per nephro     #Hypocalcemia  -corrected calcium is normal    #Vitamin D deficiency likely 2/2 CKD  -c/w vit D supplementation  -Vitamin D, 25-Hydroxy: 11    #Concern for malnutrition  -cont supplement     #very poor dentition  -outpt fu     Dvt ppx- apixaban  GI Ppx: pantoprazole  Diet- Renal  Ambulate as tolerated  Dispo:  Home vs SNF    Pending:  - F/u RBUS & NM renal scan 70y M with a PMHx of CKD V on dialysis, HTN, NIDDM, prior spinal abscess, nephrolithiasis and recurrent UTI s/p b/l stent placement and SPC presenting for evaluation of hypotension.    Plan  #hypotension 2/2 UTI?  #True infection vs Colonized   #r/o pyelonephritis  - on admission pt was hypotensive but hypotension has resolved with a 500CC bolus  - the pt has a chronic SPC with was changed recently on 5/29   - CT abd shows Increasing left-sided hydronephrosis. Bilateral nephroureteral stents in place.   - As per ID last time the pt is colonized with Proteus/Serratia/Pseudomonas and MSSA in the urine   - Only hypotension present on admission  - Can continue with cefepime for now since possible ascending infection with hydronephrosis. Previous cultures showed serratia, proteus and pseudomonas were sensitive to cefepime.  - Per urology, RBUS & NM renal scan to r/o obstruction. If Stent is obstructed/stent failure please call IR for Left PCNT.   - Per ID, c/w cefepime -- change to 1g q 24 hours for ESRD dosing , will f/u with ID following scans  - BCX negative and UCX candida    #CKD V on dialysis MWF  -sp TDC 6/2  -on HD  -unable to complete first HD session out/pt  - Nephro consult for in/pt HD as needed, will go for dialysis today 6/9    #Hx SPC  -exchanged 5/29 by urology     #Chronic cholecystitis  #hida positive   -CT shows same findings  -sexton negative  -not septic   -asymptomatic   -OP surgery follow up     #Afib  -on eliquis, held for possible urological procedure  -resumed    #HTN  -controlled off meds     #Constipation   -CTAP -  Large rectal stool burden, similar compared with prior  -Bowel reg    #Normocytic anemia suspected 2/2 advanced CKD  -b12 1269, folate 12.2  -iron 95, sat elevated , ferritin 438  -no active bleeding, continue to monitor   -cont aranesp 25 per nephro   -Hgb dropped from 7.2 -> 6.7 and pt was transfused with a unit of blood on 6/8  - repeat hgb on 6/9 is 8.3    #Hypocalcemia  -corrected calcium is normal    #Vitamin D deficiency likely 2/2 CKD  -c/w vit D supplementation  -Vitamin D, 25-Hydroxy: 11    #Concern for malnutrition  -cont supplement     #very poor dentition  -outpt fu     Dvt ppx- apixaban  GI Ppx: pantoprazole  Diet- Renal  Ambulate as tolerated  Dispo:  Home vs SNF    Pending:  - F/u RBUS & NM renal scan 70y M with a PMHx of CKD V on dialysis, HTN, NIDDM, prior spinal abscess, nephrolithiasis and recurrent UTI s/p b/l stent placement and SPC presenting for evaluation of hypotension.    Plan  #hypotension 2/2 UTI?  #True infection vs Colonized   #r/o pyelonephritis  - on admission pt was hypotensive but hypotension has resolved with a 500CC bolus  - the pt has a chronic SPC with was changed recently on 5/29   - CT abd shows Increasing left-sided hydronephrosis. Bilateral nephroureteral stents in place.   - As per ID last time the pt is colonized with Proteus/Serratia/Pseudomonas and MSSA in the urine   - Only hypotension present on admission  - Can continue with cefepime for now since possible ascending infection with hydronephrosis. Previous cultures showed serratia, proteus and pseudomonas were sensitive to cefepime.  - Per urology, RBUS & NM renal scan to r/o obstruction. If Stent is obstructed/stent failure please call IR for Left PCNT.   - Per ID, c/w cefepime -- change to 1g q 24 hours for ESRD dosing , will f/u with ID following scans  - BCX negative and UCX candida    #ESRD on dialysis MWF  -sp TDC 6/2  -on HD  -unable to complete first HD session out/pt  - Nephro consult for in/pt HD as needed, will go for dialysis today 6/9    #Hx SPC  -exchanged 5/29 by urology     #Chronic cholecystitis  -not septic   -asymptomatic   - hida positive   -CT shows same findings  -sexton negative  -surgery: OP surgery follow up     #Afib  -on eliquis, held for possible urological procedure  -resumed    #HTN  -controlled off meds     #Constipation   -CTAP -  Large rectal stool burden, similar compared with prior  -Bowel reg    #Normocytic anemia suspected 2/2 advanced CKD  -b12 1269, folate 12.2  -iron 95, sat elevated , ferritin 438  -no active bleeding, continue to monitor   -cont aranesp 25 per nephro   -Hgb dropped from 7.2 -> 6.7 and pt was transfused with a unit of blood on 6/8  - repeat hgb on 6/9 is 8.3    #Hypocalcemia  -corrected calcium is normal    #Vitamin D deficiency likely 2/2 CKD  -c/w vit D supplementation  -Vitamin D, 25-Hydroxy: 11    #Concern for malnutrition  -cont supplement     #very poor dentition  -outpt fu     Dvt ppx- apixaban  GI Ppx: pantoprazole  Diet- Renal  Ambulate as tolerated  Dispo:  Home vs SNF    Pending:  - F/u RBUS & NM renal scan

## 2025-06-09 NOTE — PROGRESS NOTE ADULT - SUBJECTIVE AND OBJECTIVE BOX
seen and examined  24 h events noted   no distress         PAST HISTORY  --------------------------------------------------------------------------------  No significant changes to PMH, PSH, FHx, SHx, unless otherwise noted    ALLERGIES & MEDICATIONS  --------------------------------------------------------------------------------  Allergies    No Known Allergies    Intolerances      Standing Inpatient Medications  apixaban 2.5 milliGRAM(s) Oral every 12 hours  calcitriol   Capsule 0.25 MICROGram(s) Oral daily  cefepime   IVPB 2000 milliGRAM(s) IV Intermittent <User Schedule>  chlorhexidine 2% Cloths 1 Application(s) Topical <User Schedule>  dextrose 5%. 1000 milliLiter(s) IV Continuous <Continuous>  dextrose 5%. 1000 milliLiter(s) IV Continuous <Continuous>  dextrose 50% Injectable 25 Gram(s) IV Push once  dextrose 50% Injectable 12.5 Gram(s) IV Push once  dextrose 50% Injectable 25 Gram(s) IV Push once  dextrose Oral Gel 15 Gram(s) Oral once  folic acid 1 milliGRAM(s) Oral daily  glucagon  Injectable 1 milliGRAM(s) IntraMuscular once  multivitamin 1 Tablet(s) Oral daily  pantoprazole    Tablet 40 milliGRAM(s) Oral before breakfast  polyethylene glycol 3350 17 Gram(s) Oral daily  senna 2 Tablet(s) Oral at bedtime  sevelamer carbonate 800 milliGRAM(s) Oral three times a day    PRN Inpatient Medications        VITALS/PHYSICAL EXAM  --------------------------------------------------------------------------------  T(C): 36.6 (06-09-25 @ 05:19), Max: 37.1 (06-08-25 @ 13:06)  HR: 61 (06-09-25 @ 05:19) (61 - 82)  BP: 119/69 (06-09-25 @ 05:19) (107/73 - 162/85)  RR: 18 (06-09-25 @ 05:19) (18 - 19)  SpO2: 99% (06-09-25 @ 05:19) (98% - 99%)  Wt(kg): --        Physical Exam:  	Gen: NAD,  	Pulm: decrease BS  B/L  	CV:  S1S2; no rub  	Abd: +distended  	Vascular access:tdc     LABS/STUDIES  --------------------------------------------------------------------------------              8.3    7.92  >-----------<  249      [06-09-25 @ 06:18]              26.5     134  |  96  |  31  ----------------------------<  123      [06-08-25 @ 12:43]  3.5   |  26  |  4.8        Ca     7.6     [06-08-25 @ 12:43]      Phos  4.4     [06-07-25 @ 08:51]    TPro  6.8  /  Alb  2.4  /  TBili  <0.2  /  DBili  x   /  AST  13  /  ALT  <5  /  AlkPhos  149  [06-08-25 @ 12:43]      PTT: 36.2       [06-07-25 @ 08:51]      Creatinine Trend:  SCr 4.8 [06-08 @ 12:43]  SCr 3.3 [06-07 @ 08:51]  SCr 2.0 [06-06 @ 13:06]  SCr 3.4 [06-05 @ 05:53]  SCr 4.8 [06-04 @ 05:35]    Urinalysis - [06-08-25 @ 12:43]      Color  / Appearance  / SG  / pH       Gluc 123 / Ketone   / Bili  / Urobili        Blood  / Protein  / Leuk Est  / Nitrite       RBC  / WBC  / Hyaline  / Gran  / Sq Epi  / Non Sq Epi  / Bacteria       Iron 73, TIBC 107, %sat 68      [05-28-25 @ 11:08]  Ferritin 438      [05-04-25 @ 06:10]  PTH -- (Ca 6.1)      [05-26-25 @ 04:47]   288  PTH -- (Ca 8.3)      [07-10-24 @ 21:08]   43  Vitamin D (25OH) 11      [05-26-25 @ 04:47]  TSH 5.27      [04-30-25 @ 17:55]    HBsAb <3.3      [06-03-25 @ 10:30]  HBsAg Nonreact      [06-02-25 @ 11:28]  HBcAb Nonreact      [06-02-25 @ 11:28]  HCV 0.06, Nonreact      [06-02-25 @ 11:28]

## 2025-06-09 NOTE — PROGRESS NOTE ADULT - SUBJECTIVE AND OBJECTIVE BOX
KRISTY GARCIA 70y Male  MRN#: [id number]     Hospital Day: 3d    Pt is currently admitted with the primary diagnosis of  Acute pyelonephritis        SUBJECTIVE     Subjective complaints  Pt was evaluated this am. Patient has no complaints    Review of systems  ROS is negative                                            ----------------------------------------------------------    HOSPITAL COURSE     6/9 - Patient was seen and examined at bedside in the morning. Will go for RBUS/NM scan to r/o obstruction. Was transfused a unit of RBC's yday, repeat hgb 8.3                                            ----------------------------------------------------------  OBJECTIVE  PAST MEDICAL & SURGICAL HISTORY  DM (diabetes mellitus)    HTN (hypertension)    H/O paraplegia    Spinal abscess    Renal stones    Stage 5 chronic kidney disease not on chronic dialysis    Neurogenic dysfunction of the urinary bladder    Encounter for care or replacement of suprapubic tube    Spinal abscess  S/P Surgery    Encounter for care or replacement of suprapubic tube    S/P ORIF (open reduction internal fixation) fracture                                              -----------------------------------------------------------  ALLERGIES:  No Known Allergies                                            ------------------------------------------------------------    HOME MEDICATIONS  Home Medications:  Eliquis 5 mg oral tablet: 0.5 tab(s) orally every 12 hours (06 Jun 2025 19:14)  folic acid 1 mg oral tablet: 1 tab(s) orally once a day (06 Jun 2025 19:14)  Multiple Vitamins oral tablet: 1 tab(s) orally once a day (06 Jun 2025 19:14)                           MEDICATIONS:  STANDING MEDICATIONS  apixaban 2.5 milliGRAM(s) Oral every 12 hours  calcitriol   Capsule 0.25 MICROGram(s) Oral daily  cefepime   IVPB 2000 milliGRAM(s) IV Intermittent <User Schedule>  chlorhexidine 2% Cloths 1 Application(s) Topical <User Schedule>  dextrose 5%. 1000 milliLiter(s) IV Continuous <Continuous>  dextrose 5%. 1000 milliLiter(s) IV Continuous <Continuous>  dextrose 50% Injectable 25 Gram(s) IV Push once  dextrose 50% Injectable 12.5 Gram(s) IV Push once  dextrose 50% Injectable 25 Gram(s) IV Push once  dextrose Oral Gel 15 Gram(s) Oral once  folic acid 1 milliGRAM(s) Oral daily  glucagon  Injectable 1 milliGRAM(s) IntraMuscular once  multivitamin 1 Tablet(s) Oral daily  pantoprazole    Tablet 40 milliGRAM(s) Oral before breakfast  polyethylene glycol 3350 17 Gram(s) Oral daily  senna 2 Tablet(s) Oral at bedtime  sevelamer carbonate 800 milliGRAM(s) Oral three times a day    PRN MEDICATIONS                                            ------------------------------------------------------------  VITAL SIGNS: Last 24 Hours  T(C): 36.6 (09 Jun 2025 05:19), Max: 37.1 (08 Jun 2025 13:06)  T(F): 97.8 (09 Jun 2025 05:19), Max: 98.8 (08 Jun 2025 13:06)  HR: 78 (09 Jun 2025 08:15) (61 - 82)  BP: 141/74 (09 Jun 2025 08:15) (107/73 - 162/85)  BP(mean): 85 (09 Jun 2025 05:19) (85 - 100)  RR: 18 (09 Jun 2025 08:15) (18 - 19)  SpO2: 99% (09 Jun 2025 05:19) (98% - 99%)                                             --------------------------------------------------------------  LABS:                        8.3    7.92  )-----------( 249      ( 09 Jun 2025 06:18 )             26.5     06-09    138  |  98  |  36[H]  ----------------------------<  78  3.8   |  26  |  5.7[HH]    Ca    7.7[L]      09 Jun 2025 06:18  Mg     2.0     06-09    TPro  6.8  /  Alb  2.4[L]  /  TBili  <0.2  /  DBili  x   /  AST  13  /  ALT  <5  /  AlkPhos  149[H]  06-08      Urinalysis Basic - ( 09 Jun 2025 06:18 )    Color: x / Appearance: x / SG: x / pH: x  Gluc: 78 mg/dL / Ketone: x  / Bili: x / Urobili: x   Blood: x / Protein: x / Nitrite: x   Leuk Esterase: x / RBC: x / WBC x   Sq Epi: x / Non Sq Epi: x / Bacteria: x              Culture - Urine (collected 06 Jun 2025 14:00)  Source: Suprapubic Suprapubic  Final Report (08 Jun 2025 16:42):    10,000 - 49,000 CFU/mL Candida albicans "Susceptibilities not performed"    Culture - Blood (collected 06 Jun 2025 13:03)  Source: Blood Blood  Preliminary Report (08 Jun 2025 22:01):    No growth at 48 Hours    Culture - Blood (collected 06 Jun 2025 13:03)  Source: Blood Blood  Preliminary Report (08 Jun 2025 22:01):    No growth at 48 Hours                                                    -------------------------------------------------------------  RADIOLOGY, TELEMETRY:                                            --------------------------------------------------------------    PHYSICAL EXAM:  GENERAL: NAD, lying in bed comfortably  HEAD:  Atraumatic, Normocephalic  CHEST/LUNG: Clear to auscultation bilaterally; No rales, rhonchi, wheezing, or rubs. Unlabored respirations  HEART: regular rate and rhythm; No murmurs, rubs, or gallops  ABDOMEN: Bowel sounds present; Soft, Nontender, Nondistended.    EXTREMITIES: Warm. No clubbing, cyanosis, or edema  NERVOUS SYSTEM:  Alert & Oriented X3. No focal deficits                                            --------------------------------------------------------------

## 2025-06-10 LAB
ANION GAP SERPL CALC-SCNC: 8 MMOL/L — SIGNIFICANT CHANGE UP (ref 7–14)
BASOPHILS # BLD AUTO: 0.09 K/UL — SIGNIFICANT CHANGE UP (ref 0–0.2)
BASOPHILS NFR BLD AUTO: 1.1 % — HIGH (ref 0–1)
BUN SERPL-MCNC: 23 MG/DL — HIGH (ref 10–20)
CALCIUM SERPL-MCNC: 7.8 MG/DL — LOW (ref 8.4–10.5)
CHLORIDE SERPL-SCNC: 105 MMOL/L — SIGNIFICANT CHANGE UP (ref 98–110)
CO2 SERPL-SCNC: 27 MMOL/L — SIGNIFICANT CHANGE UP (ref 17–32)
CREAT SERPL-MCNC: 4.2 MG/DL — CRITICAL HIGH (ref 0.7–1.5)
EGFR: 14 ML/MIN/1.73M2 — LOW
EGFR: 14 ML/MIN/1.73M2 — LOW
EOSINOPHIL # BLD AUTO: 0.21 K/UL — SIGNIFICANT CHANGE UP (ref 0–0.7)
EOSINOPHIL NFR BLD AUTO: 2.5 % — SIGNIFICANT CHANGE UP (ref 0–8)
GLUCOSE SERPL-MCNC: 78 MG/DL — SIGNIFICANT CHANGE UP (ref 70–99)
HCT VFR BLD CALC: 26 % — LOW (ref 42–52)
HGB BLD-MCNC: 8 G/DL — LOW (ref 14–18)
IMM GRANULOCYTES NFR BLD AUTO: 0.5 % — HIGH (ref 0.1–0.3)
LYMPHOCYTES # BLD AUTO: 1.7 K/UL — SIGNIFICANT CHANGE UP (ref 1.2–3.4)
LYMPHOCYTES # BLD AUTO: 20.5 % — SIGNIFICANT CHANGE UP (ref 20.5–51.1)
MAGNESIUM SERPL-MCNC: 2 MG/DL — SIGNIFICANT CHANGE UP (ref 1.8–2.4)
MCHC RBC-ENTMCNC: 26.8 PG — LOW (ref 27–31)
MCHC RBC-ENTMCNC: 30.8 G/DL — LOW (ref 32–37)
MCV RBC AUTO: 87.2 FL — SIGNIFICANT CHANGE UP (ref 80–94)
MONOCYTES # BLD AUTO: 0.9 K/UL — HIGH (ref 0.1–0.6)
MONOCYTES NFR BLD AUTO: 10.9 % — HIGH (ref 1.7–9.3)
NEUTROPHILS # BLD AUTO: 5.34 K/UL — SIGNIFICANT CHANGE UP (ref 1.4–6.5)
NEUTROPHILS NFR BLD AUTO: 64.5 % — SIGNIFICANT CHANGE UP (ref 42.2–75.2)
NRBC BLD AUTO-RTO: 0 /100 WBCS — SIGNIFICANT CHANGE UP (ref 0–0)
PHOSPHATE SERPL-MCNC: 4 MG/DL — SIGNIFICANT CHANGE UP (ref 2.1–4.9)
PLATELET # BLD AUTO: 245 K/UL — SIGNIFICANT CHANGE UP (ref 130–400)
PMV BLD: 10.9 FL — HIGH (ref 7.4–10.4)
POTASSIUM SERPL-MCNC: 4 MMOL/L — SIGNIFICANT CHANGE UP (ref 3.5–5)
POTASSIUM SERPL-SCNC: 4 MMOL/L — SIGNIFICANT CHANGE UP (ref 3.5–5)
RBC # BLD: 2.98 M/UL — LOW (ref 4.7–6.1)
RBC # FLD: SIGNIFICANT CHANGE UP % (ref 11.5–14.5)
SODIUM SERPL-SCNC: 140 MMOL/L — SIGNIFICANT CHANGE UP (ref 135–146)
WBC # BLD: 8.28 K/UL — SIGNIFICANT CHANGE UP (ref 4.8–10.8)
WBC # FLD AUTO: 8.28 K/UL — SIGNIFICANT CHANGE UP (ref 4.8–10.8)

## 2025-06-10 PROCEDURE — 99231 SBSQ HOSP IP/OBS SF/LOW 25: CPT

## 2025-06-10 PROCEDURE — 76770 US EXAM ABDO BACK WALL COMP: CPT | Mod: 26

## 2025-06-10 PROCEDURE — 99232 SBSQ HOSP IP/OBS MODERATE 35: CPT

## 2025-06-10 RX ORDER — FLUCONAZOLE 150 MG
200 TABLET ORAL DAILY
Refills: 0 | Status: DISCONTINUED | OUTPATIENT
Start: 2025-06-10 | End: 2025-06-11

## 2025-06-10 RX ADMIN — Medication 1 APPLICATION(S): at 05:37

## 2025-06-10 RX ADMIN — SEVELAMER HYDROCHLORIDE 800 MILLIGRAM(S): 800 TABLET ORAL at 13:37

## 2025-06-10 RX ADMIN — SEVELAMER HYDROCHLORIDE 800 MILLIGRAM(S): 800 TABLET ORAL at 21:34

## 2025-06-10 RX ADMIN — APIXABAN 2.5 MILLIGRAM(S): 2.5 TABLET, FILM COATED ORAL at 05:38

## 2025-06-10 RX ADMIN — Medication 2000 UNIT(S): at 12:17

## 2025-06-10 RX ADMIN — Medication 40 MILLIGRAM(S): at 05:38

## 2025-06-10 RX ADMIN — Medication 200 MILLIGRAM(S): at 17:34

## 2025-06-10 RX ADMIN — FOLIC ACID 1 MILLIGRAM(S): 1 TABLET ORAL at 12:17

## 2025-06-10 RX ADMIN — APIXABAN 2.5 MILLIGRAM(S): 2.5 TABLET, FILM COATED ORAL at 17:34

## 2025-06-10 RX ADMIN — SEVELAMER HYDROCHLORIDE 800 MILLIGRAM(S): 800 TABLET ORAL at 05:38

## 2025-06-10 RX ADMIN — CALCITRIOL 0.25 MICROGRAM(S): 0.5 CAPSULE, GELATIN COATED ORAL at 12:17

## 2025-06-10 RX ADMIN — Medication 1 TABLET(S): at 12:18

## 2025-06-10 NOTE — PROGRESS NOTE ADULT - ASSESSMENT
70y M with a PMHx of CKD V on dialysis, HTN, NIDDM, prior spinal abscess, nephrolithiasis and recurrent UTI s/p b/l stent placement and SPC presenting for evaluation of hypotension.    Plan  #hypotension 2/2 UTI?  #True infection vs Colonized   #r/o pyelonephritis  - on admission pt was hypotensive but hypotension has resolved with a 500CC bolus  - the pt has a chronic SPC with was changed recently on 5/29   - CT abd shows Increasing left-sided hydronephrosis. Bilateral nephroureteral stents in place.   - As per ID last time the pt is colonized with Proteus/Serratia/Pseudomonas and MSSA in the urine   - Only hypotension present on admission  - Can continue with cefepime for now since possible ascending infection with hydronephrosis. Previous cultures showed serratia, proteus and pseudomonas were sensitive to cefepime.  - Per urology, RBUS & NM renal scan to r/o obstruction. If Stent is obstructed/stent failure please call IR for Left PCNT.   - Per ID, c/w cefepime -- change to 1g q 24 hours for ESRD dosing , will f/u with ID following scans  - BCX negative and UCX candida    #ESRD on dialysis MWF  -sp TDC 6/2  -on HD  -unable to complete first HD session out/pt  - Nephro consult for in/pt HD as needed, will go for dialysis today 6/9    #Hx SPC  -exchanged 5/29 by urology     #Chronic cholecystitis  -not septic   -asymptomatic   - hida positive   -CT shows same findings  -sexton negative  -surgery: OP surgery follow up     #Afib  -on eliquis, held for possible urological procedure  -resumed    #HTN  -controlled off meds     #Constipation   -CTAP -  Large rectal stool burden, similar compared with prior  -Bowel reg    #Normocytic anemia suspected 2/2 advanced CKD  -b12 1269, folate 12.2  -iron 95, sat elevated , ferritin 438  -no active bleeding, continue to monitor   -cont aranesp 25 per nephro   -Hgb dropped from 7.2 -> 6.7 and pt was transfused with a unit of blood on 6/8  - repeat hgb on 6/9 is 8.3    #Hypocalcemia  -corrected calcium is normal    #Vitamin D deficiency likely 2/2 CKD  -c/w vit D supplementation  -Vitamin D, 25-Hydroxy: 11    #Concern for malnutrition  -cont supplement     #very poor dentition  -outpt fu     Dvt ppx- apixaban  GI Ppx: pantoprazole  Diet- Renal  Ambulate as tolerated  Dispo:  Home vs SNF, anticipate tomorrow    Pending:  - F/u RBUS & NM renal scan 70y M with a PMHx of CKD V on dialysis, HTN, NIDDM, prior spinal abscess, nephrolithiasis and recurrent UTI s/p b/l stent placement and SPC presenting for evaluation of hypotension.    Plan  #hypotension 2/2 UTI?  #True infection vs Colonized   #r/o pyelonephritis  - on admission pt was hypotensive but hypotension has resolved with a 500CC bolus  - the pt has a chronic SPC with was changed recently on 5/29   - CT abd shows Increasing left-sided hydronephrosis. Bilateral nephroureteral stents in place.   - As per ID last time the pt is colonized with Proteus/Serratia/Pseudomonas and MSSA in the urine   - Only hypotension present on admission  - Can continue with cefepime for now since possible ascending infection with hydronephrosis. Previous cultures showed serratia, proteus and pseudomonas were sensitive to cefepime.  - Per urology, RBUS & NM renal scan to r/o obstruction. If Stent is obstructed/stent failure please call IR for Left PCNT.   - Per ID, c/w cefepime -- change to 1g q 24 hours for ESRD dosing , will f/u with ID following scans  - BCX negative and UCX candida  - NM renal scan performed but not read, waiting for RBUS    #ESRD on dialysis MWF  -sp TDC 6/2  -on HD  -unable to complete first HD session out/pt  - Nephro consult for in/pt HD as needed, will go for dialysis today 6/9    #Hx SPC  -exchanged 5/29 by urology     #Chronic cholecystitis  -not septic   -asymptomatic   - hida positive   -CT shows same findings  -sexton negative  -surgery: OP surgery follow up     #Afib  -on eliquis, held for possible urological procedure  -resumed    #HTN  -controlled off meds     #Constipation   -CTAP -  Large rectal stool burden, similar compared with prior  -Bowel reg    #Normocytic anemia suspected 2/2 advanced CKD  -b12 1269, folate 12.2  -iron 95, sat elevated , ferritin 438  -no active bleeding, continue to monitor   -cont aranesp 25 per nephro   -Hgb dropped from 7.2 -> 6.7 and pt was transfused with a unit of blood on 6/8  - repeat hgb on 6/9 is 8.3    #Hypocalcemia  -corrected calcium is normal    #Vitamin D deficiency likely 2/2 CKD  -c/w vit D supplementation  -Vitamin D, 25-Hydroxy: 11    #Concern for malnutrition  -cont supplement     #very poor dentition  -outpt fu     Dvt ppx- apixaban  GI Ppx: pantoprazole  Diet- Renal  Ambulate as tolerated  Dispo:  Home vs SNF, anticipate tomorrow    Pending:  - F/u RBUS & NM renal scan

## 2025-06-10 NOTE — DIETITIAN INITIAL EVALUATION ADULT - ADD RECOMMEND
Recommendation: Change diet to soft & bite sized per pt preference. Remove Renal restriction to liberalize diet & encourage adequate po intake. Order Nepro twice daily (420 kcal, 19 g protein per serving). Continue to monitor PO4. Avoid additional diet restrictions (e.g. Consistent Carbohydrate) at this time to continue to encourage adequate po intake.

## 2025-06-10 NOTE — PROGRESS NOTE ADULT - SUBJECTIVE AND OBJECTIVE BOX
LISAKRISTY MARIE  70y, Male  Allergy: No Known Allergies      LOS  4d    CHIEF COMPLAINT: Hypotension (10 Jimmy 2025 13:30)      INTERVAL EVENTS/HPI  - No acute events overnight  - T(F): , Max: 98.5 (06-10-25 @ 05:25)  - Denies any worsening symptoms  - Tolerating medication  - WBC Count: 8.28 (06-10-25 @ 07:09)  WBC Count: 7.92 (06-09-25 @ 06:18)     - Creatinine: 4.2 (06-10-25 @ 07:09)  Creatinine: 5.7 (06-09-25 @ 06:18)       ROS  General: Denies rigors, nightsweats  HEENT: Denies headache, rhinorrhea, sore throat, eye pain  CV: Denies CP, palpitations  PULM: Denies wheezing, hemoptysis  GI: Denies hematemesis, hematochezia, melena  : Denies discharge, hematuria  MSK: Denies arthralgias, myalgias  SKIN: Denies rash, lesions  NEURO: Denies paresthesias, weakness  PSYCH: Denies depression, anxiety    VITALS:  T(F): 97.6, Max: 98.5 (06-10-25 @ 05:25)  HR: 72  BP: 133/76  RR: 18Vital Signs Last 24 Hrs  T(C): 36.4 (10 Jimmy 2025 13:14), Max: 36.9 (09 Jun 2025 21:02)  T(F): 97.6 (10 Jimmy 2025 13:14), Max: 98.5 (10 Jimmy 2025 05:25)  HR: 72 (10 Jimmy 2025 13:14) (59 - 76)  BP: 133/76 (10 Jimmy 2025 13:14) (120/74 - 158/81)  BP(mean): 89 (10 Jimmy 2025 05:25) (89 - 107)  RR: 18 (10 Jimmy 2025 13:14) (18 - 18)  SpO2: 96% (10 Jimmy 2025 13:14) (96% - 98%)    Parameters below as of 10 Jimmy 2025 05:25  Patient On (Oxygen Delivery Method): room air        PHYSICAL EXAM:  Gen: NAD, resting in bed  HEENT: Normocephalic, atraumatic  Neck: supple, no lymphadenopathy  CV: Regular rate & regular rhythm  Lungs: decreased BS at bases, no fremitus  Abdomen: Soft, BS present  Ext: Warm, well perfused  Neuro: non focal, awake  Skin: no rash, no erythema  Lines: no phlebitis    FH: Non-contributory  Social Hx: Non-contributory    TESTS & MEASUREMENTS:                        8.0    8.28  )-----------( 245      ( 10 Jimmy 2025 07:09 )             26.0     06-10    140  |  105  |  23[H]  ----------------------------<  78  4.0   |  27  |  4.2[HH]    Ca    7.8[L]      10 Jimmy 2025 07:09  Phos  4.0     06-10  Mg     2.0     06-10          Urinalysis Basic - ( 10 Jimmy 2025 07:09 )    Color: x / Appearance: x / SG: x / pH: x  Gluc: 78 mg/dL / Ketone: x  / Bili: x / Urobili: x   Blood: x / Protein: x / Nitrite: x   Leuk Esterase: x / RBC: x / WBC x   Sq Epi: x / Non Sq Epi: x / Bacteria: x        Culture - Urine (collected 06-06-25 @ 14:00)  Source: Suprapubic Suprapubic  Final Report (06-08-25 @ 16:42):    10,000 - 49,000 CFU/mL Candida albicans "Susceptibilities not performed"    Culture - Blood (collected 06-06-25 @ 13:03)  Source: Blood Blood  Preliminary Report (06-09-25 @ 22:02):    No growth at 72 Hours    Culture - Blood (collected 06-06-25 @ 13:03)  Source: Blood Blood  Preliminary Report (06-09-25 @ 22:02):    No growth at 72 Hours    Culture - Blood (collected 05-24-25 @ 13:20)  Source: Blood None  Final Report (05-29-25 @ 22:00):    No growth at 5 days    Culture - Urine (collected 05-23-25 @ 12:30)  Source: Clean Catch Clean Catch (Midstream)  Final Report (05-26-25 @ 16:19):    10,000 - 49,000 CFU/mL Candida albicans "Susceptibilities not performed"    Culture - Blood (collected 05-12-25 @ 09:59)  Source: Blood Blood-Peripheral  Final Report (05-17-25 @ 23:07):    No growth at 5 days    Culture - Blood (collected 05-12-25 @ 09:59)  Source: Blood Blood-Peripheral  Final Report (05-17-25 @ 23:07):    No growth at 5 days    Culture - Urine (collected 05-12-25 @ 09:59)  Source: Clean Catch Clean Catch (Midstream)  Final Report (05-13-25 @ 20:03):    <10,000 CFU/mL Normal Urogenital Lilibeth        Blood Gas Venous - Lactate: 1.2 mmol/L (06-06-25 @ 11:48)      INFECTIOUS DISEASES TESTING  MRSA PCR Result.: Positive (05-01-25 @ 09:00)  Procalcitonin: 0.51 (08-03-24 @ 08:12)      INFLAMMATORY MARKERS      RADIOLOGY & ADDITIONAL TESTS:  I have personally reviewed the last available Chest xray  CXR      CT      CARDIOLOGY TESTING  12 Lead ECG:   Ventricular Rate 56 BPM    QRS Duration 100 ms    Q-T Interval 460 ms    QTC Calculation(Bazett) 443 ms    R Axis 63 degrees    T Axis 31 degrees    Diagnosis Line Atrial fibrillation with slow ventricular response  Anterior infarct , age undetermined  Abnormal ECG    Confirmed by Behuria, Supreeti ([id number]) on 6/6/2025 8:16:28 PM (06-06-25 @ 11:11)      MEDICATIONS  apixaban 2.5 Oral every 12 hours  calcitriol   Capsule 0.25 Oral daily  cefepime   IVPB 2000 IV Intermittent <User Schedule>  chlorhexidine 2% Cloths 1 Topical <User Schedule>  cholecalciferol 2000 Oral daily  dextrose 5%. 1000 IV Continuous <Continuous>  dextrose 5%. 1000 IV Continuous <Continuous>  dextrose 50% Injectable 25 IV Push once  dextrose 50% Injectable 12.5 IV Push once  dextrose 50% Injectable 25 IV Push once  dextrose Oral Gel 15 Oral once  folic acid 1 Oral daily  glucagon  Injectable 1 IntraMuscular once  multivitamin 1 Oral daily  pantoprazole    Tablet 40 Oral before breakfast  polyethylene glycol 3350 17 Oral daily  senna 2 Oral at bedtime  sevelamer carbonate 800 Oral three times a day      WEIGHT  Weight (kg): 61.2 (06-06-25 @ 11:11)  Creatinine: 4.2 mg/dL (06-10-25 @ 07:09)      ANTIBIOTICS:  cefepime   IVPB 2000 milliGRAM(s) IV Intermittent <User Schedule>      All available historical records have been reviewed

## 2025-06-10 NOTE — DIETITIAN NUTRITION RISK NOTIFICATION - TREATMENT: THE FOLLOWING DIET HAS BEEN RECOMMENDED
Diet, Soft and Bite Sized:   Supplement Feeding Modality:  Oral  Nepro Cans or Servings Per Day:  1       Frequency:  Two Times a day (06-11-25 @ 00:29) [Pending Verification By Attending]  Diet, Renal Restrictions:   For patients receiving Renal Replacement - No Protein Restr, No Conc K, No Conc Phos, Low Sodium (06-06-25 @ 19:53) [Active]

## 2025-06-10 NOTE — DIETITIAN INITIAL EVALUATION ADULT - PERTINENT MEDS FT
MEDICATIONS  (STANDING):  apixaban 2.5 milliGRAM(s) Oral every 12 hours  calcitriol   Capsule 0.25 MICROGram(s) Oral daily  chlorhexidine 2% Cloths 1 Application(s) Topical <User Schedule>  cholecalciferol 2000 Unit(s) Oral daily  dextrose 5%. 1000 milliLiter(s) (100 mL/Hr) IV Continuous <Continuous>  dextrose 5%. 1000 milliLiter(s) (50 mL/Hr) IV Continuous <Continuous>  dextrose 50% Injectable 25 Gram(s) IV Push once  dextrose 50% Injectable 12.5 Gram(s) IV Push once  dextrose 50% Injectable 25 Gram(s) IV Push once  dextrose Oral Gel 15 Gram(s) Oral once  fluconAZOLE   Tablet 200 milliGRAM(s) Oral daily  folic acid 1 milliGRAM(s) Oral daily  glucagon  Injectable 1 milliGRAM(s) IntraMuscular once  multivitamin 1 Tablet(s) Oral daily  pantoprazole    Tablet 40 milliGRAM(s) Oral before breakfast  polyethylene glycol 3350 17 Gram(s) Oral daily  senna 2 Tablet(s) Oral at bedtime  sevelamer carbonate 800 milliGRAM(s) Oral three times a day    MEDICATIONS  (PRN):

## 2025-06-10 NOTE — DIETITIAN INITIAL EVALUATION ADULT - OTHER CALCULATIONS
Fluids: 1 L + urine output.  Pt currently not meeting estimated nutrient needs in setting of inadequate oral intake.

## 2025-06-10 NOTE — PROGRESS NOTE ADULT - SUBJECTIVE AND OBJECTIVE BOX
UROLOGY DAILY PROGRESS NOTE    Patient is a 71 yo M with PMH of CKD V on dialysis, HTN, NIDDM, prior spinal abscess, nephrolithiasis and recurrent UTI s/p b/l stent placement and SPC presenting for evaluation of hypotension-  c/s for CT finding of Increasing left-sided hydronephrosis. Bilateral nephroureteral stents in place. Patient seen and examined at bedside.     MEDICATIONS  (STANDING):  apixaban 2.5 milliGRAM(s) Oral every 12 hours  calcitriol   Capsule 0.25 MICROGram(s) Oral daily  cefepime   IVPB 2000 milliGRAM(s) IV Intermittent <User Schedule>  chlorhexidine 2% Cloths 1 Application(s) Topical <User Schedule>  cholecalciferol 2000 Unit(s) Oral daily  dextrose 5%. 1000 milliLiter(s) (100 mL/Hr) IV Continuous <Continuous>  dextrose 5%. 1000 milliLiter(s) (50 mL/Hr) IV Continuous <Continuous>  dextrose 50% Injectable 25 Gram(s) IV Push once  dextrose 50% Injectable 12.5 Gram(s) IV Push once  dextrose 50% Injectable 25 Gram(s) IV Push once  dextrose Oral Gel 15 Gram(s) Oral once  folic acid 1 milliGRAM(s) Oral daily  glucagon  Injectable 1 milliGRAM(s) IntraMuscular once  multivitamin 1 Tablet(s) Oral daily  pantoprazole    Tablet 40 milliGRAM(s) Oral before breakfast  polyethylene glycol 3350 17 Gram(s) Oral daily  senna 2 Tablet(s) Oral at bedtime  sevelamer carbonate 800 milliGRAM(s) Oral three times a day    MEDICATIONS  (PRN):      REVIEW OF SYSTEMS   [x] A ten-point review of systems was otherwise negative except as noted.    Vital Signs Last 24 Hrs  T(C): 36.4 (10 Jimmy 2025 13:14), Max: 36.9 (09 Jun 2025 21:02)  T(F): 97.6 (10 Jimmy 2025 13:14), Max: 98.5 (10 Jimmy 2025 05:25)  HR: 72 (10 Jimmy 2025 13:14) (59 - 82)  BP: 133/76 (10 Jimmy 2025 13:14) (120/74 - 158/81)  BP(mean): 89 (10 Jimmy 2025 05:25) (89 - 107)  RR: 18 (10 Jimmy 2025 13:14) (18 - 18)  SpO2: 96% (10 Jimmy 2025 13:14) (96% - 98%)    Parameters below as of 10 Jimmy 2025 05:25  Patient On (Oxygen Delivery Method): room air        PHYSICAL EXAM:    GEN: NAD, well-developed, awake and alert.  SKIN: Good color, non diaphoretic.  HEENT: NC/AT.  RESP: No dyspnea, non-labored breathing. No use of accessory muscles.  CARDIO: +S1/S2  / ABDO: Soft, NT/ND + Suprapubic Tube draining cloudy yellow urine   BACK: No CVAT B/L      I&O's Summary    09 Jun 2025 07:01  -  10 Jimmy 2025 07:00  --------------------------------------------------------  IN: 0 mL / OUT: 1000 mL / NET: -1000 mL        LABS:                        8.0    8.28  )-----------( 245      ( 10 Jimmy 2025 07:09 )             26.0     06-10    140  |  105  |  23[H]  ----------------------------<  78  4.0   |  27  |  4.2[HH]    Ca    7.8[L]      10 Jimmy 2025 07:09  Phos  4.0     06-10  Mg     2.0     06-10        Urinalysis Basic - ( 10 Jimmy 2025 07:09 )    Color: x / Appearance: x / SG: x / pH: x  Gluc: 78 mg/dL / Ketone: x  / Bili: x / Urobili: x   Blood: x / Protein: x / Nitrite: x   Leuk Esterase: x / RBC: x / WBC x   Sq Epi: x / Non Sq Epi: x / Bacteria: x            RADIOLOGY & ADDITIONAL STUDIES:  < from: NM Renal/Lasix (NM Renal/Lasix .) (06.09.25 @ 21:38) >    ACC: [id number] EXAM:  NM KIDNEY IMG  LASIX   ORDERED BY: SYED PEÑA     PROCEDURE DATE:  06/09/2025          INTERPRETATION:  PROCEDURE: RENAL FLOW; RENAL IMAGES; LASIX RENOGRAPHY;   COMPUTER ANALYSIS  Reason: Assessment of the renal obstruction  BUN 36; creatinine 5.7 mg/dL on 6/9/2025  Comparison: CT abdomen and pelvis dated 6/6/2025 showed increasing   left-sided hydronephrosis. Bilateral nephroureteral stents in place.  Left intrarenal and ureteral air, possibly reflecting infection.  Following the intravenous bolus injection of 8 mCi 99m technetium MAG-3,   posterior kidney images were obtained at 2-second intervals as an   angiographic acquisition.  These images reveal poor perfusion bilaterally compared with aorta phase.  Then posterior kidney images were obtained at 1-minute intervals for the   next 59  minutes. Postvoiding posterior view of the kidney and bladder   region was obtained at 69 minutes post injection. 40 mg Lasix was   injected intravenously at 11  minutes post-injection.Patient has Dye   catheter.  These images reveal low MAG 3 uptake, and delayed excretion and washout   bilaterally.  Cortex to collecting system transit time 10 minutes for the right, no   significant transit observed for the left.  Ureters: Bilateral ureters and ureter are transiently visualized and   normal in appearance.  Delayed view bilateral renal cortex retained radioactivity.  Computer analysis of the 2-3 minute post injection image was performed.    These reveal that the left kidney accounts for 35 %, and the right kidney   accounts for 65 % of total renal function  The renogram curve for the left kidney shows low-level plateau curve.  The renogram curve for the right kidney shows low-level plateau curve.    Impression:    1. The study is indeterminate for obstruction. Due to severely impaired   renal function, tracer dynamics with Lasix were insufficient to   conclusively evaluate for urinary tract obstruction.  2. Severe impairment of renal function bilaterally, characterized by poor   perfusion, low MAG 3 uptake, and delayed excretion and washout   bilaterally.  3. Left kidney: 35% of total renal function, right kidney: 65% of total   renal function.    --- End of Report ---    JOAQUIN RODGERS MD; Attending Radiologist  This document has been electronically signed. Jimmy 10 2025 11:02AM    < end of copied text >

## 2025-06-10 NOTE — DIETITIAN INITIAL EVALUATION ADULT - OTHER INFO
Pertinent Medical Information: Presented for evaluation of hypotension. ESRD on dialysis. Constipation - Large rectal stool burden, similar compared with prior CTAP noted; bowel regimen ordered. Normocytic anemia noted suspected 2/2 advanced CKD. Vitamin D deficiency on supplement.    PMH includes CKD V on dialysis, HTN, NIDDM, prior spinal abscess, nephrolithiasis and recurrent UTI s/p b/l stent placement and SPC.

## 2025-06-10 NOTE — PROGRESS NOTE ADULT - SUBJECTIVE AND OBJECTIVE BOX
seen and examined   24h events noted   lying comfortable         PAST HISTORY  --------------------------------------------------------------------------------  No significant changes to PMH, PSH, FHx, SHx, unless otherwise noted    ALLERGIES & MEDICATIONS  --------------------------------------------------------------------------------  Allergies    No Known Allergies    Intolerances      Standing Inpatient Medications  apixaban 2.5 milliGRAM(s) Oral every 12 hours  calcitriol   Capsule 0.25 MICROGram(s) Oral daily  cefepime   IVPB 2000 milliGRAM(s) IV Intermittent <User Schedule>  chlorhexidine 2% Cloths 1 Application(s) Topical <User Schedule>  dextrose 5%. 1000 milliLiter(s) IV Continuous <Continuous>  dextrose 5%. 1000 milliLiter(s) IV Continuous <Continuous>  dextrose 50% Injectable 25 Gram(s) IV Push once  dextrose 50% Injectable 12.5 Gram(s) IV Push once  dextrose 50% Injectable 25 Gram(s) IV Push once  dextrose Oral Gel 15 Gram(s) Oral once  folic acid 1 milliGRAM(s) Oral daily  glucagon  Injectable 1 milliGRAM(s) IntraMuscular once  multivitamin 1 Tablet(s) Oral daily  pantoprazole    Tablet 40 milliGRAM(s) Oral before breakfast  polyethylene glycol 3350 17 Gram(s) Oral daily  senna 2 Tablet(s) Oral at bedtime  sevelamer carbonate 800 milliGRAM(s) Oral three times a day      VITALS/PHYSICAL EXAM  --------------------------------------------------------------------------------  T(C): 36.9 (06-10-25 @ 05:25), Max: 36.9 (06-09-25 @ 21:02)  HR: 59 (06-10-25 @ 05:25) (59 - 82)  BP: 120/74 (06-10-25 @ 05:25) (120/74 - 158/81)  RR: 18 (06-10-25 @ 05:25) (18 - 18)  SpO2: 96% (06-10-25 @ 05:25) (96% - 98%)  Wt(kg): --        06-09-25 @ 07:01  -  06-10-25 @ 07:00  --------------------------------------------------------  IN: 0 mL / OUT: 1000 mL / NET: -1000 mL      Physical Exam:  	Gen: NAD  	Pulm: decrease BS  B/L  	CV:  S1S2; no rub  	Abd:  soft, /nondistended  	LE: dressings   	Vascular access:tdc     LABS/STUDIES  --------------------------------------------------------------------------------              8.3    7.92  >-----------<  249      [06-09-25 @ 06:18]              26.5     138  |  98  |  36  ----------------------------<  78      [06-09-25 @ 06:18]  3.8   |  26  |  5.7        Ca     7.7     [06-09-25 @ 06:18]      Mg     2.0     [06-09-25 @ 06:18]    TPro  6.8  /  Alb  2.4  /  TBili  <0.2  /  DBili  x   /  AST  13  /  ALT  <5  /  AlkPhos  149  [06-08-25 @ 12:43]    Creatinine Trend:  SCr 5.7 [06-09 @ 06:18]  SCr 4.8 [06-08 @ 12:43]  SCr 3.3 [06-07 @ 08:51]  SCr 2.0 [06-06 @ 13:06]  SCr 3.4 [06-05 @ 05:53]    Urinalysis - [06-09-25 @ 06:18]      Color  / Appearance  / SG  / pH       Gluc 78 / Ketone   / Bili  / Urobili        Blood  / Protein  / Leuk Est  / Nitrite       RBC  / WBC  / Hyaline  / Gran  / Sq Epi  / Non Sq Epi  / Bacteria       Iron 73, TIBC 107, %sat 68      [05-28-25 @ 11:08]  Ferritin 438      [05-04-25 @ 06:10]  PTH -- (Ca 6.1)      [05-26-25 @ 04:47]   288  PTH -- (Ca 8.3)      [07-10-24 @ 21:08]   43  Vitamin D (25OH) 11      [05-26-25 @ 04:47]  TSH 5.27      [04-30-25 @ 17:55]    HBsAb <3.3      [06-03-25 @ 10:30]  HBsAg Nonreact      [06-02-25 @ 11:28]  HBcAb Nonreact      [06-02-25 @ 11:28]  HCV 0.06, Nonreact      [06-02-25 @ 11:28]

## 2025-06-10 NOTE — DIETITIAN INITIAL EVALUATION ADULT - ETIOLOGY
poor appetite & po intake over past 6 months d/t increased weakness and difficulty tolerating solids

## 2025-06-10 NOTE — PROGRESS NOTE ADULT - TIME BILLING
I have personally seen and examined this patient.    I have reviewed all pertinent clinical information and reviewed all relevant imaging and diagnostic studies personally.   I counseled the patient about diagnostic testing and treatment plan. All questions were answered.   I discussed recommendations with the primary team.
time spent on review of labs, imaging studies, old records, obtaining history, personally examining patient, counselling and communicating with patient, entering orders for medications/tests/etc, discussions with other health care providers, documentation in electronic health records, independent interpretation of labs, imaging/procedure results and care coordination.

## 2025-06-10 NOTE — PROGRESS NOTE ADULT - SUBJECTIVE AND OBJECTIVE BOX
KRISTY GARCIA 70y Male  MRN#: [id number]     Hospital Day: 4d    Pt is currently admitted with the primary diagnosis of  Acute pyelonephritis        SUBJECTIVE     Subjective complaints  Pt was evaluated this am. Patient has no complaints    Review of systems  ROS is negative                                            ----------------------------------------------------------    HOSPITAL COURSE     6/10 - Patient was seen and examined at bedside in the morning. Pt wants to go home, NM Renal scan completed not read and RBUS not performed, will wait for it.                                            ----------------------------------------------------------  OBJECTIVE  PAST MEDICAL & SURGICAL HISTORY  DM (diabetes mellitus)    HTN (hypertension)    H/O paraplegia    Spinal abscess    Renal stones    Stage 5 chronic kidney disease not on chronic dialysis    Neurogenic dysfunction of the urinary bladder    Encounter for care or replacement of suprapubic tube    Spinal abscess  S/P Surgery    Encounter for care or replacement of suprapubic tube    S/P ORIF (open reduction internal fixation) fracture                                              -----------------------------------------------------------  ALLERGIES:  No Known Allergies                                            ------------------------------------------------------------    HOME MEDICATIONS  Home Medications:  Eliquis 5 mg oral tablet: 0.5 tab(s) orally every 12 hours (06 Jun 2025 19:14)  folic acid 1 mg oral tablet: 1 tab(s) orally once a day (06 Jun 2025 19:14)  Multiple Vitamins oral tablet: 1 tab(s) orally once a day (06 Jun 2025 19:14)                           MEDICATIONS:  STANDING MEDICATIONS  apixaban 2.5 milliGRAM(s) Oral every 12 hours  calcitriol   Capsule 0.25 MICROGram(s) Oral daily  cefepime   IVPB 2000 milliGRAM(s) IV Intermittent <User Schedule>  chlorhexidine 2% Cloths 1 Application(s) Topical <User Schedule>  cholecalciferol 2000 Unit(s) Oral daily  dextrose 5%. 1000 milliLiter(s) IV Continuous <Continuous>  dextrose 5%. 1000 milliLiter(s) IV Continuous <Continuous>  dextrose 50% Injectable 25 Gram(s) IV Push once  dextrose 50% Injectable 12.5 Gram(s) IV Push once  dextrose 50% Injectable 25 Gram(s) IV Push once  dextrose Oral Gel 15 Gram(s) Oral once  folic acid 1 milliGRAM(s) Oral daily  glucagon  Injectable 1 milliGRAM(s) IntraMuscular once  multivitamin 1 Tablet(s) Oral daily  pantoprazole    Tablet 40 milliGRAM(s) Oral before breakfast  polyethylene glycol 3350 17 Gram(s) Oral daily  senna 2 Tablet(s) Oral at bedtime  sevelamer carbonate 800 milliGRAM(s) Oral three times a day    PRN MEDICATIONS                                            ------------------------------------------------------------  VITAL SIGNS: Last 24 Hours  T(C): 36.9 (10 Jimmy 2025 05:25), Max: 36.9 (09 Jun 2025 21:02)  T(F): 98.5 (10 Jimmy 2025 05:25), Max: 98.5 (10 Jimmy 2025 05:25)  HR: 59 (10 Jimmy 2025 05:25) (59 - 82)  BP: 120/74 (10 Jimmy 2025 05:25) (120/74 - 158/81)  BP(mean): 89 (10 Jimmy 2025 05:25) (89 - 107)  RR: 18 (10 Jimmy 2025 05:25) (18 - 18)  SpO2: 96% (10 Jimmy 2025 05:25) (96% - 98%)      06-09-25 @ 07:01  -  06-10-25 @ 07:00  --------------------------------------------------------  IN: 0 mL / OUT: 1000 mL / NET: -1000 mL                                             --------------------------------------------------------------  LABS:                        8.0    8.28  )-----------( 245      ( 10 Jimmy 2025 07:09 )             26.0     06-10    140  |  105  |  23[H]  ----------------------------<  78  4.0   |  27  |  4.2[HH]    Ca    7.8[L]      10 Jimmy 2025 07:09  Phos  4.0     06-10  Mg     2.0     06-10    TPro  6.8  /  Alb  2.4[L]  /  TBili  <0.2  /  DBili  x   /  AST  13  /  ALT  <5  /  AlkPhos  149[H]  06-08      Urinalysis Basic - ( 10 Jimmy 2025 07:09 )    Color: x / Appearance: x / SG: x / pH: x  Gluc: 78 mg/dL / Ketone: x  / Bili: x / Urobili: x   Blood: x / Protein: x / Nitrite: x   Leuk Esterase: x / RBC: x / WBC x   Sq Epi: x / Non Sq Epi: x / Bacteria: x                                                            -------------------------------------------------------------  RADIOLOGY, TELEMETRY:                                            --------------------------------------------------------------    PHYSICAL EXAM:  GENERAL: NAD, lying in bed comfortably  HEAD:  Atraumatic, Normocephalic  CHEST/LUNG: Clear to auscultation bilaterally; No rales, rhonchi, wheezing, or rubs. Unlabored respirations  HEART: regular rate and rhythm; No murmurs, rubs, or gallops  ABDOMEN: Bowel sounds present; Soft, Nontender, Nondistended.    EXTREMITIES: Warm. No clubbing, cyanosis, or edema  NERVOUS SYSTEM:  Alert & Oriented X3. No focal deficits                                            --------------------------------------------------------------

## 2025-06-10 NOTE — PROGRESS NOTE ADULT - ASSESSMENT
Patient is a 69 yo M with PMH of CKD V on dialysis, HTN, NIDDM, prior spinal abscess, nephrolithiasis and recurrent UTI s/p b/l stent placement and SPC presenting for evaluation of hypotension-  c/s for CT finding of Increasing left-sided hydronephrosis. Bilateral nephroureteral stents in place.    Plan:  - NM Renal Scan reviewed with Dr. Schilling   - Continue abx per ID - on Cefepime   - Continue SPT care   - Patient will need to f/u OP with Dr. Schilling for definitive stone management and stent exchange/removal, biopsy of L ureter once infection is controlled. Plan discussed with patient's brother, Erich via phone call, who is aware and agreement with plan. All questions asked and answered   - No further acute  intervention at this time  - seen and examined at bedside w/ Dr. Schilling    Patient is a 71 yo M with PMH of CKD V on dialysis, HTN, NIDDM, prior spinal abscess, nephrolithiasis and recurrent UTI s/p b/l stent placement and SPC presenting for evaluation of hypotension-  c/s for CT finding of Increasing left-sided hydronephrosis. Bilateral nephroureteral stents in place.    Plan:  - NM Renal Scan reviewed with Dr. Schilling   - Continue abx per ID - on Cefepime   - Continue SPT care   - Patient will need to f/u OP with Dr. Schilling for definitive stone management and stent exchange/removal, biopsy of L ureter once infection is controlled. Plan discussed with patient's brother, Erich via phone call, who is aware and agreement with plan. All questions asked and answered   - No further acute  intervention at this time  - seen and examined at bedside w/ Dr. Schilling     Attending attestation:  I personally saw and evaluated this patient. Agree with PA assessment and plan. NM renal scan inconclusive. Will plan to treat the right side stones and biopsy the left and re-assess the type of treatement needed on the left side.     I personally spent 40 minutes on the total care of this patient.

## 2025-06-10 NOTE — DIETITIAN INITIAL EVALUATION ADULT - NS FNS DIET ORDER
Renal Replacement - no protein restriction. Reportedly w/ poor appetite & po intake in setting of weakness & difficulty chewing d/t poor dentition. Consuming 25% of meals at this time per report. No swallowing difficulty reported.

## 2025-06-10 NOTE — DIETITIAN INITIAL EVALUATION ADULT - NSPROEDAREADYLEARN_GEN_A_NUR
Reviewed diet order & RD recommendations. Discussed available po supplements. Encouraged adequate po intake. Discussed renal, DM & heart healthy nutrition therapy concepts.

## 2025-06-10 NOTE — DIETITIAN INITIAL EVALUATION ADULT - PERTINENT LABORATORY DATA
06-10    140  |  105  |  23[H]  ----------------------------<  78  4.0   |  27  |  4.2[HH]    Ca    7.8[L]      10 Jimmy 2025 07:09  Phos  4.0     06-10  Mg     2.0     06-10    A1C with Estimated Average Glucose Result: 6.2 % (05-13-25 @ 09:17)  A1C with Estimated Average Glucose Result: 6.0 % (04-29-25 @ 08:48)  A1C with Estimated Average Glucose Result: 6.9 % (07-10-24 @ 06:36)

## 2025-06-10 NOTE — PROGRESS NOTE ADULT - ASSESSMENT
ASSESSMENT  The pt is a 70y M with a PMHx of CKD V on dialysis, HTN, NIDDM, prior spinal abscess, nephrolithiasis and recurrent UTI s/p b/l stent placement and SPC presenting for evaluation of hypotension. The pts brother at bedside states that patient was getting dialysis when he began to feel tired and less responsive.    IMPRESSION  #Hypotension  #Nephrolithiasis with Hydronephrosis - rule out UTI  - CT Abdomen and Pelvis No Cont (06.06.25 @ 13:57): Since May 23, 2025; Increasing left-sided hydronephrosis. Bilateral nephroureteral stents in  place. Left intrarenal and ureteral air, possibly reflecting infection. Unchanged distended gallbladder with cholelithiasis, wall thickening and pericholecystic stranding  New small pericardial effusion and trace bilateral bilateral pleural  effusions  - Urine Cx Candida albicans     #Chronic cholecystitis  - US Abdomen Upper Quadrant Right (05.23.25 @ 16:46): Findings concerning for cholecystitis despite a negative sonographic  Iglesias's sign, as described. Right renal pelvic fullness. Trace right pleural effusion.  -  NM Hepatobiliary Imaging (05.24.25 @ 12:08): IMPRESSION: NO DEFINITE VISUALIZATION OF THE GALLBLADDER THROUGH 4 HOURS  POST-INJECTION, CONSISTENT WITH CHOLECYSTITIS, AS DESCRIBED ABOVE.  CLINICAL CORRELATION IS SUGGESTED.    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  #Prior history of spinal abscess    Colonized with Proteus/Serratia/Pseudomonas and MSSA in the urine    RECOMMENDATIONS  - Urine Cx growing Candida albicans which is unclear significance as hypotension and overall symptoms from admission have improved   - as will be going for eventual outpatient urology procedure can hold on cefepime and give PO fluconazole 200 mg daily for 7 days to see if urine can be sterilized prior to stone management     Please call or message on Microsoft Teams if with any questions.  Spectra [phone]

## 2025-06-10 NOTE — DIETITIAN INITIAL EVALUATION ADULT - NUTRITIONGOAL OUTCOME1
Pt to demonstrate tolerance to diet, with at least 50% po intake achieved over next 4 days.    Pt at high nutrition risk.    Monitor: Skin, labs, BM, wt, nutrition focused physical findings, body composition, GI, po intake, diet order.

## 2025-06-10 NOTE — PROGRESS NOTE ADULT - ASSESSMENT
The pt is a 70y M with a PMHx of CKD V on dialysis, HTN, NIDDM, prior spinal abscess, nephrolithiasis and recurrent UTI s/p b/l stent placement and SPC presenting for evaluation of hypotension  ESRD on HD / sepsis   HD in am  standard bath uf 1 liter as tolerated   CT Abdomen and Pelvis No Cont (06.06.25 @ 13:57) >  Increasing left-sided hydronephrosis. Bilateral nephroureteral stents in  place.  Left intrarenal and ureteral air, possibly reflecting infection.  Unchanged distended gallbladder with cholelithiasis, wall thickening and  pericholecystic stranding.  New small pericardial effusion and trace bilateral bilateral pleural  effusions   seen by /Consider RBUS vs NM renal scan to r/o obstruction. If Stent is obstructed/stent failure please call IR for Left PCNT.   GI evaluation    ID notes appreciated / on ATB   corrected calcium in the mid 8 / on calcitriol/ replete vit D   will follow

## 2025-06-10 NOTE — PROGRESS NOTE ADULT - ATTENDING COMMENTS
Medicine Attending Attestation  Patient was seen and examined with medicine team.  Nursing records reviewed. I agree with the resident/PA/NP's note including past medical history, home medications, social history, allergies, surgical history, family history, and review of system. I have reviewed relevant vitals, laboratory values, imaging studies, and microbiology.   - Above resident's note was edited by me  - rest of A/P as per detailed housestaff note above except above modifications    Total time spent to complete patient's bedside assessment, reviewed medical chart, discussed medical plan of care with team was 45 minutes with >50% of time spent face to face with patient, discussion with patient/family and/or coordination of care.
70y M with a PMHx of CKD V on dialysis, HTN, NIDDM, prior spinal abscess, nephrolithiasis and recurrent UTI s/p b/l stent placement and SPC presenting for evaluation of hypotension.        #Hypotension, likely HD related   #Possible UTI   #r/o pyelonephritis  - on admission pt was hypotensive but hypotension has resolved with a 500CC bolus  - the pt has a chronic SPC with was changed recently on 5/29   - CT abd shows Increasing left-sided hydronephrosis. Bilateral nephroureteral stents in place.   - As per ID last time the pt is colonized with Proteus/Serratia/Pseudomonas and MSSA in the urine   - Per urology, RBUS & NM renal scan to r/o obstruction. If Stent is obstructed/stent failure please call IR for Left PCNT.   - Per ID, c/w cefepime    - BCX negative and UCX candida  - NM renal scan nondiagnostic due to ESRD  - f/u RBUS     #ESRD on HD MWF  -sp TDC 6/2  -new HD started last week     #Chronic cholecystitis  -HIDA positive previous admission   -per surgery not clinically suggestive of acute cholecystitis     #Afib, chronic   -on eliquis     #HTN  -controlled off meds     #Constipation   -CTAP -  Large rectal stool burden, similar compared with prior  -Bowel reg    #Normocytic anemia suspected 2/2 advanced CKD  -no active bleeding, continue to monitor   -cont aranesp 25 per nephro     #Vitamin D deficiency   -c/w vit D supplementation  -Vitamin D, 25-Hydroxy: 11    #Concern for malnutrition  -cont supplement        Dvt ppx- apixaban  GI Ppx: pantoprazole
Medicine Attending Attestation  Patient was seen and examined with medicine team.  Nursing records reviewed. I agree with the resident/PA/NP's note including past medical history, home medications, social history, allergies, surgical history, family history, and review of system. I have reviewed relevant vitals, laboratory values, imaging studies, and microbiology.   - Above resident's note was edited by me  - rest of A/P as per detailed housestaff note above except above modifications    Total time spent to complete patient's bedside assessment, reviewed medical chart, discussed medical plan of care with team was 45 minutes with >50% of time spent face to face with patient, discussion with patient/family and/or coordination of care.

## 2025-06-10 NOTE — DIETITIAN INITIAL EVALUATION ADULT - ORAL INTAKE PTA/DIET HISTORY
Reportedly w/ poor appetite & po intake over past 6 months d/t increased weakness and difficulty tolerating solids. Consumes a soft diet PTP. Reportedly tries to follow a low salt, low PO4, low K+ diet PTP. NKFA. No supplements reported. UBW reported to be 77.3 kg with unintentional wt loss reported to have occurred over ~6 month duration. Compared to current wt 61.2 kg, this wt change is 20.8% over 6 month duration (unintentional). <75% energy intake > 3 months reported. No food preferences reported.

## 2025-06-11 ENCOUNTER — TRANSCRIPTION ENCOUNTER (OUTPATIENT)
Age: 70
End: 2025-06-11

## 2025-06-11 VITALS
HEART RATE: 75 BPM | SYSTOLIC BLOOD PRESSURE: 123 MMHG | TEMPERATURE: 98 F | RESPIRATION RATE: 18 BRPM | DIASTOLIC BLOOD PRESSURE: 80 MMHG | OXYGEN SATURATION: 100 %

## 2025-06-11 LAB
ANION GAP SERPL CALC-SCNC: 12 MMOL/L — SIGNIFICANT CHANGE UP (ref 7–14)
BASOPHILS # BLD AUTO: 0.07 K/UL — SIGNIFICANT CHANGE UP (ref 0–0.2)
BASOPHILS NFR BLD AUTO: 0.8 % — SIGNIFICANT CHANGE UP (ref 0–1)
BUN SERPL-MCNC: 35 MG/DL — HIGH (ref 10–20)
CALCIUM SERPL-MCNC: 8 MG/DL — LOW (ref 8.4–10.5)
CHLORIDE SERPL-SCNC: 104 MMOL/L — SIGNIFICANT CHANGE UP (ref 98–110)
CO2 SERPL-SCNC: 24 MMOL/L — SIGNIFICANT CHANGE UP (ref 17–32)
CREAT SERPL-MCNC: 5.6 MG/DL — CRITICAL HIGH (ref 0.7–1.5)
CULTURE RESULTS: SIGNIFICANT CHANGE UP
CULTURE RESULTS: SIGNIFICANT CHANGE UP
EGFR: 10 ML/MIN/1.73M2 — LOW
EGFR: 10 ML/MIN/1.73M2 — LOW
EOSINOPHIL # BLD AUTO: 0.26 K/UL — SIGNIFICANT CHANGE UP (ref 0–0.7)
EOSINOPHIL NFR BLD AUTO: 3 % — SIGNIFICANT CHANGE UP (ref 0–8)
GLUCOSE SERPL-MCNC: 75 MG/DL — SIGNIFICANT CHANGE UP (ref 70–99)
HCT VFR BLD CALC: 24.8 % — LOW (ref 42–52)
HGB BLD-MCNC: 7.6 G/DL — LOW (ref 14–18)
IMM GRANULOCYTES NFR BLD AUTO: 0.3 % — SIGNIFICANT CHANGE UP (ref 0.1–0.3)
LYMPHOCYTES # BLD AUTO: 2.19 K/UL — SIGNIFICANT CHANGE UP (ref 1.2–3.4)
LYMPHOCYTES # BLD AUTO: 25.4 % — SIGNIFICANT CHANGE UP (ref 20.5–51.1)
MAGNESIUM SERPL-MCNC: 2 MG/DL — SIGNIFICANT CHANGE UP (ref 1.8–2.4)
MCHC RBC-ENTMCNC: 26.7 PG — LOW (ref 27–31)
MCHC RBC-ENTMCNC: 30.6 G/DL — LOW (ref 32–37)
MCV RBC AUTO: 87 FL — SIGNIFICANT CHANGE UP (ref 80–94)
MONOCYTES # BLD AUTO: 0.76 K/UL — HIGH (ref 0.1–0.6)
MONOCYTES NFR BLD AUTO: 8.8 % — SIGNIFICANT CHANGE UP (ref 1.7–9.3)
NEUTROPHILS # BLD AUTO: 5.32 K/UL — SIGNIFICANT CHANGE UP (ref 1.4–6.5)
NEUTROPHILS NFR BLD AUTO: 61.7 % — SIGNIFICANT CHANGE UP (ref 42.2–75.2)
NRBC BLD AUTO-RTO: 0 /100 WBCS — SIGNIFICANT CHANGE UP (ref 0–0)
PHOSPHATE SERPL-MCNC: 4.6 MG/DL — SIGNIFICANT CHANGE UP (ref 2.1–4.9)
PLATELET # BLD AUTO: 274 K/UL — SIGNIFICANT CHANGE UP (ref 130–400)
PMV BLD: 10.5 FL — HIGH (ref 7.4–10.4)
POTASSIUM SERPL-MCNC: 4.5 MMOL/L — SIGNIFICANT CHANGE UP (ref 3.5–5)
POTASSIUM SERPL-SCNC: 4.5 MMOL/L — SIGNIFICANT CHANGE UP (ref 3.5–5)
RBC # BLD: 2.85 M/UL — LOW (ref 4.7–6.1)
RBC # FLD: 23.1 % — HIGH (ref 11.5–14.5)
SODIUM SERPL-SCNC: 140 MMOL/L — SIGNIFICANT CHANGE UP (ref 135–146)
SPECIMEN SOURCE: SIGNIFICANT CHANGE UP
SPECIMEN SOURCE: SIGNIFICANT CHANGE UP
WBC # BLD: 8.63 K/UL — SIGNIFICANT CHANGE UP (ref 4.8–10.8)
WBC # FLD AUTO: 8.63 K/UL — SIGNIFICANT CHANGE UP (ref 4.8–10.8)

## 2025-06-11 PROCEDURE — 99239 HOSP IP/OBS DSCHRG MGMT >30: CPT

## 2025-06-11 RX ORDER — FLUCONAZOLE 150 MG
1 TABLET ORAL
Qty: 6 | Refills: 0
Start: 2025-06-11 | End: 2025-06-16

## 2025-06-11 RX ADMIN — Medication 1 APPLICATION(S): at 05:09

## 2025-06-11 RX ADMIN — Medication 1 TABLET(S): at 13:47

## 2025-06-11 RX ADMIN — Medication 40 MILLIGRAM(S): at 05:09

## 2025-06-11 RX ADMIN — Medication 200 MILLIGRAM(S): at 13:46

## 2025-06-11 RX ADMIN — POLYETHYLENE GLYCOL 3350 17 GRAM(S): 17 POWDER, FOR SOLUTION ORAL at 13:48

## 2025-06-11 RX ADMIN — SEVELAMER HYDROCHLORIDE 800 MILLIGRAM(S): 800 TABLET ORAL at 13:46

## 2025-06-11 RX ADMIN — SEVELAMER HYDROCHLORIDE 800 MILLIGRAM(S): 800 TABLET ORAL at 05:09

## 2025-06-11 RX ADMIN — APIXABAN 2.5 MILLIGRAM(S): 2.5 TABLET, FILM COATED ORAL at 05:09

## 2025-06-11 RX ADMIN — Medication 2000 UNIT(S): at 13:47

## 2025-06-11 RX ADMIN — APIXABAN 2.5 MILLIGRAM(S): 2.5 TABLET, FILM COATED ORAL at 17:11

## 2025-06-11 RX ADMIN — CALCITRIOL 0.25 MICROGRAM(S): 0.5 CAPSULE, GELATIN COATED ORAL at 13:46

## 2025-06-11 RX ADMIN — FOLIC ACID 1 MILLIGRAM(S): 1 TABLET ORAL at 13:48

## 2025-06-11 NOTE — DISCHARGE NOTE PROVIDER - DETAILS OF MALNUTRITION DIAGNOSIS/DIAGNOSES
This patient has been assessed with a concern for Malnutrition and was treated during this hospitalization for the following Nutrition diagnosis/diagnoses:     -  06/11/2025: Severe protein-calorie malnutrition   -  06/11/2025: Underweight (BMI < 19)

## 2025-06-11 NOTE — DISCHARGE NOTE NURSING/CASE MANAGEMENT/SOCIAL WORK - FINANCIAL ASSISTANCE
[facility] provides services at a reduced cost to those who are determined to be eligible through [facility]’s financial assistance program. Information regarding [facility]’s financial assistance program can be found by going to [email or web address] or by calling [phone].

## 2025-06-11 NOTE — PROGRESS NOTE ADULT - ASSESSMENT
The pt is a 70y M with a PMHx of CKD V on dialysis, HTN, NIDDM, prior spinal abscess, nephrolithiasis and recurrent UTI s/p b/l stent placement and SPC presenting for evaluation of hypotension  ESRD on HD / sepsis   HD today  standard bath uf 1 liter as tolerated   CT Abdomen and Pelvis No Cont (06.06.25 @ 13:57) >  Increasing left-sided hydronephrosis. Bilateral nephroureteral stents in  place.  Left intrarenal and ureteral air, possibly reflecting infection.  Unchanged distended gallbladder with cholelithiasis, wall thickening and  pericholecystic stranding.  New small pericardial effusion and trace bilateral bilateral pleural  effusions   seen by /-s/p renal scan  Patient will need to f/u OP with Dr. Schilling for definitive stone management and stent exchange/removal, biopsy of L ureter   GI evaluation   h/h noted aranesp 25 weekly with HD    ID notes appreciated / on ATB   corrected calcium in the mid 8 / on calcitriol/ replete vit D / ph at goal   will follow

## 2025-06-11 NOTE — DISCHARGE NOTE NURSING/CASE MANAGEMENT/SOCIAL WORK - NSDCPEFALRISK_GEN_ALL_CORE
For information on Fall & Injury Prevention, visit: [email or web address] OR  [email or web address] OR  https://www.cdc.gov/steadi/patient.html

## 2025-06-11 NOTE — DISCHARGE NOTE PROVIDER - NSDCMRMEDTOKEN_GEN_ALL_CORE_FT
calcitriol 0.25 mcg oral capsule: 1 cap(s) orally once a day  Eliquis 5 mg oral tablet: 0.5 tab(s) orally every 12 hours  folic acid 1 mg oral tablet: 1 tab(s) orally once a day  magnesium gluconate 500 mg oral tablet: 1 tab(s) orally once a day  Multiple Vitamins oral tablet: 1 tab(s) orally once a day  pantoprazole 40 mg oral delayed release tablet: 1 tab(s) orally 2 times a day  sevelamer carbonate 0.8 g oral powder for reconstitution: 2 packet(s) orally 3 times a day (with meals)   calcitriol 0.25 mcg oral capsule: 1 cap(s) orally once a day  cholecalciferol oral tablet: 2000 unit(s) orally once a day  Eliquis 5 mg oral tablet: 0.5 tab(s) orally every 12 hours  fluconazole 200 mg oral tablet: 1 tab(s) orally once a day  folic acid 1 mg oral tablet: 1 tab(s) orally once a day  magnesium gluconate 500 mg oral tablet: 1 tab(s) orally once a day  Multiple Vitamins oral tablet: 1 tab(s) orally once a day  pantoprazole 40 mg oral delayed release tablet: 1 tab(s) orally 2 times a day  sevelamer carbonate 0.8 g oral powder for reconstitution: 2 packet(s) orally 3 times a day (with meals)

## 2025-06-11 NOTE — DISCHARGE NOTE PROVIDER - NSDCCPCAREPLAN_GEN_ALL_CORE_FT
PRINCIPAL DISCHARGE DIAGNOSIS  Diagnosis: Pyelonephritis  Assessment and Plan of Treatment: You came in with low blood pressure that occured while you were getting dialysis. We ran tests and did imaging to help diagnose and treat you. We suspected UTI and possible pyelonephritis, and you were seen by our urology team. They examined you and we did multiple tests to help rule out stent obstruction. They determined that you need to follow up outpatient for stent removal and exchange. During your admission you were treated with antibiotics and your symptoms also improved. Currently, your blood pressure is stable and your stable for discharge. Please follow up with Dr. Schilling, the urologist, after discharge.   Please take your medications as prescribed and please follow up with your providers by calling them to make an appointment so that you can see them in 1-2 weeks; bring your paperwork from this hospital stay to that visit.  Seek immediate medical attention if you develop fevers, chills, chest pain, shortness of breath, nausea and vomiting, abdominal pain, passing out, weakness or numbness or tingling on one side of your body, or any other concerning signs or symptoms.      SECONDARY DISCHARGE DIAGNOSES  Diagnosis: Acute hypotension  Assessment and Plan of Treatment:      PRINCIPAL DISCHARGE DIAGNOSIS  Diagnosis: Acute hypotension  Assessment and Plan of Treatment: You came in with low blood pressure that occured while you were getting dialysis. We ran tests and did imaging to help diagnose and treat you. We suspected a urinary tract infection, and you were seen by our urology team. They examined you and we did multiple tests to help rule out stent obstruction. They determined that you need to follow up outpatient for stent removal and exchange. During your admission you were treated with antibiotics and your symptoms also improved. Currently, your blood pressure is stable and your stable for discharge. Please follow up with Dr. Schilling, the urologist, after discharge.   Please take your medications as prescribed and please follow up with your providers by calling them to make an appointment so that you can see them in 1-2 weeks; bring your paperwork from this hospital stay to that visit.  Seek immediate medical attention if you develop fevers, chills, chest pain, shortness of breath, nausea and vomiting, abdominal pain, passing out, weakness or numbness or tingling on one side of your body, or any other concerning signs or symptoms.      SECONDARY DISCHARGE DIAGNOSES  Diagnosis: Acute hypotension  Assessment and Plan of Treatment:

## 2025-06-11 NOTE — PROGRESS NOTE ADULT - SUBJECTIVE AND OBJECTIVE BOX
seen and examined  24 h events noted   no distress         PAST HISTORY  --------------------------------------------------------------------------------  No significant changes to PMH, PSH, FHx, SHx, unless otherwise noted    ALLERGIES & MEDICATIONS  --------------------------------------------------------------------------------  Allergies    No Known Allergies    Intolerances      Standing Inpatient Medications  apixaban 2.5 milliGRAM(s) Oral every 12 hours  calcitriol   Capsule 0.25 MICROGram(s) Oral daily  chlorhexidine 2% Cloths 1 Application(s) Topical <User Schedule>  cholecalciferol 2000 Unit(s) Oral daily  dextrose 5%. 1000 milliLiter(s) IV Continuous <Continuous>  dextrose 5%. 1000 milliLiter(s) IV Continuous <Continuous>  dextrose 50% Injectable 25 Gram(s) IV Push once  dextrose 50% Injectable 12.5 Gram(s) IV Push once  dextrose 50% Injectable 25 Gram(s) IV Push once  dextrose Oral Gel 15 Gram(s) Oral once  fluconAZOLE   Tablet 200 milliGRAM(s) Oral daily  folic acid 1 milliGRAM(s) Oral daily  glucagon  Injectable 1 milliGRAM(s) IntraMuscular once  multivitamin 1 Tablet(s) Oral daily  pantoprazole    Tablet 40 milliGRAM(s) Oral before breakfast  polyethylene glycol 3350 17 Gram(s) Oral daily  senna 2 Tablet(s) Oral at bedtime  sevelamer carbonate 800 milliGRAM(s) Oral three times a day      VITALS/PHYSICAL EXAM  --------------------------------------------------------------------------------  T(C): 36.7 (06-11-25 @ 04:07), Max: 36.7 (06-10-25 @ 18:54)  HR: 73 (06-11-25 @ 04:07) (69 - 73)  BP: 158/77 (06-11-25 @ 04:07) (133/76 - 158/77)  RR: 19 (06-11-25 @ 04:07) (18 - 19)  SpO2: 100% (06-11-25 @ 04:07) (96% - 100%)  Wt(kg): --        06-10-25 @ 07:01  -  06-11-25 @ 07:00  --------------------------------------------------------  IN: 0 mL / OUT: 150 mL / NET: -150 mL      Physical Exam:  	Gen: NAD  	Pulm: CTA B/L  	CV: S1S2; no rub  	Abd: soft,/nondistended  	LE: dressings   	Vascular access:tdc     LABS/STUDIES  --------------------------------------------------------------------------------              8.0    8.28  >-----------<  245      [06-10-25 @ 07:09]              26.0     140  |  105  |  23  ----------------------------<  78      [06-10-25 @ 07:09]  4.0   |  27  |  4.2        Ca     7.8     [06-10-25 @ 07:09]      Mg     2.0     [06-10-25 @ 07:09]      Phos  4.0     [06-10-25 @ 07:09]      Creatinine Trend:  SCr 4.2 [06-10 @ 07:09]  SCr 5.7 [06-09 @ 06:18]  SCr 4.8 [06-08 @ 12:43]  SCr 3.3 [06-07 @ 08:51]  SCr 2.0 [06-06 @ 13:06]    Urinalysis - [06-10-25 @ 07:09]      Color  / Appearance  / SG  / pH       Gluc 78 / Ketone   / Bili  / Urobili        Blood  / Protein  / Leuk Est  / Nitrite       RBC  / WBC  / Hyaline  / Gran  / Sq Epi  / Non Sq Epi  / Bacteria       Iron 73, TIBC 107, %sat 68      [05-28-25 @ 11:08]  Ferritin 438      [05-04-25 @ 06:10]  PTH -- (Ca 6.1)      [05-26-25 @ 04:47]   288  PTH -- (Ca 8.3)      [07-10-24 @ 21:08]   43  Vitamin D (25OH) 11      [05-26-25 @ 04:47]  TSH 5.27      [04-30-25 @ 17:55]    HBsAb <3.3      [06-03-25 @ 10:30]  HBsAg Nonreact      [06-02-25 @ 11:28]  HBcAb Nonreact      [06-02-25 @ 11:28]  HCV 0.06, Nonreact      [06-02-25 @ 11:28]

## 2025-06-11 NOTE — PROGRESS NOTE ADULT - PROVIDER SPECIALTY LIST ADULT
Internal Medicine
Internal Medicine
Nephrology
Hospitalist
Internal Medicine
Urology
Nephrology
Urology
Infectious Disease

## 2025-06-11 NOTE — DISCHARGE NOTE PROVIDER - CARE PROVIDER_API CALL
Blake Schilling  Urology  [address] [place] [address]  Phone: [phone]  Fax: [phone]  Follow Up Time: 1 week    Kwaku Garibay  [facility]  [address] [place] [id number]  Phone: [phone]  Fax: [phone]  Follow Up Time: 1 week

## 2025-06-11 NOTE — DISCHARGE NOTE PROVIDER - HOSPITAL COURSE
70y M with a PMHx of CKD V on dialysis, HTN, NIDDM, prior spinal abscess, nephrolithiasis and recurrent UTI s/p b/l stent placement and SPC presenting for evaluation of hypotension.    #hypotension 2/2 UTI vs stent obstruction   #True infection vs Colonized   #r/o pyelonephritis  - on admission pt was hypotensive but hypotension has resolved with a 500CC bolus  - the pt has a chronic SPC with was changed recently on 5/29   - CT abd shows Increasing left-sided hydronephrosis. Bilateral nephroureteral stents in place.   - As per ID last time the pt is colonized with Proteus/Serratia/Pseudomonas and MSSA in the urine   - Only hypotension present on admission  - Previous cultures showed serratia, proteus and pseudomonas were sensitive to cefepime.  - Per urology, RBUS & NM renal scan to r/o obstruction. If Stent is obstructed/stent failure please call IR for Left PCNT.   - Per ID, c/w cefepime -- change to 1g q 24 hours for ESRD dosing , will f/u with ID following scans  - BCX negative and UCX candida  - NM renal scan and RBUS were done and unremarkable, per urology f/u with Dr. Schilling for definitive stone management and stent exchange/removal outpatient. Plan was discussed with patient's brother Erich with urology   - Per ID, will dc any abx on discharge, will discharge patient with flucanazole 200mg daily for 7 days to treat for candida bc patient is going for procedure with urology on discharge.     #ESRD on dialysis MWF  -sp TDC 6/2  -on HD  -unable to complete first HD session out/pt  - Nephro consult for in/pt HD as needed, will go for dialysis today 6/11 before discharge    #Hx SPC  -exchanged 5/29 by urology     #Chronic cholecystitis  -not septic   -asymptomatic   - hida positive   -CT shows same findings  -sexton negative  -surgery: OP surgery follow up     #Afib  -on eliquis, held for possible urological procedure  -resumed on discharge    #HTN  -controlled off meds     #Constipation   -CTAP -  Large rectal stool burden, similar compared with prior  -Bowel reg    #Normocytic anemia suspected 2/2 advanced CKD  -b12 1269, folate 12.2  -iron 95, sat elevated , ferritin 438  -no active bleeding, continue to monitor   -cont aranesp 25 per nephro   -Hgb dropped from 7.2 -> 6.7 and pt was transfused with a unit of blood on 6/8  - repeat hgb on 6/9 is 8.3  - Hgb stable on discharge    #Hypocalcemia  -corrected calcium is normal    #Vitamin D deficiency likely 2/2 CKD  -c/w vit D supplementation  -Vitamin D, 25-Hydroxy: 11    #Concern for malnutrition  -cont supplement     #very poor dentition  -outpt fu     Discussion of discharge plan of care, including discharge diagnosis, medication reconciliation, and follow-ups, was conducted with Dr. Peacock on 6/11/25 and discharge was approved.    (4) completely dependent 70y M with a PMHx of CKD V on dialysis, HTN, NIDDM, prior spinal abscess, nephrolithiasis and recurrent UTI s/p b/l stent placement and SPC presenting for evaluation of hypotension.    #hypotension 2/2 UTI vs fluid shift during hemodialysis   #UTI associated with SPC and not related to the stent   - on admission pt was hypotensive but hypotension has resolved with a 500CC bolus  - the pt has a chronic SPC with was changed recently on 5/29   - CT abd shows Increasing left-sided hydronephrosis. Bilateral nephroureteral stents in place.   - As per ID last time the pt is colonized with Proteus/Serratia/Pseudomonas and MSSA in the urine   - Only hypotension present on admission  - Previous cultures showed serratia, proteus and pseudomonas were sensitive to cefepime.  - Per urology, RBUS & NM renal scan to r/o obstruction. If Stent is obstructed/stent failure please call IR for Left PCNT.   - Per ID, c/w cefepime -- change to 1g q 24 hours for ESRD dosing , will f/u with ID following scans  - BCX negative and UCX candida  - NM renal scan and RBUS were done and unremarkable, per urology f/u with Dr. Schilling for definitive stone management and stent exchange/removal outpatient. Plan was discussed with patient's brother Erich with urology   - Per ID, will dc any abx on discharge, will discharge patient with flucanazole 200mg daily for 7 days to treat for candida bc patient is going for procedure with urology on discharge.     #ESRD on dialysis MWF  -sp TDC 6/2  -on HD  -unable to complete first HD session out/pt  - Nephro consult for in/pt HD as needed, will go for dialysis today 6/11 before discharge    #Hx SPC  -exchanged 5/29 by urology     #Chronic cholecystitis  -not septic   -asymptomatic   - hida positive   -CT shows same findings  -sexton negative  -surgery: OP surgery follow up     #Afib, chronic   -on eliquis, held for possible urological procedure  -resumed on discharge    #HTN  -controlled off meds     #Constipation   -CTAP -  Large rectal stool burden, similar compared with prior  -Bowel reg    #Normocytic anemia suspected 2/2 advanced CKD  -b12 1269, folate 12.2  -iron 95, sat elevated , ferritin 438  -no active bleeding, continue to monitor   -cont aranesp 25 per nephro   -Hgb dropped from 7.2 -> 6.7 and pt was transfused with a unit of blood on 6/8  - repeat hgb on 6/9 is 8.3  - Hgb stable on discharge    #Hypocalcemia  -corrected calcium is normal    #Vitamin D deficiency likely 2/2 CKD  -c/w vit D supplementation  -Vitamin D, 25-Hydroxy: 11    #Concern for malnutrition  -cont supplement     #very poor dentition  -outpt fu     Discussion of discharge plan of care, including discharge diagnosis, medication reconciliation, and follow-ups, was conducted with Dr. Peacock on 6/11/25 and discharge was approved.

## 2025-06-11 NOTE — DISCHARGE NOTE NURSING/CASE MANAGEMENT/SOCIAL WORK - PATIENT PORTAL LINK FT
You can access the FollowMyHealth Patient Portal offered by [facility] by registering at the following website: [email or web address]. By joining [employer]’s FollowMyHealth portal, you will also be able to view your health information using other applications (apps) compatible with our system.

## 2025-06-18 DIAGNOSIS — E43 UNSPECIFIED SEVERE PROTEIN-CALORIE MALNUTRITION: ICD-10-CM

## 2025-06-18 DIAGNOSIS — D63.1 ANEMIA IN CHRONIC KIDNEY DISEASE: ICD-10-CM

## 2025-06-18 DIAGNOSIS — K59.00 CONSTIPATION, UNSPECIFIED: ICD-10-CM

## 2025-06-18 DIAGNOSIS — I95.3 HYPOTENSION OF HEMODIALYSIS: ICD-10-CM

## 2025-06-18 DIAGNOSIS — K81.1 CHRONIC CHOLECYSTITIS: ICD-10-CM

## 2025-06-18 DIAGNOSIS — I12.0 HYPERTENSIVE CHRONIC KIDNEY DISEASE WITH STAGE 5 CHRONIC KIDNEY DISEASE OR END STAGE RENAL DISEASE: ICD-10-CM

## 2025-06-18 DIAGNOSIS — I48.20 CHRONIC ATRIAL FIBRILLATION, UNSPECIFIED: ICD-10-CM

## 2025-06-18 DIAGNOSIS — N18.6 END STAGE RENAL DISEASE: ICD-10-CM

## 2025-06-18 DIAGNOSIS — N10 ACUTE PYELONEPHRITIS: ICD-10-CM

## 2025-06-18 DIAGNOSIS — E11.22 TYPE 2 DIABETES MELLITUS WITH DIABETIC CHRONIC KIDNEY DISEASE: ICD-10-CM

## 2025-06-18 DIAGNOSIS — T83.518A INFECTION AND INFLAMMATORY REACTION DUE TO OTHER URINARY CATHETER, INITIAL ENCOUNTER: ICD-10-CM

## 2025-06-18 DIAGNOSIS — D84.81 IMMUNODEFICIENCY DUE TO CONDITIONS CLASSIFIED ELSEWHERE: ICD-10-CM

## 2025-06-18 DIAGNOSIS — E55.9 VITAMIN D DEFICIENCY, UNSPECIFIED: ICD-10-CM

## 2025-06-18 DIAGNOSIS — N39.0 URINARY TRACT INFECTION, SITE NOT SPECIFIED: ICD-10-CM

## 2025-06-19 DIAGNOSIS — N30.90 CYSTITIS, UNSPECIFIED WITHOUT HEMATURIA: ICD-10-CM

## 2025-06-19 DIAGNOSIS — E83.51 HYPOCALCEMIA: ICD-10-CM

## 2025-06-19 DIAGNOSIS — D63.1 ANEMIA IN CHRONIC KIDNEY DISEASE: ICD-10-CM

## 2025-06-19 DIAGNOSIS — Z79.01 LONG TERM (CURRENT) USE OF ANTICOAGULANTS: ICD-10-CM

## 2025-06-19 DIAGNOSIS — E87.20 ACIDOSIS, UNSPECIFIED: ICD-10-CM

## 2025-06-19 DIAGNOSIS — E87.5 HYPERKALEMIA: ICD-10-CM

## 2025-06-19 DIAGNOSIS — N18.5 CHRONIC KIDNEY DISEASE, STAGE 5: ICD-10-CM

## 2025-06-19 DIAGNOSIS — E78.5 HYPERLIPIDEMIA, UNSPECIFIED: ICD-10-CM

## 2025-06-19 DIAGNOSIS — K59.00 CONSTIPATION, UNSPECIFIED: ICD-10-CM

## 2025-06-19 DIAGNOSIS — E83.39 OTHER DISORDERS OF PHOSPHORUS METABOLISM: ICD-10-CM

## 2025-06-19 DIAGNOSIS — K82.8 OTHER SPECIFIED DISEASES OF GALLBLADDER: ICD-10-CM

## 2025-06-19 DIAGNOSIS — Z96.0 PRESENCE OF UROGENITAL IMPLANTS: ICD-10-CM

## 2025-06-19 DIAGNOSIS — K80.10 CALCULUS OF GALLBLADDER WITH CHRONIC CHOLECYSTITIS WITHOUT OBSTRUCTION: ICD-10-CM

## 2025-06-19 DIAGNOSIS — N17.9 ACUTE KIDNEY FAILURE, UNSPECIFIED: ICD-10-CM

## 2025-06-19 DIAGNOSIS — I12.0 HYPERTENSIVE CHRONIC KIDNEY DISEASE WITH STAGE 5 CHRONIC KIDNEY DISEASE OR END STAGE RENAL DISEASE: ICD-10-CM

## 2025-06-19 DIAGNOSIS — I48.91 UNSPECIFIED ATRIAL FIBRILLATION: ICD-10-CM

## 2025-06-19 DIAGNOSIS — N20.0 CALCULUS OF KIDNEY: ICD-10-CM

## 2025-06-19 DIAGNOSIS — R82.81 PYURIA: ICD-10-CM

## 2025-06-19 DIAGNOSIS — R74.01 ELEVATION OF LEVELS OF LIVER TRANSAMINASE LEVELS: ICD-10-CM

## 2025-06-19 DIAGNOSIS — K08.9 DISORDER OF TEETH AND SUPPORTING STRUCTURES, UNSPECIFIED: ICD-10-CM

## 2025-06-19 DIAGNOSIS — K21.9 GASTRO-ESOPHAGEAL REFLUX DISEASE WITHOUT ESOPHAGITIS: ICD-10-CM

## 2025-06-19 DIAGNOSIS — E11.22 TYPE 2 DIABETES MELLITUS WITH DIABETIC CHRONIC KIDNEY DISEASE: ICD-10-CM

## 2025-06-19 DIAGNOSIS — D84.81 IMMUNODEFICIENCY DUE TO CONDITIONS CLASSIFIED ELSEWHERE: ICD-10-CM

## 2025-06-19 DIAGNOSIS — G82.20 PARAPLEGIA, UNSPECIFIED: ICD-10-CM

## 2025-07-09 ENCOUNTER — APPOINTMENT (OUTPATIENT)
Dept: UROLOGY | Facility: CLINIC | Age: 70
End: 2025-07-09
Payer: MEDICARE

## 2025-07-09 PROCEDURE — 51705 CHANGE OF BLADDER TUBE: CPT

## 2025-08-06 ENCOUNTER — APPOINTMENT (OUTPATIENT)
Dept: UROLOGY | Facility: CLINIC | Age: 70
End: 2025-08-06
Payer: MEDICARE

## 2025-08-06 DIAGNOSIS — R33.9 RETENTION OF URINE, UNSPECIFIED: ICD-10-CM

## 2025-08-06 PROCEDURE — 51705 CHANGE OF BLADDER TUBE: CPT

## 2025-08-22 ENCOUNTER — APPOINTMENT (OUTPATIENT)
Dept: UROLOGY | Facility: CLINIC | Age: 70
End: 2025-08-22
Payer: MEDICARE

## 2025-08-22 VITALS
WEIGHT: 145 LBS | HEIGHT: 72 IN | SYSTOLIC BLOOD PRESSURE: 175 MMHG | RESPIRATION RATE: 18 BRPM | OXYGEN SATURATION: 100 % | HEART RATE: 65 BPM | BODY MASS INDEX: 19.64 KG/M2 | DIASTOLIC BLOOD PRESSURE: 97 MMHG

## 2025-08-22 DIAGNOSIS — N20.0 CALCULUS OF KIDNEY: ICD-10-CM

## 2025-08-22 DIAGNOSIS — E66.3 OVERWEIGHT: ICD-10-CM

## 2025-08-22 PROCEDURE — 99401 PREV MED CNSL INDIV APPRX 15: CPT

## 2025-08-22 PROCEDURE — 99215 OFFICE O/P EST HI 40 MIN: CPT | Mod: 25

## 2025-09-03 ENCOUNTER — APPOINTMENT (OUTPATIENT)
Dept: UROLOGY | Facility: CLINIC | Age: 70
End: 2025-09-03
Payer: MEDICARE

## 2025-09-03 ENCOUNTER — LABORATORY RESULT (OUTPATIENT)
Age: 70
End: 2025-09-03

## 2025-09-03 PROCEDURE — 51705 CHANGE OF BLADDER TUBE: CPT

## 2025-09-04 LAB
APPEARANCE: ABNORMAL
BASOPHILS # BLD AUTO: 0.12 K/UL
BASOPHILS NFR BLD AUTO: 2.4 %
BILIRUBIN URINE: NEGATIVE
BLOOD URINE: ABNORMAL
COLOR: ABNORMAL
EOSINOPHIL # BLD AUTO: 0.16 K/UL
EOSINOPHIL NFR BLD AUTO: 3.2 %
GLUCOSE QUALITATIVE U: 250 MG/DL
HCT VFR BLD CALC: 41.4 %
HGB BLD-MCNC: 13.1 G/DL
IMM GRANULOCYTES NFR BLD AUTO: 0.2 %
KETONES URINE: NEGATIVE MG/DL
LEUKOCYTE ESTERASE URINE: ABNORMAL
LYMPHOCYTES # BLD AUTO: 1.78 K/UL
LYMPHOCYTES NFR BLD AUTO: 36 %
MAN DIFF?: NORMAL
MCHC RBC-ENTMCNC: 31 PG
MCHC RBC-ENTMCNC: 31.6 G/DL
MCV RBC AUTO: 98.1 FL
MONOCYTES # BLD AUTO: 0.41 K/UL
MONOCYTES NFR BLD AUTO: 8.3 %
NEUTROPHILS # BLD AUTO: 2.46 K/UL
NEUTROPHILS NFR BLD AUTO: 49.9 %
NITRITE URINE: NEGATIVE
PH URINE: 8
PLATELET # BLD AUTO: 102 K/UL
PMV BLD AUTO: 0 /100 WBCS
PMV BLD: 10.9 FL
PROTEIN URINE: 300 MG/DL
RBC # BLD: 4.22 M/UL
RBC # FLD: 14.7 %
SPECIFIC GRAVITY URINE: 1.01
UROBILINOGEN URINE: 0.2 MG/DL
WBC # FLD AUTO: 4.94 K/UL

## 2025-09-06 LAB — URINE CULTURE <10: ABNORMAL

## 2025-09-19 ENCOUNTER — NON-APPOINTMENT (OUTPATIENT)
Age: 70
End: 2025-09-19